# Patient Record
Sex: MALE | Race: BLACK OR AFRICAN AMERICAN | NOT HISPANIC OR LATINO | Employment: FULL TIME | URBAN - METROPOLITAN AREA
[De-identification: names, ages, dates, MRNs, and addresses within clinical notes are randomized per-mention and may not be internally consistent; named-entity substitution may affect disease eponyms.]

---

## 2020-08-19 DIAGNOSIS — Z76.82 ORGAN TRANSPLANT CANDIDATE: Primary | ICD-10-CM

## 2020-08-24 ENCOUNTER — TELEPHONE (OUTPATIENT)
Dept: TRANSPLANT | Facility: CLINIC | Age: 60
End: 2020-08-24

## 2020-09-01 ENCOUNTER — TELEPHONE (OUTPATIENT)
Dept: TRANSPLANT | Facility: CLINIC | Age: 60
End: 2020-09-01

## 2020-10-02 DIAGNOSIS — Z76.82 ORGAN TRANSPLANT CANDIDATE: Primary | ICD-10-CM

## 2020-11-11 NOTE — PROGRESS NOTES
Spoke to patient to complete history for upcoming appointment his wife will come with him to his appointment he is aware that he have to bring a small breakfast to eat after blood is drawn he will not need a

## 2020-11-12 ENCOUNTER — HOSPITAL ENCOUNTER (OUTPATIENT)
Dept: RADIOLOGY | Facility: HOSPITAL | Age: 60
Discharge: HOME | End: 2020-11-12
Attending: NURSE PRACTITIONER
Payer: COMMERCIAL

## 2020-11-12 ENCOUNTER — TELEPHONE (OUTPATIENT)
Dept: TRANSPLANT | Facility: CLINIC | Age: 60
End: 2020-11-12

## 2020-11-12 ENCOUNTER — OFFICE VISIT (OUTPATIENT)
Dept: TRANSPLANT | Facility: CLINIC | Age: 60
End: 2020-11-12
Payer: COMMERCIAL

## 2020-11-12 VITALS
SYSTOLIC BLOOD PRESSURE: 131 MMHG | HEART RATE: 59 BPM | WEIGHT: 194.44 LBS | BODY MASS INDEX: 30.52 KG/M2 | HEIGHT: 67 IN | RESPIRATION RATE: 16 BRPM | OXYGEN SATURATION: 100 % | TEMPERATURE: 96 F | DIASTOLIC BLOOD PRESSURE: 76 MMHG

## 2020-11-12 DIAGNOSIS — D86.0 SARCOIDOSIS OF LUNG: ICD-10-CM

## 2020-11-12 DIAGNOSIS — Z76.82 ORGAN TRANSPLANT CANDIDATE: ICD-10-CM

## 2020-11-12 DIAGNOSIS — N18.6 ESRD ON HEMODIALYSIS: ICD-10-CM

## 2020-11-12 DIAGNOSIS — Z99.2 ESRD ON HEMODIALYSIS: ICD-10-CM

## 2020-11-12 DIAGNOSIS — N18.6 ANEMIA IN ESRD (END-STAGE RENAL DISEASE): ICD-10-CM

## 2020-11-12 DIAGNOSIS — I15.0 RENOVASCULAR HYPERTENSION: ICD-10-CM

## 2020-11-12 DIAGNOSIS — D86.89 NEUROSARCOIDOSIS: ICD-10-CM

## 2020-11-12 DIAGNOSIS — D63.1 ANEMIA IN ESRD (END-STAGE RENAL DISEASE): ICD-10-CM

## 2020-11-12 DIAGNOSIS — Z01.818 PRE-TRANSPLANT EVALUATION FOR CHRONIC KIDNEY DISEASE: Primary | ICD-10-CM

## 2020-11-12 PROCEDURE — 71046 XR CHEST PA AND LATERAL: ICD-10-PCS | Mod: 26,TXP,, | Performed by: RADIOLOGY

## 2020-11-12 PROCEDURE — 72170 XR PELVIS ROUTINE AP: ICD-10-PCS | Mod: 26,TXP,, | Performed by: RADIOLOGY

## 2020-11-12 PROCEDURE — 99203 PR OFFICE/OUTPT VISIT, NEW, LEVL III, 30-44 MIN: ICD-10-PCS | Mod: S$GLB,TXP,, | Performed by: TRANSPLANT SURGERY

## 2020-11-12 PROCEDURE — 99205 OFFICE O/P NEW HI 60 MIN: CPT | Mod: S$GLB,TXP,, | Performed by: NURSE PRACTITIONER

## 2020-11-12 PROCEDURE — 72170 X-RAY EXAM OF PELVIS: CPT | Mod: 26,TXP,, | Performed by: RADIOLOGY

## 2020-11-12 PROCEDURE — 76770 US EXAM ABDO BACK WALL COMP: CPT | Mod: TC,TXP,59

## 2020-11-12 PROCEDURE — 72170 X-RAY EXAM OF PELVIS: CPT | Mod: TC,TXP

## 2020-11-12 PROCEDURE — 76770 US EXAM ABDO BACK WALL COMP: CPT | Mod: 26,59,TXP, | Performed by: RADIOLOGY

## 2020-11-12 PROCEDURE — 99203 OFFICE O/P NEW LOW 30 MIN: CPT | Mod: S$GLB,TXP,, | Performed by: TRANSPLANT SURGERY

## 2020-11-12 PROCEDURE — 71046 X-RAY EXAM CHEST 2 VIEWS: CPT | Mod: TC,TXP

## 2020-11-12 PROCEDURE — 76770 US RETROPERITONEAL COMPLETE: ICD-10-PCS | Mod: 26,59,TXP, | Performed by: RADIOLOGY

## 2020-11-12 PROCEDURE — 71046 X-RAY EXAM CHEST 2 VIEWS: CPT | Mod: 26,TXP,, | Performed by: RADIOLOGY

## 2020-11-12 PROCEDURE — 99999 PR PBB SHADOW E&M-EST. PATIENT-LVL V: ICD-10-PCS | Mod: PBBFAC,TXP,, | Performed by: NURSE PRACTITIONER

## 2020-11-12 PROCEDURE — 93978 VASCULAR STUDY: CPT | Mod: 26,TXP,, | Performed by: RADIOLOGY

## 2020-11-12 PROCEDURE — 93978 VASCULAR STUDY: CPT | Mod: TC,TXP

## 2020-11-12 PROCEDURE — 93978 US DOPP ILIACS BILATERAL: ICD-10-PCS | Mod: 26,TXP,, | Performed by: RADIOLOGY

## 2020-11-12 PROCEDURE — 99999 PR PBB SHADOW E&M-EST. PATIENT-LVL V: CPT | Mod: PBBFAC,TXP,, | Performed by: NURSE PRACTITIONER

## 2020-11-12 PROCEDURE — 99205 PR OFFICE/OUTPT VISIT, NEW, LEVL V, 60-74 MIN: ICD-10-PCS | Mod: S$GLB,TXP,, | Performed by: NURSE PRACTITIONER

## 2020-11-12 RX ORDER — ACETAMINOPHEN, DIPHENHYDRAMINE HCL, PHENYLEPHRINE HCL 325; 25; 5 MG/1; MG/1; MG/1
10 TABLET ORAL NIGHTLY
COMMUNITY
End: 2022-03-10

## 2020-11-12 RX ORDER — AMLODIPINE BESYLATE 2.5 MG/1
2.5 TABLET ORAL NIGHTLY
COMMUNITY
Start: 2020-11-02 | End: 2022-03-10

## 2020-11-12 RX ORDER — ALLOPURINOL 300 MG/1
300 TABLET ORAL DAILY
Status: ON HOLD | COMMUNITY
End: 2023-05-17 | Stop reason: HOSPADM

## 2020-11-12 RX ORDER — BENZONATATE 200 MG/1
1 CAPSULE ORAL 3 TIMES DAILY PRN
Status: ON HOLD | COMMUNITY
Start: 2020-09-22 | End: 2023-05-12 | Stop reason: HOSPADM

## 2020-11-12 RX ORDER — PREDNISONE 5 MG/1
5 TABLET ORAL DAILY
Status: ON HOLD | COMMUNITY
End: 2023-05-11 | Stop reason: HOSPADM

## 2020-11-12 RX ORDER — CALCITRIOL 0.25 UG/1
0.25 CAPSULE ORAL
COMMUNITY
Start: 2020-08-30 | End: 2022-03-10

## 2020-11-12 RX ORDER — SEVELAMER CARBONATE FOR ORAL SUSPENSION 2400 MG/1
POWDER, FOR SUSPENSION ORAL
Status: ON HOLD | COMMUNITY
Start: 2020-09-06 | End: 2023-05-12 | Stop reason: HOSPADM

## 2020-11-12 RX ORDER — BECLOMETHASONE DIPROPIONATE HFA 40 UG/1
1 AEROSOL, METERED RESPIRATORY (INHALATION) 2 TIMES DAILY
COMMUNITY
End: 2022-03-10

## 2020-11-12 RX ORDER — LANOLIN ALCOHOL/MO/W.PET/CERES
400 CREAM (GRAM) TOPICAL 2 TIMES DAILY
Status: ON HOLD | COMMUNITY
End: 2023-05-12 | Stop reason: HOSPADM

## 2020-11-12 RX ORDER — NEBIVOLOL 5 MG/1
5 TABLET ORAL DAILY
COMMUNITY
End: 2022-03-10

## 2020-11-12 NOTE — TELEPHONE ENCOUNTER
Reviewed pt transplant labs.  Notified dialysis unit dietitian of the following abnormal labs via fax and requested their most recent nutrition note on this pt.  Once this note is received it will be scanned into pt's chart.    K 3.1  Alb 3.1

## 2020-11-12 NOTE — PROGRESS NOTES
PHARM.D. PRE-TRANSPLANT NOTE:    This patient's medication therapy was evaluated as part of his pre-transplant evaluation.      The following general pharmacologic concerns were noted: takes prednisone chronically    The following concerns for post-operative pain management were noted: none    The following pharmacologic concerns related to HCV therapy were noted: none      This patient's medication profile was reviewed for considerations for DAA Hepatitis C therapy:    [x]  No current inducers of CYP 3A4 or PGP  [x]  No amiodarone on this patient's EMR profile in the last 24 months  [x]  No past or current atrial fibrillation on this patient's EMR profile       Current Outpatient Medications   Medication Sig Dispense Refill    allopurinoL (ZYLOPRIM) 300 MG tablet Take 300 mg by mouth once daily.      amLODIPine (NORVASC) 2.5 MG tablet Take 2.5 mg by mouth every evening.      beclomethasone dipropionate (QVAR REDIHALER) 40 mcg/actuation HFAB Inhale 1 puff into the lungs 2 (two) times a day.      benzonatate (TESSALON) 200 MG capsule Take 1 capsule by mouth 3 (three) times daily as needed.      magnesium oxide (MAG-OX) 400 mg (241.3 mg magnesium) tablet Take 400 mg by mouth 2 (two) times a day.      melatonin 10 mg Tab Take 10 mg by mouth every evening.      nebivoloL (BYSTOLIC) 5 MG Tab Take 5 mg by mouth once daily.      predniSONE (DELTASONE) 5 MG tablet Take 5 mg by mouth once daily.      sevelamer carbonate (RENVELA) 800 mg Tab Take 800 mg by mouth 3 (three) times daily with meals.      calcitRIOL (ROCALTROL) 0.25 MCG Cap Take 0.25 mcg by mouth 3 (three) times a week.       No current facility-administered medications for this visit.          Currently Mr Parikh is responsible for preparing / administering this patient's medications on a daily basis.  I am available for consultation and can be contacted, as needed by the other members of the Kidney Transplant team.

## 2020-11-12 NOTE — LETTER
November 13, 2020        Quinn Espino  [address]  [address]  [facility] [address]  Phone: [phone]  Fax: [phone]             [facility]  [address]  [facility] [address]  Phone: [phone]   Patient: Abdoulaye Parikh   MR Number: [id number]   YOB: 1960   Date of Visit: 11/12/2020       Dear Dr. Quinn Espino    Thank you for referring Abdoulaye Parikh to me for evaluation. Attached you will find relevant portions of my assessment and plan of care.    If you have questions, please do not hesitate to call me. I look forward to following Abdoulaye Parikh along with you.    Sincerely,    Teodora Guevara, NP    Enclosure    If you would like to receive this communication electronically, please contact [email or web address] or [phone] to request [employer] access.    [employer] is a tool which provides read-only access to select patient information with whom you have a relationship. Its easy to use and provides real time access to review your patients record including encounter summaries, notes, results, and demographic information.    If you feel you have received this communication in error or would no longer like to receive these types of communications, please e-mail [email or web address]

## 2020-11-12 NOTE — PROGRESS NOTES
Transplant Surgery  Kidney Transplant Recipient Evaluation    Referring Physician: Quinn Espino  Current Nephrologist: Quinn Espino    Subjective:     Reason for Visit: evaluate transplant candidacy    History of Present Illness: Abdoulaye Parikh is a 60 y.o. year old male undergoing transplant evaluation.    Dialysis History: Abdoulaye is on hemodialysis.      Transplant History: N/A    Etiology of Renal Disease: Diabetes Mellitus - Type II (based on medical records from referral).    Review of Systems    Objective:     Physical Exam:  Constitutional:   Vitals reviewed: yes   Well-nourished and well-groomed: yes  Eyes:   Sclerae icteric: no   Extraocular movements intact: yes  GI:    Bowel sounds normal: yes   Tenderness: no    If yes, quadrant/location: not applicable   Palpable masses: no    If yes, quadrant/location: not applicable   Hepatosplenomegaly: no   Ascites: no   Hernia: no    If yes, type/location: not applicable   Surgical scars: yes    If yes, type/location: midline  Resp:   Effort normal: yes   Breath sounds normal: yes    CV:   Regular rate and rhythm: yes   Heart sounds normal: yes   Femoral pulses normal: yes   Extremities edematous: no  Skin:   Rashes or lesions present: no    If yes, describe:not applicable   Jaundice:: no    Musculoskeletal:   Gait normal: yes   Strength normal: yes  Psych:   Oriented to person, place, and time: yes   Affect and mood normal: yes    Additional comments: not applicable    Counseling: We provided Abdoulaye Parikh with a group education session today.  We discussed kidney transplantation at length with him, including risks, potential complications, and alternatives in the management of his renal failure.  The discussion included complications related to anesthesia, bleeding, infection, primary nonfunction, and ATN.  I discussed the typical postoperative course, length of hospitalization, the need for long-term immunosuppression, and the need for  long-term routine follow-up.  I discussed living-donor and -donor transplantation and the relative advantages and disadvantages of each.  I also discussed average waiting times for both living donation and  donation.  I discussed national and center-specific survival rates.  I also mentioned the potential benefit of multicenter listing to candidates listed with centers within more than one organ procurement organization.  All questions were answered.    Final determination of transplant candidacy will be made once evaluation is complete and reviewed by the Kidney & Kidney/Pancreas Selection Committee.         Transplant Surgery - Candidacy   Assessment/Plan:   Abdoulaye Parikh has end stage renal disease (ESRD) on dialysis. I see no surgical contraindication to placing a kidney transplant. Based on available information, Abdoulaye Parikh is a suitable kidney transplant candidate.     Chivo Brown MD

## 2020-11-12 NOTE — PROGRESS NOTES
INITIAL PATIENT EDUCATION NOTE    Mr. Abdoulaye Parikh was seen in pre-kidney transplant clinic for evaluation for kidney, kidney/pancreas or pancreas only transplant.  The patient attended a an individual video education session that discussed/reviewed the following aspects of transplantation: evaluation and selection committee process, UNOS waitlist management/multiple listings, types of organs offered (KDPI < 85%, KDPI > 85%, PHS increased risk, DCD, HCV+, HIV+ for HIV+ recipients and enbloc/dual), financial aspects, surgical procedures, dietary instruction pre- and post-transplant, health maintenance pre- and post-transplant, post-transplant hospitalization and outpatient follow-up, potential to participate in a research protocol, and medication management and side effects.  A question and answer session was provided after the presentation.    The patient was seen by all members of the multi-disciplinary team to include: Nephrologist/PA, Surgeon, , Transplant Coordinator, , Pharmacist and Dietician (if applicable).    The patient reviewed and signed all consents for evaluation which were witnessed and sent to scanning into the [facility] chart.    The patient was given an education book and plan for further evaluation based on his individual assessment.      The patient was encouraged to call with any questions or concerns.

## 2020-11-12 NOTE — PROGRESS NOTES
Transplant Nephrology  Kidney Transplant Recipient Evaluation    Referring Physician: Quinn Espino  Current Nephrologist: Quinn Espino    Subjective:   CC:  Initial evaluation of kidney transplant candidacy.    HPI:  Mr. Parikh is a 60 y.o. year old Black or  male who has presented to be evaluated as a potential kidney transplant recipient.  He has ESRD secondary to unknown etiology.  Patient is currently on hemodialysis started on 7/22/2020. Patient is dialyzing on MWF schedule.  Patient reports that he is tolerating dialysis well.. He has a LUE AV fistula for dialysis access. He is dialyzing for 4 hours per session .     Previous Transplant: no    HTN-- diagnosed at age 18    Sarcoidosis  involving the lung   Pulmonologist Dr RASHMI Liu  Chronic cough  Uses an inhaler     Chest CT WO 9/4/20 care everywhere  Result Impression   1. There is a small right pleural effusion, which is slightly decreased in size when compared to the previous study.  2. There is a right ventriculopleural shunt which has distal tip at the medial aspect of the base of the right hemithorax.  3. Multiple enlarged, partially calcified mediastinal lymph nodes as described, not significantly changed in the interval.  4. No acute abnormality is identified. Additional chronic findings as described are stable.     1/29/20 [facility] complete care everywhere   IMPRESSION:   1. Liver stiffness measurement of 16.3 kPa is consistent with cirrhosis.  2. Controlled attenuation parameter measurement of 360.0 dB per meter is consistent with severe hepatic steatosis.  3. There is a small right-sided pleural effusion.  4. Bowel gas obscures some the pancreas can tail.  5. Bilateral renal cysts as detailed above.      Neurosarcoidosis  V/P shunt   Neurologist Dr TYLER Mustafa [facility]/[place]   3/1/2019 CT head WO care everywhere    Result Impression     1.  shunt catheter remains in place. Ventricles are grossly stable in size and  configuration from March 1, 2019.  2. Small right frontal and left frontoparietal subdural hygromas are grossly stable from March 1, 2019.    Electronically Signed By: Yamini Mary MD 3/14/2019 9:11 AM CDT       Result Impression   1. Ventriculomegaly and pneumocephaly have resolved since the prior study.  2. Bilateral extra-axial CSF spaces have enlarged, right greater than left. There is no associated mass effect or sulcal effacement. This increase in size in extra-axial CSF spaces may be secondary to resolution of ventriculomegaly.         Hep C, s/p tx harvonii, liver cirrhosis and steatosis  Per [facility] care everywhere  Dr Saldana / GI/hepatology [facility]   [facility] 1/29/20 care everywhere  IMPRESSION:   1. Liver stiffness measurement of 16.3 kPa is consistent with cirrhosis.  2. Controlled attenuation parameter measurement of 360.0 dB per meter is consistent with severe hepatic steatosis.  3. There is a small right-sided pleural effusion.  4. Bowel gas obscures some the pancreas can tail.  5. Bilateral renal cysts as detailed above.    Electronically Signed By: Gilberto Vines MD 1/29/2020 10:38 AM CST    fx assessment   Rides stationary bike 3x/week for ~ 15-30 minutes, overall looks good .   Denies LE weakness, chest pain, SOB or claudication     Donor-yes  Kidney bx-yes ~ 1990s         Past Medical History:   Diagnosis Date    Anemia     Arthritis     Cirrhosis     Crohn's disease     Disorder of kidney and ureter     Stage 5    Encounter for blood transfusion     Gout     Hearing loss     Hepatitis     Had Hep C Cleared    Hypertension     Neurosarcoidosis 2018    Obesity     Osteoarthritis     Sarcoid neuropathy     Sarcoidosis, lung     Steatosis        Past Medical and Surgical History: Mr. Parikh  has a past medical history of Anemia, Arthritis, Cirrhosis, Crohn's disease, Disorder of kidney and ureter, Encounter for blood transfusion, Gout, Hearing loss, Hepatitis, Hypertension,  Neurosarcoidosis, Obesity, Osteoarthritis, Sarcoid neuropathy, Sarcoidosis, lung, and Steatosis.  He has a past surgical history that includes AV fistula placement (Left); Brain surgery; Joint replacement (Right); Total hip arthroplasty (Right); and CSF shunt.    Past Social and Family History: Mr. Parikh reports that he has never smoked. He has never used smokeless tobacco. He reports previous alcohol use. He reports that he does not use drugs. His family history includes COPD in his sister; Cancer in his father, maternal grandfather, and paternal grandfather; Diabetes in his father; Heart disease in his father and sister; Hypertension in his father and mother; Lung cancer in his maternal grandfather and paternal grandfather; Multiple myeloma in his father; Stroke in his father.    Review of Systems   Constitutional: Positive for unexpected weight change. Negative for activity change, appetite change, chills, fatigue and fever.   HENT: Positive for hearing loss. Negative for congestion, facial swelling, postnasal drip, rhinorrhea, sinus pressure, sore throat and trouble swallowing.         Decreased hearing on left side--wears hearing aid  Complete hearing loss on right   Eyes: Positive for visual disturbance. Negative for pain and redness.        Wears glasses   Respiratory: Positive for cough. Negative for chest tightness, shortness of breath and wheezing.         Chronic dry cough -->Hx sarcoidosis with lung involvement    Cardiovascular: Negative.  Negative for chest pain, palpitations and leg swelling.   Gastrointestinal: Positive for constipation. Negative for abdominal pain, diarrhea, nausea and vomiting.        Hx crohn's    Genitourinary: Positive for decreased urine volume. Negative for dysuria, flank pain and urgency.   Musculoskeletal: Positive for arthralgias. Negative for gait problem, neck pain and neck stiffness.        Osteoarthritis    Skin: Negative for rash.   Allergic/Immunologic: Negative for  "environmental allergies, food allergies and immunocompromised state.   Neurological: Negative for dizziness, weakness, light-headedness and headaches.        Hs neurosarcoidosis  V/P shunt     Psychiatric/Behavioral: Positive for sleep disturbance. Negative for agitation and confusion. The patient is not nervous/anxious.        Objective:   Blood pressure 131/76, pulse (!) 59, temperature 96.2 °F (35.7 °C), temperature source Oral, resp. rate 16, height 5' 7.13" (1.705 m), weight 88.2 kg (194 lb 7.1 oz), SpO2 100 %.body mass index is 30.34 kg/m².        Physical Exam  Vitals signs reviewed.   Constitutional:       Appearance: Normal appearance. He is well-developed.   HENT:      Head: Normocephalic.   Eyes:      Pupils: Pupils are equal, round, and reactive to light.   Neck:      Musculoskeletal: Normal range of motion and neck supple.   Cardiovascular:      Rate and Rhythm: Normal rate and regular rhythm.      Heart sounds: Normal heart sounds.   Pulmonary:      Effort: Pulmonary effort is normal.      Breath sounds: Normal breath sounds.   Abdominal:      General: Bowel sounds are normal.      Palpations: Abdomen is soft.   Musculoskeletal: Normal range of motion.        Arms:    Skin:     General: Skin is warm and dry.   Neurological:      Mental Status: He is alert and oriented to person, place, and time.      Motor: No abnormal muscle tone.   Psychiatric:         Behavior: Behavior normal.             Labs:  Lab Results   Component Value Date    WBC 6.04 03/30/2007    HGB 12.1 (L) 03/30/2007    HCT 37.9 (L) 03/30/2007     03/30/2007    K 4.1 03/30/2007     03/30/2007    CO2 22 (L) 03/30/2007    BUN 26 (H) 03/30/2007    CREATININE 2.2 (H) 03/30/2007    CALCIUM 9.8 03/30/2007    ALBUMIN 4.3 03/30/2007    AST 30 03/30/2007    ALT 45 (H) 03/30/2007       No results found for: PREALBUMIN, BILIRUBINUA, GGT, AMYLASE, LIPASE, PROTEINUA, NITRITE, RBCUA, WBCUA    No results found for: HLAABCTYPE    Labs were " reviewed with the patient.    Assessment:     1. Pre-transplant evaluation for chronic kidney disease    2. ESRD on hemodialysis    3. Renovascular hypertension    4. Sarcoidosis of lung    5. Neurosarcoidosis    6. Anemia in ESRD (end-stage renal disease)    7. BMI 30.0-30.9,adult        Plan:   Pulm clearance: Sarcoidosis  involving the lung   Pulmonologist Dr RASHMI Liu    Neuro clearance : Neurosarcoidosis, V/P shunt   Neurologist Dr TYLER Mustafa [facility]/[place]     Records and clearance   Hep C, s/p tx harvonii, liver cirrhosis and steatosis.  Per [facility] care everywhere  Dr Saldana / GI/hepatology [facility]       Transplant Candidacy:   Based on available information, Mr. Parikh is a high-risk kidney transplant candidate.   Meets center eligibility for accepting HCV+ donor offer - deferred to hepatology .  Patient educated on HCV+ donors. Abdoulaye is willing to accept HCV+ donor offer - yes   Patient is a candidate for KDPI > 85 kidney donor offer - no d/i weight .  Final determination of transplant candidacy will be made once workup is complete and reviewed by the selection committee.    Teodora Guevara NP       [facility] Patient Status  Functional Status: 60% - Requires occasional assistance but is able to care for needs  Physical Capacity: No Limitations

## 2020-11-13 ENCOUNTER — TELEPHONE (OUTPATIENT)
Dept: TRANSPLANT | Facility: CLINIC | Age: 60
End: 2020-11-13

## 2020-11-13 NOTE — PROGRESS NOTES
Transplant Recipient Adult Psychosocial Assessment    Abdoulaye Parikh  [address] [facility] [address]  Telephone Information:   Mobile [phone]   Home  [phone] (home)  Work  There is no work phone number on file.  E-mail  [email or web address]    Sex: male  YOB: 1960  Age: 60 y.o.    Encounter Date: 2020  U[place] Citizen: yes  Primary Language: English   Needed: no    Emergency Contact:  Name: Kalani Parikh  Relationship: wife  Address: [address] [place] [address]; [place] [address]  Phone Numbers:  [phone] (mobile)    Family/Social Support:   Number of dependents/: none  Marital history:  twice,  from first wife and currently  to Kalani since .  Other family dynamics: Pt had one sister who is  and one living brother.  Pt reports support from cousin and children as well as wife.    Household Composition:  Name: Lizbet Parikh  Age: 60 and 49  Relationship: patient and wife  Does person drive? patient drives, wife does not.      Do you and your caregivers have access to reliable transportation? yes  PRIMARY CAREGIVER: Kalani Parikh and Jethro Chaparro will be primary caregiver, phone number [phone]; [phone].      provided in-depth information to patient and caregiver regarding pre- and post-transplant caregiver role.   strongly encourages patient and caregiver to have concrete plan regarding post-transplant care giving, including back-up caregiver(s) to ensure care giving needs are met as needed.    Patient and Caregiver states understanding all aspects of caregiver role/commitment and is able/willing/committed to being caregiver to the fullest extent necessary.    Patient and Caregiver verbalizes understanding of the education provided today and caregiver responsibilities.         remains available. Patient and Caregiver agree to contact  in a timely  "manner if concerns arise.      Able to take time off work without financial concerns: yes. Pt's wife does not work outside the home and cousin is retired.      Additional Significant Others who will Assist with Transplant:  Name: Angelica Chaparro [phone]  Age: 62  City: [place] State: [place]  Relationship: cousin's wife  Does person drive? yes    Living Will: yes  Healthcare Power of : yes  Advance Directives on file: <<no information> per medical record.  Verbally reviewed LW/HCPA information.   provided patient with copy of LW/HCPA documents and provided education on completion of forms.    Living Donors: No. Education and resource information given to patient. 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    Spouse/Significant Other Employment: homemaker    Disabled: no    Monthly Income:  Salary/Wages: $5022.40  Able to afford all costs now and if transplanted, including medications: yes  Patient and Caregiver verbalizes understanding of personal responsibilities related to transplant costs and the importance of having a financial plan to ensure that patients transplant costs are fully covered.       provided fundraising information/education. Patient and Caregiververbalizes understanding.   remains available.    Insurance:   Payor/Plan Subscr  Sex Relation Sub. Ins. ID Effective Group Num   1. BLUE CROSS BL* MATT BHANDARI 1960 Male  [id number] 20 [address] [address]     Primary Insurance (for UNOS reporting): Private Insurance  Secondary Insurance (for UNOS " reporting): None, patient stated he has applied for Medicaid since starting dialysis.  Patient and Caregiver verbalizes clear understanding that patient may experience difficulty obtaining and/or be denied insurance coverage post-surgery. This includes and is not limited to disability insurance, life insurance, health insurance, burial insurance, long term care insurance, and other insurances.      Patient and Caregiver also reports understanding that future health concerns related to or unrelated to transplantation may not be covered by patient's insurance.  Resources and information provided and reviewed.     Patient and Caregiver provides verbal permission to release any necessary information to outside resources for patient care and discharge planning.  Resources and information provided are reviewed.      Dialysis Adherence: Patient reports good compliance with dialysis treatment and MD recommendations.  MAUREEN has sent compliance check letter to [facility].    Infusion Service: patient utilizing? no  Home Health: patient utilizing? no  DME: no  Pulmonary/Cardiac Rehab: none   ADLS:  Pt states ability to independently accomplish all adls.    Adherence:   Pt states current and expected compliance with all healthcare recommendations..  Adherence education and counseling provided.     Per History Section:  Past Medical History:   Diagnosis Date    Anemia     Arthritis     Cirrhosis     Crohn's disease     Disorder of kidney and ureter     Stage 5    Encounter for blood transfusion     Gout     Hearing loss     Hepatitis     Had Hep C Cleared    Hypertension     Neurosarcoidosis 2018    Obesity     Osteoarthritis     Sarcoid neuropathy     Sarcoidosis, lung     Steatosis      Social History     Tobacco Use    Smoking status: Never Smoker    Smokeless tobacco: Never Used   Substance Use Topics    Alcohol use: Not Currently     Social History     Substance and Sexual Activity   Drug Use Never     Social  "History     Substance and Sexual Activity   Sexual Activity Yes    Partners: Female       Per Today's Psychosocial:  Tobacco: none, patient denies any use.  Alcohol: none, patient denies any use.  Illicit Drugs/Non-prescribed Medications: none, patient denies any use.    Patient and Caregiver states clear understanding of the potential impact of substance use as it relates to transplant candidacy and is aware of possible random substance screening.  Substance abstinence/cessation counseling, education and resources provided and reviewed.     Arrests/DWI/Treatment/Rehab: patient denies    Psychiatric History:    Mental Health: Pt denies mental health concerns.  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  He stated he will monitor his mental health and seek support/care if he needs it.  Psychiatrist/Counselor: pt denies  Medications:  Pt denies, however he takes melatonin for sleep and reports persistent sleep problems due to worry.  Suicide/Homicide Issues: Pt denies current or past suicidal/homicidal ideation.   Safety at home: Pt denies any home safety concerns.    Knowledge: Patient and Caregiver states having clear understanding and realistic expectations regarding the potential risks and potential benefits of organ transplantation and organ donation and agrees to discuss with health care team members and support system members, as well as to utilize available resources and express questions and/or concerns in order to further facilitate the pt informed decision-making.  Resources and information provided and reviewed.    Patient and Caregiver is aware of [facility]'s affiliation and/or partnership with agencies in home health care, LTAC, SNF, [facility] and other hospitals and clinics.    Understanding: Patient and Caregiver reports having a clear understanding of the many lifetime " commitments involved with being a transplant recipient, including costs, compliance, medications, lab work, procedures, appointments, concrete and financial planning, preparedness, timely and appropriate communication of concerns, abstinence (ETOH, tobacco, illicit non-prescribed drugs), adherence to all health care team recommendations, support system and caregiver involvement, appropriate and timely resource utilization and follow-through, mental health counseling as needed/recommended, and patient and caregiver responsibilities.  Social Service Handbook, resources and detailed educational information provided and reviewed.  Educational information provided.    Patient and Caregiver also reports current and expected compliance with health care regime and states having a clear understanding of the importance of compliance.      Patient and Caregiver reports a clear understanding that risks and benefits may be involved with organ transplantation and with organ donation.       Patient and Caregiver also reports clear understanding that psychosocial risk factors may affect patient, and include but are not limited to feelings of depression, generalized anxiety, anxiety regarding dependence on others, post traumatic stress disorder, feelings of guilt and other emotional and/or mental concerns, and/or exacerbation of existing mental health concerns.  Detailed resources provided and discussed.      Patient and Caregiver agrees to access appropriate resources in a timely manner as needed and/or as recommended, and to communicate concerns appropriately.  Patient and Caregiver also reports a clear understanding of treatment options available.     Patient and Caregiver received education in a group setting.   reviewed education, provided additional information, and answered questions.    Feelings or Concerns: Pt stated the wait time is his main concern.     Coping: Identify Patient & Caregiver Strategies to  [place]:   1. 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   2. At the time of surgery - family and friends support/ listening to music   3. During post-Transplant & Recovery Period - gradual return to daily routine and previous activities    Goals: continue to work, get off dialysis.  Patient referred to Vocational Rehabilitation.    Interview Behavior: Patient and Caregiver presents as alert and oriented x 4, pleasant, good eye contact, well groomed, recall good, concentration/judgement good, average intelligence, calm, communicative, cooperative and asking and answering questions appropriately. Patient was accompanied by his wife who does not drive and presented today walking with a walker.  She stated she has chronic back pain and is about to begin treatment at a pain management clinic.           Transplant Social Work - Candidacy  Assessment/Plan:     Psychosocial Suitability:  Based on psychosocial risk factors, patient presents as low risk, due to Pt has good support, reports good adherence, a strong financial plan, concrete transportation plan, lives locally .    Recommendations/Additional Comments: recommend fundraising; discuss living donatio with family     Esme Dewitt LCSW

## 2020-11-16 ENCOUNTER — SOCIAL WORK (OUTPATIENT)
Dept: TRANSPLANT | Facility: CLINIC | Age: 60
End: 2020-11-16

## 2020-11-25 ENCOUNTER — TELEPHONE (OUTPATIENT)
Dept: TRANSPLANT | Facility: CLINIC | Age: 60
End: 2020-11-25

## 2020-11-25 NOTE — TELEPHONE ENCOUNTER
Compliance check:  Dialysis unit reports in the past three months pt has had 0 no shows, 0 AMAs, labs , no lab concerns, transportation no concerns noted and family support no concerns noted.    Reported by Susie Lyons LCSW, MARIA ESTHERW via fax back sheet

## 2020-11-25 NOTE — TELEPHONE ENCOUNTER
SOLE consent missing from patient paper work called to informed patient of missing paper work patient agreed to come a bring the consent on 12/3 when he com in for his next appointment

## 2020-12-01 ENCOUNTER — TELEPHONE (OUTPATIENT)
Dept: CARDIOLOGY | Facility: CLINIC | Age: 60
End: 2020-12-01

## 2020-12-03 ENCOUNTER — HOSPITAL ENCOUNTER (OUTPATIENT)
Dept: CARDIOLOGY | Facility: HOSPITAL | Age: 60
Discharge: HOME | End: 2020-12-03
Attending: NURSE PRACTITIONER
Payer: COMMERCIAL

## 2020-12-03 VITALS
HEIGHT: 67 IN | HEART RATE: 80 BPM | SYSTOLIC BLOOD PRESSURE: 130 MMHG | DIASTOLIC BLOOD PRESSURE: 70 MMHG | WEIGHT: 194 LBS | BODY MASS INDEX: 30.45 KG/M2

## 2020-12-03 DIAGNOSIS — Z76.82 ORGAN TRANSPLANT CANDIDATE: ICD-10-CM

## 2020-12-03 LAB
ASCENDING AORTA: 3.38 CM
AV INDEX (PROSTH): 0.87
AV MEAN GRADIENT: 2 MMHG
AV PEAK GRADIENT: 4 MMHG
AV VALVE AREA: 3.23 CM2
AV VELOCITY RATIO: 0.81
BSA FOR ECHO PROCEDURE: 2.04 M2
CV ECHO LV RWT: 0.46 CM
CV PHARM DOSE: 0.4 MG
CV STRESS BASE HR: 67 BPM
DIASTOLIC BLOOD PRESSURE: 67 MMHG
DOP CALC AO PEAK VEL: 0.94 M/S
DOP CALC AO VTI: 17.57 CM
DOP CALC LVOT AREA: 3.7 CM2
DOP CALC LVOT DIAMETER: 2.18 CM
DOP CALC LVOT PEAK VEL: 0.76 M/S
DOP CALC LVOT STROKE VOLUME: 56.82 CM3
DOP CALCLVOT PEAK VEL VTI: 15.23 CM
E WAVE DECELERATION TIME: 192.44 MSEC
E/A RATIO: 0.98
E/E' RATIO: 4.35 M/S
ECHO LV POSTERIOR WALL: 1.1 CM (ref 0.6–1.1)
END DIASTOLIC INDEX-HIGH: 170 ML/M2
END SYSTOLIC INDEX-HIGH: 70 ML/M2
FRACTIONAL SHORTENING: 35 % (ref 28–44)
INTERVENTRICULAR SEPTUM: 1.05 CM (ref 0.6–1.1)
IVRT: 119.89 MSEC
LA MAJOR: 5.01 CM
LA MINOR: 4.98 CM
LA WIDTH: 4.67 CM
LEFT ATRIUM SIZE: 3.89 CM
LEFT ATRIUM VOLUME INDEX: 38.6 ML/M2
LEFT ATRIUM VOLUME: 77.13 CM3
LEFT INTERNAL DIMENSION IN SYSTOLE: 3.08 CM (ref 2.1–4)
LEFT VENTRICLE DIASTOLIC VOLUME INDEX: 52.9 ML/M2
LEFT VENTRICLE DIASTOLIC VOLUME: 105.59 ML
LEFT VENTRICLE MASS INDEX: 93 G/M2
LEFT VENTRICLE SYSTOLIC VOLUME INDEX: 18.8 ML/M2
LEFT VENTRICLE SYSTOLIC VOLUME: 37.46 ML
LEFT VENTRICULAR INTERNAL DIMENSION IN DIASTOLE: 4.76 CM (ref 3.5–6)
LEFT VENTRICULAR MASS: 185.38 G
LV LATERAL E/E' RATIO: 3.33 M/S
LV SEPTAL E/E' RATIO: 6.25 M/S
MV PEAK A VEL: 0.51 M/S
MV PEAK E VEL: 0.5 M/S
MV STENOSIS PRESSURE HALF TIME: 55.81 MS
MV VALVE AREA P 1/2 METHOD: 3.94 CM2
OHS CV CPX 85 PERCENT MAX PREDICTED HEART RATE MALE: 137
OHS CV CPX MAX PREDICTED HEART RATE: 161
OHS CV CPX PATIENT IS FEMALE: 0
OHS CV CPX PATIENT IS MALE: 1
OHS CV CPX PEAK DIASTOLIC BLOOD PRESSURE: 67 MMHG
OHS CV CPX PEAK HEAR RATE: 75 BPM
OHS CV CPX PEAK RATE PRESSURE PRODUCT: 9450
OHS CV CPX PEAK SYSTOLIC BLOOD PRESSURE: 126 MMHG
OHS CV CPX PERCENT MAX PREDICTED HEART RATE ACHIEVED: 47
OHS CV CPX RATE PRESSURE PRODUCT PRESENTING: 8442
PISA TR MAX VEL: 2.33 M/S
PULM VEIN S/D RATIO: 1.13
PV PEAK D VEL: 0.39 M/S
PV PEAK S VEL: 0.44 M/S
RA MAJOR: 4.16 CM
RA PRESSURE: 3 MMHG
RA WIDTH: 3.59 CM
RETIRED EF AND QEF - SEE NOTES: 51 %
RIGHT VENTRICULAR END-DIASTOLIC DIMENSION: 3.6 CM
RV TISSUE DOPPLER FREE WALL SYSTOLIC VELOCITY 1 (APICAL 4 CHAMBER VIEW): 12.19 CM/S
SINUS: 3.64 CM
STJ: 2.68 CM
SYSTOLIC BLOOD PRESSURE: 126 MMHG
TDI LATERAL: 0.15 M/S
TDI SEPTAL: 0.08 M/S
TDI: 0.12 M/S
TR MAX PG: 22 MMHG
TRICUSPID ANNULAR PLANE SYSTOLIC EXCURSION: 2.34 CM
TV REST PULMONARY ARTERY PRESSURE: 25 MMHG

## 2020-12-03 PROCEDURE — 93016 STRESS TEST WITH MYOCARDIAL PERFUSION (CUPID ONLY): ICD-10-PCS | Mod: TXP,,, | Performed by: INTERNAL MEDICINE

## 2020-12-03 PROCEDURE — 93016 CV STRESS TEST SUPVJ ONLY: CPT | Mod: TXP,,, | Performed by: INTERNAL MEDICINE

## 2020-12-03 PROCEDURE — 93306 TTE W/DOPPLER COMPLETE: CPT | Mod: 26,TXP,, | Performed by: INTERNAL MEDICINE

## 2020-12-03 PROCEDURE — 63600175 PHARM REV CODE 636 W HCPCS: Mod: TXP | Performed by: NURSE PRACTITIONER

## 2020-12-03 PROCEDURE — 93018 STRESS TEST WITH MYOCARDIAL PERFUSION (CUPID ONLY): ICD-10-PCS | Mod: TXP,,, | Performed by: INTERNAL MEDICINE

## 2020-12-03 PROCEDURE — 93018 CV STRESS TEST I&R ONLY: CPT | Mod: TXP,,, | Performed by: INTERNAL MEDICINE

## 2020-12-03 PROCEDURE — 78452 STRESS TEST WITH MYOCARDIAL PERFUSION (CUPID ONLY): ICD-10-PCS | Mod: 26,TXP,, | Performed by: INTERNAL MEDICINE

## 2020-12-03 PROCEDURE — 93306 TTE W/DOPPLER COMPLETE: CPT | Mod: TXP

## 2020-12-03 PROCEDURE — A9502 TC99M TETROFOSMIN: HCPCS | Mod: TXP

## 2020-12-03 PROCEDURE — 93306 ECHO (CUPID ONLY): ICD-10-PCS | Mod: 26,TXP,, | Performed by: INTERNAL MEDICINE

## 2020-12-03 PROCEDURE — 78452 HT MUSCLE IMAGE SPECT MULT: CPT | Mod: 26,TXP,, | Performed by: INTERNAL MEDICINE

## 2020-12-03 RX ORDER — REGADENOSON 0.08 MG/ML
0.4 INJECTION, SOLUTION INTRAVENOUS ONCE
Status: COMPLETED | OUTPATIENT
Start: 2020-12-03 | End: 2020-12-03

## 2020-12-03 RX ADMIN — REGADENOSON 0.4 MG: 0.08 INJECTION, SOLUTION INTRAVENOUS at 09:12

## 2020-12-07 ENCOUNTER — TELEPHONE (OUTPATIENT)
Dept: TRANSPLANT | Facility: CLINIC | Age: 60
End: 2020-12-07

## 2020-12-07 DIAGNOSIS — R94.39 ABNORMAL STRESS TEST: Primary | ICD-10-CM

## 2020-12-07 NOTE — TELEPHONE ENCOUNTER
----- Message from Teodora Guevara NP sent at 12/4/2020 12:42 PM CST -----  Results reviewed, action needed.  Cardiology referral

## 2020-12-07 NOTE — TELEPHONE ENCOUNTER
I spoke with Mr Parikh and attempted to explain test results to the best of my ability. He was understandably concerned and I tried to reassure him that the fact he is asyptomatic is good but we must pursue further guidance since his testing is abnormal. He verbalized understanding.    ----- Message from Teodora Guevara NP sent at 12/4/2020 12:42 PM CST -----  Results reviewed, action needed.  Cardiology referral

## 2020-12-23 ENCOUNTER — PATIENT MESSAGE (OUTPATIENT)
Dept: TRANSPLANT | Facility: CLINIC | Age: 60
End: 2020-12-23

## 2020-12-28 ENCOUNTER — TELEPHONE (OUTPATIENT)
Dept: TRANSPLANT | Facility: CLINIC | Age: 60
End: 2020-12-28

## 2020-12-28 NOTE — TELEPHONE ENCOUNTER
Mr Parikh is requesting he be seen by his own cardiologist for transplant clearance as opposed to coming to [facility]. I advised that I will cancel that appt and fax his echo and stress test on to Dr Javed YORK [phone].    ----- Message from Niki Elliott sent at 12/28/2020 10:52 AM CST -----  Regarding: Advice  Contact: pt  Reason: Calling to speak with coordinator.    Communication: [phone]

## 2021-01-08 ENCOUNTER — PATIENT MESSAGE (OUTPATIENT)
Dept: TRANSPLANT | Facility: CLINIC | Age: 61
End: 2021-01-08

## 2021-01-27 ENCOUNTER — TELEPHONE (OUTPATIENT)
Dept: TRANSPLANT | Facility: CLINIC | Age: 61
End: 2021-01-27

## 2021-02-03 ENCOUNTER — TELEPHONE (OUTPATIENT)
Dept: TRANSPLANT | Facility: CLINIC | Age: 61
End: 2021-02-03

## 2021-02-03 DIAGNOSIS — Z76.82 ORGAN TRANSPLANT CANDIDATE: Primary | ICD-10-CM

## 2021-02-06 ENCOUNTER — HOSPITAL ENCOUNTER (OUTPATIENT)
Dept: RADIOLOGY | Facility: HOSPITAL | Age: 61
Discharge: HOME | End: 2021-02-06
Attending: NURSE PRACTITIONER
Payer: MEDICARE

## 2021-02-06 DIAGNOSIS — Z76.82 ORGAN TRANSPLANT CANDIDATE: ICD-10-CM

## 2021-02-06 PROCEDURE — 72192 CT PELVIS WITHOUT CONTRAST: ICD-10-PCS | Mod: 26,TXP,, | Performed by: RADIOLOGY

## 2021-02-06 PROCEDURE — 72192 CT PELVIS W/O DYE: CPT | Mod: 26,TXP,, | Performed by: RADIOLOGY

## 2021-02-06 PROCEDURE — 72192 CT PELVIS W/O DYE: CPT | Mod: TC,TXP

## 2021-02-12 ENCOUNTER — PATIENT MESSAGE (OUTPATIENT)
Dept: TRANSPLANT | Facility: CLINIC | Age: 61
End: 2021-02-12

## 2021-02-12 ENCOUNTER — COMMITTEE REVIEW (OUTPATIENT)
Dept: TRANSPLANT | Facility: CLINIC | Age: 61
End: 2021-02-12

## 2021-02-13 ENCOUNTER — PATIENT MESSAGE (OUTPATIENT)
Dept: TRANSPLANT | Facility: CLINIC | Age: 61
End: 2021-02-13

## 2021-02-26 ENCOUNTER — PATIENT MESSAGE (OUTPATIENT)
Dept: TRANSPLANT | Facility: CLINIC | Age: 61
End: 2021-02-26

## 2021-03-04 ENCOUNTER — TELEPHONE (OUTPATIENT)
Dept: TRANSPLANT | Facility: CLINIC | Age: 61
End: 2021-03-04

## 2021-03-05 ENCOUNTER — TELEPHONE (OUTPATIENT)
Dept: TRANSPLANT | Facility: CLINIC | Age: 61
End: 2021-03-05

## 2021-03-10 ENCOUNTER — TELEPHONE (OUTPATIENT)
Dept: TRANSPLANT | Facility: CLINIC | Age: 61
End: 2021-03-10

## 2021-03-13 ENCOUNTER — PATIENT MESSAGE (OUTPATIENT)
Dept: TRANSPLANT | Facility: CLINIC | Age: 61
End: 2021-03-13

## 2021-03-15 ENCOUNTER — TELEPHONE (OUTPATIENT)
Dept: TRANSPLANT | Facility: CLINIC | Age: 61
End: 2021-03-15

## 2021-03-15 DIAGNOSIS — Z76.82 ORGAN TRANSPLANT CANDIDATE: Primary | ICD-10-CM

## 2021-04-06 ENCOUNTER — TELEPHONE (OUTPATIENT)
Dept: TRANSPLANT | Facility: CLINIC | Age: 61
End: 2021-04-06

## 2021-04-06 DIAGNOSIS — Z76.82 ORGAN TRANSPLANT CANDIDATE: Primary | ICD-10-CM

## 2021-04-08 ENCOUNTER — LAB VISIT (OUTPATIENT)
Dept: LAB | Facility: HOSPITAL | Age: 61
End: 2021-04-08
Payer: MEDICARE

## 2021-04-08 DIAGNOSIS — Z76.82 ORGAN TRANSPLANT CANDIDATE: ICD-10-CM

## 2021-04-08 PROCEDURE — 36415 COLL VENOUS BLD VENIPUNCTURE: CPT | Mod: TXP | Performed by: NURSE PRACTITIONER

## 2021-04-08 PROCEDURE — 86886 COOMBS TEST INDIRECT TITER: CPT | Mod: TXP | Performed by: NURSE PRACTITIONER

## 2021-04-09 LAB — BLD GP AB SCN TITR SERPL: 2 {TITER}

## 2021-04-16 ENCOUNTER — PATIENT MESSAGE (OUTPATIENT)
Dept: RESEARCH | Facility: HOSPITAL | Age: 61
End: 2021-04-16

## 2021-04-16 PROCEDURE — 99001 SPECIMEN HANDLING PT-LAB: CPT | Mod: PO,TXP | Performed by: NURSE PRACTITIONER

## 2021-04-27 ENCOUNTER — LAB VISIT (OUTPATIENT)
Dept: LAB | Facility: HOSPITAL | Age: 61
End: 2021-04-27
Payer: COMMERCIAL

## 2021-04-27 DIAGNOSIS — Z76.82 ORGAN TRANSPLANT CANDIDATE: ICD-10-CM

## 2021-05-05 LAB
HLA FREEZE AND HOLD INTERPRETATION: NORMAL
HLAFH COLLECTION DATE: NORMAL
HPRA INTERPRETATION: NORMAL

## 2021-05-12 PROCEDURE — 86829 HLA CLASS I/II ANTIBODY QUAL: CPT | Mod: 91,PO,TXP | Performed by: NURSE PRACTITIONER

## 2021-05-12 PROCEDURE — 86829 HLA CLASS I/II ANTIBODY QUAL: CPT | Mod: PO,TXP | Performed by: NURSE PRACTITIONER

## 2021-05-26 ENCOUNTER — LAB VISIT (OUTPATIENT)
Dept: LAB | Facility: HOSPITAL | Age: 61
End: 2021-05-26
Payer: COMMERCIAL

## 2021-05-26 DIAGNOSIS — Z76.82 ORGAN TRANSPLANT CANDIDATE: ICD-10-CM

## 2021-06-21 LAB — HPRA INTERPRETATION: NORMAL

## 2021-07-12 ENCOUNTER — TELEPHONE (OUTPATIENT)
Dept: TRANSPLANT | Facility: CLINIC | Age: 61
End: 2021-07-12

## 2021-07-12 DIAGNOSIS — Z76.82 ORGAN TRANSPLANT CANDIDATE: Primary | ICD-10-CM

## 2021-07-12 PROCEDURE — 86829 HLA CLASS I/II ANTIBODY QUAL: CPT | Mod: 91,PO,TXP | Performed by: NURSE PRACTITIONER

## 2021-07-12 PROCEDURE — 86829 HLA CLASS I/II ANTIBODY QUAL: CPT | Mod: PO,TXP | Performed by: NURSE PRACTITIONER

## 2021-07-16 ENCOUNTER — LAB VISIT (OUTPATIENT)
Dept: LAB | Facility: HOSPITAL | Age: 61
End: 2021-07-16
Payer: COMMERCIAL

## 2021-07-16 DIAGNOSIS — Z76.82 ORGAN TRANSPLANT CANDIDATE: ICD-10-CM

## 2021-07-17 ENCOUNTER — LAB VISIT (OUTPATIENT)
Dept: LAB | Facility: HOSPITAL | Age: 61
End: 2021-07-17
Payer: MEDICARE

## 2021-07-17 DIAGNOSIS — Z76.82 ORGAN TRANSPLANT CANDIDATE: ICD-10-CM

## 2021-07-17 LAB — BLD GP AB SCN TITR SERPL: 2 {TITER}

## 2021-07-17 PROCEDURE — 36415 COLL VENOUS BLD VENIPUNCTURE: CPT | Mod: TXP | Performed by: NURSE PRACTITIONER

## 2021-07-17 PROCEDURE — 86886 COOMBS TEST INDIRECT TITER: CPT | Mod: TXP | Performed by: NURSE PRACTITIONER

## 2021-08-04 LAB — HPRA INTERPRETATION: NORMAL

## 2021-10-13 ENCOUNTER — TELEPHONE (OUTPATIENT)
Dept: TRANSPLANT | Facility: CLINIC | Age: 61
End: 2021-10-13

## 2021-10-13 ENCOUNTER — TELEPHONE (OUTPATIENT)
Dept: TRANSPLANT | Facility: HOSPITAL | Age: 61
End: 2021-10-13

## 2021-10-14 ENCOUNTER — TELEPHONE (OUTPATIENT)
Dept: TRANSPLANT | Facility: CLINIC | Age: 61
End: 2021-10-14

## 2021-10-27 DIAGNOSIS — Z76.82 ORGAN TRANSPLANT CANDIDATE: Primary | ICD-10-CM

## 2021-11-01 ENCOUNTER — LAB VISIT (OUTPATIENT)
Dept: LAB | Facility: HOSPITAL | Age: 61
End: 2021-11-01
Payer: MEDICARE

## 2021-11-01 DIAGNOSIS — Z76.82 ORGAN TRANSPLANT CANDIDATE: ICD-10-CM

## 2021-11-01 PROCEDURE — 36415 COLL VENOUS BLD VENIPUNCTURE: CPT | Mod: TXP | Performed by: NURSE PRACTITIONER

## 2021-11-01 PROCEDURE — 86886 COOMBS TEST INDIRECT TITER: CPT | Mod: TXP | Performed by: NURSE PRACTITIONER

## 2021-11-02 LAB — BLD GP AB SCN TITR SERPL: 4 {TITER}

## 2021-12-23 ENCOUNTER — TELEPHONE (OUTPATIENT)
Dept: TRANSPLANT | Facility: CLINIC | Age: 61
End: 2021-12-23
Payer: COMMERCIAL

## 2021-12-23 DIAGNOSIS — Z76.82 AWAITING ORGAN TRANSPLANT STATUS: Primary | ICD-10-CM

## 2021-12-24 ENCOUNTER — TELEPHONE (OUTPATIENT)
Dept: TRANSPLANT | Facility: HOSPITAL | Age: 61
End: 2021-12-24
Payer: COMMERCIAL

## 2021-12-24 ENCOUNTER — TELEPHONE (OUTPATIENT)
Dept: TRANSPLANT | Facility: CLINIC | Age: 61
End: 2021-12-24
Payer: COMMERCIAL

## 2021-12-24 ENCOUNTER — LAB VISIT (OUTPATIENT)
Dept: LAB | Facility: HOSPITAL | Age: 61
End: 2021-12-24
Payer: COMMERCIAL

## 2021-12-24 DIAGNOSIS — Z76.82 ORGAN TRANSPLANT CANDIDATE: ICD-10-CM

## 2021-12-24 LAB
HLA VIRTUAL CROSSMATCH INTERPRETATION: NORMAL
HLVXM TESTING DATE: NORMAL

## 2021-12-28 DIAGNOSIS — Z76.82 ORGAN TRANSPLANT CANDIDATE: Primary | ICD-10-CM

## 2022-01-05 ENCOUNTER — TELEPHONE (OUTPATIENT)
Dept: TRANSPLANT | Facility: CLINIC | Age: 62
End: 2022-01-05
Payer: COMMERCIAL

## 2022-01-20 ENCOUNTER — TELEPHONE (OUTPATIENT)
Dept: TRANSPLANT | Facility: CLINIC | Age: 62
End: 2022-01-20
Payer: COMMERCIAL

## 2022-01-21 NOTE — PROGRESS NOTES
On-Call Note  SCOTT# [id number]     Informed pt that he is still being considered a back-up candidate for this offer. Pt advised to continue normal routine, attend dialysis and he will be updated throughout the day. All other questions and concerns addressed. Pt verbalized understanding.

## 2022-01-21 NOTE — TELEPHONE ENCOUNTER
ON-CALL NOTE    OS# [id number]    Notified by Johnnie Mar, , that Abdoulaye Parikh is eligible for kidney offer.  Spoke with patient and identified no acute medical issues with telephone assessment. Protocol script read to patient regarding HCV+ donor offer. Patient verbalized understanding, all questions answered, patient accepts organ offer. Notified by Johnnie Mar that virtual crossmatch is negative.  Current sample of blood is available from date 12/23/21 for crossmatch.  Patient reports no sensitizing event since last blood sample for PRA received. Will notify [facility] to perform a retrospective  crossmatch per guideline.    Patient was asked if they have had a positive COVID-19 test or if they have any signs or symptoms. Informed patient that they will be tested for COVID-19 upon arrival to the hospital, unless have a previous positive result. If tested and result is positive, the transplant will not be able to occur, they will be inactivated on the wait list for 28 days per protocol and required to quarantine.     Patient accepted offer as a back-up candidate, notified of plan and states understanding.  Dialysis unit notified. Patient on stand-by.

## 2022-01-22 ENCOUNTER — TELEPHONE (OUTPATIENT)
Dept: TRANSPLANT | Facility: CLINIC | Age: 62
End: 2022-01-22
Payer: COMMERCIAL

## 2022-01-22 NOTE — TELEPHONE ENCOUNTER
On-Call Note  UNOS# [id number]    Notified by , Johnnie Florez that we are declining  the kidney for our recipient due to quality. Updated pt that kidney is not suitable and he is released from this organ offer. Pt verbalized understanding. All questions and concerns addressed. Team notified.

## 2022-02-09 DIAGNOSIS — Z76.82 ORGAN TRANSPLANT CANDIDATE: Primary | ICD-10-CM

## 2022-02-17 ENCOUNTER — LAB VISIT (OUTPATIENT)
Dept: LAB | Facility: HOSPITAL | Age: 62
End: 2022-02-17
Payer: MEDICARE

## 2022-02-17 DIAGNOSIS — Z76.82 ORGAN TRANSPLANT CANDIDATE: ICD-10-CM

## 2022-02-17 PROCEDURE — 36415 COLL VENOUS BLD VENIPUNCTURE: CPT | Mod: TXP | Performed by: NURSE PRACTITIONER

## 2022-02-17 PROCEDURE — 86886 COOMBS TEST INDIRECT TITER: CPT | Mod: TXP | Performed by: NURSE PRACTITIONER

## 2022-02-18 LAB — BLD GP AB SCN TITR SERPL: 2 {TITER}

## 2022-03-09 DIAGNOSIS — Z76.82 ORGAN TRANSPLANT CANDIDATE: Primary | ICD-10-CM

## 2022-03-10 ENCOUNTER — HOSPITAL ENCOUNTER (OUTPATIENT)
Dept: RADIOLOGY | Facility: HOSPITAL | Age: 62
Discharge: HOME | End: 2022-03-10
Attending: NURSE PRACTITIONER
Payer: COMMERCIAL

## 2022-03-10 ENCOUNTER — OFFICE VISIT (OUTPATIENT)
Dept: TRANSPLANT | Facility: CLINIC | Age: 62
End: 2022-03-10
Payer: COMMERCIAL

## 2022-03-10 ENCOUNTER — HOSPITAL ENCOUNTER (OUTPATIENT)
Dept: CARDIOLOGY | Facility: HOSPITAL | Age: 62
Discharge: HOME | End: 2022-03-10
Attending: NURSE PRACTITIONER
Payer: MEDICARE

## 2022-03-10 VITALS
WEIGHT: 180.13 LBS | BODY MASS INDEX: 28.27 KG/M2 | DIASTOLIC BLOOD PRESSURE: 73 MMHG | HEIGHT: 67 IN | SYSTOLIC BLOOD PRESSURE: 128 MMHG | HEART RATE: 68 BPM | TEMPERATURE: 97 F | RESPIRATION RATE: 16 BRPM | OXYGEN SATURATION: 98 %

## 2022-03-10 VITALS
WEIGHT: 194 LBS | SYSTOLIC BLOOD PRESSURE: 125 MMHG | BODY MASS INDEX: 30.45 KG/M2 | DIASTOLIC BLOOD PRESSURE: 70 MMHG | HEIGHT: 67 IN | HEART RATE: 61 BPM

## 2022-03-10 DIAGNOSIS — N18.6 ESRD ON HEMODIALYSIS: ICD-10-CM

## 2022-03-10 DIAGNOSIS — Z76.82 ORGAN TRANSPLANT CANDIDATE: ICD-10-CM

## 2022-03-10 DIAGNOSIS — D86.0 SARCOIDOSIS OF LUNG: ICD-10-CM

## 2022-03-10 DIAGNOSIS — D86.89 NEUROSARCOIDOSIS: ICD-10-CM

## 2022-03-10 DIAGNOSIS — Z76.82 AWAITING ORGAN TRANSPLANT STATUS: Primary | ICD-10-CM

## 2022-03-10 DIAGNOSIS — Z99.2 ESRD ON HEMODIALYSIS: ICD-10-CM

## 2022-03-10 DIAGNOSIS — I15.0 RENOVASCULAR HYPERTENSION: ICD-10-CM

## 2022-03-10 DIAGNOSIS — N25.81 SECONDARY HYPERPARATHYROIDISM: ICD-10-CM

## 2022-03-10 LAB
ASCENDING AORTA: 3.41 CM
AV INDEX (PROSTH): 0.76
AV MEAN GRADIENT: 3 MMHG
AV PEAK GRADIENT: 7 MMHG
AV VALVE AREA: 3.64 CM2
AV VELOCITY RATIO: 0.77
BSA FOR ECHO PROCEDURE: 2.04 M2
CV ECHO LV RWT: 0.32 CM
DOP CALC AO PEAK VEL: 1.3 M/S
DOP CALC AO VTI: 24.73 CM
DOP CALC LVOT AREA: 4.8 CM2
DOP CALC LVOT DIAMETER: 2.47 CM
DOP CALC LVOT PEAK VEL: 1 M/S
DOP CALC LVOT STROKE VOLUME: 89.94 CM3
DOP CALCLVOT PEAK VEL VTI: 18.78 CM
E WAVE DECELERATION TIME: 292.27 MSEC
E/A RATIO: 1.06
E/E' RATIO: 5.79 M/S
ECHO LV POSTERIOR WALL: 0.68 CM (ref 0.6–1.1)
EJECTION FRACTION: 65 %
FRACTIONAL SHORTENING: 36 % (ref 28–44)
INTERVENTRICULAR SEPTUM: 0.73 CM (ref 0.6–1.1)
LA MAJOR: 4.27 CM
LA MINOR: 4.31 CM
LA WIDTH: 3.76 CM
LEFT ATRIUM SIZE: 4.33 CM
LEFT ATRIUM VOLUME INDEX MOD: 20.7 ML/M2
LEFT ATRIUM VOLUME INDEX: 29.7 ML/M2
LEFT ATRIUM VOLUME MOD: 41.37 CM3
LEFT ATRIUM VOLUME: 59.37 CM3
LEFT INTERNAL DIMENSION IN SYSTOLE: 2.77 CM (ref 2.1–4)
LEFT VENTRICLE DIASTOLIC VOLUME INDEX: 41.71 ML/M2
LEFT VENTRICLE DIASTOLIC VOLUME: 83.42 ML
LEFT VENTRICLE MASS INDEX: 45 G/M2
LEFT VENTRICLE SYSTOLIC VOLUME INDEX: 14.4 ML/M2
LEFT VENTRICLE SYSTOLIC VOLUME: 28.8 ML
LEFT VENTRICULAR INTERNAL DIMENSION IN DIASTOLE: 4.31 CM (ref 3.5–6)
LEFT VENTRICULAR MASS: 89.7 G
LV LATERAL E/E' RATIO: 5 M/S
LV SEPTAL E/E' RATIO: 6.88 M/S
MV A" WAVE DURATION": 14.46 MSEC
MV PEAK A VEL: 0.52 M/S
MV PEAK E VEL: 0.55 M/S
MV STENOSIS PRESSURE HALF TIME: 84.76 MS
MV VALVE AREA P 1/2 METHOD: 2.6 CM2
PISA TR MAX VEL: 2.51 M/S
PULM VEIN S/D RATIO: 1.11
PV PEAK D VEL: 0.37 M/S
PV PEAK S VEL: 0.41 M/S
QEF: 68 %
RA MAJOR: 3.54 CM
RA PRESSURE: 3 MMHG
RA WIDTH: 2.99 CM
RIGHT VENTRICULAR END-DIASTOLIC DIMENSION: 3.25 CM
RV TISSUE DOPPLER FREE WALL SYSTOLIC VELOCITY 1 (APICAL 4 CHAMBER VIEW): 9.51 CM/S
SINUS: 3.03 CM
STJ: 2.93 CM
TDI LATERAL: 0.11 M/S
TDI SEPTAL: 0.08 M/S
TDI: 0.1 M/S
TR MAX PG: 25 MMHG
TRICUSPID ANNULAR PLANE SYSTOLIC EXCURSION: 2.36 CM
TV REST PULMONARY ARTERY PRESSURE: 28 MMHG

## 2022-03-10 PROCEDURE — 71046 X-RAY EXAM CHEST 2 VIEWS: CPT | Mod: TC,TXP

## 2022-03-10 PROCEDURE — 1160F RVW MEDS BY RX/DR IN RCRD: CPT | Mod: CPTII,S$GLB,TXP, | Performed by: NURSE PRACTITIONER

## 2022-03-10 PROCEDURE — 76770 US EXAM ABDO BACK WALL COMP: CPT | Mod: TC,TXP

## 2022-03-10 PROCEDURE — 93306 ECHO (CUPID ONLY): ICD-10-PCS | Mod: 26,TXP,, | Performed by: INTERNAL MEDICINE

## 2022-03-10 PROCEDURE — 76770 US RETROPERITONEAL COMPLETE: ICD-10-PCS | Mod: 26,TXP,, | Performed by: RADIOLOGY

## 2022-03-10 PROCEDURE — 71046 XR CHEST PA AND LATERAL: ICD-10-PCS | Mod: 26,TXP,, | Performed by: RADIOLOGY

## 2022-03-10 PROCEDURE — 1160F PR REVIEW ALL MEDS BY PRESCRIBER/CLIN PHARMACIST DOCUMENTED: ICD-10-PCS | Mod: CPTII,S$GLB,TXP, | Performed by: NURSE PRACTITIONER

## 2022-03-10 PROCEDURE — 1159F MED LIST DOCD IN RCRD: CPT | Mod: CPTII,S$GLB,TXP, | Performed by: NURSE PRACTITIONER

## 2022-03-10 PROCEDURE — 3078F PR MOST RECENT DIASTOLIC BLOOD PRESSURE < 80 MM HG: ICD-10-PCS | Mod: CPTII,S$GLB,TXP, | Performed by: NURSE PRACTITIONER

## 2022-03-10 PROCEDURE — 3008F BODY MASS INDEX DOCD: CPT | Mod: CPTII,S$GLB,TXP, | Performed by: NURSE PRACTITIONER

## 2022-03-10 PROCEDURE — 1159F PR MEDICATION LIST DOCUMENTED IN MEDICAL RECORD: ICD-10-PCS | Mod: CPTII,S$GLB,TXP, | Performed by: NURSE PRACTITIONER

## 2022-03-10 PROCEDURE — 3074F PR MOST RECENT SYSTOLIC BLOOD PRESSURE < 130 MM HG: ICD-10-PCS | Mod: CPTII,S$GLB,TXP, | Performed by: NURSE PRACTITIONER

## 2022-03-10 PROCEDURE — 99214 PR OFFICE/OUTPT VISIT, EST, LEVL IV, 30-39 MIN: ICD-10-PCS | Mod: S$GLB,TXP,, | Performed by: NURSE PRACTITIONER

## 2022-03-10 PROCEDURE — 99999 PR PBB SHADOW E&M-EST. PATIENT-LVL V: CPT | Mod: PBBFAC,TXP,, | Performed by: NURSE PRACTITIONER

## 2022-03-10 PROCEDURE — 3008F PR BODY MASS INDEX (BMI) DOCUMENTED: ICD-10-PCS | Mod: CPTII,S$GLB,TXP, | Performed by: NURSE PRACTITIONER

## 2022-03-10 PROCEDURE — 99214 OFFICE O/P EST MOD 30 MIN: CPT | Mod: S$GLB,TXP,, | Performed by: NURSE PRACTITIONER

## 2022-03-10 PROCEDURE — 76770 US EXAM ABDO BACK WALL COMP: CPT | Mod: 26,TXP,, | Performed by: RADIOLOGY

## 2022-03-10 PROCEDURE — 3074F SYST BP LT 130 MM HG: CPT | Mod: CPTII,S$GLB,TXP, | Performed by: NURSE PRACTITIONER

## 2022-03-10 PROCEDURE — 3078F DIAST BP <80 MM HG: CPT | Mod: CPTII,S$GLB,TXP, | Performed by: NURSE PRACTITIONER

## 2022-03-10 PROCEDURE — 93306 TTE W/DOPPLER COMPLETE: CPT | Mod: TXP

## 2022-03-10 PROCEDURE — 99999 PR PBB SHADOW E&M-EST. PATIENT-LVL V: ICD-10-PCS | Mod: PBBFAC,TXP,, | Performed by: NURSE PRACTITIONER

## 2022-03-10 PROCEDURE — 71046 X-RAY EXAM CHEST 2 VIEWS: CPT | Mod: 26,TXP,, | Performed by: RADIOLOGY

## 2022-03-10 PROCEDURE — 93306 TTE W/DOPPLER COMPLETE: CPT | Mod: 26,TXP,, | Performed by: INTERNAL MEDICINE

## 2022-03-10 RX ORDER — TEMAZEPAM 15 MG/1
15 CAPSULE ORAL NIGHTLY PRN
COMMUNITY
End: 2023-04-25

## 2022-03-10 RX ORDER — METOPROLOL SUCCINATE 25 MG/1
12.5 TABLET, EXTENDED RELEASE ORAL
Status: ON HOLD | COMMUNITY
End: 2023-05-12 | Stop reason: HOSPADM

## 2022-03-10 RX ORDER — FLUTICASONE FUROATE 100 UG/1
100 POWDER RESPIRATORY (INHALATION) DAILY
COMMUNITY
End: 2023-06-30

## 2022-03-10 NOTE — PROGRESS NOTES
Kidney Transplant Recipient Reevalulation    Referring Physician: Quinn Espino  Current Nephrologist: Quinn Espino  Waitlist Status: active  Dialysis Start Date: 7/22/2020    Subjective:     CC:  Annual reassessment of kidney transplant candidacy.    HPI:  Mr. Parikh is a 61 y.o. year old Black or  male with ESRD secondary to unknown etiology.  He has been on the wait list for a kidney transplant at [facility] since 7/22/2020. Patient is currently on hemodialysis started on 7/22/2020. Patient is dialyzing on MWF schedule.  Patient reports that he is tolerating dialysis well.. He has a LUE AV fistula. Running 3 hours, 15 minutes per session, no hypotension with dialysis treatments. Patient denies any recent hospitalizations or ED visits.    Neurosarcoidosis-has  shunt    Sarcoidosis of lung-stable with no acute issues, on prednisone    HX HCV infection s/p Harvoni treatment 2020    Had positive stress test followed by [facility] 1/2021 with no evidence of obstructive CAD.    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 Exercises at home on stationary bike. Looks great, not frail.      CXR 3/10/2022: No acute cardiopulmonary disease and no significant interval change.   shunt tube remains in place. Stable mild blunting of the right costophrenic angle.    Renal US 3/10/2022: Findings consistent with bilateral chronic medical disease. 6 mm non obstructing left nephrolith. Bilateral simple cysts, similar prior.    Echo 3/10/2022: EF 68%, PA 28        Current Outpatient Medications   Medication Sig Dispense Refill    allopurinoL (ZYLOPRIM) 300 MG tablet Take 300 mg by mouth once daily.      benzonatate (TESSALON) 200 MG capsule Take 1 capsule by mouth 3 (three) times daily as needed.      fluticasone furoate (ARNUITY ELLIPTA) 100 mcg/actuation inhaler Inhale 100 mcg into the lungs once daily. Controller      magnesium oxide (MAG-OX) 400 mg (241.3 mg magnesium) tablet Take 400 mg  by mouth 2 (two) times a day.      metoprolol succinate (TOPROL-XL) 25 MG 24 hr tablet Take 12.5 mg by mouth every Tuesday, Thursday, Saturday, Sunday.      predniSONE (DELTASONE) 5 MG tablet Take 5 mg by mouth once daily.      sevelamer carbonate (RENVELA) 800 mg Tab Take 1,600 mg by mouth 3 (three) times daily with meals.      temazepam (RESTORIL) 15 mg Cap Take 15 mg by mouth nightly as needed.       No current facility-administered medications for this visit.       Past Medical History:   Diagnosis Date    Anemia     Arthritis     Cirrhosis     Crohn's disease     Disorder of kidney and ureter     Stage 5    Encounter for blood transfusion     Gout     Hearing loss     Hepatitis     Had Hep C Cleared    Hypertension     Neurosarcoidosis 2018    Obesity     Osteoarthritis     Sarcoid neuropathy     Sarcoidosis, lung     Steatosis        Review of Systems   Constitutional: Negative for activity change, appetite change and fever.   HENT: Positive for hearing loss. Negative for congestion, mouth sores and sore throat.    Eyes: Positive for visual disturbance.        Wears glasses   Respiratory: Negative for cough, chest tightness and shortness of breath.    Cardiovascular: Negative for chest pain, palpitations and leg swelling.   Gastrointestinal: Negative for abdominal distention, abdominal pain, constipation, diarrhea and nausea.   Genitourinary: Positive for decreased urine volume. Negative for difficulty urinating, frequency and hematuria.        Voiding ~3x/day   Musculoskeletal: Negative for arthralgias and gait problem.   Skin: Negative for wound.   Allergic/Immunologic: Negative for environmental allergies, food allergies and immunocompromised state.   Neurological: Negative for dizziness, weakness and numbness.   Psychiatric/Behavioral: Negative for sleep disturbance. The patient is not nervous/anxious.        Objective:   body mass index is 28.04 kg/m².  /73 (BP Location: Right  "arm, Patient Position: Sitting, BP Method: Medium (Automatic))   Pulse 68   Temp 97.3 °F (36.3 °C) (Temporal)   Resp 16   Ht 5' 7.21" (1.707 m)   Wt 81.7 kg (180 lb 1.9 oz)   SpO2 98%   BMI 28.04 kg/m²     Physical Exam  Vitals and nursing note reviewed.   Constitutional:       Appearance: Normal appearance.   HENT:      Head: Normocephalic.   Cardiovascular:      Rate and Rhythm: Normal rate and regular rhythm.      Heart sounds: Normal heart sounds.   Pulmonary:      Effort: Pulmonary effort is normal.      Breath sounds: Normal breath sounds.   Abdominal:      General: Bowel sounds are normal. There is no distension.      Palpations: Abdomen is soft.      Tenderness: There is no abdominal tenderness.   Musculoskeletal:         General: Normal range of motion.      Comments: LUE AV fistula + thrill   Skin:     General: Skin is warm and dry.   Neurological:      General: No focal deficit present.      Mental Status: He is alert.   Psychiatric:         Behavior: Behavior normal.         Labs:  Lab Results   Component Value Date    WBC 7.82 11/12/2020    HGB 11.7 (L) 11/12/2020    HCT 36.3 (L) 11/12/2020     11/12/2020    K 3.1 (L) 11/12/2020    CL 93 (L) 11/12/2020    CO2 31 (H) 11/12/2020    BUN 16 11/12/2020    CREATININE 4.3 (H) 11/12/2020    EGFRNONAA 14.1 (A) 11/12/2020    CALCIUM 9.8 11/12/2020    PHOS 2.9 11/12/2020    ALBUMIN 3.1 (L) 11/12/2020    AST 21 11/12/2020    ALT 19 11/12/2020    .0 (H) 11/12/2020       Lab Results   Component Value Date    CPRA 0 03/20/2021    CPRA 0 03/20/2021    CPRA 0 03/20/2021    OR9TZSA Negative 03/20/2021    CIIAB Negative 03/20/2021       Labs were reviewed with the patient.    Pre-transplant Workup:   Reviewed with the patient.    Assessment:     1. Awaiting organ transplant status    2. ESRD on hemodialysis    3. Neurosarcoidosis    4. Renovascular hypertension    5. Sarcoidosis of lung    6. Secondary hyperparathyroidism    7. BMI 28.0-28.9,adult  "       Plan:     Transplant Candidacy:   Mr. Parikh is a suitable kidney transplant candidate.  Meets center eligibility for accepting HCV+ donor offer - no.  Patient educated on HCV+ donors. Abdoulaye is willing  to accept HCV+ donor offer -  no   Patient is a candidate for KDPI > 85 kidney donor offer - no.  He remains in overall stable health, and will remain active on the transplant list.    Patient advised that it is recommended that all transplant candidates, and their close contacts and household members receive Covid vaccination.    Kristina Hernandez NP       Follow-up:   In addition to the tests noted in the plan, Mr. Parikh will continue to have reevaluation as per the standing pre-kidney transplant protocol:  1. Monthly blood for PRA  2. Annual return to clinic, except HIV positive, > 65 years of age, or pancreas transplant candidates who will be scheduled to see transplant every 6 months while in pre-transplant phase  3. Annual re-testing: CXR, EKG, yearly mammograms for women over 40 and PSA for males over 40, cardiology follow-up as recommended by initial cardiology pre-transplant evaluation  4. Renal ultrasound every 2 years  5. Baseline colonoscopy after age 50 and repeated as recommended    UNOS Patient Status  Functional Status: 60% - Requires occasional assistance but is able to care for needs  Physical Capacity: No Limitations

## 2022-03-10 NOTE — LETTER
March 14, 2022        Quinn Espino  [address]  [address]  [facility] [address]  Phone: [phone]  Fax: [phone]             [facility] 1st Fl  [address]  [facility] [address]  Phone: [phone]   Patient: Abdoulaye Parikh   MR Number: [id number]   YOB: 1960   Date of Visit: 3/10/2022       Dear Dr. Quinn Espino    Thank you for referring Abdoulaye Parikh to me for evaluation. Attached you will find relevant portions of my assessment and plan of care.    If you have questions, please do not hesitate to call me. I look forward to following Abdoulaye Parikh along with you.    Sincerely,    Kristina Hernanedz, AUDREY    Enclosure    If you would like to receive this communication electronically, please contact [email or web address] or [phone] to request [employer] access.    [employer] is a tool which provides read-only access to select patient information with whom you have a relationship. Its easy to use and provides real time access to review your patients record including encounter summaries, notes, results, and demographic information.    If you feel you have received this communication in error or would no longer like to receive these types of communications, please e-mail [email or web address]

## 2022-03-11 NOTE — PROGRESS NOTES
Transplant Recipient Adult Psychosocial Assessment (Updated from 2020)    Abdoulaye Parikh  [address] [facility] [address]  Telephone Information:   Mobile [phone]   Home  [phone] (home)  Work  [phone] (work)  E-mail  [email or web address]    Sex: male  YOB: 1960  Age: 61 y.o.    Encounter Date: 3/10/2022  [place] Citizen: yes  Primary Language: English   Needed: no    Emergency Contact:  Name: Kalani Parikh  Relationship: wife  Address: [address] [place] [address]  Phone Numbers:  [phone] (mobile)    Family/Social Support:   Number of dependents/: none  Marital history:  twice,  from first wife and currently  to Kalani since .  Other family dynamics: Pt had one sister who is  and one living brother.  Pt reports support from cousin (Jethro Chaparro) and his wife (Angelica).    Household Composition:  Name: Lizbet Parikh  Age: 60 and 60  Relationship: patient and wife  Does person drive? patient drives, wife does not.     Milly Ricardo, 86 (mother in law); Willian Ricardo, 91 (father in law); Elenora Ricardo, 58 (sister in law) also reside in the home.       Do you and your caregivers have access to reliable transportation? yes  PRIMARY CAREGIVER: Kalani Parikh and Jethro Chaparro will be primary caregivers, phone number Kalani: [phone]; Jethro: [phone].      provided in-depth information to patient and caregiver regarding pre- and post-transplant caregiver role.   strongly encourages patient and caregiver to have concrete plan regarding post-transplant care giving, including back-up caregiver(s) to ensure care giving needs are met as needed.    Patient and Caregiver states understanding all aspects of caregiver role/commitment and is able/willing/committed to being caregiver to the fullest extent necessary.    Patient and Caregiver verbalizes understanding of the  "education provided today and caregiver responsibilities.         remains available. Patient and Caregiver agree to contact  in a timely manner if concerns arise.      Able to take time off work without financial concerns: yes. Pt's wife does not work outside the home and cousin is retired.      Additional Significant Others who will Assist with Transplant:  Name: Angelica Chaparro [phone]  Age: 62  City: [place] State: [place]  Relationship: cousin's wife  Does person drive? yes     Name: Jennifer Silva  Age: 59  City[place], [place]  Does person drive? yes    Living Will: yes  Healthcare Power of : yes  Advance Directives on file: <<no information> per medical record.  Verbally reviewed LW/HCPA information.   provided patient with copy of LW/HCPA documents and provided education on completion of forms.    Living Donors: No. Education and resource information given to patient. 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    Spouse/Significant Other Employment: homemaker    Disabled: no    Monthly Income:  Salary/Wages: $5022.40  Able to afford all costs now and if transplanted, including medications: yes  Patient and Caregiver verbalizes understanding of personal responsibilities related to transplant costs and the importance of having a financial plan to ensure that patients transplant costs are fully covered.       provided fundraising information/education. Patient and Caregiververbalizes understanding.   remains available.    Insurance:     Payer/Plan Subscr  Sex Relation Sub. Ins. ID Effective Group " Num   1. BLUE CROSS BL* MATT BHANDARI 1960 Male Self [id number] 1/1/20 [id number]                                   [address] [address]   2. MEDICARE - ME* MATT BHANDARI 1960 Male Self [id number] 10/1/20                                    [address]       Primary Insurance (for UNOS reporting): Private Insurance  Secondary Insurance (for UNOS reporting): Public Insurance - Medicare FFS (Fee For Service)  Patient and Caregiver verbalizes clear understanding that patient may experience difficulty obtaining and/or be denied insurance coverage post-surgery. This includes and is not limited to disability insurance, life insurance, health insurance, burial insurance, long term care insurance, and other insurances.      Patient and Caregiver also reports understanding that future health concerns related to or unrelated to transplantation may not be covered by patient's insurance.  Resources and information provided and reviewed.     Patient and Caregiver provides verbal permission to release any necessary information to outside resources for patient care and discharge planning.  Resources and information provided are reviewed.      Dialysis Adherence: Patient reports good compliance with dialysis treatment and MD recommendations.  [facility]. MWF 2-6pm.    Infusion Service: patient utilizing? no  Home Health: patient utilizing? no  DME: no  Pulmonary/Cardiac Rehab: none   ADLS:  Pt states ability to independently accomplish all adls.    Adherence:   Pt states current and expected compliance with all healthcare recommendations..  Adherence education and counseling provided.     Per History Section:  Past Medical History:   Diagnosis Date    Anemia     Arthritis     Cirrhosis     Crohn's disease     Disorder of kidney and ureter     Stage 5    Encounter for blood transfusion     Gout     Hearing loss     Hepatitis     Had Hep C Cleared    Hypertension     Neurosarcoidosis 2018    Obesity      Osteoarthritis     Sarcoid neuropathy     Sarcoidosis, lung     Steatosis      Social History     Tobacco Use    Smoking status: Never Smoker    Smokeless tobacco: Never Used   Substance Use Topics    Alcohol use: Not Currently     Social History     Substance and Sexual Activity   Drug Use Never     Social History     Substance and Sexual Activity   Sexual Activity Yes    Partners: Female       Per Today's Psychosocial:  Tobacco: none, patient denies any use.  Alcohol: none, patient denies any use.  Illicit Drugs/Non-prescribed Medications: none, patient denies any use.    Patient and Caregiver states clear understanding of the potential impact of substance use as it relates to transplant candidacy and is aware of possible random substance screening.  Substance abstinence/cessation counseling, education and resources provided and reviewed.     Arrests/DWI/Treatment/Rehab: patient denies    Psychiatric History:    Mental Health: Pt denies mental health concerns.  Pt did acknowledge some anxiety.  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  He stated he will monitor his mental health and seek support/care if he needs it. Pt has EAP available through work if needed.  Psychiatrist/Counselor: pt denies  Medications:  Pt denies  Suicide/Homicide Issues: Pt denies current or past suicidal/homicidal ideation.   Safety at home: Pt denies any home safety concerns.    Knowledge: Patient and Caregiver states having clear understanding and realistic expectations regarding the potential risks and potential benefits of organ transplantation and organ donation and agrees to discuss with health care team members and support system members, as well as to utilize available resources and express questions and/or concerns in order to further facilitate the pt informed decision-making.  Resources and information provided and reviewed.    Patient and Caregiver is aware of [facility]'s affiliation  and/or partnership with agencies in home health care, LTAC, SNF, [facility], and other hospitals and clinics.    Understanding: Patient and Caregiver reports having a clear understanding of the many lifetime commitments involved with being a transplant recipient, including costs, compliance, medications, lab work, procedures, appointments, concrete and financial planning, preparedness, timely and appropriate communication of concerns, abstinence (ETOH, tobacco, illicit non-prescribed drugs), adherence to all health care team recommendations, support system and caregiver involvement, appropriate and timely resource utilization and follow-through, mental health counseling as needed/recommended, and patient and caregiver responsibilities.  Social Service Handbook, resources and detailed educational information provided and reviewed.  Educational information provided.    Patient and Caregiver also reports current and expected compliance with health care regime and states having a clear understanding of the importance of compliance.      Patient and Caregiver reports a clear understanding that risks and benefits may be involved with organ transplantation and with organ donation.       Patient and Caregiver also reports clear understanding that psychosocial risk factors may affect patient, and include but are not limited to feelings of depression, generalized anxiety, anxiety regarding dependence on others, post traumatic stress disorder, feelings of guilt and other emotional and/or mental concerns, and/or exacerbation of existing mental health concerns.  Detailed resources provided and discussed.      Patient and Caregiver agrees to access appropriate resources in a timely manner as needed and/or as recommended, and to communicate concerns appropriately.  Patient and Caregiver also reports a clear understanding of treatment options available.     Patient and Caregiver received education in a group setting.    reviewed education, provided additional information, and answered questions.    Feelings or Concerns: Pt stated the wait time is his main concern.     Coping: Identify Patient & Caregiver Strategies to [place]:   1. 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   2. At the time of surgery - family and friends support/ listening to music, TV, reading   3. During post-Transplant & Recovery Period - family support, TV, reading, music, and gradual return to daily routine and previous activities    Goals:  Pt reported that his goal is to get off dialysis and continue to work.   Patient referred to Vocational Rehabilitation.    Interview Behavior: Patient and Caregiver presents as alert and oriented x 4, pleasant, good eye contact, well groomed, recall good, concentration/judgement good, average intelligence, calm, communicative, cooperative and asking and answering questions appropriately. Patient was accompanied by his wife who was engaged and supportive.          Transplant Social Work - Candidacy  Assessment/Plan:     Psychosocial Suitability:  Based on psychosocial risk factors, patient presents as low risk, due to Pt has adequate caregiver support, reports good adherence, adequate financial plan, adequate transportation plan. .    Recommendations/Additional Comments: SW recommends that pt conduct fundraising to assist pt with pay for cost of medications, food, gas, and other transplant related needs. SW recommends that pt remain aware of potential mental health concerns and contact the team if any concerns arise.  SW recommends that pt remain abstinent from tobacco, ETOH, and drug use.  SW supports pt's continued adherence. SW remains available to answer any questions or concerns that arise as the pt moves through the transplant process.       Tavia Sanchez, KARLA-BACS

## 2022-03-30 PROCEDURE — 99001 SPECIMEN HANDLING PT-LAB: CPT | Mod: PO,TXP | Performed by: NURSE PRACTITIONER

## 2022-04-01 ENCOUNTER — LAB VISIT (OUTPATIENT)
Dept: LAB | Facility: HOSPITAL | Age: 62
End: 2022-04-01
Payer: COMMERCIAL

## 2022-04-01 DIAGNOSIS — Z76.82 ORGAN TRANSPLANT CANDIDATE: ICD-10-CM

## 2022-04-19 ENCOUNTER — TELEPHONE (OUTPATIENT)
Dept: TRANSPLANT | Facility: CLINIC | Age: 62
End: 2022-04-19
Payer: COMMERCIAL

## 2022-04-25 ENCOUNTER — TELEPHONE (OUTPATIENT)
Dept: TRANSPLANT | Facility: CLINIC | Age: 62
End: 2022-04-25
Payer: COMMERCIAL

## 2022-04-25 NOTE — TELEPHONE ENCOUNTER
Informed patient that both living donor coordinators attempted to contact his potential donor last week and both received no answer.  Asked patient to call us if he is still interested.  Patient verbalized understanding to all information provided.

## 2022-04-25 NOTE — TELEPHONE ENCOUNTER
----- Message from Kenisha Borrego sent at 4/25/2022  9:00 AM CDT -----  Regarding: Speak with office  Contact: Abdoulaye Pearl is calling to speak with living donor coordinator about someone that filled out form to be a potential donor.      [phone]

## 2022-04-28 ENCOUNTER — TELEPHONE (OUTPATIENT)
Dept: TRANSPLANT | Facility: CLINIC | Age: 62
End: 2022-04-28
Payer: COMMERCIAL

## 2022-04-29 NOTE — PROGRESS NOTES
"On-Call Note  UNOS# [id number]    Spoke with pt who was at  and informed him that there are no updates at this time. Pt is still being considered a back-up candidate for this case. Pt is aware that calls may come in as "Private" or "Unknown" and to answer the phone. All other questions and concerns were addressed. Pt advised to continue normal routine at this time.        "

## 2022-04-29 NOTE — TELEPHONE ENCOUNTER
ON-CALL NOTE    OS# [id number]    Notified by Johnnie Mar, , that Abdoulaye Parikh is eligible for kidney offer.  Spoke with patient and identified no acute medical issues with telephone assessment. Protocol script read to patient regarding PHS risk criteria donor offer . Patient verbalized understanding, all questions answered, patient accepts organ offer. Notified by Johnnie Mar that virtual crossmatch is negative.  Current sample of blood is available from date 3/30/22 for crossmatch.  Patient reports no sensitizing event since last blood sample for PRA received. Will notify [facility] to perform a retrospective  crossmatch per guideline if patient admitted for transplant.    Patient was asked if they have had a positive COVID-19 test or if they have any signs or symptoms. Informed patient that they will be tested for COVID-19 upon arrival to the hospital, unless have a previous positive result. If tested and result is positive, the transplant will not be able to occur, they will be inactivated on the wait list for 28 days per protocol and required to quarantine.     Patient notified of plan and accepts offer as backup candidate. Instructed patient to continue normal routine. Dialysis unit to be notified if patient admitted.    toe

## 2022-05-01 ENCOUNTER — TELEPHONE (OUTPATIENT)
Dept: TRANSPLANT | Facility: CLINIC | Age: 62
End: 2022-05-01
Payer: COMMERCIAL

## 2022-05-01 NOTE — TELEPHONE ENCOUNTER
I was informed by Sathya kimball that the kidney transplant case [id number] is being shut down due to an artery of kidney would not flush ( kidney quality). Patient notified that case is shut down and he is released from case. Patient verbalized understanding.

## 2022-05-10 PROCEDURE — 86829 HLA CLASS I/II ANTIBODY QUAL: CPT | Mod: 91,PO,TXP | Performed by: NURSE PRACTITIONER

## 2022-05-10 PROCEDURE — 86829 HLA CLASS I/II ANTIBODY QUAL: CPT | Mod: PO,TXP | Performed by: NURSE PRACTITIONER

## 2022-05-19 DIAGNOSIS — Z76.82 AWAITING ORGAN TRANSPLANT STATUS: Primary | ICD-10-CM

## 2022-05-23 ENCOUNTER — LAB VISIT (OUTPATIENT)
Dept: LAB | Facility: HOSPITAL | Age: 62
End: 2022-05-23
Payer: COMMERCIAL

## 2022-05-23 DIAGNOSIS — Z76.82 ORGAN TRANSPLANT CANDIDATE: ICD-10-CM

## 2022-05-30 DIAGNOSIS — Z76.82 AWAITING ORGAN TRANSPLANT STATUS: Primary | ICD-10-CM

## 2022-06-02 ENCOUNTER — LAB VISIT (OUTPATIENT)
Dept: LAB | Facility: HOSPITAL | Age: 62
End: 2022-06-02
Payer: MEDICARE

## 2022-06-02 DIAGNOSIS — Z76.82 AWAITING ORGAN TRANSPLANT STATUS: ICD-10-CM

## 2022-06-02 LAB — BLD GP AB SCN TITR SERPL: 1 {TITER}

## 2022-06-02 PROCEDURE — 86886 COOMBS TEST INDIRECT TITER: CPT | Mod: TXP | Performed by: NURSE PRACTITIONER

## 2022-06-02 PROCEDURE — 36415 COLL VENOUS BLD VENIPUNCTURE: CPT | Mod: TXP | Performed by: NURSE PRACTITIONER

## 2022-06-04 ENCOUNTER — PATIENT MESSAGE (OUTPATIENT)
Dept: TRANSPLANT | Facility: CLINIC | Age: 62
End: 2022-06-04
Payer: COMMERCIAL

## 2022-06-07 LAB — HPRA INTERPRETATION: NORMAL

## 2022-07-19 ENCOUNTER — TELEPHONE (OUTPATIENT)
Dept: TRANSPLANT | Facility: CLINIC | Age: 62
End: 2022-07-19
Payer: COMMERCIAL

## 2022-07-19 NOTE — TELEPHONE ENCOUNTER
Patient calling to ask if crossmatch completed with possible living donor. Informed that blood was not received from donor.    ----- Message from Prvaeen Francis sent at 7/19/2022  2:35 PM CDT -----  Regarding: speak to nurse  Patient is calling to speak to nurse., reason unspecified.    Contact: [phone]

## 2022-07-26 ENCOUNTER — LAB VISIT (OUTPATIENT)
Dept: LAB | Facility: HOSPITAL | Age: 62
End: 2022-07-26
Payer: COMMERCIAL

## 2022-07-26 DIAGNOSIS — Z76.82 ORGAN TRANSPLANT CANDIDATE: ICD-10-CM

## 2022-07-26 PROCEDURE — 86829 HLA CLASS I/II ANTIBODY QUAL: CPT | Mod: 91,PO,TXP | Performed by: NURSE PRACTITIONER

## 2022-07-26 PROCEDURE — 86829 HLA CLASS I/II ANTIBODY QUAL: CPT | Mod: PO,TXP | Performed by: NURSE PRACTITIONER

## 2022-07-30 ENCOUNTER — PATIENT MESSAGE (OUTPATIENT)
Dept: TRANSPLANT | Facility: CLINIC | Age: 62
End: 2022-07-30
Payer: COMMERCIAL

## 2022-07-30 LAB — HPRA INTERPRETATION: NORMAL

## 2022-08-08 PROCEDURE — 99001 SPECIMEN HANDLING PT-LAB: CPT | Mod: PO,TXP | Performed by: NURSE PRACTITIONER

## 2022-08-12 ENCOUNTER — LAB VISIT (OUTPATIENT)
Dept: LAB | Facility: HOSPITAL | Age: 62
End: 2022-08-12
Payer: COMMERCIAL

## 2022-08-12 DIAGNOSIS — Z76.82 ORGAN TRANSPLANT CANDIDATE: ICD-10-CM

## 2022-08-30 ENCOUNTER — PATIENT MESSAGE (OUTPATIENT)
Dept: ENDOCRINOLOGY | Facility: CLINIC | Age: 62
End: 2022-08-30
Payer: COMMERCIAL

## 2022-08-30 ENCOUNTER — TELEPHONE (OUTPATIENT)
Dept: NEUROSURGERY | Facility: CLINIC | Age: 62
End: 2022-08-30
Payer: COMMERCIAL

## 2022-09-02 ENCOUNTER — TELEPHONE (OUTPATIENT)
Dept: NEUROSURGERY | Facility: CLINIC | Age: 62
End: 2022-09-02
Payer: COMMERCIAL

## 2022-09-02 NOTE — TELEPHONE ENCOUNTER
Returned pt's call  He made the appt to have his  shunt checked (wife is a pt).    Explained we will need a CT & XR. I will order Xray. Advised pt to get CT order from PCP and let me know so I can schedule all together.    He v/u & thx.    ----- Message from Jethro Gallegos sent at 9/2/2022  3:13 PM CDT -----  Regarding: call bk from Melinda- cancelled appt      The Pt states that he had a missed call from you and would like a call back about the cancelled appt    # [phone]

## 2022-09-15 DIAGNOSIS — Z98.2 VP (VENTRICULOPERITONEAL) SHUNT STATUS: Primary | ICD-10-CM

## 2022-09-21 PROCEDURE — 86829 HLA CLASS I/II ANTIBODY QUAL: CPT | Mod: PO,TXP | Performed by: NURSE PRACTITIONER

## 2022-09-21 PROCEDURE — 86832 HLA CLASS I HIGH DEFIN QUAL: CPT | Mod: PO,TXP | Performed by: NURSE PRACTITIONER

## 2022-09-21 PROCEDURE — 86833 HLA CLASS II HIGH DEFIN QUAL: CPT | Mod: PO,TXP | Performed by: NURSE PRACTITIONER

## 2022-09-21 PROCEDURE — 86829 HLA CLASS I/II ANTIBODY QUAL: CPT | Mod: 91,PO,TXP | Performed by: NURSE PRACTITIONER

## 2022-09-24 ENCOUNTER — HOSPITAL ENCOUNTER (OUTPATIENT)
Dept: RADIOLOGY | Facility: HOSPITAL | Age: 62
Discharge: HOME | End: 2022-09-24
Attending: PHYSICIAN ASSISTANT
Payer: COMMERCIAL

## 2022-09-24 DIAGNOSIS — Z98.2 VP (VENTRICULOPERITONEAL) SHUNT STATUS: ICD-10-CM

## 2022-09-24 PROCEDURE — 70250 XR SHUNT SERIES: ICD-10-PCS | Mod: 26,NTX,, | Performed by: RADIOLOGY

## 2022-09-24 PROCEDURE — 72020 XR SHUNT SERIES: ICD-10-PCS | Mod: 26,NTX,, | Performed by: RADIOLOGY

## 2022-09-24 PROCEDURE — 71045 XR SHUNT SERIES: ICD-10-PCS | Mod: 26,NTX,, | Performed by: RADIOLOGY

## 2022-09-24 PROCEDURE — 74018 RADEX ABDOMEN 1 VIEW: CPT | Mod: TC,TXP

## 2022-09-24 PROCEDURE — 70250 X-RAY EXAM OF SKULL: CPT | Mod: 26,NTX,, | Performed by: RADIOLOGY

## 2022-09-24 PROCEDURE — 71045 X-RAY EXAM CHEST 1 VIEW: CPT | Mod: 26,NTX,, | Performed by: RADIOLOGY

## 2022-09-24 PROCEDURE — 74018 XR SHUNT SERIES: ICD-10-PCS | Mod: 26,NTX,, | Performed by: RADIOLOGY

## 2022-09-24 PROCEDURE — 72020 X-RAY EXAM OF SPINE 1 VIEW: CPT | Mod: 26,NTX,, | Performed by: RADIOLOGY

## 2022-09-24 PROCEDURE — 74018 RADEX ABDOMEN 1 VIEW: CPT | Mod: 26,NTX,, | Performed by: RADIOLOGY

## 2022-09-29 ENCOUNTER — PATIENT MESSAGE (OUTPATIENT)
Dept: NEUROSURGERY | Facility: CLINIC | Age: 62
End: 2022-09-29
Payer: COMMERCIAL

## 2022-10-03 ENCOUNTER — LAB VISIT (OUTPATIENT)
Dept: LAB | Facility: HOSPITAL | Age: 62
End: 2022-10-03
Payer: COMMERCIAL

## 2022-10-03 DIAGNOSIS — Z76.82 ORGAN TRANSPLANT CANDIDATE: ICD-10-CM

## 2022-10-04 ENCOUNTER — OFFICE VISIT (OUTPATIENT)
Dept: NEUROSURGERY | Facility: CLINIC | Age: 62
End: 2022-10-04
Payer: COMMERCIAL

## 2022-10-04 VITALS — WEIGHT: 180 LBS | BODY MASS INDEX: 28.25 KG/M2 | HEIGHT: 67 IN

## 2022-10-04 DIAGNOSIS — G91.2 NPH (NORMAL PRESSURE HYDROCEPHALUS): Primary | ICD-10-CM

## 2022-10-04 PROCEDURE — 99999 PR PBB SHADOW E&M-EST. PATIENT-LVL III: ICD-10-PCS | Mod: PBBFAC,TXP,, | Performed by: PHYSICIAN ASSISTANT

## 2022-10-04 PROCEDURE — 99204 OFFICE O/P NEW MOD 45 MIN: CPT | Mod: NTX,S$GLB,, | Performed by: PHYSICIAN ASSISTANT

## 2022-10-04 PROCEDURE — 99204 PR OFFICE/OUTPT VISIT, NEW, LEVL IV, 45-59 MIN: ICD-10-PCS | Mod: NTX,S$GLB,, | Performed by: PHYSICIAN ASSISTANT

## 2022-10-04 PROCEDURE — 99999 PR PBB SHADOW E&M-EST. PATIENT-LVL III: CPT | Mod: PBBFAC,TXP,, | Performed by: PHYSICIAN ASSISTANT

## 2022-10-04 PROCEDURE — 1159F PR MEDICATION LIST DOCUMENTED IN MEDICAL RECORD: ICD-10-PCS | Mod: CPTII,NTX,S$GLB, | Performed by: PHYSICIAN ASSISTANT

## 2022-10-04 PROCEDURE — 3008F PR BODY MASS INDEX (BMI) DOCUMENTED: ICD-10-PCS | Mod: CPTII,NTX,S$GLB, | Performed by: PHYSICIAN ASSISTANT

## 2022-10-04 PROCEDURE — 3008F BODY MASS INDEX DOCD: CPT | Mod: CPTII,NTX,S$GLB, | Performed by: PHYSICIAN ASSISTANT

## 2022-10-04 PROCEDURE — 1159F MED LIST DOCD IN RCRD: CPT | Mod: CPTII,NTX,S$GLB, | Performed by: PHYSICIAN ASSISTANT

## 2022-10-10 LAB — HPRA INTERPRETATION: NORMAL

## 2022-10-10 NOTE — PROGRESS NOTES
Neurosurgery  History & Physical    SUBJECTIVE:     Chief Complaint: headaches    History of Present Illness:  Abdoulaye Parikh is a 61 y.o. male who h/o of ESRD on HD (M W F), neurosarcoidosis, Crohn's disease (s/p multiple abdominal surgeries), arnold-chiari malformation, and ventriculopleural shunt placed by Dr. Hammer in 2019 ([facility] NCS @ 2.5) who presents to clinic today to establish care for ventriculopleural shunt. Patient reports ventriculoperitoneal shunt was originally placed in 8/2018 and was subsequently revised shortly after to pleural given scar tissue in abdomen 2/2 to Crohn's disease. Patient had significant improvement in gait imbalance. Unfortunately, patient's cranial incision became infected as well and required washout and antibiotics in 2/2019. He has been doing well since then until the past 4-5 months when he noticed headaches before and after HD sessions. He reports tylenol will help the headaches. His insurance no longer cover's Dr. Eldridge, and he wants to establish care with Dr. Patel.      Review of patient's allergies indicates:   Allergen Reactions    Bactrim [sulfamethoxazole-trimethoprim] Shortness Of Breath and Itching    Penicillins Shortness Of Breath and Itching       Current Outpatient Medications   Medication Sig Dispense Refill    allopurinoL (ZYLOPRIM) 300 MG tablet Take 300 mg by mouth once daily.      benzonatate (TESSALON) 200 MG capsule Take 1 capsule by mouth 3 (three) times daily as needed.      fluticasone furoate (ARNUITY ELLIPTA) 100 mcg/actuation inhaler Inhale 100 mcg into the lungs once daily. Controller      magnesium oxide (MAG-OX) 400 mg (241.3 mg magnesium) tablet Take 400 mg by mouth 2 (two) times a day.      metoprolol succinate (TOPROL-XL) 25 MG 24 hr tablet Take 12.5 mg by mouth every Tuesday, Thursday, Saturday, Sunday.      predniSONE (DELTASONE) 5 MG tablet Take 5 mg by mouth once daily.      sevelamer carbonate (RENVELA) 800 mg Tab Take 1,600 mg by mouth 3  (three) times daily with meals.      temazepam (RESTORIL) 15 mg Cap Take 15 mg by mouth nightly as needed.       No current facility-administered medications for this visit.       Past Medical History:   Diagnosis Date    Anemia     Arthritis     Cirrhosis     Crohn's disease     Disorder of kidney and ureter     Stage 5    Encounter for blood transfusion     Gout     Hearing loss     Hepatitis     Had Hep C Cleared    Hypertension     Neurosarcoidosis 2018    Obesity     Osteoarthritis     Sarcoid neuropathy     Sarcoidosis, lung     Steatosis      Past Surgical History:   Procedure Laterality Date    AV FISTULA PLACEMENT Left     BRAIN SURGERY      COLON SURGERY      Exploratory lap x 4 for Chron's disease. Likely right colectomy    CSF SHUNT      JOINT REPLACEMENT Right     Hip    TOTAL HIP ARTHROPLASTY Right      Family History       Problem Relation (Age of Onset)    COPD Sister    Cancer Father, Maternal Grandfather, Paternal Grandfather    Diabetes Father    Heart disease Father, Sister    Hypertension Mother, Father    Lung cancer Maternal Grandfather, Paternal Grandfather    Multiple myeloma Father    Stroke Father          Social History     Socioeconomic History    Marital status:      Spouse name: Kalani Parikh    Number of children: 2   Tobacco Use    Smoking status: Never    Smokeless tobacco: Never   Substance and Sexual Activity    Alcohol use: Not Currently    Drug use: Never    Sexual activity: Yes     Partners: Female   Social History Narrative    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       Review of Systems   Constitutional:  Negative for chills, fatigue and fever.   Eyes:  Negative for photophobia and visual disturbance.   Respiratory:  Negative for cough and shortness of breath.    Cardiovascular:  Negative for chest pain, palpitations and leg swelling.   Gastrointestinal:  Negative for constipation, diarrhea, nausea and vomiting.   Genitourinary:  Negative for difficulty urinating, dysuria, frequency and  "urgency.   Musculoskeletal:  Negative for back pain, gait problem and neck pain.   Neurological:  Positive for headaches. Negative for dizziness, seizures, speech difficulty, weakness and numbness.   Psychiatric/Behavioral:  Negative for confusion. The patient is not nervous/anxious.      OBJECTIVE:     Vital Signs  Pain Score:   6  Height: 5' 7" (170.2 cm)  Weight: 81.6 kg (180 lb)  Body mass index is 28.19 kg/m².      Neurosurgery Physical Exam  General: well developed, well nourished, no distress.   Head: normocephalic, shunt reservoir pumps and refills  Neurologic: Alert and oriented. Thought content appropriate.  GCS: Motor: 6/Verbal: 5/Eyes: 4 GCS Total: 15  Mental Status: Awake, Alert, Oriented x 4  Language: No aphasia  Speech: No dysarthria  Cranial nerves: face symmetric, tongue midline, CN II-XII grossly intact.   Eyes: pupils equal, round, reactive to light with accommodation, EOMI.   Pulmonary: normal respirations, no signs of respiratory distress  Skin: Skin is warm, dry and intact.  Sensory: intact to light touch throughout  Motor Strength:Moves all extremities spontaneously with good tone.  Full strength upper and lower extremities. No abnormal movements seen.   Gait stable, fluid.       Diagnostic Results:  I have personally reviewed imaging and agree with the findings    CT head wo contrast (9/22/22): proximal catheter in place, ventricles small, no extra-axial fluid collection    ASSESSMENT/PLAN:     61 y.o. male who h/o of ESRD on HD (M W F), neurosarcoidosis, Crohn's disease (s/p multiple abdominal surgeries), arnold-chiari malformation, and ventriculopleural shunt placed by Dr. Hammer in 2019 ([facility] NCS @ 2.5) who presents to clinic today to establish care for ventriculopleural shunt. Patient with headaches, ventricles are small, patient may be over-shunting. Will repeat CTH in 3 months and reassess at that time. Discussed potential distal catheter tie off may be an option if headaches " continue. Signs and symptoms that prompt urgent medical attention were discussed.  All questions answered.      Guillermina Espino PA-C  Neurosurgery     Time spent on this encounter: 45 minutes. This includes face-to-face time and non-face to face time preparing to see the patient (eg, review of tests), obtaining and/or reviewing separately obtained history, documenting clinical information in the electronic or other health record, independently interpreting results and communicating results to the patient/family/caregiver, or care coordinator       Note dictated with voice recognition software, please excuse any grammatical errors.

## 2022-10-11 ENCOUNTER — PATIENT MESSAGE (OUTPATIENT)
Dept: TRANSPLANT | Facility: CLINIC | Age: 62
End: 2022-10-11
Payer: COMMERCIAL

## 2022-10-11 ENCOUNTER — TELEPHONE (OUTPATIENT)
Dept: NEUROSURGERY | Facility: CLINIC | Age: 62
End: 2022-10-11
Payer: COMMERCIAL

## 2022-10-11 NOTE — TELEPHONE ENCOUNTER
Spoke w pt to schedule 3mo follow-up appt w [facility]. Pt agreed to be seen on 12/28 with 830am CTH and 930am f/u appt w Guillermina MACHUCA.

## 2022-10-18 LAB
CLASS I ANTIBODIES - LUMINEX: NEGATIVE
CLASS II ANTIBODIES - LUMINEX: NORMAL
CPRA %: 0
SERUM COLLECTION DT - LUMINEX CLASS I: NORMAL
SERUM COLLECTION DT - LUMINEX CLASS II: NORMAL
SPCL1 TESTING DATE: NORMAL
SPCL2 TESTING DATE: NORMAL
SPCLU TESTING DATE: NORMAL

## 2022-10-19 PROCEDURE — 99001 SPECIMEN HANDLING PT-LAB: CPT | Mod: PO,TXP | Performed by: NURSE PRACTITIONER

## 2022-10-21 ENCOUNTER — LAB VISIT (OUTPATIENT)
Dept: LAB | Facility: HOSPITAL | Age: 62
End: 2022-10-21
Payer: COMMERCIAL

## 2022-10-21 DIAGNOSIS — Z76.82 ORGAN TRANSPLANT CANDIDATE: ICD-10-CM

## 2022-11-08 PROCEDURE — 86832 HLA CLASS I HIGH DEFIN QUAL: CPT | Mod: PO,TXP | Performed by: NURSE PRACTITIONER

## 2022-11-08 PROCEDURE — 86833 HLA CLASS II HIGH DEFIN QUAL: CPT | Mod: PO,TXP | Performed by: NURSE PRACTITIONER

## 2022-11-15 ENCOUNTER — LAB VISIT (OUTPATIENT)
Dept: LAB | Facility: HOSPITAL | Age: 62
End: 2022-11-15
Payer: MEDICARE

## 2022-11-15 DIAGNOSIS — Z76.82 ORGAN TRANSPLANT CANDIDATE: ICD-10-CM

## 2022-11-30 LAB — HPRA INTERPRETATION: NORMAL

## 2022-12-12 PROCEDURE — 99001 SPECIMEN HANDLING PT-LAB: CPT | Mod: PO,TXP | Performed by: NURSE PRACTITIONER

## 2022-12-15 ENCOUNTER — LAB VISIT (OUTPATIENT)
Dept: LAB | Facility: HOSPITAL | Age: 62
End: 2022-12-15
Payer: COMMERCIAL

## 2022-12-15 DIAGNOSIS — Z76.82 ORGAN TRANSPLANT CANDIDATE: ICD-10-CM

## 2022-12-26 ENCOUNTER — PATIENT MESSAGE (OUTPATIENT)
Dept: NEUROSURGERY | Facility: CLINIC | Age: 62
End: 2022-12-26
Payer: MEDICARE

## 2022-12-27 ENCOUNTER — PATIENT MESSAGE (OUTPATIENT)
Dept: ADMINISTRATIVE | Facility: OTHER | Age: 62
End: 2022-12-27
Payer: MEDICARE

## 2023-01-05 LAB
CLASS I ANTIBODIES - LUMINEX: NEGATIVE
CLASS II ANTIBODIES - LUMINEX: NEGATIVE
CPRA %: 0
SERUM COLLECTION DT - LUMINEX CLASS I: NORMAL
SERUM COLLECTION DT - LUMINEX CLASS II: NORMAL
SPCL1 TESTING DATE: NORMAL
SPCL2 TESTING DATE: NORMAL
SPCLU TESTING DATE: NORMAL

## 2023-01-14 PROCEDURE — 86833 HLA CLASS II HIGH DEFIN QUAL: CPT | Mod: PO,TXP | Performed by: NURSE PRACTITIONER

## 2023-01-14 PROCEDURE — 86832 HLA CLASS I HIGH DEFIN QUAL: CPT | Mod: PO,TXP | Performed by: NURSE PRACTITIONER

## 2023-01-23 ENCOUNTER — LAB VISIT (OUTPATIENT)
Dept: LAB | Facility: HOSPITAL | Age: 63
End: 2023-01-23
Payer: COMMERCIAL

## 2023-01-23 DIAGNOSIS — Z76.82 ORGAN TRANSPLANT CANDIDATE: ICD-10-CM

## 2023-01-28 LAB — HPRA INTERPRETATION: NORMAL

## 2023-01-31 ENCOUNTER — TELEPHONE (OUTPATIENT)
Dept: TRANSPLANT | Facility: CLINIC | Age: 63
End: 2023-01-31
Payer: MEDICARE

## 2023-01-31 DIAGNOSIS — Z76.82 AWAITING ORGAN TRANSPLANT STATUS: Primary | ICD-10-CM

## 2023-02-08 PROCEDURE — 99001 SPECIMEN HANDLING PT-LAB: CPT | Mod: PO,TXP | Performed by: NURSE PRACTITIONER

## 2023-02-09 ENCOUNTER — LAB VISIT (OUTPATIENT)
Dept: LAB | Facility: HOSPITAL | Age: 63
End: 2023-02-09
Payer: MEDICARE

## 2023-02-09 DIAGNOSIS — Z76.82 AWAITING ORGAN TRANSPLANT STATUS: ICD-10-CM

## 2023-02-09 LAB — BLD GP AB SCN TITR SERPL: 2 {TITER}

## 2023-02-09 PROCEDURE — 36415 COLL VENOUS BLD VENIPUNCTURE: CPT | Mod: TXP | Performed by: NURSE PRACTITIONER

## 2023-02-09 PROCEDURE — 86886 COOMBS TEST INDIRECT TITER: CPT | Mod: TXP | Performed by: NURSE PRACTITIONER

## 2023-02-13 ENCOUNTER — LAB VISIT (OUTPATIENT)
Dept: LAB | Facility: HOSPITAL | Age: 63
End: 2023-02-13
Payer: MEDICARE

## 2023-02-13 DIAGNOSIS — Z76.82 ORGAN TRANSPLANT CANDIDATE: ICD-10-CM

## 2023-02-16 ENCOUNTER — PATIENT MESSAGE (OUTPATIENT)
Dept: TRANSPLANT | Facility: CLINIC | Age: 63
End: 2023-02-16
Payer: MEDICARE

## 2023-02-16 LAB
CLASS I ANTIBODIES - LUMINEX: NEGATIVE
CLASS II ANTIBODY COMMENTS - LUMINEX: NORMAL
CPRA %: 0
SERUM COLLECTION DT - LUMINEX CLASS I: NORMAL
SERUM COLLECTION DT - LUMINEX CLASS II: NORMAL
SPCL1 TESTING DATE: NORMAL
SPCL2 TESTING DATE: NORMAL
SPCLU TESTING DATE: NORMAL

## 2023-02-28 DIAGNOSIS — Z76.82 ORGAN TRANSPLANT CANDIDATE: Primary | ICD-10-CM

## 2023-02-28 NOTE — PROGRESS NOTES
YEARLY LIST MANAGEMENT NOTE    Abdoulaye Parikh's kidney transplant listing status reviewed.  Patient is due for follow-up appointments in May 2023.  Appointments will be scheduled per protocol.  HLA sample is up to date and being received on a regular basis.

## 2023-03-01 ENCOUNTER — TELEPHONE (OUTPATIENT)
Dept: TRANSPLANT | Facility: CLINIC | Age: 63
End: 2023-03-01
Payer: MEDICARE

## 2023-03-07 ENCOUNTER — TELEPHONE (OUTPATIENT)
Dept: TRANSPLANT | Facility: CLINIC | Age: 63
End: 2023-03-07
Payer: MEDICARE

## 2023-03-20 PROCEDURE — 86833 HLA CLASS II HIGH DEFIN QUAL: CPT | Mod: PO,TXP | Performed by: NURSE PRACTITIONER

## 2023-03-20 PROCEDURE — 86832 HLA CLASS I HIGH DEFIN QUAL: CPT | Mod: PO,TXP | Performed by: NURSE PRACTITIONER

## 2023-03-28 ENCOUNTER — LAB VISIT (OUTPATIENT)
Dept: LAB | Facility: HOSPITAL | Age: 63
End: 2023-03-28
Payer: COMMERCIAL

## 2023-03-28 DIAGNOSIS — Z76.82 ORGAN TRANSPLANT CANDIDATE: ICD-10-CM

## 2023-04-04 LAB — HPRA INTERPRETATION: NORMAL

## 2023-04-10 ENCOUNTER — TELEPHONE (OUTPATIENT)
Dept: TRANSPLANT | Facility: CLINIC | Age: 63
End: 2023-04-10
Payer: MEDICARE

## 2023-04-10 NOTE — TELEPHONE ENCOUNTER
Spoke with patient regarding living donor being offered to him through the paired kidney exchange program.   Patient denies any new medical problems and is doing well.   Informed that I will contact him with a surgery date once set and arrange for preop testing at that time.   All questions were answered.

## 2023-04-11 ENCOUNTER — TELEPHONE (OUTPATIENT)
Dept: TRANSPLANT | Facility: CLINIC | Age: 63
End: 2023-04-11
Payer: MEDICARE

## 2023-04-11 DIAGNOSIS — Z76.82 ORGAN TRANSPLANT CANDIDATE: Primary | ICD-10-CM

## 2023-04-11 NOTE — TELEPHONE ENCOUNTER
Patient notified that a surgery date of 5/10/23 has been set. Preop appointments will be scheduled for 4/25/23. Information provided for STD/FMLA. All questions were answered.

## 2023-04-17 ENCOUNTER — HOSPITAL ENCOUNTER (OUTPATIENT)
Dept: CARDIOLOGY | Facility: HOSPITAL | Age: 63
Discharge: HOME | End: 2023-04-17
Attending: NURSE PRACTITIONER
Payer: MEDICARE

## 2023-04-17 DIAGNOSIS — Z76.82 ORGAN TRANSPLANT CANDIDATE: ICD-10-CM

## 2023-04-17 LAB
ASCENDING AORTA: 3.36 CM
AV INDEX (PROSTH): 0.72
AV MEAN GRADIENT: 4 MMHG
AV PEAK GRADIENT: 7 MMHG
AV VALVE AREA: 2.98 CM2
AV VELOCITY RATIO: 0.63
CV ECHO LV RWT: 0.49 CM
DOP CALC AO PEAK VEL: 1.3 M/S
DOP CALC AO VTI: 25.6 CM
DOP CALC LVOT AREA: 4.2 CM2
DOP CALC LVOT DIAMETER: 2.3 CM
DOP CALC LVOT PEAK VEL: 0.82 M/S
DOP CALC LVOT STROKE VOLUME: 76.41 CM3
DOP CALCLVOT PEAK VEL VTI: 18.4 CM
E WAVE DECELERATION TIME: 206.97 MSEC
E/A RATIO: 1.22
E/E' RATIO: 6.42 M/S
ECHO LV POSTERIOR WALL: 1.05 CM (ref 0.6–1.1)
EJECTION FRACTION: 60 %
FRACTIONAL SHORTENING: 34 % (ref 28–44)
INTERVENTRICULAR SEPTUM: 1.01 CM (ref 0.6–1.1)
IVC DIAMETER: 1.4 CM
IVRT: 110.37 MSEC
LA MAJOR: 5.16 CM
LA MINOR: 5.12 CM
LA WIDTH: 4 CM
LEFT ATRIUM SIZE: 4.45 CM
LEFT ATRIUM VOLUME: 77.77 CM3
LEFT INTERNAL DIMENSION IN SYSTOLE: 2.8 CM (ref 2.1–4)
LEFT VENTRICLE DIASTOLIC VOLUME: 81.24 ML
LEFT VENTRICLE SYSTOLIC VOLUME: 29.59 ML
LEFT VENTRICULAR INTERNAL DIMENSION IN DIASTOLE: 4.26 CM (ref 3.5–6)
LEFT VENTRICULAR MASS: 146.31 G
LV LATERAL E/E' RATIO: 5.08 M/S
LV SEPTAL E/E' RATIO: 8.71 M/S
LVOT MG: 1.42 MMHG
LVOT MV: 0.56 CM/S
MV PEAK A VEL: 0.5 M/S
MV PEAK E VEL: 0.61 M/S
MV STENOSIS PRESSURE HALF TIME: 60.02 MS
MV VALVE AREA P 1/2 METHOD: 3.67 CM2
PISA TR MAX VEL: 2.5 M/S
PULM VEIN S/D RATIO: 1.48
PV PEAK D VEL: 0.42 M/S
PV PEAK S VEL: 0.62 M/S
PV PEAK VELOCITY: 0.89 CM/S
RA MAJOR: 4.75 CM
RA PRESSURE: 3 MMHG
RIGHT VENTRICULAR END-DIASTOLIC DIMENSION: 3.82 CM
RV TISSUE DOPPLER FREE WALL SYSTOLIC VELOCITY 1 (APICAL 4 CHAMBER VIEW): 0.01 CM/S
SINUS: 3.16 CM
STJ: 2.62 CM
TDI LATERAL: 0.12 M/S
TDI SEPTAL: 0.07 M/S
TDI: 0.1 M/S
TR MAX PG: 25 MMHG
TRICUSPID ANNULAR PLANE SYSTOLIC EXCURSION: 2.16 CM
TV PEAK GRADIENT: 1.9 MMHG
TV REST PULMONARY ARTERY PRESSURE: 28 MMHG

## 2023-04-17 PROCEDURE — 93306 ECHO (CUPID ONLY): ICD-10-PCS | Mod: 26,TXP,, | Performed by: INTERNAL MEDICINE

## 2023-04-17 PROCEDURE — 93306 TTE W/DOPPLER COMPLETE: CPT | Mod: 26,TXP,, | Performed by: INTERNAL MEDICINE

## 2023-04-17 PROCEDURE — 93306 TTE W/DOPPLER COMPLETE: CPT | Mod: TXP

## 2023-04-18 ENCOUNTER — HOSPITAL ENCOUNTER (OUTPATIENT)
Dept: RADIOLOGY | Facility: HOSPITAL | Age: 63
Discharge: HOME | End: 2023-04-18
Attending: NURSE PRACTITIONER
Payer: MEDICARE

## 2023-04-18 DIAGNOSIS — Z76.82 ORGAN TRANSPLANT CANDIDATE: ICD-10-CM

## 2023-04-18 PROCEDURE — 76770 US EXAM ABDO BACK WALL COMP: CPT | Mod: TC,TXP

## 2023-04-18 PROCEDURE — 76770 US EXAM ABDO BACK WALL COMP: CPT | Mod: 26,TXP,, | Performed by: RADIOLOGY

## 2023-04-18 PROCEDURE — 76770 US RETROPERITONEAL COMPLETE: ICD-10-PCS | Mod: 26,TXP,, | Performed by: RADIOLOGY

## 2023-04-21 ENCOUNTER — TELEPHONE (OUTPATIENT)
Dept: TRANSPLANT | Facility: CLINIC | Age: 63
End: 2023-04-21
Payer: MEDICARE

## 2023-04-21 NOTE — TELEPHONE ENCOUNTER
Caregiver confirmation    MA called pt at 7:42 am on 4/21/23 to confirm pre-op appt. Pt reports plan to bring his wife Kalani Parikh (caregiver) to pre-op appt.     MA answered all questions and remains available.     Marlen Keys  Abdominal Transplant MA

## 2023-04-24 NOTE — PROGRESS NOTES
Kidney Transplant Recipient Preoperative Evaluation    Subjective:     CC:  Preoperative evaluation of kidney transplant candidacy.    HPI:  Mr. Parikh is a 62 y.o. year old Black or  male who has been approved to receive a living unrelated kidney transplant through paired exchange.  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  He has ESRD secondary to unknown etiology.  Patient is on hemodialysis MWF via left AVF, tolerating well without hypotension.  He has presented for his final preoperative medical evaluation.  He denies any recent hospitalizations or ED visits.       Still working as a mental health counselor. Exercises at home on stationary bike and plays drums at [employer]. Looks good, not frail.    CXR 4/25/2023: following visit   CT pelvis 2/6/2021: favorable for transplant  Renal US 4/18/2023: Chronic medical renal disease. Bilateral simple renal cysts, similar to prior. Right-sided pleural effusion.  Iliacs 11/23/2020: favorable for transplant.   Echo 4/17/2023: EF 60%, PA 28   LHC 1/25/2021: No evidence of obstructive coronary arterial disease.  Colonoscopy 11/24/2020:  No evidence of malignancy or active colitis. Repeat 3-5 years    Current Outpatient Medications   Medication Sig Dispense Refill    allopurinoL (ZYLOPRIM) 300 MG tablet Take 300 mg by mouth once daily.      benzonatate (TESSALON) 200 MG capsule Take 1 capsule by mouth 3 (three) times daily as needed.      fluticasone furoate (ARNUITY ELLIPTA) 100 mcg/actuation inhaler Inhale 100 mcg into the lungs once daily. Controller      magnesium oxide (MAG-OX) 400 mg (241.3 mg magnesium) tablet Take 400 mg by mouth 2 (two) times a day.      metoprolol succinate (TOPROL-XL) 25 MG 24 hr tablet Take 12.5 mg by mouth every Tuesday, Thursday, Saturday, Sunday.      predniSONE (DELTASONE) 5 MG tablet Take 5 mg by mouth once daily.       "sevelamer carbonate (RENVELA) 800 mg Tab Take 1,600 mg by mouth 3 (three) times daily with meals.      temazepam (RESTORIL) 15 mg Cap Take 15 mg by mouth nightly as needed.       No current facility-administered medications for this visit.       Past Medical History:   Diagnosis Date    Anemia     Arthritis     Cirrhosis     Crohn's disease     Disorder of kidney and ureter     Stage 5    Encounter for blood transfusion     Gout     Hearing loss     Hepatitis     Had Hep C Cleared    Hypertension     Neurosarcoidosis 2018    Obesity     Osteoarthritis     Sarcoid neuropathy     Sarcoidosis, lung     Steatosis        Review of Systems   Constitutional:  Negative for activity change, appetite change and fever.   HENT:  Positive for hearing loss. Negative for congestion, mouth sores and sore throat.    Eyes:  Positive for visual disturbance.        Wears glasses   Respiratory:  Negative for cough, chest tightness and shortness of breath.    Cardiovascular:  Negative for chest pain, palpitations and leg swelling.   Gastrointestinal:  Negative for abdominal distention, abdominal pain, constipation, diarrhea and nausea.   Genitourinary:  Positive for decreased urine volume. Negative for difficulty urinating, frequency and hematuria.        Voiding ~2x/day   Musculoskeletal:  Negative for arthralgias and gait problem.   Skin:  Negative for wound.   Allergic/Immunologic: Negative for environmental allergies, food allergies and immunocompromised state.   Neurological:  Negative for dizziness, weakness and numbness.   Psychiatric/Behavioral:  Negative for sleep disturbance. The patient is not nervous/anxious.      Objective:     Blood pressure 121/68, pulse 69, temperature 97.7 °F (36.5 °C), temperature source Temporal, resp. rate 18, height 5' 6.5" (1.689 m), weight 81.9 kg (180 lb 8.9 oz), SpO2 97 %.  Physical Exam  Vitals and nursing note reviewed.   Constitutional:       Appearance: Normal appearance.   HENT:      Head: " Normocephalic.   Cardiovascular:      Rate and Rhythm: Normal rate and regular rhythm.      Heart sounds: Normal heart sounds.   Pulmonary:      Effort: Pulmonary effort is normal.      Breath sounds: Normal breath sounds.   Abdominal:      General: Bowel sounds are normal. There is no distension.      Palpations: Abdomen is soft.      Tenderness: There is no abdominal tenderness.   Musculoskeletal:         General: Normal range of motion.      Comments: LUE AV fistula + thrill    Skin:     General: Skin is warm and dry.   Neurological:      General: No focal deficit present.      Mental Status: He is alert.   Psychiatric:         Behavior: Behavior normal.       Labs:  PSA, Screen (ng/mL)   Date Value   04/18/2023 2.0     Lab Results   Component Value Date    WBC 5.08 04/25/2023    HGB 9.9 (L) 04/25/2023    HCT 30.8 (L) 04/25/2023     (H) 04/25/2023     04/25/2023 4/25/2023   Sodium 136 - 145 mmol/L 134 (A)   Potassium 3.5 - 5.1 mmol/L 3.4 (A)   Chloride 95 - 110 mmol/L 92 (A)   CO2 23 - 29 mmol/L 28   Anion Gap 8 - 16 mmol/L 14   BUN 8 - 23 mg/dL 22   Creatinine 0.5 - 1.4 mg/dL 7.0 (A)   eGFR >60 mL/min/1.73 m^2 8.3 (A)   Glucose 70 - 110 mg/dL 69 (A)   Calcium 8.7 - 10.5 mg/dL 8.6 (A)   Phosphorus 2.7 - 4.5 mg/dL 3.2   Alkaline Phosphatase 55 - 135 U/L 67   PROTEIN TOTAL 6.0 - 8.4 g/dL 8.5 (A)   Albumin 3.5 - 5.2 g/dL 3.0 (A)   Uric Acid 3.4 - 7.0 mg/dL 2.7 (A)   BILIRUBIN TOTAL 0.1 - 1.0 mg/dL 0.7     AST 10 - 40 U/L 18   ALT 10 - 44 U/L 14     4/25/2023: Prothrombin Time 10.5 sec (Ref range: 9.0 - 12.5 sec)  Labs were reviewed with the patient.    ABO type: B POS    Assessment:     1. Organ transplant candidate    2. ESRD on hemodialysis    3. Sarcoidosis of lung    4. Secondary hyperparathyroidism    5. Renovascular hypertension    6. BMI 28.0-28.9,adult      Plan:      Transplant Candidacy:   Based on available information, Mr. Parikh remains a suitable kidney transplant candidate.  He may  proceed with transplant surgery as planned.    Kristina Hernandez NP       Follow-up:  After the transplant, the patient will follow-up with the kidney transplant team and get blood work per standard protocol as described below.    Clinic: return to transplant clinic weekly for the first month after transplant; every 2 weeks during months 2-3; then at 6-, 9-, 12-, 18-, 24-, and 36- months post-transplant to reassess for complications from immunosuppression and monitor for rejection.  Annually thereafter.    Labs: continue twice weekly CBC, renal panel, and drug level for first month; then same labs once weekly through 3rd month post-transplant.  Urine for UA, protein/creatinine ratio monthly.  Add urine BK - PCR at 1-, 3-, 6-, 9-, 12-, 18-, 24-, and 36- months post-transplant.  Hepatic panel at 1-, 2-, 3-, 6-, 9-, 12-, 18-, 24-, and 36- months post-transplant.    Counseling:     Patient advised that it is recommended that all transplant candidates, and their close contacts and household members receive Covid vaccination.

## 2023-04-25 ENCOUNTER — CLINICAL SUPPORT (OUTPATIENT)
Dept: TRANSPLANT | Facility: CLINIC | Age: 63
End: 2023-04-25
Payer: MEDICARE

## 2023-04-25 ENCOUNTER — OFFICE VISIT (OUTPATIENT)
Dept: TRANSPLANT | Facility: CLINIC | Age: 63
End: 2023-04-25
Payer: MEDICARE

## 2023-04-25 ENCOUNTER — HOSPITAL ENCOUNTER (OUTPATIENT)
Dept: PREADMISSION TESTING | Facility: HOSPITAL | Age: 63
Discharge: HOME | End: 2023-04-25
Attending: TRANSPLANT SURGERY
Payer: MEDICARE

## 2023-04-25 ENCOUNTER — SOCIAL WORK (OUTPATIENT)
Dept: TRANSPLANT | Facility: CLINIC | Age: 63
End: 2023-04-25
Payer: MEDICARE

## 2023-04-25 ENCOUNTER — ANESTHESIA EVENT (OUTPATIENT)
Dept: SURGERY | Facility: HOSPITAL | Age: 63
DRG: 650 | End: 2023-04-25
Payer: MEDICARE

## 2023-04-25 ENCOUNTER — HOSPITAL ENCOUNTER (OUTPATIENT)
Dept: RADIOLOGY | Facility: HOSPITAL | Age: 63
Discharge: HOME | End: 2023-04-25
Attending: NURSE PRACTITIONER
Payer: MEDICARE

## 2023-04-25 ENCOUNTER — HOSPITAL ENCOUNTER (OUTPATIENT)
Dept: CARDIOLOGY | Facility: CLINIC | Age: 63
Discharge: HOME | End: 2023-04-25
Payer: MEDICARE

## 2023-04-25 VITALS
BODY MASS INDEX: 28.34 KG/M2 | DIASTOLIC BLOOD PRESSURE: 68 MMHG | SYSTOLIC BLOOD PRESSURE: 121 MMHG | HEIGHT: 67 IN | OXYGEN SATURATION: 97 % | WEIGHT: 180.56 LBS | BODY MASS INDEX: 28.34 KG/M2 | OXYGEN SATURATION: 97 % | HEART RATE: 69 BPM | TEMPERATURE: 98 F | SYSTOLIC BLOOD PRESSURE: 121 MMHG | RESPIRATION RATE: 18 BRPM | RESPIRATION RATE: 18 BRPM | DIASTOLIC BLOOD PRESSURE: 68 MMHG | WEIGHT: 180.56 LBS | SYSTOLIC BLOOD PRESSURE: 121 MMHG | TEMPERATURE: 98 F | HEIGHT: 67 IN | WEIGHT: 180.56 LBS | HEART RATE: 69 BPM | BODY MASS INDEX: 28.34 KG/M2 | TEMPERATURE: 98 F | RESPIRATION RATE: 18 BRPM | HEIGHT: 67 IN | SYSTOLIC BLOOD PRESSURE: 121 MMHG | HEART RATE: 69 BPM | BODY MASS INDEX: 28.34 KG/M2 | OXYGEN SATURATION: 97 % | HEART RATE: 69 BPM | HEIGHT: 67 IN | RESPIRATION RATE: 18 BRPM | DIASTOLIC BLOOD PRESSURE: 68 MMHG | OXYGEN SATURATION: 97 % | WEIGHT: 180.56 LBS | TEMPERATURE: 98 F | DIASTOLIC BLOOD PRESSURE: 68 MMHG

## 2023-04-25 VITALS
BODY MASS INDEX: 28.56 KG/M2 | HEART RATE: 62 BPM | OXYGEN SATURATION: 98 % | HEIGHT: 67 IN | WEIGHT: 182 LBS | SYSTOLIC BLOOD PRESSURE: 130 MMHG | TEMPERATURE: 98 F | DIASTOLIC BLOOD PRESSURE: 76 MMHG | RESPIRATION RATE: 16 BRPM

## 2023-04-25 DIAGNOSIS — Z76.82 ORGAN TRANSPLANT CANDIDATE: ICD-10-CM

## 2023-04-25 DIAGNOSIS — Z99.2 ESRD ON HEMODIALYSIS: ICD-10-CM

## 2023-04-25 DIAGNOSIS — D86.0 SARCOIDOSIS OF LUNG: ICD-10-CM

## 2023-04-25 DIAGNOSIS — N25.81 SECONDARY HYPERPARATHYROIDISM: ICD-10-CM

## 2023-04-25 DIAGNOSIS — N18.6 ESRD ON HEMODIALYSIS: ICD-10-CM

## 2023-04-25 DIAGNOSIS — N18.6 ESRD (END STAGE RENAL DISEASE): ICD-10-CM

## 2023-04-25 DIAGNOSIS — N18.6 ESRD (END STAGE RENAL DISEASE) ON DIALYSIS: ICD-10-CM

## 2023-04-25 DIAGNOSIS — Z99.2 ESRD (END STAGE RENAL DISEASE) ON DIALYSIS: ICD-10-CM

## 2023-04-25 DIAGNOSIS — Z76.82 ORGAN TRANSPLANT CANDIDATE: Primary | ICD-10-CM

## 2023-04-25 DIAGNOSIS — I15.0 RENOVASCULAR HYPERTENSION: ICD-10-CM

## 2023-04-25 PROCEDURE — 99211 OFF/OP EST MAY X REQ PHY/QHP: CPT | Mod: PBBFAC,27,TXP,25

## 2023-04-25 PROCEDURE — 99999 PR PBB SHADOW E&M-EST. PATIENT-LVL III: CPT | Mod: PBBFAC,TXP,,

## 2023-04-25 PROCEDURE — 99213 OFFICE O/P EST LOW 20 MIN: CPT | Mod: PBBFAC,27,TXP,25

## 2023-04-25 PROCEDURE — 99214 OFFICE O/P EST MOD 30 MIN: CPT | Mod: S$PBB,TXP,, | Performed by: NURSE PRACTITIONER

## 2023-04-25 PROCEDURE — 71046 X-RAY EXAM CHEST 2 VIEWS: CPT | Mod: 26,TXP,, | Performed by: RADIOLOGY

## 2023-04-25 PROCEDURE — 99215 OFFICE O/P EST HI 40 MIN: CPT | Mod: S$PBB,TXP,, | Performed by: TRANSPLANT SURGERY

## 2023-04-25 PROCEDURE — 93005 ELECTROCARDIOGRAM TRACING: CPT | Mod: PBBFAC,TXP | Performed by: INTERNAL MEDICINE

## 2023-04-25 PROCEDURE — 99214 PR OFFICE/OUTPT VISIT, EST, LEVL IV, 30-39 MIN: ICD-10-PCS | Mod: S$PBB,TXP,, | Performed by: NURSE PRACTITIONER

## 2023-04-25 PROCEDURE — 99999 PR PBB SHADOW E&M-EST. PATIENT-LVL III: ICD-10-PCS | Mod: PBBFAC,TXP,,

## 2023-04-25 PROCEDURE — 93010 ELECTROCARDIOGRAM REPORT: CPT | Mod: S$PBB,TXP,, | Performed by: INTERNAL MEDICINE

## 2023-04-25 PROCEDURE — 99999 PR PBB SHADOW E&M-EST. PATIENT-LVL IV: ICD-10-PCS | Mod: PBBFAC,TXP,, | Performed by: NURSE PRACTITIONER

## 2023-04-25 PROCEDURE — 99999 PR PBB SHADOW E&M-EST. PATIENT-LVL IV: CPT | Mod: PBBFAC,TXP,, | Performed by: NURSE PRACTITIONER

## 2023-04-25 PROCEDURE — 99214 OFFICE O/P EST MOD 30 MIN: CPT | Mod: PBBFAC,TXP,25 | Performed by: NURSE PRACTITIONER

## 2023-04-25 PROCEDURE — 71046 X-RAY EXAM CHEST 2 VIEWS: CPT | Mod: TC,FY,TXP

## 2023-04-25 PROCEDURE — 99999 PR PBB SHADOW E&M-EST. PATIENT-LVL I: CPT | Mod: PBBFAC,TXP,,

## 2023-04-25 PROCEDURE — 71046 XR CHEST PA AND LATERAL: ICD-10-PCS | Mod: 26,TXP,, | Performed by: RADIOLOGY

## 2023-04-25 PROCEDURE — 93010 EKG 12-LEAD: ICD-10-PCS | Mod: S$PBB,TXP,, | Performed by: INTERNAL MEDICINE

## 2023-04-25 PROCEDURE — 99215 PR OFFICE/OUTPT VISIT, EST, LEVL V, 40-54 MIN: ICD-10-PCS | Mod: S$PBB,TXP,, | Performed by: TRANSPLANT SURGERY

## 2023-04-25 PROCEDURE — 99999 PR PBB SHADOW E&M-EST. PATIENT-LVL I: ICD-10-PCS | Mod: PBBFAC,TXP,,

## 2023-04-25 RX ORDER — CIPROFLOXACIN 2 MG/ML
400 INJECTION, SOLUTION INTRAVENOUS
Status: CANCELLED | OUTPATIENT
Start: 2023-04-25

## 2023-04-25 RX ORDER — SODIUM CHLORIDE 0.9 % (FLUSH) 0.9 %
10 SYRINGE (ML) INJECTION
Status: CANCELLED | OUTPATIENT
Start: 2023-04-25

## 2023-04-25 RX ORDER — CYANOCOBALAMIN (VITAMIN B-12) 500 MCG
3 TABLET ORAL NIGHTLY
Status: ON HOLD | COMMUNITY
End: 2023-05-12 | Stop reason: HOSPADM

## 2023-04-25 RX ORDER — VANCOMYCIN HCL IN 5 % DEXTROSE 1.5G/250ML
1500 PLASTIC BAG, INJECTION (ML) INTRAVENOUS
Status: CANCELLED | OUTPATIENT
Start: 2023-04-25

## 2023-04-25 RX ORDER — DIPHENHYDRAMINE HYDROCHLORIDE 50 MG/ML
50 INJECTION INTRAMUSCULAR; INTRAVENOUS ONCE
Status: CANCELLED | OUTPATIENT
Start: 2023-04-25 | End: 2023-04-25

## 2023-04-25 RX ORDER — HEPARIN SODIUM 5000 [USP'U]/ML
5000 INJECTION, SOLUTION INTRAVENOUS; SUBCUTANEOUS ONCE
Status: CANCELLED | OUTPATIENT
Start: 2023-04-25 | End: 2023-04-25

## 2023-04-25 RX ORDER — MUPIROCIN 20 MG/G
OINTMENT TOPICAL
Status: CANCELLED | OUTPATIENT
Start: 2023-04-25

## 2023-04-25 RX ORDER — ACETAMINOPHEN 650 MG/20.3ML
650 LIQUID ORAL ONCE
Status: CANCELLED | OUTPATIENT
Start: 2023-04-25 | End: 2023-04-25

## 2023-04-25 RX ORDER — CINACALCET 30 MG/1
30 TABLET, FILM COATED ORAL DAILY
Status: ON HOLD | COMMUNITY
Start: 2023-04-21 | End: 2023-05-12 | Stop reason: HOSPADM

## 2023-04-25 RX ORDER — PREGABALIN 75 MG/1
75 CAPSULE ORAL
Status: CANCELLED | OUTPATIENT
Start: 2023-04-25

## 2023-04-25 NOTE — PROGRESS NOTES
Met with Abdoulaye Parikh in the clinic as part of her pre-transplant clearance appointment.    1) Performed a complete medication reconciliation.    2) Obtained copies of insurance cards, patient understood that the Pharm.D. will order medications, and that medications must be available prior to discharge   3) Discussed medication education that will occur post-transplant   4) Patient will  use [facility] for first fill.  5)  was checked: yes     Current Outpatient Medications   Medication Sig Dispense Refill    allopurinoL (ZYLOPRIM) 300 MG tablet Take 300 mg by mouth once daily.      benzonatate (TESSALON) 200 MG capsule Take 1 capsule by mouth 3 (three) times daily as needed.      cinacalcet (SENSIPAR) 30 MG Tab Take 30 mg by mouth once daily.      fluticasone furoate (ARNUITY ELLIPTA) 100 mcg/actuation inhaler Inhale 100 mcg into the lungs once daily. Controller      magnesium oxide (MAG-OX) 400 mg (241.3 mg magnesium) tablet Take 400 mg by mouth 2 (two) times a day.      melatonin (MELATIN) Take 3 mg by mouth nightly.      metoprolol succinate (TOPROL-XL) 25 MG 24 hr tablet Take 12.5 mg by mouth every Tuesday, Thursday, Saturday, Sunday.      predniSONE (DELTASONE) 5 MG tablet Take 5 mg by mouth once daily.      sevelamer carbonate (RENVELA) 2.4 gram PwPk Take by mouth 3 (three) times daily with meals. One pack with meals       No current facility-administered medications for this visit.       Patient verbalized understanding and had the opportunity to ask questions

## 2023-04-25 NOTE — ANESTHESIA PREPROCEDURE EVALUATION
[facility]  Anesthesia Pre-Operative Evaluation         Patient Name: Abdoulaye Parikh  YOB: 1960  MRN: [id number]    SUBJECTIVE:     Pre-operative evaluation for Procedure(s) (LRB):  TRANSPLANT, KIDNEY - RQ 7PM START (N/A)     05/10/2023    Abdoulaye Parikh is a 62 y.o. male w/ a significant PMHx of ESRD 2/2 unknown etiology, on HD via LUE AVF (MWF), hx of neurosarcoidosis s/p  shunt, lung sarcoidosis on long-term prednisone (uses inhaler couple times a week), HTN, HCV s/p Harvoni tx (2020), Crohn's disease s/p colectomy, arthritis, and anemia. Patient presents as a direct admit for living unrelated kidney transplant. Denies any recent illnesses or changes to baseline health.    4/17/23 Echo   The left ventricle is normal in size with concentric hypertrophy and normal systolic function.   The estimated ejection fraction is 60%.   Normal left ventricular diastolic function.   Normal right ventricular size with normal right ventricular systolic function.   Mild left atrial enlargement.   Normal central venous pressure (3 mmHg).   The estimated PA systolic pressure is 28 mmHg.       Patient now presents for the above procedure(s).      LDA:        Hemodialysis AV Graft Left upper arm (Active)   Number of days:             Peripheral IV - Single Lumen 05/10/23 1555 20 G;1 3/4 in Right;Anterior Forearm (Active)   Site Assessment Clean;Dry;Intact;No redness;No swelling 05/10/23 1555   Extremity Assessment Distal to IV No abnormal discoloration 05/10/23 1555   Line Status Blood return noted;Flushed;Saline locked 05/10/23 1555   Dressing Status Clean;Dry;Intact 05/10/23 1555   Dressing Intervention First dressing 05/10/23 1555   Site Change Due 05/14/23 05/10/23 1555   Number of days: 0        Prev airway: None documented.      Drips: None documented.       Patient Active Problem List   Diagnosis    ESRD (end stage renal disease) on dialysis       Review of patient's allergies  indicates:   Allergen Reactions    Bactrim [sulfamethoxazole-trimethoprim] Shortness Of Breath and Itching    Penicillins Shortness Of Breath and Itching       Current Outpatient Medications:    Current Facility-Administered Medications:     acetaminophen oral solution 650 mg, 650 mg, Per NG tube, Once, Sachi Rdz MD    antithymocyte globulin (rabbit) 100 mg, hydrocortisone sodium succinate (SOLU-CORTEF) 20 mg in sodium chloride 0.9% 500 mL (FOR PERIPHERAL LINE ADMINISTRATION ONLY), 1.5 mg/kg (Adjusted), Intravenous, Once, Sachi Rdz MD    vancomycin 1,500 mg in dextrose 5 % (D5W) 250 mL IVPB (Vial-Mate), 1,500 mg, Intravenous, On Call Procedure **AND** ciprofloxacin (CIPRO)400mg/200ml D5W IVPB 400 mg, 400 mg, Intravenous, On Call Procedure, Sachi Rdz MD    diphenhydrAMINE injection 50 mg, 50 mg, Intravenous, Once, Sachi Rdz MD    methylPREDNISolone sodium succinate (SOLU-MEDROL) 500 mg in dextrose 5 % (D5W) 100 mL IVPB, 500 mg, Intravenous, Once, Sachi Rdz MD    mupirocin 2 % ointment, , Nasal, On Call Procedure, Sachi Rdz MD    pregabalin capsule 75 mg, 75 mg, Oral, On Call Procedure, Sachi Rdz MD    sodium chloride 0.9% flush 10 mL, 10 mL, Intravenous, PRN, Sachi Rdz MD    Past Surgical History:   Procedure Laterality Date    AV FISTULA PLACEMENT Left     BRAIN SURGERY      COLON SURGERY      Exploratory lap x 4 for Chron's disease. 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       OBJECTIVE:     Vital  Signs Range (Last 24H):  Temp:  [36.6 °C (97.8 °F)-36.6 °C (97.9 °F)]   Pulse:  [64-77]   Resp:  [16-18]   BP: (130)/(70-73)   SpO2:  [98 %]       Significant Labs:  Lab Results   Component Value Date    WBC 5.08 04/25/2023    HGB 9.9 (L) 04/25/2023    HCT 31.8 (L) 05/10/2023     04/25/2023    CHOL 145 04/25/2023    TRIG 115 04/25/2023    HDL 47 04/25/2023    ALT 14 04/25/2023    AST 18 04/25/2023     (L) 05/10/2023    K 3.8 05/10/2023    CL 93 (L) 05/10/2023    CREATININE 6.5 (H) 05/10/2023    BUN 18 05/10/2023    CO2 27 05/10/2023    PSA 2.0 04/18/2023    INR 1.0 05/10/2023    HGBA1C 4.4 11/12/2020       Microbiology Results (last 7 days)     ** No results found for the last 168 hours. **          Diagnostic Studies:    EKG:   Results for orders placed or performed during the hospital encounter of 04/25/23   EKG 12-lead    Collection Time: 04/25/23 10:39 AM    Narrative    Test Reason : Z76.82,    Vent. Rate : 065 BPM     Atrial Rate : 065 BPM     P-R Int : 144 ms          QRS Dur : 092 ms      QT Int : 476 ms       P-R-T Axes : 065 048 038 degrees     QTc Int : 495 ms    Normal sinus rhythm  Nonspecific ST abnormality  Prolonged QT  Abnormal ECG  When compared with ECG of 03-DEC-2020 09:19,    QT has lengthened  Confirmed by NICHOLE QUINONES MD ([id number]) on 4/25/2023 11:19:23 AM    Referred By: BRE RAWLS           Confirmed By:NICHOLE QUINONES MD       2D ECHO:  TTE:  Results for orders placed or performed during the hospital encounter of 04/17/23   Echo Saline Bubble? No   Result Value Ref Range    TDI SEPTAL 0.07 m/s    LV LATERAL E/E' RATIO 5.08 m/s    LV SEPTAL E/E' RATIO 8.71 m/s    LA WIDTH 4.00 cm    IVC diameter 1.40 cm    Left Ventricular Outflow Tract Mean Velocity 0.56 cm/s    Left Ventricular Outflow Tract Mean Gradient 1.42 mmHg    TDI LATERAL 0.12 m/s    PV PEAK VELOCITY 0.89 cm/s    LVIDd 4.26 3.5 - 6.0 cm    IVS 1.01 0.6 - 1.1 cm    Posterior Wall 1.05 0.6 - 1.1 cm    LVIDs 2.80 2.1 -  4.0 cm    FS 34 28 - 44 %    LA volume 77.77 cm3    Sinus 3.16 cm    STJ 2.62 cm    Ascending aorta 3.36 cm    LV mass 146.31 g    LA size 4.45 cm    RVDD 3.82 cm    TAPSE 2.16 cm    RV S' 0.01 cm/s    Left Ventricle Relative Wall Thickness 0.49 cm    AV mean gradient 4 mmHg    AV valve area 2.98 cm2    AV Velocity Ratio 0.63     AV index (prosthetic) 0.72     MV valve area p 1/2 method 3.67 cm2    E/A ratio 1.22     Mean e' 0.10 m/s    E wave deceleration time 206.97 msec    IVRT 110.37 msec    Pulm vein S/D ratio 1.48     LVOT diameter 2.30 cm    LVOT area 4.2 cm2    LVOT peak aníbal 0.82 m/s    LVOT peak VTI 18.40 cm    Ao peak aníbal 1.30 m/s    Ao VTI 25.6 cm    LVOT stroke volume 76.41 cm3    AV peak gradient 7 mmHg    TV peak gradient 1.90 mmHg    E/E' ratio 6.42 m/s    MV Peak E Aníbal 0.61 m/s    TR Max Aníbal 2.50 m/s    MV stenosis pressure 1/2 time 60.02 ms    MV Peak A Aníbal 0.50 m/s    PV Peak S Aníbal 0.62 m/s    PV Peak D Aníbal 0.42 m/s    LV Systolic Volume 29.59 mL    LV Diastolic Volume 81.24 mL    RA Major Axis 4.75 cm    Left Atrium Minor Axis 5.12 cm    Left Atrium Major Axis 5.16 cm    Triscuspid Valve Regurgitation Peak Gradient 25 mmHg    Right Atrial Pressure (from IVC) 3 mmHg    EF 60 %    TV rest pulmonary artery pressure 28 mmHg    Narrative    · The left ventricle is normal in size with concentric hypertrophy and   normal systolic function.  · The estimated ejection fraction is 60%.  · Normal left ventricular diastolic function.  · Normal right ventricular size with normal right ventricular systolic   function.  · Mild left atrial enlargement.  · Normal central venous pressure (3 mmHg).  · The estimated PA systolic pressure is 28 mmHg.          FER:  No results found for this or any previous visit.    ASSESSMENT/PLAN:            Pre-op Assessment    I have reviewed the Patient Summary Reports.    I have reviewed the Nursing Notes. I have reviewed the NPO Status.   I have reviewed the Medications.    Steroids Taken In Past Year: Prednisone    Review of Systems  Anesthesia Hx:  No problems with previous Anesthesia  History of prior surgery of interest to airway management or planning: Denies Family Hx of Anesthesia complications.   Denies Personal Hx of Anesthesia complications.   Social:  Non-Smoker    Hematology/Oncology:         -- Anemia:   Cardiovascular:   Hypertension    Pulmonary:   Denies COPD.  Denies Asthma.  Denies Shortness of breath.  Denies Recent URI.  Denies Sleep Apnea. Lung sarcoidosis   Renal/:   Chronic Renal Disease, ESRD, Dialysis    Hepatic/GI:   Denies GERD. Hepatitis, C    Musculoskeletal:   Arthritis     Neurological:   Neuromuscular Disease, Neurosarcoidosis s/p  shunt   Endocrine:  Endocrine Normal Denies Diabetes.        Physical Exam  General: Well nourished, Cooperative, Alert and Oriented    Airway:  Mallampati: III   Mouth Opening: Normal  TM Distance: Normal  Tongue: Normal  Neck ROM: Normal ROM       Anesthesia Plan  Type of Anesthesia, risks & benefits discussed:    Anesthesia Type: Gen ETT  Intra-op Monitoring Plan: Standard ASA Monitors and Art Line  Post Op Pain Control Plan: multimodal analgesia and IV/PO Opioids PRN  Induction:  IV  Airway Plan: Direct and Video, Post-Induction  Informed Consent: Informed consent signed with the Patient and all parties understand the risks and agree with anesthesia plan.  All questions answered.   ASA Score: 3  Day of Surgery Review of History & Physical: H&P Update referred to the surgeon/provider.    Ready For Surgery From Anesthesia Perspective.     .

## 2023-04-25 NOTE — PROGRESS NOTES
Transplant Surgery  Kidney Transplant Recipient Evaluation    Referring Physician: Quinn Espino  Current Nephrologist: Quinn Espino    Subjective:     Reason for Visit: evaluate transplant candidacy    History of Present Illness: Abdoulaye Parikh is a 62 y.o. year old male undergoing transplant evaluation.    Dialysis History: Abdoulaye is on hemodialysis.      Transplant History: N/A    Etiology of Renal Disease: Other, Specify - Unknown (based on medical records from referral).    External provider notes reviewed: Yes    Review of Systems   Constitutional:  Negative for activity change, appetite change, chills, diaphoresis, fatigue, fever and unexpected weight change.   HENT:  Negative for congestion, dental problem, ear pain, facial swelling, mouth sores, nosebleeds, sore throat, tinnitus, trouble swallowing and voice change.    Eyes:  Negative for photophobia, pain and visual disturbance.   Respiratory:  Negative for apnea, cough, choking, chest tightness and shortness of breath.    Cardiovascular:  Negative for chest pain, palpitations and leg swelling.   Gastrointestinal:  Negative for abdominal distention, abdominal pain, blood in stool, constipation, diarrhea, nausea and vomiting.   Endocrine: Negative for cold intolerance and heat intolerance.   Genitourinary:  Negative for difficulty urinating, dysuria, flank pain, hematuria and urgency.   Musculoskeletal:  Negative for arthralgias and gait problem.   Skin:  Negative for color change, pallor and rash.   Neurological:  Negative for dizziness, tremors, seizures, syncope and light-headedness.   Hematological:  Negative for adenopathy. Does not bruise/bleed easily.   Psychiatric/Behavioral:  Negative for agitation and confusion.    Objective:     Physical Exam:  Constitutional:   Vitals reviewed: yes   Well-nourished and well-groomed: yes  Eyes:   Sclerae icteric: no   Extraocular movements intact: yes  GI:    Bowel sounds normal: yes   Tenderness:  no    If yes, quadrant/location: not applicable   Palpable masses: no    If yes, quadrant/location: not applicable   Hepatosplenomegaly: no   Ascites: no   Hernia: no    If yes, type/location: not applicable   Surgical scars: yes    If yes, type/location: midline  RUQ transverse incision from  shunt  Resp:   Effort normal: yes   Breath sounds normal: yes    CV:   Regular rate and rhythm: yes   Heart sounds normal: yes   Femoral pulses normal: yes   Extremities edematous: no  Skin:   Rashes or lesions present: no    If yes, describe:not applicable   Jaundice:: no    Musculoskeletal:   Gait normal: yes   Strength normal: yes  Psych:   Oriented to person, place, and time: yes   Affect and mood normal: yes    Additional comments: not applicable    Diagnostics:  The following labs have been reviewed: CBC  BMP  CMP  PT  INR  PTT  The following radiology images have been independently reviewed and interpreted: CT Abd/Pelvis    Counseling: We provided Abdoulaye VITALE Parikh with a group education session today.  We discussed kidney transplantation at length with him, including risks, potential complications, and alternatives in the management of his renal failure.  The discussion included complications related to anesthesia, bleeding, infection, primary nonfunction, and ATN.  I discussed the typical postoperative course, length of hospitalization, the need for long-term immunosuppression, and the need for long-term routine follow-up.  I discussed living-donor and -donor transplantation and the relative advantages and disadvantages of each.  I also discussed average waiting times for both living donation and  donation.  I discussed national and center-specific survival rates.  I also mentioned the potential benefit of multicenter listing to candidates listed with centers within more than one organ procurement organization.  All questions were answered.    Patient advised that it is recommended that all transplant  candidates, and their close contacts and household members receive Covid vaccination.    Final determination of transplant candidacy will be made once evaluation is complete and reviewed by the Kidney & Kidney/Pancreas Selection Committee.    Coronavirus disease (COVID-19) caused by severe acute respiratory virus coronavirus 2 (SARS-C0V 2) is associated with increased mortality in solid organ transplant recipients (SOT) compared to non-transplant patients. Vaccine responses to vaccination are depressed against SARS-CoV2 compared to normal individuals but improve with third vaccination doses. Vaccination prior to SOT provides both the best opportunity for transplant candidates to develop protective immunity and to reduce the risk of serious COVID19 infections post transplantation. Organ transplant candidates at [facility] will be required to receive SARS-CoV-2 vaccination prior to being listed with a an active status, whenever possible. Exceptions will be made for disability related reasons or for sincerely held Judaism beliefs.          Transplant Surgery - Candidacy   Assessment/Plan:   Abdoulaye Parikh has end stage renal disease (ESRD) on dialysis. I see no surgical contraindication to placing a kidney transplant. Based on available information, Abdoulaye Parikh is a suitable kidney transplant candidate.     Additional testing to be completed according to the Written Order Guidelines for Adult Pre-kidney and Pancreas Transplant Evaluation (KI-02).  Interpretation of tests and discussion of patient management involves all members of the multidisciplinary transplant team.    Sachi Rdz MD

## 2023-04-25 NOTE — LETTER
April 25, 2023        Quinn Espino  [address]  [address]  [facility] [address]  Phone: [phone]  Fax: [phone]             [facility] 1st Fl  [address]  [facility] [address]  Phone: [phone]   Patient: Abdoulaye Parikh   MR Number: [id number]   YOB: 1960   Date of Visit: 4/25/2023       Dear Dr. Quinn Espino    Thank you for referring Abdoulaye Parikh to me for evaluation. Attached you will find relevant portions of my assessment and plan of care.    If you have questions, please do not hesitate to call me. I look forward to following Abdoulaye Parikh along with you.    Sincerely,    Kristina Hernandez, AUDREY    Enclosure    If you would like to receive this communication electronically, please contact [email or web address] or [phone] to request Stat Link access.    Stat Link is a tool which provides read-only access to select patient information with whom you have a relationship. Its easy to use and provides real time access to review your patients record including encounter summaries, notes, results, and demographic information.    If you feel you have received this communication in error or would no longer like to receive these types of communications, please e-mail [email or web address]

## 2023-04-25 NOTE — H&P
Transplant Surgery  Kidney Transplant Recipient Evaluation    Referring Physician: Quinn Espino  Current Nephrologist: Quinn Espino    Subjective:     Reason for Visit: evaluate transplant candidacy    History of Present Illness: Abdoulaye Parikh is a 62 y.o. year old male undergoing transplant evaluation.    Dialysis History: Abdoulaye is on hemodialysis.      Transplant History: N/A    Etiology of Renal Disease: Other, Specify - Unknown (based on medical records from referral).    External provider notes reviewed: Yes    Review of Systems   Constitutional:  Negative for activity change, appetite change, chills, diaphoresis, fatigue, fever and unexpected weight change.   HENT:  Negative for congestion, dental problem, ear pain, facial swelling, mouth sores, nosebleeds, sore throat, tinnitus, trouble swallowing and voice change.    Eyes:  Negative for photophobia, pain and visual disturbance.   Respiratory:  Negative for apnea, cough, choking, chest tightness and shortness of breath.    Cardiovascular:  Negative for chest pain, palpitations and leg swelling.   Gastrointestinal:  Negative for abdominal distention, abdominal pain, blood in stool, constipation, diarrhea, nausea and vomiting.   Endocrine: Negative for cold intolerance and heat intolerance.   Genitourinary:  Negative for difficulty urinating, dysuria, flank pain, hematuria and urgency.   Musculoskeletal:  Negative for arthralgias and gait problem.   Skin:  Negative for color change, pallor and rash.   Neurological:  Negative for dizziness, tremors, seizures, syncope and light-headedness.   Hematological:  Negative for adenopathy. Does not bruise/bleed easily.   Psychiatric/Behavioral:  Negative for agitation and confusion.    Objective:     Physical Exam:  Constitutional:   Vitals reviewed: yes   Well-nourished and well-groomed: yes  Eyes:   Sclerae icteric: no   Extraocular movements intact: yes  GI:    Bowel sounds normal: yes   Tenderness:  no    If yes, quadrant/location: not applicable   Palpable masses: no    If yes, quadrant/location: not applicable   Hepatosplenomegaly: no   Ascites: no   Hernia: no    If yes, type/location: not applicable   Surgical scars: yes    If yes, type/location: midline  RUQ transverse incision from  shunt  Resp:   Effort normal: yes   Breath sounds normal: yes    CV:   Regular rate and rhythm: yes   Heart sounds normal: yes   Femoral pulses normal: yes   Extremities edematous: no  Skin:   Rashes or lesions present: no    If yes, describe:not applicable   Jaundice:: no    Musculoskeletal:   Gait normal: yes   Strength normal: yes  Psych:   Oriented to person, place, and time: yes   Affect and mood normal: yes    Additional comments: not applicable    Diagnostics:  The following labs have been reviewed: CBC  BMP  CMP  PT  INR  PTT  The following radiology images have been independently reviewed and interpreted: CT Abd/Pelvis    Counseling: We provided Abdoulaye VITALE Parikh with a group education session today.  We discussed kidney transplantation at length with him, including risks, potential complications, and alternatives in the management of his renal failure.  The discussion included complications related to anesthesia, bleeding, infection, primary nonfunction, and ATN.  I discussed the typical postoperative course, length of hospitalization, the need for long-term immunosuppression, and the need for long-term routine follow-up.  I discussed living-donor and -donor transplantation and the relative advantages and disadvantages of each.  I also discussed average waiting times for both living donation and  donation.  I discussed national and center-specific survival rates.  I also mentioned the potential benefit of multicenter listing to candidates listed with centers within more than one organ procurement organization.  All questions were answered.    Patient advised that it is recommended that all transplant  candidates, and their close contacts and household members receive Covid vaccination.    Final determination of transplant candidacy will be made once evaluation is complete and reviewed by the Kidney & Kidney/Pancreas Selection Committee.    Coronavirus disease (COVID-19) caused by severe acute respiratory virus coronavirus 2 (SARS-C0V 2) is associated with increased mortality in solid organ transplant recipients (SOT) compared to non-transplant patients. Vaccine responses to vaccination are depressed against SARS-CoV2 compared to normal individuals but improve with third vaccination doses. Vaccination prior to SOT provides both the best opportunity for transplant candidates to develop protective immunity and to reduce the risk of serious COVID19 infections post transplantation. Organ transplant candidates at [facility] will be required to receive SARS-CoV-2 vaccination prior to being listed with a an active status, whenever possible. Exceptions will be made for disability related reasons or for sincerely held Restorationism beliefs.          Transplant Surgery - Candidacy   Assessment/Plan:   Abdoulaye Parikh has end stage renal disease (ESRD) on dialysis. I see no surgical contraindication to placing a kidney transplant. Based on available information, Abdoulaye Parikh is a suitable kidney transplant candidate.     Additional testing to be completed according to the Written Order Guidelines for Adult Pre-kidney and Pancreas Transplant Evaluation (KI-02).  Interpretation of tests and discussion of patient management involves all members of the multidisciplinary transplant team.    Sachi Rdz MD

## 2023-04-25 NOTE — PROGRESS NOTES
PRE-OP TEACHING NOTE    Abdoulaye Jl is here today for pre-op appointments.  Pre-op instructions reviewed and written information was provided.  Instructed to report to the admit department at 12 pm to be admitted for surgery. Surgery scheduled for 7 pm. Patient will move dialysis to Tuesday or Wednesday morning. Post kidney transplant education book provided with instructions to review prior to surgery. All questions were answered.  Patient agreed and verbalized understanding of all instructions.

## 2023-04-25 NOTE — PROGRESS NOTES
RECIPIENT PRE-OP NOTE    Potential Recipient Name: Abdoulaye Parikh, [id number]  Encounter Date: 4/25/2023    Sex: male  YOB: 1960  Age: 62 y.o.    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 Patient presents as a 62 y.o. year old male to recipient pre-op appointment for scheduled kidney living donor surgery. Patient's wife accompanies patient.  Patient states that he is independent with ADLs at this time.  Patient states motivation to pursue organ transplant at this time.    Does patient drive?  Patient is aware will not be able to drive until medically cleared by the transplant team. Patient verbalizes understanding that patient will need assistance for all transportation needs until medically cleared to drive.    Caregivers/Transportation:  Name: Kalani Parikh  Age: 62  Phone: [phone]  Relationship: wife  Does person drive? no  Does person have own/reliable transportation? yes    Name: Jennifer Silva  Age: 60  Phone: [phone]  Relationship:  ADENIKE  Does person drive? yes  Does person have own/reliable transportation? yes    Name: Huyen Krysta   Age: both 62  Phone: [phone]// [phone]  Relationship: cousin and wife  Does person drive? yes  Does person have own/reliable transportation? yes    Dependents/Others who rely on Patient/Caregiver for care: none    Cognitive:  Education:  graduate degree  Reading Level: college  Reports difficulty with:  "hearing and wears bilateral hearing aids  Denies difficulty with: reading, writing, seeing, comprehension, learning, and memory    Infusion Service: patient utilizing? no  Home Health: patient utilizing? no  DME:  patient utilizing? no    Living Will: no  Healthcare Power of : no  Written LW/HCPA and verbal information presented to patient today.    Insurance: Payor: MEDICARE / Plan: MEDICARE PART A & B / Product Type: Government /   Possible concerns regarding insurance post-donation reviewed. Patient verbalizes clear understanding.    Financial:  Employment: Patient is currently employed: occupation: counselor, company: [employer], time at present employer: not provided.  Able to take time off work without financial concerns: yes.. Patient does plan to return to work once medically cleared. Patient referred to vocational rehabilitation.  Spouse/Significant Other Employment: occupation: homemaker.  Patient states does not expect to have any financial problems following transplant surgery.  Patient states has not conducted fundraising to assist with post-transplant costs.    Tobacco/Alcohol/Illicit Drug Abuse: Patient reports does not use tobacco products, alcohol, illicit drugs, and non-prescription medications and that patient does plan to remain abstinent.     Psychiatric History:  pt denies significant mental health concerns.  States he is anxious to get surgery done ("impatient, not nervous")    Coping: Patient states that he is coping well with having kidney living donor surgery. Patient states will call as needed and does understand how to access resources including the  as needed, both inpatient and outpatient.    Resources, information and support provided. Psychosocial aspects regarding organ donation and transplantation were discussed. Patient reports having a clear understanding of resources, information, support and psychosocial aspects.    Discharge " Plan: Patient to discharge to own home under the care of patient's wife and other family members  post-organ transplant. Patient states that patient's wife will be present as caregiver in the hospital. Patient's  cousin or ADENIKE  will transport patient home. Patient states agreement with not driving and not returning to work until medically cleared to do so.    Patient states having clear understanding and realistic expectations regarding the potential risks and potential benefits of organ transplantation and organ donation. Patient agrees to further discuss with health care team members and support system members, as well as to utilize available resources and express questions and/or concerns. Resources and information provided and reviewed.    Patient reports motivation to proceed with living organ donor transplantation as scheduled.     Suitability for Transplant: Patient presents as  low risk  candidate for organ transplant at this time.  Patient states does have a caregiver plan, transportation plan, and lodging plan in place. Patient states that patient does have medical and prescription medicine insurance in place and does have a plan in place to afford post-transplant costs.    Patient provided verbal permission to release any necessary information to outside resources for patient care and discharge planning.  Resources and information provided and reviewed.  Patient is choosing local pharmacy.    Pharmacy Name: [facility]    Patient states that he is aware of what patient's normal copays and deductibles are for prescription medicines.       provided psychosocial support, counseling, resources, education, assistance, and discharge planning.  remains available.    Recommendations/Additional Comments: Recommend pt and wife contact transplant team for any questions or concerns as they may arise.    Patient states is aware of [facility]'s affiliation and/or partnership with  agencies in home health care, LTAC, SNF, [facility], and other hospitals and clinics.

## 2023-05-03 ENCOUNTER — PATIENT MESSAGE (OUTPATIENT)
Dept: TRANSPLANT | Facility: CLINIC | Age: 63
End: 2023-05-03
Payer: MEDICARE

## 2023-05-10 ENCOUNTER — ANESTHESIA (OUTPATIENT)
Dept: SURGERY | Facility: HOSPITAL | Age: 63
DRG: 650 | End: 2023-05-10
Payer: MEDICARE

## 2023-05-10 ENCOUNTER — HOSPITAL ENCOUNTER (INPATIENT)
Facility: HOSPITAL | Age: 63
LOS: 7 days | Discharge: HOME | DRG: 650 | End: 2023-05-17
Attending: TRANSPLANT SURGERY | Admitting: TRANSPLANT SURGERY
Payer: MEDICARE

## 2023-05-10 DIAGNOSIS — Z99.2 ESRD (END STAGE RENAL DISEASE) ON DIALYSIS: ICD-10-CM

## 2023-05-10 DIAGNOSIS — Z94.0 S/P KIDNEY TRANSPLANT: Primary | ICD-10-CM

## 2023-05-10 DIAGNOSIS — Z91.89 AT RISK FOR OPPORTUNISTIC INFECTIONS: ICD-10-CM

## 2023-05-10 DIAGNOSIS — N18.6 ESRD (END STAGE RENAL DISEASE): ICD-10-CM

## 2023-05-10 DIAGNOSIS — N18.6 ESRD (END STAGE RENAL DISEASE) ON DIALYSIS: ICD-10-CM

## 2023-05-10 DIAGNOSIS — D63.1 ANEMIA OF RENAL DISEASE: ICD-10-CM

## 2023-05-10 DIAGNOSIS — E87.5 HYPERKALEMIA: ICD-10-CM

## 2023-05-10 DIAGNOSIS — D62 ACUTE BLOOD LOSS ANEMIA: ICD-10-CM

## 2023-05-10 DIAGNOSIS — Z79.60 LONG-TERM USE OF IMMUNOSUPPRESSANT MEDICATION: ICD-10-CM

## 2023-05-10 DIAGNOSIS — Z29.89 PROPHYLACTIC IMMUNOTHERAPY: ICD-10-CM

## 2023-05-10 DIAGNOSIS — N18.9 ANEMIA OF RENAL DISEASE: ICD-10-CM

## 2023-05-10 LAB
25(OH)D3+25(OH)D2 SERPL-MCNC: 39 NG/ML (ref 30–96)
ABO + RH BLD: NORMAL
ALBUMIN SERPL BCP-MCNC: 3.2 G/DL (ref 3.5–5.2)
ANION GAP SERPL CALC-SCNC: 15 MMOL/L (ref 8–16)
APTT PPP: 24.3 SEC (ref 21–32)
BLD GP AB SCN CELLS X3 SERPL QL: NORMAL
BUN SERPL-MCNC: 18 MG/DL (ref 8–23)
CALCIUM SERPL-MCNC: 8.9 MG/DL (ref 8.7–10.5)
CHLORIDE SERPL-SCNC: 93 MMOL/L (ref 95–110)
CO2 SERPL-SCNC: 27 MMOL/L (ref 23–29)
CREAT SERPL-MCNC: 6.5 MG/DL (ref 0.5–1.4)
EST. GFR  (NO RACE VARIABLE): 9.1 ML/MIN/1.73 M^2
GLUCOSE SERPL-MCNC: 90 MG/DL (ref 70–110)
HBV CORE AB SERPL QL IA: NORMAL
HBV SURFACE AB SER-ACNC: 39.53 MIU/ML
HBV SURFACE AB SER-ACNC: REACTIVE M[IU]/ML
HBV SURFACE AG SERPL QL IA: NORMAL
HCT VFR BLD AUTO: 31.8 % (ref 40–54)
HCV AB SERPL QL IA: REACTIVE
HIV 1+2 AB+HIV1 P24 AG SERPL QL IA: NORMAL
INR PPP: 1 (ref 0.8–1.2)
PHOSPHATE SERPL-MCNC: 2.4 MG/DL (ref 2.7–4.5)
POTASSIUM SERPL-SCNC: 3.8 MMOL/L (ref 3.5–5.1)
PROTHROMBIN TIME: 10.9 SEC (ref 9–12.5)
PTH-INTACT SERPL-MCNC: 217.7 PG/ML (ref 9–77)
SARS-COV-2 RDRP RESP QL NAA+PROBE: NEGATIVE
SODIUM SERPL-SCNC: 135 MMOL/L (ref 136–145)
SPECIMEN OUTDATE: NORMAL

## 2023-05-10 PROCEDURE — 81100000 HC KIDNEY ACQUISITION-LIVE DONOR

## 2023-05-10 PROCEDURE — 50360 RNL ALTRNSPLJ W/O RCP NFRCT: CPT | Mod: LT,,, | Performed by: TRANSPLANT SURGERY

## 2023-05-10 PROCEDURE — 12000002 HC ACUTE/MED SURGE SEMI-PRIVATE ROOM: Mod: NTX

## 2023-05-10 PROCEDURE — 37000008 HC ANESTHESIA 1ST 15 MINUTES: Performed by: TRANSPLANT SURGERY

## 2023-05-10 PROCEDURE — 85014 HEMATOCRIT: CPT | Mod: NTX | Performed by: TRANSPLANT SURGERY

## 2023-05-10 PROCEDURE — 50360 PR TRANSPLANTATION OF KIDNEY: ICD-10-PCS | Mod: LT,,, | Performed by: TRANSPLANT SURGERY

## 2023-05-10 PROCEDURE — 63600175 PHARM REV CODE 636 W HCPCS: Performed by: TRANSPLANT SURGERY

## 2023-05-10 PROCEDURE — 99223 PR INITIAL HOSPITAL CARE,LEVL III: ICD-10-PCS | Mod: 57,AI,NTX, | Performed by: PHYSICIAN ASSISTANT

## 2023-05-10 PROCEDURE — C2617 STENT, NON-COR, TEM W/O DEL: HCPCS | Performed by: TRANSPLANT SURGERY

## 2023-05-10 PROCEDURE — 86803 HEPATITIS C AB TEST: CPT | Mod: NTX | Performed by: TRANSPLANT SURGERY

## 2023-05-10 PROCEDURE — U0002 COVID-19 LAB TEST NON-CDC: HCPCS | Mod: NTX | Performed by: TRANSPLANT SURGERY

## 2023-05-10 PROCEDURE — 86900 BLOOD TYPING SEROLOGIC ABO: CPT | Mod: NTX | Performed by: TRANSPLANT SURGERY

## 2023-05-10 PROCEDURE — 50605 PR URETEROTOMY TO INSERT STENT: ICD-10-PCS | Mod: 51,LT,, | Performed by: TRANSPLANT SURGERY

## 2023-05-10 PROCEDURE — 25000003 PHARM REV CODE 250: Mod: NTX | Performed by: TRANSPLANT SURGERY

## 2023-05-10 PROCEDURE — 82962 GLUCOSE BLOOD TEST: CPT | Performed by: TRANSPLANT SURGERY

## 2023-05-10 PROCEDURE — 83970 ASSAY OF PARATHORMONE: CPT | Mod: NTX | Performed by: TRANSPLANT SURGERY

## 2023-05-10 PROCEDURE — 81200001 HC KIDNEY ACQUISITION - CADAVER

## 2023-05-10 PROCEDURE — 71000015 HC POSTOP RECOV 1ST HR: Performed by: TRANSPLANT SURGERY

## 2023-05-10 PROCEDURE — 87340 HEPATITIS B SURFACE AG IA: CPT | Mod: NTX | Performed by: TRANSPLANT SURGERY

## 2023-05-10 PROCEDURE — 63600175 PHARM REV CODE 636 W HCPCS: Mod: NTX | Performed by: TRANSPLANT SURGERY

## 2023-05-10 PROCEDURE — 82306 VITAMIN D 25 HYDROXY: CPT | Mod: NTX | Performed by: TRANSPLANT SURGERY

## 2023-05-10 PROCEDURE — 80069 RENAL FUNCTION PANEL: CPT | Mod: NTX | Performed by: TRANSPLANT SURGERY

## 2023-05-10 PROCEDURE — 86706 HEP B SURFACE ANTIBODY: CPT | Mod: 91,NTX | Performed by: TRANSPLANT SURGERY

## 2023-05-10 PROCEDURE — 50325 PR TRANSPLANT,PREP LIVING  RENAL GRAFT: ICD-10-PCS | Mod: 51,LT,, | Performed by: TRANSPLANT SURGERY

## 2023-05-10 PROCEDURE — D9220A PRA ANESTHESIA: ICD-10-PCS | Mod: CRNA,,, | Performed by: NURSE ANESTHETIST, CERTIFIED REGISTERED

## 2023-05-10 PROCEDURE — 27201423 OPTIME MED/SURG SUP & DEVICES STERILE SUPPLY: Performed by: TRANSPLANT SURGERY

## 2023-05-10 PROCEDURE — 36415 COLL VENOUS BLD VENIPUNCTURE: CPT | Mod: NTX | Performed by: TRANSPLANT SURGERY

## 2023-05-10 PROCEDURE — 25000003 PHARM REV CODE 250: Performed by: NURSE ANESTHETIST, CERTIFIED REGISTERED

## 2023-05-10 PROCEDURE — 63600175 PHARM REV CODE 636 W HCPCS: Performed by: NURSE ANESTHETIST, CERTIFIED REGISTERED

## 2023-05-10 PROCEDURE — 87522 HEPATITIS C REVRS TRNSCRPJ: CPT | Mod: NTX | Performed by: TRANSPLANT SURGERY

## 2023-05-10 PROCEDURE — 87389 HIV-1 AG W/HIV-1&-2 AB AG IA: CPT | Mod: NTX | Performed by: TRANSPLANT SURGERY

## 2023-05-10 PROCEDURE — 71000033 HC RECOVERY, INTIAL HOUR: Performed by: TRANSPLANT SURGERY

## 2023-05-10 PROCEDURE — 50605 INSERT URETERAL SUPPORT: CPT | Mod: 51,LT,, | Performed by: TRANSPLANT SURGERY

## 2023-05-10 PROCEDURE — 50325 PREP DONOR RENAL GRAFT: CPT | Mod: 51,LT,, | Performed by: TRANSPLANT SURGERY

## 2023-05-10 PROCEDURE — D9220A PRA ANESTHESIA: Mod: CRNA,,, | Performed by: NURSE ANESTHETIST, CERTIFIED REGISTERED

## 2023-05-10 PROCEDURE — 76937 US GUIDE VASCULAR ACCESS: CPT | Mod: 26,,, | Performed by: ANESTHESIOLOGY

## 2023-05-10 PROCEDURE — 99223 1ST HOSP IP/OBS HIGH 75: CPT | Mod: 57,AI,NTX, | Performed by: PHYSICIAN ASSISTANT

## 2023-05-10 PROCEDURE — 37000009 HC ANESTHESIA EA ADD 15 MINS: Performed by: TRANSPLANT SURGERY

## 2023-05-10 PROCEDURE — 36000931 HC OR TIME LEV VII EA ADD 15 MIN: Performed by: TRANSPLANT SURGERY

## 2023-05-10 PROCEDURE — 36000930 HC OR TIME LEV VII 1ST 15 MIN: Performed by: TRANSPLANT SURGERY

## 2023-05-10 PROCEDURE — 71000016 HC POSTOP RECOV ADDL HR: Performed by: TRANSPLANT SURGERY

## 2023-05-10 PROCEDURE — 86704 HEP B CORE ANTIBODY TOTAL: CPT | Mod: NTX | Performed by: TRANSPLANT SURGERY

## 2023-05-10 PROCEDURE — 85610 PROTHROMBIN TIME: CPT | Mod: NTX | Performed by: TRANSPLANT SURGERY

## 2023-05-10 PROCEDURE — D9220A PRA ANESTHESIA: Mod: ANES,,, | Performed by: ANESTHESIOLOGY

## 2023-05-10 PROCEDURE — 81300002 HC KIDNEY TRANSPORT, GROUND 4-5 HOURS

## 2023-05-10 PROCEDURE — 76937 ARTERIAL: ICD-10-PCS | Mod: 26,,, | Performed by: ANESTHESIOLOGY

## 2023-05-10 PROCEDURE — 85730 THROMBOPLASTIN TIME PARTIAL: CPT | Mod: NTX | Performed by: TRANSPLANT SURGERY

## 2023-05-10 PROCEDURE — 25000003 PHARM REV CODE 250: Performed by: TRANSPLANT SURGERY

## 2023-05-10 PROCEDURE — 36620 INSERTION CATHETER ARTERY: CPT | Mod: 59,,, | Performed by: ANESTHESIOLOGY

## 2023-05-10 PROCEDURE — 36620 ARTERIAL: ICD-10-PCS | Mod: 59,,, | Performed by: ANESTHESIOLOGY

## 2023-05-10 PROCEDURE — D9220A PRA ANESTHESIA: ICD-10-PCS | Mod: ANES,,, | Performed by: ANESTHESIOLOGY

## 2023-05-10 DEVICE — STENT DOUBLE J 6FRMX12CM
Type: IMPLANTABLE DEVICE | Site: URETER | Status: FUNCTIONAL
Removed: 2023-05-30

## 2023-05-10 RX ORDER — PREGABALIN 75 MG/1
75 CAPSULE ORAL
Status: DISCONTINUED | OUTPATIENT
Start: 2023-05-10 | End: 2023-05-11

## 2023-05-10 RX ORDER — FENTANYL CITRATE 50 UG/ML
INJECTION, SOLUTION INTRAMUSCULAR; INTRAVENOUS
Status: DISCONTINUED | OUTPATIENT
Start: 2023-05-10 | End: 2023-05-11

## 2023-05-10 RX ORDER — LIDOCAINE HYDROCHLORIDE 20 MG/ML
JELLY TOPICAL
Status: DISCONTINUED | OUTPATIENT
Start: 2023-05-10 | End: 2023-05-11 | Stop reason: HOSPADM

## 2023-05-10 RX ORDER — MUPIROCIN 20 MG/G
OINTMENT TOPICAL
Status: DISCONTINUED | OUTPATIENT
Start: 2023-05-10 | End: 2023-05-11

## 2023-05-10 RX ORDER — HEPARIN SODIUM 1000 [USP'U]/ML
INJECTION, SOLUTION INTRAVENOUS; SUBCUTANEOUS
Status: DISCONTINUED | OUTPATIENT
Start: 2023-05-10 | End: 2023-05-11 | Stop reason: HOSPADM

## 2023-05-10 RX ORDER — KETAMINE HCL IN 0.9 % NACL 50 MG/5 ML
SYRINGE (ML) INTRAVENOUS
Status: DISCONTINUED | OUTPATIENT
Start: 2023-05-10 | End: 2023-05-11

## 2023-05-10 RX ORDER — CIPROFLOXACIN 2 MG/ML
400 INJECTION, SOLUTION INTRAVENOUS
Status: COMPLETED | OUTPATIENT
Start: 2023-05-10 | End: 2023-05-10

## 2023-05-10 RX ORDER — ACETAMINOPHEN 650 MG/20.3ML
650 LIQUID ORAL ONCE
Status: COMPLETED | OUTPATIENT
Start: 2023-05-10 | End: 2023-05-10

## 2023-05-10 RX ORDER — MIDAZOLAM HYDROCHLORIDE 1 MG/ML
INJECTION INTRAMUSCULAR; INTRAVENOUS
Status: DISCONTINUED | OUTPATIENT
Start: 2023-05-10 | End: 2023-05-11

## 2023-05-10 RX ORDER — PROPOFOL 10 MG/ML
VIAL (ML) INTRAVENOUS
Status: DISCONTINUED | OUTPATIENT
Start: 2023-05-10 | End: 2023-05-11

## 2023-05-10 RX ORDER — ROCURONIUM BROMIDE 10 MG/ML
INJECTION, SOLUTION INTRAVENOUS
Status: DISCONTINUED | OUTPATIENT
Start: 2023-05-10 | End: 2023-05-11

## 2023-05-10 RX ORDER — PHENYLEPHRINE HYDROCHLORIDE 10 MG/ML
INJECTION INTRAVENOUS
Status: DISCONTINUED | OUTPATIENT
Start: 2023-05-10 | End: 2023-05-11

## 2023-05-10 RX ORDER — MANNITOL 250 MG/ML
INJECTION, SOLUTION INTRAVENOUS
Status: DISCONTINUED | OUTPATIENT
Start: 2023-05-10 | End: 2023-05-11

## 2023-05-10 RX ORDER — SODIUM CHLORIDE 0.9 % (FLUSH) 0.9 %
10 SYRINGE (ML) INJECTION
Status: DISCONTINUED | OUTPATIENT
Start: 2023-05-10 | End: 2023-05-11

## 2023-05-10 RX ORDER — DIPHENHYDRAMINE HYDROCHLORIDE 50 MG/ML
INJECTION INTRAMUSCULAR; INTRAVENOUS
Status: DISCONTINUED | OUTPATIENT
Start: 2023-05-10 | End: 2023-05-11

## 2023-05-10 RX ORDER — HEPARIN SODIUM 5000 [USP'U]/ML
5000 INJECTION, SOLUTION INTRAVENOUS; SUBCUTANEOUS ONCE
Status: COMPLETED | OUTPATIENT
Start: 2023-05-10 | End: 2023-05-10

## 2023-05-10 RX ORDER — ONDANSETRON 2 MG/ML
INJECTION INTRAMUSCULAR; INTRAVENOUS
Status: DISCONTINUED | OUTPATIENT
Start: 2023-05-10 | End: 2023-05-11

## 2023-05-10 RX ORDER — FUROSEMIDE 10 MG/ML
INJECTION INTRAMUSCULAR; INTRAVENOUS
Status: DISCONTINUED | OUTPATIENT
Start: 2023-05-10 | End: 2023-05-11

## 2023-05-10 RX ORDER — DIPHENHYDRAMINE HYDROCHLORIDE 50 MG/ML
50 INJECTION INTRAMUSCULAR; INTRAVENOUS ONCE
Status: DISCONTINUED | OUTPATIENT
Start: 2023-05-10 | End: 2023-05-11

## 2023-05-10 RX ORDER — LIDOCAINE HYDROCHLORIDE 20 MG/ML
INJECTION INTRAVENOUS
Status: DISCONTINUED | OUTPATIENT
Start: 2023-05-10 | End: 2023-05-11

## 2023-05-10 RX ADMIN — CIPROFLOXACIN 400 MG: 2 INJECTION, SOLUTION INTRAVENOUS at 09:05

## 2023-05-10 RX ADMIN — ROCURONIUM BROMIDE 50 MG: 10 INJECTION INTRAVENOUS at 09:05

## 2023-05-10 RX ADMIN — PHENYLEPHRINE HYDROCHLORIDE 200 MCG: 10 INJECTION INTRAVENOUS at 09:05

## 2023-05-10 RX ADMIN — ONDANSETRON 250 G: 2 INJECTION INTRAMUSCULAR; INTRAVENOUS at 10:05

## 2023-05-10 RX ADMIN — LIDOCAINE HYDROCHLORIDE 100 MG: 20 INJECTION INTRAVENOUS at 09:05

## 2023-05-10 RX ADMIN — MIDAZOLAM HYDROCHLORIDE 2 MG: 1 INJECTION INTRAMUSCULAR; INTRAVENOUS at 09:05

## 2023-05-10 RX ADMIN — ONDANSETRON 250 G: 2 INJECTION INTRAMUSCULAR; INTRAVENOUS at 11:05

## 2023-05-10 RX ADMIN — PROPOFOL 180 MG: 10 INJECTION, EMULSION INTRAVENOUS at 09:05

## 2023-05-10 RX ADMIN — HEPARIN SODIUM 5000 UNITS: 5000 INJECTION, SOLUTION INTRAVENOUS; SUBCUTANEOUS at 06:05

## 2023-05-10 RX ADMIN — DEXTROSE 500 MG: 50 INJECTION, SOLUTION INTRAVENOUS at 09:05

## 2023-05-10 RX ADMIN — DIPHENHYDRAMINE HYDROCHLORIDE 50 MG: 50 INJECTION, SOLUTION INTRAMUSCULAR; INTRAVENOUS at 09:05

## 2023-05-10 RX ADMIN — ROCURONIUM BROMIDE 20 MG: 10 INJECTION INTRAVENOUS at 10:05

## 2023-05-10 RX ADMIN — VANCOMYCIN HYDROCHLORIDE 1500 MG: 1.5 INJECTION, POWDER, LYOPHILIZED, FOR SOLUTION INTRAVENOUS at 09:05

## 2023-05-10 RX ADMIN — SODIUM CHLORIDE, SODIUM GLUCONATE, SODIUM ACETATE, POTASSIUM CHLORIDE, MAGNESIUM CHLORIDE, SODIUM PHOSPHATE, DIBASIC, AND POTASSIUM PHOSPHATE: .53; .5; .37; .037; .03; .012; .00082 INJECTION, SOLUTION INTRAVENOUS at 09:05

## 2023-05-10 RX ADMIN — FENTANYL CITRATE 50 MCG: 50 INJECTION, SOLUTION INTRAMUSCULAR; INTRAVENOUS at 10:05

## 2023-05-10 RX ADMIN — ANTI-THYMOCYTE GLOBULIN (RABBIT) 100 MG: 5 INJECTION, POWDER, LYOPHILIZED, FOR SOLUTION INTRAVENOUS at 10:05

## 2023-05-10 RX ADMIN — PHENYLEPHRINE HYDROCHLORIDE 100 MCG: 10 INJECTION INTRAVENOUS at 09:05

## 2023-05-10 RX ADMIN — Medication 10 MG: at 11:05

## 2023-05-10 RX ADMIN — ACETAMINOPHEN 650 MG: 650 SOLUTION ORAL at 09:05

## 2023-05-10 RX ADMIN — Medication 20 MG: at 10:05

## 2023-05-10 RX ADMIN — ROCURONIUM BROMIDE 10 MG: 10 INJECTION INTRAVENOUS at 11:05

## 2023-05-10 RX ADMIN — MANNITOL 25 G: 12.5 INJECTION, SOLUTION INTRAVENOUS at 11:05

## 2023-05-10 RX ADMIN — SODIUM CHLORIDE 0.4 MCG/KG/MIN: 9 INJECTION, SOLUTION INTRAVENOUS at 09:05

## 2023-05-10 RX ADMIN — FUROSEMIDE 100 MG: 10 INJECTION, SOLUTION INTRAMUSCULAR; INTRAVENOUS at 11:05

## 2023-05-10 NOTE — ASSESSMENT & PLAN NOTE
- Admit for living unrelated kidney transplant through paired exchange for ESRD 2/2 unknown etiology  - Dr Brown will be the primary surgeon  - OR scheduled for 1900  - Pre op labs and imaging will be reviewed prior to surgery

## 2023-05-10 NOTE — H&P
[facility]  Kidney Transplant  H&P      Subjective:     Chief Complaint/Reason for Admission: Kidney Transplant    History of Present Illness:  Mr. Parikh is a 62 y.o. year old Black or  male being admitted to receive a living unrelated kidney transplant through paired exchange for ESRD secondary to unknown etiology. 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 Patient is on HD MWF via left AVF, tolerating well without hypotension. Last HD 5/9/22. Dry wt ~82 kg. He denies any recent hospitalizations or ED visits. Dr Brown will be the primary surgeon. OR scheduled for 1900. Pre op labs and imaging will be reviewed prior to surgery.     Dialysis History: Mr. Stanford with ESRD, requiring chronic dialysis who is on hemodialysis started on 7/22/20. Patient is dialyzing on MWF schedule.  Patient reports that he is tolerating dialysis well.  Date of Last Dialysis: 5/9/22    Native urine output per day: 1 cup     Previous Transplant: no    PTA Medications   Medication Sig    allopurinoL (ZYLOPRIM) 300 MG tablet Take 300 mg by mouth once daily.    benzonatate (TESSALON) 200 MG capsule Take 1 capsule by mouth 3 (three) times daily as needed.    cinacalcet (SENSIPAR) 30 MG Tab Take 30 mg by mouth once daily.    fluticasone furoate (ARNUITY ELLIPTA) 100 mcg/actuation inhaler Inhale 100 mcg into the lungs once daily. Controller    magnesium oxide (MAG-OX) 400 mg (241.3 mg magnesium) tablet Take 400 mg by mouth 2 (two) times a day.    melatonin (MELATIN) Take 3 mg by mouth nightly.    metoprolol succinate (TOPROL-XL) 25 MG 24 hr tablet Take 12.5 mg by mouth every Tuesday, Thursday, Saturday, Sunday.    predniSONE (DELTASONE) 5 MG tablet Take 5 mg by mouth once daily.    sevelamer carbonate (RENVELA) 2.4 gram PwPk Take by mouth 3 (three) times daily with meals. One pack with meals        Review of patient's allergies indicates:   Allergen Reactions    Bactrim [sulfamethoxazole-trimethoprim] Shortness Of Breath and Itching    Penicillins Shortness Of Breath and Itching       Past Medical History:   Diagnosis Date    Anemia     Arthritis     Cirrhosis     Crohn's disease     Disorder of kidney and ureter     Stage 5    Encounter for blood transfusion     Gout     Hearing loss     Hepatitis     Had Hep C Cleared    Hypertension     Neurosarcoidosis 2018    Obesity     Osteoarthritis     Sarcoid neuropathy     Sarcoidosis, lung     Steatosis      Past Surgical History:   Procedure Laterality Date    AV FISTULA PLACEMENT Left     BRAIN SURGERY      COLON SURGERY      Exploratory lap x 4 for Chron's disease. Likely right colectomy    CSF SHUNT      JOINT REPLACEMENT Right     Hip    TOTAL HIP ARTHROPLASTY Right      Family History       Problem Relation (Age of Onset)    COPD Sister    Cancer Father, Maternal Grandfather, Paternal Grandfather    Diabetes Father    Heart disease Father, Sister    Hypertension Mother, Father    Lung cancer Maternal Grandfather, Paternal Grandfather    Multiple myeloma Father    Stroke Father          Tobacco Use    Smoking status: Never    Smokeless tobacco: Never   Substance and Sexual Activity    Alcohol use: Not Currently    Drug use: Never    Sexual activity: Yes     Partners: Female        Review of Systems   Constitutional:  Negative for chills and fever.   HENT: Negative.     Eyes: Negative.    Respiratory:  Negative for chest tightness and shortness of breath.    Cardiovascular:  Negative for chest pain and leg swelling.   Gastrointestinal:  Negative for constipation, diarrhea, nausea and vomiting.   Endocrine: Negative.    Genitourinary:  Positive for decreased urine volume. Negative for dysuria and hematuria.   Musculoskeletal:  Negative for arthralgias and myalgias.   Skin:  Negative for wound.   Allergic/Immunologic: Negative.   "  Neurological:  Negative for weakness and light-headedness.   Psychiatric/Behavioral:  Negative for confusion. The patient is not nervous/anxious.    Objective:     Vital Signs (Most Recent):  Temp: 97.9 °F (36.6 °C) (05/10/23 1242)  Pulse: 77 (05/10/23 1242)  Resp: 16 (05/10/23 1242)  BP: 130/73 (05/10/23 1242)  SpO2: 98 % (05/10/23 1242)  Height: 5' 7" (170.2 cm)  Weight: 81.3 kg (179 lb 3.7 oz)  Body mass index is 28.07 kg/m².      Physical Exam  Vitals and nursing note reviewed.   Constitutional:       Appearance: Normal appearance. He is normal weight.   Eyes:      Conjunctiva/sclera: Conjunctivae normal.   Cardiovascular:      Rate and Rhythm: Normal rate.      Pulses: Normal pulses.   Pulmonary:      Effort: Pulmonary effort is normal.   Abdominal:      General: Bowel sounds are normal. There is no distension.      Palpations: Abdomen is soft.      Tenderness: There is no abdominal tenderness.   Musculoskeletal:         General: Normal range of motion.      Right lower leg: No edema.      Left lower leg: No edema.   Skin:     General: Skin is warm and dry.   Neurological:      Mental Status: He is oriented to person, place, and time.      Motor: No weakness.   Psychiatric:         Behavior: Behavior normal.         Thought Content: Thought content normal.        Pre op labs and imaging pending     Assessment/Plan:     Renal/  * ESRD (end stage renal disease) on dialysis  - Admit for living unrelated kidney transplant through paired exchange for ESRD 2/2 unknown etiology  - Dr Brown will be the primary surgeon  - OR scheduled for 1900  - Pre op labs and imaging will be reviewed prior to surgery       The patient presents for kidney transplant.  There are no apparent contraindications to proceeding with the planned transplant.  The patient understands that the transplant could potentially be cancelled pending detailed assessment of the donor organ.  He will receive Thymoglobulin induction.  A complete " discussion of the transplant procedure, including risks, complications, and alternatives, as well as any donor-specific risk factors requiring specific disclosure, will be carried out by the responsible staff surgeon prior to the procedure.     Discharge Planning: Not a candidate for d/c at this time.     Sachi Samuels PA-C  Kidney Transplant  [facility]

## 2023-05-10 NOTE — SUBJECTIVE & OBJECTIVE
Subjective:     Chief Complaint/Reason for Admission: Kidney Transplant    History of Present Illness:  Mr. Parikh is a 62 y.o. year old Black or  male being admitted to receive a living unrelated kidney transplant through paired exchange for ESRD secondary to unknown etiology. 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 Patient is on HD MWF via left AVF, tolerating well without hypotension. Last HD 5/9/22. Dry wt 82 kg. He denies any recent hospitalizations or ED visits. Dr Brown will be the primary surgeon. OR scheduled for 1900. Pre op labs and imaging will be reviewed prior to surgery.     Dialysis History: Mr. Stanford with ESRD, requiring chronic dialysis who is on hemodialysis started on 7/22/20. Patient is dialyzing on MWF schedule.  Patient reports that he is tolerating dialysis well.  Date of Last Dialysis: 5/9/22    Native urine output per day: 1 cup     Previous Transplant: no    PTA Medications   Medication Sig    allopurinoL (ZYLOPRIM) 300 MG tablet Take 300 mg by mouth once daily.    benzonatate (TESSALON) 200 MG capsule Take 1 capsule by mouth 3 (three) times daily as needed.    cinacalcet (SENSIPAR) 30 MG Tab Take 30 mg by mouth once daily.    fluticasone furoate (ARNUITY ELLIPTA) 100 mcg/actuation inhaler Inhale 100 mcg into the lungs once daily. Controller    magnesium oxide (MAG-OX) 400 mg (241.3 mg magnesium) tablet Take 400 mg by mouth 2 (two) times a day.    melatonin (MELATIN) Take 3 mg by mouth nightly.    metoprolol succinate (TOPROL-XL) 25 MG 24 hr tablet Take 12.5 mg by mouth every Tuesday, Thursday, Saturday, Sunday.    predniSONE (DELTASONE) 5 MG tablet Take 5 mg by mouth once daily.    sevelamer carbonate (RENVELA) 2.4 gram PwPk Take by mouth 3 (three) times daily with meals. One pack with meals       Review of patient's allergies indicates:   Allergen Reactions     Bactrim [sulfamethoxazole-trimethoprim] Shortness Of Breath and Itching    Penicillins Shortness Of Breath and Itching       Past Medical History:   Diagnosis Date    Anemia     Arthritis     Cirrhosis     Crohn's disease     Disorder of kidney and ureter     Stage 5    Encounter for blood transfusion     Gout     Hearing loss     Hepatitis     Had Hep C Cleared    Hypertension     Neurosarcoidosis 2018    Obesity     Osteoarthritis     Sarcoid neuropathy     Sarcoidosis, lung     Steatosis      Past Surgical History:   Procedure Laterality Date    AV FISTULA PLACEMENT Left     BRAIN SURGERY      COLON SURGERY      Exploratory lap x 4 for Chron's disease. Likely right colectomy    CSF SHUNT      JOINT REPLACEMENT Right     Hip    TOTAL HIP ARTHROPLASTY Right      Family History       Problem Relation (Age of Onset)    COPD Sister    Cancer Father, Maternal Grandfather, Paternal Grandfather    Diabetes Father    Heart disease Father, Sister    Hypertension Mother, Father    Lung cancer Maternal Grandfather, Paternal Grandfather    Multiple myeloma Father    Stroke Father          Tobacco Use    Smoking status: Never    Smokeless tobacco: Never   Substance and Sexual Activity    Alcohol use: Not Currently    Drug use: Never    Sexual activity: Yes     Partners: Female        Review of Systems   Constitutional:  Negative for chills and fever.   HENT: Negative.     Eyes: Negative.    Respiratory:  Negative for chest tightness and shortness of breath.    Cardiovascular:  Negative for chest pain and leg swelling.   Gastrointestinal:  Negative for constipation, diarrhea, nausea and vomiting.   Endocrine: Negative.    Genitourinary:  Positive for decreased urine volume. Negative for dysuria and hematuria.   Musculoskeletal:  Negative for arthralgias and myalgias.   Skin:  Negative for wound.   Allergic/Immunologic: Negative.    Neurological:  Negative for weakness and light-headedness.   Psychiatric/Behavioral:  Negative  "for confusion. The patient is not nervous/anxious.    Objective:     Vital Signs (Most Recent):  Temp: 97.9 °F (36.6 °C) (05/10/23 1242)  Pulse: 77 (05/10/23 1242)  Resp: 16 (05/10/23 1242)  BP: 130/73 (05/10/23 1242)  SpO2: 98 % (05/10/23 1242)  Height: 5' 7" (170.2 cm)  Weight: 81.3 kg (179 lb 3.7 oz)  Body mass index is 28.07 kg/m².      Physical Exam  Vitals and nursing note reviewed.   Constitutional:       Appearance: Normal appearance. He is normal weight.   Eyes:      Conjunctiva/sclera: Conjunctivae normal.   Cardiovascular:      Rate and Rhythm: Normal rate.      Pulses: Normal pulses.   Pulmonary:      Effort: Pulmonary effort is normal.   Abdominal:      General: Bowel sounds are normal. There is no distension.      Palpations: Abdomen is soft.      Tenderness: There is no abdominal tenderness.   Musculoskeletal:         General: Normal range of motion.      Right lower leg: No edema.      Left lower leg: No edema.   Skin:     General: Skin is warm and dry.   Neurological:      Mental Status: He is oriented to person, place, and time.      Motor: No weakness.   Psychiatric:         Behavior: Behavior normal.         Thought Content: Thought content normal.        Pre op labs and imaging pending   "

## 2023-05-10 NOTE — PLAN OF CARE
PT admitted to room 73098 for kidney transplant .OR time 1800. Unable to Place PIV, Midline consulted. No acute Distress noted or voiced. No skin problems noted on Admit.

## 2023-05-10 NOTE — SIGNIFICANT EVENT
Pre-operative Discussion Note  Kidney Transplant Surgery    Abdoulaye Parikh is a 62 y.o. male with ESRD, requiring chronic dialysis admitted for kidney transplant.  I discussed the planned procedure in detail, including expected hospital course and outcomes, benefits, risks, and potential complications.  Complications discussed included death, graft failure, bleeding, infection, vascular thrombosis, and rejection.  I discussed the risks of anesthesia, as well as the potential need for re-operation.  The possibility of other complications not specifically mentioned was also discussed.  Also, I discussed the need for lifelong immunosuppression and the possibility of serious complications from immunosuppressive drugs.    The discussion included the risks that the patient will incur if he elects to not have the proposed procedure.    Relevant donor-specific risk factors were disclosed and discussed with the patient, including:   PHS: I discussed the use of organs from donors with [facility] risk criteria, including the testing protocols utilized, as well as data from the literature regarding the likelihood of transmission of hepatitis or HIV. The patient is willing to consider such grafts.    Specific [facility] donor risk criteria for the organ donor include:  Drug injection for nonmedical reasons    HCV: Non-viremic recipient: I discussed the use of HCV-positive organs in naive recipients, including the risk of viral transmission to the patients or others, potential insurance barriers for antiviral medication coverage, risk for fibrosing cholestatic hepatitis, death or graft loss. The potential advantage to the recipient is the possibility of receiving a transplant sooner with decreased mortality risk by accepting such an organ. The patient is willing to consider such grafts.    The patient was SARS-CoV-2 /COVID-19 tested with negative results.    COVID-19: I discussed the possibility of COVID-19 transmission with the patient.  Although [facility] testing is available for the virus using a technique which in theory should be very accurate, there is no data yet regarding the likelihood of a  false-negative test leading to virus transmission. Based on accuracy of testing for other viruses, it is expected that this risk is extremely small.     I also discussed that transplant immunosuppression will increase susceptibility to COVID-19 and other viruses, and that although we use stringent precautions to protect patients from infection, it is possible for a transplant recipient to contract this infection. If COVID-19 infection should occur, it would be a serious matter with a significant risk of death.    All questions were answered.  The patient and available family members voice understanding and agree to proceed with the transplant.    UNOS Patient Status  Functional Status: 50% - Requires considerable assistance and frequent medical care  Physical Capacity: No Limitations

## 2023-05-10 NOTE — HPI
Mr. Parikh is a 62 y.o. year old Black or  male being admitted to receive a living unrelated kidney transplant through paired exchange for ESRD secondary to unknown etiology. 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 Patient is on HD MWF via left AVF, tolerating well without hypotension. Last HD 5/9/22. Dry wt 82 kg. He denies any recent hospitalizations or ED visits. Dr Brown will be the primary surgeon. OR scheduled for 1900. Pre op labs and imaging will be reviewed prior to surgery.

## 2023-05-11 PROBLEM — Z94.0 S/P KIDNEY TRANSPLANT: Status: ACTIVE | Noted: 2023-05-11

## 2023-05-11 PROBLEM — Z29.89 PROPHYLACTIC IMMUNOTHERAPY: Status: ACTIVE | Noted: 2023-05-11

## 2023-05-11 PROBLEM — N18.9 ANEMIA OF RENAL DISEASE: Status: ACTIVE | Noted: 2023-05-11

## 2023-05-11 PROBLEM — Z79.60 LONG-TERM USE OF IMMUNOSUPPRESSANT MEDICATION: Status: ACTIVE | Noted: 2023-05-11

## 2023-05-11 PROBLEM — Z91.89 AT RISK FOR OPPORTUNISTIC INFECTIONS: Status: ACTIVE | Noted: 2023-05-11

## 2023-05-11 PROBLEM — D62 ACUTE BLOOD LOSS ANEMIA: Status: ACTIVE | Noted: 2023-05-11

## 2023-05-11 PROBLEM — D63.1 ANEMIA OF RENAL DISEASE: Status: ACTIVE | Noted: 2023-05-11

## 2023-05-11 PROBLEM — E87.5 HYPERKALEMIA: Status: ACTIVE | Noted: 2023-05-11

## 2023-05-11 PROBLEM — Z99.2 ESRD (END STAGE RENAL DISEASE) ON DIALYSIS: Status: RESOLVED | Noted: 2023-04-25 | Resolved: 2023-05-11

## 2023-05-11 PROBLEM — N18.6 ESRD (END STAGE RENAL DISEASE) ON DIALYSIS: Status: RESOLVED | Noted: 2023-04-25 | Resolved: 2023-05-11

## 2023-05-11 LAB
ALBUMIN SERPL BCP-MCNC: 2.4 G/DL (ref 3.5–5.2)
ALBUMIN SERPL BCP-MCNC: 2.4 G/DL (ref 3.5–5.2)
ALBUMIN SERPL BCP-MCNC: 2.6 G/DL (ref 3.5–5.2)
ALP SERPL-CCNC: 57 U/L (ref 55–135)
ALT SERPL W/O P-5'-P-CCNC: 25 U/L (ref 10–44)
ANION GAP SERPL CALC-SCNC: 12 MMOL/L (ref 8–16)
ANION GAP SERPL CALC-SCNC: 13 MMOL/L (ref 8–16)
ANION GAP SERPL CALC-SCNC: 13 MMOL/L (ref 8–16)
ANION GAP SERPL CALC-SCNC: 15 MMOL/L (ref 8–16)
AST SERPL-CCNC: 28 U/L (ref 10–40)
BASOPHILS # BLD AUTO: 0.01 K/UL (ref 0–0.2)
BASOPHILS # BLD AUTO: 0.02 K/UL (ref 0–0.2)
BASOPHILS NFR BLD: 0.1 % (ref 0–1.9)
BASOPHILS NFR BLD: 0.1 % (ref 0–1.9)
BILIRUB DIRECT SERPL-MCNC: 0.4 MG/DL (ref 0.1–0.3)
BILIRUB SERPL-MCNC: 0.7 MG/DL (ref 0.1–1)
BUN SERPL-MCNC: 20 MG/DL (ref 8–23)
BUN SERPL-MCNC: 23 MG/DL (ref 8–23)
BUN SERPL-MCNC: 24 MG/DL (ref 8–23)
BUN SERPL-MCNC: 27 MG/DL (ref 8–23)
CALCIUM SERPL-MCNC: 8.1 MG/DL (ref 8.7–10.5)
CALCIUM SERPL-MCNC: 8.3 MG/DL (ref 8.7–10.5)
CALCIUM SERPL-MCNC: 8.4 MG/DL (ref 8.7–10.5)
CALCIUM SERPL-MCNC: 8.4 MG/DL (ref 8.7–10.5)
CHLORIDE SERPL-SCNC: 94 MMOL/L (ref 95–110)
CHLORIDE SERPL-SCNC: 95 MMOL/L (ref 95–110)
CHLORIDE SERPL-SCNC: 97 MMOL/L (ref 95–110)
CHLORIDE SERPL-SCNC: 98 MMOL/L (ref 95–110)
CO2 SERPL-SCNC: 17 MMOL/L (ref 23–29)
CO2 SERPL-SCNC: 20 MMOL/L (ref 23–29)
CO2 SERPL-SCNC: 21 MMOL/L (ref 23–29)
CO2 SERPL-SCNC: 25 MMOL/L (ref 23–29)
CREAT SERPL-MCNC: 7.2 MG/DL (ref 0.5–1.4)
CREAT SERPL-MCNC: 7.2 MG/DL (ref 0.5–1.4)
CREAT SERPL-MCNC: 7.5 MG/DL (ref 0.5–1.4)
CREAT SERPL-MCNC: 7.7 MG/DL (ref 0.5–1.4)
CREAT UR-MCNC: 32 MG/DL (ref 23–375)
DIFFERENTIAL METHOD: ABNORMAL
DIFFERENTIAL METHOD: ABNORMAL
EOSINOPHIL # BLD AUTO: 0 K/UL (ref 0–0.5)
EOSINOPHIL # BLD AUTO: 0 K/UL (ref 0–0.5)
EOSINOPHIL NFR BLD: 0 % (ref 0–8)
EOSINOPHIL NFR BLD: 0.1 % (ref 0–8)
ERYTHROCYTE [DISTWIDTH] IN BLOOD BY AUTOMATED COUNT: 14 % (ref 11.5–14.5)
ERYTHROCYTE [DISTWIDTH] IN BLOOD BY AUTOMATED COUNT: 14.3 % (ref 11.5–14.5)
EST. GFR  (NO RACE VARIABLE): 7.4 ML/MIN/1.73 M^2
EST. GFR  (NO RACE VARIABLE): 7.6 ML/MIN/1.73 M^2
EST. GFR  (NO RACE VARIABLE): 8 ML/MIN/1.73 M^2
EST. GFR  (NO RACE VARIABLE): 8 ML/MIN/1.73 M^2
GLUCOSE SERPL-MCNC: 103 MG/DL (ref 70–110)
GLUCOSE SERPL-MCNC: 107 MG/DL (ref 70–110)
GLUCOSE SERPL-MCNC: 112 MG/DL (ref 70–110)
GLUCOSE SERPL-MCNC: 142 MG/DL (ref 70–110)
GLUCOSE SERPL-MCNC: 88 MG/DL (ref 70–110)
HAPTOGLOB SERPL-MCNC: 113 MG/DL (ref 30–250)
HCO3 UR-SCNC: 27.5 MMOL/L (ref 24–28)
HCT VFR BLD AUTO: 28.3 % (ref 40–54)
HCT VFR BLD AUTO: 29 % (ref 40–54)
HCT VFR BLD AUTO: 30.7 % (ref 40–54)
HCT VFR BLD CALC: 27 %PCV (ref 36–54)
HCV RNA SERPL QL NAA+PROBE: NOT DETECTED
HCV RNA SPEC NAA+PROBE-ACNC: NOT DETECTED IU/ML
HGB BLD-MCNC: 10 G/DL (ref 14–18)
HGB BLD-MCNC: 10.4 G/DL (ref 14–18)
IMM GRANULOCYTES # BLD AUTO: 0.12 K/UL (ref 0–0.04)
IMM GRANULOCYTES # BLD AUTO: 0.13 K/UL (ref 0–0.04)
IMM GRANULOCYTES NFR BLD AUTO: 0.8 % (ref 0–0.5)
IMM GRANULOCYTES NFR BLD AUTO: 0.8 % (ref 0–0.5)
LDH SERPL L TO P-CCNC: 277 U/L (ref 110–260)
LYMPHOCYTES # BLD AUTO: 0 K/UL (ref 1–4.8)
LYMPHOCYTES # BLD AUTO: 0.1 K/UL (ref 1–4.8)
LYMPHOCYTES NFR BLD: 0.1 % (ref 18–48)
LYMPHOCYTES NFR BLD: 0.3 % (ref 18–48)
MAGNESIUM SERPL-MCNC: 1.9 MG/DL (ref 1.6–2.6)
MCH RBC QN AUTO: 36 PG (ref 27–31)
MCH RBC QN AUTO: 37.5 PG (ref 27–31)
MCHC RBC AUTO-ENTMCNC: 33.9 G/DL (ref 32–36)
MCHC RBC AUTO-ENTMCNC: 35.3 G/DL (ref 32–36)
MCV RBC AUTO: 106 FL (ref 82–98)
MCV RBC AUTO: 106 FL (ref 82–98)
MONOCYTES # BLD AUTO: 0.1 K/UL (ref 0.3–1)
MONOCYTES # BLD AUTO: 0.2 K/UL (ref 0.3–1)
MONOCYTES NFR BLD: 0.9 % (ref 4–15)
MONOCYTES NFR BLD: 1.5 % (ref 4–15)
NEUTROPHILS # BLD AUTO: 14.9 K/UL (ref 1.8–7.7)
NEUTROPHILS # BLD AUTO: 16 K/UL (ref 1.8–7.7)
NEUTROPHILS NFR BLD: 97.3 % (ref 38–73)
NEUTROPHILS NFR BLD: 98 % (ref 38–73)
NRBC BLD-RTO: 0 /100 WBC
NRBC BLD-RTO: 0 /100 WBC
PCO2 BLDA: 30.5 MMHG (ref 35–45)
PH SMN: 7.56 [PH] (ref 7.35–7.45)
PHOSPHATE SERPL-MCNC: 3.1 MG/DL (ref 2.7–4.5)
PHOSPHATE SERPL-MCNC: 3.9 MG/DL (ref 2.7–4.5)
PHOSPHATE SERPL-MCNC: 3.9 MG/DL (ref 2.7–4.5)
PLATELET # BLD AUTO: 101 K/UL (ref 150–450)
PLATELET # BLD AUTO: 102 K/UL (ref 150–450)
PMV BLD AUTO: 11.4 FL (ref 9.2–12.9)
PMV BLD AUTO: 11.4 FL (ref 9.2–12.9)
PO2 BLDA: 156 MMHG (ref 80–100)
POC BE: 5 MMOL/L
POC IONIZED CALCIUM: 0.94 MMOL/L (ref 1.06–1.42)
POC SATURATED O2: 100 % (ref 95–100)
POC TCO2: 28 MMOL/L (ref 23–27)
POCT GLUCOSE: 111 MG/DL (ref 70–110)
POCT GLUCOSE: 119 MG/DL (ref 70–110)
POCT GLUCOSE: 139 MG/DL (ref 70–110)
POCT GLUCOSE: 239 MG/DL (ref 70–110)
POCT GLUCOSE: 90 MG/DL (ref 70–110)
POTASSIUM BLD-SCNC: 3.5 MMOL/L (ref 3.5–5.1)
POTASSIUM SERPL-SCNC: 4.5 MMOL/L (ref 3.5–5.1)
POTASSIUM SERPL-SCNC: 5 MMOL/L (ref 3.5–5.1)
POTASSIUM SERPL-SCNC: 5.9 MMOL/L (ref 3.5–5.1)
POTASSIUM SERPL-SCNC: 6 MMOL/L (ref 3.5–5.1)
PROT SERPL-MCNC: 7.6 G/DL (ref 6–8.4)
PROT UR-MCNC: 60 MG/DL (ref 0–15)
PROT/CREAT UR: 1.88 MG/G{CREAT} (ref 0–0.2)
RBC # BLD AUTO: 2.67 M/UL (ref 4.6–6.2)
RBC # BLD AUTO: 2.89 M/UL (ref 4.6–6.2)
SAMPLE: ABNORMAL
SODIUM BLD-SCNC: 133 MMOL/L (ref 136–145)
SODIUM SERPL-SCNC: 127 MMOL/L (ref 136–145)
SODIUM SERPL-SCNC: 129 MMOL/L (ref 136–145)
SODIUM SERPL-SCNC: 131 MMOL/L (ref 136–145)
SODIUM SERPL-SCNC: 133 MMOL/L (ref 136–145)
TACROLIMUS BLD-MCNC: <2 NG/ML (ref 5–15)
WBC # BLD AUTO: 15.18 K/UL (ref 3.9–12.7)
WBC # BLD AUTO: 16.47 K/UL (ref 3.9–12.7)

## 2023-05-11 PROCEDURE — 25000003 PHARM REV CODE 250: Performed by: PHYSICIAN ASSISTANT

## 2023-05-11 PROCEDURE — 80197 ASSAY OF TACROLIMUS: CPT | Performed by: STUDENT IN AN ORGANIZED HEALTH CARE EDUCATION/TRAINING PROGRAM

## 2023-05-11 PROCEDURE — 83615 LACTATE (LD) (LDH) ENZYME: CPT | Performed by: PHYSICIAN ASSISTANT

## 2023-05-11 PROCEDURE — 63600175 PHARM REV CODE 636 W HCPCS: Performed by: STUDENT IN AN ORGANIZED HEALTH CARE EDUCATION/TRAINING PROGRAM

## 2023-05-11 PROCEDURE — 99233 SBSQ HOSP IP/OBS HIGH 50: CPT | Mod: ,,, | Performed by: PHYSICIAN ASSISTANT

## 2023-05-11 PROCEDURE — 25000003 PHARM REV CODE 250: Performed by: NURSE ANESTHETIST, CERTIFIED REGISTERED

## 2023-05-11 PROCEDURE — 63600175 PHARM REV CODE 636 W HCPCS: Performed by: TRANSPLANT SURGERY

## 2023-05-11 PROCEDURE — 36415 COLL VENOUS BLD VENIPUNCTURE: CPT | Performed by: STUDENT IN AN ORGANIZED HEALTH CARE EDUCATION/TRAINING PROGRAM

## 2023-05-11 PROCEDURE — 82570 ASSAY OF URINE CREATININE: CPT | Performed by: PHYSICIAN ASSISTANT

## 2023-05-11 PROCEDURE — 85025 COMPLETE CBC W/AUTO DIFF WBC: CPT | Performed by: STUDENT IN AN ORGANIZED HEALTH CARE EDUCATION/TRAINING PROGRAM

## 2023-05-11 PROCEDURE — 80048 BASIC METABOLIC PNL TOTAL CA: CPT | Performed by: PHYSICIAN ASSISTANT

## 2023-05-11 PROCEDURE — 25000003 PHARM REV CODE 250: Performed by: STUDENT IN AN ORGANIZED HEALTH CARE EDUCATION/TRAINING PROGRAM

## 2023-05-11 PROCEDURE — 85025 COMPLETE CBC W/AUTO DIFF WBC: CPT | Mod: 91 | Performed by: PHYSICIAN ASSISTANT

## 2023-05-11 PROCEDURE — 80100014 HC HEMODIALYSIS 1:1

## 2023-05-11 PROCEDURE — 36415 COLL VENOUS BLD VENIPUNCTURE: CPT | Performed by: PHYSICIAN ASSISTANT

## 2023-05-11 PROCEDURE — S5010 5% DEXTROSE AND 0.45% SALINE: HCPCS | Performed by: STUDENT IN AN ORGANIZED HEALTH CARE EDUCATION/TRAINING PROGRAM

## 2023-05-11 PROCEDURE — 85014 HEMATOCRIT: CPT | Performed by: STUDENT IN AN ORGANIZED HEALTH CARE EDUCATION/TRAINING PROGRAM

## 2023-05-11 PROCEDURE — 63600175 PHARM REV CODE 636 W HCPCS

## 2023-05-11 PROCEDURE — 25000003 PHARM REV CODE 250: Performed by: CLINICAL NURSE SPECIALIST

## 2023-05-11 PROCEDURE — 63600175 PHARM REV CODE 636 W HCPCS: Mod: JZ,JG | Performed by: PHYSICIAN ASSISTANT

## 2023-05-11 PROCEDURE — 99233 PR SUBSEQUENT HOSPITAL CARE,LEVL III: ICD-10-PCS | Mod: ,,, | Performed by: PHYSICIAN ASSISTANT

## 2023-05-11 PROCEDURE — 25000003 PHARM REV CODE 250: Performed by: TRANSPLANT SURGERY

## 2023-05-11 PROCEDURE — 20600001 HC STEP DOWN PRIVATE ROOM

## 2023-05-11 PROCEDURE — 83010 ASSAY OF HAPTOGLOBIN QUANT: CPT | Performed by: PHYSICIAN ASSISTANT

## 2023-05-11 PROCEDURE — 63600175 PHARM REV CODE 636 W HCPCS: Performed by: NURSE ANESTHETIST, CERTIFIED REGISTERED

## 2023-05-11 PROCEDURE — 83735 ASSAY OF MAGNESIUM: CPT | Performed by: STUDENT IN AN ORGANIZED HEALTH CARE EDUCATION/TRAINING PROGRAM

## 2023-05-11 PROCEDURE — 80076 HEPATIC FUNCTION PANEL: CPT | Performed by: PHYSICIAN ASSISTANT

## 2023-05-11 PROCEDURE — 80069 RENAL FUNCTION PANEL: CPT | Mod: 91 | Performed by: STUDENT IN AN ORGANIZED HEALTH CARE EDUCATION/TRAINING PROGRAM

## 2023-05-11 RX ORDER — PREDNISONE 5 MG/1
TABLET ORAL
Qty: 70 TABLET | Refills: 11 | Status: ON HOLD | OUTPATIENT
Start: 2023-05-11 | End: 2024-03-09

## 2023-05-11 RX ORDER — CIPROFLOXACIN 2 MG/ML
400 INJECTION, SOLUTION INTRAVENOUS
Status: COMPLETED | OUTPATIENT
Start: 2023-05-11 | End: 2023-05-11

## 2023-05-11 RX ORDER — ONDANSETRON 2 MG/ML
INJECTION INTRAMUSCULAR; INTRAVENOUS
Status: DISCONTINUED | OUTPATIENT
Start: 2023-05-11 | End: 2023-05-11

## 2023-05-11 RX ORDER — SODIUM CHLORIDE 0.9 % (FLUSH) 0.9 %
10 SYRINGE (ML) INJECTION
Status: DISCONTINUED | OUTPATIENT
Start: 2023-05-11 | End: 2023-05-17 | Stop reason: HOSPADM

## 2023-05-11 RX ORDER — IBUPROFEN 200 MG
16 TABLET ORAL
Status: DISCONTINUED | OUTPATIENT
Start: 2023-05-11 | End: 2023-05-16

## 2023-05-11 RX ORDER — TACROLIMUS 1 MG/1
6 CAPSULE ORAL EVERY 12 HOURS
Qty: 360 CAPSULE | Refills: 11 | Status: SHIPPED | OUTPATIENT
Start: 2023-05-11 | End: 2023-05-17 | Stop reason: SDUPTHER

## 2023-05-11 RX ORDER — DEXTROSE MONOHYDRATE AND SODIUM CHLORIDE 5; .45 G/100ML; G/100ML
INJECTION, SOLUTION INTRAVENOUS CONTINUOUS
Status: DISCONTINUED | OUTPATIENT
Start: 2023-05-11 | End: 2023-05-11

## 2023-05-11 RX ORDER — SODIUM CHLORIDE 9 MG/ML
INJECTION, SOLUTION INTRAVENOUS ONCE
Status: DISCONTINUED | OUTPATIENT
Start: 2023-05-11 | End: 2023-05-16

## 2023-05-11 RX ORDER — EPINEPHRINE 1 MG/ML
1 INJECTION, SOLUTION, CONCENTRATE INTRAVENOUS ONCE AS NEEDED
Status: DISCONTINUED | OUTPATIENT
Start: 2023-05-11 | End: 2023-05-13

## 2023-05-11 RX ORDER — DOCUSATE SODIUM 100 MG/1
100 CAPSULE, LIQUID FILLED ORAL 3 TIMES DAILY
Status: DISCONTINUED | OUTPATIENT
Start: 2023-05-11 | End: 2023-05-14

## 2023-05-11 RX ORDER — FUROSEMIDE 10 MG/ML
120 INJECTION INTRAMUSCULAR; INTRAVENOUS ONCE
Status: COMPLETED | OUTPATIENT
Start: 2023-05-11 | End: 2023-05-11

## 2023-05-11 RX ORDER — HYDROMORPHONE HYDROCHLORIDE 1 MG/ML
0.2 INJECTION, SOLUTION INTRAMUSCULAR; INTRAVENOUS; SUBCUTANEOUS EVERY 5 MIN PRN
Status: DISCONTINUED | OUTPATIENT
Start: 2023-05-11 | End: 2023-05-11

## 2023-05-11 RX ORDER — MYCOPHENOLIC ACID 180 MG/1
720 TABLET, DELAYED RELEASE ORAL 2 TIMES DAILY
Qty: 240 TABLET | Refills: 11 | Status: ON HOLD | OUTPATIENT
Start: 2023-05-11 | End: 2023-06-06 | Stop reason: SDUPTHER

## 2023-05-11 RX ORDER — FENTANYL CITRATE 50 UG/ML
25 INJECTION, SOLUTION INTRAMUSCULAR; INTRAVENOUS EVERY 5 MIN PRN
Status: DISCONTINUED | OUTPATIENT
Start: 2023-05-11 | End: 2023-05-11 | Stop reason: HOSPADM

## 2023-05-11 RX ORDER — ACETAMINOPHEN 325 MG/1
650 TABLET ORAL
Status: DISCONTINUED | OUTPATIENT
Start: 2023-05-12 | End: 2023-05-11

## 2023-05-11 RX ORDER — FAMOTIDINE 20 MG/1
20 TABLET, FILM COATED ORAL NIGHTLY
Qty: 30 TABLET | Refills: 0 | Status: ON HOLD | OUTPATIENT
Start: 2023-05-11 | End: 2023-06-06 | Stop reason: HOSPADM

## 2023-05-11 RX ORDER — TACROLIMUS 1 MG/1
3 CAPSULE ORAL 2 TIMES DAILY
Status: DISCONTINUED | OUTPATIENT
Start: 2023-05-11 | End: 2023-05-12

## 2023-05-11 RX ORDER — METHYLPREDNISOLONE SOD SUCC 125 MG
125 VIAL (EA) INJECTION ONCE
Status: COMPLETED | OUTPATIENT
Start: 2023-05-12 | End: 2023-05-12

## 2023-05-11 RX ORDER — METHYLPREDNISOLONE SOD SUCC 125 MG
250 VIAL (EA) INJECTION ONCE
Status: DISCONTINUED | OUTPATIENT
Start: 2023-05-12 | End: 2023-05-11

## 2023-05-11 RX ORDER — HEPARIN SODIUM 5000 [USP'U]/ML
5000 INJECTION, SOLUTION INTRAVENOUS; SUBCUTANEOUS EVERY 8 HOURS
Status: DISCONTINUED | OUTPATIENT
Start: 2023-05-11 | End: 2023-05-17 | Stop reason: HOSPADM

## 2023-05-11 RX ORDER — CALCIUM GLUCONATE 20 MG/ML
1 INJECTION, SOLUTION INTRAVENOUS ONCE
Status: COMPLETED | OUTPATIENT
Start: 2023-05-11 | End: 2023-05-11

## 2023-05-11 RX ORDER — ATOVAQUONE 750 MG/5ML
1500 SUSPENSION ORAL DAILY
Qty: 300 ML | Refills: 5 | Status: ON HOLD | OUTPATIENT
Start: 2023-05-11 | End: 2023-06-06 | Stop reason: HOSPADM

## 2023-05-11 RX ORDER — HEPARIN SODIUM 1000 [USP'U]/ML
1000 INJECTION, SOLUTION INTRAVENOUS; SUBCUTANEOUS
Status: DISCONTINUED | OUTPATIENT
Start: 2023-05-11 | End: 2023-05-13

## 2023-05-11 RX ORDER — HYDROMORPHONE HYDROCHLORIDE 1 MG/ML
0.2 INJECTION, SOLUTION INTRAMUSCULAR; INTRAVENOUS; SUBCUTANEOUS EVERY 5 MIN PRN
Status: DISCONTINUED | OUTPATIENT
Start: 2023-05-11 | End: 2023-05-11 | Stop reason: HOSPADM

## 2023-05-11 RX ORDER — METHYLPREDNISOLONE SOD SUCC 125 MG
250 VIAL (EA) INJECTION ONCE
Status: COMPLETED | OUTPATIENT
Start: 2023-05-11 | End: 2023-05-11

## 2023-05-11 RX ORDER — HEPARIN SODIUM 1000 [USP'U]/ML
1000 INJECTION, SOLUTION INTRAVENOUS; SUBCUTANEOUS
Status: DISCONTINUED | OUTPATIENT
Start: 2023-05-11 | End: 2023-05-17 | Stop reason: HOSPADM

## 2023-05-11 RX ORDER — HYDROMORPHONE HYDROCHLORIDE 1 MG/ML
INJECTION, SOLUTION INTRAMUSCULAR; INTRAVENOUS; SUBCUTANEOUS
Status: COMPLETED
Start: 2023-05-11 | End: 2023-05-11

## 2023-05-11 RX ORDER — BISACODYL 5 MG
10 TABLET, DELAYED RELEASE (ENTERIC COATED) ORAL NIGHTLY
Status: DISCONTINUED | OUTPATIENT
Start: 2023-05-11 | End: 2023-05-14

## 2023-05-11 RX ORDER — SULFAMETHOXAZOLE AND TRIMETHOPRIM 400; 80 MG/1; MG/1
1 TABLET ORAL EVERY MORNING
Status: DISCONTINUED | OUTPATIENT
Start: 2023-05-21 | End: 2023-05-11

## 2023-05-11 RX ORDER — ACYCLOVIR 200 MG/1
400 CAPSULE ORAL 2 TIMES DAILY
Status: DISCONTINUED | OUTPATIENT
Start: 2023-05-12 | End: 2023-05-17 | Stop reason: HOSPADM

## 2023-05-11 RX ORDER — HALOPERIDOL 5 MG/ML
0.5 INJECTION INTRAMUSCULAR EVERY 10 MIN PRN
Status: DISCONTINUED | OUTPATIENT
Start: 2023-05-11 | End: 2023-05-11 | Stop reason: HOSPADM

## 2023-05-11 RX ORDER — DIPHENHYDRAMINE HCL 25 MG
25 CAPSULE ORAL DAILY
Status: COMPLETED | OUTPATIENT
Start: 2023-05-11 | End: 2023-05-12

## 2023-05-11 RX ORDER — PREDNISONE 20 MG/1
20 TABLET ORAL DAILY
Status: DISCONTINUED | OUTPATIENT
Start: 2023-05-14 | End: 2023-05-17 | Stop reason: HOSPADM

## 2023-05-11 RX ORDER — SODIUM CHLORIDE 0.9 % (FLUSH) 0.9 %
10 SYRINGE (ML) INJECTION
Status: DISCONTINUED | OUTPATIENT
Start: 2023-05-11 | End: 2023-05-11 | Stop reason: HOSPADM

## 2023-05-11 RX ORDER — BISACODYL 5 MG
10 TABLET, DELAYED RELEASE (ENTERIC COATED) ORAL DAILY PRN
Refills: 0 | Status: ON HOLD | COMMUNITY
Start: 2023-05-11 | End: 2023-06-06 | Stop reason: HOSPADM

## 2023-05-11 RX ORDER — MYCOPHENOLATE MOFETIL 250 MG/1
1000 CAPSULE ORAL 2 TIMES DAILY
Status: DISCONTINUED | OUTPATIENT
Start: 2023-05-11 | End: 2023-05-11

## 2023-05-11 RX ORDER — ALLOPURINOL 100 MG/1
300 TABLET ORAL DAILY
Status: DISCONTINUED | OUTPATIENT
Start: 2023-05-11 | End: 2023-05-11

## 2023-05-11 RX ORDER — MYCOPHENOLIC ACID 180 MG/1
720 TABLET, DELAYED RELEASE ORAL 2 TIMES DAILY
Status: DISCONTINUED | OUTPATIENT
Start: 2023-05-11 | End: 2023-05-17 | Stop reason: HOSPADM

## 2023-05-11 RX ORDER — DIPHENHYDRAMINE HCL 25 MG
50 CAPSULE ORAL ONCE AS NEEDED
Status: DISCONTINUED | OUTPATIENT
Start: 2023-05-11 | End: 2023-05-16

## 2023-05-11 RX ORDER — DIPHENHYDRAMINE HCL 25 MG
25 CAPSULE ORAL
Status: DISCONTINUED | OUTPATIENT
Start: 2023-05-12 | End: 2023-05-11

## 2023-05-11 RX ORDER — TRAMADOL HYDROCHLORIDE 50 MG/1
50 TABLET ORAL EVERY 6 HOURS PRN
Status: DISCONTINUED | OUTPATIENT
Start: 2023-05-11 | End: 2023-05-16

## 2023-05-11 RX ORDER — ONDANSETRON 2 MG/ML
4 INJECTION INTRAMUSCULAR; INTRAVENOUS EVERY 6 HOURS PRN
Status: DISCONTINUED | OUTPATIENT
Start: 2023-05-11 | End: 2023-05-17 | Stop reason: HOSPADM

## 2023-05-11 RX ORDER — LORAZEPAM 2 MG/ML
0.25 INJECTION INTRAMUSCULAR ONCE AS NEEDED
Status: DISCONTINUED | OUTPATIENT
Start: 2023-05-12 | End: 2023-05-11

## 2023-05-11 RX ORDER — CIPROFLOXACIN 2 MG/ML
400 INJECTION, SOLUTION INTRAVENOUS
Status: DISCONTINUED | OUTPATIENT
Start: 2023-05-11 | End: 2023-05-11

## 2023-05-11 RX ORDER — INSULIN ASPART 100 [IU]/ML
0-5 INJECTION, SOLUTION INTRAVENOUS; SUBCUTANEOUS
Status: DISCONTINUED | OUTPATIENT
Start: 2023-05-11 | End: 2023-05-17 | Stop reason: HOSPADM

## 2023-05-11 RX ORDER — VALGANCICLOVIR 450 MG/1
900 TABLET, FILM COATED ORAL EVERY MORNING
Qty: 60 TABLET | Refills: 2 | Status: SHIPPED | OUTPATIENT
Start: 2023-05-10 | End: 2023-05-11 | Stop reason: HOSPADM

## 2023-05-11 RX ORDER — IBUPROFEN 200 MG
24 TABLET ORAL
Status: DISCONTINUED | OUTPATIENT
Start: 2023-05-11 | End: 2023-05-16

## 2023-05-11 RX ORDER — GLUCAGON 1 MG
1 KIT INJECTION CONTINUOUS PRN
Status: DISCONTINUED | OUTPATIENT
Start: 2023-05-11 | End: 2023-05-17 | Stop reason: HOSPADM

## 2023-05-11 RX ORDER — DOCUSATE SODIUM 100 MG/1
100 CAPSULE, LIQUID FILLED ORAL 3 TIMES DAILY PRN
Refills: 0 | Status: ON HOLD | COMMUNITY
Start: 2023-05-11 | End: 2023-06-06 | Stop reason: HOSPADM

## 2023-05-11 RX ORDER — ACETAMINOPHEN 325 MG/1
650 TABLET ORAL EVERY 8 HOURS
Status: DISPENSED | OUTPATIENT
Start: 2023-05-11 | End: 2023-05-13

## 2023-05-11 RX ORDER — OXYCODONE HYDROCHLORIDE 5 MG/1
5 TABLET ORAL EVERY 6 HOURS PRN
Status: DISCONTINUED | OUTPATIENT
Start: 2023-05-11 | End: 2023-05-17 | Stop reason: HOSPADM

## 2023-05-11 RX ORDER — ACETAMINOPHEN 325 MG/1
650 TABLET ORAL DAILY
Status: COMPLETED | OUTPATIENT
Start: 2023-05-11 | End: 2023-05-12

## 2023-05-11 RX ORDER — DIPHENHYDRAMINE HYDROCHLORIDE 50 MG/ML
25 INJECTION INTRAMUSCULAR; INTRAVENOUS EVERY 6 HOURS PRN
Status: DISCONTINUED | OUTPATIENT
Start: 2023-05-12 | End: 2023-05-11

## 2023-05-11 RX ORDER — CEFAZOLIN SODIUM 1 G/3ML
INJECTION, POWDER, FOR SOLUTION INTRAMUSCULAR; INTRAVENOUS
Status: DISCONTINUED | OUTPATIENT
Start: 2023-05-11 | End: 2023-05-11 | Stop reason: HOSPADM

## 2023-05-11 RX ORDER — MYCOPHENOLATE MOFETIL 250 MG/1
1000 CAPSULE ORAL 2 TIMES DAILY
Qty: 240 CAPSULE | Refills: 11 | Status: SHIPPED | OUTPATIENT
Start: 2023-05-11 | End: 2023-05-11 | Stop reason: HOSPADM

## 2023-05-11 RX ORDER — FAMOTIDINE 20 MG/1
20 TABLET, FILM COATED ORAL NIGHTLY
Status: DISCONTINUED | OUTPATIENT
Start: 2023-05-11 | End: 2023-05-17 | Stop reason: HOSPADM

## 2023-05-11 RX ORDER — VALGANCICLOVIR 450 MG/1
450 TABLET, FILM COATED ORAL EVERY MORNING
Status: DISCONTINUED | OUTPATIENT
Start: 2023-05-21 | End: 2023-05-11

## 2023-05-11 RX ORDER — MUPIROCIN 20 MG/G
1 OINTMENT TOPICAL 2 TIMES DAILY
Status: COMPLETED | OUTPATIENT
Start: 2023-05-11 | End: 2023-05-15

## 2023-05-11 RX ORDER — ACYCLOVIR 400 MG/1
400 TABLET ORAL 2 TIMES DAILY
Qty: 60 TABLET | Refills: 2 | Status: SHIPPED | OUTPATIENT
Start: 2023-05-11 | End: 2023-08-18

## 2023-05-11 RX ORDER — METHYLPREDNISOLONE SOD SUCC 125 MG
125 VIAL (EA) INJECTION ONCE
Status: DISCONTINUED | OUTPATIENT
Start: 2023-05-13 | End: 2023-05-11

## 2023-05-11 RX ORDER — SODIUM CHLORIDE 9 MG/ML
INJECTION, SOLUTION INTRAVENOUS CONTINUOUS
Status: DISCONTINUED | OUTPATIENT
Start: 2023-05-11 | End: 2023-05-11

## 2023-05-11 RX ADMIN — FAMOTIDINE 20 MG: 20 TABLET, FILM COATED ORAL at 11:05

## 2023-05-11 RX ADMIN — ACETAMINOPHEN 650 MG: 325 TABLET ORAL at 01:05

## 2023-05-11 RX ADMIN — HYDROMORPHONE HYDROCHLORIDE 0.2 MG: 1 INJECTION, SOLUTION INTRAMUSCULAR; INTRAVENOUS; SUBCUTANEOUS at 01:05

## 2023-05-11 RX ADMIN — ACETAMINOPHEN 650 MG: 325 TABLET ORAL at 11:05

## 2023-05-11 RX ADMIN — DEXTROSE AND SODIUM CHLORIDE: 5; 450 INJECTION, SOLUTION INTRAVENOUS at 01:05

## 2023-05-11 RX ADMIN — TACROLIMUS 3 MG: 1 CAPSULE ORAL at 05:05

## 2023-05-11 RX ADMIN — TRAMADOL HYDROCHLORIDE 50 MG: 50 TABLET, COATED ORAL at 03:05

## 2023-05-11 RX ADMIN — INSULIN ASPART 2 UNITS: 100 INJECTION, SOLUTION INTRAVENOUS; SUBCUTANEOUS at 12:05

## 2023-05-11 RX ADMIN — OXYCODONE 5 MG: 5 TABLET ORAL at 08:05

## 2023-05-11 RX ADMIN — Medication 10 MG: at 12:05

## 2023-05-11 RX ADMIN — BISACODYL 10 MG: 5 TABLET, COATED ORAL at 11:05

## 2023-05-11 RX ADMIN — CALCIUM GLUCONATE 1 G: 20 INJECTION, SOLUTION INTRAVENOUS at 12:05

## 2023-05-11 RX ADMIN — SUGAMMADEX 200 MG: 100 INJECTION, SOLUTION INTRAVENOUS at 12:05

## 2023-05-11 RX ADMIN — OXYCODONE 5 MG: 5 TABLET ORAL at 06:05

## 2023-05-11 RX ADMIN — FENTANYL CITRATE 50 MCG: 50 INJECTION, SOLUTION INTRAMUSCULAR; INTRAVENOUS at 01:05

## 2023-05-11 RX ADMIN — HEPARIN SODIUM 5000 UNITS: 5000 INJECTION INTRAVENOUS; SUBCUTANEOUS at 06:05

## 2023-05-11 RX ADMIN — OXYCODONE 5 MG: 5 TABLET ORAL at 11:05

## 2023-05-11 RX ADMIN — DOCUSATE SODIUM 100 MG: 100 CAPSULE, LIQUID FILLED ORAL at 11:05

## 2023-05-11 RX ADMIN — SODIUM CHLORIDE 250 ML: 9 INJECTION, SOLUTION INTRAVENOUS at 05:05

## 2023-05-11 RX ADMIN — MUPIROCIN 1 G: 20 OINTMENT TOPICAL at 11:05

## 2023-05-11 RX ADMIN — DIPHENHYDRAMINE HYDROCHLORIDE 25 MG: 25 CAPSULE ORAL at 04:05

## 2023-05-11 RX ADMIN — HEPARIN SODIUM 5000 UNITS: 5000 INJECTION INTRAVENOUS; SUBCUTANEOUS at 11:05

## 2023-05-11 RX ADMIN — DOCUSATE SODIUM 100 MG: 100 CAPSULE, LIQUID FILLED ORAL at 08:05

## 2023-05-11 RX ADMIN — HEPARIN SODIUM 5000 UNITS: 5000 INJECTION INTRAVENOUS; SUBCUTANEOUS at 01:05

## 2023-05-11 RX ADMIN — MUPIROCIN 1 G: 20 OINTMENT TOPICAL at 08:05

## 2023-05-11 RX ADMIN — FUROSEMIDE 120 MG: 10 INJECTION, SOLUTION INTRAMUSCULAR; INTRAVENOUS at 08:05

## 2023-05-11 RX ADMIN — ALLOPURINOL 300 MG: 100 TABLET ORAL at 08:05

## 2023-05-11 RX ADMIN — METHYLPREDNISOLONE SODIUM SUCCINATE 250 MG: 125 INJECTION, POWDER, FOR SOLUTION INTRAMUSCULAR; INTRAVENOUS at 12:05

## 2023-05-11 RX ADMIN — TACROLIMUS 3 MG: 1 CAPSULE ORAL at 08:05

## 2023-05-11 RX ADMIN — SODIUM CHLORIDE 500 ML: 9 INJECTION, SOLUTION INTRAVENOUS at 04:05

## 2023-05-11 RX ADMIN — ANTI-THYMOCYTE GLOBULIN (RABBIT) 100 MG: 5 INJECTION, POWDER, LYOPHILIZED, FOR SOLUTION INTRAVENOUS at 05:05

## 2023-05-11 RX ADMIN — FAMOTIDINE 20 MG: 20 TABLET, FILM COATED ORAL at 01:05

## 2023-05-11 RX ADMIN — MYCOPHENOLIC ACID 720 MG: 180 TABLET, DELAYED RELEASE ORAL at 08:05

## 2023-05-11 RX ADMIN — MYCOPHENOLATE MOFETIL 1000 MG: 250 CAPSULE ORAL at 01:05

## 2023-05-11 RX ADMIN — DOCUSATE SODIUM 100 MG: 100 CAPSULE, LIQUID FILLED ORAL at 01:05

## 2023-05-11 RX ADMIN — HYDROMORPHONE HYDROCHLORIDE 0.2 MG: 1 INJECTION, SOLUTION INTRAMUSCULAR; INTRAVENOUS; SUBCUTANEOUS at 02:05

## 2023-05-11 RX ADMIN — VANCOMYCIN HYDROCHLORIDE 1500 MG: 1.5 INJECTION, POWDER, LYOPHILIZED, FOR SOLUTION INTRAVENOUS at 10:05

## 2023-05-11 RX ADMIN — ACETAMINOPHEN 650 MG: 325 TABLET ORAL at 06:05

## 2023-05-11 RX ADMIN — ACETAMINOPHEN 650 MG: 325 TABLET ORAL at 04:05

## 2023-05-11 RX ADMIN — OXYCODONE 5 MG: 5 TABLET ORAL at 01:05

## 2023-05-11 RX ADMIN — ONDANSETRON 4 MG: 2 INJECTION INTRAMUSCULAR; INTRAVENOUS at 12:05

## 2023-05-11 RX ADMIN — MYCOPHENOLATE MOFETIL 1000 MG: 250 CAPSULE ORAL at 08:05

## 2023-05-11 RX ADMIN — CIPROFLOXACIN 400 MG: 2 INJECTION, SOLUTION INTRAVENOUS at 08:05

## 2023-05-11 RX ADMIN — FUROSEMIDE 120 MG: 10 INJECTION, SOLUTION INTRAMUSCULAR; INTRAVENOUS at 02:05

## 2023-05-11 RX ADMIN — SODIUM CHLORIDE 250 ML: 9 INJECTION, SOLUTION INTRAVENOUS at 02:05

## 2023-05-11 RX ADMIN — Medication 10 MG: at 01:05

## 2023-05-11 RX ADMIN — THERA TABS 1 TABLET: TAB at 08:05

## 2023-05-11 NOTE — PROGRESS NOTES
Admit Note     Met with pt's spouse to assess patients needs due to pt being out of room for imaging. SW returned to room later in day to complete admit note with information directly from pt.  Patient is a 62 y.o.  male, admitted End stage renal disease [N18.6]  ESRD (end stage renal disease) [N18.6]  ESRD (end stage renal disease) on dialysis [N18.6, Z99.2]  S/P kidney transplant [Z94.0]     Patient admitted from home on 5/10/2023 .  At this time, patient presents as alert and oriented x 4, pleasant, good eye contact, well groomed, recall good, concentration/judgement good, average intelligence, calm, communicative, cooperative, and asking and answering questions appropriately.  At this time, patients caregiver presents as alert and oriented x 4, pleasant, good eye contact, well groomed, recall good, concentration/judgement good, average intelligence, calm, communicative, cooperative, and asking and answering questions appropriately.      Household/Family Systems (as reported by patients caregiver)     Patient resides with patient's spouse Kalani Parikh, pt's mother-in-law Milly Ricardo, and pt's sister-in-law Shellynosamuel Ricardo, at [address] [place] [address].  Support system includes patient's spouse Kalani Parikh, pt's mother-in-law Milly Ricardo, and pt's sister-in-law Elenora Ricardo.  Patient does not have dependents that are need of being cared for.     Patients primary caregiver is Kalani Parikh, patients spouse, phone number [phone].  Confirmed patients contact information is [phone] (home);   Telephone Information:   Mobile [phone]   .    During admission, patient's caregiver plans to stay in patient's room.  Confirmed patient and patients caregivers do have access to reliable transportation. Pt's wife does not drive to to temporarily needing walker for ambulation. Pt reports pt's sister-in-law, Jennifer Ricardo, does not work and will be providing all transportation.      Cognitive Status/Learning     Patients caregiver reports patients reading ability as college and states patient does have difficulty with hearing and uses bilateral hearing aids. Patients caregiver reports patient learns best by hands-on, practicing.   Needed: No.   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 Pt reports having 4 months of PTO and FMLA available.     Adherence     Patients caregiver reports patient has a high level of adherence to patients health care regimen.  Adherence counseling and education provided.  Patient's caregiver verbalizes understanding.    Substance Use    Patients caregiver reports patients substance usage as the following:    Tobacco: none, patient denies any use.  Alcohol: none, patient denies any use.  Illicit Drugs/Non-prescribed Medications: none, patient denies any use.  Patients caregiver states clear understanding of the potential impact of substance use.  Substance abstinence/cessation counseling, education and resources provided and reviewed.     Services Utilizing/ADLS (as reported by patients caregiver)    Infusion Service: Prior to admission, patient utilizing? no  Home Health: Prior to admission, patient utilizing? no  DME: Prior to admission, yes, BPC and glucometer  Pulmonary/Cardiac Rehab: Prior to admission, no  Dialysis:  Prior to admission, yes, before admission pt was on MWF 2-6 pm at [facility]  Transplant Specialty Pharmacy:  Prior to admission, no; patient's caregiver provides permission to release necessary information to specialty pharmacy for medications post-transplant. Resources provided and patient is choosing [facility] pharmacy at this time.    Prior to admission, patients caregiver  "reports patient was independent with ADLS and was driving.  Patients caregiver reports patient is able to care for self at this time..  Patients caregiver reports patient indicates a willingness to care for self once medically cleared to do so.    Insurance/Medications    Insured by   Payer/Plan Subscr  Sex Relation Sub. Ins. ID Effective Group Num   1. MEDICARE - MEMATT HARPER 1960 Male Self [id number] 10/1/20                                    [address]   2. BLUE CROSS BL* MATT BHANDARI 1960 Male Self [id number]                                   [address] [address]      Primary Insurance (for UNOS reporting): Private Insurance  Secondary Insurance (for UNOS reporting): Public Insurance - Medicare FFS (Fee For Service)  Pt reports receiving AKF assistance paying for insurance premiums. MAUREEN provided education and AKF booklet. MAUREEN emailed transplant verification form to [email or web address]     Patients caregiver reports patient is able to obtain and afford medications at this time and at time of discharge.    Living Will/Healthcare Power of     Patients caregiver reports patient has a LW and/or HCPA.   provided education regarding LW and HCPA and the completion of forms.    Coping/Mental Health (as reported by patients caregiver)    Patient is coping adequately with the aid of  family members. Pt reports heightened anxiety day of transplant. Pt reports anxiety has mostly subsided now and pt feels "relieved and grateful". Pt reports in pain and feels uncomfortable. Patients caregiver is coping adequately with the aid of  family members.      Discharge Planning (as reported by patients caregiver)    At time of discharge, patient plans to return to patient's home under the care of self/spouse.  Patients spouse will transport patient.  Per rounds today, expected discharge date has not been medically determined at this time. Patients caretaker " verbalizes understanding and is involved in treatment planning and discharge process.    Additional Concerns    Patient's caretaker denies additional needs and/or concerns at this time.  providing ongoing psychosocial support, education, resources and d/c planning as needed.  SW remains available.  remains available. Patient's caregiver verbalizes understanding and agreement with information reviewed,  availability and how to access available resources as needed. Patient denies additional needs and/or concerns at this time. Patient verbalizes understanding and agreement with information reviewed, social work availability, and how to access available resources as needed.    Laure Mascorro LMSW

## 2023-05-11 NOTE — HOSPITAL COURSE
Patient is now s/p living unrelated kidney transplant on 5/10/23. Post op UOP not robust. Kidney US obtained on POD#1 which showed mildly elevated RIs but good waveforms. He required HD on POD#1 for hyperkalemia.     Interval History: lab unable to obtain labs- required HD RN to access AVF for labs. He reports increased anxiety overnight. He attributes it to increased bloating and not able to have a BM. He denies N/V. He endorses early satiety. BS Ax4, (+)belching. He is on colace, increased dose, added bisacodyl at hs PRN, KUB and supp ordered. He has history of Crohn's, he denies diarrhea. Kidney US 5/15 again showed interval elevation of intraparenchymal arterial resistive indices and elevation of the main renal artery and main renal vein peak systolic velocities at the anastomosis. Pt was taken back to OR 5/15 ~1430. US showed good flow and kindey looked well perfused, no intervention. Kidney Bx performed- peth pending. RLQ surgical dressing removed, incision CDI w staples. UOP is increasing. He is voiding per urinal w no issues.  Cr trended down. BP stable, restarted home BB w hold parameters in place. O2 sat % on RA. CXR reviewed w opacity to RLL. Encourage IS. Pt is up in chair. Encourage ambulation. Monitor.

## 2023-05-11 NOTE — NURSING TRANSFER
Nursing Transfer Note      5/11/2023     Reason patient is being transferred: post surgey    Transfer To: [id number] from PACU    Transfer via bed    Transfer with IV pole    Transported by [facility]    Telemetry: na    Medicines sent: IVF infusing/ thymo infusing    Any special needs or follow-up needed: per orders    Chart send with patient: Yes    Notified: spouse via epic    Patient reassessed at: 5/11/23 02:30

## 2023-05-11 NOTE — OP NOTE
Operative Report    Date of Procedure: 5/10/2023  Date of Transplant (UNOS): 5/10/23    Surgeons:  Surgeon(s) and Role:     * Chivo Brown MD - Primary     * Pipo Rosen MD - Fellow    First Assistant Attestation:  The indicated resident served as first assistant for this procedure.    Pre-operative Diagnosis: ESRD, requiring chronic dialysis secondary to Other, Specify - Unknown  Post-operative Diagnosis: Same    Procedure(s) Performed:   Back Table Preparation of left kidney with normal anatomy.     Living Kidney transplant    Anesthesia: General endotracheal    Preamble  Indications and Patient Counseling: The patient is a 62 y.o. year-old male with end-stage kidney disease secondary to Other, Specify - Unknown who has been evaluated for a kidney transplant.  The procedure was thoroughly discussed with the patient, including potential risks, complications, and alternatives.  Specific complications mentioned included death, graft non-function, bleeding, infection, and rejection, as well as the possibility of other complications not specifically mentioned.    Donor Risk Factors: Prior to the operation, the patient was advised of any donor-specific risk factors requiring specific disclosure.  Factors in this case included nothing that required specific disclosure.      Specific [facility] donor risk criteria for the organ donor include:  None    All questions were answered, the patient voiced appropriate understanding, and he agreed to proceed with the planned procedure.    ABO Confirmation: Immediately following arrival of the donor organ and prior to implantation, a formal ABO confirmation was done according to hospital and UNOS policies.  I confirmed the UNOS ID number ([id number]) of the donor organ and the donor and recipient ABO types, directly verifying these data by comparison with the UNOS Match Run report ().  This confirmation was personally done by an attending surgeon and circulating nurse, and is  officially documented elsewhere.    Time-Out: A complete time out was carried out prior to incision, with confirmation of patient identity, correct procedure, correct operative site, appropriate antibiotic prophylaxis, review of any known allergies, and presence of all needed equipment.    Procedure in Detail  Prior to starting the operation, the left kidney  was prepared on the back table. Arterial anatomy was normal, single. Venous anatomy was single. Ureteral anatomy was single. Back table vascular reconstruction was not required. Unneeded fat was removed from the kidney, the vessels were cleaned of adherent tissue and tested for leaks, and the kidney was maintained at ice temperature in organ preservation solution until it was brought to the operative field.     The patient was brought into the operating room and placed in a supine position on the OR table.  After the induction of general endotracheal anesthesia, lines were placed by the anesthesiologist.  The urinary bladder was catheterized and irrigated with antibiotic solution.  There was no tension on the axillae and all pressure points were padded.  Sequential compression boots were used as were Jorge Luis Huggers.  The abdomen was prepped and draped in the usual sterile fashion.  Skin was incised over the right with a knife and deepened with electrocautery.  The peritoneum and its contents were swept medially, exposing the right external iliac artery and the right external iliac vein.  The Bookwalter retractor was used to provide exposure.  Overlying lymphatics were ligated or cauterized and the vessels were dissected free for a length compatible with anastomosis.  The kidney was brought to the OR table at 5/10/2023 11:17 PM.  Venous control was obtained with a vascular clamp.  A venotomy was made, the vein irrigated, and an end renal to right external iliac vein anastomosis was created with 5-0 polypropylene.  Arterial control was obtained with a vascular  clamp.  Arteriotomy was made, the artery irrigated, and an end renal to right external iliac artery anastomosis was created with 6-0 polypropylene.  The kidney was unclamped and reperfused at 5/10/2023 11:48 PM.  Reperfusion quality was good. Intraoperative urine production was not observed.  A subcapsular hematoma (20%) draining through a small capsular tear was identified at the dome of the kidney. This was controlled with Surgiflow.  After hemostasis was obtained, a Lich uretero-neocystostomy was created.  The bladder was filled and identified, opened, and the anastomosis created using 6-0 PDS.  The bladder muscle was closed over the distal ureter to create an antireflux tunnel.  A ureteral stent was used.  With the kidney well perfused and sitting appropriately without tension on the anastomoses, viscera were replaced in their usual position.  The wound was closed after a final check for hemostasis.  Overall, the graft quality was assessed to be good. At the end of the case the needle, sponge and instrument counts were all correct.  Sterile dressings were applied and the patient was brought to the recovery room/ICU in good condition.    Estimated Blood Loss: 10 mL  Fluids Administered:    Drains: None  Specimens: none    Findings:    Organ Transplanted: Left kidney    Arterial Anatomy: Normal   Number of Arteries: 1  Configuration of Multiple Arteries: not applicable  Venous Anatomy:   Number of Veins: 1  Ureteral Anatomy: Single  Number of Ureters: 1  Reperfusion Quality: good  Overall Graft Quality: good  Intraoperative Urine Production: no  Gr: not to be removed before 2 days.  Ureteral Stent: Yes    Ischemic Times:   Anastomosis (warm ischemia) time: 31 minutes   Clamp time 05-  11:26 am Cold ischemia time: 711 minutes  Total ischemia time: 751 minutes    Donor Data:  UNOS ID: [id number]   UNOS Match Run:    Donor Type: Living   Donor CMV Status:    Donor HBcAB:    Donor HCV Status:

## 2023-05-11 NOTE — TRANSFER OF CARE
"Anesthesia Transfer of Care Note    Patient: Abdoulaye Parikh    Procedure(s) Performed: Procedure(s) (LRB):  TRANSPLANT, KIDNEY - RQ 7PM START (N/A)    Patient location: PACU    Anesthesia Type: general    Transport from OR: Transported from OR on 6-10 L/min O2 by face mask with adequate spontaneous ventilation    Post pain: adequate analgesia    Post assessment: no apparent anesthetic complications and tolerated procedure well    Post vital signs: stable    Level of consciousness: awake and alert    Nausea/Vomiting: no nausea/vomiting    Complications: none    Transfer of care protocol was followed      Last vitals:   Visit Vitals  /70 (BP Location: Right arm, Patient Position: Lying)   Pulse 64   Temp 36.6 °C (97.8 °F) (Oral)   Resp 18   Ht 5' 7" (1.702 m)   Wt 81.3 kg (179 lb 3.7 oz)   SpO2 98%   BMI 28.07 kg/m²     "

## 2023-05-11 NOTE — PLAN OF CARE
Patient is AAOx3. RLQ incision surgical dressing removed by the PA. Incision is oozing, gauze applied. Patient had a kidney US this morning. Gr catheter CDI. Patient's K 5.9 this morning. Patient received Lasix 120mg IVP and Ca gluconate. Repeat K level 5.0, patient received NS 250cc bolus and another Lasix 120mg IVP. Patient has been sitting up in the chair for most of the day. Errol hose and SCDs are in use. Monitoring the patient's blood sugar AC&HS. P/C sent. Patient to receive thymo #2 today. Reminded the patient to call for assistance.

## 2023-05-11 NOTE — ASSESSMENT & PLAN NOTE
- Lasix 120 mg x 1  - Calcium gluconate  - Repeat labs ordered  - Possible need for HD pending repeat labs  - Renal diet and cardiac monitoring

## 2023-05-11 NOTE — ANESTHESIA PROCEDURE NOTES
Arterial    Diagnosis: ESRD    Patient location during procedure: done in OR  Procedure start time: 5/10/2023 9:32 PM  Timeout: 5/10/2023 9:32 PM  Procedure end time: 5/10/2023 9:37 PM    Staffing  Authorizing Provider: Tima Mon MD  Performing Provider: Tima Mon MD    Anesthesiologist was present at the time of the procedure.  Arterial  Skin Prep: chlorhexidine gluconate  Local Infiltration: none  Orientation: right  Location: radial    Catheter Size: 20 G  Catheter placement by Ultrasound guidance. Heme positive aspiration all ports.   Vessel Caliber: medium, patent, compressibility normal  Needle advanced into vessel with real time Ultrasound guidance.  Guidewire confirmed in vessel.  Sterile sheath used.  Image recorded and saved.Insertion Attempts: 1  Assessment  Dressing: secured with tape and tegaderm  Patient: Tolerated well

## 2023-05-11 NOTE — PROGRESS NOTES
EDUCATION NOTE:    Met with Abdoulaye Parikh and his caregivers to provide teaching re: immunosuppressant medications.  Reviewed medication section of the Kidney Transplant Education book that was provided.  Emphasized the importance of compliance, role of the blue medication card, concerns for drug interactions, and process of obtaining refills.  Counseled regarding Prograf, Cellcept , prednisone, including directions for use, monitoring, how to handle missed doses, and side effects.  Patient and caretaker verbalized understanding and had the opportunity to ask questions.

## 2023-05-11 NOTE — ANESTHESIA PROCEDURE NOTES
Intubation    Date/Time: 5/10/2023 9:29 PM  Performed by: Katie Rojas CRNA  Authorized by: Tima Mon MD     Intubation:     Induction:  Intravenous    Intubated:  Postinduction    Mask Ventilation:  Easy mask    Attempts:  1    Attempted By:  CRNA    Method of Intubation:  Video laryngoscopy    Blade:  Trivedi 3    Laryngeal View Grade: Grade I - full view of cords      Difficult Airway Encountered?: No      Complications:  None    Airway Device:  Oral endotracheal tube    Airway Device Size:  7.5    Style/Cuff Inflation:  Cuffed    Inflation Amount (mL):  7    Tube secured:  24    Secured at:  The lips    Placement Verified By:  Capnometry    Complicating Factors:  None    Findings Post-Intubation:  BS equal bilateral and atraumatic/condition of teeth unchanged

## 2023-05-11 NOTE — PROGRESS NOTES
TRANSPLANT NOTE:      ORGAN: LEFT KIDNEY    Disease Etiology: Other, Specify - Unknown  Donor Type: Living  unrelated paired   [facility] High Risk: No    Donor CMV Status: negative   Donor HBcAB:   negative   Donor HCV Status:   negative   Peak cPRA % (within 1 year of transplant): 0      Abdoulaye Parikh is a 62 y.o. male s/p  Living   kidney transplant on 5/10/2023 (Kidney) for Other, Specify - Unknown.   This patient will receive 3 doses of Thymoglobulin for induction.  This patients maintenance immunosuppression will include a steroid taper per protocol to 5mg daily, Prograf, and Cellcept maintenance.  Opportunistic infection prophylaxis will include acyclovir for 3 months (CMV D - , R - ) and Bactrim for 6 months.  Patient is to begin self medications upon transfer to the [facility], and I plan to meet with this patient and his/her support person prior to discharge to review the medication section of the Kidney Transplant Education Manual.  I have reviewed the pre-op medications and have restarted those, as appropriate.

## 2023-05-11 NOTE — PROGRESS NOTES
[facility] - Transplant Stepdown  Kidney Transplant  Progress Note      Reason for Follow-up: Reassessment of Kidney Transplant - 5/10/2023  (#1) recipient and management of immunosuppression.          Subjective:   History of Present Illness:  Mr. Parikh is a 62 y.o. year old Black or  male being admitted to receive a living unrelated kidney transplant through paired exchange for ESRD secondary to unknown etiology. 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 Patient is on HD MWF via left AVF, tolerating well without hypotension. Last HD 5/9/22. Dry wt 82 kg. He denies any recent hospitalizations or ED visits. Dr Brown will be the primary surgeon. OR scheduled for 1900. Pre op labs and imaging will be reviewed prior to surgery.         Hospital Course:  Patient is now s/p living unrelated kidney transplant on 5/10/23.    Interval History: Patient out of OR ~1AM last night. He is doing okay this morning but making minimal UOP. Kidney US obtained and overall looks fine. Potassium elevated on AM labs. Plan for Lasix 120 mg x 1 and Calcium gluconate. Will repeat bmp now to reassess need to HD. Will also check pr/cr ratio, LDH, ferratin. Continue to monitor closely      Past Medical, Surgical, Family, and Social History:   Unchanged from H&P.    Scheduled Meds:   acetaminophen  650 mg Oral Q8H    acetaminophen  650 mg Oral Daily    antithymocyte globulin (rabbit), hydrocortisone 20mg, with optional heparin 1000 units in NS 500ml (FOR PERIPHERAL LINE ADMINISTRATION ONLY)  1.5 mg/kg (Adjusted) Intravenous Daily    bisacodyL  10 mg Oral QHS    calcium gluconate IVPB  1 g Intravenous Once    diphenhydrAMINE  25 mg Oral Daily    docusate sodium  100 mg Oral TID    famotidine  20 mg Oral QHS    heparin (porcine)  5,000 Units Subcutaneous Q8H    [START ON 5/12/2023] methylPREDNISolone sodium  succinate injection  125 mg Intravenous Once    methylPREDNISolone sodium succinate injection  250 mg Intravenous Once    multivitamin  1 tablet Oral Daily    mupirocin  1 g Nasal BID    mycophenolate  720 mg Oral BID    [START ON 5/14/2023] predniSONE  20 mg Oral Daily    tacrolimus  3 mg Oral BID    [START ON 5/21/2023] valGANciclovir  450 mg Oral Daily AM    vancomycin (VANCOCIN) IVPB  1,500 mg Intravenous Q12H     Continuous Infusions:   sodium chloride 0.9%      dextrose 5 % and 0.45 % NaCl 50 mL/hr at 05/11/23 0140    glucagon (human recombinant)       PRN Meds:dextrose 10%, diphenhydrAMINE, EPINEPHrine (PF), glucagon (human recombinant), glucose, glucose, glucose, hydrocortisone sodium succinate, insulin aspart U-100, ondansetron, oxyCODONE, sodium chloride 0.9%, traMADoL    Intake/Output - Last 3 Shifts         05/09 0700  05/10 0659 05/10 0700  05/11 0659 05/11 0700  05/12 0659    P.O.  285     I.V. (mL/kg)  436.9 (5.4) 225 (2.8)    IV Piggyback  3129.2     Total Intake(mL/kg)  3851.1 (47.4) 225 (2.8)    Urine (mL/kg/hr)  205 325 (0.8)    Total Output  205 325    Net  +3646.1 -100                    Review of Systems   Constitutional:  Negative for activity change and appetite change.   Respiratory:  Negative for shortness of breath.    Gastrointestinal:  Positive for abdominal pain. Negative for abdominal distention, diarrhea and nausea.   Genitourinary:  Positive for decreased urine volume.   Skin:  Positive for wound.   Allergic/Immunologic: Positive for immunocompromised state.   Neurological:  Negative for weakness.   Psychiatric/Behavioral:  Negative for agitation, behavioral problems and confusion.     Objective:     Vital Signs (Most Recent):  Temp: 97.5 °F (36.4 °C) (05/11/23 0800)  Pulse: 70 (05/11/23 1000)  Resp: (!) 30 (05/11/23 1000)  BP: 133/85 (05/11/23 1000)  SpO2: 97 % (05/11/23 1000) Vital Signs (24h Range):  Temp:  [97.5 °F (36.4 °C)-97.9 °F (36.6 °C)] 97.5 °F (36.4 °C)  Pulse:  " [64-78] 70  Resp:  [11-30] 30  SpO2:  [94 %-100 %] 97 %  BP: (113-148)/(65-85) 133/85  Arterial Line BP: (118-133)/(45-56) 118/53     Weight: 81.3 kg (179 lb 3.7 oz)  Height: 5' 7" (170.2 cm)  Body mass index is 28.07 kg/m².     Physical Exam  Vitals reviewed.   Cardiovascular:      Rate and Rhythm: Normal rate.   Pulmonary:      Effort: Pulmonary effort is normal.   Abdominal:      General: There is no distension.      Tenderness: There is abdominal tenderness. There is no guarding or rebound.      Comments: Kidney incision with minimal bloody oozing     Musculoskeletal:      Right lower leg: No edema.      Left lower leg: No edema.   Neurological:      Mental Status: He is alert and oriented to person, place, and time.   Psychiatric:         Mood and Affect: Mood normal.         Behavior: Behavior normal.         Thought Content: Thought content normal.         Judgment: Judgment normal.        Laboratory:  CBC:   Recent Labs   Lab 05/10/23  1255 05/11/23  0121 05/11/23  0628   WBC  --   --  15.18*   RBC  --   --  2.89*   HGB  --   --  10.4*   HCT 31.8* 29.0* 30.7*   PLT  --   --  102*   MCV  --   --  106*   MCH  --   --  36.0*   MCHC  --   --  33.9     BMP:   Recent Labs   Lab 05/10/23  1245 05/11/23  0121 05/11/23  0628   GLU 90 88 107   * 133* 131*   K 3.8 4.5 5.9*   CL 93* 95 97   CO2 27 25 21*   BUN 18 20 23   CREATININE 6.5* 7.2* 7.2*   CALCIUM 8.9 8.4* 8.1*       Diagnostic Results:  None    Assessment/Plan:     * S/P kidney transplant  - CIT ~11 hours  - 2 day diaz  - No drain   - Making minimal UOP  - Kidney US overall looks fine  - Plan for Lasix 120 mg x 1  - Repeat labs ordered  - Possible need for HD pending repeat potassium  - Continue to monitor       Hyperkalemia  - Lasix 120 mg x 1  - Calcium gluconate  - Repeat labs ordered  - Possible need for HD pending repeat labs  - Renal diet and cardiac monitoring       Acute blood loss anemia  - Expected post operatively  - H/H stable  - Monitor " with daily cbc      Anemia of renal disease  - See acute blood loss anemia      Long-term use of immunosuppressant medication  - Continue prograf. Monitor prograf level daily, monitor for toxic side effects, and adjust for therapeutic dose      Prophylactic immunotherapy  - See long term use of immunosuppressant medication          Discharge Planning: Not a candidate for discharge at this time       Sruthi Cardoso PA-C  Kidney Transplant  [facility] - Transplant Stepdown

## 2023-05-11 NOTE — SUBJECTIVE & OBJECTIVE
Subjective:   History of Present Illness:  Mr. Parikh is a 62 y.o. year old Black or  male being admitted to receive a living unrelated kidney transplant through paired exchange for ESRD secondary to unknown etiology. 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 Patient is on HD MWF via left AVF, tolerating well without hypotension. Last HD 5/9/22. Dry wt 82 kg. He denies any recent hospitalizations or ED visits. Dr Brown will be the primary surgeon. OR scheduled for 1900. Pre op labs and imaging will be reviewed prior to surgery.         Hospital Course:  Patient is now s/p living unrelated kidney transplant on 5/10/23.    Interval History: Patient out of OR ~1AM last night. He is doing okay this morning but making minimal UOP. Kidney US obtained and overall looks fine. Potassium elevated on AM labs. Plan for Lasix 120 mg x 1 and Calcium gluconate. Will repeat bmp now to reassess need to HD. Will also check pr/cr ratio, LDH, ferratin. Continue to monitor closely      Past Medical, Surgical, Family, and Social History:   Unchanged from H&P.    Scheduled Meds:   acetaminophen  650 mg Oral Q8H    acetaminophen  650 mg Oral Daily    antithymocyte globulin (rabbit), hydrocortisone 20mg, with optional heparin 1000 units in NS 500ml (FOR PERIPHERAL LINE ADMINISTRATION ONLY)  1.5 mg/kg (Adjusted) Intravenous Daily    bisacodyL  10 mg Oral QHS    calcium gluconate IVPB  1 g Intravenous Once    diphenhydrAMINE  25 mg Oral Daily    docusate sodium  100 mg Oral TID    famotidine  20 mg Oral QHS    heparin (porcine)  5,000 Units Subcutaneous Q8H    [START ON 5/12/2023] methylPREDNISolone sodium succinate injection  125 mg Intravenous Once    methylPREDNISolone sodium succinate injection  250 mg Intravenous Once    multivitamin  1 tablet Oral Daily    mupirocin  1 g Nasal BID    mycophenolate  720 mg Oral  "BID    [START ON 5/14/2023] predniSONE  20 mg Oral Daily    tacrolimus  3 mg Oral BID    [START ON 5/21/2023] valGANciclovir  450 mg Oral Daily AM    vancomycin (VANCOCIN) IVPB  1,500 mg Intravenous Q12H     Continuous Infusions:   sodium chloride 0.9%      dextrose 5 % and 0.45 % NaCl 50 mL/hr at 05/11/23 0140    glucagon (human recombinant)       PRN Meds:dextrose 10%, diphenhydrAMINE, EPINEPHrine (PF), glucagon (human recombinant), glucose, glucose, glucose, hydrocortisone sodium succinate, insulin aspart U-100, ondansetron, oxyCODONE, sodium chloride 0.9%, traMADoL    Intake/Output - Last 3 Shifts         05/09 0700  05/10 0659 05/10 0700  05/11 0659 05/11 0700 05/12 0659    P.O.  285     I.V. (mL/kg)  436.9 (5.4) 225 (2.8)    IV Piggyback  3129.2     Total Intake(mL/kg)  3851.1 (47.4) 225 (2.8)    Urine (mL/kg/hr)  205 325 (0.8)    Total Output  205 325    Net  +3646.1 -100                    Review of Systems   Constitutional:  Negative for activity change and appetite change.   Respiratory:  Negative for shortness of breath.    Gastrointestinal:  Positive for abdominal pain. Negative for abdominal distention, diarrhea and nausea.   Genitourinary:  Positive for decreased urine volume.   Skin:  Positive for wound.   Allergic/Immunologic: Positive for immunocompromised state.   Neurological:  Negative for weakness.   Psychiatric/Behavioral:  Negative for agitation, behavioral problems and confusion.     Objective:     Vital Signs (Most Recent):  Temp: 97.5 °F (36.4 °C) (05/11/23 0800)  Pulse: 70 (05/11/23 1000)  Resp: (!) 30 (05/11/23 1000)  BP: 133/85 (05/11/23 1000)  SpO2: 97 % (05/11/23 1000) Vital Signs (24h Range):  Temp:  [97.5 °F (36.4 °C)-97.9 °F (36.6 °C)] 97.5 °F (36.4 °C)  Pulse:  [64-78] 70  Resp:  [11-30] 30  SpO2:  [94 %-100 %] 97 %  BP: (113-148)/(65-85) 133/85  Arterial Line BP: (118-133)/(45-56) 118/53     Weight: 81.3 kg (179 lb 3.7 oz)  Height: 5' 7" (170.2 cm)  Body mass index is 28.07 " kg/m².     Physical Exam  Vitals reviewed.   Cardiovascular:      Rate and Rhythm: Normal rate.   Pulmonary:      Effort: Pulmonary effort is normal.   Abdominal:      General: There is no distension.      Tenderness: There is abdominal tenderness. There is no guarding or rebound.      Comments: Kidney incision with minimal bloody oozing     Musculoskeletal:      Right lower leg: No edema.      Left lower leg: No edema.   Neurological:      Mental Status: He is alert and oriented to person, place, and time.   Psychiatric:         Mood and Affect: Mood normal.         Behavior: Behavior normal.         Thought Content: Thought content normal.         Judgment: Judgment normal.        Laboratory:  CBC:   Recent Labs   Lab 05/10/23  1255 05/11/23  0121 05/11/23  0628   WBC  --   --  15.18*   RBC  --   --  2.89*   HGB  --   --  10.4*   HCT 31.8* 29.0* 30.7*   PLT  --   --  102*   MCV  --   --  106*   MCH  --   --  36.0*   MCHC  --   --  33.9     BMP:   Recent Labs   Lab 05/10/23  1245 05/11/23  0121 05/11/23  0628   GLU 90 88 107   * 133* 131*   K 3.8 4.5 5.9*   CL 93* 95 97   CO2 27 25 21*   BUN 18 20 23   CREATININE 6.5* 7.2* 7.2*   CALCIUM 8.9 8.4* 8.1*       Diagnostic Results:  None

## 2023-05-11 NOTE — ASSESSMENT & PLAN NOTE
- CIT ~11 hours  - 2 day diaz  - No drain   - Making minimal UOP  - Kidney US overall looks fine  - Plan for Lasix 120 mg x 1  - Repeat labs ordered  - Possible need for HD pending repeat potassium  - Continue to monitor

## 2023-05-11 NOTE — CONSULTS
"20g x 1 3/4" PIV placed to right upper arm and 20g x 1 3/4" PIV placed to right forearm using usg   "

## 2023-05-12 DIAGNOSIS — Z94.0 KIDNEY REPLACED BY TRANSPLANT: Primary | ICD-10-CM

## 2023-05-12 PROBLEM — E87.5 HYPERKALEMIA: Status: RESOLVED | Noted: 2023-05-11 | Resolved: 2023-05-12

## 2023-05-12 LAB
ALBUMIN SERPL BCP-MCNC: 2.3 G/DL (ref 3.5–5.2)
ANION GAP SERPL CALC-SCNC: 9 MMOL/L (ref 8–16)
BASOPHILS # BLD AUTO: 0.01 K/UL (ref 0–0.2)
BASOPHILS NFR BLD: 0.1 % (ref 0–1.9)
BUN SERPL-MCNC: 19 MG/DL (ref 8–23)
CALCIUM SERPL-MCNC: 8 MG/DL (ref 8.7–10.5)
CHLORIDE SERPL-SCNC: 96 MMOL/L (ref 95–110)
CO2 SERPL-SCNC: 27 MMOL/L (ref 23–29)
CREAT SERPL-MCNC: 5.1 MG/DL (ref 0.5–1.4)
DIFFERENTIAL METHOD: ABNORMAL
EOSINOPHIL # BLD AUTO: 0 K/UL (ref 0–0.5)
EOSINOPHIL NFR BLD: 0 % (ref 0–8)
ERYTHROCYTE [DISTWIDTH] IN BLOOD BY AUTOMATED COUNT: 14.3 % (ref 11.5–14.5)
EST. GFR  (NO RACE VARIABLE): 12.1 ML/MIN/1.73 M^2
GLUCOSE SERPL-MCNC: 101 MG/DL (ref 70–110)
HCT VFR BLD AUTO: 29.4 % (ref 40–54)
HGB BLD-MCNC: 9.7 G/DL (ref 14–18)
IMM GRANULOCYTES # BLD AUTO: 0.06 K/UL (ref 0–0.04)
IMM GRANULOCYTES NFR BLD AUTO: 0.7 % (ref 0–0.5)
LYMPHOCYTES # BLD AUTO: 0 K/UL (ref 1–4.8)
LYMPHOCYTES NFR BLD: 0.3 % (ref 18–48)
MAGNESIUM SERPL-MCNC: 2 MG/DL (ref 1.6–2.6)
MCH RBC QN AUTO: 37.2 PG (ref 27–31)
MCHC RBC AUTO-ENTMCNC: 33 G/DL (ref 32–36)
MCV RBC AUTO: 113 FL (ref 82–98)
MONOCYTES # BLD AUTO: 0.1 K/UL (ref 0.3–1)
MONOCYTES NFR BLD: 1.2 % (ref 4–15)
NEUTROPHILS # BLD AUTO: 8.8 K/UL (ref 1.8–7.7)
NEUTROPHILS NFR BLD: 97.7 % (ref 38–73)
NRBC BLD-RTO: 0 /100 WBC
PHOSPHATE SERPL-MCNC: 4.8 MG/DL (ref 2.7–4.5)
PHOSPHATE SERPL-MCNC: 4.8 MG/DL (ref 2.7–4.5)
PLATELET # BLD AUTO: 91 K/UL (ref 150–450)
PMV BLD AUTO: 11.6 FL (ref 9.2–12.9)
POCT GLUCOSE: 109 MG/DL (ref 70–110)
POCT GLUCOSE: 114 MG/DL (ref 70–110)
POCT GLUCOSE: 130 MG/DL (ref 70–110)
POTASSIUM SERPL-SCNC: 4.5 MMOL/L (ref 3.5–5.1)
RBC # BLD AUTO: 2.61 M/UL (ref 4.6–6.2)
SODIUM SERPL-SCNC: 132 MMOL/L (ref 136–145)
TACROLIMUS BLD-MCNC: 6.4 NG/ML (ref 5–15)
WBC # BLD AUTO: 8.96 K/UL (ref 3.9–12.7)

## 2023-05-12 PROCEDURE — 20600001 HC STEP DOWN PRIVATE ROOM

## 2023-05-12 PROCEDURE — 63600175 PHARM REV CODE 636 W HCPCS: Mod: JZ,JG | Performed by: PHYSICIAN ASSISTANT

## 2023-05-12 PROCEDURE — 63600175 PHARM REV CODE 636 W HCPCS: Performed by: STUDENT IN AN ORGANIZED HEALTH CARE EDUCATION/TRAINING PROGRAM

## 2023-05-12 PROCEDURE — 63600175 PHARM REV CODE 636 W HCPCS: Performed by: TRANSPLANT SURGERY

## 2023-05-12 PROCEDURE — 80069 RENAL FUNCTION PANEL: CPT | Performed by: STUDENT IN AN ORGANIZED HEALTH CARE EDUCATION/TRAINING PROGRAM

## 2023-05-12 PROCEDURE — 25000003 PHARM REV CODE 250: Performed by: STUDENT IN AN ORGANIZED HEALTH CARE EDUCATION/TRAINING PROGRAM

## 2023-05-12 PROCEDURE — 83735 ASSAY OF MAGNESIUM: CPT | Performed by: STUDENT IN AN ORGANIZED HEALTH CARE EDUCATION/TRAINING PROGRAM

## 2023-05-12 PROCEDURE — 99233 PR SUBSEQUENT HOSPITAL CARE,LEVL III: ICD-10-PCS | Mod: ,,, | Performed by: PHYSICIAN ASSISTANT

## 2023-05-12 PROCEDURE — 25000003 PHARM REV CODE 250: Performed by: PHYSICIAN ASSISTANT

## 2023-05-12 PROCEDURE — 85025 COMPLETE CBC W/AUTO DIFF WBC: CPT | Performed by: STUDENT IN AN ORGANIZED HEALTH CARE EDUCATION/TRAINING PROGRAM

## 2023-05-12 PROCEDURE — 80197 ASSAY OF TACROLIMUS: CPT | Performed by: STUDENT IN AN ORGANIZED HEALTH CARE EDUCATION/TRAINING PROGRAM

## 2023-05-12 PROCEDURE — 36415 COLL VENOUS BLD VENIPUNCTURE: CPT | Performed by: STUDENT IN AN ORGANIZED HEALTH CARE EDUCATION/TRAINING PROGRAM

## 2023-05-12 PROCEDURE — 99233 SBSQ HOSP IP/OBS HIGH 50: CPT | Mod: ,,, | Performed by: PHYSICIAN ASSISTANT

## 2023-05-12 RX ORDER — TACROLIMUS 1 MG/1
5 CAPSULE ORAL 2 TIMES DAILY
Status: DISCONTINUED | OUTPATIENT
Start: 2023-05-12 | End: 2023-05-12

## 2023-05-12 RX ORDER — TACROLIMUS 1 MG/1
1 CAPSULE ORAL ONCE
Status: COMPLETED | OUTPATIENT
Start: 2023-05-12 | End: 2023-05-12

## 2023-05-12 RX ORDER — TACROLIMUS 1 MG/1
2 CAPSULE ORAL ONCE
Status: DISCONTINUED | OUTPATIENT
Start: 2023-05-12 | End: 2023-05-12

## 2023-05-12 RX ORDER — TACROLIMUS 1 MG/1
4 CAPSULE ORAL 2 TIMES DAILY
Status: DISCONTINUED | OUTPATIENT
Start: 2023-05-12 | End: 2023-05-13

## 2023-05-12 RX ORDER — OXYCODONE HYDROCHLORIDE 5 MG/1
5 TABLET ORAL EVERY 4 HOURS PRN
Qty: 40 TABLET | Refills: 0 | Status: SHIPPED | OUTPATIENT
Start: 2023-05-12 | End: 2023-06-30

## 2023-05-12 RX ORDER — FUROSEMIDE 10 MG/ML
120 INJECTION INTRAMUSCULAR; INTRAVENOUS ONCE
Status: COMPLETED | OUTPATIENT
Start: 2023-05-12 | End: 2023-05-12

## 2023-05-12 RX ADMIN — MYCOPHENOLIC ACID 720 MG: 180 TABLET, DELAYED RELEASE ORAL at 08:05

## 2023-05-12 RX ADMIN — TACROLIMUS 1 MG: 1 CAPSULE ORAL at 01:05

## 2023-05-12 RX ADMIN — DOCUSATE SODIUM 100 MG: 100 CAPSULE, LIQUID FILLED ORAL at 08:05

## 2023-05-12 RX ADMIN — OXYCODONE 5 MG: 5 TABLET ORAL at 03:05

## 2023-05-12 RX ADMIN — MUPIROCIN 1 G: 20 OINTMENT TOPICAL at 08:05

## 2023-05-12 RX ADMIN — HEPARIN SODIUM 5000 UNITS: 5000 INJECTION INTRAVENOUS; SUBCUTANEOUS at 05:05

## 2023-05-12 RX ADMIN — DIPHENHYDRAMINE HYDROCHLORIDE 25 MG: 25 CAPSULE ORAL at 01:05

## 2023-05-12 RX ADMIN — BISACODYL 10 MG: 5 TABLET, COATED ORAL at 08:05

## 2023-05-12 RX ADMIN — TACROLIMUS 4 MG: 1 CAPSULE ORAL at 05:05

## 2023-05-12 RX ADMIN — ACETAMINOPHEN 650 MG: 325 TABLET ORAL at 08:05

## 2023-05-12 RX ADMIN — HEPARIN SODIUM 5000 UNITS: 5000 INJECTION INTRAVENOUS; SUBCUTANEOUS at 08:05

## 2023-05-12 RX ADMIN — FAMOTIDINE 20 MG: 20 TABLET, FILM COATED ORAL at 08:05

## 2023-05-12 RX ADMIN — FUROSEMIDE 120 MG: 10 INJECTION, SOLUTION INTRAMUSCULAR; INTRAVENOUS at 09:05

## 2023-05-12 RX ADMIN — METHYLPREDNISOLONE SODIUM SUCCINATE 125 MG: 125 INJECTION, POWDER, FOR SOLUTION INTRAMUSCULAR; INTRAVENOUS at 01:05

## 2023-05-12 RX ADMIN — ACETAMINOPHEN 650 MG: 325 TABLET ORAL at 05:05

## 2023-05-12 RX ADMIN — ACETAMINOPHEN 650 MG: 325 TABLET ORAL at 01:05

## 2023-05-12 RX ADMIN — HEPARIN SODIUM 5000 UNITS: 5000 INJECTION INTRAVENOUS; SUBCUTANEOUS at 01:05

## 2023-05-12 RX ADMIN — TACROLIMUS 3 MG: 1 CAPSULE ORAL at 08:05

## 2023-05-12 RX ADMIN — SODIUM CHLORIDE 500 ML: 9 INJECTION, SOLUTION INTRAVENOUS at 09:05

## 2023-05-12 RX ADMIN — THERA TABS 1 TABLET: TAB at 08:05

## 2023-05-12 RX ADMIN — DOCUSATE SODIUM 100 MG: 100 CAPSULE, LIQUID FILLED ORAL at 02:05

## 2023-05-12 RX ADMIN — ANTI-THYMOCYTE GLOBULIN (RABBIT) 100 MG: 5 INJECTION, POWDER, LYOPHILIZED, FOR SOLUTION INTRAVENOUS at 02:05

## 2023-05-12 RX ADMIN — ACYCLOVIR 400 MG: 200 CAPSULE ORAL at 08:05

## 2023-05-12 NOTE — DISCHARGE SUMMARY
[facility]  Kidney Transplant  Discharge Summary    Patient Name: Abdoulaye Parikh  MRN: [id number]  Admission Date: 5/10/2023  Hospital Length of Stay: 7 days  Discharge Date and Time:  05/17/2023 10:20 AM  Attending Physician: Zeke Devine Jr., MD   Discharging Provider: Sruthi Cardoso PA-C  Primary Care Provider: Primary Doctor Gosia    HPI:   Mr. Parikh is a 62 y.o. year old Black or  male being admitted to receive a living unrelated kidney transplant through paired exchange for ESRD secondary to unknown etiology. 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 Patient is on HD MWF via left AVF, tolerating well without hypotension. Last HD 5/9/22. Dry wt 82 kg. He denies any recent hospitalizations or ED visits. Dr Brown will be the primary surgeon. OR scheduled for 1900. Pre op labs and imaging will be reviewed prior to surgery.       Procedure(s) (LRB):  ULTRASOUND, DIAGNOSTIC (N/A)  BIOPSY, KIDNEY  EXPLORATION, KIDNEY     Hospital Course:    Patient is now s/p living unrelated kidney transplant on 5/10/23. Post op UOP not robust initially. Kidney US obtained on POD#1 which showed mildly elevated RIs but good waveforms. He required HD on POD#1 for hyperkalemia.Gr removed POD#3 w/o issues. UOP remained fair- ~600cc per day. Native UOP ~1c per day. Cr slow to trend down. Kidney US obtained 5/14 showed elevated velocity in artery and vein. Repeat kidney US 5/15 w similar findings. Decision made to take patient back to OR 5/15 to assess. Per surgeon there was good flow in allograft and kidney looked good. Kidney biopsy performed- path pending. DSA negative. He is now making more urine and Cr starting to clear. Patient has history of Crohn's and initially had loose stools (baseline for him). After take back to OR, he c/o increased distention and not able to have a BM. KUB  reviewed. Pt denied having n/v and appetite improving. He is now passing flatus and had a BM.  Pain well managed w Tramadol and Oxy as needed. He is ambulating in hallways.     On day of discharge, patient is feeling well. Tolerating diet, passing flatus, ambulating, pain well controlled. Incision clean, dry, intact with staples in place. He is stable and ready for discharge. He will follow up with DGF protocol. He has an outpatient HD chair set up in case he needs it (only required HD on POD#1 for hyperkalemia thus far). He will discharge with lasix 40 mg BID x 5 days. He will follow up with labs and transplant clinic tomorrow 5/18/23. Patient verbalized his understanding prior to discharge.       Goals of Care Treatment Preferences:  Code Status: Full Code      Final Active Diagnoses:    Diagnosis Date Noted POA    PRINCIPAL PROBLEM:  S/P kidney transplant [Z94.0] 05/11/2023 Not Applicable    Constipation [K59.00] 05/16/2023 No    At risk for opportunistic infections [Z91.89] 05/11/2023 No    Prophylactic immunotherapy [Z29.8] 05/11/2023 Not Applicable    Long-term use of immunosuppressant medication [Z79.60] 05/11/2023 Not Applicable    Anemia of renal disease [N18.9, D63.1] 05/11/2023 Yes    Acute blood loss anemia [D62] 05/11/2023 No      Problems Resolved During this Admission:    Diagnosis Date Noted Date Resolved POA    Hyperkalemia [E87.5] 05/11/2023 05/12/2023 No    ESRD (end stage renal disease) on dialysis [N18.6, Z99.2] 04/25/2023 05/11/2023 Not Applicable       Treatments: As above    Consults (From admission, onward)          Status Ordering Provider     Inpatient consult to Midline team  Once        Provider:  (Not yet assigned)    Completed DOMINIC LEWIS     Inpatient consult to Midline team  Once        Provider:  (Not yet assigned)    Completed REBECCA BELTRÁN JR     Inpatient consult to Registered Dietitian/Nutritionist  Once        Provider:  (Not yet assigned)    Completed MEAGHAN WATKINS      Inpatient consult to Transplant Nephrology (KTM)  Once        Provider:  (Not yet assigned)    Acknowledged MEAGHAN WATKINS     Inpatient consult to Midline team  Once        Provider:  (Not yet assigned)    MARK Euceda            Pending Diagnostic Studies:       Procedure Component Value Units Date/Time    CBC auto differential [[id number]]     Order Status: Sent Lab Status: No result     Specimen: Blood     Renal Function Panel [[id number]]     Order Status: Sent Lab Status: No result     Specimen: Blood     Specimen to Pathology, Surgery Other (kidney transplant) [[id number]] Collected: 05/15/23 1516    Order Status: Sent Lab Status: In process Updated: 05/15/23 1540    Specimen: Tissue           Significant Diagnostic Studies: Labs:   BMP:   Recent Labs   Lab 05/15/23  1358 05/16/23  1047 05/17/23  0700    84 77   * 134* 137   K 3.4* 3.5 3.5   CL 97 101 105   CO2 20* 20* 20*   BUN 63* 60* 67*   CREATININE 7.0* 6.5* 6.1*   CALCIUM 8.4* 8.3* 8.1*   MG  --  2.0  --     and CBC   Recent Labs   Lab 05/15/23  1358 05/16/23  1047 05/17/23  0700   WBC 6.09 6.08 5.32   HGB 8.2* 8.8* 8.8*   HCT 23.9* 25.9* 26.6*   PLT 95* 103* 116*       Discharged Condition: good    Disposition: Home    Follow Up: As above    Patient Instructions:      Diet renal     Notify your health care provider if you experience any of the following:  temperature >100.4     Notify your health care provider if you experience any of the following:  persistent nausea and vomiting or diarrhea     Notify your health care provider if you experience any of the following:  severe uncontrolled pain     Notify your health care provider if you experience any of the following:  redness, tenderness, or signs of infection (pain, swelling, redness, odor or green/yellow discharge around incision site)     Notify your health care provider if you experience any of the following:  difficulty breathing or increased cough     Notify your  health care provider if you experience any of the following:  severe persistent headache     Notify your health care provider if you experience any of the following:  worsening rash     Notify your health care provider if you experience any of the following:  persistent dizziness, light-headedness, or visual disturbances     Notify your health care provider if you experience any of the following:  increased confusion or weakness     Notify your health care provider if you experience any of the following:   Order Comments: For any other concerning signs or symptoms  If you have not received your scheduled follow up within 24 hours of your discharge or for any questions or concerns, please call [phone] for further assistance.     Lifting restrictions   Order Comments: Do not lift greater than 10 lbs for 6 weeks from time of transplant     Medications:  Reconciled Home Medications:      Medication List        START taking these medications      acyclovir 400 MG tablet  Commonly known as: ZOVIRAX  Take 1 tablet (400 mg total) by mouth 2 (two) times daily. Stop 8/9/23     atovaquone 750 mg/5 mL Susp oral liquid  Commonly known as: MEPRON  Take 10 mLs (1,500 mg total) by mouth once daily. Stop 11/7/23     bisacodyL 5 mg EC tablet  Commonly known as: DULCOLAX  Take 2 tablets (10 mg total) by mouth daily as needed for Constipation.     docusate sodium 100 MG capsule  Commonly known as: COLACE  Take 1 capsule (100 mg total) by mouth 3 (three) times daily as needed for Constipation.     famotidine 20 MG tablet  Commonly known as: PEPCID  Take 1 tablet (20 mg total) by mouth every evening.     furosemide 40 MG tablet  Commonly known as: LASIX  Take 1 tablet (40 mg total) by mouth 2 (two) times daily. for 5 days     hydrOXYzine HCL 25 MG tablet  Commonly known as: ATARAX  Take 1 tablet (25 mg total) by mouth 3 (three) times daily as needed for Anxiety.     mycophenolate 180 MG Tbec  Commonly known as: MYFORTIC  Take 4  tablets (720 mg total) by mouth 2 (two) times daily.     oxyCODONE 5 MG immediate release tablet  Commonly known as: ROXICODONE  Take 1 tablet (5 mg total) by mouth every 4 (four) hours as needed for Pain.     sodium bicarbonate 650 MG tablet  Take 1 tablet (650 mg total) by mouth 2 (two) times daily.     tacrolimus 1 MG Cap  Commonly known as: PROGRAF  Take 2 capsules (2 mg total) by mouth every 12 (twelve) hours.            CHANGE how you take these medications      metoprolol succinate 25 MG 24 hr tablet  Commonly known as: TOPROL-XL  Take HALF tablet (12.5 mg total) by mouth once daily.  Start taking on: May 18, 2023  What changed: when to take this     predniSONE 5 MG tablet  Commonly known as: DELTASONE  Take 20 mg by mouth daily 5/14-5/20; 15 mg daily 5/21-5/27; 10 mg daily 5/28-6/3; then 5 mg daily thereafter 6/4/23  What changed:   how much to take  how to take this  when to take this            CONTINUE taking these medications      ARNUITY ELLIPTA 100 mcg/actuation inhaler  Generic drug: fluticasone furoate  Inhale 100 mcg into the lungs once daily. Controller            STOP taking these medications      allopurinoL 300 MG tablet  Commonly known as: ZYLOPRIM     benzonatate 200 MG capsule  Commonly known as: TESSALON     cinacalcet 30 MG Tab  Commonly known as: SENSIPAR     magnesium oxide 400 mg (241.3 mg magnesium) tablet  Commonly known as: MAG-OX     melatonin  Commonly known as: MELATIN     sevelamer carbonate 2.4 gram Pwpk  Commonly known as: RENVELA            Time spent caring for patient (Greater than 1/2 spent in direct face-to-face contact): > 30 minutes      Sruthi Cardoso PA-C  Kidney Transplant  [facility]

## 2023-05-12 NOTE — PROGRESS NOTES
[facility]  Kidney Transplant  Progress Note      Reason for Follow-up: Reassessment of Kidney Transplant - 5/10/2023  (#1) recipient and management of immunosuppression.          Subjective:   History of Present Illness:  Mr. Parikh is a 62 y.o. year old Black or  male being admitted to receive a living unrelated kidney transplant through paired exchange for ESRD secondary to unknown etiology. 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 Patient is on HD MWF via left AVF, tolerating well without hypotension. Last HD 5/9/22. Dry wt 82 kg. He denies any recent hospitalizations or ED visits. Dr Brown will be the primary surgeon. OR scheduled for 1900. Pre op labs and imaging will be reviewed prior to surgery.         Hospital Course:  Patient is now s/p living unrelated kidney transplant on 5/10/23. Post op UOP not robust. Kidney US obtained on POD#1 which showed mildly elevated RIs but good waveforms. He required HD on POD#1 for hyperkalemia.     Interval History: Patient required HD overnight for hyperkalemia. He is feeling fine today. Some expected incisional pain. UOP increased past 24 hours. ~1.1L. Will give IV lasix again today. Plan for diaz removal tomorrow 5/13/23. Encourage ambulation. Encourage IS.       Past Medical, Surgical, Family, and Social History:   Unchanged from H&P.    Scheduled Meds:   sodium chloride 0.9%   Intravenous Once    sodium chloride 0.9%   Intravenous Once    acetaminophen  650 mg Oral Q8H    acetaminophen  650 mg Oral Daily    acyclovir  400 mg Oral BID    antithymocyte globulin (rabbit), hydrocortisone 20mg, with optional heparin 1000 units in NS 500ml (FOR PERIPHERAL LINE ADMINISTRATION ONLY)  1.5 mg/kg (Adjusted) Intravenous Daily    bisacodyL  10 mg Oral QHS    diphenhydrAMINE  25 mg Oral Daily    docusate sodium  100 mg Oral TID     famotidine  20 mg Oral QHS    heparin (porcine)  5,000 Units Subcutaneous Q8H    methylPREDNISolone sodium succinate injection  125 mg Intravenous Once    multivitamin  1 tablet Oral Daily    mupirocin  1 g Nasal BID    mycophenolate  720 mg Oral BID    [START ON 5/14/2023] predniSONE  20 mg Oral Daily    tacrolimus  3 mg Oral BID     Continuous Infusions:   glucagon (human recombinant)       PRN Meds:dextrose 10%, diphenhydrAMINE, EPINEPHrine (PF), glucagon (human recombinant), glucose, glucose, glucose, heparin (porcine), heparin (porcine), hydrocortisone sodium succinate, insulin aspart U-100, ondansetron, oxyCODONE, sodium chloride 0.9%, sodium chloride 0.9%, sodium chloride 0.9%, traMADoL    Intake/Output - Last 3 Shifts         05/10 0700 05/11 0659 05/11 0700 05/12 0659 05/12 0700  05/13 0659    P.O. 285 1140     I.V. (mL/kg) 436.9 (5.4) 484.2 (6)     IV Piggyback 3129.2 782.8     Total Intake(mL/kg) 3851.1 (47.4) 2407.1 (29.6)     Urine (mL/kg/hr) 205 1100 (0.6)     Other  500     Stool  0     Total Output 205 1600     Net +3646.1 +807.1            Stool Occurrence  0 x              Review of Systems   Constitutional:  Negative for activity change and appetite change.   Respiratory:  Negative for shortness of breath.    Gastrointestinal:  Positive for abdominal pain. Negative for abdominal distention, diarrhea and nausea.   Skin:  Positive for wound.   Allergic/Immunologic: Positive for immunocompromised state.   Neurological:  Negative for weakness.   Psychiatric/Behavioral:  Negative for agitation, behavioral problems and confusion.     Objective:     Vital Signs (Most Recent):  Temp: 97.5 °F (36.4 °C) (05/12/23 0359)  Pulse: 66 (05/12/23 0359)  Resp: 18 (05/12/23 0359)  BP: 118/73 (05/12/23 0359)  SpO2: (!) 92 % (05/12/23 0359) Vital Signs (24h Range):  Temp:  [97.3 °F (36.3 °C)-98 °F (36.7 °C)] 97.5 °F (36.4 °C)  Pulse:  [59-70] 66  Resp:  [11-30] 18  SpO2:  [92 %-99 %] 92 %  BP:  "(118-154)/(62-87) 118/73     Weight: 81.3 kg (179 lb 3.7 oz)  Height: 5' 7" (170.2 cm)  Body mass index is 28.07 kg/m².    Physical Exam  Vitals reviewed.   Cardiovascular:      Rate and Rhythm: Normal rate.   Pulmonary:      Effort: Pulmonary effort is normal.   Abdominal:      General: There is no distension.      Tenderness: There is abdominal tenderness. There is no guarding or rebound.      Comments: Kidney incision with minimal bloody oozing     Musculoskeletal:      Right lower leg: No edema.      Left lower leg: No edema.   Neurological:      Mental Status: He is alert and oriented to person, place, and time.   Psychiatric:         Mood and Affect: Mood normal.         Behavior: Behavior normal.         Thought Content: Thought content normal.         Judgment: Judgment normal.        Laboratory:  CBC:   Recent Labs   Lab 05/11/23  0121 05/11/23  0628 05/11/23  1745   WBC  --  15.18* 16.47*   RBC  --  2.89* 2.67*   HGB  --  10.4* 10.0*   HCT 29.0* 30.7* 28.3*   PLT  --  102* 101*   MCV  --  106* 106*   MCH  --  36.0* 37.5*   MCHC  --  33.9 35.3       BMP:   Recent Labs   Lab 05/11/23  0628 05/11/23  1133 05/11/23  1745    112* 103   * 129* 127*   K 5.9* 5.0 6.0*   CL 97 94* 98   CO2 21* 20* 17*   BUN 23 24* 27*   CREATININE 7.2* 7.7* 7.5*   CALCIUM 8.1* 8.4* 8.3*         Diagnostic Results:  None    Assessment/Plan:     * S/P kidney transplant  - CIT ~11 hours  - 2 day joe, plan for removal 5/13  - No drain   - Kidney US overall looks fine, mildly elevated RIs, preserved waveforms   - Required HD overnight for hyperkalemia   - UOP improved past 24 hours   - Will continue lasix   - Continue to monitor       Hyperkalemia  - Required HD overnight for hyperkalemia  - Improved now  - Will continue IV lasix   - Continue renal diet and cardiac monitoring       Acute blood loss anemia  - Expected post operatively  - H/H stable  - Monitor with daily cbc      Anemia of renal disease  - See acute blood " loss anemia      Long-term use of immunosuppressant medication  - Continue prograf. Monitor prograf level daily, monitor for toxic side effects, and adjust for therapeutic dose      Prophylactic immunotherapy  - See long term use of immunosuppressant medication          Discharge Planning: Not a candidate for discharge at this time       Sruthi Cardoso, PA-C  Kidney Transplant  [facility]

## 2023-05-12 NOTE — CONSULTS
"  [facility]  Adult Nutrition  Consult Note    SUMMARY     Recommendations    1.Continue Renal diet      2. If PO intake <50%, add Novasource renal TID      3. RD to monitor and follow    Goals: Meet % EEN, EPN by RD f/u  Nutrition Goal Status: new  Communication of RD Recs:  (POC)    Assessment and Plan    Nutrition Problem  Increased nutrient needs (protein, energy)    Related to (etiology):   Increased physiological demands    Signs and Symptoms (as evidenced by):   S/p kidney transplant (5/10)    Interventions/Recommendations (treatment strategy):  Collaboration with other providers  Nutrition education    Nutrition Diagnosis Status:   New      Reason for Assessment    Reason For Assessment: consult  Diagnosis:  (s/p kidney transplant)  Interdisciplinary Rounds: did not attend    General Information Comments:   S/p kidney transplant (5/10). Pt tolerating diet. Reports good PO intake PTA. Follows renal diet at home. Denies N/V, chewing/swallowing difficulties. Stated  lb. Wt stable per chart. Pt appears nourished, no indicator of malnutrition. Educated pt and family on food safety and food-drug interaction. Pt and family verbalized understanding.    Nutrition Discharge Planning: Post transplant nutrition education provided on 5/12. Food safety/drug interactions emphasized. General healthy/low salt diet recommended. Education material left at bedside. No other needs identified.    Nutrition Risk Screen    Nutrition Risk Screen: no indicators present    Anthropometrics    Temp: 97.6 °F (36.4 °C)  Height: 5' 7" (170.2 cm)  Height (inches): 67 in  Weight Method: Standard Scale  Weight: 81.3 kg (179 lb 3.7 oz)  Weight (lb): 179.24 lb  Ideal Body Weight (IBW), Male: 148 lb  % Ideal Body Weight, Male (lb): 121.11 %  BMI (Calculated): 28.1     Lab/Procedures/Meds    Pertinent Labs Reviewed: reviewed  Pertinent Labs Comments: P 4.8, Na 132, Cr 5.1, albumin 2.3, eGFR 12.1  Pertinent " Medications Reviewed: reviewed  Pertinent Medications Comments: prednisone, tacrolimus, methylprednisolone, mycophenolate, MV, docusate, heparin, famotidine    Estimated/Assessed Needs    Weight Used For Calorie Calculations: 81.2 kg (179 lb)  Energy Calorie Requirements (kcal): 1968 kcal  Energy Need Method: [place]-St Jeor (PAL 1.25)  Protein Requirements: 81g (1g/kg)  Weight Used For Protein Calculations: 81.2 kg (179 lb)  Fluid Requirements (mL): 1ml/kcal or per MD  Estimated Fluid Requirement Method: RDA Method  RDA Method (mL): 1968     Nutrition Prescription Ordered    Current Diet Order: Renal    Evaluation of Received Nutrient/Fluid Intake    I/O: + 4.5 L since admit  Energy Calories Required: not meeting needs  Protein Required: not meeting needs  Comments: LBM 5/10  Tolerance: tolerating    Nutrition Risk    Level of Risk/Frequency of Follow-up:  (1x/week)     Monitor and Evaluation    Food and Nutrient Intake: energy intake, food and beverage intake  Food and Nutrient Adminstration: diet order  Physical Activity and Function: nutrition-related ADLs and IADLs  Anthropometric Measurements: height/length, weight, weight change, body mass index  Biochemical Data, Medical Tests and Procedures: gastrointestinal profile, glucose/endocrine profile, electrolyte and renal panel, inflammatory profile, lipid profile  Nutrition-Focused Physical Findings: overall appearance     Nutrition Follow-Up    RD Follow-up?: Yes

## 2023-05-12 NOTE — PROGRESS NOTES
Patient admitted for Kidney transplant.Transplant coordinator met with the patient on rounds to introduce self and explain the coordinator role. The post-transplant teaching book was given. Transplant Coordinator explained that she will follow the patient while in the hospital and assist with discharge.       ESRD 2/2 unknown  SCD, LURD (paired exchange  CIT~11hrs  Thymo induction  CMV -,-

## 2023-05-12 NOTE — PROGRESS NOTES
Tentative Discharge Note:    SW met with pt in room to discuss discharge plan and to assess needs. Pt reports wife returned home and plans to be back shortly.Pt reports coping adequately at this time and presents as alert and oriented x4 and states pain and anxiety is lower than yesterday. Pt scheduled to discharge possibly over the weekend to pt's home under the care of self/wife, Kalani Parikh ([phone]).     SW confirmed with pt's dialysis unit, [facility] pt's original chair time is being held MWF 2:00pm, if needed.    SW reviewed discharge plan with pt. Pt aware of and involved in discharge planning. Pt's wife, Kalani, to transport pt. Pt denies having any concerns at this time. Pt verbalized understanding and agreement with information reviewed, social work availability, and how to assess available resources as needed. SW remains available.      Laure Mascorro LMSW

## 2023-05-12 NOTE — ASSESSMENT & PLAN NOTE
- CIT ~11 hours  - 2 day joe, plan for removal 5/13  - No drain   - Kidney US overall looks fine, mildly elevated RIs, preserved waveforms   - Required HD overnight for hyperkalemia   - UOP improved past 24 hours   - Will continue lasix   - Continue to monitor

## 2023-05-12 NOTE — NURSING
05/11/23 2030   During Hemodialysis Assessment   Blood Flow Rate (mL/min) 400 mL/min   Dialysate Flow Rate (mL/min) 800 ml/min   Ultrafiltration Rate (mL/Hr) 170 mL/Hr   Arteriovenous Lines Secure Yes   Arterial Pressure (mmHg) -150 mmHg   Venous Pressure (mmHg) 150   Blood Volume Processed (Liters) 0 L   UF Removed (mL) 0 mL   TMP 70   Venous Line in Air Detector Yes   Intake (mL) 250 mL   Intra-Hemodialysis Comments Tx started per MD orders     Report received from primary nurse. HD started per MD orders.

## 2023-05-12 NOTE — PROGRESS NOTES
Transplant Teaching Book given to patient, Abdoulaye Parikh, during pre-op appts.  During the course of the hospital stay the patient received information regarding kidney transplant. Teaching and instruction were completed.  Areas that were discussed included: how to contact the Transplant Team, the importance of measuring intake of fluids and urine output, and monitoring vital signs such as blood pressure, temperature, and daily weights.  Parameters for which to report abnormal findings were given.  Appointment were provided along with the rational for the importance of lab work and clinic visits.  A written medication list was provided.  The importance of immunosuppressive medications, their common side effects, and treatment to prevent or minimize side effects has been reviewed.  Signs and symptoms of rejection and infection along with various treatments were reviewed.  The need to avoid infection was discussed.  Wound care and special consideration regarding activities of daily living were explained.  Written and verbal teaching of the above information was given.     Discussed with the patient and caregiver the importance of maintaining COVID-19 precautions; wearing a mask, good handwashing, and social distancing.  Also, to report any signs or symptoms (fever, difficulty breathing, loss of taste/smell, etc.), suspected exposure, or COVID testing, immediately to the transplant program.     Education was provided to the patient, his wife, mother-in-law and sister-in-law.

## 2023-05-12 NOTE — PLAN OF CARE
Patient is AAOx3. RLQ incision MARGARITA with staples. Patient and his wife taught how to paint the incision with betadine. Diaz catheter CDI. Orders to remove the diaz at 0600 5/13. Patient received NS 500c bolus followed by Lasix 120mg IVP. Patient has been sitting up in the chair for most of the day. IS, amanda hose and SCDs are in use. Monitoring the patient's blood sugar AC&HS. Patient and his wife taught self meds and how to pull them.  Patient to receive thymo #3 today. Reminded the patient to call for assistance.

## 2023-05-12 NOTE — NURSING
05/11/23 2330 05/11/23 2336   During Hemodialysis Assessment   Blood Flow Rate (mL/min) 400 mL/min  --    Dialysate Flow Rate (mL/min) 800 ml/min  --    Ultrafiltration Rate (mL/Hr) 170 mL/Hr  --    Arteriovenous Lines Secure Yes  --    Arterial Pressure (mmHg) -150 mmHg  --    Venous Pressure (mmHg) 150  --    UF Removed (mL) 500 mL  --    TMP 70  --    Venous Line in Air Detector Yes  --    Intake (mL) 250 mL  --    Intra-Hemodialysis Comments Tx complete  --    Post-Hemodialysis Assessment   Blood Volume Processed (Liters)  --  72.2 L   Dialyzer Clearance  --  Moderately streaked   Duration of Treatment  --  180 minutes   Total UF (mL)  --  500 mL   Net Fluid Removal  --  0   Patient Response to Treatment  --  Pt tolerated HD very well   Post-Hemodialysis Comments  --  Tx complete     HD x 3 hours via GRACIE AVF, no UF, pt tolerated HD very well

## 2023-05-12 NOTE — SUBJECTIVE & OBJECTIVE
Subjective:   History of Present Illness:  Mr. Parikh is a 62 y.o. year old Black or  male being admitted to receive a living unrelated kidney transplant through paired exchange for ESRD secondary to unknown etiology. 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 Patient is on HD MWF via left AVF, tolerating well without hypotension. Last HD 5/9/22. Dry wt 82 kg. He denies any recent hospitalizations or ED visits. Dr Brown will be the primary surgeon. OR scheduled for 1900. Pre op labs and imaging will be reviewed prior to surgery.         Hospital Course:  Patient is now s/p living unrelated kidney transplant on 5/10/23. Post op UOP not robust. Kidney US obtained on POD#1 which showed mildly elevated RIs but good waveforms. He required HD on POD#1 for hyperkalemia.     Interval History: Patient required HD overnight for hyperkalemia. He is feeling fine today. Some expected incisional pain. UOP increased past 24 hours. ~1.1L. Will give IV lasix again today. Plan for diaz removal tomorrow 5/13/23. Encourage ambulation. Encourage IS.       Past Medical, Surgical, Family, and Social History:   Unchanged from H&P.    Scheduled Meds:   sodium chloride 0.9%   Intravenous Once    sodium chloride 0.9%   Intravenous Once    acetaminophen  650 mg Oral Q8H    acetaminophen  650 mg Oral Daily    acyclovir  400 mg Oral BID    antithymocyte globulin (rabbit), hydrocortisone 20mg, with optional heparin 1000 units in NS 500ml (FOR PERIPHERAL LINE ADMINISTRATION ONLY)  1.5 mg/kg (Adjusted) Intravenous Daily    bisacodyL  10 mg Oral QHS    diphenhydrAMINE  25 mg Oral Daily    docusate sodium  100 mg Oral TID    famotidine  20 mg Oral QHS    heparin (porcine)  5,000 Units Subcutaneous Q8H    methylPREDNISolone sodium succinate injection  125 mg Intravenous Once    multivitamin  1 tablet Oral Daily    mupirocin  1  "g Nasal BID    mycophenolate  720 mg Oral BID    [START ON 5/14/2023] predniSONE  20 mg Oral Daily    tacrolimus  3 mg Oral BID     Continuous Infusions:   glucagon (human recombinant)       PRN Meds:dextrose 10%, diphenhydrAMINE, EPINEPHrine (PF), glucagon (human recombinant), glucose, glucose, glucose, heparin (porcine), heparin (porcine), hydrocortisone sodium succinate, insulin aspart U-100, ondansetron, oxyCODONE, sodium chloride 0.9%, sodium chloride 0.9%, sodium chloride 0.9%, traMADoL    Intake/Output - Last 3 Shifts         05/10 0700  05/11 0659 05/11 0700  05/12 0659 05/12 0700  05/13 0659    P.O. 285 1140     I.V. (mL/kg) 436.9 (5.4) 484.2 (6)     IV Piggyback 3129.2 782.8     Total Intake(mL/kg) 3851.1 (47.4) 2407.1 (29.6)     Urine (mL/kg/hr) 205 1100 (0.6)     Other  500     Stool  0     Total Output 205 1600     Net +3646.1 +807.1            Stool Occurrence  0 x              Review of Systems   Constitutional:  Negative for activity change and appetite change.   Respiratory:  Negative for shortness of breath.    Gastrointestinal:  Positive for abdominal pain. Negative for abdominal distention, diarrhea and nausea.   Skin:  Positive for wound.   Allergic/Immunologic: Positive for immunocompromised state.   Neurological:  Negative for weakness.   Psychiatric/Behavioral:  Negative for agitation, behavioral problems and confusion.     Objective:     Vital Signs (Most Recent):  Temp: 97.5 °F (36.4 °C) (05/12/23 0359)  Pulse: 66 (05/12/23 0359)  Resp: 18 (05/12/23 0359)  BP: 118/73 (05/12/23 0359)  SpO2: (!) 92 % (05/12/23 0359) Vital Signs (24h Range):  Temp:  [97.3 °F (36.3 °C)-98 °F (36.7 °C)] 97.5 °F (36.4 °C)  Pulse:  [59-70] 66  Resp:  [11-30] 18  SpO2:  [92 %-99 %] 92 %  BP: (118-154)/(62-87) 118/73     Weight: 81.3 kg (179 lb 3.7 oz)  Height: 5' 7" (170.2 cm)  Body mass index is 28.07 kg/m².     Physical Exam  Vitals reviewed.   Cardiovascular:      Rate and Rhythm: Normal rate.   Pulmonary:      " Effort: Pulmonary effort is normal.   Abdominal:      General: There is no distension.      Tenderness: There is abdominal tenderness. There is no guarding or rebound.      Comments: Kidney incision with minimal bloody oozing     Musculoskeletal:      Right lower leg: No edema.      Left lower leg: No edema.   Neurological:      Mental Status: He is alert and oriented to person, place, and time.   Psychiatric:         Mood and Affect: Mood normal.         Behavior: Behavior normal.         Thought Content: Thought content normal.         Judgment: Judgment normal.        Laboratory:  CBC:   Recent Labs   Lab 05/11/23  0121 05/11/23  0628 05/11/23  1745   WBC  --  15.18* 16.47*   RBC  --  2.89* 2.67*   HGB  --  10.4* 10.0*   HCT 29.0* 30.7* 28.3*   PLT  --  102* 101*   MCV  --  106* 106*   MCH  --  36.0* 37.5*   MCHC  --  33.9 35.3       BMP:   Recent Labs   Lab 05/11/23  0628 05/11/23  1133 05/11/23  1745    112* 103   * 129* 127*   K 5.9* 5.0 6.0*   CL 97 94* 98   CO2 21* 20* 17*   BUN 23 24* 27*   CREATININE 7.2* 7.7* 7.5*   CALCIUM 8.1* 8.4* 8.3*         Diagnostic Results:  None

## 2023-05-12 NOTE — PLAN OF CARE
Patient AAO x4, VSS, on room air, afebrile. AC/HS- no coverage needed. Received HD this shift- nothing taken off. Repeat labs this AM. Thymo #2/3 completed- tolerated well. RLQ incision CDI with staples. Small area of incision w/ gauze; no drainage. Gr clean and intact with clear yellow urine. Q4 I/O. Bed locked and in lowest setting. Call bell in reach. Reminded to call for assistance.

## 2023-05-13 LAB
ALBUMIN SERPL BCP-MCNC: 2.2 G/DL (ref 3.5–5.2)
ANION GAP SERPL CALC-SCNC: 13 MMOL/L (ref 8–16)
BASOPHILS # BLD AUTO: 0 K/UL (ref 0–0.2)
BASOPHILS NFR BLD: 0 % (ref 0–1.9)
BUN SERPL-MCNC: 36 MG/DL (ref 8–23)
CALCIUM SERPL-MCNC: 8.2 MG/DL (ref 8.7–10.5)
CHLORIDE SERPL-SCNC: 95 MMOL/L (ref 95–110)
CO2 SERPL-SCNC: 23 MMOL/L (ref 23–29)
CREAT SERPL-MCNC: 6 MG/DL (ref 0.5–1.4)
DIFFERENTIAL METHOD: ABNORMAL
EOSINOPHIL # BLD AUTO: 0 K/UL (ref 0–0.5)
EOSINOPHIL NFR BLD: 0 % (ref 0–8)
ERYTHROCYTE [DISTWIDTH] IN BLOOD BY AUTOMATED COUNT: 13.8 % (ref 11.5–14.5)
EST. GFR  (NO RACE VARIABLE): 10 ML/MIN/1.73 M^2
GLUCOSE SERPL-MCNC: 94 MG/DL (ref 70–110)
HCT VFR BLD AUTO: 28.4 % (ref 40–54)
HGB BLD-MCNC: 9.2 G/DL (ref 14–18)
IMM GRANULOCYTES # BLD AUTO: 0.02 K/UL (ref 0–0.04)
IMM GRANULOCYTES NFR BLD AUTO: 0.3 % (ref 0–0.5)
LYMPHOCYTES # BLD AUTO: 0 K/UL (ref 1–4.8)
LYMPHOCYTES NFR BLD: 0.4 % (ref 18–48)
MAGNESIUM SERPL-MCNC: 2 MG/DL (ref 1.6–2.6)
MCH RBC QN AUTO: 35.1 PG (ref 27–31)
MCHC RBC AUTO-ENTMCNC: 32.4 G/DL (ref 32–36)
MCV RBC AUTO: 108 FL (ref 82–98)
MONOCYTES # BLD AUTO: 0.1 K/UL (ref 0.3–1)
MONOCYTES NFR BLD: 2.1 % (ref 4–15)
NEUTROPHILS # BLD AUTO: 6.6 K/UL (ref 1.8–7.7)
NEUTROPHILS NFR BLD: 97.2 % (ref 38–73)
NRBC BLD-RTO: 0 /100 WBC
PHOSPHATE SERPL-MCNC: 5.5 MG/DL (ref 2.7–4.5)
PHOSPHATE SERPL-MCNC: 5.5 MG/DL (ref 2.7–4.5)
PLATELET # BLD AUTO: 90 K/UL (ref 150–450)
PMV BLD AUTO: 11.8 FL (ref 9.2–12.9)
POCT GLUCOSE: 100 MG/DL (ref 70–110)
POCT GLUCOSE: 106 MG/DL (ref 70–110)
POCT GLUCOSE: 95 MG/DL (ref 70–110)
POCT GLUCOSE: 95 MG/DL (ref 70–110)
POTASSIUM SERPL-SCNC: 4.3 MMOL/L (ref 3.5–5.1)
RBC # BLD AUTO: 2.62 M/UL (ref 4.6–6.2)
SODIUM SERPL-SCNC: 131 MMOL/L (ref 136–145)
TACROLIMUS BLD-MCNC: 12 NG/ML (ref 5–15)
WBC # BLD AUTO: 6.8 K/UL (ref 3.9–12.7)

## 2023-05-13 PROCEDURE — 20600001 HC STEP DOWN PRIVATE ROOM

## 2023-05-13 PROCEDURE — 85025 COMPLETE CBC W/AUTO DIFF WBC: CPT | Performed by: STUDENT IN AN ORGANIZED HEALTH CARE EDUCATION/TRAINING PROGRAM

## 2023-05-13 PROCEDURE — 25000242 PHARM REV CODE 250 ALT 637 W/ HCPCS: Performed by: INTERNAL MEDICINE

## 2023-05-13 PROCEDURE — 63600175 PHARM REV CODE 636 W HCPCS: Performed by: TRANSPLANT SURGERY

## 2023-05-13 PROCEDURE — 97162 PT EVAL MOD COMPLEX 30 MIN: CPT

## 2023-05-13 PROCEDURE — 63600175 PHARM REV CODE 636 W HCPCS: Performed by: PHYSICIAN ASSISTANT

## 2023-05-13 PROCEDURE — 80069 RENAL FUNCTION PANEL: CPT | Performed by: STUDENT IN AN ORGANIZED HEALTH CARE EDUCATION/TRAINING PROGRAM

## 2023-05-13 PROCEDURE — 36415 COLL VENOUS BLD VENIPUNCTURE: CPT | Performed by: STUDENT IN AN ORGANIZED HEALTH CARE EDUCATION/TRAINING PROGRAM

## 2023-05-13 PROCEDURE — 63600175 PHARM REV CODE 636 W HCPCS: Performed by: STUDENT IN AN ORGANIZED HEALTH CARE EDUCATION/TRAINING PROGRAM

## 2023-05-13 PROCEDURE — 63600175 PHARM REV CODE 636 W HCPCS: Performed by: INTERNAL MEDICINE

## 2023-05-13 PROCEDURE — 83735 ASSAY OF MAGNESIUM: CPT | Performed by: STUDENT IN AN ORGANIZED HEALTH CARE EDUCATION/TRAINING PROGRAM

## 2023-05-13 PROCEDURE — 80197 ASSAY OF TACROLIMUS: CPT | Performed by: STUDENT IN AN ORGANIZED HEALTH CARE EDUCATION/TRAINING PROGRAM

## 2023-05-13 PROCEDURE — 25000003 PHARM REV CODE 250: Performed by: STUDENT IN AN ORGANIZED HEALTH CARE EDUCATION/TRAINING PROGRAM

## 2023-05-13 PROCEDURE — 97116 GAIT TRAINING THERAPY: CPT

## 2023-05-13 PROCEDURE — 25000003 PHARM REV CODE 250: Performed by: PHYSICIAN ASSISTANT

## 2023-05-13 RX ORDER — TACROLIMUS 1 MG/1
3 CAPSULE ORAL 2 TIMES DAILY
Status: DISCONTINUED | OUTPATIENT
Start: 2023-05-14 | End: 2023-05-15

## 2023-05-13 RX ORDER — FUROSEMIDE 10 MG/ML
60 INJECTION INTRAMUSCULAR; INTRAVENOUS ONCE
Status: COMPLETED | OUTPATIENT
Start: 2023-05-13 | End: 2023-05-13

## 2023-05-13 RX ADMIN — MYCOPHENOLIC ACID 720 MG: 180 TABLET, DELAYED RELEASE ORAL at 08:05

## 2023-05-13 RX ADMIN — FAMOTIDINE 20 MG: 20 TABLET, FILM COATED ORAL at 08:05

## 2023-05-13 RX ADMIN — TACROLIMUS 4 MG: 1 CAPSULE ORAL at 08:05

## 2023-05-13 RX ADMIN — MUPIROCIN 1 G: 20 OINTMENT TOPICAL at 09:05

## 2023-05-13 RX ADMIN — ACYCLOVIR 400 MG: 200 CAPSULE ORAL at 08:05

## 2023-05-13 RX ADMIN — HEPARIN SODIUM 5000 UNITS: 5000 INJECTION INTRAVENOUS; SUBCUTANEOUS at 08:05

## 2023-05-13 RX ADMIN — FUROSEMIDE 60 MG: 10 INJECTION, SOLUTION INTRAMUSCULAR; INTRAVENOUS at 12:05

## 2023-05-13 RX ADMIN — THERA TABS 1 TABLET: TAB at 08:05

## 2023-05-13 RX ADMIN — HEPARIN SODIUM 5000 UNITS: 5000 INJECTION INTRAVENOUS; SUBCUTANEOUS at 02:05

## 2023-05-13 RX ADMIN — HEPARIN SODIUM 5000 UNITS: 5000 INJECTION INTRAVENOUS; SUBCUTANEOUS at 06:05

## 2023-05-13 RX ADMIN — OXYCODONE 5 MG: 5 TABLET ORAL at 06:05

## 2023-05-13 RX ADMIN — FLUTICASONE FUROATE 1 PUFF: 100 POWDER RESPIRATORY (INHALATION) at 02:05

## 2023-05-13 RX ADMIN — MUPIROCIN 1 G: 20 OINTMENT TOPICAL at 08:05

## 2023-05-13 RX ADMIN — DOCUSATE SODIUM 100 MG: 100 CAPSULE, LIQUID FILLED ORAL at 02:05

## 2023-05-13 RX ADMIN — DOCUSATE SODIUM 100 MG: 100 CAPSULE, LIQUID FILLED ORAL at 08:05

## 2023-05-13 RX ADMIN — OXYCODONE 5 MG: 5 TABLET ORAL at 02:05

## 2023-05-13 RX ADMIN — OXYCODONE 5 MG: 5 TABLET ORAL at 11:05

## 2023-05-13 RX ADMIN — TRAMADOL HYDROCHLORIDE 50 MG: 50 TABLET, COATED ORAL at 08:05

## 2023-05-13 NOTE — PROGRESS NOTES
[facility] - Transplant Stepdown  Kidney Transplant  Progress Note      Reason for Follow-up: Reassessment of Kidney Transplant - 5/10/2023  (#1) recipient and management of immunosuppression.    ORGAN: LEFT KIDNEY    Donor Type: Living    Donor CMV Status:   Donor HBcAB:  Donor HCV Status:  Donor HBV AURORA:   Donor HCV AURORA:       Subjective:   History of Present Illness:  Mr. Parikh is a 62 y.o. year old Black or  male being admitted to receive a living unrelated kidney transplant through paired exchange for ESRD secondary to unknown etiology. 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 Patient is on HD MWF via left AVF, tolerating well without hypotension. Last HD 5/9/22. Dry wt 82 kg. He denies any recent hospitalizations or ED visits. Dr Brown will be the primary surgeon. OR scheduled for 1900. Pre op labs and imaging will be reviewed prior to surgery.         Hospital Course:  Patient is now s/p living unrelated kidney transplant on 5/10/23. Post op UOP not robust. Kidney US obtained on POD#1 which showed mildly elevated RIs but good waveforms. He required HD on POD#1 for hyperkalemia.     Interval History: No acute events overnight.  Gr removed this AM,  had not yet urinated.  Pain is tolerable. UOP adequate at 850.  Patient has noted he has minimally ambulated.        Past Medical, Surgical, Family, and Social History:   Unchanged from H&P.    Scheduled Meds:   sodium chloride 0.9%   Intravenous Once    sodium chloride 0.9%   Intravenous Once    acyclovir  400 mg Oral BID    bisacodyL  10 mg Oral QHS    docusate sodium  100 mg Oral TID    famotidine  20 mg Oral QHS    fluticasone furoate  1 puff Inhalation Daily    heparin (porcine)  5,000 Units Subcutaneous Q8H    multivitamin  1 tablet Oral Daily    mupirocin  1 g Nasal BID    mycophenolate  720 mg Oral BID    [START ON  "5/14/2023] predniSONE  20 mg Oral Daily    [START ON 5/14/2023] tacrolimus  3 mg Oral BID     Continuous Infusions:   glucagon (human recombinant)       PRN Meds:dextrose 10%, diphenhydrAMINE, glucagon (human recombinant), glucose, glucose, glucose, heparin (porcine), insulin aspart U-100, ondansetron, oxyCODONE, sodium chloride 0.9%, sodium chloride 0.9%, sodium chloride 0.9%, traMADoL    Intake/Output - Last 3 Shifts         05/11 0700 05/12 0659 05/12 0700 05/13 0659 05/13 0700 05/14 0659    P.O. 1140 1080     I.V. (mL/kg) 484.2 (6)      IV Piggyback 782.8 1000     Total Intake(mL/kg) 2407.1 (29.6) 2080 (25.6)     Urine (mL/kg/hr) 1100 (0.6) 850 (0.4) 75 (0.1)    Other 500      Stool 0 0 0    Total Output 1600 850 75    Net +807.1 +1230 -75           Stool Occurrence 0 x 0 x 0 x             Review of Systems   Constitutional:  Negative for activity change and appetite change.   Respiratory:  Negative for shortness of breath.    Gastrointestinal:  Positive for abdominal pain. Negative for abdominal distention, diarrhea and nausea.   Skin:  Positive for wound.   Allergic/Immunologic: Positive for immunocompromised state.   Neurological:  Negative for weakness.   Psychiatric/Behavioral:  Negative for agitation, behavioral problems and confusion.     Objective:     Vital Signs (Most Recent):  Temp: 97.4 °F (36.3 °C) (05/13/23 1112)  Pulse: 70 (05/13/23 1400)  Resp: 16 (05/13/23 1433)  BP: 134/67 (05/13/23 1112)  SpO2: 95 % (05/13/23 1400) Vital Signs (24h Range):  Temp:  [97.4 °F (36.3 °C)-97.9 °F (36.6 °C)] 97.4 °F (36.3 °C)  Pulse:  [64-73] 70  Resp:  [16-18] 16  SpO2:  [92 %-95 %] 95 %  BP: (119-134)/(65-78) 134/67     Weight: 81.3 kg (179 lb 3.7 oz)  Height: 5' 7" (170.2 cm)  Body mass index is 28.07 kg/m².    Physical Exam  Vitals reviewed.   Cardiovascular:      Rate and Rhythm: Normal rate.   Pulmonary:      Effort: Pulmonary effort is normal.   Abdominal:      General: There is no distension.      " Tenderness: There is abdominal tenderness. There is no guarding or rebound.      Comments: Kidney incision with minimal bloody oozing     Musculoskeletal:      Right lower leg: No edema.      Left lower leg: No edema.   Neurological:      Mental Status: He is alert and oriented to person, place, and time.   Psychiatric:         Mood and Affect: Mood normal.         Behavior: Behavior normal.         Thought Content: Thought content normal.         Judgment: Judgment normal.        Laboratory:  CBC:   Recent Labs   Lab 05/11/23  1745 05/12/23  0710 05/13/23  0744   WBC 16.47* 8.96 6.80   RBC 2.67* 2.61* 2.62*   HGB 10.0* 9.7* 9.2*   HCT 28.3* 29.4* 28.4*   * 91* 90*   * 113* 108*   MCH 37.5* 37.2* 35.1*   MCHC 35.3 33.0 32.4       BMP:   Recent Labs   Lab 05/11/23  1745 05/12/23  0710 05/13/23  0743    101 94   * 132* 131*   K 6.0* 4.5 4.3   CL 98 96 95   CO2 17* 27 23   BUN 27* 19 36*   CREATININE 7.5* 5.1* 6.0*   CALCIUM 8.3* 8.0* 8.2*         Diagnostic Results:  None    Assessment/Plan:     * S/P kidney transplant  - CIT ~11 hours  - 2 day joe, plan for removal 5/13  - No drain   - Kidney US overall looks fine, mildly elevated RIs, preserved waveforms   - Required HD 5/11/23 for hyperkalemia   - adquate UOP but not yet clearing.  - Will continue lasix   - Continue to monitor       Acute blood loss anemia  - Expected post operatively  - H/H stable  - Monitor with daily cbc      Anemia of renal disease  - See acute blood loss anemia      Long-term use of immunosuppressant medication  - Continue prograf. Monitor prograf level daily, monitor for toxic side effects, and adjust for therapeutic dose      Prophylactic immunotherapy  - See long term use of immunosuppressant medication          Discharge Planning:  Not medically ready for discharge    FALLON BURDEN MD  Kidney Transplant  [facility] - Transplant Stepdown

## 2023-05-13 NOTE — PT/OT/SLP EVAL
"Physical Therapy  Evaluation and Treatment    Patient Name:  Abdoulaye Parikh   MRN:  [id number]    Recommendations:     Discharge Recommendations:  other (see comments) (HHPT)   Discharge Equipment Recommendations: none   Barriers to discharge: decreased functional mobility and fall risk    Assessment:     Abdoulaye Parikh is a 62 y.o. male admitted with a medical diagnosis of S/P kidney transplant.  Pt demonstrates the below listed impairments with decreased tolerance to functional mobility, impaired endurance, and pain being the most limiting.  Pt demonstrates fair tolerance to out of bed mobility and is willing to ambulate in the room.  He demonstrates a slow gait velocity and is not at his baseline at this time.  Pt requires skilled PT due to patient's status as: a fall risk and patient has increased pain to allow safe d/c home.     Impairments and functional limitations:  weakness, impaired endurance, impaired self care skills, impaired functional mobility, gait instability, impaired balance, pain, impaired skin.  These deficits affect their roles and responsibilities in which they were able to complete prior to admit.  Rehab Prognosis:   Good ; patient would benefit from acute skilled PT services 3 x/week to address these deficits, improve quality of life, focus on recovery of impairments, provide patient/caregiver education, reduce fall risk, and reach maximum level of function.  Pt is highly  motivated to participated in skilled PT.    Recent Surgery:   Procedure(s) (LRB):  TRANSPLANT, KIDNEY - RQ 7PM START (N/A) 3 Days Post-Op    Plan:     During this hospitalization, patient to be seen 3 x/week to address the identified rehab impairments via gait training, therapeutic activities, therapeutic exercises, neuromuscular re-education and progress toward the following goals:    Plan of Care Expires:  06/12/23    Subjective     Chief Complaint: "I haven't used the walker since 2016"  Patient/Family Comments/Goals: " Return home  Pain/Comfort:  Pain Rating 1: 6/10  Location - Side 1: Right  Location - Orientation 1: generalized  Location 1: abdomen  Pain Addressed 1: Reposition, Distraction  Pain Rating Post-Intervention 1:  (not rated)    Patients cultural, spiritual, Pentec[employer]alism conflicts given the current situation: no    Living Environment:  Patient lives with their spouse and mother in law and sister in law  in single story home, 3 steps with Bilateral hand rail, tub/shower.   Pt utilizes No AD for ambulation of all distances.    Prior to admission, patient was independent with ADLs.   DME owned: walker, rolling, grab bar.   DME not currently used: RW.   Upon discharge, patient will have assistance from family with 24/7 assist.     Objective:     Communicated with nursing prior to session.  Patient found HOB elevated with  (no active lines)  upon PT entry to room.    General Precautions: Standard, fall   Orthopedic Precautions:N/A   Braces: N/A   Oxygen Device:      Exams:  Cognitive Exam:  Patient is oriented to Person, Place, Time, and Situation  Command following: Patient follows 100% of commands   RLE ROM: WFL  RLE Strength: WFL  LLE ROM: WFL  LLE Strength: WFL  Postural Exam:  Patient presented with the following abnormalities:    -       Rounded shoulders  -       Forward head  Sensation:    -       Intact    Functional Mobility:  Bed Mobility:  Rolling Right: SBA  Scooting: SBA  Supine to Sit: SBA  Head of bed position: HOB elevated    Transfers:  Sit to Stand: CGA with HHA    Gait: Patient ambulated 25' with HHA and CGA. Patient demonstrates occasional unsteady gait, decreased step length, narrow base of support, decreased weight shift, decreased arm swing, flexed posture, and decreased orin. All lines remained intact throughout ambulation trial.  Short steps, slow velocity     Balance:   Position Score Time   Static Sitting GOOD: Takes MODERATE challenges n/a   Dynamic Sitting GOOD-: Maintains balance through  MODERATE excursions of active trunk motion, but inconstantly  n/a   Static Standing FAIR-: Maintains without assist but is inconsistent n/a   Dynamic Standing FAIR-: Maintains without assist but is inconsistent n/a       Therapeutic Activities:  Patient educated on role of acute care PT and PT POC, safety while in hospital including calling nurse for mobility, call light usage, benefits of out of bed mobility, breathing technique, fall risk, assistive device use, bed mobility , transfers, gait technique, positioning, posture, risks of prolonged bed rest, possible discharge disposition , and benefits of continued PT by explanation and demonstration.    Patient demonstrates good understanding of education provided this day.   Whiteboard updated    Therapeutic Exercises:  n/a    AM-PAC 6 CLICK MOBILITY  Total Score:18     Patient left up in chair with all lines intact, call button in reach, and RN notified.    GOALS:   Multidisciplinary Problems       Physical Therapy Goals          Problem: Physical Therapy    Goal Priority Disciplines Outcome Goal Variances Interventions   Physical Therapy Goal     PT, PT/OT Ongoing, Progressing     Description: Goals to be met by: 23    Patient will increase functional independence with mobility by performin. Supine to sit with Modified [place] using LRAD as needed  2. Sit to supine with Modified [place] using LRAD as needed  3. Sit to stand transfer with Modified [place] using LRAD as needed  4. Gait  x 100 feet with Modified [place] using LRAD as needed  5. Ascend/descend 3 stair with bilateral handrails Modified [place] using LRAD as needed  6. Lower extremity exercise program x15 reps per handout, with independence                         History:     Past Medical History:   Diagnosis Date    Anemia     Arthritis     Cirrhosis     Crohn's disease     Disorder of kidney and ureter     Stage 5    Encounter for blood transfusion     Gout      Hearing loss     Hepatitis     Had Hep C Cleared    Hypertension     Neurosarcoidosis 2018    Obesity     Osteoarthritis     Sarcoid neuropathy     Sarcoidosis, lung     Steatosis        Past Surgical History:   Procedure Laterality Date    AV FISTULA PLACEMENT Left     BRAIN SURGERY      COLON SURGERY      Exploratory lap x 4 for Chron's disease. Likely right colectomy    CSF SHUNT      JOINT REPLACEMENT Right     Hip    KIDNEY TRANSPLANT N/A 5/10/2023    Procedure: TRANSPLANT, KIDNEY - RQ 7PM START;  Surgeon: Chivo Brown MD;  Location: [facility] OR [address] [place] [address];  Service: Transplant;  Laterality: N/A;    TOTAL HIP ARTHROPLASTY Right        Time Tracking:     PT Received On: 05/13/23  PT Start Time: 0957     PT Stop Time: 1012  PT Total Time (min): 15 min     Billable Minutes: Evaluation 7 and Gait Training 8    05/13/2023

## 2023-05-13 NOTE — PLAN OF CARE
Problem: Physical Therapy  Goal: Physical Therapy Goal  Description: Goals to be met by: 23    Patient will increase functional independence with mobility by performin. Supine to sit with Modified [place] using LRAD as needed  2. Sit to supine with Modified [place] using LRAD as needed  3. Sit to stand transfer with Modified [place] using LRAD as needed  4. Gait  x 100 feet with Modified [place] using LRAD as needed  5. Ascend/descend 3 stair with bilateral handrails Modified [place] using LRAD as needed  6. Lower extremity exercise program x15 reps per handout, with independence    Outcome: Ongoing, Progressing     Pt tolerated PT session well.      All needs met, all questions answered within PT scope of practice.

## 2023-05-13 NOTE — ANESTHESIA POSTPROCEDURE EVALUATION
Anesthesia Post Evaluation    Patient: Abdoulaye Parikh    Procedure(s) Performed: Procedure(s) (LRB):  TRANSPLANT, KIDNEY - RQ 7PM START (N/A)    Final Anesthesia Type: general      Patient location during evaluation: PACU  Patient participation: Yes- Able to Participate  Level of consciousness: awake and alert and oriented  Post-procedure vital signs: reviewed and stable  Pain management: adequate  Airway patency: patent    PONV status at discharge: No PONV  Anesthetic complications: no      Cardiovascular status: hemodynamically stable  Respiratory status: unassisted, spontaneous ventilation and room air  Hydration status: euvolemic  Follow-up not needed.          Vitals Value Taken Time   /69 05/12/23 2048   Temp 36.4 °C (97.6 °F) 05/12/23 2048   Pulse 71 05/12/23 2048   Resp 18 05/12/23 2048   SpO2 95 % 05/12/23 2048         Event Time   Out of Recovery 05/11/2023 01:30:00         Pain/Marci Score: Pain Rating Prior to Med Admin: 2 (5/12/2023  8:49 PM)  Pain Rating Post Med Admin: 4 (5/11/2023  2:20 AM)  Marci Score: 9 (5/11/2023  1:30 AM)

## 2023-05-13 NOTE — SUBJECTIVE & OBJECTIVE
Subjective:   History of Present Illness:  Mr. Parikh is a 62 y.o. year old Black or  male being admitted to receive a living unrelated kidney transplant through paired exchange for ESRD secondary to unknown etiology. 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 Patient is on HD MWF via left AVF, tolerating well without hypotension. Last HD 5/9/22. Dry wt 82 kg. He denies any recent hospitalizations or ED visits. Dr Brown will be the primary surgeon. OR scheduled for 1900. Pre op labs and imaging will be reviewed prior to surgery.         Hospital Course:  Patient is now s/p living unrelated kidney transplant on 5/10/23. Post op UOP not robust. Kidney US obtained on POD#1 which showed mildly elevated RIs but good waveforms. He required HD on POD#1 for hyperkalemia.     Interval History: No acute events overnight.  Gr removed this AM,  had not yet urinated.  Pain is tolerable. UOP adequate at 850.  Patient has noted he has minimally ambulated.        Past Medical, Surgical, Family, and Social History:   Unchanged from H&P.    Scheduled Meds:   sodium chloride 0.9%   Intravenous Once    sodium chloride 0.9%   Intravenous Once    acyclovir  400 mg Oral BID    bisacodyL  10 mg Oral QHS    docusate sodium  100 mg Oral TID    famotidine  20 mg Oral QHS    fluticasone furoate  1 puff Inhalation Daily    heparin (porcine)  5,000 Units Subcutaneous Q8H    multivitamin  1 tablet Oral Daily    mupirocin  1 g Nasal BID    mycophenolate  720 mg Oral BID    [START ON 5/14/2023] predniSONE  20 mg Oral Daily    [START ON 5/14/2023] tacrolimus  3 mg Oral BID     Continuous Infusions:   glucagon (human recombinant)       PRN Meds:dextrose 10%, diphenhydrAMINE, glucagon (human recombinant), glucose, glucose, glucose, heparin (porcine), insulin aspart U-100, ondansetron, oxyCODONE, sodium chloride 0.9%, sodium  "chloride 0.9%, sodium chloride 0.9%, traMADoL    Intake/Output - Last 3 Shifts         05/11 0700  05/12 0659 05/12 0700  05/13 0659 05/13 0700  05/14 0659    P.O. 1140 1080     I.V. (mL/kg) 484.2 (6)      IV Piggyback 782.8 1000     Total Intake(mL/kg) 2407.1 (29.6) 2080 (25.6)     Urine (mL/kg/hr) 1100 (0.6) 850 (0.4) 75 (0.1)    Other 500      Stool 0 0 0    Total Output 1600 850 75    Net +807.1 +1230 -75           Stool Occurrence 0 x 0 x 0 x             Review of Systems   Constitutional:  Negative for activity change and appetite change.   Respiratory:  Negative for shortness of breath.    Gastrointestinal:  Positive for abdominal pain. Negative for abdominal distention, diarrhea and nausea.   Skin:  Positive for wound.   Allergic/Immunologic: Positive for immunocompromised state.   Neurological:  Negative for weakness.   Psychiatric/Behavioral:  Negative for agitation, behavioral problems and confusion.     Objective:     Vital Signs (Most Recent):  Temp: 97.4 °F (36.3 °C) (05/13/23 1112)  Pulse: 70 (05/13/23 1400)  Resp: 16 (05/13/23 1433)  BP: 134/67 (05/13/23 1112)  SpO2: 95 % (05/13/23 1400) Vital Signs (24h Range):  Temp:  [97.4 °F (36.3 °C)-97.9 °F (36.6 °C)] 97.4 °F (36.3 °C)  Pulse:  [64-73] 70  Resp:  [16-18] 16  SpO2:  [92 %-95 %] 95 %  BP: (119-134)/(65-78) 134/67     Weight: 81.3 kg (179 lb 3.7 oz)  Height: 5' 7" (170.2 cm)  Body mass index is 28.07 kg/m².     Physical Exam  Vitals reviewed.   Cardiovascular:      Rate and Rhythm: Normal rate.   Pulmonary:      Effort: Pulmonary effort is normal.   Abdominal:      General: There is no distension.      Tenderness: There is abdominal tenderness. There is no guarding or rebound.      Comments: Kidney incision with minimal bloody oozing     Musculoskeletal:      Right lower leg: No edema.      Left lower leg: No edema.   Neurological:      Mental Status: He is alert and oriented to person, place, and time.   Psychiatric:         Mood and Affect: Mood " normal.         Behavior: Behavior normal.         Thought Content: Thought content normal.         Judgment: Judgment normal.        Laboratory:  CBC:   Recent Labs   Lab 05/11/23  1745 05/12/23  0710 05/13/23  0744   WBC 16.47* 8.96 6.80   RBC 2.67* 2.61* 2.62*   HGB 10.0* 9.7* 9.2*   HCT 28.3* 29.4* 28.4*   * 91* 90*   * 113* 108*   MCH 37.5* 37.2* 35.1*   MCHC 35.3 33.0 32.4       BMP:   Recent Labs   Lab 05/11/23 1745 05/12/23  0710 05/13/23  0743    101 94   * 132* 131*   K 6.0* 4.5 4.3   CL 98 96 95   CO2 17* 27 23   BUN 27* 19 36*   CREATININE 7.5* 5.1* 6.0*   CALCIUM 8.3* 8.0* 8.2*         Diagnostic Results:  None

## 2023-05-13 NOTE — PLAN OF CARE
Pt aaox4. VSS on RA. Pt post kidney transplant 5/10. RLQ incision with staples CDI. Painted incision with betadine. Gr catheter with clear yellow urine, to be removed this am. Thymo #3 infused yesterday, pt tolerated well. PIV x2 intact. GRACIE fistula +/+ CDI. AC/HS accuchecks monitored. Pt and wife pulled self meds @ 100%. Pt up with x1 assistance. VS and assessments in flowsheets.

## 2023-05-13 NOTE — PLAN OF CARE
"- Pt with lower UOP.  75 this AM, 125 mL directly after Lasix 60 mg IVP & 1x unmeasured urination when having BM, though patient states subjectively "it felt like way more" than previously.  - Tacro dose adjusted by team, blue card updated  - CBG ACHS, SSI if indicated  - Keep bed low & locked, call light in reach, nonslip socks in use, calls appropriately for assistance, spouse at bedside this afternoon    Problem: Adult Inpatient Plan of Care  Goal: Plan of Care Review  Outcome: Ongoing, Progressing  Goal: Patient-Specific Goal (Individualized)  Outcome: Ongoing, Progressing  Goal: Absence of Hospital-Acquired Illness or Injury  Outcome: Ongoing, Progressing  Goal: Optimal Comfort and Wellbeing  Outcome: Ongoing, Progressing  Goal: Readiness for Transition of Care  Outcome: Ongoing, Progressing     Problem: Infection  Goal: Absence of Infection Signs and Symptoms  Outcome: Ongoing, Progressing     Problem: Adjustment to Transplant (Kidney Transplant)  Goal: Optimal Coping with Organ Transplant  Outcome: Ongoing, Progressing     Problem: Bleeding (Kidney Transplant)  Goal: Absence of Bleeding  Outcome: Ongoing, Progressing     Problem: Bowel Motility Impaired (Kidney Transplant)  Goal: Effective Bowel Elimination  Outcome: Ongoing, Progressing     Problem: Donor Organ Function (Kidney Transplant)  Goal: Effective Renal Function  Outcome: Ongoing, Progressing     Problem: Fluid and Electrolyte Imbalance (Kidney Transplant)  Goal: Fluid and Electrolyte Balance  Outcome: Ongoing, Progressing     Problem: Glycemic Control Impaired (Kidney Transplant)  Goal: Blood Glucose Level Within Targeted Range  Outcome: Ongoing, Progressing     Problem: Infection (Kidney Transplant)  Goal: Absence of Infection Signs and Symptoms  Outcome: Ongoing, Progressing     Problem: Ongoing Anesthesia Effects (Kidney Transplant)  Goal: Anesthesia/Sedation Recovery  Outcome: Ongoing, Progressing     Problem: Oral Intake Inadequate (Kidney " Transplant)  Goal: Optimal Nutrition Intake  Outcome: Ongoing, Progressing     Problem: Pain (Kidney Transplant)  Goal: Acceptable Pain Control  Outcome: Ongoing, Progressing     Problem: Respiratory Compromise (Kidney Transplant)  Goal: Effective Oxygenation and Ventilation  Outcome: Ongoing, Progressing     Problem: Fall Injury Risk  Goal: Absence of Fall and Fall-Related Injury  Outcome: Ongoing, Progressing     Problem: Device-Related Complication Risk (Hemodialysis)  Goal: Safe, Effective Therapy Delivery  Outcome: Ongoing, Progressing     Problem: Hemodynamic Instability (Hemodialysis)  Goal: Effective Tissue Perfusion  Outcome: Ongoing, Progressing     Problem: Infection (Hemodialysis)  Goal: Absence of Infection Signs and Symptoms  Outcome: Ongoing, Progressing

## 2023-05-13 NOTE — ASSESSMENT & PLAN NOTE
- CIT ~11 hours  - 2 day diaz, plan for removal 5/13  - No drain   - Kidney US overall looks fine, mildly elevated RIs, preserved waveforms   - Required HD 5/11/23 for hyperkalemia   - adquate UOP but not yet clearing.  - Will continue lasix   - Continue to monitor

## 2023-05-14 LAB
ABO + RH BLD: NORMAL
ALBUMIN SERPL BCP-MCNC: 2.7 G/DL (ref 3.5–5.2)
ANION GAP SERPL CALC-SCNC: 19 MMOL/L (ref 8–16)
BASOPHILS # BLD AUTO: 0.02 K/UL (ref 0–0.2)
BASOPHILS NFR BLD: 0.3 % (ref 0–1.9)
BLD GP AB SCN CELLS X3 SERPL QL: NORMAL
BUN SERPL-MCNC: 51 MG/DL (ref 8–23)
CALCIUM SERPL-MCNC: 8.5 MG/DL (ref 8.7–10.5)
CHLORIDE SERPL-SCNC: 94 MMOL/L (ref 95–110)
CO2 SERPL-SCNC: 18 MMOL/L (ref 23–29)
CREAT SERPL-MCNC: 6.7 MG/DL (ref 0.5–1.4)
CREAT UR-MCNC: 125 MG/DL (ref 23–375)
DIFFERENTIAL METHOD: ABNORMAL
EOSINOPHIL # BLD AUTO: 0 K/UL (ref 0–0.5)
EOSINOPHIL NFR BLD: 0.2 % (ref 0–8)
ERYTHROCYTE [DISTWIDTH] IN BLOOD BY AUTOMATED COUNT: 14.2 % (ref 11.5–14.5)
EST. GFR  (NO RACE VARIABLE): 8.7 ML/MIN/1.73 M^2
GLUCOSE SERPL-MCNC: 85 MG/DL (ref 70–110)
HCT VFR BLD AUTO: 38.6 % (ref 40–54)
HGB BLD-MCNC: 12.8 G/DL (ref 14–18)
IMM GRANULOCYTES # BLD AUTO: 0.11 K/UL (ref 0–0.04)
IMM GRANULOCYTES NFR BLD AUTO: 1.8 % (ref 0–0.5)
LYMPHOCYTES # BLD AUTO: 0.2 K/UL (ref 1–4.8)
LYMPHOCYTES NFR BLD: 3.5 % (ref 18–48)
MAGNESIUM SERPL-MCNC: 2 MG/DL (ref 1.6–2.6)
MCH RBC QN AUTO: 36.1 PG (ref 27–31)
MCHC RBC AUTO-ENTMCNC: 33.2 G/DL (ref 32–36)
MCV RBC AUTO: 109 FL (ref 82–98)
MONOCYTES # BLD AUTO: 0.1 K/UL (ref 0.3–1)
MONOCYTES NFR BLD: 2 % (ref 4–15)
NEUTROPHILS # BLD AUTO: 5.6 K/UL (ref 1.8–7.7)
NEUTROPHILS NFR BLD: 92.2 % (ref 38–73)
NRBC BLD-RTO: 0 /100 WBC
PHOSPHATE SERPL-MCNC: 4.4 MG/DL (ref 2.7–4.5)
PHOSPHATE SERPL-MCNC: 4.4 MG/DL (ref 2.7–4.5)
PLATELET # BLD AUTO: 98 K/UL (ref 150–450)
PLATELET BLD QL SMEAR: ABNORMAL
PMV BLD AUTO: 12.3 FL (ref 9.2–12.9)
POCT GLUCOSE: 103 MG/DL (ref 70–110)
POTASSIUM SERPL-SCNC: 3.3 MMOL/L (ref 3.5–5.1)
PROT UR-MCNC: 169 MG/DL (ref 0–15)
PROT/CREAT UR: 1.35 MG/G{CREAT} (ref 0–0.2)
RBC # BLD AUTO: 3.55 M/UL (ref 4.6–6.2)
SODIUM SERPL-SCNC: 131 MMOL/L (ref 136–145)
SPECIMEN OUTDATE: NORMAL
TACROLIMUS BLD-MCNC: 6.7 NG/ML (ref 5–15)
WBC # BLD AUTO: 6.03 K/UL (ref 3.9–12.7)

## 2023-05-14 PROCEDURE — 63600175 PHARM REV CODE 636 W HCPCS: Performed by: STUDENT IN AN ORGANIZED HEALTH CARE EDUCATION/TRAINING PROGRAM

## 2023-05-14 PROCEDURE — 36415 COLL VENOUS BLD VENIPUNCTURE: CPT | Performed by: TRANSPLANT SURGERY

## 2023-05-14 PROCEDURE — 25000003 PHARM REV CODE 250: Performed by: INTERNAL MEDICINE

## 2023-05-14 PROCEDURE — 63600175 PHARM REV CODE 636 W HCPCS: Performed by: TRANSPLANT SURGERY

## 2023-05-14 PROCEDURE — 83735 ASSAY OF MAGNESIUM: CPT | Performed by: STUDENT IN AN ORGANIZED HEALTH CARE EDUCATION/TRAINING PROGRAM

## 2023-05-14 PROCEDURE — 25000003 PHARM REV CODE 250: Performed by: PHYSICIAN ASSISTANT

## 2023-05-14 PROCEDURE — 20600001 HC STEP DOWN PRIVATE ROOM

## 2023-05-14 PROCEDURE — 25000003 PHARM REV CODE 250: Performed by: STUDENT IN AN ORGANIZED HEALTH CARE EDUCATION/TRAINING PROGRAM

## 2023-05-14 PROCEDURE — 80069 RENAL FUNCTION PANEL: CPT | Performed by: STUDENT IN AN ORGANIZED HEALTH CARE EDUCATION/TRAINING PROGRAM

## 2023-05-14 PROCEDURE — 86900 BLOOD TYPING SEROLOGIC ABO: CPT | Performed by: TRANSPLANT SURGERY

## 2023-05-14 PROCEDURE — 84156 ASSAY OF PROTEIN URINE: CPT | Performed by: INTERNAL MEDICINE

## 2023-05-14 PROCEDURE — 85025 COMPLETE CBC W/AUTO DIFF WBC: CPT | Performed by: STUDENT IN AN ORGANIZED HEALTH CARE EDUCATION/TRAINING PROGRAM

## 2023-05-14 PROCEDURE — 80197 ASSAY OF TACROLIMUS: CPT | Performed by: STUDENT IN AN ORGANIZED HEALTH CARE EDUCATION/TRAINING PROGRAM

## 2023-05-14 PROCEDURE — 63600175 PHARM REV CODE 636 W HCPCS: Performed by: INTERNAL MEDICINE

## 2023-05-14 PROCEDURE — 86920 COMPATIBILITY TEST SPIN: CPT | Performed by: STUDENT IN AN ORGANIZED HEALTH CARE EDUCATION/TRAINING PROGRAM

## 2023-05-14 RX ORDER — SODIUM CHLORIDE 9 MG/ML
INJECTION, SOLUTION INTRAVENOUS CONTINUOUS
Status: DISCONTINUED | OUTPATIENT
Start: 2023-05-14 | End: 2023-05-14

## 2023-05-14 RX ADMIN — SODIUM BICARBONATE: 84 INJECTION, SOLUTION INTRAVENOUS at 03:05

## 2023-05-14 RX ADMIN — SODIUM CHLORIDE: 9 INJECTION, SOLUTION INTRAVENOUS at 10:05

## 2023-05-14 RX ADMIN — TRAMADOL HYDROCHLORIDE 50 MG: 50 TABLET, COATED ORAL at 02:05

## 2023-05-14 RX ADMIN — FAMOTIDINE 20 MG: 20 TABLET, FILM COATED ORAL at 08:05

## 2023-05-14 RX ADMIN — ACYCLOVIR 400 MG: 200 CAPSULE ORAL at 08:05

## 2023-05-14 RX ADMIN — ONDANSETRON 4 MG: 2 INJECTION INTRAMUSCULAR; INTRAVENOUS at 02:05

## 2023-05-14 RX ADMIN — TACROLIMUS 3 MG: 1 CAPSULE ORAL at 08:05

## 2023-05-14 RX ADMIN — TACROLIMUS 3 MG: 1 CAPSULE ORAL at 05:05

## 2023-05-14 RX ADMIN — FLUTICASONE FUROATE 1 PUFF: 100 POWDER RESPIRATORY (INHALATION) at 08:05

## 2023-05-14 RX ADMIN — MYCOPHENOLIC ACID 720 MG: 180 TABLET, DELAYED RELEASE ORAL at 08:05

## 2023-05-14 RX ADMIN — OXYCODONE 5 MG: 5 TABLET ORAL at 05:05

## 2023-05-14 RX ADMIN — THERA TABS 1 TABLET: TAB at 08:05

## 2023-05-14 RX ADMIN — HEPARIN SODIUM 5000 UNITS: 5000 INJECTION INTRAVENOUS; SUBCUTANEOUS at 05:05

## 2023-05-14 RX ADMIN — PREDNISONE 20 MG: 20 TABLET ORAL at 08:05

## 2023-05-14 RX ADMIN — MUPIROCIN 1 G: 20 OINTMENT TOPICAL at 08:05

## 2023-05-14 RX ADMIN — HEPARIN SODIUM 5000 UNITS: 5000 INJECTION INTRAVENOUS; SUBCUTANEOUS at 08:05

## 2023-05-14 RX ADMIN — OXYCODONE 5 MG: 5 TABLET ORAL at 03:05

## 2023-05-14 NOTE — ASSESSMENT & PLAN NOTE
- CIT ~11 hours  - 2 day diaz, plan for removal 5/13  - No drain   - Required HD 5/11/23 for hyperkalemia   - UOP decreased 5/13 and Cr now uptrending.  Repeat US ordered.  - Urine protein-Cr ratio ordered.  - PVR.  - IVF restarted.

## 2023-05-14 NOTE — SUBJECTIVE & OBJECTIVE
Subjective:   History of Present Illness:  Mr. Parikh is a 62 y.o. year old Black or  male being admitted to receive a living unrelated kidney transplant through paired exchange for ESRD secondary to unknown etiology. 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 Patient is on HD MWF via left AVF, tolerating well without hypotension. Last HD 5/9/22. Dry wt 82 kg. He denies any recent hospitalizations or ED visits. Dr Brown will be the primary surgeon. OR scheduled for 1900. Pre op labs and imaging will be reviewed prior to surgery.         Hospital Course:  Patient is now s/p living unrelated kidney transplant on 5/10/23. Post op UOP not robust. Kidney US obtained on POD#1 which showed mildly elevated RIs but good waveforms. He required HD on POD#1 for hyperkalemia.     Interval History: Yesterday, developed nausea and emesis.  In the evening also developed multiple episodes of diarrhea through the night.  The AM, patient continues to complain of diarrhea.  UOP decreased. IVF restarted.  Bowel regimen discontinued.  Labs notable for significantly higher hgb (12.8 from 9.2) and increased Cr (6.7 from 6.1).  Kidney US ordered.  Repeat labs and urine protein-Cr ratio ordered.      Past Medical, Surgical, Family, and Social History:   Unchanged from H&P.    Scheduled Meds:   sodium chloride 0.9%   Intravenous Once    sodium chloride 0.9%   Intravenous Once    acyclovir  400 mg Oral BID    famotidine  20 mg Oral QHS    fluticasone furoate  1 puff Inhalation Daily    heparin (porcine)  5,000 Units Subcutaneous Q8H    multivitamin  1 tablet Oral Daily    mupirocin  1 g Nasal BID    mycophenolate  720 mg Oral BID    predniSONE  20 mg Oral Daily    tacrolimus  3 mg Oral BID     Continuous Infusions:   sodium chloride 0.9% 100 mL/hr at 05/14/23 1040    glucagon (human recombinant)       PRN Meds:dextrose  "10%, diphenhydrAMINE, glucagon (human recombinant), glucose, glucose, glucose, heparin (porcine), insulin aspart U-100, ondansetron, oxyCODONE, sodium chloride 0.9%, sodium chloride 0.9%, sodium chloride 0.9%, traMADoL    Intake/Output - Last 3 Shifts         05/12 0700  05/13 0659 05/13 0700  05/14 0659 05/14 0700  05/15 0659    P.O. 1080 1350     I.V. (mL/kg)       IV Piggyback 1000      Total Intake(mL/kg) 2080 (25.6) 1350 (16.6)     Urine (mL/kg/hr) 850 (0.4) 275 (0.1) 100 (0.3)    Other       Stool 0 0 0    Total Output 850 275 100    Net +1230 +1075 -100           Urine Occurrence  3 x     Stool Occurrence 0 x 4 x 1 x             Review of Systems   Constitutional:  Negative for activity change and appetite change.   Respiratory:  Negative for shortness of breath.    Gastrointestinal:  Positive for abdominal pain, diarrhea, nausea and vomiting. Negative for abdominal distention.   Skin:  Positive for wound.   Allergic/Immunologic: Positive for immunocompromised state.   Neurological:  Negative for weakness.   Psychiatric/Behavioral:  Negative for agitation, behavioral problems and confusion.     Objective:     Vital Signs (Most Recent):  Temp: 98.5 °F (36.9 °C) (05/14/23 0730)  Pulse: 84 (05/14/23 0841)  Resp: 16 (05/14/23 0841)  BP: 134/68 (05/14/23 0730)  SpO2: 96 % (05/14/23 0730) Vital Signs (24h Range):  Temp:  [97.4 °F (36.3 °C)-98.5 °F (36.9 °C)] 98.5 °F (36.9 °C)  Pulse:  [66-84] 84  Resp:  [16-18] 16  SpO2:  [93 %-96 %] 96 %  BP: (120-142)/(66-82) 134/68     Weight: 81.3 kg (179 lb 3.7 oz)  Height: 5' 7" (170.2 cm)  Body mass index is 28.07 kg/m².     Physical Exam  Vitals reviewed.   Cardiovascular:      Rate and Rhythm: Normal rate.   Pulmonary:      Effort: Pulmonary effort is normal.   Abdominal:      General: There is no distension.      Tenderness: There is abdominal tenderness. There is no guarding or rebound.      Comments: Kidney incision with minimal bloody oozing     Musculoskeletal:      " Right lower leg: No edema.      Left lower leg: No edema.   Neurological:      Mental Status: He is alert and oriented to person, place, and time.   Psychiatric:         Mood and Affect: Mood normal.         Behavior: Behavior normal.         Thought Content: Thought content normal.         Judgment: Judgment normal.        Laboratory:  CBC:   Recent Labs   Lab 05/12/23  0710 05/13/23  0744 05/14/23  0828   WBC 8.96 6.80 6.03   RBC 2.61* 2.62* 3.55*   HGB 9.7* 9.2* 12.8*   HCT 29.4* 28.4* 38.6*   PLT 91* 90* 98*   * 108* 109*   MCH 37.2* 35.1* 36.1*   MCHC 33.0 32.4 33.2       BMP:   Recent Labs   Lab 05/12/23  0710 05/13/23  0743 05/14/23  0828    94 85   * 131* 131*   K 4.5 4.3 3.3*   CL 96 95 94*   CO2 27 23 18*   BUN 19 36* 51*   CREATININE 5.1* 6.0* 6.7*   CALCIUM 8.0* 8.2* 8.5*         Diagnostic Results:  None

## 2023-05-14 NOTE — NURSING
Notified KTS & KTM incision with small amount of serous drainage from the right side, not noted on previous day assessment by this RN, patient also reports he vomited 3 times & had 3 BM overnight

## 2023-05-14 NOTE — PROGRESS NOTES
[facility] - Transplant Stepdown  Kidney Transplant  Progress Note      Reason for Follow-up: Reassessment of Kidney Transplant - 5/10/2023  (#1) recipient and management of immunosuppression.    ORGAN: LEFT KIDNEY    Donor Type: Living    Donor CMV Status:   Donor HBcAB:  Donor HCV Status:  Donor HBV A[facility]A:   Donor HCV [facility]:       Subjective:   History of Present Illness:  Mr. Parikh is a 62 y.o. year old Black or  male being admitted to receive a living unrelated kidney transplant through paired exchange for ESRD secondary to unknown etiology. 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 Patient is on HD MWF via left AVF, tolerating well without hypotension. Last HD 5/9/22. Dry wt 82 kg. He denies any recent hospitalizations or ED visits. Dr Brown will be the primary surgeon. OR scheduled for 1900. Pre op labs and imaging will be reviewed prior to surgery.         Hospital Course:  Patient is now s/p living unrelated kidney transplant on 5/10/23. Post op UOP not robust. Kidney US obtained on POD#1 which showed mildly elevated RIs but good waveforms. He required HD on POD#1 for hyperkalemia.     Interval History: Yesterday, developed nausea and emesis.  In the evening also developed multiple episodes of diarrhea through the night.  The AM, patient continues to complain of diarrhea.  UOP decreased. IVF restarted.  Bowel regimen discontinued.  Labs notable for significantly higher hgb (12.8 from 9.2) and increased Cr (6.7 from 6.1).  Kidney US ordered.  Repeat labs and urine protein-Cr ratio ordered.      Past Medical, Surgical, Family, and Social History:   Unchanged from H&P.    Scheduled Meds:   sodium chloride 0.9%   Intravenous Once    sodium chloride 0.9%   Intravenous Once    acyclovir  400 mg Oral BID    famotidine  20 mg Oral QHS    fluticasone furoate  1 puff Inhalation Daily    heparin  "(porcine)  5,000 Units Subcutaneous Q8H    multivitamin  1 tablet Oral Daily    mupirocin  1 g Nasal BID    mycophenolate  720 mg Oral BID    predniSONE  20 mg Oral Daily    tacrolimus  3 mg Oral BID     Continuous Infusions:   sodium chloride 0.9% 100 mL/hr at 05/14/23 1040    glucagon (human recombinant)       PRN Meds:dextrose 10%, diphenhydrAMINE, glucagon (human recombinant), glucose, glucose, glucose, heparin (porcine), insulin aspart U-100, ondansetron, oxyCODONE, sodium chloride 0.9%, sodium chloride 0.9%, sodium chloride 0.9%, traMADoL    Intake/Output - Last 3 Shifts         05/12 0700  05/13 0659 05/13 0700  05/14 0659 05/14 0700  05/15 0659    P.O. 1080 1350     I.V. (mL/kg)       IV Piggyback 1000      Total Intake(mL/kg) 2080 (25.6) 1350 (16.6)     Urine (mL/kg/hr) 850 (0.4) 275 (0.1) 100 (0.3)    Other       Stool 0 0 0    Total Output 850 275 100    Net +1230 +1075 -100           Urine Occurrence  3 x     Stool Occurrence 0 x 4 x 1 x             Review of Systems   Constitutional:  Negative for activity change and appetite change.   Respiratory:  Negative for shortness of breath.    Gastrointestinal:  Positive for abdominal pain, diarrhea, nausea and vomiting. Negative for abdominal distention.   Skin:  Positive for wound.   Allergic/Immunologic: Positive for immunocompromised state.   Neurological:  Negative for weakness.   Psychiatric/Behavioral:  Negative for agitation, behavioral problems and confusion.     Objective:     Vital Signs (Most Recent):  Temp: 98.5 °F (36.9 °C) (05/14/23 0730)  Pulse: 84 (05/14/23 0841)  Resp: 16 (05/14/23 0841)  BP: 134/68 (05/14/23 0730)  SpO2: 96 % (05/14/23 0730) Vital Signs (24h Range):  Temp:  [97.4 °F (36.3 °C)-98.5 °F (36.9 °C)] 98.5 °F (36.9 °C)  Pulse:  [66-84] 84  Resp:  [16-18] 16  SpO2:  [93 %-96 %] 96 %  BP: (120-142)/(66-82) 134/68     Weight: 81.3 kg (179 lb 3.7 oz)  Height: 5' 7" (170.2 cm)  Body mass index is 28.07 kg/m².    Physical " Exam  Vitals reviewed.   Cardiovascular:      Rate and Rhythm: Normal rate.   Pulmonary:      Effort: Pulmonary effort is normal.   Abdominal:      General: There is no distension.      Tenderness: There is abdominal tenderness. There is no guarding or rebound.      Comments: Kidney incision with minimal bloody oozing     Musculoskeletal:      Right lower leg: No edema.      Left lower leg: No edema.   Neurological:      Mental Status: He is alert and oriented to person, place, and time.   Psychiatric:         Mood and Affect: Mood normal.         Behavior: Behavior normal.         Thought Content: Thought content normal.         Judgment: Judgment normal.        Laboratory:  CBC:   Recent Labs   Lab 05/12/23  0710 05/13/23  0744 05/14/23  0828   WBC 8.96 6.80 6.03   RBC 2.61* 2.62* 3.55*   HGB 9.7* 9.2* 12.8*   HCT 29.4* 28.4* 38.6*   PLT 91* 90* 98*   * 108* 109*   MCH 37.2* 35.1* 36.1*   MCHC 33.0 32.4 33.2       BMP:   Recent Labs   Lab 05/12/23  0710 05/13/23  0743 05/14/23  0828    94 85   * 131* 131*   K 4.5 4.3 3.3*   CL 96 95 94*   CO2 27 23 18*   BUN 19 36* 51*   CREATININE 5.1* 6.0* 6.7*   CALCIUM 8.0* 8.2* 8.5*         Diagnostic Results:  None    Assessment/Plan:     * S/P kidney transplant  - CIT ~11 hours  - 2 day joe, plan for removal 5/13  - No drain   - Required HD 5/11/23 for hyperkalemia   - UOP decreased 5/13 and Cr now uptrending.  Repeat US ordered.  - Urine protein-Cr ratio ordered.  - PVR.  - IVF restarted.        Acute blood loss anemia  - Expected post operatively  - H/H stable  - Monitor with daily cbc      Anemia of renal disease  - See acute blood loss anemia      Long-term use of immunosuppressant medication  - Continue prograf. Monitor prograf level daily, monitor for toxic side effects, and adjust for therapeutic dose      Prophylactic immunotherapy  - See long term use of immunosuppressant medication          Discharge Planning:  Not medically ready for  discharge    FALLON BURDEN MD  Kidney Transplant  [facility] - Transplant Stepdown

## 2023-05-14 NOTE — PLAN OF CARE
Pt aaox4. VSS on RA. Pt post kidney transplant 5/10. RLQ incision with staples CDI. Painted incision with betadine. Gr catheter removed yesterday, pt voiding spontaneously. Pt voided once in urinal and twice in toilet, educated pt on making sure to void in urinal for accurate I/O's. Pt had 1 bm overnight. PIV x2 intact. GRACIE fistula +/+ CDI. AC/HS accuchecks monitored. Prn oxy and tramadol administered for pain. Pt had 1 episode of emesis overnight, prn zofran given. Pt up with x1 assistance. VS and assessments in flowsheets

## 2023-05-14 NOTE — PLAN OF CARE
- Pt with lower UOP.  See flowsheet for volume.  Started IV fluids.  Currently with Na Bicarb continous at 100 mL/hr  - P/K urine sent & U/S completed  - Pt with multiple BMs overnight & 2 loose/liquid stools reported so far today.  Bowel regimen d/c & abdominal x-ray complete  - Keep bed low & locked, call light in reach, nonslip socks in use, calls appropriately for assistance, spouse at bedside this afternoon    Problem: Adult Inpatient Plan of Care  Goal: Plan of Care Review  Outcome: Ongoing, Progressing  Goal: Patient-Specific Goal (Individualized)  Outcome: Ongoing, Progressing  Goal: Absence of Hospital-Acquired Illness or Injury  Outcome: Ongoing, Progressing  Goal: Optimal Comfort and Wellbeing  Outcome: Ongoing, Progressing  Goal: Readiness for Transition of Care  Outcome: Ongoing, Progressing     Problem: Infection  Goal: Absence of Infection Signs and Symptoms  Outcome: Ongoing, Progressing     Problem: Adjustment to Transplant (Kidney Transplant)  Goal: Optimal Coping with Organ Transplant  Outcome: Ongoing, Progressing     Problem: Bleeding (Kidney Transplant)  Goal: Absence of Bleeding  Outcome: Ongoing, Progressing     Problem: Bowel Motility Impaired (Kidney Transplant)  Goal: Effective Bowel Elimination  Outcome: Ongoing, Progressing     Problem: Donor Organ Function (Kidney Transplant)  Goal: Effective Renal Function  Outcome: Ongoing, Progressing     Problem: Fluid and Electrolyte Imbalance (Kidney Transplant)  Goal: Fluid and Electrolyte Balance  Outcome: Ongoing, Progressing     Problem: Infection (Kidney Transplant)  Goal: Absence of Infection Signs and Symptoms  Outcome: Ongoing, Progressing     Problem: Oral Intake Inadequate (Kidney Transplant)  Goal: Optimal Nutrition Intake  Outcome: Ongoing, Progressing     Problem: Pain (Kidney Transplant)  Goal: Acceptable Pain Control  Outcome: Ongoing, Progressing     Problem: Fall Injury Risk  Goal: Absence of Fall and Fall-Related Injury  Outcome:  Ongoing, Progressing     Problem: Device-Related Complication Risk (Hemodialysis)  Goal: Safe, Effective Therapy Delivery  Outcome: Ongoing, Progressing     Problem: Hemodynamic Instability (Hemodialysis)  Goal: Effective Tissue Perfusion  Outcome: Ongoing, Progressing     Problem: Infection (Hemodialysis)  Goal: Absence of Infection Signs and Symptoms  Outcome: Ongoing, Progressing

## 2023-05-15 ENCOUNTER — ANESTHESIA EVENT (OUTPATIENT)
Dept: SURGERY | Facility: HOSPITAL | Age: 63
DRG: 650 | End: 2023-05-15
Payer: MEDICARE

## 2023-05-15 ENCOUNTER — ANESTHESIA (OUTPATIENT)
Dept: SURGERY | Facility: HOSPITAL | Age: 63
DRG: 650 | End: 2023-05-15
Payer: MEDICARE

## 2023-05-15 LAB
ALBUMIN SERPL BCP-MCNC: 2.2 G/DL (ref 3.5–5.2)
ANION GAP SERPL CALC-SCNC: 14 MMOL/L (ref 8–16)
ANION GAP SERPL CALC-SCNC: 15 MMOL/L (ref 8–16)
BASOPHILS # BLD AUTO: 0 K/UL (ref 0–0.2)
BASOPHILS # BLD AUTO: 0 K/UL (ref 0–0.2)
BASOPHILS NFR BLD: 0 % (ref 0–1.9)
BASOPHILS NFR BLD: 0 % (ref 0–1.9)
BLD PROD TYP BPU: NORMAL
BLOOD UNIT EXPIRATION DATE: NORMAL
BLOOD UNIT TYPE CODE: 7300
BLOOD UNIT TYPE: NORMAL
BUN SERPL-MCNC: 60 MG/DL (ref 8–23)
BUN SERPL-MCNC: 63 MG/DL (ref 8–23)
CALCIUM SERPL-MCNC: 8 MG/DL (ref 8.7–10.5)
CALCIUM SERPL-MCNC: 8.4 MG/DL (ref 8.7–10.5)
CHLORIDE SERPL-SCNC: 97 MMOL/L (ref 95–110)
CHLORIDE SERPL-SCNC: 97 MMOL/L (ref 95–110)
CO2 SERPL-SCNC: 20 MMOL/L (ref 23–29)
CO2 SERPL-SCNC: 22 MMOL/L (ref 23–29)
CODING SYSTEM: NORMAL
CREAT SERPL-MCNC: 7 MG/DL (ref 0.5–1.4)
CREAT SERPL-MCNC: 7.1 MG/DL (ref 0.5–1.4)
CROSSMATCH INTERPRETATION: NORMAL
DIFFERENTIAL METHOD: ABNORMAL
DIFFERENTIAL METHOD: ABNORMAL
DISPENSE STATUS: NORMAL
EOSINOPHIL # BLD AUTO: 0 K/UL (ref 0–0.5)
EOSINOPHIL # BLD AUTO: 0 K/UL (ref 0–0.5)
EOSINOPHIL NFR BLD: 0 % (ref 0–8)
EOSINOPHIL NFR BLD: 0.2 % (ref 0–8)
ERYTHROCYTE [DISTWIDTH] IN BLOOD BY AUTOMATED COUNT: 13.9 % (ref 11.5–14.5)
ERYTHROCYTE [DISTWIDTH] IN BLOOD BY AUTOMATED COUNT: 14.1 % (ref 11.5–14.5)
EST. GFR  (NO RACE VARIABLE): 8.1 ML/MIN/1.73 M^2
EST. GFR  (NO RACE VARIABLE): 8.3 ML/MIN/1.73 M^2
GLUCOSE SERPL-MCNC: 103 MG/DL (ref 70–110)
GLUCOSE SERPL-MCNC: 89 MG/DL (ref 70–110)
HCT VFR BLD AUTO: 23.9 % (ref 40–54)
HCT VFR BLD AUTO: 26.3 % (ref 40–54)
HGB BLD-MCNC: 8.2 G/DL (ref 14–18)
HGB BLD-MCNC: 8.3 G/DL (ref 14–18)
IMM GRANULOCYTES # BLD AUTO: 0.01 K/UL (ref 0–0.04)
IMM GRANULOCYTES # BLD AUTO: 0.02 K/UL (ref 0–0.04)
IMM GRANULOCYTES NFR BLD AUTO: 0.2 % (ref 0–0.5)
IMM GRANULOCYTES NFR BLD AUTO: 0.3 % (ref 0–0.5)
LYMPHOCYTES # BLD AUTO: 0 K/UL (ref 1–4.8)
LYMPHOCYTES # BLD AUTO: 0 K/UL (ref 1–4.8)
LYMPHOCYTES NFR BLD: 0.7 % (ref 18–48)
LYMPHOCYTES NFR BLD: 0.9 % (ref 18–48)
MAGNESIUM SERPL-MCNC: 2 MG/DL (ref 1.6–2.6)
MCH RBC QN AUTO: 34.7 PG (ref 27–31)
MCH RBC QN AUTO: 35.5 PG (ref 27–31)
MCHC RBC AUTO-ENTMCNC: 31.6 G/DL (ref 32–36)
MCHC RBC AUTO-ENTMCNC: 34.3 G/DL (ref 32–36)
MCV RBC AUTO: 104 FL (ref 82–98)
MCV RBC AUTO: 110 FL (ref 82–98)
MONOCYTES # BLD AUTO: 0.1 K/UL (ref 0.3–1)
MONOCYTES # BLD AUTO: 0.2 K/UL (ref 0.3–1)
MONOCYTES NFR BLD: 2.8 % (ref 4–15)
MONOCYTES NFR BLD: 3 % (ref 4–15)
NEUTROPHILS # BLD AUTO: 4.5 K/UL (ref 1.8–7.7)
NEUTROPHILS # BLD AUTO: 5.9 K/UL (ref 1.8–7.7)
NEUTROPHILS NFR BLD: 95.9 % (ref 38–73)
NEUTROPHILS NFR BLD: 96 % (ref 38–73)
NRBC BLD-RTO: 0 /100 WBC
NRBC BLD-RTO: 0 /100 WBC
NUM UNITS TRANS PACKED RBC: NORMAL
PHOSPHATE SERPL-MCNC: 4.3 MG/DL (ref 2.7–4.5)
PLATELET # BLD AUTO: 87 K/UL (ref 150–450)
PLATELET # BLD AUTO: 95 K/UL (ref 150–450)
PMV BLD AUTO: 11.3 FL (ref 9.2–12.9)
PMV BLD AUTO: 12 FL (ref 9.2–12.9)
POCT GLUCOSE: 101 MG/DL (ref 70–110)
POCT GLUCOSE: 99 MG/DL (ref 70–110)
POTASSIUM SERPL-SCNC: 3.4 MMOL/L (ref 3.5–5.1)
POTASSIUM SERPL-SCNC: 4 MMOL/L (ref 3.5–5.1)
RBC # BLD AUTO: 2.31 M/UL (ref 4.6–6.2)
RBC # BLD AUTO: 2.39 M/UL (ref 4.6–6.2)
SODIUM SERPL-SCNC: 132 MMOL/L (ref 136–145)
SODIUM SERPL-SCNC: 133 MMOL/L (ref 136–145)
TACROLIMUS BLD-MCNC: 14 NG/ML (ref 5–15)
WBC # BLD AUTO: 4.66 K/UL (ref 3.9–12.7)
WBC # BLD AUTO: 6.09 K/UL (ref 3.9–12.7)

## 2023-05-15 PROCEDURE — 86977 RBC SERUM PRETX INCUBJ/INHIB: CPT | Mod: 59 | Performed by: TRANSPLANT SURGERY

## 2023-05-15 PROCEDURE — 35840 PR EXPLOR POSTOP BLEED,INFEC,CLOT-ABD: ICD-10-PCS | Mod: 78,,, | Performed by: TRANSPLANT SURGERY

## 2023-05-15 PROCEDURE — 86832 HLA CLASS I HIGH DEFIN QUAL: CPT | Performed by: TRANSPLANT SURGERY

## 2023-05-15 PROCEDURE — 27201423 OPTIME MED/SURG SUP & DEVICES STERILE SUPPLY: Performed by: TRANSPLANT SURGERY

## 2023-05-15 PROCEDURE — 82962 GLUCOSE BLOOD TEST: CPT | Performed by: TRANSPLANT SURGERY

## 2023-05-15 PROCEDURE — 63600175 PHARM REV CODE 636 W HCPCS: Performed by: STUDENT IN AN ORGANIZED HEALTH CARE EDUCATION/TRAINING PROGRAM

## 2023-05-15 PROCEDURE — 36000706: Performed by: TRANSPLANT SURGERY

## 2023-05-15 PROCEDURE — P9016 RBC LEUKOCYTES REDUCED: HCPCS | Performed by: STUDENT IN AN ORGANIZED HEALTH CARE EDUCATION/TRAINING PROGRAM

## 2023-05-15 PROCEDURE — 71000033 HC RECOVERY, INTIAL HOUR: Performed by: TRANSPLANT SURGERY

## 2023-05-15 PROCEDURE — D9220A PRA ANESTHESIA: ICD-10-PCS | Mod: CRNA,,, | Performed by: NURSE ANESTHETIST, CERTIFIED REGISTERED

## 2023-05-15 PROCEDURE — D9220A PRA ANESTHESIA: Mod: CRNA,,, | Performed by: NURSE ANESTHETIST, CERTIFIED REGISTERED

## 2023-05-15 PROCEDURE — 80197 ASSAY OF TACROLIMUS: CPT | Performed by: STUDENT IN AN ORGANIZED HEALTH CARE EDUCATION/TRAINING PROGRAM

## 2023-05-15 PROCEDURE — 63600175 PHARM REV CODE 636 W HCPCS

## 2023-05-15 PROCEDURE — 20600001 HC STEP DOWN PRIVATE ROOM

## 2023-05-15 PROCEDURE — 85025 COMPLETE CBC W/AUTO DIFF WBC: CPT | Performed by: PHYSICIAN ASSISTANT

## 2023-05-15 PROCEDURE — 63600175 PHARM REV CODE 636 W HCPCS: Performed by: INTERNAL MEDICINE

## 2023-05-15 PROCEDURE — D9220A PRA ANESTHESIA: Mod: ANES,,, | Performed by: STUDENT IN AN ORGANIZED HEALTH CARE EDUCATION/TRAINING PROGRAM

## 2023-05-15 PROCEDURE — 51798 US URINE CAPACITY MEASURE: CPT

## 2023-05-15 PROCEDURE — 25000003 PHARM REV CODE 250: Performed by: PHYSICIAN ASSISTANT

## 2023-05-15 PROCEDURE — 86833 HLA CLASS II HIGH DEFIN QUAL: CPT | Performed by: TRANSPLANT SURGERY

## 2023-05-15 PROCEDURE — 99233 SBSQ HOSP IP/OBS HIGH 50: CPT | Mod: ,,, | Performed by: PHYSICIAN ASSISTANT

## 2023-05-15 PROCEDURE — 83735 ASSAY OF MAGNESIUM: CPT | Performed by: STUDENT IN AN ORGANIZED HEALTH CARE EDUCATION/TRAINING PROGRAM

## 2023-05-15 PROCEDURE — 25000003 PHARM REV CODE 250: Performed by: STUDENT IN AN ORGANIZED HEALTH CARE EDUCATION/TRAINING PROGRAM

## 2023-05-15 PROCEDURE — 99233 PR SUBSEQUENT HOSPITAL CARE,LEVL III: ICD-10-PCS | Mod: ,,, | Performed by: PHYSICIAN ASSISTANT

## 2023-05-15 PROCEDURE — 80048 BASIC METABOLIC PNL TOTAL CA: CPT | Performed by: PHYSICIAN ASSISTANT

## 2023-05-15 PROCEDURE — 80069 RENAL FUNCTION PANEL: CPT | Performed by: PHYSICIAN ASSISTANT

## 2023-05-15 PROCEDURE — 37000008 HC ANESTHESIA 1ST 15 MINUTES: Performed by: TRANSPLANT SURGERY

## 2023-05-15 PROCEDURE — 63600175 PHARM REV CODE 636 W HCPCS: Performed by: TRANSPLANT SURGERY

## 2023-05-15 PROCEDURE — 37000009 HC ANESTHESIA EA ADD 15 MINS: Performed by: TRANSPLANT SURGERY

## 2023-05-15 PROCEDURE — 36000707: Performed by: TRANSPLANT SURGERY

## 2023-05-15 PROCEDURE — 25000003 PHARM REV CODE 250: Performed by: TRANSPLANT SURGERY

## 2023-05-15 PROCEDURE — 25000003 PHARM REV CODE 250: Performed by: NURSE ANESTHETIST, CERTIFIED REGISTERED

## 2023-05-15 PROCEDURE — 50205 PR BIOPSY OF KIDNEY,OPEN EXPOS: ICD-10-PCS | Mod: 78,51,LT, | Performed by: TRANSPLANT SURGERY

## 2023-05-15 PROCEDURE — 35840 EXPLORE ABDOMINAL VESSELS: CPT | Mod: 78,,, | Performed by: TRANSPLANT SURGERY

## 2023-05-15 PROCEDURE — 71000016 HC POSTOP RECOV ADDL HR: Performed by: TRANSPLANT SURGERY

## 2023-05-15 PROCEDURE — 71000015 HC POSTOP RECOV 1ST HR: Performed by: TRANSPLANT SURGERY

## 2023-05-15 PROCEDURE — 25000003 PHARM REV CODE 250: Performed by: INTERNAL MEDICINE

## 2023-05-15 PROCEDURE — 63600175 PHARM REV CODE 636 W HCPCS: Performed by: NURSE ANESTHETIST, CERTIFIED REGISTERED

## 2023-05-15 PROCEDURE — D9220A PRA ANESTHESIA: ICD-10-PCS | Mod: ANES,,, | Performed by: STUDENT IN AN ORGANIZED HEALTH CARE EDUCATION/TRAINING PROGRAM

## 2023-05-15 PROCEDURE — 80197 ASSAY OF TACROLIMUS: CPT | Mod: 91 | Performed by: PHYSICIAN ASSISTANT

## 2023-05-15 PROCEDURE — 50205 RENAL BX SURG EXPOSURE KDN: CPT | Mod: 78,51,LT, | Performed by: TRANSPLANT SURGERY

## 2023-05-15 RX ORDER — PHENYLEPHRINE HYDROCHLORIDE 10 MG/ML
INJECTION INTRAVENOUS CONTINUOUS PRN
Status: DISCONTINUED | OUTPATIENT
Start: 2023-05-15 | End: 2023-05-15

## 2023-05-15 RX ORDER — SODIUM CHLORIDE 9 MG/ML
INJECTION, SOLUTION INTRAVENOUS CONTINUOUS
Status: DISCONTINUED | OUTPATIENT
Start: 2023-05-15 | End: 2023-05-16

## 2023-05-15 RX ORDER — METHOCARBAMOL 500 MG/1
500 TABLET, FILM COATED ORAL 4 TIMES DAILY PRN
Status: DISCONTINUED | OUTPATIENT
Start: 2023-05-15 | End: 2023-05-17 | Stop reason: HOSPADM

## 2023-05-15 RX ORDER — HYDROMORPHONE HYDROCHLORIDE 1 MG/ML
INJECTION, SOLUTION INTRAMUSCULAR; INTRAVENOUS; SUBCUTANEOUS
Status: COMPLETED
Start: 2023-05-15 | End: 2023-05-15

## 2023-05-15 RX ORDER — SIMETHICONE 80 MG
1 TABLET,CHEWABLE ORAL 3 TIMES DAILY PRN
Status: DISCONTINUED | OUTPATIENT
Start: 2023-05-16 | End: 2023-05-17 | Stop reason: HOSPADM

## 2023-05-15 RX ORDER — FENTANYL CITRATE 50 UG/ML
INJECTION, SOLUTION INTRAMUSCULAR; INTRAVENOUS
Status: DISCONTINUED | OUTPATIENT
Start: 2023-05-15 | End: 2023-05-15

## 2023-05-15 RX ORDER — ROCURONIUM BROMIDE 10 MG/ML
INJECTION, SOLUTION INTRAVENOUS
Status: DISCONTINUED | OUTPATIENT
Start: 2023-05-15 | End: 2023-05-15

## 2023-05-15 RX ORDER — MIDAZOLAM HYDROCHLORIDE 1 MG/ML
INJECTION, SOLUTION INTRAMUSCULAR; INTRAVENOUS
Status: DISCONTINUED | OUTPATIENT
Start: 2023-05-15 | End: 2023-05-15

## 2023-05-15 RX ORDER — PROCHLORPERAZINE EDISYLATE 5 MG/ML
5 INJECTION INTRAMUSCULAR; INTRAVENOUS EVERY 30 MIN PRN
Status: DISCONTINUED | OUTPATIENT
Start: 2023-05-15 | End: 2023-05-17 | Stop reason: HOSPADM

## 2023-05-15 RX ORDER — BUPIVACAINE HYDROCHLORIDE 2.5 MG/ML
INJECTION, SOLUTION EPIDURAL; INFILTRATION; INTRACAUDAL
Status: DISCONTINUED | OUTPATIENT
Start: 2023-05-15 | End: 2023-05-15 | Stop reason: HOSPADM

## 2023-05-15 RX ORDER — ONDANSETRON 2 MG/ML
INJECTION INTRAMUSCULAR; INTRAVENOUS
Status: DISCONTINUED | OUTPATIENT
Start: 2023-05-15 | End: 2023-05-15

## 2023-05-15 RX ORDER — IPRATROPIUM BROMIDE AND ALBUTEROL SULFATE 2.5; .5 MG/3ML; MG/3ML
3 SOLUTION RESPIRATORY (INHALATION) EVERY 4 HOURS PRN
Status: DISCONTINUED | OUTPATIENT
Start: 2023-05-15 | End: 2023-05-17 | Stop reason: HOSPADM

## 2023-05-15 RX ORDER — PHENYLEPHRINE HYDROCHLORIDE 10 MG/ML
INJECTION INTRAVENOUS
Status: DISCONTINUED | OUTPATIENT
Start: 2023-05-15 | End: 2023-05-15

## 2023-05-15 RX ORDER — CIPROFLOXACIN 2 MG/ML
INJECTION, SOLUTION INTRAVENOUS
Status: DISCONTINUED | OUTPATIENT
Start: 2023-05-15 | End: 2023-05-15

## 2023-05-15 RX ORDER — FENTANYL CITRATE 50 UG/ML
25 INJECTION, SOLUTION INTRAMUSCULAR; INTRAVENOUS EVERY 5 MIN PRN
Status: DISCONTINUED | OUTPATIENT
Start: 2023-05-15 | End: 2023-05-16

## 2023-05-15 RX ORDER — PROPOFOL 10 MG/ML
VIAL (ML) INTRAVENOUS
Status: DISCONTINUED | OUTPATIENT
Start: 2023-05-15 | End: 2023-05-15

## 2023-05-15 RX ORDER — HYDROMORPHONE HYDROCHLORIDE 1 MG/ML
0.2 INJECTION, SOLUTION INTRAMUSCULAR; INTRAVENOUS; SUBCUTANEOUS EVERY 5 MIN PRN
Status: COMPLETED | OUTPATIENT
Start: 2023-05-15 | End: 2023-05-15

## 2023-05-15 RX ORDER — SENNOSIDES 8.6 MG/1
8.6 TABLET ORAL DAILY PRN
Status: DISCONTINUED | OUTPATIENT
Start: 2023-05-16 | End: 2023-05-17 | Stop reason: HOSPADM

## 2023-05-15 RX ORDER — LIDOCAINE HYDROCHLORIDE 20 MG/ML
INJECTION, SOLUTION EPIDURAL; INFILTRATION; INTRACAUDAL; PERINEURAL
Status: DISCONTINUED | OUTPATIENT
Start: 2023-05-15 | End: 2023-05-15

## 2023-05-15 RX ORDER — SUCCINYLCHOLINE CHLORIDE 20 MG/ML
INJECTION INTRAMUSCULAR; INTRAVENOUS
Status: DISCONTINUED | OUTPATIENT
Start: 2023-05-15 | End: 2023-05-15

## 2023-05-15 RX ADMIN — HYDROMORPHONE HYDROCHLORIDE 0.2 MG: 1 INJECTION, SOLUTION INTRAMUSCULAR; INTRAVENOUS; SUBCUTANEOUS at 05:05

## 2023-05-15 RX ADMIN — PROPOFOL 100 MG: 10 INJECTION, EMULSION INTRAVENOUS at 02:05

## 2023-05-15 RX ADMIN — TACROLIMUS 3 MG: 1 CAPSULE ORAL at 08:05

## 2023-05-15 RX ADMIN — HEPARIN SODIUM 5000 UNITS: 5000 INJECTION INTRAVENOUS; SUBCUTANEOUS at 05:05

## 2023-05-15 RX ADMIN — MUPIROCIN 1 G: 20 OINTMENT TOPICAL at 08:05

## 2023-05-15 RX ADMIN — ROCURONIUM BROMIDE 20 MG: 10 INJECTION, SOLUTION INTRAVENOUS at 02:05

## 2023-05-15 RX ADMIN — PREDNISONE 20 MG: 20 TABLET ORAL at 08:05

## 2023-05-15 RX ADMIN — PHENYLEPHRINE HYDROCHLORIDE 100 MCG: 10 INJECTION INTRAVENOUS at 02:05

## 2023-05-15 RX ADMIN — PHENYLEPHRINE HYDROCHLORIDE 0.5 MCG/KG/MIN: 10 INJECTION INTRAVENOUS at 02:05

## 2023-05-15 RX ADMIN — THERA TABS 1 TABLET: TAB at 08:05

## 2023-05-15 RX ADMIN — SUGAMMADEX 200 MG: 100 INJECTION, SOLUTION INTRAVENOUS at 03:05

## 2023-05-15 RX ADMIN — HEPARIN SODIUM 5000 UNITS: 5000 INJECTION INTRAVENOUS; SUBCUTANEOUS at 08:05

## 2023-05-15 RX ADMIN — HYDROMORPHONE HYDROCHLORIDE 0.2 MG: 1 INJECTION, SOLUTION INTRAMUSCULAR; INTRAVENOUS; SUBCUTANEOUS at 04:05

## 2023-05-15 RX ADMIN — SENNOSIDES 8.6 MG: 8.6 TABLET, FILM COATED ORAL at 11:05

## 2023-05-15 RX ADMIN — SODIUM BICARBONATE: 84 INJECTION, SOLUTION INTRAVENOUS at 02:05

## 2023-05-15 RX ADMIN — ACYCLOVIR 400 MG: 200 CAPSULE ORAL at 08:05

## 2023-05-15 RX ADMIN — FAMOTIDINE 20 MG: 20 TABLET, FILM COATED ORAL at 08:05

## 2023-05-15 RX ADMIN — MYCOPHENOLIC ACID 720 MG: 180 TABLET, DELAYED RELEASE ORAL at 08:05

## 2023-05-15 RX ADMIN — MIDAZOLAM 2 MG: 1 INJECTION INTRAMUSCULAR; INTRAVENOUS at 02:05

## 2023-05-15 RX ADMIN — ROCURONIUM BROMIDE 5 MG: 10 INJECTION, SOLUTION INTRAVENOUS at 02:05

## 2023-05-15 RX ADMIN — SUCCINYLCHOLINE 160 MG: 20 INJECTION, SOLUTION INTRAMUSCULAR; INTRAVENOUS at 02:05

## 2023-05-15 RX ADMIN — ROCURONIUM BROMIDE 10 MG: 10 INJECTION, SOLUTION INTRAVENOUS at 03:05

## 2023-05-15 RX ADMIN — SIMETHICONE 80 MG: 80 TABLET, CHEWABLE ORAL at 11:05

## 2023-05-15 RX ADMIN — LIDOCAINE HYDROCHLORIDE 100 MG: 20 INJECTION, SOLUTION EPIDURAL; INFILTRATION; INTRACAUDAL at 02:05

## 2023-05-15 RX ADMIN — OXYCODONE 5 MG: 5 TABLET ORAL at 04:05

## 2023-05-15 RX ADMIN — SODIUM CHLORIDE 1500 MG: 9 INJECTION, SOLUTION INTRAVENOUS at 02:05

## 2023-05-15 RX ADMIN — SODIUM CHLORIDE, SODIUM GLUCONATE, SODIUM ACETATE, POTASSIUM CHLORIDE, MAGNESIUM CHLORIDE, SODIUM PHOSPHATE, DIBASIC, AND POTASSIUM PHOSPHATE: .53; .5; .37; .037; .03; .012; .00082 INJECTION, SOLUTION INTRAVENOUS at 02:05

## 2023-05-15 RX ADMIN — FENTANYL CITRATE 100 MCG: 50 INJECTION, SOLUTION INTRAMUSCULAR; INTRAVENOUS at 02:05

## 2023-05-15 RX ADMIN — FLUTICASONE FUROATE 1 PUFF: 100 POWDER RESPIRATORY (INHALATION) at 08:05

## 2023-05-15 RX ADMIN — SODIUM CHLORIDE: 9 INJECTION, SOLUTION INTRAVENOUS at 11:05

## 2023-05-15 RX ADMIN — CIPROFLOXACIN 400 MG: 2 INJECTION, SOLUTION INTRAVENOUS at 02:05

## 2023-05-15 RX ADMIN — METHOCARBAMOL 500 MG: 500 TABLET ORAL at 07:05

## 2023-05-15 RX ADMIN — ONDANSETRON 4 MG: 2 INJECTION INTRAMUSCULAR; INTRAVENOUS at 03:05

## 2023-05-15 NOTE — NURSING TRANSFER
Nursing Transfer Note      5/15/2023     Reason patient is being transferred: post op    Transfer To: [id number]    Transfer via bed    Transported by transport    Medicines sent: none    Any special needs or follow-up needed: routine    Chart send with patient: Yes    Notified: spouse    Patient reassessed at: 1756      Pt. Complaining of cramping to r upper /mid-abd area. Overall pain rating 5/10 but team paged to notify them of cramping. No page back at this time. Nurse Katie on [facility] that will be taking over care has been notified.

## 2023-05-15 NOTE — PROGRESS NOTES
Phlebotomist and coordinator unable to draw labs on pt.  Notified Ron Cardoso and Dr Mary bates for HD nurse to draw from fistula.  Dialysis department called and RN stated to put supplies and tubes at bedside- done.  Pt aware as well as bedside RN

## 2023-05-15 NOTE — PHYSICIAN QUERY
PT Name: Abdoulaye Parikh  MR #: [id number]    DOCUMENTATION CLARIFICATION     CDS/: Amanda Lopez RN               Contact information: [email or web address]    This form is a permanent document in the medical record.     Query Date: May 15, 2023    Dear Provider,  By submitting this query, we are merely seeking further clarification of documentation. Please utilize your independent clinical judgment when addressing the question(s) below.    The medical record contains the following:  Supporting Clinical Findings Location in Medical Record    Procedure(s) Performed:    Back Table Preparation of left kidney with normal anatomy.     Living Kidney transplant     A subcapsular hematoma (20%) draining through  a small capsular tear was identified at the dome of the kidney. This was controlled with Surgiflow.  After hemostasis was obtained, a Lich uretero-neocystostomy was created.   Op Note 5/10       Please clarify if _subcapsular hematoma and __small capsular tear ____________   (as it relates to __ Living Kidney transplant_  is:      [  ] Complication of the procedure     [ X ] Present, but not a complication of the procedure     [  ] Incidental finding, not clinically significant     [  ] Other (please specify): __________________         Please document in your progress notes daily for the duration of treatment until resolved and include in your discharge summary.

## 2023-05-15 NOTE — PROGRESS NOTES
[facility] - [facility]plant Stepdown  Kidney Transplant  Progress Note      Reason for Follow-up: Reassessment of Kidney Transplant - 5/10/2023  (#1) recipient and management of immunosuppression.        Subjective:   History of Present Illness:  Mr. Parikh is a 62 y.o. year old Black or  male being admitted to receive a living unrelated kidney transplant through paired exchange for ESRD secondary to unknown etiology. 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 Patient is on HD MWF via left AVF, tolerating well without hypotension. Last HD 5/9/22. Dry wt 82 kg. He denies any recent hospitalizations or ED visits. Dr Brown will be the primary surgeon. OR scheduled for 1900. Pre op labs and imaging will be reviewed prior to surgery.         Hospital Course:  Patient is now s/p living unrelated kidney transplant on 5/10/23. Post op UOP not robust. Kidney US obtained on POD#1 which showed mildly elevated RIs but good waveforms. He required HD on POD#1 for hyperkalemia.     Interval History: No acute events overnight. Patient feeling fine. Diarrhea improved. Kidney US yesterday showed interval elevation of intraparenchymal arterial resistive indices and elevation of the main renal artery and main renal vein peak systolic velocities at the anastomosis. Repeat US today shows similar findings. UOP~ 600cc past 24 hours with no clearance yet. Given US findings and ongoing DGF, decision made to take patient back to OR to assess blood flow and obtain kidney biopsy. Continue to monitor.       Past Medical, Surgical, Family, and Social History:   Unchanged from H&P.    Scheduled Meds:   sodium chloride 0.9%   Intravenous Once    sodium chloride 0.9%   Intravenous Once    acyclovir  400 mg Oral BID    famotidine  20 mg Oral QHS    fluticasone furoate  1 puff Inhalation Daily    heparin (porcine)  5,000 Units  "Subcutaneous Q8H    multivitamin  1 tablet Oral Daily    mupirocin  1 g Nasal BID    mycophenolate  720 mg Oral BID    predniSONE  20 mg Oral Daily     Continuous Infusions:   sodium chloride 0.9% 75 mL/hr at 05/15/23 1121    glucagon (human recombinant)       PRN Meds:dextrose 10%, diphenhydrAMINE, glucagon (human recombinant), glucose, glucose, glucose, heparin (porcine), insulin aspart U-100, ondansetron, oxyCODONE, sodium chloride 0.9%, sodium chloride 0.9%, sodium chloride 0.9%, traMADoL    Intake/Output - Last 3 Shifts         05/13 0700  05/14 0659 05/14 0700  05/15 0659 05/15 0700  05/16 0659    P.O. 1350 1000     I.V. (mL/kg)  865.9 (10.7) 1295.1 (15.9)    IV Piggyback       Total Intake(mL/kg) 1350 (16.6) 1865.9 (23) 1295.1 (15.9)    Urine (mL/kg/hr) 275 (0.1) 640 (0.3) 150 (0.3)    Stool 0 0 0    Total Output 275 640 150    Net +1075 +1225.9 +1145.1           Urine Occurrence 3 x      Stool Occurrence 4 x 2 x 1 x             Review of Systems   Constitutional:  Negative for activity change and appetite change.   Respiratory:  Negative for shortness of breath.    Gastrointestinal:  Positive for abdominal pain. Negative for abdominal distention, diarrhea and nausea.   Skin:  Positive for wound.   Allergic/Immunologic: Positive for immunocompromised state.   Neurological:  Negative for weakness.   Psychiatric/Behavioral:  Negative for agitation, behavioral problems and confusion.     Objective:     Vital Signs (Most Recent):  Temp: 97.5 °F (36.4 °C) (05/15/23 0726)  Pulse: 84 (05/15/23 0842)  Resp: 16 (05/15/23 0842)  BP: (!) 112/59 (05/15/23 0726)  SpO2: (!) 92 % (05/15/23 0726) Vital Signs (24h Range):  Temp:  [97.5 °F (36.4 °C)-98 °F (36.7 °C)] 97.5 °F (36.4 °C)  Pulse:  [68-84] 84  Resp:  [16-18] 16  SpO2:  [92 %-94 %] 92 %  BP: (112-135)/(57-67) 112/59     Weight: 81.3 kg (179 lb 3.7 oz)  Height: 5' 7" (170.2 cm)  Body mass index is 28.07 kg/m².    Physical Exam  Vitals reviewed.   Cardiovascular: "      Rate and Rhythm: Normal rate.   Pulmonary:      Effort: Pulmonary effort is normal.   Abdominal:      General: There is no distension.      Tenderness: There is abdominal tenderness. There is no guarding or rebound.      Comments: Kidney incision clean, dry, intact with staples in place     Musculoskeletal:      Right lower leg: No edema.      Left lower leg: No edema.   Neurological:      Mental Status: He is alert and oriented to person, place, and time.   Psychiatric:         Mood and Affect: Mood normal.         Behavior: Behavior normal.         Thought Content: Thought content normal.         Judgment: Judgment normal.        Laboratory:  CBC:   Recent Labs   Lab 05/13/23  0744 05/14/23  0828 05/15/23  0811   WBC 6.80 6.03 4.66   RBC 2.62* 3.55* 2.39*   HGB 9.2* 12.8* 8.3*   HCT 28.4* 38.6* 26.3*   PLT 90* 98* 87*   * 109* 110*   MCH 35.1* 36.1* 34.7*   MCHC 32.4 33.2 31.6*       BMP:   Recent Labs   Lab 05/13/23  0743 05/14/23  0828 05/15/23  0811   GLU 94 85 89   * 131* 133*   K 4.3 3.3* 4.0   CL 95 94* 97   CO2 23 18* 22*   BUN 36* 51* 60*   CREATININE 6.0* 6.7* 7.1*   CALCIUM 8.2* 8.5* 8.0*         Diagnostic Results:  None    Assessment/Plan:     * S/P kidney transplant  - CIT ~11 hours  - 2 day diaz, removed 5/13, no issues voiding following removal  - No drain   - Required HD 5/11/23 for hyperkalemia   - Kidney US 5/14 showed interval elevation of intraparenchymal arterial resistive indices and elevation of the main renal artery and main renal vein peak systolic velocities at the anastomosis.  - Repeat US today shows similar findings. UOP~ 600cc past 24 hours with no clearance yet.  - Given US findings and ongoing DGF, decision made to take patient back to OR to assess blood flow and obtain kidney biopsy        Acute blood loss anemia  - Expected post operatively  - H/H stable  - Monitor with daily cbc      Anemia of renal disease  - See acute blood loss anemia      Long-term use of  immunosuppressant medication  - Continue prograf. Monitor prograf level daily, monitor for toxic side effects, and adjust for therapeutic dose      Prophylactic immunotherapy  - See long term use of immunosuppressant medication          Discharge Planning: Not a candidate for discharge at this time       Sruthi Cardoso PAOrquideaC  Kidney Transplant  [facility] - Transplant Stepdown

## 2023-05-15 NOTE — PLAN OF CARE
Pt aaox4. VSS on RA. Pt post kidney transplant 5/10. RLQ incision with staples with scant serous drainage, gauze dressing in place. Changed prn. Sodium bicarb infusing @ 100 cc/hr. Pt had loose BM's yesterday, none reported overnight. No c/o of nausea/vomiting overnight. PIV x2 intact. GRACIE fistula +/+ CDI. AC/HS accuchecks monitored. Pt up with x1 assistance. VS and assessments in flowsheets

## 2023-05-15 NOTE — ASSESSMENT & PLAN NOTE
- CIT ~11 hours  - 2 day diaz, removed 5/13, no issues voiding following removal  - No drain   - Required HD 5/11/23 for hyperkalemia   - Kidney US 5/14 showed interval elevation of intraparenchymal arterial resistive indices and elevation of the main renal artery and main renal vein peak systolic velocities at the anastomosis.  - Repeat US today shows similar findings. UOP~ 600cc past 24 hours with no clearance yet.  - Given US findings and ongoing DGF, decision made to take patient back to OR to assess blood flow and obtain kidney biopsy

## 2023-05-15 NOTE — PLAN OF CARE
- Pt with continued low UOP.  Sent for STAT US transplant kidney this AM, then to OR for Class A this afternoon.  Currently off unit in OR/PACU.  Spouse [place] [id number], states MD has updated her    Problem: Adult Inpatient Plan of Care  Goal: Plan of Care Review  Outcome: Ongoing, Progressing  Goal: Patient-Specific Goal (Individualized)  Outcome: Ongoing, Progressing  Goal: Absence of Hospital-Acquired Illness or Injury  Outcome: Ongoing, Progressing  Goal: Optimal Comfort and Wellbeing  Outcome: Ongoing, Progressing  Goal: Readiness for Transition of Care  Outcome: Ongoing, Progressing     Problem: Infection  Goal: Absence of Infection Signs and Symptoms  Outcome: Ongoing, Progressing     Problem: Adjustment to Transplant (Kidney Transplant)  Goal: Optimal Coping with Organ Transplant  Outcome: Ongoing, Progressing     Problem: Bleeding (Kidney Transplant)  Goal: Absence of Bleeding  Outcome: Ongoing, Progressing     Problem: Bowel Motility Impaired (Kidney Transplant)  Goal: Effective Bowel Elimination  Outcome: Ongoing, Progressing     Problem: Donor Organ Function (Kidney Transplant)  Goal: Effective Renal Function  Outcome: Ongoing, Progressing     Problem: Fluid and Electrolyte Imbalance (Kidney Transplant)  Goal: Fluid and Electrolyte Balance  Outcome: Ongoing, Progressing     Problem: Infection (Kidney Transplant)  Goal: Absence of Infection Signs and Symptoms  Outcome: Ongoing, Progressing     Problem: Oral Intake Inadequate (Kidney Transplant)  Goal: Optimal Nutrition Intake  Outcome: Ongoing, Progressing     Problem: Pain (Kidney Transplant)  Goal: Acceptable Pain Control  Outcome: Ongoing, Progressing     Problem: Fall Injury Risk  Goal: Absence of Fall and Fall-Related Injury  Outcome: Ongoing, Progressing     Problem: Device-Related Complication Risk (Hemodialysis)  Goal: Safe, Effective Therapy Delivery  Outcome: Ongoing, Progressing     Problem: Hemodynamic Instability (Hemodialysis)  Goal: Effective  Tissue Perfusion  Outcome: Ongoing, Progressing     Problem: Infection (Hemodialysis)  Goal: Absence of Infection Signs and Symptoms  Outcome: Ongoing, Progressing     Problem: Postoperative Nausea and Vomiting (Kidney Transplant)  Goal: Nausea and Vomiting Relief  Outcome: Ongoing, Progressing     Problem: Respiratory Compromise (Kidney Transplant)  Goal: Effective Oxygenation and Ventilation  Outcome: Ongoing, Progressing

## 2023-05-15 NOTE — ANESTHESIA PREPROCEDURE EVALUATION
[facility]  Anesthesia Pre-Operative Evaluation         Patient Name: Abdoulaye Parikh  YOB: 1960  MRN: [id number]    SUBJECTIVE:     05/15/2023    Procedure(s) (LRB):  LAPAROTOMY, EXPLORATORY (N/A)    Abdoulaye Parikh is a 62 y.o. male here for Procedure(s) (LRB):  LAPAROTOMY, EXPLORATORY (N/A)    Drips:    sodium chloride 0.9% 75 mL/hr at 05/15/23 1121    glucagon (human recombinant)         Patient Active Problem List   Diagnosis    S/P kidney transplant    At risk for opportunistic infections    Prophylactic immunotherapy    Long-term use of immunosuppressant medication    Anemia of renal disease    Acute blood loss anemia       Review of patient's allergies indicates:   Allergen Reactions    Bactrim [sulfamethoxazole-trimethoprim] Shortness Of Breath and Itching    Penicillins Shortness Of Breath and Itching       No current facility-administered medications on file prior to encounter.     Current Outpatient Medications on File Prior to Encounter   Medication Sig Dispense Refill    allopurinoL (ZYLOPRIM) 300 MG tablet Take 300 mg by mouth once daily.      fluticasone furoate (ARNUITY ELLIPTA) 100 mcg/actuation inhaler Inhale 100 mcg into the lungs once daily. Controller         Past Surgical History:   Procedure Laterality Date    AV FISTULA PLACEMENT Left     BRAIN SURGERY      COLON SURGERY      Exploratory lap x 4 for Chron's disease. Likely right colectomy    CSF SHUNT      JOINT REPLACEMENT Right     Hip    KIDNEY TRANSPLANT N/A 5/10/2023    Procedure: TRANSPLANT, KIDNEY - RQ 7PM START;  Surgeon: Chivo Brown MD;  Location: [address];  Service: Transplant;  Laterality: N/A;    TOTAL HIP ARTHROPLASTY Right        Social History     Socioeconomic History    Marital status:      Spouse name: Kalani Parikh    Number of children: 2   Tobacco Use    Smoking status: Never    Smokeless tobacco: Never   Substance and Sexual Activity    Alcohol  use: Not Currently    Drug use: Never    Sexual activity: Yes     Partners: Female   Social History Narrative    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         OBJECTIVE:     Vital Signs Range (Last 24H):  Temp:  [36.4 °C (97.5 °F)-36.7 °C (98 °F)] 36.4 °C (97.5 °F)  Pulse:  [68-84] 69  Resp:  [16-18] 16  SpO2:  [92 %-94 %] 92 %  BP: (112-166)/(57-76) 166/76    Significant Labs:  Lab Results   Component Value Date    WBC 4.66 05/15/2023    HGB 8.3 (L) 05/15/2023    HCT 26.3 (L) 05/15/2023    PLT 87 (L) 05/15/2023    CHOL 145 04/25/2023    TRIG 115 04/25/2023    HDL 47 04/25/2023    ALT 25 05/11/2023    AST 28 05/11/2023     (L) 05/15/2023    K 4.0 05/15/2023    CL 97 05/15/2023    CREATININE 7.1 (H) 05/15/2023    BUN 60 (H) 05/15/2023    CO2 22 (L) 05/15/2023    PSA 2.0 04/18/2023    INR 1.0 05/10/2023    HGBA1C 4.4 11/12/2020       Diagnostic Studies:    EKG:   Results for orders placed or performed during the hospital encounter of 04/25/23   EKG 12-lead    Collection Time: 04/25/23 10:39 AM    Narrative    Test Reason : Z76.82,    Vent. Rate : 065 BPM     Atrial Rate : 065 BPM     P-R Int : 144 ms          QRS Dur : 092 ms      QT Int : 476 ms       P-R-T Axes : 065 048 038 degrees     QTc Int : 495 ms    Normal sinus rhythm  Nonspecific ST abnormality  Prolonged QT  Abnormal ECG  When compared with ECG of 03-DEC-2020 09:19,    QT has lengthened  Confirmed by NICHOLE QUINONES MD ([id number]) on 4/25/2023 11:19:23 AM    Referred By: BRE RAWLS           Confirmed By:NICHOLE QUINONES MD       2D ECHO:  TTE:  No results found for this or any previous visit.  Results for orders placed or performed during the hospital encounter of 04/17/23   Echo Saline Bubble? No   Result Value Ref Range    TDI SEPTAL 0.07 m/s    LV LATERAL E/E' RATIO 5.08 m/s    LV SEPTAL E/E' RATIO 8.71 m/s    LA WIDTH 4.00 cm    IVC diameter 1.40 cm    Left Ventricular Outflow Tract Mean Velocity 0.56 cm/s    Left Ventricular Outflow Tract Mean Gradient 1.42 mmHg    TDI  LATERAL 0.12 m/s    PV PEAK VELOCITY 0.89 cm/s    LVIDd 4.26 3.5 - 6.0 cm    IVS 1.01 0.6 - 1.1 cm    Posterior Wall 1.05 0.6 - 1.1 cm    LVIDs 2.80 2.1 - 4.0 cm    FS 34 28 - 44 %    LA volume 77.77 cm3    Sinus 3.16 cm    STJ 2.62 cm    Ascending aorta 3.36 cm    LV mass 146.31 g    LA size 4.45 cm    RVDD 3.82 cm    TAPSE 2.16 cm    RV S' 0.01 cm/s    Left Ventricle Relative Wall Thickness 0.49 cm    AV mean gradient 4 mmHg    AV valve area 2.98 cm2    AV Velocity Ratio 0.63     AV index (prosthetic) 0.72     MV valve area p 1/2 method 3.67 cm2    E/A ratio 1.22     Mean e' 0.10 m/s    E wave deceleration time 206.97 msec    IVRT 110.37 msec    Pulm vein S/D ratio 1.48     LVOT diameter 2.30 cm    LVOT area 4.2 cm2    LVOT peak aníbal 0.82 m/s    LVOT peak VTI 18.40 cm    Ao peak aníbal 1.30 m/s    Ao VTI 25.6 cm    LVOT stroke volume 76.41 cm3    AV peak gradient 7 mmHg    TV peak gradient 1.90 mmHg    E/E' ratio 6.42 m/s    MV Peak E Aníbal 0.61 m/s    TR Max Aníbal 2.50 m/s    MV stenosis pressure 1/2 time 60.02 ms    MV Peak A Aníbal 0.50 m/s    PV Peak S Aníbal 0.62 m/s    PV Peak D Aníbal 0.42 m/s    LV Systolic Volume 29.59 mL    LV Diastolic Volume 81.24 mL    RA Major Axis 4.75 cm    Left Atrium Minor Axis 5.12 cm    Left Atrium Major Axis 5.16 cm    Triscuspid Valve Regurgitation Peak Gradient 25 mmHg    Right Atrial Pressure (from IVC) 3 mmHg    EF 60 %    TV rest pulmonary artery pressure 28 mmHg    Narrative    · The left ventricle is normal in size with concentric hypertrophy and   normal systolic function.  · The estimated ejection fraction is 60%.  · Normal left ventricular diastolic function.  · Normal right ventricular size with normal right ventricular systolic   function.  · Mild left atrial enlargement.  · Normal central venous pressure (3 mmHg).  · The estimated PA systolic pressure is 28 mmHg.              Pre-op Assessment    I have reviewed the Patient Summary Reports.     I have reviewed the Nursing Notes. I  have reviewed the NPO Status.   I have reviewed the Medications.     Review of Systems  Anesthesia Hx:  No problems with previous Anesthesia  History of prior surgery of interest to airway management or planning:  Denies Personal Hx of Anesthesia complications.   Social:  Non-Smoker, No Alcohol Use    Cardiovascular:   Hypertension    Pulmonary:   Sarcoidosis   Denies breathing difficulties    Renal/:   Chronic Renal Disease, ESRD S/p living unrelated kidney TXP on 5/10/2023.   Last dialysis 5/11/2023    Now, decision made to take patient back to OR to assess blood flow and obtain kidney biopsy   Hepatic/GI:   Liver Disease, Hepatitis (s/p Harvoni), C    Musculoskeletal:   Arthritis     Neurological:   Neuromuscular Disease, Neuro-sarcoidosis    Endocrine:   Denies Diabetes. Denies Hypothyroidism. Denies Hyperthyroidism.        Physical Exam  General: Well nourished, Cooperative, Alert and Oriented  Left upper arm AV fistula   Airway:  Mallampati: III / II  Mouth Opening: Normal  TM Distance: Normal  Tongue: Normal  Neck ROM: Normal ROM    Chest/Lungs:  Clear to auscultation, Normal Respiratory Rate    Heart:  Rate: Normal  Rhythm: Regular Rhythm        Anesthesia Plan  Type of Anesthesia, risks & benefits discussed:    Anesthesia Type: Gen ETT  Intra-op Monitoring Plan: Standard ASA Monitors  Post Op Pain Control Plan: multimodal analgesia and IV/PO Opioids PRN  Induction:  IV and rapid sequence  Airway Plan: Video, Post-Induction  Informed Consent: Informed consent signed with the Patient and all parties understand the risks and agree with anesthesia plan.  All questions answered.   ASA Score: 3  Day of Surgery Review of History & Physical: H&P Update referred to the surgeon/provider.  Anesthesia Plan Notes:   Last ate bite of eggs this morning before being told to be NPO.     Ready For Surgery From Anesthesia Perspective.     .

## 2023-05-15 NOTE — ANESTHESIA PROCEDURE NOTES
Intubation    Date/Time: 5/15/2023 2:34 PM  Performed by: Flavia Suarez CRNA  Authorized by: Josué Wills MD     Intubation:     Induction:  Intravenous    Intubated:  Postinduction    Mask Ventilation:  Not attempted    Attempts:  1    Attempted By:  CRNA    Method of Intubation:  Video laryngoscopy    Blade:  Trivedi 3    Laryngeal View Grade: Grade I - full view of cords      Difficult Airway Encountered?: No      Complications:  None    Airway Device:  Oral endotracheal tube    Airway Device Size:  7.5    Style/Cuff Inflation:  Cuffed    Inflation Amount (mL):  8    Tube secured:  22    Secured at:  The lips    Placement Verified By:  Capnometry    Complicating Factors:  None    Findings Post-Intubation:  BS equal bilateral and atraumatic/condition of teeth unchanged

## 2023-05-15 NOTE — TRANSFER OF CARE
"Anesthesia Transfer of Care Note    Patient: Abdoulaye Parikh    Procedure(s) Performed: Procedure(s) (LRB):  ULTRASOUND, DIAGNOSTIC (N/A)  BIOPSY, KIDNEY  EXPLORATION, KIDNEY    Patient location: PACU    Anesthesia Type: general    Transport from OR: Transported from OR on 6-10 L/min O2 by face mask with adequate spontaneous ventilation    Post pain: adequate analgesia    Post assessment: no apparent anesthetic complications    Post vital signs: stable    Level of consciousness: awake    Nausea/Vomiting: no nausea/vomiting    Complications: none    Transfer of care protocol was followed      Last vitals:   Visit Vitals  BP (!) 166/76   Pulse 69   Temp 36.4 °C (97.5 °F)   Resp 16   Ht 5' 7" (1.702 m)   Wt 81.3 kg (179 lb 3.7 oz)   SpO2 (!) 92%   BMI 28.07 kg/m²     "

## 2023-05-15 NOTE — SUBJECTIVE & OBJECTIVE
Subjective:   History of Present Illness:  Mr. Parikh is a 62 y.o. year old Black or  male being admitted to receive a living unrelated kidney transplant through paired exchange for ESRD secondary to unknown etiology. 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 Patient is on HD MWF via left AVF, tolerating well without hypotension. Last HD 5/9/22. Dry wt 82 kg. He denies any recent hospitalizations or ED visits. Dr Brown will be the primary surgeon. OR scheduled for 1900. Pre op labs and imaging will be reviewed prior to surgery.         Hospital Course:  Patient is now s/p living unrelated kidney transplant on 5/10/23. Post op UOP not robust. Kidney US obtained on POD#1 which showed mildly elevated RIs but good waveforms. He required HD on POD#1 for hyperkalemia.     Interval History: No acute events overnight. Patient feeling fine. Diarrhea improved. Kidney US yesterday showed interval elevation of intraparenchymal arterial resistive indices and elevation of the main renal artery and main renal vein peak systolic velocities at the anastomosis. Repeat US today shows similar findings. UOP~ 600cc past 24 hours with no clearance yet. Given US findings and ongoing DGF, decision made to take patient back to OR to assess blood flow and obtain kidney biopsy. Continue to monitor.       Past Medical, Surgical, Family, and Social History:   Unchanged from H&P.    Scheduled Meds:   sodium chloride 0.9%   Intravenous Once    sodium chloride 0.9%   Intravenous Once    acyclovir  400 mg Oral BID    famotidine  20 mg Oral QHS    fluticasone furoate  1 puff Inhalation Daily    heparin (porcine)  5,000 Units Subcutaneous Q8H    multivitamin  1 tablet Oral Daily    mupirocin  1 g Nasal BID    mycophenolate  720 mg Oral BID    predniSONE  20 mg Oral Daily     Continuous Infusions:   sodium chloride 0.9% 75  "mL/hr at 05/15/23 1121    glucagon (human recombinant)       PRN Meds:dextrose 10%, diphenhydrAMINE, glucagon (human recombinant), glucose, glucose, glucose, heparin (porcine), insulin aspart U-100, ondansetron, oxyCODONE, sodium chloride 0.9%, sodium chloride 0.9%, sodium chloride 0.9%, traMADoL    Intake/Output - Last 3 Shifts         05/13 0700  05/14 0659 05/14 0700  05/15 0659 05/15 0700  05/16 0659    P.O. 1350 1000     I.V. (mL/kg)  865.9 (10.7) 1295.1 (15.9)    IV Piggyback       Total Intake(mL/kg) 1350 (16.6) 1865.9 (23) 1295.1 (15.9)    Urine (mL/kg/hr) 275 (0.1) 640 (0.3) 150 (0.3)    Stool 0 0 0    Total Output 275 640 150    Net +1075 +1225.9 +1145.1           Urine Occurrence 3 x      Stool Occurrence 4 x 2 x 1 x             Review of Systems   Constitutional:  Negative for activity change and appetite change.   Respiratory:  Negative for shortness of breath.    Gastrointestinal:  Positive for abdominal pain. Negative for abdominal distention, diarrhea and nausea.   Skin:  Positive for wound.   Allergic/Immunologic: Positive for immunocompromised state.   Neurological:  Negative for weakness.   Psychiatric/Behavioral:  Negative for agitation, behavioral problems and confusion.     Objective:     Vital Signs (Most Recent):  Temp: 97.5 °F (36.4 °C) (05/15/23 0726)  Pulse: 84 (05/15/23 0842)  Resp: 16 (05/15/23 0842)  BP: (!) 112/59 (05/15/23 0726)  SpO2: (!) 92 % (05/15/23 0726) Vital Signs (24h Range):  Temp:  [97.5 °F (36.4 °C)-98 °F (36.7 °C)] 97.5 °F (36.4 °C)  Pulse:  [68-84] 84  Resp:  [16-18] 16  SpO2:  [92 %-94 %] 92 %  BP: (112-135)/(57-67) 112/59     Weight: 81.3 kg (179 lb 3.7 oz)  Height: 5' 7" (170.2 cm)  Body mass index is 28.07 kg/m².     Physical Exam  Vitals reviewed.   Cardiovascular:      Rate and Rhythm: Normal rate.   Pulmonary:      Effort: Pulmonary effort is normal.   Abdominal:      General: There is no distension.      Tenderness: There is abdominal tenderness. There is no " guarding or rebound.      Comments: Kidney incision clean, dry, intact with staples in place     Musculoskeletal:      Right lower leg: No edema.      Left lower leg: No edema.   Neurological:      Mental Status: He is alert and oriented to person, place, and time.   Psychiatric:         Mood and Affect: Mood normal.         Behavior: Behavior normal.         Thought Content: Thought content normal.         Judgment: Judgment normal.        Laboratory:  CBC:   Recent Labs   Lab 05/13/23  0744 05/14/23  0828 05/15/23  0811   WBC 6.80 6.03 4.66   RBC 2.62* 3.55* 2.39*   HGB 9.2* 12.8* 8.3*   HCT 28.4* 38.6* 26.3*   PLT 90* 98* 87*   * 109* 110*   MCH 35.1* 36.1* 34.7*   MCHC 32.4 33.2 31.6*       BMP:   Recent Labs   Lab 05/13/23  0743 05/14/23  0828 05/15/23  0811   GLU 94 85 89   * 131* 133*   K 4.3 3.3* 4.0   CL 95 94* 97   CO2 23 18* 22*   BUN 36* 51* 60*   CREATININE 6.0* 6.7* 7.1*   CALCIUM 8.2* 8.5* 8.0*         Diagnostic Results:  None

## 2023-05-15 NOTE — PROGRESS NOTES
"[facility] - Transplant Stepdown  Adult Nutrition  Progress Note    SUMMARY       Recommendations    1.When medically able, resume Renal diet       2. If PO intake <50%, add Novasource renal TID       3. RD to monitor and follow    Goals: Meet % EEN, EPN by RD f/u  Nutrition Goal Status: progressing towards goal  Communication of RD Recs:  (POC)    Assessment and Plan    Nutrition Problem  Increased nutrient needs (protein, energy)     Related to (etiology):   Increased physiological demands     Signs and Symptoms (as evidenced by):   S/p kidney transplant (5/10)     Interventions/Recommendations (treatment strategy):  Collaboration with other providers  Nutrition education     Nutrition Diagnosis Status:   Continue     Reason for Assessment    Reason For Assessment: RD follow-up  Diagnosis:  (s/p kidney transplant)  Interdisciplinary Rounds: did not attend    General Information Comments:   5/15: NPO today for US. Pt denies N/V at this time. Had loose BM yesterday per chart. Reiterated post tx nutrition education, pt and family with no additional question at this time.  5/12: S/p kidney transplant (5/10). Pt tolerating diet. Reports good PO intake PTA. Follows renal diet at home. Denies N/V, chewing/swallowing difficulties. Stated  lb. Wt stable per chart. Pt appears nourished, no indicator of malnutrition. Educated pt and family on food safety and food-drug interaction. Pt and family verbalized understanding.    Nutrition Discharge Planning: Post transplant nutrition education provided on 5/12. Food safety/drug interactions emphasized. General healthy/low salt diet recommended. Education material left at bedside. No other needs identified.    Nutrition Risk Screen    Nutrition Risk Screen: no indicators present    Nutrition/Diet History    Spiritual, Cultural Beliefs, Church Practices, Values that Affect Care: no    Anthropometrics    Temp: 97.5 °F (36.4 °C)  Height: 5' 7" (170.2 cm)  Height " (inches): 67 in  Weight Method: Standard Scale  Weight: 81.3 kg (179 lb 3.7 oz)  Weight (lb): 179.24 lb  Ideal Body Weight (IBW), Male: 148 lb  % Ideal Body Weight, Male (lb): 121.11 %  BMI (Calculated): 28.1     Lab/Procedures/Meds    Pertinent Labs Reviewed: reviewed  Pertinent Labs Comments: Na 133, BUN 60, Cr 7.1, albumin 2.2, eGFR 8.1  Pertinent Medications Reviewed: reviewed  Pertinent Medications Comments: prednisone, mycophenolate, MV, heparin, famotidine, prednisone, tacrolimue    Estimated/Assessed Needs    Weight Used For Calorie Calculations: 81.2 kg (179 lb)  Energy Calorie Requirements (kcal): 1968 kcal  Energy Need Method: Saunders-St Jeor (PAL 1.25)  Protein Requirements: 81g (1g/kg)  Weight Used For Protein Calculations: 81.2 kg (179 lb)  Fluid Requirements (mL): 1ml/kcal or per MD  Estimated Fluid Requirement Method: RDA Method  RDA Method (mL): 1968     Nutrition Prescription Ordered    Current Diet Order: NPO    Evaluation of Received Nutrient/Fluid Intake    I/O: + 9L since admit  Energy Calories Required: not meeting needs  Protein Required: not meeting needs  Comments: LBM 5/14  Tolerance: tolerating    Nutrition Risk    Level of Risk/Frequency of Follow-up:  (1x/week)     Monitor and Evaluation    Food and Nutrient Intake: energy intake, food and beverage intake  Food and Nutrient Adminstration: diet order  Physical Activity and Function: nutrition-related ADLs and IADLs  Anthropometric Measurements: height/length, weight, weight change, body mass index  Biochemical Data, Medical Tests and Procedures: gastrointestinal profile, glucose/endocrine profile, electrolyte and renal panel, inflammatory profile, lipid profile  Nutrition-Focused Physical Findings: overall appearance     Nutrition Follow-Up    RD Follow-up?: Yes

## 2023-05-15 NOTE — ANESTHESIA POSTPROCEDURE EVALUATION
Anesthesia Post Evaluation    Patient: Abdoulaye Parikh    Procedure(s) Performed: Procedure(s) (LRB):  ULTRASOUND, DIAGNOSTIC (N/A)  BIOPSY, KIDNEY  EXPLORATION, KIDNEY    Final Anesthesia Type: general      Patient location during evaluation: PACU  Patient participation: Yes- Able to Participate  Level of consciousness: awake  Post-procedure vital signs: reviewed and stable  Pain management: adequate  Airway patency: patent    PONV status at discharge: No PONV  Anesthetic complications: no      Cardiovascular status: blood pressure returned to baseline  Respiratory status: unassisted  Hydration status: euvolemic  Follow-up not needed.          Vitals Value Taken Time   /70 05/15/23 1731   Temp 36.5 °C (97.7 °F) 05/15/23 1615   Pulse 77 05/15/23 1732   Resp 17 05/15/23 1732   SpO2 97 % 05/15/23 1732   Vitals shown include unvalidated device data.      Event Time   Out of Recovery 05/15/2023 16:45:00         Pain/Marci Score: Pain Rating Prior to Med Admin: 7 (5/15/2023  5:32 PM)  Pain Rating Post Med Admin: 4 (5/14/2023  3:13 AM)  Marci Score: 9 (5/15/2023  5:00 PM)

## 2023-05-16 ENCOUNTER — TELEPHONE (OUTPATIENT)
Dept: PHARMACY | Facility: CLINIC | Age: 63
End: 2023-05-16
Payer: MEDICARE

## 2023-05-16 PROBLEM — K59.00 CONSTIPATION: Status: ACTIVE | Noted: 2023-05-16

## 2023-05-16 LAB
ALBUMIN SERPL BCP-MCNC: 2.3 G/DL (ref 3.5–5.2)
ANION GAP SERPL CALC-SCNC: 13 MMOL/L (ref 8–16)
BASOPHILS # BLD AUTO: 0 K/UL (ref 0–0.2)
BASOPHILS NFR BLD: 0 % (ref 0–1.9)
BUN SERPL-MCNC: 60 MG/DL (ref 8–23)
CALCIUM SERPL-MCNC: 8.3 MG/DL (ref 8.7–10.5)
CHLORIDE SERPL-SCNC: 101 MMOL/L (ref 95–110)
CLASS I ANTIBODY COMMENTS - LUMINEX: NORMAL
CLASS II ANTIBODY COMMENTS - LUMINEX: NORMAL
CO2 SERPL-SCNC: 20 MMOL/L (ref 23–29)
CREAT SERPL-MCNC: 6.5 MG/DL (ref 0.5–1.4)
DIFFERENTIAL METHOD: ABNORMAL
DSA1 TESTING DATE: NORMAL
DSA12 TESTING DATE: NORMAL
DSA2 TESTING DATE: NORMAL
EOSINOPHIL # BLD AUTO: 0.1 K/UL (ref 0–0.5)
EOSINOPHIL NFR BLD: 0.8 % (ref 0–8)
ERYTHROCYTE [DISTWIDTH] IN BLOOD BY AUTOMATED COUNT: 18.1 % (ref 11.5–14.5)
EST. GFR  (NO RACE VARIABLE): 9.1 ML/MIN/1.73 M^2
GLUCOSE SERPL-MCNC: 84 MG/DL (ref 70–110)
HCT VFR BLD AUTO: 25.9 % (ref 40–54)
HGB BLD-MCNC: 8.8 G/DL (ref 14–18)
IMM GRANULOCYTES # BLD AUTO: 0.03 K/UL (ref 0–0.04)
IMM GRANULOCYTES NFR BLD AUTO: 0.5 % (ref 0–0.5)
LYMPHOCYTES # BLD AUTO: 0.1 K/UL (ref 1–4.8)
LYMPHOCYTES NFR BLD: 0.8 % (ref 18–48)
MAGNESIUM SERPL-MCNC: 2 MG/DL (ref 1.6–2.6)
MCH RBC QN AUTO: 33.8 PG (ref 27–31)
MCHC RBC AUTO-ENTMCNC: 34 G/DL (ref 32–36)
MCV RBC AUTO: 100 FL (ref 82–98)
MONOCYTES # BLD AUTO: 0.3 K/UL (ref 0.3–1)
MONOCYTES NFR BLD: 4.3 % (ref 4–15)
NEUTROPHILS # BLD AUTO: 5.7 K/UL (ref 1.8–7.7)
NEUTROPHILS NFR BLD: 93.6 % (ref 38–73)
NRBC BLD-RTO: 0 /100 WBC
PHOSPHATE SERPL-MCNC: 4.2 MG/DL (ref 2.7–4.5)
PHOSPHATE SERPL-MCNC: 4.2 MG/DL (ref 2.7–4.5)
PLATELET # BLD AUTO: 103 K/UL (ref 150–450)
PMV BLD AUTO: 11.6 FL (ref 9.2–12.9)
POCT GLUCOSE: 101 MG/DL (ref 70–110)
POCT GLUCOSE: 107 MG/DL (ref 70–110)
POCT GLUCOSE: 128 MG/DL (ref 70–110)
POCT GLUCOSE: 81 MG/DL (ref 70–110)
POTASSIUM SERPL-SCNC: 3.5 MMOL/L (ref 3.5–5.1)
RBC # BLD AUTO: 2.6 M/UL (ref 4.6–6.2)
SERUM COLLECTION DT - LUMINEX CLASS I: NORMAL
SERUM COLLECTION DT - LUMINEX CLASS II: NORMAL
SODIUM SERPL-SCNC: 134 MMOL/L (ref 136–145)
TACROLIMUS BLD-MCNC: 19.5 NG/ML (ref 5–15)
WBC # BLD AUTO: 6.08 K/UL (ref 3.9–12.7)

## 2023-05-16 PROCEDURE — 63600175 PHARM REV CODE 636 W HCPCS: Performed by: TRANSPLANT SURGERY

## 2023-05-16 PROCEDURE — 25000003 PHARM REV CODE 250: Performed by: PHYSICIAN ASSISTANT

## 2023-05-16 PROCEDURE — 83735 ASSAY OF MAGNESIUM: CPT | Performed by: TRANSPLANT SURGERY

## 2023-05-16 PROCEDURE — 36415 COLL VENOUS BLD VENIPUNCTURE: CPT | Performed by: PHYSICIAN ASSISTANT

## 2023-05-16 PROCEDURE — 99233 PR SUBSEQUENT HOSPITAL CARE,LEVL III: ICD-10-PCS | Mod: 24,,, | Performed by: NURSE PRACTITIONER

## 2023-05-16 PROCEDURE — 84100 ASSAY OF PHOSPHORUS: CPT | Performed by: TRANSPLANT SURGERY

## 2023-05-16 PROCEDURE — 27000221 HC OXYGEN, UP TO 24 HOURS

## 2023-05-16 PROCEDURE — 97116 GAIT TRAINING THERAPY: CPT

## 2023-05-16 PROCEDURE — 36415 COLL VENOUS BLD VENIPUNCTURE: CPT | Performed by: TRANSPLANT SURGERY

## 2023-05-16 PROCEDURE — 94761 N-INVAS EAR/PLS OXIMETRY MLT: CPT

## 2023-05-16 PROCEDURE — 63600175 PHARM REV CODE 636 W HCPCS: Performed by: STUDENT IN AN ORGANIZED HEALTH CARE EDUCATION/TRAINING PROGRAM

## 2023-05-16 PROCEDURE — 20600001 HC STEP DOWN PRIVATE ROOM

## 2023-05-16 PROCEDURE — 94640 AIRWAY INHALATION TREATMENT: CPT

## 2023-05-16 PROCEDURE — 63600175 PHARM REV CODE 636 W HCPCS: Performed by: PHYSICIAN ASSISTANT

## 2023-05-16 PROCEDURE — 85025 COMPLETE CBC W/AUTO DIFF WBC: CPT | Performed by: TRANSPLANT SURGERY

## 2023-05-16 PROCEDURE — 25000003 PHARM REV CODE 250: Performed by: STUDENT IN AN ORGANIZED HEALTH CARE EDUCATION/TRAINING PROGRAM

## 2023-05-16 PROCEDURE — 99233 SBSQ HOSP IP/OBS HIGH 50: CPT | Mod: 24,,, | Performed by: NURSE PRACTITIONER

## 2023-05-16 PROCEDURE — 80069 RENAL FUNCTION PANEL: CPT | Performed by: TRANSPLANT SURGERY

## 2023-05-16 PROCEDURE — 80197 ASSAY OF TACROLIMUS: CPT | Performed by: TRANSPLANT SURGERY

## 2023-05-16 PROCEDURE — 25000003 PHARM REV CODE 250: Performed by: NURSE PRACTITIONER

## 2023-05-16 RX ORDER — SENNOSIDES 8.6 MG/1
8.6 TABLET ORAL DAILY
Status: CANCELLED | OUTPATIENT
Start: 2023-05-17

## 2023-05-16 RX ORDER — DEXTROSE 40 %
15 GEL (GRAM) ORAL
Status: DISCONTINUED | OUTPATIENT
Start: 2023-05-16 | End: 2023-05-17 | Stop reason: HOSPADM

## 2023-05-16 RX ORDER — DOCUSATE SODIUM 100 MG/1
100 CAPSULE, LIQUID FILLED ORAL 2 TIMES DAILY
Status: DISCONTINUED | OUTPATIENT
Start: 2023-05-16 | End: 2023-05-16

## 2023-05-16 RX ORDER — TACROLIMUS 1 MG/1
2 CAPSULE ORAL ONCE
Status: COMPLETED | OUTPATIENT
Start: 2023-05-16 | End: 2023-05-16

## 2023-05-16 RX ORDER — DEXTROSE 40 %
30 GEL (GRAM) ORAL
Status: DISCONTINUED | OUTPATIENT
Start: 2023-05-16 | End: 2023-05-17 | Stop reason: HOSPADM

## 2023-05-16 RX ORDER — DOCUSATE SODIUM 100 MG/1
100 CAPSULE, LIQUID FILLED ORAL 3 TIMES DAILY
Status: DISCONTINUED | OUTPATIENT
Start: 2023-05-16 | End: 2023-05-17 | Stop reason: HOSPADM

## 2023-05-16 RX ORDER — TRAMADOL HYDROCHLORIDE 50 MG/1
50 TABLET ORAL EVERY 6 HOURS PRN
Status: DISCONTINUED | OUTPATIENT
Start: 2023-05-16 | End: 2023-05-17 | Stop reason: HOSPADM

## 2023-05-16 RX ORDER — SODIUM CHLORIDE 9 MG/ML
INJECTION, SOLUTION INTRAVENOUS CONTINUOUS
Status: ACTIVE | OUTPATIENT
Start: 2023-05-16 | End: 2023-05-16

## 2023-05-16 RX ORDER — HYDROXYZINE HYDROCHLORIDE 25 MG/1
25 TABLET, FILM COATED ORAL 3 TIMES DAILY PRN
Status: DISCONTINUED | OUTPATIENT
Start: 2023-05-16 | End: 2023-05-17 | Stop reason: HOSPADM

## 2023-05-16 RX ORDER — BISACODYL 10 MG
10 SUPPOSITORY, RECTAL RECTAL ONCE
Status: COMPLETED | OUTPATIENT
Start: 2023-05-16 | End: 2023-05-16

## 2023-05-16 RX ORDER — BISACODYL 5 MG
5 TABLET, DELAYED RELEASE (ENTERIC COATED) ORAL DAILY
Status: DISCONTINUED | OUTPATIENT
Start: 2023-05-16 | End: 2023-05-17 | Stop reason: HOSPADM

## 2023-05-16 RX ADMIN — HYDROXYZINE HYDROCHLORIDE 25 MG: 25 TABLET, FILM COATED ORAL at 12:05

## 2023-05-16 RX ADMIN — METOPROLOL SUCCINATE 12.5 MG: 25 TABLET, EXTENDED RELEASE ORAL at 10:05

## 2023-05-16 RX ADMIN — FLUTICASONE FUROATE 1 PUFF: 100 POWDER RESPIRATORY (INHALATION) at 08:05

## 2023-05-16 RX ADMIN — HYDROXYZINE HYDROCHLORIDE 25 MG: 25 TABLET, FILM COATED ORAL at 08:05

## 2023-05-16 RX ADMIN — HEPARIN SODIUM 5000 UNITS: 5000 INJECTION INTRAVENOUS; SUBCUTANEOUS at 06:05

## 2023-05-16 RX ADMIN — MYCOPHENOLIC ACID 720 MG: 180 TABLET, DELAYED RELEASE ORAL at 08:05

## 2023-05-16 RX ADMIN — OXYCODONE 5 MG: 5 TABLET ORAL at 08:05

## 2023-05-16 RX ADMIN — SIMETHICONE 80 MG: 80 TABLET, CHEWABLE ORAL at 11:05

## 2023-05-16 RX ADMIN — ACYCLOVIR 400 MG: 200 CAPSULE ORAL at 08:05

## 2023-05-16 RX ADMIN — SODIUM CHLORIDE: 9 INJECTION, SOLUTION INTRAVENOUS at 04:05

## 2023-05-16 RX ADMIN — HEPARIN SODIUM 5000 UNITS: 5000 INJECTION INTRAVENOUS; SUBCUTANEOUS at 02:05

## 2023-05-16 RX ADMIN — PREDNISONE 20 MG: 20 TABLET ORAL at 08:05

## 2023-05-16 RX ADMIN — DOCUSATE SODIUM 100 MG: 100 CAPSULE, LIQUID FILLED ORAL at 02:05

## 2023-05-16 RX ADMIN — TACROLIMUS 2 MG: 1 CAPSULE ORAL at 10:05

## 2023-05-16 RX ADMIN — FAMOTIDINE 20 MG: 20 TABLET, FILM COATED ORAL at 08:05

## 2023-05-16 RX ADMIN — BISACODYL 5 MG: 5 TABLET, COATED ORAL at 10:05

## 2023-05-16 RX ADMIN — DOCUSATE SODIUM 100 MG: 100 CAPSULE, LIQUID FILLED ORAL at 08:05

## 2023-05-16 RX ADMIN — BISACODYL 10 MG: 10 SUPPOSITORY RECTAL at 10:05

## 2023-05-16 RX ADMIN — THERA TABS 1 TABLET: TAB at 08:05

## 2023-05-16 RX ADMIN — TRAMADOL HYDROCHLORIDE 50 MG: 50 TABLET, COATED ORAL at 08:05

## 2023-05-16 RX ADMIN — TRAMADOL HYDROCHLORIDE 50 MG: 50 TABLET, COATED ORAL at 12:05

## 2023-05-16 NOTE — PROGRESS NOTES
[facility] - Transplant Stepdown  Kidney Transplant  Progress Note      Reason for Follow-up: Reassessment of Kidney Transplant - 5/10/2023  (#1) recipient and management of immunosuppression.    ORGAN: LEFT KIDNEY    Donor Type: Living    Donor CMV Status:   Donor HBcAB:  Donor HCV Status:  Donor HBV AURORA:   Donor HCV AURORA:       Subjective:   History of Present Illness:  Mr. Parikh is a 62 y.o. year old Black or  male being admitted to receive a living unrelated kidney transplant through paired exchange for ESRD secondary to unknown etiology. 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 Patient is on HD MWF via left AVF, tolerating well without hypotension. Last HD 5/9/22. Dry wt 82 kg. He denies any recent hospitalizations or ED visits. Dr Brown will be the primary surgeon. OR scheduled for 1900. Pre op labs and imaging will be reviewed prior to surgery.         Hospital Course:  Patient is now s/p living unrelated kidney transplant on 5/10/23. Post op UOP not robust. Kidney US obtained on POD#1 which showed mildly elevated RIs but good waveforms. He required HD on POD#1 for hyperkalemia.     Interval History: labs pending- required HD RN to access AVF for labs. He reports increased anxiety overnight. He attributes it to increased bloating and not able to have a BM. He denies nausea or vomiting, endorses early satiety.BS Ax4, (+)belching. He is on colace, increased dose, added bisacodyl at hs PRN, KUB and supp ordered. He has history of Crohn's, he denies diarrhea. Kidney US 5/15 again showed interval elevation of intraparenchymal arterial resistive indices and elevation of the main renal artery and main renal vein peak systolic velocities at the anastomosis. Pt was taken back to OR 5/15 ~1430. US showed good flow and kindey looked well perfused, no intervention. Kidney Bx performed- peth pending.  RLQ surgical dressing removed, incision CDI w staples. UOP is increasing. He is voiding per urinal w no issues.. BP stable, restarted BB w hold parameters in place. O2 sat % on RA. CXR reviewed w opacity to RLL. Encourage IS. Pt is up in chair. Encourage ambulation. Monitor.       Past Medical, Surgical, Family, and Social History:   Unchanged from H&P.    Scheduled Meds:   acyclovir  400 mg Oral BID    bisacodyL  5 mg Oral Daily    docusate sodium  100 mg Oral TID    famotidine  20 mg Oral QHS    fluticasone furoate  1 puff Inhalation Daily    heparin (porcine)  5,000 Units Subcutaneous Q8H    metoprolol succinate  12.5 mg Oral Daily    multivitamin  1 tablet Oral Daily    mycophenolate  720 mg Oral BID    predniSONE  20 mg Oral Daily     Continuous Infusions:   sodium chloride 0.9% 75 mL/hr at 05/16/23 0448    glucagon (human recombinant)       PRN Meds:albuterol-ipratropium, dextrose 10%, diphenhydrAMINE, fentaNYL, glucagon (human recombinant), glucose, glucose, glucose, heparin (porcine), hydrOXYzine HCL, insulin aspart U-100, methocarbamoL, ondansetron, oxyCODONE, prochlorperazine, senna, simethicone, sodium chloride 0.9%, sodium chloride 0.9%, sodium chloride 0.9%, traMADoL    Intake/Output - Last 3 Shifts         05/14 0700  05/15 0659 05/15 0700  05/16 0659 05/16 0700  05/17 0659    P.O. 1000 480     I.V. (mL/kg) 865.9 (10.7) 1514.9 (18.6)     Blood  250     IV Piggyback  950     Total Intake(mL/kg) 1865.9 (23) 3194.9 (39.3)     Urine (mL/kg/hr) 640 (0.3) 1375 (0.7) 225 (0.8)    Stool 0 0     Total Output 640 1375 225    Net +1225.9 +1819.9 -225           Stool Occurrence 2 x 1 x              Review of Systems   Constitutional:  Positive for appetite change (decreased). Negative for activity change.   Respiratory:  Negative for shortness of breath.    Gastrointestinal:  Positive for abdominal pain and constipation. Negative for abdominal distention, diarrhea and nausea.   Genitourinary:   "Negative for dysuria, hematuria and urgency.   Skin:  Positive for wound.   Allergic/Immunologic: Positive for immunocompromised state.   Neurological:  Negative for weakness.   Psychiatric/Behavioral:  Negative for agitation, behavioral problems and confusion.     Objective:     Vital Signs (Most Recent):  Temp: 97.7 °F (36.5 °C) (05/16/23 0814)  Pulse: 65 (05/16/23 0850)  Resp: 16 (05/16/23 0850)  BP: (!) 150/72 (05/16/23 0814)  SpO2: 98 % (05/16/23 0850) Vital Signs (24h Range):  Temp:  [97 °F (36.1 °C)-98.6 °F (37 °C)] 97.7 °F (36.5 °C)  Pulse:  [62-82] 65  Resp:  [13-29] 16  SpO2:  [93 %-100 %] 98 %  BP: (127-166)/(61-77) 150/72     Weight: 81.3 kg (179 lb 3.7 oz)  Height: 5' 7" (170.2 cm)  Body mass index is 28.07 kg/m².    Physical Exam  Vitals reviewed.   Constitutional:       Appearance: Normal appearance.   HENT:      Head: Normocephalic.   Eyes:      Pupils: Pupils are equal, round, and reactive to light.   Cardiovascular:      Rate and Rhythm: Normal rate.   Pulmonary:      Effort: Pulmonary effort is normal.   Abdominal:      General: There is no distension.      Tenderness: There is abdominal tenderness. There is no guarding or rebound.      Comments: Kidney incision CDI with staples in place     Musculoskeletal:      Right lower leg: No edema.      Left lower leg: No edema.   Skin:     General: Skin is warm.      Capillary Refill: Capillary refill takes 2 to 3 seconds.   Neurological:      Mental Status: He is alert and oriented to person, place, and time.   Psychiatric:         Mood and Affect: Mood normal.         Behavior: Behavior normal.         Thought Content: Thought content normal.         Judgment: Judgment normal.        Laboratory:  CBC:   Recent Labs   Lab 05/14/23  0828 05/15/23  0811 05/15/23  1358   WBC 6.03 4.66 6.09   RBC 3.55* 2.39* 2.31*   HGB 12.8* 8.3* 8.2*   HCT 38.6* 26.3* 23.9*   PLT 98* 87* 95*   * 110* 104*   MCH 36.1* 34.7* 35.5*   MCHC 33.2 31.6* 34.3       BMP: "   Recent Labs   Lab 05/14/23  0828 05/15/23  0811 05/15/23  1358   GLU 85 89 103   * 133* 132*   K 3.3* 4.0 3.4*   CL 94* 97 97   CO2 18* 22* 20*   BUN 51* 60* 63*   CREATININE 6.7* 7.1* 7.0*   CALCIUM 8.5* 8.0* 8.4*         Diagnostic Results:  US and CXR reviewed. KUB pending.    Assessment/Plan:     * S/P kidney transplant  - CIT ~11 hours  - 2 day diaz, removed 5/13, no issues voiding following removal  - No drain   - Required HD 5/11/23 for hyperkalemia   - Kidney US 5/14 showed interval elevation of intraparenchymal arterial resistive indices and elevation of the main renal artery and main renal vein peak systolic velocities at the anastomosis.  - Repeat US 5/15 w similar findings. UOP~ 600cc past 24 hours with no clearance   - Given US findings and ongoing DGF, pt taken to OR- flow was good, allograft looked good  - Kidney Bx performed, path pending  - UOP increasing past 24 hours. Cr decreased 7 to 6.5!  - cont up in chair, OOB and pulmonary toiletring/IS    Constipation  - pt has hx of crohn's disease, typically has 2-3 BMs per day  - reports worsening constipation past 48 hours  - cont colace, bisacodyl at hs PRN  - KUB w/o ileus  - supp ordered  - encourage ambulation      Acute blood loss anemia  - Expected post operatively  - H/H stable  - Monitor with daily cbc      Anemia of renal disease  - See acute blood loss anemia      Long-term use of immunosuppressant medication  - Continue prograf. Monitor prograf level daily, monitor for toxic side effects, and adjust for therapeutic dose      Prophylactic immunotherapy  - See long term use of immunosuppressant medication      At risk for opportunistic infections  - cont OI prophylaxis          Discharge Planning:  Discussed plan of care.  No plan for discharge today.    Greater than 30 minutes spent with patient discussing diagnosis including reviewing labs and imaging and plan of care.      Sandra Arredondo, NP  Kidney Transplant  [facility] -  Transplant Stepdown

## 2023-05-16 NOTE — PLAN OF CARE
Pt aaox4. VSS on RA. Pt post kidney transplant 5/10. Pt went for ex lap yesterday with kidney biopsy. RLQ incision with island dressing intact, some serosanguinous drainage to middle area this am, reinforced dressing. Drainage on R side of incision marked. NS infusing @ 75 cc/hr. Prn gasx administered for bloating/gas pain. Pt having anxiety overnight, prn atarax added to regimen. Pt stated relief. No c/o of nausea/vomiting overnight. PIV x2 intact. GRACIE fistula +/+ CDI. AC/HS accuchecks monitored. Pt up with x1 assistance. VS and assessments in flowsheets

## 2023-05-16 NOTE — ASSESSMENT & PLAN NOTE
- CIT ~11 hours  - 2 day diaz, removed 5/13, no issues voiding following removal  - No drain   - Required HD 5/11/23 for hyperkalemia   - Kidney US 5/14 showed interval elevation of intraparenchymal arterial resistive indices and elevation of the main renal artery and main renal vein peak systolic velocities at the anastomosis.  - Repeat US 5/15 w similar findings. UOP~ 600cc past 24 hours with no clearance   - Given US findings and ongoing DGF, pt taken to OR- flow was good, allograft looked good  - Kidney Bx performed, path pending  - UOP increasing past 24 hours. Cr decreased 7 to 6.5!  - cont up in chair, OOB and pulmonary toiletring/IS

## 2023-05-16 NOTE — OP NOTE
[facility] - Transplant Stepdown  General Surgery  Operative Note    SUMMARY     Date of Procedure: 5/15/2023     Procedure: Procedure(s) (LRB):  ULTRASOUND, DIAGNOSTIC (N/A)  BIOPSY, KIDNEY  EXPLORATION, KIDNEY       Surgeon(s) and Role:     * Zach Samuels MD - Primary     * Chivo Brown MD - Assisting     * Jasbir Osman MD - Resident - Assisting     * Pipo Rosen MD - Fellow        Pre-Operative Diagnosis: S/P kidney transplant [Z94.0]    Post-Operative Diagnosis: Post-Op Diagnosis Codes:     * S/P kidney transplant [Z94.0]    Anesthesia: General    Operative Findings (including complications, if any): healthy-appearing kidney. Perfusion satisfactory on ultrasound    Description of Technical Procedures: exploration of kidney transplant, open needle biopsy of kidney, intraoperative ultrasound (performed by radiology personnel).    Significant Surgical Tasks Conducted by the Assistant(s), if Applicable: assisting primary surgeon    Estimated Blood Loss (EBL): none         Implants: * No implants in log *    Specimens:   Specimen (24h ago, onward)       Start     Ordered    05/15/23 1514  Specimen to Pathology, Surgery Other (kidney transplant)  Once        Comments: Pre-op Diagnosis: S/P kidney transplant [Z94.0]Procedure(s):LAPAROTOMY, EXPLORATORY Number of specimens: 1Name of specimens: 1) kidney transplant biopsy -fresh for [place]. Light Microscopy, IF, EM per protocol- fresh     References:    Click here for ordering Quick Tip   Question Answer Comment   Procedure Type: Other kidney transplant   Specimen Class: Complex case/Special    Which provider would you like to cc? ZACH SAMUELS    Release to patient Immediate        05/15/23 1516                            Condition: Good    Disposition: PACU - hemodynamically stable.    Attestation: I was present for the entire procedure.    Procedure in Detail: Following the induction of general anesthesia, the abdomen was prepped and draped and the  recent kidney transplant incision was reopened. The kidney appeared pink and well-perfused.  There was no evidence of kinking of the renal vessels, though the vessels themselves were only able to be visualized after the kidney was mobilized. Intraoperative ultrasound was done, showing high renal vein velocity (likely attributable to the small diameter of the renal vein) but otherwise satisfactory perfusion.  A spring-loaded 18g biopsy needle was used to obtain four cores for diagnostic evaluation, and the biopsy site was sutured with a 5-0 prolene U stitch.  The kidney was then carefully positioned in the retroperitoneum to avoid any kinking of the vessels, and the wound was closed.    Rodolfo Samuels MD

## 2023-05-16 NOTE — PLAN OF CARE
Pt is AAOx4, 1 person/stand by assist, and VSS. NS infusion ordered to DC. Blood glucose monitoring performed and treated as ordered. U/O 575ml this shift. Suppository administered this shift- no BM yet. Pt ambulated with PT in hallway. Pt up in chair during shift. Self meds reinforced to pt's wife by this RN. Pt's bed in lowest position, call bell within reach, nonskid socks on, and RN encouraged pt to call for any assistance needed. RN rounded on pt throughout shift. Will continue to monitor.

## 2023-05-16 NOTE — ASSESSMENT & PLAN NOTE
- pt has hx of crohn's disease, typically has 2-3 BMs per day  - reports worsening constipation past 48 hours  - cont colace, bisacodyl at hs PRN  - KUB w/o ileus  - supp ordered  - encourage ambulation

## 2023-05-16 NOTE — PT/OT/SLP PROGRESS
Physical Therapy Treatment    Patient Name:  Abdoulaye Parikh   MRN:  [id number]    Recommendations:     Discharge Recommendations: other (see comments)  Discharge Equipment Recommendations: none  Barriers to discharge: None    Assessment:     Abdoulaye Parikh is a 62 y.o. male admitted with a medical diagnosis of S/P kidney transplant.  He presents with the following impairments/functional limitations: weakness, impaired endurance, impaired self care skills, impaired functional mobility, gait instability, impaired balance Pt. cooperative and tolerated treatment well. Pt. progressing with mobility. Pt. would benefit from continued PT at home upon discharge.    Rehab Prognosis: Good; patient would benefit from acute skilled PT services to address these deficits and reach maximum level of function.    Recent Surgery: Procedure(s) (LRB):  ULTRASOUND, DIAGNOSTIC (N/A)  BIOPSY, KIDNEY  EXPLORATION, KIDNEY 1 Day Post-Op    Plan:     During this hospitalization, patient to be seen 3 x/week to address the identified rehab impairments via gait training, therapeutic activities, therapeutic exercises and progress toward the following goals:    Plan of Care Expires:  06/12/23    Subjective     Chief Complaint: decreased endurance  Patient/Family Comments/goals: pt. Agreeable to PT  Pain/Comfort:  Pain Rating 1:  (pt. did not rate)      Objective:     Communicated with nursing prior to session.  Patient found up in chair with peripheral IV upon PT entry to room.     General Precautions: Standard, fall  Orthopedic Precautions: N/A  Braces: N/A  Respiratory Status: Room air     Functional Mobility:  Transfers:     Sit to Stand:  stand by assistance with rolling walker  Bed to Chair: stand by assistance with  rolling walker  using  Stand Pivot  Gait: 200' with RW and SBA with slow, steady gait.  Balance: fair+      AM-PAC 6 CLICK MOBILITY  Turning over in bed (including adjusting bedclothes, sheets and blankets)?: 3  Sitting down on and  standing up from a chair with arms (e.g., wheelchair, bedside commode, etc.): 3  Moving from lying on back to sitting on the side of the bed?: 3  Moving to and from a bed to a chair (including a wheelchair)?: 3  Need to walk in hospital room?: 3  Climbing 3-5 steps with a railing?: 3  Basic Mobility Total Score: 18       Treatment & Education:  Discussed pt.'s progress, goals, LE therex, and POC.    Patient left up in chair with all lines intact and call button in reach..    GOALS:   Multidisciplinary Problems       Physical Therapy Goals          Problem: Physical Therapy    Goal Priority Disciplines Outcome Goal Variances Interventions   Physical Therapy Goal     PT, PT/OT Ongoing, Progressing     Description: Goals to be met by: 23    Patient will increase functional independence with mobility by performin. Supine to sit with Modified [place] using LRAD as needed  2. Sit to supine with Modified [place] using LRAD as needed  3. Sit to stand transfer with Modified [place] using LRAD as needed  4. Gait  x 100 feet with Modified [place] using LRAD as needed  5. Ascend/descend 3 stair with bilateral handrails Modified [place] using LRAD as needed  6. Lower extremity exercise program x15 reps per handout, with independence                         Time Tracking:     PT Received On: 23  PT Start Time: 1125     PT Stop Time: 1135  PT Total Time (min): 10 min     Billable Minutes: Gait Training 10    Treatment Type: Treatment  PT/PTA: PT     Number of PTA visits since last PT visit: 0     2023

## 2023-05-16 NOTE — NURSING
"Pt c/o of gas pain/bloating overnight. Prn gasx and stool softener ordered and administered to pt. Pt called nurse to room saying he felt SOB after walking to bathroom and that he "feels like he is having a lot of anxiety". O2 sats >97%. Pt placed on NC for comfort. BRIGETTE López notified. Prn atarax ordered and cxray for am. Atarax administered, pt stating he is starting to feel better. WCTM  "

## 2023-05-16 NOTE — SUBJECTIVE & OBJECTIVE
Subjective:   History of Present Illness:  Mr. Parikh is a 62 y.o. year old Black or  male being admitted to receive a living unrelated kidney transplant through paired exchange for ESRD secondary to unknown etiology. 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 Patient is on HD MWF via left AVF, tolerating well without hypotension. Last HD 5/9/22. Dry wt 82 kg. He denies any recent hospitalizations or ED visits. Dr Brown will be the primary surgeon. OR scheduled for 1900. Pre op labs and imaging will be reviewed prior to surgery.         Hospital Course:  Patient is now s/p living unrelated kidney transplant on 5/10/23. Post op UOP not robust. Kidney US obtained on POD#1 which showed mildly elevated RIs but good waveforms. He required HD on POD#1 for hyperkalemia.     Interval History: labs pending- required HD RN to access AVF for labs. He reports increased anxiety overnight. He attributes it to increased bloating and not able to have a BM. He denies nausea or vomiting, endorses early satiety.BS Ax4, (+)belching. He is on colace, increased dose, added bisacodyl at hs PRN, KUB and supp ordered. He has history of Crohn's, he denies diarrhea. Kidney US 5/15 again showed interval elevation of intraparenchymal arterial resistive indices and elevation of the main renal artery and main renal vein peak systolic velocities at the anastomosis. Pt was taken back to OR 5/15 ~1430. US showed good flow and kindey looked well perfused, no intervention. Kidney Bx performed- peth pending. RLQ surgical dressing removed, incision CDI w staples. UOP is increasing. He is voiding per urinal w no issues.. BP stable, restarted BB w hold parameters in place. O2 sat % on RA. CXR reviewed w opacity to RLL. Encourage IS. Pt is up in chair. Encourage ambulation. Monitor.       Past Medical, Surgical, Family, and  Social History:   Unchanged from H&P.    Scheduled Meds:   acyclovir  400 mg Oral BID    bisacodyL  5 mg Oral Daily    docusate sodium  100 mg Oral TID    famotidine  20 mg Oral QHS    fluticasone furoate  1 puff Inhalation Daily    heparin (porcine)  5,000 Units Subcutaneous Q8H    metoprolol succinate  12.5 mg Oral Daily    multivitamin  1 tablet Oral Daily    mycophenolate  720 mg Oral BID    predniSONE  20 mg Oral Daily     Continuous Infusions:   sodium chloride 0.9% 75 mL/hr at 05/16/23 0448    glucagon (human recombinant)       PRN Meds:albuterol-ipratropium, dextrose 10%, diphenhydrAMINE, fentaNYL, glucagon (human recombinant), glucose, glucose, glucose, heparin (porcine), hydrOXYzine HCL, insulin aspart U-100, methocarbamoL, ondansetron, oxyCODONE, prochlorperazine, senna, simethicone, sodium chloride 0.9%, sodium chloride 0.9%, sodium chloride 0.9%, traMADoL    Intake/Output - Last 3 Shifts         05/14 0700  05/15 0659 05/15 0700  05/16 0659 05/16 0700  05/17 0659    P.O. 1000 480     I.V. (mL/kg) 865.9 (10.7) 1514.9 (18.6)     Blood  250     IV Piggyback  950     Total Intake(mL/kg) 1865.9 (23) 3194.9 (39.3)     Urine (mL/kg/hr) 640 (0.3) 1375 (0.7) 225 (0.8)    Stool 0 0     Total Output 640 1375 225    Net +1225.9 +1819.9 -225           Stool Occurrence 2 x 1 x              Review of Systems   Constitutional:  Positive for appetite change (decreased). Negative for activity change.   Respiratory:  Negative for shortness of breath.    Gastrointestinal:  Positive for abdominal pain and constipation. Negative for abdominal distention, diarrhea and nausea.   Genitourinary:  Negative for dysuria, hematuria and urgency.   Skin:  Positive for wound.   Allergic/Immunologic: Positive for immunocompromised state.   Neurological:  Negative for weakness.   Psychiatric/Behavioral:  Negative for agitation, behavioral problems and confusion.     Objective:     Vital Signs (Most Recent):  Temp: 97.7 °F (36.5 °C)  "(05/16/23 0814)  Pulse: 65 (05/16/23 0850)  Resp: 16 (05/16/23 0850)  BP: (!) 150/72 (05/16/23 0814)  SpO2: 98 % (05/16/23 0850) Vital Signs (24h Range):  Temp:  [97 °F (36.1 °C)-98.6 °F (37 °C)] 97.7 °F (36.5 °C)  Pulse:  [62-82] 65  Resp:  [13-29] 16  SpO2:  [93 %-100 %] 98 %  BP: (127-166)/(61-77) 150/72     Weight: 81.3 kg (179 lb 3.7 oz)  Height: 5' 7" (170.2 cm)  Body mass index is 28.07 kg/m².     Physical Exam  Vitals reviewed.   Constitutional:       Appearance: Normal appearance.   HENT:      Head: Normocephalic.   Eyes:      Pupils: Pupils are equal, round, and reactive to light.   Cardiovascular:      Rate and Rhythm: Normal rate.   Pulmonary:      Effort: Pulmonary effort is normal.   Abdominal:      General: There is no distension.      Tenderness: There is abdominal tenderness. There is no guarding or rebound.      Comments: Kidney incision CDI with staples in place     Musculoskeletal:      Right lower leg: No edema.      Left lower leg: No edema.   Skin:     General: Skin is warm.      Capillary Refill: Capillary refill takes 2 to 3 seconds.   Neurological:      Mental Status: He is alert and oriented to person, place, and time.   Psychiatric:         Mood and Affect: Mood normal.         Behavior: Behavior normal.         Thought Content: Thought content normal.         Judgment: Judgment normal.        Laboratory:  CBC:   Recent Labs   Lab 05/14/23  0828 05/15/23  0811 05/15/23  1358   WBC 6.03 4.66 6.09   RBC 3.55* 2.39* 2.31*   HGB 12.8* 8.3* 8.2*   HCT 38.6* 26.3* 23.9*   PLT 98* 87* 95*   * 110* 104*   MCH 36.1* 34.7* 35.5*   MCHC 33.2 31.6* 34.3       BMP:   Recent Labs   Lab 05/14/23  0828 05/15/23  0811 05/15/23  1358   GLU 85 89 103   * 133* 132*   K 3.3* 4.0 3.4*   CL 94* 97 97   CO2 18* 22* 20*   BUN 51* 60* 63*   CREATININE 6.7* 7.1* 7.0*   CALCIUM 8.5* 8.0* 8.4*         Diagnostic Results:  US and CXR reviewed. KUB pending.  "

## 2023-05-17 ENCOUNTER — TELEPHONE (OUTPATIENT)
Dept: TRANSPLANT | Facility: CLINIC | Age: 63
End: 2023-05-17

## 2023-05-17 VITALS
RESPIRATION RATE: 18 BRPM | WEIGHT: 201.06 LBS | SYSTOLIC BLOOD PRESSURE: 137 MMHG | BODY MASS INDEX: 31.56 KG/M2 | OXYGEN SATURATION: 96 % | DIASTOLIC BLOOD PRESSURE: 63 MMHG | HEIGHT: 67 IN | TEMPERATURE: 98 F | HEART RATE: 61 BPM

## 2023-05-17 DIAGNOSIS — Z57.8 EMPLOYEE EXPOSURE TO BLOOD: ICD-10-CM

## 2023-05-17 DIAGNOSIS — Z11.4 SCREENING FOR HUMAN IMMUNODEFICIENCY VIRUS: ICD-10-CM

## 2023-05-17 DIAGNOSIS — Z94.0 KIDNEY REPLACED BY TRANSPLANT: Primary | ICD-10-CM

## 2023-05-17 DIAGNOSIS — Z21 ASYMPTOMATIC HUMAN IMMUNODEFICIENCY VIRUS (HIV) INFECTION STATUS: ICD-10-CM

## 2023-05-17 DIAGNOSIS — Z91.89 PERSONAL HISTORY OF POISONING, PRESENTING HAZARDS TO HEALTH: ICD-10-CM

## 2023-05-17 LAB
ALBUMIN SERPL BCP-MCNC: 2.3 G/DL (ref 3.5–5.2)
ANION GAP SERPL CALC-SCNC: 12 MMOL/L (ref 8–16)
BASOPHILS # BLD AUTO: 0 K/UL (ref 0–0.2)
BASOPHILS NFR BLD: 0 % (ref 0–1.9)
BUN SERPL-MCNC: 67 MG/DL (ref 8–23)
CALCIUM SERPL-MCNC: 8.1 MG/DL (ref 8.7–10.5)
CHLORIDE SERPL-SCNC: 105 MMOL/L (ref 95–110)
CO2 SERPL-SCNC: 20 MMOL/L (ref 23–29)
CREAT SERPL-MCNC: 6.1 MG/DL (ref 0.5–1.4)
DIFFERENTIAL METHOD: ABNORMAL
EOSINOPHIL # BLD AUTO: 0 K/UL (ref 0–0.5)
EOSINOPHIL NFR BLD: 0.8 % (ref 0–8)
ERYTHROCYTE [DISTWIDTH] IN BLOOD BY AUTOMATED COUNT: 17.6 % (ref 11.5–14.5)
EST. GFR  (NO RACE VARIABLE): 9.8 ML/MIN/1.73 M^2
GLUCOSE SERPL-MCNC: 77 MG/DL (ref 70–110)
HCT VFR BLD AUTO: 26.6 % (ref 40–54)
HGB BLD-MCNC: 8.8 G/DL (ref 14–18)
IMM GRANULOCYTES # BLD AUTO: 0.05 K/UL (ref 0–0.04)
IMM GRANULOCYTES NFR BLD AUTO: 0.9 % (ref 0–0.5)
LYMPHOCYTES # BLD AUTO: 0.1 K/UL (ref 1–4.8)
LYMPHOCYTES NFR BLD: 1.1 % (ref 18–48)
MCH RBC QN AUTO: 34.1 PG (ref 27–31)
MCHC RBC AUTO-ENTMCNC: 33.1 G/DL (ref 32–36)
MCV RBC AUTO: 103 FL (ref 82–98)
MONOCYTES # BLD AUTO: 0.4 K/UL (ref 0.3–1)
MONOCYTES NFR BLD: 7.1 % (ref 4–15)
NEUTROPHILS # BLD AUTO: 4.8 K/UL (ref 1.8–7.7)
NEUTROPHILS NFR BLD: 90.1 % (ref 38–73)
NRBC BLD-RTO: 0 /100 WBC
PHOSPHATE SERPL-MCNC: 4.3 MG/DL (ref 2.7–4.5)
PLATELET # BLD AUTO: 116 K/UL (ref 150–450)
PMV BLD AUTO: 11.7 FL (ref 9.2–12.9)
POCT GLUCOSE: 104 MG/DL (ref 70–110)
POCT GLUCOSE: 81 MG/DL (ref 70–110)
POTASSIUM SERPL-SCNC: 3.5 MMOL/L (ref 3.5–5.1)
RBC # BLD AUTO: 2.58 M/UL (ref 4.6–6.2)
SODIUM SERPL-SCNC: 137 MMOL/L (ref 136–145)
TACROLIMUS BLD-MCNC: 11 NG/ML (ref 5–15)
TACROLIMUS BLD-MCNC: 8.8 NG/ML (ref 5–15)
WBC # BLD AUTO: 5.32 K/UL (ref 3.9–12.7)

## 2023-05-17 PROCEDURE — 25000003 PHARM REV CODE 250: Performed by: PHYSICIAN ASSISTANT

## 2023-05-17 PROCEDURE — 63600175 PHARM REV CODE 636 W HCPCS: Performed by: TRANSPLANT SURGERY

## 2023-05-17 PROCEDURE — 36415 COLL VENOUS BLD VENIPUNCTURE: CPT | Performed by: PHYSICIAN ASSISTANT

## 2023-05-17 PROCEDURE — 63600175 PHARM REV CODE 636 W HCPCS: Performed by: STUDENT IN AN ORGANIZED HEALTH CARE EDUCATION/TRAINING PROGRAM

## 2023-05-17 PROCEDURE — 63600175 PHARM REV CODE 636 W HCPCS: Performed by: PHYSICIAN ASSISTANT

## 2023-05-17 PROCEDURE — 80069 RENAL FUNCTION PANEL: CPT | Performed by: PHYSICIAN ASSISTANT

## 2023-05-17 PROCEDURE — 80197 ASSAY OF TACROLIMUS: CPT | Performed by: PHYSICIAN ASSISTANT

## 2023-05-17 PROCEDURE — 25000003 PHARM REV CODE 250: Performed by: NURSE PRACTITIONER

## 2023-05-17 PROCEDURE — 99024 POSTOP FOLLOW-UP VISIT: CPT | Mod: ,,, | Performed by: PHYSICIAN ASSISTANT

## 2023-05-17 PROCEDURE — 99900035 HC TECH TIME PER 15 MIN (STAT)

## 2023-05-17 PROCEDURE — 94761 N-INVAS EAR/PLS OXIMETRY MLT: CPT

## 2023-05-17 PROCEDURE — 94640 AIRWAY INHALATION TREATMENT: CPT

## 2023-05-17 PROCEDURE — 99024 PR POST-OP FOLLOW-UP VISIT: ICD-10-PCS | Mod: ,,, | Performed by: PHYSICIAN ASSISTANT

## 2023-05-17 PROCEDURE — 25000003 PHARM REV CODE 250: Performed by: STUDENT IN AN ORGANIZED HEALTH CARE EDUCATION/TRAINING PROGRAM

## 2023-05-17 PROCEDURE — 85025 COMPLETE CBC W/AUTO DIFF WBC: CPT | Performed by: PHYSICIAN ASSISTANT

## 2023-05-17 RX ORDER — HYDROXYZINE HYDROCHLORIDE 25 MG/1
25 TABLET, FILM COATED ORAL 3 TIMES DAILY PRN
Qty: 90 TABLET | Refills: 0 | Status: SHIPPED | OUTPATIENT
Start: 2023-05-17 | End: 2023-07-03 | Stop reason: SDUPTHER

## 2023-05-17 RX ORDER — FUROSEMIDE 40 MG/1
20 TABLET ORAL 2 TIMES DAILY
Qty: 5 TABLET | Refills: 0 | Status: SHIPPED | OUTPATIENT
Start: 2023-05-17 | End: 2023-05-17 | Stop reason: SDUPTHER

## 2023-05-17 RX ORDER — METOPROLOL SUCCINATE 25 MG/1
12.5 TABLET, EXTENDED RELEASE ORAL DAILY
Qty: 15 TABLET | Refills: 11 | Status: SHIPPED | OUTPATIENT
Start: 2023-05-18 | End: 2023-11-13

## 2023-05-17 RX ORDER — FUROSEMIDE 10 MG/ML
100 INJECTION INTRAMUSCULAR; INTRAVENOUS ONCE
Status: COMPLETED | OUTPATIENT
Start: 2023-05-17 | End: 2023-05-17

## 2023-05-17 RX ORDER — SODIUM BICARBONATE 650 MG/1
650 TABLET ORAL 2 TIMES DAILY
Qty: 60 TABLET | Refills: 11 | Status: SHIPPED | OUTPATIENT
Start: 2023-05-17 | End: 2023-05-29 | Stop reason: SDUPTHER

## 2023-05-17 RX ORDER — ATOVAQUONE 750 MG/5ML
1500 SUSPENSION ORAL DAILY
Status: DISCONTINUED | OUTPATIENT
Start: 2023-05-17 | End: 2023-05-17 | Stop reason: HOSPADM

## 2023-05-17 RX ORDER — FUROSEMIDE 40 MG/1
40 TABLET ORAL 2 TIMES DAILY
Qty: 10 TABLET | Refills: 0 | Status: SHIPPED | OUTPATIENT
Start: 2023-05-17 | End: 2023-05-23 | Stop reason: ALTCHOICE

## 2023-05-17 RX ORDER — TACROLIMUS 1 MG/1
2 CAPSULE ORAL 2 TIMES DAILY
Status: DISCONTINUED | OUTPATIENT
Start: 2023-05-17 | End: 2023-05-17 | Stop reason: HOSPADM

## 2023-05-17 RX ORDER — TACROLIMUS 1 MG/1
2 CAPSULE ORAL EVERY 12 HOURS
Qty: 360 CAPSULE | Refills: 11 | Status: SHIPPED | OUTPATIENT
Start: 2023-05-17 | End: 2023-05-22

## 2023-05-17 RX ADMIN — FUROSEMIDE 100 MG: 10 INJECTION, SOLUTION INTRAMUSCULAR; INTRAVENOUS at 09:05

## 2023-05-17 RX ADMIN — THERA TABS 1 TABLET: TAB at 08:05

## 2023-05-17 RX ADMIN — FLUTICASONE FUROATE 1 PUFF: 100 POWDER RESPIRATORY (INHALATION) at 08:05

## 2023-05-17 RX ADMIN — ACYCLOVIR 400 MG: 200 CAPSULE ORAL at 08:05

## 2023-05-17 RX ADMIN — BISACODYL 5 MG: 5 TABLET, COATED ORAL at 08:05

## 2023-05-17 RX ADMIN — PREDNISONE 20 MG: 20 TABLET ORAL at 08:05

## 2023-05-17 RX ADMIN — ATOVAQUONE 1500 MG: 750 SUSPENSION ORAL at 09:05

## 2023-05-17 RX ADMIN — HEPARIN SODIUM 5000 UNITS: 5000 INJECTION INTRAVENOUS; SUBCUTANEOUS at 06:05

## 2023-05-17 RX ADMIN — DOCUSATE SODIUM 100 MG: 100 CAPSULE, LIQUID FILLED ORAL at 08:05

## 2023-05-17 RX ADMIN — OXYCODONE 5 MG: 5 TABLET ORAL at 09:05

## 2023-05-17 RX ADMIN — METOPROLOL SUCCINATE 12.5 MG: 25 TABLET, EXTENDED RELEASE ORAL at 08:05

## 2023-05-17 RX ADMIN — MYCOPHENOLIC ACID 720 MG: 180 TABLET, DELAYED RELEASE ORAL at 08:05

## 2023-05-17 RX ADMIN — TACROLIMUS 2 MG: 1 CAPSULE ORAL at 09:05

## 2023-05-17 SDOH — SOCIAL DETERMINANTS OF HEALTH (SDOH): OCCUPATIONAL EXPOSURE TO OTHER RISK FACTORS: Z57.8

## 2023-05-17 NOTE — PROGRESS NOTES
Fall bundle in place. Pt. Remained free from falls/trauma/injuries. No complaints of CP/ SOB/discomfort. VSS. NSR. A&Ox4. Pt. Reports pain this shift, given PRN Tramadol. Pts' abd. Incision remains open to air, CDI; cleansed w/iodine. Additional dose of Prograf 2 mg given, ordered by BRIGETTE López due to unknown Prograf AM levels on 05/16. The POC reviewed w/ pt. & pts' spouse @ bedside, questions were encouraged & answered. No further concerns at this time.

## 2023-05-17 NOTE — NURSING
Discharge instructions reviewed with the patient and his wife. Both verbalized their understanding and deny any questions or concerns at this time. Right FA PIVs removed. Patient preparing for discharge, awaiting transportation home.

## 2023-05-18 ENCOUNTER — TELEPHONE (OUTPATIENT)
Dept: TRANSPLANT | Facility: CLINIC | Age: 63
End: 2023-05-18

## 2023-05-18 ENCOUNTER — CLINICAL SUPPORT (OUTPATIENT)
Dept: TRANSPLANT | Facility: CLINIC | Age: 63
End: 2023-05-18
Payer: MEDICARE

## 2023-05-18 ENCOUNTER — LAB VISIT (OUTPATIENT)
Dept: LAB | Facility: HOSPITAL | Age: 63
End: 2023-05-18
Payer: MEDICARE

## 2023-05-18 VITALS
WEIGHT: 192.88 LBS | DIASTOLIC BLOOD PRESSURE: 72 MMHG | BODY MASS INDEX: 30.27 KG/M2 | SYSTOLIC BLOOD PRESSURE: 134 MMHG | TEMPERATURE: 97 F | OXYGEN SATURATION: 98 % | OXYGEN SATURATION: 97 % | SYSTOLIC BLOOD PRESSURE: 134 MMHG | DIASTOLIC BLOOD PRESSURE: 72 MMHG | RESPIRATION RATE: 17 BRPM | HEIGHT: 67 IN | RESPIRATION RATE: 17 BRPM | HEIGHT: 67 IN | WEIGHT: 192.88 LBS | TEMPERATURE: 97 F | BODY MASS INDEX: 30.27 KG/M2 | HEART RATE: 67 BPM | HEART RATE: 67 BPM

## 2023-05-18 DIAGNOSIS — Z94.0 KIDNEY REPLACED BY TRANSPLANT: ICD-10-CM

## 2023-05-18 LAB
ALBUMIN SERPL BCP-MCNC: 2.9 G/DL (ref 3.5–5.2)
ANION GAP SERPL CALC-SCNC: 18 MMOL/L (ref 8–16)
BASOPHILS # BLD AUTO: 0 K/UL (ref 0–0.2)
BASOPHILS NFR BLD: 0 % (ref 0–1.9)
BUN SERPL-MCNC: 74 MG/DL (ref 8–23)
CALCIUM SERPL-MCNC: 8.9 MG/DL (ref 8.7–10.5)
CHLORIDE SERPL-SCNC: 102 MMOL/L (ref 95–110)
CO2 SERPL-SCNC: 22 MMOL/L (ref 23–29)
CREAT SERPL-MCNC: 5.6 MG/DL (ref 0.5–1.4)
DIFFERENTIAL METHOD: ABNORMAL
EOSINOPHIL # BLD AUTO: 0 K/UL (ref 0–0.5)
EOSINOPHIL NFR BLD: 0.4 % (ref 0–8)
ERYTHROCYTE [DISTWIDTH] IN BLOOD BY AUTOMATED COUNT: 17.1 % (ref 11.5–14.5)
EST. GFR  (NO RACE VARIABLE): 10.8 ML/MIN/1.73 M^2
GLUCOSE SERPL-MCNC: 77 MG/DL (ref 70–110)
HCT VFR BLD AUTO: 30.2 % (ref 40–54)
HGB BLD-MCNC: 10.1 G/DL (ref 14–18)
IMM GRANULOCYTES # BLD AUTO: 0.04 K/UL (ref 0–0.04)
IMM GRANULOCYTES NFR BLD AUTO: 0.9 % (ref 0–0.5)
LYMPHOCYTES # BLD AUTO: 0.1 K/UL (ref 1–4.8)
LYMPHOCYTES NFR BLD: 3.1 % (ref 18–48)
MAGNESIUM SERPL-MCNC: 2.1 MG/DL (ref 1.6–2.6)
MCH RBC QN AUTO: 34.7 PG (ref 27–31)
MCHC RBC AUTO-ENTMCNC: 33.4 G/DL (ref 32–36)
MCV RBC AUTO: 104 FL (ref 82–98)
MONOCYTES # BLD AUTO: 0.4 K/UL (ref 0.3–1)
MONOCYTES NFR BLD: 8.3 % (ref 4–15)
NEUTROPHILS # BLD AUTO: 4 K/UL (ref 1.8–7.7)
NEUTROPHILS NFR BLD: 87.3 % (ref 38–73)
NRBC BLD-RTO: 0 /100 WBC
PHOSPHATE SERPL-MCNC: 3.9 MG/DL (ref 2.7–4.5)
PLATELET # BLD AUTO: 148 K/UL (ref 150–450)
PMV BLD AUTO: 10.7 FL (ref 9.2–12.9)
POTASSIUM SERPL-SCNC: 3.5 MMOL/L (ref 3.5–5.1)
RBC # BLD AUTO: 2.91 M/UL (ref 4.6–6.2)
SODIUM SERPL-SCNC: 142 MMOL/L (ref 136–145)
TACROLIMUS BLD-MCNC: 8.4 NG/ML (ref 5–15)
WBC # BLD AUTO: 4.58 K/UL (ref 3.9–12.7)

## 2023-05-18 PROCEDURE — 80197 ASSAY OF TACROLIMUS: CPT | Performed by: INTERNAL MEDICINE

## 2023-05-18 PROCEDURE — 99999 PR PBB SHADOW E&M-EST. PATIENT-LVL III: ICD-10-PCS | Mod: PBBFAC,,,

## 2023-05-18 PROCEDURE — 36415 COLL VENOUS BLD VENIPUNCTURE: CPT | Performed by: INTERNAL MEDICINE

## 2023-05-18 PROCEDURE — 99999 PR PBB SHADOW E&M-EST. PATIENT-LVL III: CPT | Mod: PBBFAC,,,

## 2023-05-18 PROCEDURE — 83735 ASSAY OF MAGNESIUM: CPT | Performed by: INTERNAL MEDICINE

## 2023-05-18 PROCEDURE — 99213 OFFICE O/P EST LOW 20 MIN: CPT | Mod: PBBFAC,27

## 2023-05-18 PROCEDURE — 85025 COMPLETE CBC W/AUTO DIFF WBC: CPT | Performed by: INTERNAL MEDICINE

## 2023-05-18 PROCEDURE — 99213 OFFICE O/P EST LOW 20 MIN: CPT | Mod: PBBFAC

## 2023-05-18 PROCEDURE — 80069 RENAL FUNCTION PANEL: CPT | Performed by: INTERNAL MEDICINE

## 2023-05-18 NOTE — PROGRESS NOTES
Discharge Note:    Pt will discharge to patient's home under the care of Kalani Parikh, patient's wife, phone number [phone].  SW confirmed with pt's dialysis unit, Richard Welsh pt's original chair time is being held MWF 2:00pm, if needed.  Pt aware of, involved in, and coping well with this discharge plan. Pt did not have any concerns with the discharge plan at this time. No additional psychosocial needs indicated for discharge at this time.  SW remains available at [phone].

## 2023-05-18 NOTE — TELEPHONE ENCOUNTER
Results reviewed with patient and Wife Kalani.  Voice a understanding to increase PO intake to over 2 liters of H2O daily.  Next labs with DGF assessment tomorrow 5/19.  ----- Message from Darrin Verma MD sent at 5/18/2023  1:17 PM CDT -----  Please document biopsy result: good sample, No IFTA no glomerulosclerosis ,ATI no rejection C4d negative, moderate arterionephrosclerosis    Encourage hydration

## 2023-05-18 NOTE — PROGRESS NOTES
Clinic Note: First Return to Clinic Post-  Kidney Transplant    Abdoulaye Parikh  is a 62 y.o. male  S/p LEFT KIDNEY   transplant on 5/10/2023 (Kidney) for Other, Specify - Unknown.      Discharge Course (Issues/Concerns): No real issues or concerns since discharge -- pt complaining of going to to the bathroom too often.    Objective:   Vitals:    05/18/23 1043   BP: 134/72   Pulse: 67   Resp: 17   Temp: 97.3 °F (36.3 °C)       Met with patient and his caregiver in the clinic to review current medication list.     Current Outpatient Medications   Medication Sig Dispense Refill    acyclovir (ZOVIRAX) 400 MG tablet Take 1 tablet (400 mg total) by mouth 2 (two) times daily. Stop 8/9/23 60 tablet 2    atovaquone (MEPRON) 750 mg/5 mL Susp oral liquid Take 10 mLs (1,500 mg total) by mouth once daily. Stop 11/7/23 300 mL 5    bisacodyL (DULCOLAX) 5 mg EC tablet Take 2 tablets (10 mg total) by mouth daily as needed for Constipation.  0    docusate sodium (COLACE) 100 MG capsule Take 1 capsule (100 mg total) by mouth 3 (three) times daily as needed for Constipation.  0    famotidine (PEPCID) 20 MG tablet Take 1 tablet (20 mg total) by mouth every evening. 30 tablet 0    fluticasone furoate (ARNUITY ELLIPTA) 100 mcg/actuation inhaler Inhale 100 mcg into the lungs once daily. Controller      furosemide (LASIX) 40 MG tablet Take 1 tablet (40 mg total) by mouth 2 (two) times daily. for 5 days 10 tablet 0    hydrOXYzine HCL (ATARAX) 25 MG tablet Take 1 tablet (25 mg total) by mouth 3 (three) times daily as needed for Anxiety. 90 tablet 0    metoprolol succinate (TOPROL-XL) 25 MG 24 hr tablet Take HALF tablet (12.5 mg total) by mouth once daily. 15 tablet 11    mycophenolate (MYFORTIC) 180 MG TbEC Take 4 tablets (720 mg total) by mouth 2 (two) times daily. 240 tablet 11    oxyCODONE (ROXICODONE) 5 MG immediate release tablet Take 1 tablet (5 mg total) by mouth every 4 (four) hours as needed for Pain. 40 tablet 0    predniSONE  (DELTASONE) 5 MG tablet Take 20 mg by mouth daily 5/14-5/20; 15 mg daily 5/21-5/27; 10 mg daily 5/28-6/3; then 5 mg daily thereafter 6/4/23 70 tablet 11    sodium bicarbonate 650 MG tablet Take 1 tablet (650 mg total) by mouth 2 (two) times daily. 60 tablet 11    tacrolimus (PROGRAF) 1 MG Cap Take 2 capsules (2 mg total) by mouth every 12 (twelve) hours. 360 capsule 11     No current facility-administered medications for this visit.       Pharmacy Interventions/Recommendations:     1) Graft Function & Immunosuppression Issues: Thymoglobulin induction with DGF.  Patient on tacrolimus 2 mg BID, Myfortic 720 mg BID and prednisone taper. SCr down to 5.6 from 6.1 - patient states he is making more urine.     2) Opportunistic Infection prophylaxis:   PCP ppx: Atovaquone 11/7/23 (bactrim allergy)  CMV ppx: Acyclovir until 8/9/23    3) Donor Serologies & Monitoring:     Donor CMV Status: Negative  Donor HCV Status: Negative  Donor HBcAb: Negative  Donor HBV AURORA: Negative  Donor HCV AUR[place]A: Negative    4) Pain Management & Bowel Regimen: Patient discharged on oxycodone 5 mg q4 PRN. Patient states he had a bowel movement today after 4 days and feeling much better. Patient advised to take tylenol more frequently if in pain.     5) Blood Pressure Management: Patient on metoprolol 12.5 mg daily for BP management. BP WNL on current regimen.     6) Blood Sugar Management & Follow-up: WNL without medications.     7) Electrolyte Management: Sodium bicarbonate 650 mg BID     8) Osteoporosis Prevention Strategy (Liver Transplant): N/A    9) OTHER medication follow-up (patient assistance, held medications, etc): N/A    10) Reinforced medication education conducted in the hospital, including medication indications, dosing, administration, side effects, monitoring-- including timing of immunosuppressant levels.     Patient received their FIRST fill of medications from [facility].  Discussed the process for obtaining refills of medications,  including verifying the dose and mailing address to have medications delivered.     Abdoulaye and his caregivers verbalized understanding and had the opportunity to ask questions.

## 2023-05-18 NOTE — PROGRESS NOTES
"1ST NURSING VISIT POST DISCHARGE NOTE    1st RN appointment with Abdoulaye Parikh post discharge 5/17/2023 s/p kidney transplant 5/10/23.  Patient's wife Kalani accompanied him.  Patient reports none.  Patient says that he that he is sleeping well.  Incision intact with staples.  Patient has  N/A .  Patient that he is able to explain daily incision care and showering instructions.  Reviewed I&O monitoring, measuring, and recording, and the need for hydration (i.e., at least 2 liters of water daily with minimal caffeine and no grapefruit products).  Medication list and rationale were reviewed.  Patient did bring blue medication card and medication bottles for review.  Patient reports that he has stopped Dulcolax and has continued Colace.  Patient has had a bowel movement.  Patient expressed understanding of daily care including BID VS, medications, and I&O documentation.  Patient made aware of today's creatinine level: 5.6 ,DGF labs and assessment schedule for 5/19..  Patient aware that coordinator will review today's labs with a transplant physician and call the patient with any dose changes indicated.  Next lab appointment scheduled for 5/19/2023.  First post-operative transplant team appointment with labs scheduled for 5/22/2023.    Using the Kidney Transplant Patient Reference Manual, the patient submitted his open book "Self-assessment of Kidney Transplant Patient Knowledge" test, which was completed in the transplant clinic this morning before 1st nursing visit.  This test includes questions regarding critical dose medications commonly used after kidney transplant, medication dosing and side effects, importance of timed lab draws, important signs and symptoms to report 24/7 immediately post-transplant as well as how to contact the transplant team 24/7.    Test Score: 22/25    After completing the test, the patient was given a copy of the Self-assessment Answer Key to reinforce accurate learning of test content.  " Patient expressed his understanding of the value of the information included in the self-assessment test.  Patient instructed on correct procedure for collection of midstream clean catch urine specimen and vebalizes understanding.

## 2023-05-18 NOTE — PROGRESS NOTES
Please document biopsy result: good sample, No IFTA no glomerulosclerosis ,ATI no rejection C4d negative, moderate arterionephrosclerosis    Encourage hydration

## 2023-05-19 ENCOUNTER — TELEPHONE (OUTPATIENT)
Dept: TRANSPLANT | Facility: CLINIC | Age: 63
End: 2023-05-19
Payer: MEDICARE

## 2023-05-19 ENCOUNTER — LAB VISIT (OUTPATIENT)
Dept: LAB | Facility: HOSPITAL | Age: 63
End: 2023-05-19
Attending: INTERNAL MEDICINE
Payer: MEDICARE

## 2023-05-19 DIAGNOSIS — Z94.0 KIDNEY REPLACED BY TRANSPLANT: ICD-10-CM

## 2023-05-19 LAB
ALBUMIN SERPL BCP-MCNC: 3 G/DL (ref 3.5–5.2)
ANION GAP SERPL CALC-SCNC: 14 MMOL/L (ref 8–16)
BASOPHILS # BLD AUTO: 0 K/UL (ref 0–0.2)
BASOPHILS NFR BLD: 0 % (ref 0–1.9)
BUN SERPL-MCNC: 70 MG/DL (ref 8–23)
CALCIUM SERPL-MCNC: 9.1 MG/DL (ref 8.7–10.5)
CHLORIDE SERPL-SCNC: 104 MMOL/L (ref 95–110)
CO2 SERPL-SCNC: 25 MMOL/L (ref 23–29)
CREAT SERPL-MCNC: 5 MG/DL (ref 0.5–1.4)
DIFFERENTIAL METHOD: ABNORMAL
EOSINOPHIL # BLD AUTO: 0 K/UL (ref 0–0.5)
EOSINOPHIL NFR BLD: 0.4 % (ref 0–8)
ERYTHROCYTE [DISTWIDTH] IN BLOOD BY AUTOMATED COUNT: 16.8 % (ref 11.5–14.5)
EST. GFR  (NO RACE VARIABLE): 12.4 ML/MIN/1.73 M^2
FINAL PATHOLOGIC DIAGNOSIS: NORMAL
GLUCOSE SERPL-MCNC: 83 MG/DL (ref 70–110)
GROSS: NORMAL
HCT VFR BLD AUTO: 31.1 % (ref 40–54)
HGB BLD-MCNC: 10.3 G/DL (ref 14–18)
IMM GRANULOCYTES # BLD AUTO: 0.06 K/UL (ref 0–0.04)
IMM GRANULOCYTES NFR BLD AUTO: 0.8 % (ref 0–0.5)
LYMPHOCYTES # BLD AUTO: 0.1 K/UL (ref 1–4.8)
LYMPHOCYTES NFR BLD: 1.5 % (ref 18–48)
Lab: NORMAL
MAGNESIUM SERPL-MCNC: 1.9 MG/DL (ref 1.6–2.6)
MCH RBC QN AUTO: 34.4 PG (ref 27–31)
MCHC RBC AUTO-ENTMCNC: 33.1 G/DL (ref 32–36)
MCV RBC AUTO: 104 FL (ref 82–98)
MONOCYTES # BLD AUTO: 0.4 K/UL (ref 0.3–1)
MONOCYTES NFR BLD: 5.1 % (ref 4–15)
NEUTROPHILS # BLD AUTO: 6.8 K/UL (ref 1.8–7.7)
NEUTROPHILS NFR BLD: 92.2 % (ref 38–73)
NRBC BLD-RTO: 0 /100 WBC
PHOSPHATE SERPL-MCNC: 3.2 MG/DL (ref 2.7–4.5)
PLATELET # BLD AUTO: 177 K/UL (ref 150–450)
PMV BLD AUTO: 10.9 FL (ref 9.2–12.9)
POTASSIUM SERPL-SCNC: 3.5 MMOL/L (ref 3.5–5.1)
RBC # BLD AUTO: 2.99 M/UL (ref 4.6–6.2)
SODIUM SERPL-SCNC: 143 MMOL/L (ref 136–145)
TACROLIMUS BLD-MCNC: 8.4 NG/ML (ref 5–15)
WBC # BLD AUTO: 7.42 K/UL (ref 3.9–12.7)

## 2023-05-19 PROCEDURE — 83735 ASSAY OF MAGNESIUM: CPT | Performed by: INTERNAL MEDICINE

## 2023-05-19 PROCEDURE — 80197 ASSAY OF TACROLIMUS: CPT | Performed by: INTERNAL MEDICINE

## 2023-05-19 PROCEDURE — 80069 RENAL FUNCTION PANEL: CPT | Performed by: INTERNAL MEDICINE

## 2023-05-19 PROCEDURE — 85025 COMPLETE CBC W/AUTO DIFF WBC: CPT | Performed by: INTERNAL MEDICINE

## 2023-05-19 PROCEDURE — 36415 COLL VENOUS BLD VENIPUNCTURE: CPT | Performed by: INTERNAL MEDICINE

## 2023-05-19 NOTE — PROGRESS NOTES
No HD today  Continue with DGF assessment per protocol  Labs Monday   Please let's make sure we discussed biopsy results when he is seen in clinic. Thanks very much

## 2023-05-19 NOTE — PROGRESS NOTES
Transplant Coordinator  5/10-5/17/2023     Pt admitted for a LURD kidney transplant from the paired exchange. CIT was ~12 hrs. Thymo induction, CMV -,-. ESRD 2/2 unknown etiology     Kidney function did not improve initially and made minimal UOP.   U/S and HD x1 POD 1  UOP improved some with dieretics ~600cc per day.      Kidney US 5/14 and 5/15/23. Pt taken back to the OR for exploration. Good flow and position. Kidney biopsy--PENDING. DSA negative.      UOP has increased and function improving, Cr 6.1 on day of d/c.  Outpt HD set up at home unit  MWF 2pm  Pt will d/c with lasix 40 mg BID x 5 days.      Labs 5/18 and RTC to meet with coordinator/pharmD

## 2023-05-19 NOTE — TELEPHONE ENCOUNTER
Patient repeated back and voice a understanding of orders.  Bonny WEATHERS at [facility] notified.  Next labs and clinic on 5/22.    ----- Message from Darrin Verma MD sent at 5/19/2023 11:05 AM CDT -----  No HD today  Continue with DGF assessment per protocol  Labs Monday   Please let's make sure we discussed biopsy results when he is seen in clinic. Thanks very much

## 2023-05-19 NOTE — TELEPHONE ENCOUNTER
"Return call to patient stating that he has had several episodes of loose watery stools today, states he feels its like a crohn's flare up.Patient does not want to come to the ER at present.Voice a understanding that  to drink extra H2O with each episode and present to the ER if no improvement.Patient denies pain Nausea or Vomiting.  ----- Message from Randall Ortiz sent at 5/19/2023  2:15 PM CDT -----  Consult/Advisory:          Name Of Caller: Kalani Parikh (Spouse)       Contact Preference?: [phone] (Mobile)      What is the nature of the call?: Calling to speak w/ Robles about pt current stomach issues          Additional Notes:  "Thank you for all that you do for our patients"      "

## 2023-05-22 ENCOUNTER — TELEPHONE (OUTPATIENT)
Dept: TRANSPLANT | Facility: CLINIC | Age: 63
End: 2023-05-22

## 2023-05-22 ENCOUNTER — HOSPITAL ENCOUNTER (OUTPATIENT)
Dept: RADIOLOGY | Facility: HOSPITAL | Age: 63
Discharge: HOME | End: 2023-05-22
Attending: NURSE PRACTITIONER
Payer: MEDICARE

## 2023-05-22 ENCOUNTER — OFFICE VISIT (OUTPATIENT)
Dept: TRANSPLANT | Facility: CLINIC | Age: 63
End: 2023-05-22
Payer: MEDICARE

## 2023-05-22 VITALS
WEIGHT: 174.19 LBS | BODY MASS INDEX: 25.8 KG/M2 | SYSTOLIC BLOOD PRESSURE: 100 MMHG | HEIGHT: 69 IN | RESPIRATION RATE: 24 BRPM | HEART RATE: 84 BPM | TEMPERATURE: 97 F | OXYGEN SATURATION: 98 % | DIASTOLIC BLOOD PRESSURE: 65 MMHG

## 2023-05-22 DIAGNOSIS — Z94.0 S/P KIDNEY TRANSPLANT: ICD-10-CM

## 2023-05-22 DIAGNOSIS — Z79.60 LONG-TERM USE OF IMMUNOSUPPRESSANT MEDICATION: ICD-10-CM

## 2023-05-22 DIAGNOSIS — Z94.0 S/P KIDNEY TRANSPLANT: Primary | ICD-10-CM

## 2023-05-22 DIAGNOSIS — E87.20 METABOLIC ACIDOSIS: ICD-10-CM

## 2023-05-22 DIAGNOSIS — T86.19 DELAYED RENAL GRAFT FUNCTION: ICD-10-CM

## 2023-05-22 DIAGNOSIS — M79.601 RIGHT ARM PAIN: ICD-10-CM

## 2023-05-22 PROCEDURE — 99999 PR PBB SHADOW E&M-EST. PATIENT-LVL V: ICD-10-PCS | Mod: PBBFAC,,, | Performed by: NURSE PRACTITIONER

## 2023-05-22 PROCEDURE — 93971 EXTREMITY STUDY: CPT | Mod: TC,RT

## 2023-05-22 PROCEDURE — 99215 PR OFFICE/OUTPT VISIT, EST, LEVL V, 40-54 MIN: ICD-10-PCS | Mod: S$PBB,,, | Performed by: NURSE PRACTITIONER

## 2023-05-22 PROCEDURE — 99999 PR PBB SHADOW E&M-EST. PATIENT-LVL V: CPT | Mod: PBBFAC,,, | Performed by: NURSE PRACTITIONER

## 2023-05-22 PROCEDURE — 93971 US UPPER EXTREMITY VEINS RIGHT: ICD-10-PCS | Mod: 26,RT,, | Performed by: RADIOLOGY

## 2023-05-22 PROCEDURE — 99215 OFFICE O/P EST HI 40 MIN: CPT | Mod: S$PBB,,, | Performed by: NURSE PRACTITIONER

## 2023-05-22 PROCEDURE — 93971 EXTREMITY STUDY: CPT | Mod: 26,RT,, | Performed by: RADIOLOGY

## 2023-05-22 PROCEDURE — 99215 OFFICE O/P EST HI 40 MIN: CPT | Mod: PBBFAC,25 | Performed by: NURSE PRACTITIONER

## 2023-05-22 RX ORDER — TACROLIMUS 1 MG/1
CAPSULE ORAL
Qty: 360 CAPSULE | Refills: 11 | Status: SHIPPED | OUTPATIENT
Start: 2023-05-22 | End: 2023-05-25

## 2023-05-22 NOTE — PHYSICIAN QUERY
PT Name: Abdoulaye Parikh  MR #: [id number]    DOCUMENTATION CLARIFICATION     CDS/: Amanda Lopez RN               Contact information: [email or web address]  This form is a permanent document in the medical record.     Query Date: May 22, 2023    By submitting this query, we are merely seeking further clarification of documentation.  Please utilize your independent clinical judgment when addressing the question(s) below.    The medical record contains the following:  Pathology Findings Location in Medical Record   Final Pathologic Diagnosis [place] DIAGNOSIS:     KIDNEY (PERCUTANEOUS TRANSPLANT BIOPSY):   1)  ACUTE TUBULAR INJURY.   2)  MODERATE-TO-SEVERE ARTERIOSCLEROSIS (SEE COMMENT).       Aline Hercules M.D.       Surgical Pathology Report 5/16       Please clarify the pathology findings.    [x  ] Pathology findings noted above are ruled in/confirmed as diagnoses     [  ] Pathology findings noted above are not confirmed as diagnoses     [  ] Pathology findings noted above are incidental     [  ] Other diagnosis (please specify): ___________       Please document in your progress notes daily for the duration of treatment until resolved and include in your discharge summary.    Form No. [id number]

## 2023-05-22 NOTE — TELEPHONE ENCOUNTER
Vaishnavi Bartholomew at [facility] notified.  Prograf dose adjusted in clinic today.  Next labs on 5/25/2023.  ----- Message from Darrin Verma MD sent at 5/22/2023 10:53 AM CDT -----  Yes ok to d/c dialysis chair and make sure they adjust prograf dose today

## 2023-05-22 NOTE — PROGRESS NOTES
Kidney Post-Transplant Assessment    Referring Physician: Quinn Espino  Current Nephrologist: Quinn Espino    ORGAN: LEFT KIDNEY  Donor Type: living  PHS Increased Risk: no  Cold Ischemia: 11 hours  Induction Medications: Thymo    Subjective:     CC:  Reassessment of renal allograft function and management of chronic immunosuppression.    HPI:  Mr. Parikh is a 62 y.o. year old Black or  male who received a living (paired exchange) kidney transplant on 5/10/23. His most recent creatinine is 3.5. He takes mycophenolate mofetil, prednisone, and tacrolimus for maintenance immunosuppression. His post transplant course has been complicated by delayed graft function requiring dialysis.    5/15/23-Kidney Bx: Good sample, No IFTA no glomerulosclerosis ,ATI no rejection C4d negative, moderate arterionephrosclerosis     Hospitalization/ ED visits  None    Interval HX:  Reports doing well except for pain to right arm. It is tender to touch, red swollen       Intake 2-2.5L  UOP 2.5L  BP low 100s  Peripheral edema: reports improved, trace edema  Weight: lose about 10lbs  Appetite: slowly increase  Wound: staples  fx assessment: improving   Lab /diagnostic results reviewed with patient today.   All questions answered        Current Outpatient Medications:     acyclovir (ZOVIRAX) 400 MG tablet, Take 1 tablet (400 mg total) by mouth 2 (two) times daily. Stop 8/9/23, Disp: 60 tablet, Rfl: 2    atovaquone (MEPRON) 750 mg/5 mL Susp oral liquid, Take 10 mLs (1,500 mg total) by mouth once daily. Stop 11/7/23, Disp: 300 mL, Rfl: 5    famotidine (PEPCID) 20 MG tablet, Take 1 tablet (20 mg total) by mouth every evening., Disp: 30 tablet, Rfl: 0    fluticasone furoate (ARNUITY ELLIPTA) 100 mcg/actuation inhaler, Inhale 100 mcg into the lungs once daily. Controller, Disp: , Rfl:     furosemide (LASIX) 40 MG tablet, Take 1 tablet (40 mg total) by mouth 2 (two) times daily. for 5 days, Disp: 10 tablet, Rfl:  0    hydrOXYzine HCL (ATARAX) 25 MG tablet, Take 1 tablet (25 mg total) by mouth 3 (three) times daily as needed for Anxiety., Disp: 90 tablet, Rfl: 0    metoprolol succinate (TOPROL-XL) 25 MG 24 hr tablet, Take HALF tablet (12.5 mg total) by mouth once daily., Disp: 15 tablet, Rfl: 11    mycophenolate (MYFORTIC) 180 MG TbEC, Take 4 tablets (720 mg total) by mouth 2 (two) times daily., Disp: 240 tablet, Rfl: 11    oxyCODONE (ROXICODONE) 5 MG immediate release tablet, Take 1 tablet (5 mg total) by mouth every 4 (four) hours as needed for Pain., Disp: 40 tablet, Rfl: 0    predniSONE (DELTASONE) 5 MG tablet, Take 20 mg by mouth daily 5/14-5/20; 15 mg daily 5/21-5/27; 10 mg daily 5/28-6/3; then 5 mg daily thereafter 6/4/23, Disp: 70 tablet, Rfl: 11    sodium bicarbonate 650 MG tablet, Take 1 tablet (650 mg total) by mouth 2 (two) times daily., Disp: 60 tablet, Rfl: 11    tacrolimus (PROGRAF) 1 MG Cap, Take 2 capsules (2 mg total) by mouth every 12 (twelve) hours., Disp: 360 capsule, Rfl: 11    bisacodyL (DULCOLAX) 5 mg EC tablet, Take 2 tablets (10 mg total) by mouth daily as needed for Constipation. (Patient not taking: Reported on 5/22/2023), Disp: , Rfl: 0    docusate sodium (COLACE) 100 MG capsule, Take 1 capsule (100 mg total) by mouth 3 (three) times daily as needed for Constipation. (Patient not taking: Reported on 5/22/2023), Disp: , Rfl: 0      Past Medical History:   Diagnosis Date    Anemia     Arthritis     Cirrhosis     Crohn's disease     Disorder of kidney and ureter     Stage 5    Encounter for blood transfusion     Gout     Hearing loss     Hepatitis     Had Hep C Cleared    Hypertension     Neurosarcoidosis 2018    Obesity     Osteoarthritis     Sarcoid neuropathy     Sarcoidosis, lung     Steatosis        Review of Systems   Constitutional:  Negative for appetite change, chills, fatigue and fever.   HENT:  Negative for trouble swallowing.    Respiratory:  Negative for cough, chest tightness, shortness  "of breath and wheezing.    Cardiovascular:  Negative for chest pain, palpitations and leg swelling.   Gastrointestinal:  Negative for abdominal pain, constipation, diarrhea and nausea.   Genitourinary:  Negative for difficulty urinating, frequency and urgency.   Musculoskeletal:  Negative for arthralgias and myalgias.        Right arm swollen, tender to touch, redness   Skin:  Negative for rash.   Allergic/Immunologic: Positive for immunocompromised state.   Neurological:  Negative for dizziness, weakness, light-headedness and headaches.   Psychiatric/Behavioral:  Negative for sleep disturbance.      Objective:   Blood pressure 100/65, pulse 84, temperature 97.3 °F (36.3 °C), temperature source Skin, resp. rate (!) 24, height 5' 9" (1.753 m), weight 79 kg (174 lb 2.6 oz), SpO2 98 %.body mass index is 25.72 kg/m².    Physical Exam  Constitutional:       General: He is not in acute distress.     Appearance: He is well-developed. He is not diaphoretic.   Cardiovascular:      Rate and Rhythm: Normal rate and regular rhythm.      Heart sounds: Normal heart sounds.   Pulmonary:      Effort: Pulmonary effort is normal.      Breath sounds: Normal breath sounds.   Abdominal:      General: Bowel sounds are normal.      Palpations: Abdomen is soft.   Musculoskeletal:         General: Swelling (Right arm swollen, tender to touch, redness) and tenderness present. Normal range of motion.   Skin:     General: Skin is warm and dry.      Findings: No rash.      Nails: There is no clubbing.   Neurological:      Mental Status: He is alert and oriented to person, place, and time.   Psychiatric:         Behavior: Behavior normal.       Labs:  Lab Results   Component Value Date    WBC 14.81 (H) 05/22/2023    HGB 9.8 (L) 05/22/2023    HCT 29.8 (L) 05/22/2023     05/22/2023    K 3.6 05/22/2023     05/22/2023    CO2 23 05/22/2023    BUN 54 (H) 05/22/2023    CREATININE 3.5 (H) 05/22/2023    EGFRNORACEVR 19.0 (A) 05/22/2023    " CALCIUM 9.3 05/22/2023    PHOS 2.9 05/22/2023    MG 1.6 05/22/2023    ALBUMIN 3.0 (L) 05/22/2023    AST 28 05/11/2023    ALT 25 05/11/2023    UTPCR 1.35 (H) 05/14/2023    .7 (H) 05/10/2023    TACROLIMUS 5.7 05/22/2023       No results found for: EXTANC, EXTWBC, EXTSEGS, EXTPLATELETS, EXTHEMOGLOBI, EXTHEMATOCRI, EXTCREATININ, EXTSODIUM, EXTPOTASSIUM, EXTBUN, EXTCO2, EXTCALCIUM, EXTPHOSPHORU, EXTGLUCOSE, EXTALBUMIN, EXTAST, EXTALT, EXTBILITOTAL, EXTLIPASE, EXTAMYLASE    No results found for: EXTCYCLOSLVL, EXTSIROLIMUS, EXTTACROLVL, EXTPROTCRE, EXTPTHINTACT, EXTPROTEINUA, EXTWBCUA, EXTRBCUA    Labs were reviewed with the patient    Assessment:     1. S/P kidney transplant    2. Long-term use of immunosuppressant medication    3. Metabolic acidosis    4. Right arm pain    5. Delayed renal graft function        Plan:   US right upper extremity to assess for DVT; red, swollen, tinder to touch  Tac changed to 3/2; today's level 5.7    @1408  Occlusive thrombus in the right cephalic (superficial) vein compatible with superficial thrombophlebitis  No action needed since involvement with superficial vein  Needs elevation and warm compresses  I notified patient via phone call today. Answered all questions       1. CKD stage: will continue follow up as per our center guidelines. patient to continue close follow up with the local General nephrologist. Education provided in appropriate fluid intake, potassium intake. Continue with oral hydration.  DGF-- on lasix 40mg BID    2. Immunosuppression: Prograf trough 5.7, therapeutic target 8-10. Increase  Prograf 3/2, MAS521 Mg BID, and Prednisone   Lab Results   Component Value Date    TACROLIMUS 5.7 05/22/2023    TACROLIMUS 8.4 05/19/2023    TACROLIMUS 8.4 05/18/2023   Will closely monitor for toxicities, education provided about adherence to medicines and need to communicate any side effect to the transplant nurse or physician.    3. Allograft Function:stable at baseline for  the patient. Continue follow up as per our guidelines and with the local General nephrologist. Communication will be sent today.  Lab Results   Component Value Date    CREATININE 3.5 (H) 05/22/2023    CREATININE 5.0 (H) 05/19/2023    CREATININE 5.6 (H) 05/18/2023     No results found for: AMYLASE, LIPASE    4. Hypertension management:  Continue with home blood pressure monitoring, low salt and healthy life discussed with the patient.  BP Readings from Last 3 Encounters:   05/22/23 100/65   05/18/23 134/72   05/18/23 134/72   MTP 12.5mg daily    5. Metabolic Bone Disease/Secondary Hyperparathyroidism:calcium and phosphorus level discussed with the patient, patient will continue follow up with the general nephrologist for management of metabolic bone disease calcium and phosphorus as per our center protocol. Will monitor PTH, Vit D level, calcium.   Lab Results   Component Value Date    .7 (H) 05/10/2023    CALCIUM 9.3 05/22/2023    PHOS 2.9 05/22/2023    PHOS 3.2 05/19/2023    PHOS 3.9 05/18/2023       6. Electrolytes: reviewed with the patient, essentially within the normal range no need for acute changes today, will monitor as per our center guidelines.  Lab Results   Component Value Date     05/22/2023    K 3.6 05/22/2023     05/22/2023    CO2 23 05/22/2023    CO2 25 05/19/2023    CO2 22 (L) 05/18/2023   NaBicarb 650mg BID    7. Anemia: will continue monitoring as per our center guidelines. No indication for acute intervention today.  Lab Results   Component Value Date    WBC 14.81 (H) 05/22/2023    HGB 9.8 (L) 05/22/2023    HCT 29.8 (L) 05/22/2023     (H) 05/22/2023     05/22/2023       8.Proteinuria: will continue with pr/cr ratio as per our center guidelines  Lab Results   Component Value Date    PROTEINURINE 169 (H) 05/14/2023    CREATRANDUR 125.0 05/14/2023    UTPCR 1.35 (H) 05/14/2023    UTPCR 1.88 (H) 05/11/2023        9. BK virus infection screening: will continue with  urine or blood PCR as per our guidelines to prevent BK virus viremia and allograft dysfunction  No results found for: BKVIRUSDNAUR, BKQUANTURINE, BKVIRUSLOG, BKVIRUSURINE, BKVIRUSPCRQB      10. Weight education: provided during the clinic visit.  Body mass index is 25.72 kg/m².     11.Patient safety education regarding immunosuppression including prophylaxis posttransplant for CMV, PCP : Education provided about vaccination and prevention of infections.    12.  Cytopenias: no significant cytopenias will monitor as per our guidelines. Medicine list reviewed including potential causes of drug-induced cytopenias  Lab Results   Component Value Date    WBC 14.81 (H) 05/22/2023    HGB 9.8 (L) 05/22/2023    HCT 29.8 (L) 05/22/2023     (H) 05/22/2023     05/22/2023       13. Post-transplant Prophylaxis; CMV Infection, PJP and Candida mucosistis and other indicated for this particular patient.   PCP PROPHYLAXIS: Atovaquone until 11/7/23 (bactrim allergy)  CMV PROPHYLAXIS: Acyclovir until 8/9/23      Exercise: reminded Roc of the importance of regular exercise for weight management, blood sugar and blood pressure management.  I also explained exercise has been shown to improve cardiovascular health, energy level, and sleep hygiene.  Lastly, I advised him that cardiovascular complications are leading cause of death for renal transplant recipients, and regular exercise can help lower this risk.      Follow-up:   Clinic: return to transplant clinic weekly for the first month after transplant; every 2 weeks during months 2-3; then at 6-, 9-, 12-, 18-, 24-, and 36- months post-transplant to reassess for complications from immunosuppression toxicity and monitor for rejection.  Annually thereafter.    Labs: since patient remains at high risk for rejection and drug-related complications that warrant close monitoring, labs will be ordered as follows: continue twice weekly CBC, renal panel, and drug level for first  month; then same labs once weekly through 3rd month post-transplant.  Urine for UA and protein/creatinine ratio monthly.  Serum BK - PCR at 1-, 3-, 6-, 9-, 12-, 18-, 24-, 36-, 48-, and 60 months post-transplant.  Hepatic panel at 1-, 2-, 3-, 6-, 9-, 12-, 18-, 24-, and 36- months post-transplant.    Rose Mary Stroud NP       Education:   Material provided to the patient.  Patient reminded to call with any health changes since these can be early signs of significant complications.  Also, I advised the patient to be sure any new medications or changes of old medications are discussed with either a pharmacist or physician knowledgeable with transplant to avoid rejection/drug toxicity related to significant drug interactions.    Patient advised that it is recommended that all transplanted patients, and their close contacts and household members receive Covid vaccination.

## 2023-05-22 NOTE — LETTER
May 22, 2023        Quinn Espino  [address]  [address]  [facility] [address]  Phone: [phone]  Fax: [phone]             [facility]st [facility]  [address]  [facility] [address]  Phone: [phone]   Patient: Abdoulaye Parikh   MR Number: [id number]   YOB: 1960   Date of Visit: 5/22/2023       Dear Dr. Quinn Espino    Thank you for referring Abdoulaye Parikh to me for evaluation. Attached you will find relevant portions of my assessment and plan of care.    If you have questions, please do not hesitate to call me. I look forward to following Abdoulaye Parikh along with you.    Sincerely,    Rose Mary Stroud, NP    Enclosure    If you would like to receive this communication electronically, please contact [email or web address] or [phone] to request [employer] access.    [employer] is a tool which provides read-only access to select patient information with whom you have a relationship. Its easy to use and provides real time access to review your patients record including encounter summaries, notes, results, and demographic information.    If you feel you have received this communication in error or would no longer like to receive these types of communications, please e-mail [email or web address]

## 2023-05-23 ENCOUNTER — TELEPHONE (OUTPATIENT)
Dept: TRANSPLANT | Facility: CLINIC | Age: 63
End: 2023-05-23
Payer: MEDICARE

## 2023-05-23 NOTE — TELEPHONE ENCOUNTER
Return call to patient and wanted to validate he was to D/C Lasix on 5/22.  ----- Message from Robles Arita RN sent at 5/23/2023 11:29 AM CDT -----  Regarding: FW: Medication Advice    ----- Message -----  From: Praveen Francis  Sent: 5/23/2023   9:48 AM CDT  To: [facility] Post-Kidney Transplant Clinical  Subject: Medication Advice                                Patient's spouse called in requesting to speak with nurse for advice on one of patient's medications. No other info provided. Requested a call back to discuss further.                               Contact: [phone] or [phone]

## 2023-05-24 ENCOUNTER — PATIENT MESSAGE (OUTPATIENT)
Dept: TRANSPLANT | Facility: CLINIC | Age: 63
End: 2023-05-24
Payer: MEDICARE

## 2023-05-25 ENCOUNTER — LAB VISIT (OUTPATIENT)
Dept: LAB | Facility: HOSPITAL | Age: 63
End: 2023-05-25
Attending: INTERNAL MEDICINE
Payer: MEDICARE

## 2023-05-25 ENCOUNTER — PATIENT MESSAGE (OUTPATIENT)
Dept: ADMINISTRATIVE | Facility: OTHER | Age: 63
End: 2023-05-25
Payer: MEDICARE

## 2023-05-25 ENCOUNTER — PATIENT MESSAGE (OUTPATIENT)
Dept: TRANSPLANT | Facility: CLINIC | Age: 63
End: 2023-05-25
Payer: MEDICARE

## 2023-05-25 DIAGNOSIS — Z94.0 KIDNEY REPLACED BY TRANSPLANT: ICD-10-CM

## 2023-05-25 DIAGNOSIS — Z94.0 S/P KIDNEY TRANSPLANT: ICD-10-CM

## 2023-05-25 LAB
ALBUMIN SERPL BCP-MCNC: 3 G/DL (ref 3.5–5.2)
ANION GAP SERPL CALC-SCNC: 16 MMOL/L (ref 8–16)
BASOPHILS # BLD AUTO: 0.02 K/UL (ref 0–0.2)
BASOPHILS NFR BLD: 0.3 % (ref 0–1.9)
BUN SERPL-MCNC: 44 MG/DL (ref 8–23)
CALCIUM SERPL-MCNC: 9.7 MG/DL (ref 8.7–10.5)
CHLORIDE SERPL-SCNC: 106 MMOL/L (ref 95–110)
CO2 SERPL-SCNC: 22 MMOL/L (ref 23–29)
CREAT SERPL-MCNC: 2.9 MG/DL (ref 0.5–1.4)
DIFFERENTIAL METHOD: ABNORMAL
EOSINOPHIL # BLD AUTO: 0 K/UL (ref 0–0.5)
EOSINOPHIL NFR BLD: 0.5 % (ref 0–8)
ERYTHROCYTE [DISTWIDTH] IN BLOOD BY AUTOMATED COUNT: 18.1 % (ref 11.5–14.5)
EST. GFR  (NO RACE VARIABLE): 23.9 ML/MIN/1.73 M^2
GLUCOSE SERPL-MCNC: 86 MG/DL (ref 70–110)
HCT VFR BLD AUTO: 28.9 % (ref 40–54)
HGB BLD-MCNC: 9.3 G/DL (ref 14–18)
IMM GRANULOCYTES # BLD AUTO: 0.05 K/UL (ref 0–0.04)
IMM GRANULOCYTES NFR BLD AUTO: 0.7 % (ref 0–0.5)
LYMPHOCYTES # BLD AUTO: 0.2 K/UL (ref 1–4.8)
LYMPHOCYTES NFR BLD: 3.1 % (ref 18–48)
MAGNESIUM SERPL-MCNC: 1.8 MG/DL (ref 1.6–2.6)
MCH RBC QN AUTO: 34.6 PG (ref 27–31)
MCHC RBC AUTO-ENTMCNC: 32.2 G/DL (ref 32–36)
MCV RBC AUTO: 107 FL (ref 82–98)
MONOCYTES # BLD AUTO: 0.3 K/UL (ref 0.3–1)
MONOCYTES NFR BLD: 3.7 % (ref 4–15)
NEUTROPHILS # BLD AUTO: 6.7 K/UL (ref 1.8–7.7)
NEUTROPHILS NFR BLD: 91.7 % (ref 38–73)
NRBC BLD-RTO: 0 /100 WBC
PHOSPHATE SERPL-MCNC: 2.6 MG/DL (ref 2.7–4.5)
PLATELET # BLD AUTO: 276 K/UL (ref 150–450)
PMV BLD AUTO: 11 FL (ref 9.2–12.9)
POTASSIUM SERPL-SCNC: 3.9 MMOL/L (ref 3.5–5.1)
RBC # BLD AUTO: 2.69 M/UL (ref 4.6–6.2)
SODIUM SERPL-SCNC: 144 MMOL/L (ref 136–145)
TACROLIMUS BLD-MCNC: 10.6 NG/ML (ref 5–15)
WBC # BLD AUTO: 7.32 K/UL (ref 3.9–12.7)

## 2023-05-25 PROCEDURE — 80197 ASSAY OF TACROLIMUS: CPT | Performed by: INTERNAL MEDICINE

## 2023-05-25 PROCEDURE — 85025 COMPLETE CBC W/AUTO DIFF WBC: CPT | Performed by: INTERNAL MEDICINE

## 2023-05-25 PROCEDURE — 83735 ASSAY OF MAGNESIUM: CPT | Performed by: INTERNAL MEDICINE

## 2023-05-25 PROCEDURE — 80069 RENAL FUNCTION PANEL: CPT | Performed by: INTERNAL MEDICINE

## 2023-05-25 PROCEDURE — 36415 COLL VENOUS BLD VENIPUNCTURE: CPT | Performed by: INTERNAL MEDICINE

## 2023-05-25 RX ORDER — TACROLIMUS 1 MG/1
2 CAPSULE ORAL EVERY 12 HOURS
Qty: 120 CAPSULE | Refills: 11 | Status: SHIPPED | OUTPATIENT
Start: 2023-05-25 | End: 2023-06-08 | Stop reason: DRUGHIGH

## 2023-05-25 NOTE — TELEPHONE ENCOUNTER
Message sent to pt instructing him to decrease prograf dose to 2mg twice a day. Repeat labs Monday  ----- Message from Darrin Verma MD sent at 5/25/2023 10:42 AM CDT -----  Please Lower prograf to 2 mg PO bid ;labs next week twice  a week

## 2023-05-26 PROBLEM — N18.4 CKD (CHRONIC KIDNEY DISEASE) STAGE 4, GFR 15-29 ML/MIN: Status: ACTIVE | Noted: 2023-05-26

## 2023-05-26 PROBLEM — Z94.0 IMMUNOSUPPRESSIVE MANAGEMENT ENCOUNTER FOLLOWING KIDNEY TRANSPLANT: Status: ACTIVE | Noted: 2023-05-26

## 2023-05-26 PROBLEM — Z79.899 IMMUNOSUPPRESSIVE MANAGEMENT ENCOUNTER FOLLOWING KIDNEY TRANSPLANT: Status: ACTIVE | Noted: 2023-05-26

## 2023-05-26 NOTE — PROGRESS NOTES
Kidney Post-Transplant Assessment    Referring Physician: Quinn Espino  Current Nephrologist: Quinn Espino    ORGAN: LEFT KIDNEY  Donor Type: living  PHS Increased Risk: no  Cold Ischemia:  mins  Induction Medications: thymo    Subjective:     CC:  Reassessment of renal allograft function and management of chronic immunosuppression.    HPI:  Mr. Parikh is a 62 y.o. year old Black or  male who received a living kidney transplant on 5/10/23. His most recent creatinine is 2.9. He takes mycophenolate mofetil, prednisone, and tacrolimus for maintenance immunosuppression. His post transplant course has been complicated by slow graft function . Kidney biopsy negative for rejection consistent with severe arteriosclerosis     Refers diarrhea 4 to 5 times a day    They state that  the BP is normal at home    No fever No abdominal pain     No nausea or vomit     Current Outpatient Medications on File Prior to Visit   Medication Sig Dispense Refill    acyclovir (ZOVIRAX) 400 MG tablet Take 1 tablet (400 mg total) by mouth 2 (two) times daily. Stop 8/9/23 60 tablet 2    atovaquone (MEPRON) 750 mg/5 mL Susp oral liquid Take 10 mLs (1,500 mg total) by mouth once daily. Stop 11/7/23 300 mL 5    famotidine (PEPCID) 20 MG tablet Take 1 tablet (20 mg total) by mouth every evening. 30 tablet 0    hydrOXYzine HCL (ATARAX) 25 MG tablet Take 1 tablet (25 mg total) by mouth 3 (three) times daily as needed for Anxiety. 90 tablet 0    metoprolol succinate (TOPROL-XL) 25 MG 24 hr tablet Take HALF tablet (12.5 mg total) by mouth once daily. 15 tablet 11    mycophenolate (MYFORTIC) 180 MG TbEC Take 4 tablets (720 mg total) by mouth 2 (two) times daily. 240 tablet 11    oxyCODONE (ROXICODONE) 5 MG immediate release tablet Take 1 tablet (5 mg total) by mouth every 4 (four) hours as needed for Pain. 40 tablet 0    predniSONE (DELTASONE) 5 MG tablet Take 20 mg by mouth daily 5/14-5/20; 15 mg daily 5/21-5/27; 10 mg  "daily 5/28-6/3; then 5 mg daily thereafter 6/4/23 70 tablet 11    sodium bicarbonate 650 MG tablet Take 1 tablet (650 mg total) by mouth 2 (two) times daily. 60 tablet 11    tacrolimus (PROGRAF) 1 MG Cap Take 2 capsules (2 mg total) by mouth every 12 (twelve) hours. 120 capsule 11    bisacodyL (DULCOLAX) 5 mg EC tablet Take 2 tablets (10 mg total) by mouth daily as needed for Constipation. (Patient not taking: Reported on 5/29/2023)  0    docusate sodium (COLACE) 100 MG capsule Take 1 capsule (100 mg total) by mouth 3 (three) times daily as needed for Constipation. (Patient not taking: Reported on 5/22/2023)  0    fluticasone furoate (ARNUITY ELLIPTA) 100 mcg/actuation inhaler Inhale 100 mcg into the lungs once daily. Controller       No current facility-administered medications on file prior to visit.                    Review of Systems    Skin: no skin rash  CNS; no headaches, blurred vision, seizure, or syncope  ENT: No JVD,  Adenopathies,  nasal congestion. No oral lesions  Cardiac: No chest pain, dyspnea, claudication, edema or palpitations  Respiratory: No SOB, cough, hemoptysis   Gastro-intestinal: ++ diarrhea, constipation, abdominal pain, nausea, vomit. No ascitis  Genitourinary: no hematuria, dysuria, frequency, frequency  Musculoskeletal: joint pain, arthritis or vasculitic changes  Psych: alert awake, oriented, No cranial nerves deficit.      Objective:       Physical Exam    /63 (BP Location: Right arm, Patient Position: Sitting, BP Method: Medium (Automatic))   Pulse 60   Temp 97.7 °F (36.5 °C) (Tympanic)   Resp 18   Ht 5' 9" (1.753 m)   Wt 79 kg (174 lb 2.6 oz)   SpO2 99%   BMI 25.72 kg/m²       Head: normocephalic  Neck: No JVD, cervical axillary, or femoral adenopathies  Heart: no murmurs, Normal s1 and s2, No gallops, no rubs, No murmurs  Lungs; CTA, good respiratory effort, no crackles  Abdomen: soft, non tender, no splenomegaly or hepatomegaly, no massess, no bruits. Incision is " healing well. No erythema No pain.   Extremities: No edema, skin rash, joint pain  SNC: awake, alert oriented. Cranial nerves are intact, no focalized, sensitivity and strength preserved      Labs:  Lab Results   Component Value Date    WBC 7.32 05/25/2023    HGB 9.3 (L) 05/25/2023    HCT 28.9 (L) 05/25/2023     05/25/2023    K 3.9 05/25/2023     05/25/2023    CO2 22 (L) 05/25/2023    BUN 44 (H) 05/25/2023    CREATININE 2.9 (H) 05/25/2023    EGFRNORACEVR 23.9 (A) 05/25/2023    CALCIUM 9.7 05/25/2023    PHOS 2.6 (L) 05/25/2023    MG 1.8 05/25/2023    ALBUMIN 3.0 (L) 05/25/2023    AST 28 05/11/2023    ALT 25 05/11/2023    UTPCR 0.67 (H) 05/22/2023    .7 (H) 05/10/2023    TACROLIMUS 10.6 05/25/2023       No results found for: EXTANC, EXTWBC, EXTSEGS, EXTPLATELETS, EXTHEMOGLOBI, EXTHEMATOCRI, EXTCREATININ, EXTSODIUM, EXTPOTASSIUM, EXTBUN, EXTCO2, EXTCALCIUM, EXTPHOSPHORU, EXTGLUCOSE, EXTALBUMIN, EXTAST, EXTALT, EXTBILITOTAL, EXTLIPASE, EXTAMYLASE    No results found for: EXTCYCLOSLVL, EXTSIROLIMUS, EXTTACROLVL, EXTPROTCRE, EXTPTHINTACT, EXTPROTEINUA, EXTWBCUA, EXTRBCUA    Labs were reviewed with the patient    Assessment:     1. S/P kidney transplant    2. CKD (chronic kidney disease) stage 4, GFR 15-29 ml/min, slow graft function with sustaine creatinien improvement    3. At risk for opportunistic infections    4. Immunosuppressive management encounter following kidney transplant    5. Anemia of renal disease    6. Long-term use of immunosuppressant medication        Plan:        1. CKD stage 3 with improved creatinine now down to 2 consistent with CKD stage 3b will continue follow up as per our center guidelines. patient to continue close follow up with the local General nephrologist. Education provided in appropriate fluid intake, potassium intake. Continue with oral hydration.    1A. Pancreatic Function: N/A for non-pancreas allograft recipients:     2. High risk immunosuppression medication for  organ transplantation, requiring regular intensive follow up and monitoring : prograf at target , Myfortic per protocol, will watch diarrhea  get stool specimen for GI PCR and consider lower dose of myfortic. I asked patient and wife to call if diarrhea does not get better or gets worse.   Lab Results   Component Value Date    TACROLIMUS 10.6 05/25/2023    TACROLIMUS 5.7 05/22/2023    TACROLIMUS 8.4 05/19/2023     No results found for: CYCLOSPORINE  @   Will closely monitor for toxicities, education provided about adherence to medicines and need to communicate any side effects to the transplant nurse or physician.    3. Allograft Function:improved to 2 consistent with CKD stage 3 stable at baseline for the patient. Continue follow up as per our guidelines and with the local General nephrologist. Communication will be sent today.  Lab Results   Component Value Date    CREATININE 2.9 (H) 05/25/2023    CREATININE 3.5 (H) 05/22/2023    CREATININE 5.0 (H) 05/19/2023     No results found for: AMYLASE, LIPASE    4. Hypertension management: well controlled  Continue with home blood pressure monitoring, low salt and healthy life discussed with the patient..    5. Metabolic Bone Disease/Secondary Hyperparathyroidism:calcium and phosphorus level discussed with the patient, patient will continue follow up with the general nephrologist for management of metabolic bone disease. Will monitor PTH and Vit D per our transplant center guidelines.      Lab Results   Component Value Date    .7 (H) 05/10/2023    CALCIUM 9.7 05/25/2023    PHOS 2.6 (L) 05/25/2023    PHOS 2.9 05/22/2023    PHOS 3.2 05/19/2023       6. Electrolytes and acid base balance: increased sodium bicarbonate to 1300 bid reviewed with the patient, essentially within the normal range no need for acute changes today, will monitor as per our center guidelines.     Lab Results   Component Value Date     05/25/2023    K 3.9 05/25/2023     05/25/2023     CO2 22 (L) 05/25/2023    CO2 23 05/22/2023    CO2 25 05/19/2023       7. Anemia: h/h improving will continue monitoring as per our center guidelines. No indication for acute intervention today.     Lab Results   Component Value Date    WBC 7.32 05/25/2023    HGB 9.3 (L) 05/25/2023    HCT 28.9 (L) 05/25/2023     (H) 05/25/2023     05/25/2023       8.Proteinuria: will continue with pr/cr ratio as per our center guidelines.  Lab Results   Component Value Date    PROTEINURINE 63 (H) 05/22/2023    CREATRANDUR 94.0 05/22/2023    UTPCR 0.67 (H) 05/22/2023    UTPCR 1.35 (H) 05/14/2023    UTPCR 1.88 (H) 05/11/2023        9. BK virus infection screening: will continue with urine or blood PCR as per our guidelines to prevent BK virus viremia and allograft dysfunction  No results found for: BKVIRUSDNAUR, BKQUANTURINE, BKVIRUSLOG, BKVIRUSURINE, BKVIRUSPCRQB      10. Weight and metabolic management: education provided provided during the clinic visit.   Body mass index is 25.72 kg/m².       11.Patient safety education regarding immunosuppression including prophylaxis posttransplant for CMV, PCP : Education provided about vaccination and prevention of infections.    12.  Cytopenias: no significant cytopenias will monitor as per our guidelines. Medicine list reviewed including potential causes of drug-induced cytopenias     Lab Results   Component Value Date    WBC 7.32 05/25/2023    HGB 9.3 (L) 05/25/2023    HCT 28.9 (L) 05/25/2023     (H) 05/25/2023     05/25/2023       13. Post-transplant Prophylaxis; CMV Infection, PJP and Candida mucosistis and other indicated for this particular patient. Zovirax atovaquone     I spoke with the patient for 30 minutes. More than half dedicated to counseling and education. All questions answered    Darrin Verma MD  Transplant Nephrology            Follow-up:   Clinic: return to transplant clinic weekly for the first month after transplant; every 2 weeks during  months 2-3; then at 6-, 9-, 12-, 18-, 24-, and 36- months post-transplant to reassess for complications from immunosuppression toxicity and monitor for rejection.  Annually thereafter.    Labs: since patient remains at high risk for rejection and drug-related complications that warrant close monitoring, labs will be ordered as follows: continue twice weekly CBC, renal panel, and drug level for first month; then same labs once weekly through 3rd month post-transplant.  Urine for UA and protein/creatinine ratio monthly.  Serum BK - PCR at 1-, 3-, 6-, 9-, 12-, 18-, 24-, 36-, 48-, and 60 months post-transplant.  Hepatic panel at 1-, 2-, 3-, 6-, 9-, 12-, 18-, 24-, and 36- months post-transplant.    Darrin Verma MD       Education:   Material provided to the patient.  Patient reminded to call with any health changes since these can be early signs of significant complications.  Also, I advised the patient to be sure any new medications or changes of old medications are discussed with either a pharmacist or physician knowledgeable with transplant to avoid rejection/drug toxicity related to significant drug interactions.    Patient advised that it is recommended that all transplanted patients, and their close contacts and household members receive Covid vaccination.

## 2023-05-29 ENCOUNTER — OFFICE VISIT (OUTPATIENT)
Dept: TRANSPLANT | Facility: CLINIC | Age: 63
End: 2023-05-29
Payer: MEDICARE

## 2023-05-29 ENCOUNTER — LAB VISIT (OUTPATIENT)
Dept: LAB | Facility: HOSPITAL | Age: 63
End: 2023-05-29
Attending: INTERNAL MEDICINE
Payer: MEDICARE

## 2023-05-29 VITALS
OXYGEN SATURATION: 99 % | SYSTOLIC BLOOD PRESSURE: 117 MMHG | RESPIRATION RATE: 18 BRPM | BODY MASS INDEX: 25.8 KG/M2 | HEART RATE: 60 BPM | TEMPERATURE: 98 F | HEIGHT: 69 IN | WEIGHT: 174.19 LBS | DIASTOLIC BLOOD PRESSURE: 63 MMHG

## 2023-05-29 DIAGNOSIS — Z79.899 IMMUNOSUPPRESSIVE MANAGEMENT ENCOUNTER FOLLOWING KIDNEY TRANSPLANT: ICD-10-CM

## 2023-05-29 DIAGNOSIS — Z94.0 S/P KIDNEY TRANSPLANT: Primary | ICD-10-CM

## 2023-05-29 DIAGNOSIS — N18.4 CKD (CHRONIC KIDNEY DISEASE) STAGE 4, GFR 15-29 ML/MIN: ICD-10-CM

## 2023-05-29 DIAGNOSIS — Z79.60 LONG-TERM USE OF IMMUNOSUPPRESSANT MEDICATION: ICD-10-CM

## 2023-05-29 DIAGNOSIS — A09 INFECTIOUS DIARRHEA: ICD-10-CM

## 2023-05-29 DIAGNOSIS — Z91.89 AT RISK FOR OPPORTUNISTIC INFECTIONS: ICD-10-CM

## 2023-05-29 DIAGNOSIS — Z94.0 IMMUNOSUPPRESSIVE MANAGEMENT ENCOUNTER FOLLOWING KIDNEY TRANSPLANT: ICD-10-CM

## 2023-05-29 DIAGNOSIS — N18.9 ANEMIA OF RENAL DISEASE: ICD-10-CM

## 2023-05-29 DIAGNOSIS — Z94.0 KIDNEY REPLACED BY TRANSPLANT: ICD-10-CM

## 2023-05-29 DIAGNOSIS — D63.1 ANEMIA OF RENAL DISEASE: ICD-10-CM

## 2023-05-29 LAB
ALBUMIN SERPL BCP-MCNC: 3 G/DL (ref 3.5–5.2)
ANION GAP SERPL CALC-SCNC: 14 MMOL/L (ref 8–16)
BASOPHILS # BLD AUTO: 0.02 K/UL (ref 0–0.2)
BASOPHILS NFR BLD: 0.5 % (ref 0–1.9)
BUN SERPL-MCNC: 29 MG/DL (ref 8–23)
CALCIUM SERPL-MCNC: 9.5 MG/DL (ref 8.7–10.5)
CHLORIDE SERPL-SCNC: 104 MMOL/L (ref 95–110)
CO2 SERPL-SCNC: 19 MMOL/L (ref 23–29)
CREAT SERPL-MCNC: 2 MG/DL (ref 0.5–1.4)
DIFFERENTIAL METHOD: ABNORMAL
EOSINOPHIL # BLD AUTO: 0.1 K/UL (ref 0–0.5)
EOSINOPHIL NFR BLD: 1.5 % (ref 0–8)
ERYTHROCYTE [DISTWIDTH] IN BLOOD BY AUTOMATED COUNT: 17.9 % (ref 11.5–14.5)
EST. GFR  (NO RACE VARIABLE): 37.3 ML/MIN/1.73 M^2
GLUCOSE SERPL-MCNC: 80 MG/DL (ref 70–110)
HCT VFR BLD AUTO: 28.4 % (ref 40–54)
HGB BLD-MCNC: 9.4 G/DL (ref 14–18)
IMM GRANULOCYTES # BLD AUTO: 0.08 K/UL (ref 0–0.04)
IMM GRANULOCYTES NFR BLD AUTO: 2 % (ref 0–0.5)
LYMPHOCYTES # BLD AUTO: 0.3 K/UL (ref 1–4.8)
LYMPHOCYTES NFR BLD: 7.6 % (ref 18–48)
MAGNESIUM SERPL-MCNC: 1.9 MG/DL (ref 1.6–2.6)
MCH RBC QN AUTO: 35.2 PG (ref 27–31)
MCHC RBC AUTO-ENTMCNC: 33.1 G/DL (ref 32–36)
MCV RBC AUTO: 106 FL (ref 82–98)
MONOCYTES # BLD AUTO: 0.5 K/UL (ref 0.3–1)
MONOCYTES NFR BLD: 11.1 % (ref 4–15)
NEUTROPHILS # BLD AUTO: 3.2 K/UL (ref 1.8–7.7)
NEUTROPHILS NFR BLD: 77.3 % (ref 38–73)
NRBC BLD-RTO: 0 /100 WBC
PHOSPHATE SERPL-MCNC: 2.2 MG/DL (ref 2.7–4.5)
PLATELET # BLD AUTO: 284 K/UL (ref 150–450)
PMV BLD AUTO: 10.3 FL (ref 9.2–12.9)
POTASSIUM SERPL-SCNC: 3.7 MMOL/L (ref 3.5–5.1)
RBC # BLD AUTO: 2.67 M/UL (ref 4.6–6.2)
SODIUM SERPL-SCNC: 137 MMOL/L (ref 136–145)
TACROLIMUS BLD-MCNC: 7.8 NG/ML (ref 5–15)
WBC # BLD AUTO: 4.07 K/UL (ref 3.9–12.7)

## 2023-05-29 PROCEDURE — 99215 OFFICE O/P EST HI 40 MIN: CPT | Mod: S$PBB,,, | Performed by: INTERNAL MEDICINE

## 2023-05-29 PROCEDURE — 99214 OFFICE O/P EST MOD 30 MIN: CPT | Mod: PBBFAC | Performed by: INTERNAL MEDICINE

## 2023-05-29 PROCEDURE — 99999 PR PBB SHADOW E&M-EST. PATIENT-LVL IV: ICD-10-PCS | Mod: PBBFAC,,, | Performed by: INTERNAL MEDICINE

## 2023-05-29 PROCEDURE — 80069 RENAL FUNCTION PANEL: CPT | Performed by: INTERNAL MEDICINE

## 2023-05-29 PROCEDURE — 99215 PR OFFICE/OUTPT VISIT, EST, LEVL V, 40-54 MIN: ICD-10-PCS | Mod: S$PBB,,, | Performed by: INTERNAL MEDICINE

## 2023-05-29 PROCEDURE — 99999 PR PBB SHADOW E&M-EST. PATIENT-LVL IV: CPT | Mod: PBBFAC,,, | Performed by: INTERNAL MEDICINE

## 2023-05-29 PROCEDURE — 83735 ASSAY OF MAGNESIUM: CPT | Performed by: INTERNAL MEDICINE

## 2023-05-29 PROCEDURE — 36415 COLL VENOUS BLD VENIPUNCTURE: CPT | Performed by: INTERNAL MEDICINE

## 2023-05-29 PROCEDURE — 85025 COMPLETE CBC W/AUTO DIFF WBC: CPT | Performed by: INTERNAL MEDICINE

## 2023-05-29 PROCEDURE — 80197 ASSAY OF TACROLIMUS: CPT | Performed by: INTERNAL MEDICINE

## 2023-05-29 RX ORDER — SODIUM BICARBONATE 650 MG/1
1300 TABLET ORAL 2 TIMES DAILY
Qty: 120 TABLET | Refills: 11 | Status: ON HOLD | OUTPATIENT
Start: 2023-05-29 | End: 2023-06-06 | Stop reason: SDUPTHER

## 2023-05-29 NOTE — LETTER
May 29, 2023        Quinn Espino  [address]  [address]  [facility] [address]  Phone: [phone]  Fax: [phone]             [facility]  [address]  [facility] [address]  Phone: [phone]   Patient: Abdoulaye Parikh   MR Number: [id number]   YOB: 1960   Date of Visit: 5/29/2023       Dear Dr. Quinn Espino    Thank you for referring Abdoulaye Parikh to me for evaluation. Attached you will find relevant portions of my assessment and plan of care.    If you have questions, please do not hesitate to call me. I look forward to following Abdoulaye Parikh along with you.    Sincerely,    Darrin Verma MD    Enclosure    If you would like to receive this communication electronically, please contact [email or web address] or [phone] to request [employer] access.    [employer] is a tool which provides read-only access to select patient information with whom you have a relationship. Its easy to use and provides real time access to review your patients record including encounter summaries, notes, results, and demographic information.    If you feel you have received this communication in error or would no longer like to receive these types of communications, please e-mail [email or web address]

## 2023-05-30 ENCOUNTER — PROCEDURE VISIT (OUTPATIENT)
Dept: UROLOGY | Facility: CLINIC | Age: 63
DRG: 699 | End: 2023-05-30
Payer: MEDICARE

## 2023-05-30 VITALS
WEIGHT: 174.13 LBS | RESPIRATION RATE: 18 BRPM | DIASTOLIC BLOOD PRESSURE: 68 MMHG | TEMPERATURE: 98 F | HEIGHT: 69 IN | BODY MASS INDEX: 25.79 KG/M2 | HEART RATE: 71 BPM | SYSTOLIC BLOOD PRESSURE: 143 MMHG

## 2023-05-30 DIAGNOSIS — Z94.0 KIDNEY REPLACED BY TRANSPLANT: ICD-10-CM

## 2023-05-30 DIAGNOSIS — Z94.0 S/P KIDNEY TRANSPLANT: Primary | ICD-10-CM

## 2023-05-30 PROCEDURE — 52310 CYSTOSCOPY AND TREATMENT: CPT | Mod: PBBFAC | Performed by: UROLOGY

## 2023-05-30 PROCEDURE — 52310 PR CYSTOSCOPY,REMV CALCULUS,SIMPLE: ICD-10-PCS | Mod: S$PBB,,, | Performed by: UROLOGY

## 2023-05-30 PROCEDURE — 52310 CYSTOSCOPY AND TREATMENT: CPT | Mod: S$PBB,,, | Performed by: UROLOGY

## 2023-05-30 RX ORDER — LIDOCAINE HYDROCHLORIDE 20 MG/ML
JELLY TOPICAL ONCE
Status: COMPLETED | OUTPATIENT
Start: 2023-05-30 | End: 2023-05-30

## 2023-05-30 RX ADMIN — LIDOCAINE HYDROCHLORIDE: 20 JELLY TOPICAL at 09:05

## 2023-05-30 NOTE — PATIENT INSTRUCTIONS
What to Expect After a Cystoscopy with Stent Removal  For the next 24-48 hours, you may feel a mild burning when you urinate. This burning is normal and expected. Drink plenty of water to dilute the urine to help relieve the burning sensation. You may also see a small amount of blood in your urine after the procedure.    Unless you are already taking antibiotics, you may be given an antibiotic after the test to prevent infection.    Signs and Symptoms to Report  Call the [facility] at [phone] if you develop any of the following:  Fever of 101 degrees or higher  Chills or persistent bleeding  Inability to urinate    After hours or on weekends, you may reach a urology resident on call at this number: [phone].

## 2023-05-30 NOTE — PROCEDURES
CYSTOSCOPY W/ STENT REMOVAL    Date/Time: 5/30/2023 9:42 AM  Performed by: Gary Carmen MD  Authorized by: Zeke Devine Jr., MD   Preparation: Patient was prepped and draped in the usual sterile fashion.  Local anesthesia used: no    Anesthesia:  Local anesthesia used: no    Sedation:  Patient sedated: no    Patient tolerance: patient tolerated the procedure well with no immediate complications  Comments: JJ stent removed. Patient tolerated procedure well.

## 2023-05-31 ENCOUNTER — OFFICE VISIT (OUTPATIENT)
Dept: TRANSPLANT | Facility: CLINIC | Age: 63
DRG: 699 | End: 2023-05-31
Payer: MEDICARE

## 2023-05-31 VITALS
TEMPERATURE: 97 F | BODY MASS INDEX: 26.02 KG/M2 | OXYGEN SATURATION: 97 % | WEIGHT: 175.69 LBS | HEIGHT: 69 IN | SYSTOLIC BLOOD PRESSURE: 136 MMHG | HEART RATE: 62 BPM | RESPIRATION RATE: 16 BRPM | DIASTOLIC BLOOD PRESSURE: 68 MMHG

## 2023-05-31 DIAGNOSIS — Z91.89 AT RISK FOR OPPORTUNISTIC INFECTIONS: ICD-10-CM

## 2023-05-31 DIAGNOSIS — Z94.0 S/P KIDNEY TRANSPLANT: Primary | ICD-10-CM

## 2023-05-31 DIAGNOSIS — Z29.89 PROPHYLACTIC IMMUNOTHERAPY: ICD-10-CM

## 2023-05-31 DIAGNOSIS — Z79.899 ENCOUNTER FOR LONG-TERM (CURRENT) USE OF OTHER MEDICATIONS: ICD-10-CM

## 2023-05-31 DIAGNOSIS — Z94.0 IMMUNOSUPPRESSIVE MANAGEMENT ENCOUNTER FOLLOWING KIDNEY TRANSPLANT: ICD-10-CM

## 2023-05-31 DIAGNOSIS — Z51.81 ENCOUNTER FOR THERAPEUTIC DRUG MONITORING: ICD-10-CM

## 2023-05-31 DIAGNOSIS — Z79.60 LONG-TERM USE OF IMMUNOSUPPRESSANT MEDICATION: ICD-10-CM

## 2023-05-31 DIAGNOSIS — Z79.899 IMMUNOSUPPRESSIVE MANAGEMENT ENCOUNTER FOLLOWING KIDNEY TRANSPLANT: ICD-10-CM

## 2023-05-31 DIAGNOSIS — Z94.0 KIDNEY REPLACED BY TRANSPLANT: ICD-10-CM

## 2023-05-31 PROCEDURE — 99215 PR OFFICE/OUTPT VISIT, EST, LEVL V, 40-54 MIN: ICD-10-PCS | Mod: 24,S$PBB,, | Performed by: TRANSPLANT SURGERY

## 2023-05-31 PROCEDURE — 99215 OFFICE O/P EST HI 40 MIN: CPT | Mod: 24,S$PBB,, | Performed by: TRANSPLANT SURGERY

## 2023-05-31 PROCEDURE — 99999 PR PBB SHADOW E&M-EST. PATIENT-LVL IV: CPT | Mod: PBBFAC,,,

## 2023-05-31 PROCEDURE — 99999 PR PBB SHADOW E&M-EST. PATIENT-LVL IV: ICD-10-PCS | Mod: PBBFAC,,,

## 2023-05-31 PROCEDURE — 99214 OFFICE O/P EST MOD 30 MIN: CPT | Mod: PBBFAC

## 2023-05-31 NOTE — PROGRESS NOTES
STAPLE REMOVAL NOTE    Staples removed from kidney transplant incision, steri-strips applied with Benzoin per Dr. Rdz's order.  Patient tolerated procedure well.  Skin dry and intact.  Patient instructed to shower with back to water spray and to pat dry incision and to let the steri-strips wear off on their own.  Patient instructed to report any redness, warmth, or drainage from incision to transplant coordinators.  All questions answered.

## 2023-05-31 NOTE — LETTER
May 31, 2023      [facility]  [address]  [facility] [address]  Phone: [phone]       Patient: Abdoulaye Parikh   YOB: 1960  Date of Visit: 05/31/2023    To Whom It May Concern:    Trisha Parikh  was at [facility] on 05/31/2023. The patient may return to work/school on *** {With/no:46720} restrictions. If you have any questions or concerns, or if I can be of further assistance, please do not hesitate to contact me.    Sincerely,    Katie Bermudez MA

## 2023-05-31 NOTE — PROGRESS NOTES
Transplant Surgery  Kidney Transplant Recipient Follow-up    Referring Physician: Quinn Espino  Current Nephrologist: Quinn Espino    Subjective:     Chief Complaint: Abdoulaye Parikh is a 62 y.o. year old Black or  male who is status post Kidney transplant performed on 5/10/2023.    ORGAN: LEFT KIDNEY   Disease Etiology: Other, Specify - Unknown  Donor Type: Living   Donor CMV Status:    Donor HBcAB:    Donor HCV Status:      History of Present Illness: He reports no concerns.  From a transplant perspective, he reports normal urination.  Abdoulaye is here for management of his immunosuppression medication.  Abdoulaye states that his immunosuppression is being well tolerated.  Hypertension is not present.    External provider notes reviewed: Yes    Review of Systems   Constitutional:  Negative for activity change, appetite change, chills, diaphoresis, fatigue, fever and unexpected weight change.   HENT:  Negative for congestion, dental problem, ear pain, facial swelling, mouth sores, nosebleeds, sore throat, tinnitus, trouble swallowing and voice change.    Eyes:  Negative for photophobia, pain and visual disturbance.   Respiratory:  Negative for apnea, cough, choking, chest tightness and shortness of breath.    Cardiovascular:  Negative for chest pain, palpitations and leg swelling.   Gastrointestinal:  Negative for abdominal distention, abdominal pain, blood in stool, constipation, diarrhea, nausea and vomiting.   Endocrine: Negative for cold intolerance and heat intolerance.   Genitourinary:  Negative for difficulty urinating, dysuria, flank pain, hematuria and urgency.   Musculoskeletal:  Negative for arthralgias and gait problem.   Skin:  Negative for color change, pallor and rash.   Neurological:  Negative for dizziness, tremors, seizures, syncope and light-headedness.   Hematological:  Negative for adenopathy. Does not bruise/bleed easily.   Psychiatric/Behavioral:  Negative for  agitation and confusion.      Objective:     Physical Exam  Constitutional:       General: He is not in acute distress.     Appearance: He is well-developed.   HENT:      Head: Normocephalic and atraumatic.      Mouth/Throat:      Pharynx: No oropharyngeal exudate.   Eyes:      General: No scleral icterus.     Conjunctiva/sclera: Conjunctivae normal.      Pupils: Pupils are equal, round, and reactive to light.   Cardiovascular:      Rate and Rhythm: Normal rate and regular rhythm.      Heart sounds: Normal heart sounds.   Pulmonary:      Effort: Pulmonary effort is normal. No respiratory distress.      Breath sounds: Normal breath sounds.   Abdominal:      General: Bowel sounds are normal. There is no distension.      Palpations: Abdomen is soft.      Tenderness: There is no abdominal tenderness. There is no guarding or rebound.   Musculoskeletal:         General: No swelling. Normal range of motion.      Cervical back: Normal range of motion and neck supple.   Lymphadenopathy:      Cervical: No cervical adenopathy.   Skin:     General: Skin is warm and dry.      Findings: No erythema or rash.   Neurological:      Mental Status: He is alert and oriented to person, place, and time.   Psychiatric:         Mood and Affect: Mood normal.         Behavior: Behavior normal.         Thought Content: Thought content normal.   Lab Results   Component Value Date    CREATININE 2.0 (H) 05/29/2023    BUN 29 (H) 05/29/2023     Lab Results   Component Value Date    WBC 4.07 05/29/2023    HGB 9.4 (L) 05/29/2023    HCT 28.4 (L) 05/29/2023    HCT 27 (L) 05/10/2023     05/29/2023     Lab Results   Component Value Date    TACROLIMUS 7.8 05/29/2023       Diagnostics:  The following labs have been reviewed: CBC  BMP  UA  TACROLIMUS LEVEL  The following radiology images have been independently reviewed and interpreted: Renal US    Assessment and Plan:        S/P Kidney transplant.  Chronic immunosuppressive medications for rejection  prophylaxis at target.  Plan: no adjustment needed.  Continue monitoring symptoms, labs and drug levels for drug-related toxicity and side effects.  Renal hypertension at target.  Remove staples today    Additional testing to be completed according to the Kidney: Written Order Guideline for Kidney Transplant Follow-Up (KI-09)    Interpretation of tests and discussion of patient management involves all members of the multidisciplinary transplant team.  Patient advised that it is recommended that all transplant candidates, and their close contacts and household members receive Covid vaccination.  Follow-up: Patient reminded to call with any health changes, since these can be early signs of significant complications.  Also, I advised the patient to be sure any new medications or changes of old medications are discussed with either a pharmacist, or physician knowledgeable with transplant to avoid rejection/drug toxicity related to significant drug interactions.    Sachi Rdz MD       [facility] Patient Status  Functional Status: 70% - Cares for self: unable to carry on normal activity or active work  Physical Capacity: No Limitations

## 2023-06-01 ENCOUNTER — PATIENT MESSAGE (OUTPATIENT)
Dept: TRANSPLANT | Facility: CLINIC | Age: 63
End: 2023-06-01
Payer: MEDICARE

## 2023-06-01 ENCOUNTER — TELEPHONE (OUTPATIENT)
Dept: TRANSPLANT | Facility: CLINIC | Age: 63
End: 2023-06-01
Payer: MEDICARE

## 2023-06-01 ENCOUNTER — HOSPITAL ENCOUNTER (INPATIENT)
Facility: HOSPITAL | Age: 63
LOS: 5 days | Discharge: HOME | DRG: 699 | End: 2023-06-06
Attending: INTERNAL MEDICINE | Admitting: INTERNAL MEDICINE
Payer: MEDICARE

## 2023-06-01 DIAGNOSIS — Z29.89 PROPHYLACTIC IMMUNOTHERAPY: ICD-10-CM

## 2023-06-01 DIAGNOSIS — T86.19 PERINEPHRIC FLUID COLLECTION OF KIDNEY TRANSPLANT: ICD-10-CM

## 2023-06-01 DIAGNOSIS — R78.81 BACTEREMIA: ICD-10-CM

## 2023-06-01 DIAGNOSIS — E83.39 HYPOPHOSPHATEMIA: ICD-10-CM

## 2023-06-01 DIAGNOSIS — Z79.60 LONG-TERM USE OF IMMUNOSUPPRESSANT MEDICATION: ICD-10-CM

## 2023-06-01 DIAGNOSIS — N28.89 PERINEPHRIC FLUID COLLECTION OF KIDNEY TRANSPLANT: ICD-10-CM

## 2023-06-01 DIAGNOSIS — N17.9 AKI (ACUTE KIDNEY INJURY): ICD-10-CM

## 2023-06-01 DIAGNOSIS — Z91.89 AT RISK FOR OPPORTUNISTIC INFECTIONS: ICD-10-CM

## 2023-06-01 DIAGNOSIS — T86.898 URINE LEAK FROM TRANSPLANTED URETER: Primary | ICD-10-CM

## 2023-06-01 DIAGNOSIS — Z94.0 S/P KIDNEY TRANSPLANT: ICD-10-CM

## 2023-06-01 PROBLEM — I95.1 ORTHOSTATIC HYPOTENSION: Status: ACTIVE | Noted: 2023-06-01

## 2023-06-01 PROBLEM — R19.7 DIARRHEA, UNSPECIFIED: Status: ACTIVE | Noted: 2023-06-01

## 2023-06-01 PROBLEM — D62 ACUTE BLOOD LOSS ANEMIA: Status: RESOLVED | Noted: 2023-05-11 | Resolved: 2023-06-01

## 2023-06-01 LAB
ABO + RH BLD: NORMAL
ALBUMIN SERPL BCP-MCNC: 3.2 G/DL (ref 3.5–5.2)
ALBUMIN SERPL BCP-MCNC: 3.4 G/DL (ref 3.5–5.2)
ALP SERPL-CCNC: 104 U/L (ref 55–135)
ALT SERPL W/O P-5'-P-CCNC: 33 U/L (ref 10–44)
ANION GAP SERPL CALC-SCNC: 11 MMOL/L (ref 8–16)
ANION GAP SERPL CALC-SCNC: 8 MMOL/L (ref 8–16)
AST SERPL-CCNC: 18 U/L (ref 10–40)
BACTERIA #/AREA URNS AUTO: ABNORMAL /HPF
BASOPHILS # BLD AUTO: 0.01 K/UL (ref 0–0.2)
BASOPHILS NFR BLD: 0.2 % (ref 0–1.9)
BILIRUB SERPL-MCNC: 0.5 MG/DL (ref 0.1–1)
BILIRUB UR QL STRIP: NEGATIVE
BLD GP AB SCN CELLS X3 SERPL QL: NORMAL
BUN SERPL-MCNC: 27 MG/DL (ref 8–23)
BUN SERPL-MCNC: 28 MG/DL (ref 8–23)
CALCIUM SERPL-MCNC: 9.3 MG/DL (ref 8.7–10.5)
CALCIUM SERPL-MCNC: 9.6 MG/DL (ref 8.7–10.5)
CHLORIDE SERPL-SCNC: 106 MMOL/L (ref 95–110)
CHLORIDE SERPL-SCNC: 106 MMOL/L (ref 95–110)
CLARITY UR REFRACT.AUTO: CLEAR
CO2 SERPL-SCNC: 22 MMOL/L (ref 23–29)
CO2 SERPL-SCNC: 24 MMOL/L (ref 23–29)
COLOR UR AUTO: YELLOW
CREAT SERPL-MCNC: 2.2 MG/DL (ref 0.5–1.4)
CREAT SERPL-MCNC: 2.3 MG/DL (ref 0.5–1.4)
DIFFERENTIAL METHOD: ABNORMAL
EOSINOPHIL # BLD AUTO: 0 K/UL (ref 0–0.5)
EOSINOPHIL NFR BLD: 0.5 % (ref 0–8)
ERYTHROCYTE [DISTWIDTH] IN BLOOD BY AUTOMATED COUNT: 19 % (ref 11.5–14.5)
EST. GFR  (NO RACE VARIABLE): 31.5 ML/MIN/1.73 M^2
EST. GFR  (NO RACE VARIABLE): 33.2 ML/MIN/1.73 M^2
GLUCOSE SERPL-MCNC: 94 MG/DL (ref 70–110)
GLUCOSE SERPL-MCNC: 95 MG/DL (ref 70–110)
GLUCOSE UR QL STRIP: NEGATIVE
HCT VFR BLD AUTO: 25.1 % (ref 40–54)
HGB BLD-MCNC: 8.4 G/DL (ref 14–18)
HGB UR QL STRIP: ABNORMAL
HYALINE CASTS UR QL AUTO: 0 /LPF
IMM GRANULOCYTES # BLD AUTO: 0.08 K/UL (ref 0–0.04)
IMM GRANULOCYTES NFR BLD AUTO: 1.4 % (ref 0–0.5)
KETONES UR QL STRIP: NEGATIVE
LEUKOCYTE ESTERASE UR QL STRIP: ABNORMAL
LYMPHOCYTES # BLD AUTO: 0.2 K/UL (ref 1–4.8)
LYMPHOCYTES NFR BLD: 2.7 % (ref 18–48)
MAGNESIUM SERPL-MCNC: 2 MG/DL (ref 1.6–2.6)
MCH RBC QN AUTO: 35.6 PG (ref 27–31)
MCHC RBC AUTO-ENTMCNC: 33.5 G/DL (ref 32–36)
MCV RBC AUTO: 106 FL (ref 82–98)
MICROSCOPIC COMMENT: ABNORMAL
MONOCYTES # BLD AUTO: 0.4 K/UL (ref 0.3–1)
MONOCYTES NFR BLD: 7 % (ref 4–15)
NEUTROPHILS # BLD AUTO: 4.9 K/UL (ref 1.8–7.7)
NEUTROPHILS NFR BLD: 88.2 % (ref 38–73)
NITRITE UR QL STRIP: NEGATIVE
NRBC BLD-RTO: 0 /100 WBC
PH UR STRIP: 6 [PH] (ref 5–8)
PHOSPHATE SERPL-MCNC: 1.8 MG/DL (ref 2.7–4.5)
PLATELET # BLD AUTO: 246 K/UL (ref 150–450)
PMV BLD AUTO: 10.4 FL (ref 9.2–12.9)
POTASSIUM SERPL-SCNC: 3.9 MMOL/L (ref 3.5–5.1)
POTASSIUM SERPL-SCNC: 3.9 MMOL/L (ref 3.5–5.1)
PROT SERPL-MCNC: 8.7 G/DL (ref 6–8.4)
PROT UR QL STRIP: ABNORMAL
RBC # BLD AUTO: 2.36 M/UL (ref 4.6–6.2)
RBC #/AREA URNS AUTO: 14 /HPF (ref 0–4)
SARS-COV-2 RDRP RESP QL NAA+PROBE: NEGATIVE
SODIUM SERPL-SCNC: 138 MMOL/L (ref 136–145)
SODIUM SERPL-SCNC: 139 MMOL/L (ref 136–145)
SP GR UR STRIP: 1.01 (ref 1–1.03)
SPECIMEN OUTDATE: NORMAL
URN SPEC COLLECT METH UR: ABNORMAL
WBC # BLD AUTO: 5.56 K/UL (ref 3.9–12.7)
WBC #/AREA URNS AUTO: 29 /HPF (ref 0–5)

## 2023-06-01 PROCEDURE — G0379 DIRECT REFER HOSPITAL OBSERV: HCPCS

## 2023-06-01 PROCEDURE — 87086 URINE CULTURE/COLONY COUNT: CPT | Performed by: PHYSICIAN ASSISTANT

## 2023-06-01 PROCEDURE — 83735 ASSAY OF MAGNESIUM: CPT | Performed by: PHYSICIAN ASSISTANT

## 2023-06-01 PROCEDURE — 80069 RENAL FUNCTION PANEL: CPT | Performed by: TRANSPLANT SURGERY

## 2023-06-01 PROCEDURE — G0378 HOSPITAL OBSERVATION PER HR: HCPCS

## 2023-06-01 PROCEDURE — 86900 BLOOD TYPING SEROLOGIC ABO: CPT | Performed by: PHYSICIAN ASSISTANT

## 2023-06-01 PROCEDURE — 81001 URINALYSIS AUTO W/SCOPE: CPT | Performed by: PHYSICIAN ASSISTANT

## 2023-06-01 PROCEDURE — 25000003 PHARM REV CODE 250: Performed by: PHYSICIAN ASSISTANT

## 2023-06-01 PROCEDURE — 36415 COLL VENOUS BLD VENIPUNCTURE: CPT | Performed by: TRANSPLANT SURGERY

## 2023-06-01 PROCEDURE — 63600175 PHARM REV CODE 636 W HCPCS: Performed by: PHYSICIAN ASSISTANT

## 2023-06-01 PROCEDURE — 85025 COMPLETE CBC W/AUTO DIFF WBC: CPT | Performed by: TRANSPLANT SURGERY

## 2023-06-01 PROCEDURE — 80053 COMPREHEN METABOLIC PANEL: CPT | Performed by: PHYSICIAN ASSISTANT

## 2023-06-01 PROCEDURE — U0002 COVID-19 LAB TEST NON-CDC: HCPCS | Performed by: PHYSICIAN ASSISTANT

## 2023-06-01 PROCEDURE — 87449 NOS EACH ORGANISM AG IA: CPT | Performed by: PHYSICIAN ASSISTANT

## 2023-06-01 RX ORDER — ACETAMINOPHEN 325 MG/1
650 TABLET ORAL EVERY 8 HOURS PRN
Status: DISCONTINUED | OUTPATIENT
Start: 2023-06-01 | End: 2023-06-06 | Stop reason: HOSPADM

## 2023-06-01 RX ORDER — HYDROXYZINE HYDROCHLORIDE 25 MG/1
25 TABLET, FILM COATED ORAL 3 TIMES DAILY PRN
Status: DISCONTINUED | OUTPATIENT
Start: 2023-06-01 | End: 2023-06-06 | Stop reason: HOSPADM

## 2023-06-01 RX ORDER — PREDNISONE 10 MG/1
10 TABLET ORAL DAILY
Status: DISCONTINUED | OUTPATIENT
Start: 2023-06-01 | End: 2023-06-06 | Stop reason: HOSPADM

## 2023-06-01 RX ORDER — SODIUM CHLORIDE 0.9 % (FLUSH) 0.9 %
10 SYRINGE (ML) INJECTION
Status: DISCONTINUED | OUTPATIENT
Start: 2023-06-01 | End: 2023-06-06 | Stop reason: HOSPADM

## 2023-06-01 RX ORDER — SODIUM CHLORIDE 9 MG/ML
INJECTION, SOLUTION INTRAVENOUS CONTINUOUS
Status: DISCONTINUED | OUTPATIENT
Start: 2023-06-01 | End: 2023-06-02

## 2023-06-01 RX ORDER — TALC
6 POWDER (GRAM) TOPICAL NIGHTLY PRN
Status: DISCONTINUED | OUTPATIENT
Start: 2023-06-01 | End: 2023-06-06 | Stop reason: HOSPADM

## 2023-06-01 RX ORDER — FAMOTIDINE 20 MG/1
20 TABLET, FILM COATED ORAL NIGHTLY
Status: DISCONTINUED | OUTPATIENT
Start: 2023-06-01 | End: 2023-06-06 | Stop reason: HOSPADM

## 2023-06-01 RX ORDER — SODIUM BICARBONATE 650 MG/1
1300 TABLET ORAL 2 TIMES DAILY
Status: DISCONTINUED | OUTPATIENT
Start: 2023-06-01 | End: 2023-06-02

## 2023-06-01 RX ORDER — ONDANSETRON 8 MG/1
8 TABLET, ORALLY DISINTEGRATING ORAL EVERY 6 HOURS PRN
Status: DISCONTINUED | OUTPATIENT
Start: 2023-06-01 | End: 2023-06-06 | Stop reason: HOSPADM

## 2023-06-01 RX ORDER — OXYCODONE HYDROCHLORIDE 5 MG/1
5 TABLET ORAL EVERY 4 HOURS PRN
Status: DISCONTINUED | OUTPATIENT
Start: 2023-06-01 | End: 2023-06-06 | Stop reason: HOSPADM

## 2023-06-01 RX ORDER — ACETAMINOPHEN 325 MG/1
650 TABLET ORAL EVERY 4 HOURS PRN
Status: DISCONTINUED | OUTPATIENT
Start: 2023-06-01 | End: 2023-06-06 | Stop reason: HOSPADM

## 2023-06-01 RX ORDER — ATOVAQUONE 750 MG/5ML
1500 SUSPENSION ORAL DAILY
Status: DISCONTINUED | OUTPATIENT
Start: 2023-06-01 | End: 2023-06-05

## 2023-06-01 RX ORDER — ACYCLOVIR 200 MG/1
400 CAPSULE ORAL 2 TIMES DAILY
Status: DISCONTINUED | OUTPATIENT
Start: 2023-06-01 | End: 2023-06-06 | Stop reason: HOSPADM

## 2023-06-01 RX ORDER — TACROLIMUS 1 MG/1
2 CAPSULE ORAL 2 TIMES DAILY
Status: DISCONTINUED | OUTPATIENT
Start: 2023-06-01 | End: 2023-06-06 | Stop reason: HOSPADM

## 2023-06-01 RX ADMIN — SODIUM BICARBONATE 1300 MG: 650 TABLET ORAL at 08:06

## 2023-06-01 RX ADMIN — SODIUM CHLORIDE: 9 INJECTION, SOLUTION INTRAVENOUS at 05:06

## 2023-06-01 RX ADMIN — TACROLIMUS 2 MG: 1 CAPSULE ORAL at 05:06

## 2023-06-01 RX ADMIN — ACYCLOVIR 400 MG: 200 CAPSULE ORAL at 08:06

## 2023-06-01 RX ADMIN — FAMOTIDINE 20 MG: 20 TABLET ORAL at 08:06

## 2023-06-01 NOTE — ASSESSMENT & PLAN NOTE
- Continue prograf and steroids.  - Continue to monitor prograf levels daily, monitor for toxic side effects, and adjust for therapeutic dose.   - Hold myfortic due to GI upset

## 2023-06-01 NOTE — TELEPHONE ENCOUNTER
Patient repeated back and voice a understanding of orders, all appropriate personnel notified of admit [facility] # [id number].  ----- Message from Darrin Verma MD sent at 6/1/2023 10:48 AM CDT -----   I think the safer thing to do is direct admit this for observation  man for IV fluids and monitor BP, he is clearly orthostatic   ----- Message -----  From: Robles Arita RN  Sent: 6/1/2023  10:41 AM CDT  To: Darrin Verma MD    Spoke with patient and states he had 7 episodes of diarrhea last night.  Total intake yesterday 5/30 2.5 liters and output 1.1 liters.  V/S during conversation Sitting B/P 134/72 P 66 and Standing 114/79 P 80, states he feels fine.  He submitted stool on Tuesday 5/30 for a Gastro Intestinal Panel and I spoke with  Lab and specimen is a send out with a 4 day turn around.  Still proceed with a Kidney TXP U/S today ? or give outpatient IV fluids ?  ----- Message -----  From: Darrin Verma MD  Sent: 6/1/2023   9:09 AM CDT  To: [facility] Post-Kidney Transplant Clinical    Please see if he has any acute change or illness :?diarrhea. Let me know   Let's do a kidney US today if possible'  Assess fluid intake  Tacro level is pending

## 2023-06-01 NOTE — ASSESSMENT & PLAN NOTE
"- s/p living unrelated kidney transplant on 5/10/23  - Post op course significant for OR takeback 5/15 to assess flow due to elevated velocities in artery and vein on US and DGF (last HD 5/11 for hyperkalemia)  - See "TRINO"  "

## 2023-06-01 NOTE — ASSESSMENT & PLAN NOTE
- Cr noted to be elevated at 2.4 from 2.0 on outpt labs  - DGF  - Pt reports watery stools (hx of Crohns, BMs loose but not watery at BL).   - Plan to hydrate with IVFs, send c diff, CMV PCR, Kidney US, UA w/cx

## 2023-06-01 NOTE — ASSESSMENT & PLAN NOTE
- Reports watery stools (hx of Crohns, BMs loose but not watery at BL)  - C diff and CMV PCR ordered  - Hydrating with IVFs

## 2023-06-01 NOTE — H&P
[facility]  Kidney Transplant  H&P      Subjective:     Chief Complaint/Reason for Admission: TRINO    History of Present Illness:  Mr Abdoulaye Parikh is a 63 yo s/p living unrelated kidney transplant on 5/10/23. Post op course significant for OR takeback 5/15 to assess flow due to elevated velocities in artery and vein on US and DGF (last HD 5/11 for hyperkalemia). Kidney biopsy obtained in the OR without evidence of rejection. He now presents as a direct admit for TRINO. Cr noted to be elevated at 2.4 from 2.0. Pt reports watery stools (hx of Crohn's, BMs loose but not watery at BL). Pt also orthostatic in clinic but asymptomatic. Plan to hydrate with IVFs, send c diff and CMV PCR, Kidney US, UA w/cx, and hold metoprolol and myfortic. Pt discussed with Dr Mendez.       PTA Medications   Medication Sig    acyclovir (ZOVIRAX) 400 MG tablet Take 1 tablet (400 mg total) by mouth 2 (two) times daily. Stop 8/9/23    atovaquone (MEPRON) 750 mg/5 mL Susp oral liquid Take 10 mLs (1,500 mg total) by mouth once daily. Stop 11/7/23    bisacodyL (DULCOLAX) 5 mg EC tablet Take 2 tablets (10 mg total) by mouth daily as needed for Constipation.    docusate sodium (COLACE) 100 MG capsule Take 1 capsule (100 mg total) by mouth 3 (three) times daily as needed for Constipation.    famotidine (PEPCID) 20 MG tablet Take 1 tablet (20 mg total) by mouth every evening.    fluticasone furoate (ARNUITY ELLIPTA) 100 mcg/actuation inhaler Inhale 100 mcg into the lungs once daily. Controller    hydrOXYzine HCL (ATARAX) 25 MG tablet Take 1 tablet (25 mg total) by mouth 3 (three) times daily as needed for Anxiety.    metoprolol succinate (TOPROL-XL) 25 MG 24 hr tablet Take HALF tablet (12.5 mg total) by mouth once daily.    mycophenolate (MYFORTIC) 180 MG TbEC Take 4 tablets (720 mg total) by mouth 2 (two) times daily.    oxyCODONE (ROXICODONE) 5 MG immediate release tablet Take 1 tablet (5 mg total) by mouth every 4 (four)  hours as needed for Pain.    predniSONE (DELTASONE) 5 MG tablet Take 20 mg by mouth daily 5/14-5/20; 15 mg daily 5/21-5/27; 10 mg daily 5/28-6/3; then 5 mg daily thereafter 6/4/23    sodium bicarbonate 650 MG tablet Take 2 tablets (1,300 mg total) by mouth 2 (two) times daily.    tacrolimus (PROGRAF) 1 MG Cap Take 2 capsules (2 mg total) by mouth every 12 (twelve) hours.       Review of patient's allergies indicates:   Allergen Reactions    Bactrim [sulfamethoxazole-trimethoprim] Shortness Of Breath and Itching    Penicillins Shortness Of Breath and Itching       Past Medical History:   Diagnosis Date    Acute blood loss anemia 5/11/2023    Anemia     Arthritis     Cirrhosis     CKD (chronic kidney disease) stage 4, GFR 15-29 ml/min, slow graft function with sustaine creatinien improvement 5/26/2023    Crohn's disease     Disorder of kidney and ureter     Stage 5    Encounter for blood transfusion     Gout     Hearing loss     Hepatitis     Had Hep C Cleared    Hypertension     Immunosuppressive management encounter following kidney transplant 5/26/2023    Neurosarcoidosis 2018    Obesity     Osteoarthritis     Sarcoid neuropathy     Sarcoidosis, lung     Steatosis      Past Surgical History:   Procedure Laterality Date    AV FISTULA PLACEMENT Left     BRAIN SURGERY      COLON SURGERY      Exploratory lap x 4 for Chron's disease. Likely right colectomy    CSF SHUNT      DIAGNOSTIC ULTRASOUND N/A 5/15/2023    Procedure: ULTRASOUND, DIAGNOSTIC;  Surgeon: Chivo Brown MD;  Location: [address];  Service: Transplant;  Laterality: N/A;    JOINT REPLACEMENT Right     Hip    KIDNEY TRANSPLANT N/A 5/10/2023    Procedure: TRANSPLANT, KIDNEY - RQ 7PM START;  Surgeon: Chivo Brown MD;  Location: [address];  Service: Transplant;  Laterality: N/A;    RENAL BIOPSY  5/15/2023    Procedure: BIOPSY, KIDNEY;  Surgeon: Chivo Brown MD;  Location: [facility] OR [address] [place] [address];   Service: Transplant;;    RENAL EXPLORATION  5/15/2023    Procedure: EXPLORATION, KIDNEY;  Surgeon: Chivo Brown MD;  Location: [facility] OR [address] [place] [address];  Service: Transplant;;    TOTAL HIP ARTHROPLASTY Right      Family History       Problem Relation (Age of Onset)    COPD Sister    Cancer Father, Maternal Grandfather, Paternal Grandfather    Diabetes Father    Heart disease Father, Sister    Hypertension Mother, Father    Lung cancer Maternal Grandfather, Paternal Grandfather    Multiple myeloma Father    Stroke Father          Tobacco Use    Smoking status: Never    Smokeless tobacco: Never   Substance and Sexual Activity    Alcohol use: Not Currently    Drug use: Never    Sexual activity: Yes     Partners: Female        Review of Systems   Constitutional:  Negative for appetite change and fever.   HENT:  Negative for facial swelling and trouble swallowing.    Eyes:  Negative for visual disturbance.   Respiratory:  Negative for shortness of breath, wheezing and stridor.    Cardiovascular:  Negative for chest pain.   Gastrointestinal:  Positive for diarrhea. Negative for abdominal distention, abdominal pain, nausea and vomiting.   Genitourinary:  Negative for decreased urine volume and difficulty urinating.   Musculoskeletal:  Negative for myalgias.   Skin:  Negative for wound.   Allergic/Immunologic: Positive for immunocompromised state.   Neurological:  Negative for tremors and syncope.   Psychiatric/Behavioral:  Negative for agitation, behavioral problems, confusion, decreased concentration and hallucinations.    Objective:     Vital Signs (Most Recent):           There is no height or weight on file to calculate BMI.      Physical Exam  Vitals and nursing note reviewed.   Constitutional:       General: He is not in acute distress.     Appearance: Normal appearance. He is well-developed. He is not toxic-appearing.   HENT:      Head: Normocephalic.   Eyes:      General: No scleral  icterus.  Cardiovascular:      Rate and Rhythm: Normal rate and regular rhythm.      Pulses: Normal pulses.      Heart sounds: Murmur heard.   Pulmonary:      Effort: Pulmonary effort is normal.      Breath sounds: Normal breath sounds.   Abdominal:      General: A surgical scar is present. Bowel sounds are normal. There is no distension.      Palpations: Abdomen is soft.      Tenderness: There is no abdominal tenderness. There is no guarding or rebound.   Musculoskeletal:      Right lower leg: No edema.      Left lower leg: No edema.   Skin:     General: Skin is warm and dry.      Capillary Refill: Capillary refill takes 2 to 3 seconds.   Neurological:      Mental Status: He is alert and oriented to person, place, and time.      GCS: GCS eye subscore is 4. GCS verbal subscore is 5. GCS motor subscore is 6.   Psychiatric:         Attention and Perception: Attention normal.         Mood and Affect: Mood normal.         Speech: Speech normal.         Behavior: Behavior normal. Behavior is cooperative.         Thought Content: Thought content normal.         Judgment: Judgment normal.        Laboratory  CBC:   Recent Labs   Lab 05/29/23  0645 06/01/23  0746   WBC 4.07 4.76   RBC 2.67* 2.69*   HGB 9.4* 9.4*   HCT 28.4* 29.5*    233   * 110*   MCH 35.2* 34.9*   MCHC 33.1 31.9*     CMP:   Recent Labs   Lab 05/29/23  0645 06/01/23  0746   GLU 80 100   CALCIUM 9.5 9.3   ALBUMIN 3.0* 2.9*    138   K 3.7 4.2   CO2 19* 22*    106   BUN 29* 27*   CREATININE 2.0* 2.4*     Labs within the past 24 hours have been reviewed.    Diagnostic Results:  US - Kidney: Results for orders placed during the hospital encounter of 05/10/23    US Transplant Kidney With Doppler    Narrative  EXAMINATION:  US TRANSPLANT KIDNEY WITH DOPPLER    CLINICAL HISTORY:  please reassess flow, concern for need to go to OR;    TECHNIQUE:  Real time gray scale and doppler ultrasound was performed over the patient's renal  "allograft.    COMPARISON:  Renal transplant ultrasound 05/14/2023, 05/11/2023    FINDINGS:  Renal allograft in the right lower quadrant.  The allograft measures 11.8 cm. Normal perfusion. No hydronephrosis.  Urinary stent is noted.    3.7 x 1.0 x 2.4 cm fluid collection adjacent to the superior pole, previously 2.7 x 1.3 x 2.4 cm.    Vasculature:    Resistive indexes:    *Interlobar: 1.0, previously 0.85    *Segmental upper: 1.0, previously 0.86    *Segmental middle: 1.0, previously 1.0    *Segmental lower: 1.0, previously 1.0    Main renal artery peak systolic velocity: 203cm/sec with normal waveform (previously 347 cm/sec).    Renal artery/iliac ratio: 1.0.    The main renal vein is patent with elevated peak systolic velocity at the anastomosis of 250 cm/sec, previously 258 cm/sec.    Impression  Persistent elevation of the intraparenchymal arterial resistive indices as well as the main renal vein peak systolic velocity at the anastomosis.  Interval improvement of the main renal artery peak systolic velocity.  Waveforms are preserved.  Continued follow-up is recommended.    Peritransplant fluid collection, similar to prior exam.    Electronically signed by resident: Jemima Nielson  Date:    05/15/2023  Time:    11:33    Electronically signed by: Al Read  Date:    05/15/2023  Time:    11:59        Assessment/Plan:     Cardiac/Vascular  Orthostatic hypotension  - Hold metoprolol  - Monitor       Renal/  * TRINO (acute kidney injury)  - Cr noted to be elevated at 2.4 from 2.0 on outpt labs  - DGF  - Pt reports watery stools (hx of Crohns, BMs loose but not watery at BL).   - Plan to hydrate with IVFs, send c diff, CMV PCR, Kidney US, UA w/cx    Delayed renal graft function  - Last HD 5/11 for hyperkalemia  - See "TRINO"      Metabolic acidosis  - Cont PO bicarb   - Monitor       S/P kidney transplant  - s/p living unrelated kidney transplant on 5/10/23  - Post op course significant for OR takeback 5/15 to " "assess flow due to elevated velocities in artery and vein on US and DGF (last HD 5/11 for hyperkalemia)  - See "TRINO"    ID  At risk for opportunistic infections  - Cont appropriate OI prophylaxis per protocol.         Immunology/Multi System  Prophylactic immunotherapy  - see long term use of immunosuppression         Oncology  Anemia of renal disease  - H/H stable. Will continue to monitor with daily cbc.         GI  Diarrhea, unspecified  - Reports watery stools (hx of Crohns, BMs loose but not watery at BL)  - C diff and CMV PCR ordered  - Hydrating with IVFs    Palliative Care  Long-term use of immunosuppressant medication  - Continue prograf and steroids.  - Continue to monitor prograf levels daily, monitor for toxic side effects, and adjust for therapeutic dose.   - Hold myfortic due to GI upset            Discharge Planning:  Not a candidate at this time     Kera Houston NP  Kidney Transplant  [facility]  "

## 2023-06-01 NOTE — SUBJECTIVE & OBJECTIVE
Subjective:     Chief Complaint/Reason for Admission: TRINO    History of Present Illness:  Mr Abdoulaye Parikh is a 61 yo s/p living unrelated kidney transplant on 5/10/23. Post op course significant for OR takeback 5/15 to assess flow due to elevated velocities in artery and vein on US and DGF (last HD 5/11 for hyperkalemia). Kidney biopsy obtained in the OR without evidence of rejection. He now presents as a direct admit for TRINO. Cr noted to be elevated at 2.4 from 2.0. Pt reports watery stools (hx of Crohn's, BMs loose but not watery at BL). Pt also orthostatic in clinic but asymptomatic. Plan to hydrate with IVFs, send c diff and CMV PCR, Kidney US, UA w/cx, and hold metoprolol and myfortic. Pt discussed with Dr Mendez.       PTA Medications   Medication Sig    acyclovir (ZOVIRAX) 400 MG tablet Take 1 tablet (400 mg total) by mouth 2 (two) times daily. Stop 8/9/23    atovaquone (MEPRON) 750 mg/5 mL Susp oral liquid Take 10 mLs (1,500 mg total) by mouth once daily. Stop 11/7/23    bisacodyL (DULCOLAX) 5 mg EC tablet Take 2 tablets (10 mg total) by mouth daily as needed for Constipation.    docusate sodium (COLACE) 100 MG capsule Take 1 capsule (100 mg total) by mouth 3 (three) times daily as needed for Constipation.    famotidine (PEPCID) 20 MG tablet Take 1 tablet (20 mg total) by mouth every evening.    fluticasone furoate (ARNUITY ELLIPTA) 100 mcg/actuation inhaler Inhale 100 mcg into the lungs once daily. Controller    hydrOXYzine HCL (ATARAX) 25 MG tablet Take 1 tablet (25 mg total) by mouth 3 (three) times daily as needed for Anxiety.    metoprolol succinate (TOPROL-XL) 25 MG 24 hr tablet Take HALF tablet (12.5 mg total) by mouth once daily.    mycophenolate (MYFORTIC) 180 MG TbEC Take 4 tablets (720 mg total) by mouth 2 (two) times daily.    oxyCODONE (ROXICODONE) 5 MG immediate release tablet Take 1 tablet (5 mg total) by mouth every 4 (four) hours as needed for Pain.    predniSONE (DELTASONE) 5 MG tablet  Take 20 mg by mouth daily 5/14-5/20; 15 mg daily 5/21-5/27; 10 mg daily 5/28-6/3; then 5 mg daily thereafter 6/4/23    sodium bicarbonate 650 MG tablet Take 2 tablets (1,300 mg total) by mouth 2 (two) times daily.    tacrolimus (PROGRAF) 1 MG Cap Take 2 capsules (2 mg total) by mouth every 12 (twelve) hours.       Review of patient's allergies indicates:   Allergen Reactions    Bactrim [sulfamethoxazole-trimethoprim] Shortness Of Breath and Itching    Penicillins Shortness Of Breath and Itching       Past Medical History:   Diagnosis Date    Acute blood loss anemia 5/11/2023    Anemia     Arthritis     Cirrhosis     CKD (chronic kidney disease) stage 4, GFR 15-29 ml/min, slow graft function with sustaine creatinien improvement 5/26/2023    Crohn's disease     Disorder of kidney and ureter     Stage 5    Encounter for blood transfusion     Gout     Hearing loss     Hepatitis     Had Hep C Cleared    Hypertension     Immunosuppressive management encounter following kidney transplant 5/26/2023    Neurosarcoidosis 2018    Obesity     Osteoarthritis     Sarcoid neuropathy     Sarcoidosis, lung     Steatosis      Past Surgical History:   Procedure Laterality Date    AV FISTULA PLACEMENT Left     BRAIN SURGERY      COLON SURGERY      Exploratory lap x 4 for Chron's disease. Likely right colectomy    CSF SHUNT      DIAGNOSTIC ULTRASOUND N/A 5/15/2023    Procedure: ULTRASOUND, DIAGNOSTIC;  Surgeon: Chivo Brown MD;  Location: [address];  Service: Transplant;  Laterality: N/A;    JOINT REPLACEMENT Right     Hip    KIDNEY TRANSPLANT N/A 5/10/2023    Procedure: TRANSPLANT, KIDNEY - RQ 7PM START;  Surgeon: Chivo Brown MD;  Location: [facility] OR [address] [place] [address];  Service: Transplant;  Laterality: N/A;    RENAL BIOPSY  5/15/2023    Procedure: BIOPSY, KIDNEY;  Surgeon: Chivo Brown MD;  Location: [facility] OR [address] [place] [address];  Service: Transplant;;    RENAL EXPLORATION  5/15/2023    Procedure: EXPLORATION, KIDNEY;   Surgeon: Chivo Brown MD;  Location: [facility] OR [address] [place] [address];  Service: Transplant;;    TOTAL HIP ARTHROPLASTY Right      Family History       Problem Relation (Age of Onset)    COPD Sister    Cancer Father, Maternal Grandfather, Paternal Grandfather    Diabetes Father    Heart disease Father, Sister    Hypertension Mother, Father    Lung cancer Maternal Grandfather, Paternal Grandfather    Multiple myeloma Father    Stroke Father          Tobacco Use    Smoking status: Never    Smokeless tobacco: Never   Substance and Sexual Activity    Alcohol use: Not Currently    Drug use: Never    Sexual activity: Yes     Partners: Female        Review of Systems   Constitutional:  Negative for appetite change and fever.   HENT:  Negative for facial swelling and trouble swallowing.    Eyes:  Negative for visual disturbance.   Respiratory:  Negative for shortness of breath, wheezing and stridor.    Cardiovascular:  Negative for chest pain.   Gastrointestinal:  Positive for diarrhea. Negative for abdominal distention, abdominal pain, nausea and vomiting.   Genitourinary:  Negative for decreased urine volume and difficulty urinating.   Musculoskeletal:  Negative for myalgias.   Skin:  Negative for wound.   Allergic/Immunologic: Positive for immunocompromised state.   Neurological:  Negative for tremors and syncope.   Psychiatric/Behavioral:  Negative for agitation, behavioral problems, confusion, decreased concentration and hallucinations.    Objective:     Vital Signs (Most Recent):           There is no height or weight on file to calculate BMI.      Physical Exam  Vitals and nursing note reviewed.   Constitutional:       General: He is not in acute distress.     Appearance: Normal appearance. He is well-developed. He is not toxic-appearing.   HENT:      Head: Normocephalic.   Eyes:      General: No scleral icterus.  Cardiovascular:      Rate and Rhythm: Normal rate and regular rhythm.      Pulses: Normal pulses.      Heart  sounds: Murmur heard.   Pulmonary:      Effort: Pulmonary effort is normal.      Breath sounds: Normal breath sounds.   Abdominal:      General: A surgical scar is present. Bowel sounds are normal. There is no distension.      Palpations: Abdomen is soft.      Tenderness: There is no abdominal tenderness. There is no guarding or rebound.   Musculoskeletal:      Right lower leg: No edema.      Left lower leg: No edema.   Skin:     General: Skin is warm and dry.      Capillary Refill: Capillary refill takes 2 to 3 seconds.   Neurological:      Mental Status: He is alert and oriented to person, place, and time.      GCS: GCS eye subscore is 4. GCS verbal subscore is 5. GCS motor subscore is 6.   Psychiatric:         Attention and Perception: Attention normal.         Mood and Affect: Mood normal.         Speech: Speech normal.         Behavior: Behavior normal. Behavior is cooperative.         Thought Content: Thought content normal.         Judgment: Judgment normal.        Laboratory  CBC:   Recent Labs   Lab 05/29/23  0645 06/01/23  0746   WBC 4.07 4.76   RBC 2.67* 2.69*   HGB 9.4* 9.4*   HCT 28.4* 29.5*    233   * 110*   MCH 35.2* 34.9*   MCHC 33.1 31.9*     CMP:   Recent Labs   Lab 05/29/23  0645 06/01/23  0746   GLU 80 100   CALCIUM 9.5 9.3   ALBUMIN 3.0* 2.9*    138   K 3.7 4.2   CO2 19* 22*    106   BUN 29* 27*   CREATININE 2.0* 2.4*     Labs within the past 24 hours have been reviewed.    Diagnostic Results:  US - Kidney: Results for orders placed during the hospital encounter of 05/10/23    US Transplant Kidney With Doppler    Narrative  EXAMINATION:  US TRANSPLANT KIDNEY WITH DOPPLER    CLINICAL HISTORY:  please reassess flow, concern for need to go to OR;    TECHNIQUE:  Real time gray scale and doppler ultrasound was performed over the patient's renal allograft.    COMPARISON:  Renal transplant ultrasound 05/14/2023, 05/11/2023    FINDINGS:  Renal allograft in the right lower  quadrant.  The allograft measures 11.8 cm. Normal perfusion. No hydronephrosis.  Urinary stent is noted.    3.7 x 1.0 x 2.4 cm fluid collection adjacent to the superior pole, previously 2.7 x 1.3 x 2.4 cm.    Vasculature:    Resistive indexes:    *Interlobar: 1.0, previously 0.85    *Segmental upper: 1.0, previously 0.86    *Segmental middle: 1.0, previously 1.0    *Segmental lower: 1.0, previously 1.0    Main renal artery peak systolic velocity: 203cm/sec with normal waveform (previously 347 cm/sec).    Renal artery/iliac ratio: 1.0.    The main renal vein is patent with elevated peak systolic velocity at the anastomosis of 250 cm/sec, previously 258 cm/sec.    Impression  Persistent elevation of the intraparenchymal arterial resistive indices as well as the main renal vein peak systolic velocity at the anastomosis.  Interval improvement of the main renal artery peak systolic velocity.  Waveforms are preserved.  Continued follow-up is recommended.    Peritransplant fluid collection, similar to prior exam.    Electronically signed by resident: Jemima Nielson  Date:    05/15/2023  Time:    11:33    Electronically signed by: Al Read  Date:    05/15/2023  Time:    11:59

## 2023-06-01 NOTE — HPI
Mr Abdoulaye Parikh is a 61 yo s/p living unrelated kidney transplant on 5/10/23. Post op course significant for OR takeback 5/15 to assess flow due to elevated velocities in artery and vein on US and DGF (last HD 5/11 for hyperkalemia). Kidney biopsy obtained in the OR without evidence of rejection. He now presents as a direct admit for TRINO. Cr noted to be elevated at 2.4 from 2.0. Pt reports watery stools (hx of Crohn's, BMs loose but not watery at BL). Pt also orthostatic in clinic but asymptomatic. Plan to hydrate with IVFs, send c diff and CMV PCR, Kidney US, UA w/cx, and hold metoprolol and myfortic. Pt discussed with Dr Mendez.

## 2023-06-02 PROBLEM — E83.39 HYPOPHOSPHATEMIA: Status: ACTIVE | Noted: 2023-06-02

## 2023-06-02 PROBLEM — T86.19 PERINEPHRIC FLUID COLLECTION OF KIDNEY TRANSPLANT: Status: ACTIVE | Noted: 2023-06-02

## 2023-06-02 PROBLEM — N28.89 PERINEPHRIC FLUID COLLECTION OF KIDNEY TRANSPLANT: Status: ACTIVE | Noted: 2023-06-02

## 2023-06-02 LAB
ALBUMIN SERPL BCP-MCNC: 2.6 G/DL (ref 3.5–5.2)
ALP SERPL-CCNC: 82 U/L (ref 55–135)
ALT SERPL W/O P-5'-P-CCNC: 22 U/L (ref 10–44)
ANION GAP SERPL CALC-SCNC: 11 MMOL/L (ref 8–16)
APPEARANCE FLD: NORMAL
AST SERPL-CCNC: 16 U/L (ref 10–40)
BACTERIA UR CULT: NO GROWTH
BASOPHILS # BLD AUTO: 0.02 K/UL (ref 0–0.2)
BASOPHILS NFR BLD: 0.4 % (ref 0–1.9)
BILIRUB SERPL-MCNC: 0.4 MG/DL (ref 0.1–1)
BODY FLD TYPE: NORMAL
BODY FLUID SOURCE, CREATININE: NORMAL
BUN SERPL-MCNC: 28 MG/DL (ref 8–23)
C DIFF GDH STL QL: NEGATIVE
C DIFF TOX A+B STL QL IA: NEGATIVE
CALCIUM SERPL-MCNC: 8.8 MG/DL (ref 8.7–10.5)
CHLORIDE SERPL-SCNC: 110 MMOL/L (ref 95–110)
CMV DNA SPEC QL NAA+PROBE: NOT DETECTED
CO2 SERPL-SCNC: 17 MMOL/L (ref 23–29)
COLOR FLD: YELLOW
CREAT FLD-MCNC: >30 MG/DL
CREAT SERPL-MCNC: 2.1 MG/DL (ref 0.5–1.4)
CYTOMEGALOVIRUS LOG (IU/ML): NOT DETECTED LOGIU/ML
CYTOMEGALOVIRUS PCR, QUANT: NOT DETECTED IU/ML
DIFFERENTIAL METHOD: ABNORMAL
EOSINOPHIL # BLD AUTO: 0.1 K/UL (ref 0–0.5)
EOSINOPHIL NFR BLD: 1.6 % (ref 0–8)
ERYTHROCYTE [DISTWIDTH] IN BLOOD BY AUTOMATED COUNT: 19.6 % (ref 11.5–14.5)
EST. GFR  (NO RACE VARIABLE): 35.2 ML/MIN/1.73 M^2
GLUCOSE SERPL-MCNC: 79 MG/DL (ref 70–110)
HCT VFR BLD AUTO: 25.4 % (ref 40–54)
HGB BLD-MCNC: 8.1 G/DL (ref 14–18)
IMM GRANULOCYTES # BLD AUTO: 0.05 K/UL (ref 0–0.04)
IMM GRANULOCYTES NFR BLD AUTO: 1.1 % (ref 0–0.5)
LYMPHOCYTES # BLD AUTO: 0.4 K/UL (ref 1–4.8)
LYMPHOCYTES NFR BLD: 8.2 % (ref 18–48)
LYMPHOCYTES NFR FLD MANUAL: 4 %
MAGNESIUM SERPL-MCNC: 1.8 MG/DL (ref 1.6–2.6)
MCH RBC QN AUTO: 35.4 PG (ref 27–31)
MCHC RBC AUTO-ENTMCNC: 31.9 G/DL (ref 32–36)
MCV RBC AUTO: 111 FL (ref 82–98)
MONOCYTES # BLD AUTO: 0.7 K/UL (ref 0.3–1)
MONOCYTES NFR BLD: 14.7 % (ref 4–15)
MONOS+MACROS NFR FLD MANUAL: 14 %
NEUTROPHILS # BLD AUTO: 3.3 K/UL (ref 1.8–7.7)
NEUTROPHILS NFR BLD: 74 % (ref 38–73)
NEUTROPHILS NFR FLD MANUAL: 82 %
NRBC BLD-RTO: 0 /100 WBC
PHOSPHATE SERPL-MCNC: 1.8 MG/DL (ref 2.7–4.5)
PLATELET # BLD AUTO: 204 K/UL (ref 150–450)
PMV BLD AUTO: 10.6 FL (ref 9.2–12.9)
POTASSIUM SERPL-SCNC: 3.8 MMOL/L (ref 3.5–5.1)
PROT SERPL-MCNC: 6.8 G/DL (ref 6–8.4)
RBC # BLD AUTO: 2.29 M/UL (ref 4.6–6.2)
SODIUM SERPL-SCNC: 138 MMOL/L (ref 136–145)
TACROLIMUS BLD-MCNC: 5.9 NG/ML (ref 5–15)
WBC # BLD AUTO: 4.5 K/UL (ref 3.9–12.7)
WBC # FLD: 627 /CU MM

## 2023-06-02 PROCEDURE — 94761 N-INVAS EAR/PLS OXIMETRY MLT: CPT

## 2023-06-02 PROCEDURE — 99233 SBSQ HOSP IP/OBS HIGH 50: CPT | Mod: ,,,

## 2023-06-02 PROCEDURE — 85025 COMPLETE CBC W/AUTO DIFF WBC: CPT | Performed by: PHYSICIAN ASSISTANT

## 2023-06-02 PROCEDURE — 20600001 HC STEP DOWN PRIVATE ROOM

## 2023-06-02 PROCEDURE — 25000003 PHARM REV CODE 250: Performed by: PHYSICIAN ASSISTANT

## 2023-06-02 PROCEDURE — 83735 ASSAY OF MAGNESIUM: CPT | Performed by: PHYSICIAN ASSISTANT

## 2023-06-02 PROCEDURE — 80197 ASSAY OF TACROLIMUS: CPT | Performed by: PHYSICIAN ASSISTANT

## 2023-06-02 PROCEDURE — 99233 PR SUBSEQUENT HOSPITAL CARE,LEVL III: ICD-10-PCS | Mod: ,,,

## 2023-06-02 PROCEDURE — 63600175 PHARM REV CODE 636 W HCPCS: Performed by: RADIOLOGY

## 2023-06-02 PROCEDURE — 80053 COMPREHEN METABOLIC PANEL: CPT | Performed by: PHYSICIAN ASSISTANT

## 2023-06-02 PROCEDURE — 99223 1ST HOSP IP/OBS HIGH 75: CPT | Mod: ,,, | Performed by: PHYSICIAN ASSISTANT

## 2023-06-02 PROCEDURE — 87075 CULTR BACTERIA EXCEPT BLOOD: CPT

## 2023-06-02 PROCEDURE — 99223 PR INITIAL HOSPITAL CARE,LEVL III: ICD-10-PCS | Mod: ,,, | Performed by: PHYSICIAN ASSISTANT

## 2023-06-02 PROCEDURE — 25000003 PHARM REV CODE 250: Performed by: INTERNAL MEDICINE

## 2023-06-02 PROCEDURE — 25000003 PHARM REV CODE 250

## 2023-06-02 PROCEDURE — 82570 ASSAY OF URINE CREATININE: CPT | Mod: 91 | Performed by: NURSE PRACTITIONER

## 2023-06-02 PROCEDURE — 89051 BODY FLUID CELL COUNT: CPT

## 2023-06-02 PROCEDURE — 87070 CULTURE OTHR SPECIMN AEROBIC: CPT

## 2023-06-02 PROCEDURE — 25000003 PHARM REV CODE 250: Performed by: RADIOLOGY

## 2023-06-02 PROCEDURE — 63600175 PHARM REV CODE 636 W HCPCS: Performed by: PHYSICIAN ASSISTANT

## 2023-06-02 PROCEDURE — 36415 COLL VENOUS BLD VENIPUNCTURE: CPT | Performed by: PHYSICIAN ASSISTANT

## 2023-06-02 PROCEDURE — 82570 ASSAY OF URINE CREATININE: CPT

## 2023-06-02 PROCEDURE — 84100 ASSAY OF PHOSPHORUS: CPT | Performed by: PHYSICIAN ASSISTANT

## 2023-06-02 PROCEDURE — 25000242 PHARM REV CODE 250 ALT 637 W/ HCPCS: Performed by: PHYSICIAN ASSISTANT

## 2023-06-02 RX ORDER — MIDAZOLAM HYDROCHLORIDE 1 MG/ML
INJECTION INTRAMUSCULAR; INTRAVENOUS
Status: COMPLETED | OUTPATIENT
Start: 2023-06-02 | End: 2023-06-02

## 2023-06-02 RX ORDER — SODIUM CHLORIDE 0.9 % (FLUSH) 0.9 %
3 SYRINGE (ML) INJECTION
Status: DISCONTINUED | OUTPATIENT
Start: 2023-06-02 | End: 2023-06-06 | Stop reason: HOSPADM

## 2023-06-02 RX ORDER — SODIUM CHLORIDE 9 MG/ML
INJECTION, SOLUTION INTRAVENOUS
Status: COMPLETED | OUTPATIENT
Start: 2023-06-02 | End: 2023-06-02

## 2023-06-02 RX ORDER — SODIUM BICARBONATE 650 MG/1
1300 TABLET ORAL 3 TIMES DAILY
Status: DISCONTINUED | OUTPATIENT
Start: 2023-06-02 | End: 2023-06-06 | Stop reason: HOSPADM

## 2023-06-02 RX ORDER — FENTANYL CITRATE 50 UG/ML
INJECTION, SOLUTION INTRAMUSCULAR; INTRAVENOUS
Status: COMPLETED | OUTPATIENT
Start: 2023-06-02 | End: 2023-06-02

## 2023-06-02 RX ORDER — SODIUM CHLORIDE 9 MG/ML
INJECTION, SOLUTION INTRAVENOUS
Status: DISCONTINUED | OUTPATIENT
Start: 2023-06-02 | End: 2023-06-06 | Stop reason: HOSPADM

## 2023-06-02 RX ADMIN — PREDNISONE 10 MG: 10 TABLET ORAL at 08:06

## 2023-06-02 RX ADMIN — ONDANSETRON 8 MG: 8 TABLET, ORALLY DISINTEGRATING ORAL at 08:06

## 2023-06-02 RX ADMIN — TACROLIMUS 2 MG: 1 CAPSULE ORAL at 06:06

## 2023-06-02 RX ADMIN — Medication 6 MG: at 07:06

## 2023-06-02 RX ADMIN — ATOVAQUONE 1500 MG: 750 SUSPENSION ORAL at 08:06

## 2023-06-02 RX ADMIN — ACYCLOVIR 400 MG: 200 CAPSULE ORAL at 08:06

## 2023-06-02 RX ADMIN — SODIUM BICARBONATE 1300 MG: 650 TABLET ORAL at 09:06

## 2023-06-02 RX ADMIN — OXYCODONE HYDROCHLORIDE 5 MG: 5 TABLET ORAL at 07:06

## 2023-06-02 RX ADMIN — SODIUM CHLORIDE 50 ML/HR: 0.9 INJECTION, SOLUTION INTRAVENOUS at 04:06

## 2023-06-02 RX ADMIN — FAMOTIDINE 20 MG: 20 TABLET ORAL at 07:06

## 2023-06-02 RX ADMIN — ACYCLOVIR 400 MG: 200 CAPSULE ORAL at 07:06

## 2023-06-02 RX ADMIN — SODIUM BICARBONATE: 84 INJECTION, SOLUTION INTRAVENOUS at 11:06

## 2023-06-02 RX ADMIN — MIDAZOLAM HYDROCHLORIDE 2 MG: 1 INJECTION INTRAMUSCULAR; INTRAVENOUS at 04:06

## 2023-06-02 RX ADMIN — FLUTICASONE FUROATE 1 PUFF: 100 POWDER RESPIRATORY (INHALATION) at 08:06

## 2023-06-02 RX ADMIN — TACROLIMUS 2 MG: 1 CAPSULE ORAL at 08:06

## 2023-06-02 RX ADMIN — FENTANYL CITRATE 50 MCG: 50 INJECTION, SOLUTION INTRAMUSCULAR; INTRAVENOUS at 04:06

## 2023-06-02 RX ADMIN — SODIUM BICARBONATE 1300 MG: 650 TABLET ORAL at 08:06

## 2023-06-02 RX ADMIN — SODIUM CHLORIDE: 9 INJECTION, SOLUTION INTRAVENOUS at 11:06

## 2023-06-02 RX ADMIN — POTASSIUM PHOSPHATE, MONOBASIC AND POTASSIUM PHOSPHATE, DIBASIC 20 MMOL: 224; 236 INJECTION, SOLUTION, CONCENTRATE INTRAVENOUS at 11:06

## 2023-06-02 RX ADMIN — SODIUM CHLORIDE: 9 INJECTION, SOLUTION INTRAVENOUS at 02:06

## 2023-06-02 RX ADMIN — SODIUM BICARBONATE 1300 MG: 650 TABLET ORAL at 06:06

## 2023-06-02 NOTE — PLAN OF CARE
- Report received from IR.  Patient to return to TSU with RLQ drain in place  - Na Bicarb gtt and K Phos IVPB replacement  - RLQ incision with steristrips in place  - Cellcept remains on hold.  3 BM during day, improving consistency    Problem: Adult Inpatient Plan of Care  Goal: Plan of Care Review  Outcome: Ongoing, Progressing  Goal: Patient-Specific Goal (Individualized)  Outcome: Ongoing, Progressing  Goal: Absence of Hospital-Acquired Illness or Injury  Outcome: Ongoing, Progressing  Goal: Optimal Comfort and Wellbeing  Outcome: Ongoing, Progressing  Goal: Readiness for Transition of Care  Outcome: Ongoing, Progressing     Problem: Fluid and Electrolyte Imbalance (Acute Kidney Injury/Impairment)  Goal: Fluid and Electrolyte Balance  Outcome: Ongoing, Progressing     Problem: Oral Intake Inadequate (Acute Kidney Injury/Impairment)  Goal: Optimal Nutrition Intake  Outcome: Ongoing, Progressing     Problem: Renal Function Impairment (Acute Kidney Injury/Impairment)  Goal: Effective Renal Function  Outcome: Ongoing, Progressing     Problem: Infection  Goal: Absence of Infection Signs and Symptoms  Outcome: Ongoing, Progressing     Problem: Fall Injury Risk  Goal: Absence of Fall and Fall-Related Injury  Outcome: Ongoing, Progressing

## 2023-06-02 NOTE — ASSESSMENT & PLAN NOTE
"- Cr noted to be elevated at 2.4 from 2.0 on outpt labs  - DGF  - Pt reports watery stools (hx of Crohns, BMs loose but not watery at BL).   - Plan to hydrate with IVFs. Cr improving with IV hydration. Switching NS infusion to Sodium Bicarb infusion today. Cr 2.1. CO2 17. 6/2  - c diff negative   - CMV PCR and GI pathogens panel pending  - UA negative; Urine culture pending   - Kidney US 6/1 with impression of "Improved intraparenchymal arterial resistive indices which measure mildly elevated on today's exam. Improved main renal vein velocity at the anastomosis. Large minimally complex peritransplant collection extending the entire length of the allograft.  Collection may be new or represent significant interval enlargement of prior collection.".   - IR consulted for drain placement. Fluid studies ordered.  - continue to monitor       "

## 2023-06-02 NOTE — CARE UPDATE
patient arrived to MPU 4 via stretcher in [facility], report received from JASIEL Bone, see chart for vital signs and assessment, will continue to monitor patient until properly relieved.

## 2023-06-02 NOTE — NURSING
Procedure recovery complete. VSS. Patient denies pain. Procedure site dressing to RLQ is clean, dry, and intact. Bulb drain compressed with serous drainage in tubing.

## 2023-06-02 NOTE — PROGRESS NOTES
Admit / (Tentative) Weekend Discharge Note     Met with patient and pt's spouse to assess patients needs.   Patient is a 62 y.o.  male, s/p kidney transplant from 5/10/2023; admitted Kidney transplant status [Z94.0]  Diarrhea, unspecified [R19.7]  TRINO (acute kidney injury) [N17.9]     Patient admitted from home on 6/1/2023 .  At this time, patient presents as alert and oriented x 4, pleasant, good eye contact, well groomed, recall good, concentration/judgement good, average intelligence, calm, communicative, cooperative, and asking and answering questions appropriately.  At this time, patients caregiver presents as alert and oriented x 4, pleasant, good eye contact, well groomed, recall good, concentration/judgement good, average intelligence, calm, communicative, cooperative, and asking and answering questions appropriately.      Household/Family Systems    Patient resides with patient's spouse Kalani Parikh, pt's mother-in-law Milly Ricardo, and pt's sister-in-law Elenora Ricardo, at [address] [place] [address].  Support system includes patient's spouse Kalani Parikh, pt's mother-in-law Milly Ricardo, and pt's sister-in-law Elenora Ricardo.  Patient does not have dependents that are need of being cared for.     Patients primary caregiver is Kalani Parikh, patients spouse, phone number [phone].  Confirmed patients contact information is [phone] (home);   Telephone Information:   Mobile [phone]   .    During admission, patient's caregiver plans to stay at home.  Confirmed patient and patients caregivers do have access to reliable transportation.     Cognitive Status/Learning     Patient reports patients reading ability as college and states patient does have difficulty with hearing and uses bilateral hearing aids. Patient reports patient learns best by hands-on, practicing.   Needed: No.   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 Pt reports having PTO and FMLA available.     Adherence     Patient reports patient has a high level of adherence to patients health care regimen.  Adherence counseling and education provided.  Patient verbalizes understanding.    Substance Use    Patient reports substance usage as the following:    Tobacco: none, patient denies any use.  Alcohol: none, patient denies any use.  Illicit Drugs/Non-prescribed Medications: none, patient denies any use.  Patient states clear understanding of the potential impact of substance use.  Substance abstinence/cessation counseling, education and resources provided and reviewed.     Services Utilizing/ADLS    Infusion Service: Prior to admission, patient utilizing? no  Home Health: Prior to admission, patient utilizing? no  DME: Prior to admission, yes, BPC and glucometer  Pulmonary/Cardiac Rehab: Prior to admission, no  Dialysis:  Prior to admission, no, pt was on MWF 2-6 pm at [facility] following transplant due to DGF concerns.  However, per [facility] rounds, OP HD no longer needed.  Transplant Specialty Pharmacy:  Prior to admission, yes; [facility].    Prior to admission, patient was independent with ADLS and was not driving.  Patients caregiver reports patient is able to care for self at this time..  Patients caregiver reports patient indicates a willingness to care for self once medically cleared to do so.    Insurance/Medications    Insured by  Payer/Plan Subscr  Sex Relation Sub. Ins. ID Effective Group Num   1. MEDICARE - ME* MATT BHANDARI 1960 Male Self [id number] 10/1/20                                    [address]   2. BLUE CROSS * MATT BHANDARI 1960 Male Self [id number]                                   [address] [address]     Primary  Insurance (for UNOS reporting): Private Insurance  Secondary Insurance (for UNOS reporting): Public Insurance - Medicare FFS (Fee For Service)  Pt reports receiving AKF assistance paying for insurance premiums. SW previously provided education and AKF booklet and submitted AKF verification form.  Pt confirms having access to own GMS account online for ongoing management.    Patient reports patient is able to obtain and afford medications at this time and at time of discharge.    Living Will/Healthcare Power of     Patient reports patient has a LW and/or HCPA.   provided education regarding LW and HCPA and the completion of forms.    Coping/Mental Health    Patient is coping adequately with the aid of  family members.  Patients caregiver is coping adequately with the aid of  family members.  Patient denies mental health difficulties.    Discharge Planning    At time of discharge, patient plans to return to patient's home under the care of self/spouse.  Patients spouse will transport patient.  Per rounds today, expected discharge date is tentatively scheduled for over the weekend . Patient and patients caretaker verbalizes understanding and is involved in treatment planning and discharge process.    No psychosocial needs indicated for discharge at this time.    Additional Concerns    Patient's caretaker denies additional needs and/or concerns at this time. Patient is being followed for needs, education, resources, information, emotional support, supportive counseling, and for supportive and skilled discharge plan of care.  providing ongoing psychosocial support, education, resources and d/c planning as needed.  SW remains available.  remains available. Patient's caregiver verbalizes understanding and agreement with information reviewed,  availability and how to access available resources as needed. Patient denies additional needs and/or concerns at this  time. Patient verbalizes understanding and agreement with information reviewed, social work availability, and how to access available resources as needed.

## 2023-06-02 NOTE — DISCHARGE SUMMARY
[facility]  Kidney Transplant  Discharge Summary    Patient Name: Abdoulaye Parikh  MRN: [id number]  Admission Date: 6/1/2023  Hospital Length of Stay: 5 days  Discharge Date and Time:  06/06/2023 2:25 PM  Attending Physician: Beatrice Mendez DO   Discharging Provider: Mary Lima PA-C  Primary Care Provider: Primary Doctor Gosia    HPI:   Mr Abdoulaye Parikh is a 63 yo s/p living unrelated kidney transplant on 5/10/23. Post op course significant for OR takeback 5/15 to assess flow due to elevated velocities in artery and vein on US and DGF (last HD 5/11 for hyperkalemia). Kidney biopsy obtained in the OR without evidence of rejection. He now presents as a direct admit for TRINO. Cr noted to be elevated at 2.4 from 2.0. Pt reports watery stools (hx of Crohn's, BMs loose but not watery at BL). Pt also orthostatic in clinic but asymptomatic. Plan to hydrate with IVFs, send c diff and CMV PCR, Kidney US, UA w/cx, and hold metoprolol and myfortic. Pt discussed with Dr Mendez.       Procedure(s) (LRB):  Nephrostomy (N/A)     Hospital Course:    Patient admitted for TRINO and diarrhea. IVFs started. Myfortic held for GI upset and poss infection.   KTX US 6/1 with new or evolved, large minimally complex peritransplant collection   C diff negative, UA negative, Urine culture NGTD and CMV PCR not detected  IR consulted and drain placed in fluid collection on 6/2.   Fluid studies NGTD, + for urinoma  Cont Levo x 14 days total (EOT 6/16)   Nephrostomy tube placed 6/3 for urine leak   Repeat in 1-2 weeks   RLE DVT US pending for RLE swelling. Will be completed and reviewed prior to dc.   Patient's diarrhea improving once atovaquone was held, restarted myfortic without issue. His kidney function improved with IVFs and Cr on day of discharge is 1.4.     Patient ready and stable for discharge at this time. On day of discharge, patient ambulating without assistance, tolerating diet, pain well controlled. CATHLEEN to  remain in place, PCNU to remain uncapped. F/u labs 6/8. F/u clinic appointment 6/9.  Patient understands discharge instruction and importance of follow up.    Goals of Care Treatment Preferences:  Code Status: Full Code      Final Active Diagnoses:    Diagnosis Date Noted POA    Urine leak from transplanted ureter [T86.898] 06/03/2023 Yes    Perinephric fluid collection of kidney transplant [T86.19, N28.89] 06/02/2023 Yes    Orthostatic hypotension [I95.1] 06/01/2023 Yes    Metabolic acidosis [E87.20] 05/22/2023 Yes    Anemia of renal disease [N18.9, D63.1] 05/11/2023 Yes    At risk for opportunistic infections [Z91.89] 05/11/2023 Yes    Prophylactic immunotherapy [Z29.8] 05/11/2023 Not Applicable    Long-term use of immunosuppressant medication [Z79.60] 05/11/2023 Not Applicable    S/P kidney transplant [Z94.0] 05/11/2023 Not Applicable      Problems Resolved During this Admission:    Diagnosis Date Noted Date Resolved POA    PRINCIPAL PROBLEM:  TRINO (acute kidney injury) [N17.9] 06/01/2023 06/06/2023 Yes    Bacteremia [R78.81] 06/05/2023 06/06/2023 No    Hypophosphatemia [E83.39] 06/02/2023 06/06/2023 No    Diarrhea, unspecified [R19.7] 06/01/2023 06/06/2023 Yes    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] 05/22/2023 06/06/2023 Yes       Treatments: see above     Consults (From admission, onward)          Status Ordering Provider     Inpatient consult to Infectious Diseases  Once        Provider:  (Not yet assigned)    Completed STEFFEN HYMAN     Inpatient consult to Interventional Radiology  Once        Provider:  (Not yet assigned)    Completed STEFFEN YU     Inpatient consult to Midline team  Once        Provider:  (Not yet assigned)    Completed BASILIO GARCIA     Inpatient consult to Interventional Radiology  Once        Provider:  (Not yet assigned)    Completed KARAN CABRERA     Inpatient consult to Midline team  Once        Provider:  (Not yet assigned)    Completed JUAN BARRETO             Pending Diagnostic Studies:       Procedure Component Value Units Date/Time    G6PD,Quantitative [[id number]] Collected: 06/06/23 0559    Order Status: Sent Lab Status: In process Updated: 06/06/23 0632    Specimen: Blood     US Lower Extremity Veins Right [[id number]]     Order Status: Sent Lab Status: No result           Significant Diagnostic Studies: Labs:   CMP   Recent Labs   Lab 06/05/23 0757 06/06/23 0559    139   K 3.5 3.5    107   CO2 23 23   GLU 76 96   BUN 15 13   CREATININE 1.6* 1.4   CALCIUM 8.6* 8.7   ALBUMIN 2.6* 2.5*   ANIONGAP 8 9   , CBC   Recent Labs   Lab 06/05/23 0757 06/06/23 0559   WBC 5.06 5.83   HGB 9.2* 8.6*   HCT 27.5* 26.1*    210    and All labs within the past 24 hours have been reviewed    Discharged Condition: good    Disposition: Home or Self Care    Patient Instructions:      Diet Adult Regular     Notify your health care provider if you experience any of the following:  increased confusion or weakness     Notify your health care provider if you experience any of the following:  persistent dizziness, light-headedness, or visual disturbances     Notify your health care provider if you experience any of the following:  worsening rash     Notify your health care provider if you experience any of the following:  severe persistent headache     Notify your health care provider if you experience any of the following:  difficulty breathing or increased cough     Notify your health care provider if you experience any of the following:  redness, tenderness, or signs of infection (pain, swelling, redness, odor or green/yellow discharge around incision site)     Notify your health care provider if you experience any of the following:  severe uncontrolled pain     Notify your health care provider if you experience any of the following:  persistent nausea and vomiting or diarrhea     Notify your health care provider if you experience any of the following:  temperature  >100.4     No dressing needed     Activity as tolerated     Medications:  Reconciled Home Medications:      Medication List        START taking these medications      levoFLOXacin 750 MG tablet  Commonly known as: LEVAQUIN  Take 1 tablet (750 mg total) by mouth once daily. for 9 days  Start taking on: June 7, 2023     PHOSPHA 250 NEUTRAL 250 mg Tab  Generic drug: k phos di & mono-sod phos mono  Take 2 tablets by mouth 3 (three) times daily.            CHANGE how you take these medications      mycophenolate 180 MG Tbec  Commonly known as: MYFORTIC  Take 2 tablets (360 mg total) by mouth 2 (two) times daily.  What changed: how much to take     sodium bicarbonate 650 MG tablet  Take 2 tablets (1,300 mg total) by mouth 3 (three) times daily.  What changed: when to take this            CONTINUE taking these medications      acyclovir 400 MG tablet  Commonly known as: ZOVIRAX  Take 1 tablet (400 mg total) by mouth 2 (two) times daily. Stop 8/9/23     ARNUITY ELLIPTA 100 mcg/actuation inhaler  Generic drug: fluticasone furoate  Inhale 100 mcg into the lungs once daily. Controller     hydrOXYzine HCL 25 MG tablet  Commonly known as: ATARAX  Take 1 tablet (25 mg total) by mouth 3 (three) times daily as needed for Anxiety.     metoprolol succinate 25 MG 24 hr tablet  Commonly known as: TOPROL-XL  Take HALF tablet (12.5 mg total) by mouth once daily.     oxyCODONE 5 MG immediate release tablet  Commonly known as: ROXICODONE  Take 1 tablet (5 mg total) by mouth every 4 (four) hours as needed for Pain.     predniSONE 5 MG tablet  Commonly known as: DELTASONE  Take 20 mg by mouth daily 5/14-5/20; 15 mg daily 5/21-5/27; 10 mg daily 5/28-6/3; then 5 mg daily thereafter 6/4/23     tacrolimus 1 MG Cap  Commonly known as: PROGRAF  Take 2 capsules (2 mg total) by mouth every 12 (twelve) hours.            STOP taking these medications      atovaquone 750 mg/5 mL Susp oral liquid  Commonly known as: MEPRON     bisacodyL 5 mg EC  tablet  Commonly known as: DULCOLAX     docusate sodium 100 MG capsule  Commonly known as: COLACE     famotidine 20 MG tablet  Commonly known as: PEPCID            Time spent caring for patient (Greater than 1/2 spent in direct face-to-face contact): > 30 minutes    Mary Lima PA-C  Kidney Transplant  [facility]

## 2023-06-02 NOTE — PROGRESS NOTES
"[facility]  Kidney Transplant  Progress Note      Reason for Follow-up: Reassessment of Kidney Transplant - 5/10/2023  (#1) recipient and management of immunosuppression.    ORGAN: LEFT KIDNEY    Donor Type: Living    Donor CMV Status:   Donor HBcAB:  Donor HCV Status:  Donor HBV AURORA:   Donor HCV AURORA:       Subjective:   History of Present Illness:  Mr Abdoulaye Parikh is a 61 yo s/p living unrelated kidney transplant on 5/10/23. Post op course significant for OR takeback 5/15 to assess flow due to elevated velocities in artery and vein on US and DGF (last HD 5/11 for hyperkalemia). Kidney biopsy obtained in the OR without evidence of rejection. He now presents as a direct admit for TRINO. Cr noted to be elevated at 2.4 from 2.0. Pt reports watery stools (hx of Crohn's, BMs loose but not watery at BL). Pt also orthostatic in clinic but asymptomatic. Plan to hydrate with IVFs, send c diff and CMV PCR, Kidney US, UA w/cx, and hold metoprolol and myfortic. Pt discussed with Dr Mendez.       Hospital Course:  Patient admitted for TRINO and diarrhea. IVFs started, C diff negative, UA negative, Urine culture and CMV PCR pending. Myfortic held on admit due to GI upset and possible infection. Kidney trasnplant US obtained on admit with impression of "Improved intraparenchymal arterial resistive indices which measure mildly elevated on today's exam. Improved main renal vein velocity at the anastomosis. Large minimally complex peritransplant collection extending the entire length of the allograft.  Collection may be new or represent significant interval enlargement of prior collection.".     Interval Course: NAEON. Patient reports feeling well this morning and has not had any more episodes of diarrhea overnight. Denies any nausea, vomiting, or abdominal pain. CMV PCR and GI pathogens panel pending. Infectious workup negative thus far. Kidney US on admit with large peritransplant fluid collection. IR consulted for " drain placement. Fluid studies ordered. Cr down to 2.1 this morning. Will switch NS infusion to Sodium bicarb gtt. Electrolytes replaced as needed. All VSS. Will continue to monitor closely.       Past Medical, Surgical, Family, and Social History:   Unchanged from H&P.    Scheduled Meds:   acyclovir  400 mg Oral BID    atovaquone  1,500 mg Oral Daily    famotidine  20 mg Oral QHS    fluticasone furoate  1 puff Inhalation Daily    potassium phosphate IVPB  20 mmol Intravenous Once    predniSONE  10 mg Oral Daily    sodium bicarbonate  1,300 mg Oral TID    tacrolimus  2 mg Oral BID     Continuous Infusions:   sodium bicarbonate drip       PRN Meds:acetaminophen, acetaminophen, hydrOXYzine HCL, melatonin, ondansetron, oxyCODONE, sodium chloride 0.9%    Intake/Output - Last 3 Shifts         05/31 0700  06/01 0659 06/01 0700  06/02 0659 06/02 0700  06/03 0659    P.O.  0     I.V. (mL/kg)  1220 (15.2)     Total Intake(mL/kg)  1220 (15.2)     Net  +1220            Urine Occurrence  4 x     Stool Occurrence  4 x              Review of Systems   Constitutional:  Negative for appetite change and fever.   HENT:  Negative for facial swelling and trouble swallowing.    Eyes:  Negative for visual disturbance.   Respiratory:  Negative for shortness of breath, wheezing and stridor.    Cardiovascular:  Negative for chest pain.   Gastrointestinal:  Positive for diarrhea (improved). Negative for abdominal distention, abdominal pain, nausea and vomiting.   Genitourinary:  Negative for decreased urine volume and difficulty urinating.   Musculoskeletal:  Negative for myalgias.   Skin:  Negative for wound.   Allergic/Immunologic: Positive for immunocompromised state.   Neurological:  Negative for tremors and syncope.   Psychiatric/Behavioral:  Negative for agitation, behavioral problems, confusion, decreased concentration and hallucinations.     Objective:     Vital Signs (Most Recent):  Temp: 97.8 °F (36.6 °C) (06/02/23  "0740)  Pulse: 60 (06/02/23 0827)  Resp: 16 (06/02/23 0827)  BP: (!) 140/69 (06/02/23 0740)  SpO2: 98 % (06/02/23 0827) Vital Signs (24h Range):  Temp:  [97.4 °F (36.3 °C)-97.9 °F (36.6 °C)] 97.8 °F (36.6 °C)  Pulse:  [59-63] 60  Resp:  [15-18] 16  SpO2:  [98 %-99 %] 98 %  BP: (119-147)/(59-71) 140/69     Weight: 80.3 kg (177 lb 0.5 oz)  Height: 5' 9" (175.3 cm)  Body mass index is 26.14 kg/m².     Physical Exam  Vitals and nursing note reviewed.   Constitutional:       General: He is not in acute distress.     Appearance: Normal appearance. He is well-developed. He is not toxic-appearing.   HENT:      Head: Normocephalic.   Eyes:      General: No scleral icterus.  Cardiovascular:      Rate and Rhythm: Normal rate and regular rhythm.      Pulses: Normal pulses.      Heart sounds: Murmur heard.   Pulmonary:      Effort: Pulmonary effort is normal.      Breath sounds: Normal breath sounds.   Abdominal:      General: A surgical scar is present. Bowel sounds are normal. There is no distension.      Palpations: Abdomen is soft.      Tenderness: There is no abdominal tenderness. There is no guarding or rebound.   Musculoskeletal:      Right lower leg: No edema.      Left lower leg: No edema.   Skin:     General: Skin is warm and dry.      Capillary Refill: Capillary refill takes 2 to 3 seconds.   Neurological:      Mental Status: He is alert and oriented to person, place, and time.      GCS: GCS eye subscore is 4. GCS verbal subscore is 5. GCS motor subscore is 6.   Psychiatric:         Attention and Perception: Attention normal.         Mood and Affect: Mood normal.         Speech: Speech normal.         Behavior: Behavior normal. Behavior is cooperative.         Thought Content: Thought content normal.         Judgment: Judgment normal.        Laboratory:  CBC:   Recent Labs   Lab 06/01/23  0746 06/01/23  1548 06/02/23  0503   WBC 4.76 5.56 4.50   RBC 2.69* 2.36* 2.29*   HGB 9.4* 8.4* 8.1*   HCT 29.5* 25.1* 25.4*   PLT " 233 246 204   * 106* 111*   MCH 34.9* 35.6* 35.4*   MCHC 31.9* 33.5 31.9*     CMP:   Recent Labs   Lab 06/01/23  0746 06/01/23  1548 06/02/23  0503    95  94 79   CALCIUM 9.3 9.3  9.6 8.8   ALBUMIN 2.9* 3.4*  3.2* 2.6*   PROT  --  8.7* 6.8    139  138 138   K 4.2 3.9  3.9 3.8   CO2 22* 22*  24 17*    106  106 110   BUN 27* 27*  28* 28*   CREATININE 2.4* 2.3*  2.2* 2.1*   ALKPHOS  --  104 82   ALT  --  33 22   AST  --  18 16     Labs within the past 24 hours have been reviewed.    Diagnostic Results:  US - Kidney: Results for orders placed during the hospital encounter of 06/01/23    US Transplant Kidney With Doppler    Narrative  EXAMINATION:  US TRANSPLANT KIDNEY WITH DOPPLER    CLINICAL HISTORY:  TRINO;    TECHNIQUE:  Real time gray scale and doppler ultrasound was performed over the patient's renal allograft.    COMPARISON:  U/S intraoperative 05/15/2023; transplant kidney with Doppler 05/15/2023    FINDINGS:  The transplant lies in the right lower quadrant and measures 11.9 cm.    The renal transplant demonstrates no focal parenchymal abnormality. No transplant hydronephrosis. Minimally complex peritransplant fluid collection extending across the entire allograft measuring 16.6 x 7.8 x 8.6 cm.    Renal arterial resistive indices are as follows: Interlobar 0.77, segmental Up 0.68, segmental Mid 0.80, segmental Low 0.79.  No evidence of a tardus parvus waveform. The maximum velocity in the main renal artery is 249 cm/sec (previously 252 cm/sec).  The maximum velocity in the iliac artery measures 147 cm/sec.  The RA/iliac ratio measures 1.2.    The renal transplant venous system is unremarkable.  Improved velocity within the main renal vein anastomosis measuring 120 cm/sec.    Impression  Improved intraparenchymal arterial resistive indices which measure mildly elevated on today's exam.    Improved main renal vein velocity at the anastomosis.    Large minimally complex  "peritransplant collection extending the entire length of the allograft.  Collection may be new or represent significant interval enlargement of prior collection.    This report was flagged in Epic as abnormal.    Electronically signed by resident: Chandan Montoya  Date:    06/01/2023  Time:    16:36    Electronically signed by: Al Read  Date:    06/01/2023  Time:    17:00    Assessment/Plan:     * TRINO (acute kidney injury)  - Cr noted to be elevated at 2.4 from 2.0 on outpt labs  - DGF  - Pt reports watery stools (hx of Crohns, BMs loose but not watery at BL).   - Plan to hydrate with IVFs. Cr improving with IV hydration. Switching NS infusion to Sodium Bicarb infusion today. Cr 2.1. CO2 17. 6/2  - c diff negative   - CMV PCR and GI pathogens panel pending  - UA negative; Urine culture pending   - Kidney US 6/1 with impression of "Improved intraparenchymal arterial resistive indices which measure mildly elevated on today's exam. Improved main renal vein velocity at the anastomosis. Large minimally complex peritransplant collection extending the entire length of the allograft.  Collection may be new or represent significant interval enlargement of prior collection.".   - IR consulted for drain placement. Fluid studies ordered.  - continue to monitor         Hypophosphatemia  - replace IV as needed      Perinephric fluid collection of kidney transplant  - Kidney US 6/1 with impression of "Improved intraparenchymal arterial resistive indices which measure mildly elevated on today's exam. Improved main renal vein velocity at the anastomosis. Large minimally complex peritransplant collection extending the entire length of the allograft.  Collection may be new or represent significant interval enlargement of prior collection.".   - IR consulted for drain placement; appreciate assitance. Fluid studies ordered.      Orthostatic hypotension  - Hold metoprolol  - Monitor       Diarrhea, unspecified  - Reports watery " "stools (hx of Crohns, BMs loose but not watery at BL)  - C diff negative and CMV PCR pending   - Hydrating with IVFs; Cr improving with hydration, Cr 2.1 today. Will cotnineu to hydrate while NPO for IR procedure  - Myfortic held on admit due to GI upset  - Patient reports not having anymore diarrhea overnight.  - continue to monitor     Delayed renal graft function  - Last HD 5/11 for hyperkalemia  - See "TRINO"      Metabolic acidosis  - Cont PO bicarb   - Switching NS infusion to Sodium Bicarb gtt. CO2 17 on AM labs   - Monitor       Anemia of renal disease  - H/H stable. Will continue to monitor with daily cbc.         Long-term use of immunosuppressant medication  - Continue prograf and steroids.  - Continue to monitor prograf levels daily, monitor for toxic side effects, and adjust for therapeutic dose.   - Hold myfortic due to GI upset      Prophylactic immunotherapy  - see long term use of immunosuppression         At risk for opportunistic infections  - Cont appropriate OI prophylaxis per protocol.         S/P kidney transplant  - s/p living unrelated kidney transplant on 5/10/23  - Post op course significant for OR takeback 5/15 to assess flow due to elevated velocities in artery and vein on US and DGF (last HD 5/11 for hyperkalemia)  - See "TRINO"        Discharge Planning:  Not a candidate at this time     Kera Houston NP  Kidney Transplant  [facility]  "

## 2023-06-02 NOTE — PLAN OF CARE
Patient AAO x4. VSS. On room air. Afebrile. NS infusing at 100 ml/hr. Admitted with TRINO; kidney U/S showed fluid collection. NPO for possible biopsy or drain placement today. Pt ambulatory and independent. Bed locked/in lowest setting. Call bell in reach. Wife at bedside.

## 2023-06-02 NOTE — SUBJECTIVE & OBJECTIVE
"  Subjective:   History of Present Illness:  Mr Abdoulaye Parikh is a 61 yo s/p living unrelated kidney transplant on 5/10/23. Post op course significant for OR takeback 5/15 to assess flow due to elevated velocities in artery and vein on US and DGF (last HD 5/11 for hyperkalemia). Kidney biopsy obtained in the OR without evidence of rejection. He now presents as a direct admit for TRINO. Cr noted to be elevated at 2.4 from 2.0. Pt reports watery stools (hx of Crohn's, BMs loose but not watery at BL). Pt also orthostatic in clinic but asymptomatic. Plan to hydrate with IVFs, send c diff and CMV PCR, Kidney US, UA w/cx, and hold metoprolol and myfortic. Pt discussed with Dr Mendez.       Hospital Course:  Patient admitted for TRINO and diarrhea. IVFs started, C diff negative, UA negative, Urine culture and CMV PCR pending. Myfortic held on admit due to GI upset and possible infection. Kidney trasnplant US obtained on admit with impression of "Improved intraparenchymal arterial resistive indices which measure mildly elevated on today's exam. Improved main renal vein velocity at the anastomosis. Large minimally complex peritransplant collection extending the entire length of the allograft.  Collection may be new or represent significant interval enlargement of prior collection.".     Interval Course: NAEON. Patient reports feeling well this morning and has not had any more episodes of diarrhea overnight. Denies any nausea, vomiting, or abdominal pain. CMV PCR and GI pathogens panel pending. Infectious workup negative thus far. Kidney US on admit with large peritransplant fluid collection. IR consulted for drain placement. Fluid studies ordered. Cr down to 2.1 this morning. Will switch NS infusion to Sodium bicarb gtt. Electrolytes replaced as needed. All VSS. Will continue to monitor closely.       Past Medical, Surgical, Family, and Social History:   Unchanged from H&P.    Scheduled Meds:   acyclovir  400 mg Oral BID    " EXAM DESCRIPTION:  CHEST PA/LAT



COMPLETED DATE/TIME:  1/28/2018 8:53 pm



REASON FOR STUDY:  cough fever



COMPARISON:  2010.



TECHNIQUE:  Frontal and lateral radiographic views of the chest acquired.



NUMBER OF VIEWS:  Two view.



LIMITATIONS:  None.



FINDINGS:  LUNGS AND PLEURA: Suspect minimal subsegmental atelectasis or infiltrate in the lingula.

MEDIASTINUM AND HILAR STRUCTURES: No masses or contour abnormalities.

HEART AND VASCULAR STRUCTURES: Heart normal size.  No evidence for failure.

BONES: No acute findings.

HARDWARE: None in the chest.

OTHER: No other significant finding.



IMPRESSION:  Left upper lobe infiltrate.



TECHNICAL DOCUMENTATION:  JOB ID:  [id number]

 2011 [employer]- All Rights Reserved atovaquone  1,500 mg Oral Daily    famotidine  20 mg Oral QHS    fluticasone furoate  1 puff Inhalation Daily    potassium phosphate IVPB  20 mmol Intravenous Once    predniSONE  10 mg Oral Daily    sodium bicarbonate  1,300 mg Oral TID    tacrolimus  2 mg Oral BID     Continuous Infusions:   sodium bicarbonate drip       PRN Meds:acetaminophen, acetaminophen, hydrOXYzine HCL, melatonin, ondansetron, oxyCODONE, sodium chloride 0.9%    Intake/Output - Last 3 Shifts         05/31 0700 06/01 0659 06/01 0700 06/02 0659 06/02 0700 06/03 0659    P.O.  0     I.V. (mL/kg)  1220 (15.2)     Total Intake(mL/kg)  1220 (15.2)     Net  +1220            Urine Occurrence  4 x     Stool Occurrence  4 x              Review of Systems   Constitutional:  Negative for appetite change and fever.   HENT:  Negative for facial swelling and trouble swallowing.    Eyes:  Negative for visual disturbance.   Respiratory:  Negative for shortness of breath, wheezing and stridor.    Cardiovascular:  Negative for chest pain.   Gastrointestinal:  Positive for diarrhea (improved). Negative for abdominal distention, abdominal pain, nausea and vomiting.   Genitourinary:  Negative for decreased urine volume and difficulty urinating.   Musculoskeletal:  Negative for myalgias.   Skin:  Negative for wound.   Allergic/Immunologic: Positive for immunocompromised state.   Neurological:  Negative for tremors and syncope.   Psychiatric/Behavioral:  Negative for agitation, behavioral problems, confusion, decreased concentration and hallucinations.     Objective:     Vital Signs (Most Recent):  Temp: 97.8 °F (36.6 °C) (06/02/23 0740)  Pulse: 60 (06/02/23 0827)  Resp: 16 (06/02/23 0827)  BP: (!) 140/69 (06/02/23 0740)  SpO2: 98 % (06/02/23 0827) Vital Signs (24h Range):  Temp:  [97.4 °F (36.3 °C)-97.9 °F (36.6 °C)] 97.8 °F (36.6 °C)  Pulse:  [59-63] 60  Resp:  [15-18] 16  SpO2:  [98 %-99 %] 98 %  BP: (119-147)/(59-71) 140/69     Weight: 80.3 kg (177 lb 0.5  "oz)  Height: 5' 9" (175.3 cm)  Body mass index is 26.14 kg/m².     Physical Exam  Vitals and nursing note reviewed.   Constitutional:       General: He is not in acute distress.     Appearance: Normal appearance. He is well-developed. He is not toxic-appearing.   HENT:      Head: Normocephalic.   Eyes:      General: No scleral icterus.  Cardiovascular:      Rate and Rhythm: Normal rate and regular rhythm.      Pulses: Normal pulses.      Heart sounds: Murmur heard.   Pulmonary:      Effort: Pulmonary effort is normal.      Breath sounds: Normal breath sounds.   Abdominal:      General: A surgical scar is present. Bowel sounds are normal. There is no distension.      Palpations: Abdomen is soft.      Tenderness: There is no abdominal tenderness. There is no guarding or rebound.   Musculoskeletal:      Right lower leg: No edema.      Left lower leg: No edema.   Skin:     General: Skin is warm and dry.      Capillary Refill: Capillary refill takes 2 to 3 seconds.   Neurological:      Mental Status: He is alert and oriented to person, place, and time.      GCS: GCS eye subscore is 4. GCS verbal subscore is 5. GCS motor subscore is 6.   Psychiatric:         Attention and Perception: Attention normal.         Mood and Affect: Mood normal.         Speech: Speech normal.         Behavior: Behavior normal. Behavior is cooperative.         Thought Content: Thought content normal.         Judgment: Judgment normal.        Laboratory:  CBC:   Recent Labs   Lab 06/01/23  0746 06/01/23  1548 06/02/23  0503   WBC 4.76 5.56 4.50   RBC 2.69* 2.36* 2.29*   HGB 9.4* 8.4* 8.1*   HCT 29.5* 25.1* 25.4*    246 204   * 106* 111*   MCH 34.9* 35.6* 35.4*   MCHC 31.9* 33.5 31.9*     CMP:   Recent Labs   Lab 06/01/23  0746 06/01/23  1548 06/02/23  0503    95  94 79   CALCIUM 9.3 9.3  9.6 8.8   ALBUMIN 2.9* 3.4*  3.2* 2.6*   PROT  --  8.7* 6.8    139  138 138   K 4.2 3.9  3.9 3.8   CO2 22* 22*  24 17*    " 106  106 110   BUN 27* 27*  28* 28*   CREATININE 2.4* 2.3*  2.2* 2.1*   ALKPHOS  --  104 82   ALT  --  33 22   AST  --  18 16     Labs within the past 24 hours have been reviewed.    Diagnostic Results:  US - Kidney: Results for orders placed during the hospital encounter of 06/01/23    US Transplant Kidney With Doppler    Narrative  EXAMINATION:  US TRANSPLANT KIDNEY WITH DOPPLER    CLINICAL HISTORY:  TRINO;    TECHNIQUE:  Real time gray scale and doppler ultrasound was performed over the patient's renal allograft.    COMPARISON:  U/S intraoperative 05/15/2023; transplant kidney with Doppler 05/15/2023    FINDINGS:  The transplant lies in the right lower quadrant and measures 11.9 cm.    The renal transplant demonstrates no focal parenchymal abnormality. No transplant hydronephrosis. Minimally complex peritransplant fluid collection extending across the entire allograft measuring 16.6 x 7.8 x 8.6 cm.    Renal arterial resistive indices are as follows: Interlobar 0.77, segmental Up 0.68, segmental Mid 0.80, segmental Low 0.79.  No evidence of a tardus parvus waveform. The maximum velocity in the main renal artery is 249 cm/sec (previously 252 cm/sec).  The maximum velocity in the iliac artery measures 147 cm/sec.  The RA/iliac ratio measures 1.2.    The renal transplant venous system is unremarkable.  Improved velocity within the main renal vein anastomosis measuring 120 cm/sec.    Impression  Improved intraparenchymal arterial resistive indices which measure mildly elevated on today's exam.    Improved main renal vein velocity at the anastomosis.    Large minimally complex peritransplant collection extending the entire length of the allograft.  Collection may be new or represent significant interval enlargement of prior collection.    This report was flagged in Epic as abnormal.    Electronically signed by resident: Chandan Montoya  Date:    06/01/2023  Time:    16:36    Electronically signed by: Al  Foto  Date:    06/01/2023  Time:    17:00

## 2023-06-02 NOTE — ASSESSMENT & PLAN NOTE
- Reports watery stools (hx of Crohns, BMs loose but not watery at BL)  - C diff negative and CMV PCR pending   - Hydrating with IVFs; Cr improving with hydration, Cr 2.1 today. Will cotnineu to hydrate while NPO for IR procedure  - Myfortic held on admit due to GI upset  - Patient reports not having anymore diarrhea overnight.  - continue to monitor

## 2023-06-02 NOTE — CONSULTS
"Percutaneous Drain Placement/Aspiration Consult Note  Interventional Radiology    Consult Requested By: Kera Houston NP   Reason for Consult: "large fluid collection around kidney transsplant. would like fluid tested and drain placed please"    SUBJECTIVE:     Chief Complaint: perinephric fluid collection for Ktx pt a/w TRINO, request drain placement    History of Present Illness:  Abdoulaye Parikh is a 62 y.o. male with a PMHx of ESRD s/p Ktx 5/10/23 c/b DGF (now resolved), Crohn's disease (s/p multiple abdominal surgeries), arnold-chiari malformation, and ventriculopleural shunt who was admitted on 6/1/23 for TRINO, Cr 2.4 from 2.0. Interventional Radiology has been consulted for image guided percutaneous drain placement for management of perinephric fluid collection. Pt has had recent imaging including a  kidney transplant US  on 6/1/23 which revealed "Minimally complex peritransplant fluid collection extending across the entire allograft measuring 16.6 x 7.8 x 8.6 cm". The pt's WBC is 4.50 and is trending down. Pt's Cr has trended down to 2.1 this AM after receiving IVF. The pt is hemodynamically stable.     Review of Systems   Constitutional: Negative.    HENT: Negative.     Eyes: Negative.    Respiratory: Negative.     Cardiovascular: Negative.    Gastrointestinal:  Positive for diarrhea. Negative for abdominal pain, nausea and vomiting.   Genitourinary: Negative.    Musculoskeletal: Negative.    Skin: Negative.    Neurological: Negative.      Scheduled Meds:   acyclovir  400 mg Oral BID    atovaquone  1,500 mg Oral Daily    famotidine  20 mg Oral QHS    fluticasone furoate  1 puff Inhalation Daily    potassium phosphate IVPB  20 mmol Intravenous Once    predniSONE  10 mg Oral Daily    sodium bicarbonate  1,300 mg Oral TID    tacrolimus  2 mg Oral BID     Continuous Infusions:   sodium chloride 0.9% 100 mL/hr at 06/02/23 0230     PRN Meds:acetaminophen, acetaminophen, hydrOXYzine HCL, melatonin, ondansetron, " New Prescription: Prolensa (bromfenac): drops: 0.07% 1 drop once a day into affected eye 09- oxyCODONE, sodium chloride 0.9%    Review of patient's allergies indicates:   Allergen Reactions    Bactrim [sulfamethoxazole-trimethoprim] Shortness Of Breath and Itching    Penicillins Shortness Of Breath and Itching       Past Medical History:   Diagnosis Date    Acute blood loss anemia 5/11/2023    Anemia     Arthritis     Cirrhosis     CKD (chronic kidney disease) stage 4, GFR 15-29 ml/min, slow graft function with sustaine creatinien improvement 5/26/2023    Crohn's disease     Disorder of kidney and ureter     Stage 5    Encounter for blood transfusion     Gout     Hearing loss     Hepatitis     Had Hep C Cleared    Hypertension     Immunosuppressive management encounter following kidney transplant 5/26/2023    Neurosarcoidosis 2018    Obesity     Osteoarthritis     Sarcoid neuropathy     Sarcoidosis, lung     Steatosis      Past Surgical History:   Procedure Laterality Date    AV FISTULA PLACEMENT Left     BRAIN SURGERY      COLON SURGERY      Exploratory lap x 4 for Chron's disease. Likely right colectomy    CSF SHUNT      DIAGNOSTIC ULTRASOUND N/A 5/15/2023    Procedure: ULTRASOUND, DIAGNOSTIC;  Surgeon: Chivo Brown MD;  Location: [facility] OR [address] [place] [address];  Service: Transplant;  Laterality: N/A;    JOINT REPLACEMENT Right     Hip    KIDNEY TRANSPLANT N/A 5/10/2023    Procedure: TRANSPLANT, KIDNEY - RQ 7PM START;  Surgeon: Chivo Brown MD;  [facility];  Service: Transplant;  Laterality: N/A;    RENAL BIOPSY  5/15/2023    Procedure: BIOPSY, KIDNEY;  Surgeon: Chivo Brown MD;  Location: [facility] OR [address] [place] [address];  Service: Transplant;;    RENAL EXPLORATION  5/15/2023    Procedure: EXPLORATION, KIDNEY;  Surgeon: Chivo Brown MD;  Location: [facility] OR [address] [place] [address];  Service: Transplant;;    TOTAL HIP ARTHROPLASTY Right      Family History   Problem Relation Age of Onset    Hypertension Mother     Cancer Father     Diabetes Father     Heart disease Father     Hypertension Father      Stroke Father     Multiple myeloma Father     COPD Sister     Heart disease Sister     Cancer Maternal Grandfather     Lung cancer Maternal Grandfather     Cancer Paternal Grandfather     Lung cancer Paternal Grandfather      Social History     Tobacco Use    Smoking status: Never    Smokeless tobacco: Never   Substance Use Topics    Alcohol use: Not Currently    Drug use: Never       OBJECTIVE:     Vital Signs (Most Recent)  Temp: 97.8 °F (36.6 °C) (06/02/23 0740)  Pulse: 60 (06/02/23 0827)  Resp: 16 (06/02/23 0827)  BP: (!) 140/69 (06/02/23 0740)  SpO2: 98 % (06/02/23 0827)    Physical Exam:  Physical Exam  Vitals and nursing note reviewed.   Constitutional:       General: He is not in acute distress.     Appearance: Normal appearance. He is not ill-appearing.   HENT:      Head: Normocephalic and atraumatic.   Eyes:      Extraocular Movements: Extraocular movements intact.      Conjunctiva/sclera: Conjunctivae normal.      Pupils: Pupils are equal, round, and reactive to light.   Cardiovascular:      Rate and Rhythm: Normal rate.   Pulmonary:      Effort: Pulmonary effort is normal. No respiratory distress.   Abdominal:      General: Abdomen is flat. There is no distension.      Tenderness: There is no abdominal tenderness.      Comments: RLQ curvilinear incision with steri strips, healing well   Musculoskeletal:         General: No swelling. Normal range of motion.   Skin:     General: Skin is warm and dry.   Neurological:      General: No focal deficit present.      Mental Status: He is alert and oriented to person, place, and time.   Psychiatric:         Mood and Affect: Mood normal.         Behavior: Behavior normal.         Thought Content: Thought content normal.         Judgment: Judgment normal.       Laboratory  I have reviewed all pertinent lab results within the past 24 hours.  CBC:   Recent Labs   Lab 06/02/23  0503   WBC 4.50   RBC 2.29*   HGB 8.1*   HCT 25.4*      *   MCH 35.4*   MCHC  31.9*     BMP:   Recent Labs   Lab 06/02/23  0503   GLU 79      K 3.8      CO2 17*   BUN 28*   CREATININE 2.1*   CALCIUM 8.8   MG 1.8     CMP:   Recent Labs   Lab 06/02/23  0503   GLU 79   CALCIUM 8.8   ALBUMIN 2.6*   PROT 6.8      K 3.8   CO2 17*      BUN 28*   CREATININE 2.1*   ALKPHOS 82   ALT 22   AST 16   BILITOT 0.4     LFTs:   Recent Labs   Lab 06/02/23  0503   ALT 22   AST 16   ALKPHOS 82   BILITOT 0.4   PROT 6.8   ALBUMIN 2.6*     Coagulation: No results for input(s): LABPROT, INR, APTT in the last 168 hours.  Microbiology Results (last 7 days)       Procedure Component Value Units Date/Time    Clostridium difficile EIA [[id number]] Collected: 06/01/23 1713    Order Status: Completed Specimen: Stool Updated: 06/02/23 0455     C. diff Antigen Negative     C difficile Toxins A+B, EIA Negative     Comment: Testing not recommended for children <24 months old.       Urine Culture High Risk [[id number]] Collected: 06/01/23 1716    Order Status: Sent Specimen: Urine Updated: 06/01/23 1726            ASA/Mallampati  ASA: 3  Mallampati: 2    Imaging:  Recent imaging studies including  transplant kidney US  on 6/1/23 which was independently reviewed by Gurwinder Blanton MD.     EXAMINATION:  US TRANSPLANT KIDNEY WITH DOPPLER     CLINICAL HISTORY:  TRINO;     TECHNIQUE:  Real time gray scale and doppler ultrasound was performed over the patient's renal allograft.     COMPARISON:  U/S intraoperative 05/15/2023; transplant kidney with Doppler 05/15/2023     FINDINGS:  The transplant lies in the right lower quadrant and measures 11.9 cm.     The renal transplant demonstrates no focal parenchymal abnormality. No transplant hydronephrosis. Minimally complex peritransplant fluid collection extending across the entire allograft measuring 16.6 x 7.8 x 8.6 cm.     Renal arterial resistive indices are as follows: Interlobar 0.77, segmental Up 0.68, segmental Mid 0.80, segmental Low 0.79.  No evidence of a  tardus parvus waveform. The maximum velocity in the main renal artery is 249 cm/sec (previously 252 cm/sec).  The maximum velocity in the iliac artery measures 147 cm/sec.  The RA/iliac ratio measures 1.2.     The renal transplant venous system is unremarkable.  Improved velocity within the main renal vein anastomosis measuring 120 cm/sec.     Impression:     Improved intraparenchymal arterial resistive indices which measure mildly elevated on today's exam.     Improved main renal vein velocity at the anastomosis.     Large minimally complex peritransplant collection extending the entire length of the allograft.  Collection may be new or represent significant interval enlargement of prior collection.     This report was flagged in Epic as abnormal.     Electronically signed by resident: Chandan Montoya  Date:                                            06/01/2023  Time:                                           16:36     Electronically signed by: Al Read  Date:                                            06/01/2023  Time:                                           17:00    ASSESSMENT/PLAN:     Assessment:  62 y.o. male with a PMHx of ESRD s/p Ktx 5/10/23 c/b DGF (now resolved), Crohn's disease (s/p multiple abdominal surgeries), arnold-chiari malformation, and ventriculopleural shunt who has been referred to IR for image guided percutaneous drain placement for management of perinephric fluid collection. The procedure was discussed in great detail with the patient including thorough explanations of the potential risks and benefits of percutaneous drain placement. Risks include sepsis, severe infection, hemorrhage, damage to surrounding structures, catheter malfunction, inability to remove catheter, and catheter dislodgment. The patient is a candidate for US guided percutaneous drain placement under moderate sedation. Plan discussed with ordering physician.The pt verbalized understanding of the plan and would like to  proceed.    Plan:  Will proceed with US guided percutaneous drain placement under moderate sedation on 6/2/23.   Please keep pt NPO   Anticoagulation history reviewed.   Coagulation labs reviewed.  Thank you for the consult. Please contact with questions via [employer] secure chat or [employer] Link    Estefania Lucio PA-C  Interventional Radiology  Spectra: [phone]

## 2023-06-02 NOTE — HOSPITAL COURSE
"Patient admitted for TRINO and diarrhea. IVFs started, C diff negative, UA negative, Urine culture and CMV PCR pending. Myfortic held on admit due to GI upset and possible infection. Kidney trasnplant US obtained on admit with impression of "Improved intraparenchymal arterial resistive indices which measure mildly elevated on today's exam. Improved main renal vein velocity at the anastomosis. Large minimally complex peritransplant collection extending the entire length of the allograft.  Collection may be new or represent significant interval enlargement of prior collection.".  IR drained collection on 6/2- wbc 627/segs 82%, drain Cr >30. Nephrostogram w PCNU 6/3. Nephrostogram w/o clear leak. Started on Vanc/Cipro    Interval Course: No acute events overnight. Gr removed 6/4. UOP per PCNU good. CATHLEEN draining 190cc serosang drainage over past 24 hours. Plan to keep PCNy open and repeat nephrostogram in 1-2 weeks. Consulted ID for pos blood cx (GPC resembling staph) and abx recs for dispo. Per Dr Lopez, can likely discharge on Levofloxacin. Repeating blood cx today and waiting for final recs. Pt cont to report diarrhea despite holding Myfortic. Of note, pt has hx of Crohn's which was managed prior to transplant. He in fact reports constipation prior to txp d/t Renvela. Stool for c diff neg. Stool cx neg, no wbc's, stool for infectious etiology neg. Discontinued atovaquone 6/5. G6PD in am. Restarted Myfortic at half dose. Will cont to monitor diarrhea. Encourage PO intake. Cr returned to baseline. Electrolytes replaced as needed. VSS. Will continue to monitor closely.   "

## 2023-06-03 ENCOUNTER — ANESTHESIA (OUTPATIENT)
Dept: ENDOSCOPY | Facility: HOSPITAL | Age: 63
DRG: 699 | End: 2023-06-03
Payer: MEDICARE

## 2023-06-03 ENCOUNTER — ANESTHESIA EVENT (OUTPATIENT)
Dept: ENDOSCOPY | Facility: HOSPITAL | Age: 63
DRG: 699 | End: 2023-06-03
Payer: MEDICARE

## 2023-06-03 PROBLEM — T86.898 URINE LEAK FROM TRANSPLANTED URETER: Status: ACTIVE | Noted: 2023-06-03

## 2023-06-03 LAB
ALBUMIN SERPL BCP-MCNC: 2.7 G/DL (ref 3.5–5.2)
ANION GAP SERPL CALC-SCNC: 10 MMOL/L (ref 8–16)
BASOPHILS # BLD AUTO: 0.03 K/UL (ref 0–0.2)
BASOPHILS NFR BLD: 0.7 % (ref 0–1.9)
BUN SERPL-MCNC: 24 MG/DL (ref 8–23)
CALCIUM SERPL-MCNC: 8.4 MG/DL (ref 8.7–10.5)
CHLORIDE SERPL-SCNC: 108 MMOL/L (ref 95–110)
CO2 SERPL-SCNC: 23 MMOL/L (ref 23–29)
CREAT SERPL-MCNC: 1.7 MG/DL (ref 0.5–1.4)
DIFFERENTIAL METHOD: ABNORMAL
EOSINOPHIL # BLD AUTO: 0.1 K/UL (ref 0–0.5)
EOSINOPHIL NFR BLD: 1.3 % (ref 0–8)
ERYTHROCYTE [DISTWIDTH] IN BLOOD BY AUTOMATED COUNT: 19.6 % (ref 11.5–14.5)
EST. GFR  (NO RACE VARIABLE): 45.3 ML/MIN/1.73 M^2
GLUCOSE SERPL-MCNC: 79 MG/DL (ref 70–110)
HCT VFR BLD AUTO: 27.6 % (ref 40–54)
HGB BLD-MCNC: 8.9 G/DL (ref 14–18)
IMM GRANULOCYTES # BLD AUTO: 0.08 K/UL (ref 0–0.04)
IMM GRANULOCYTES NFR BLD AUTO: 1.8 % (ref 0–0.5)
LYMPHOCYTES # BLD AUTO: 0.5 K/UL (ref 1–4.8)
LYMPHOCYTES NFR BLD: 11.7 % (ref 18–48)
MAGNESIUM SERPL-MCNC: 1.6 MG/DL (ref 1.6–2.6)
MCH RBC QN AUTO: 35.7 PG (ref 27–31)
MCHC RBC AUTO-ENTMCNC: 32.2 G/DL (ref 32–36)
MCV RBC AUTO: 111 FL (ref 82–98)
MONOCYTES # BLD AUTO: 0.7 K/UL (ref 0.3–1)
MONOCYTES NFR BLD: 16.4 % (ref 4–15)
NEUTROPHILS # BLD AUTO: 3 K/UL (ref 1.8–7.7)
NEUTROPHILS NFR BLD: 68.1 % (ref 38–73)
NRBC BLD-RTO: 0 /100 WBC
PHOSPHATE SERPL-MCNC: 2 MG/DL (ref 2.7–4.5)
PHOSPHATE SERPL-MCNC: 2 MG/DL (ref 2.7–4.5)
PLATELET # BLD AUTO: 239 K/UL (ref 150–450)
PMV BLD AUTO: 10.6 FL (ref 9.2–12.9)
POTASSIUM SERPL-SCNC: 3.5 MMOL/L (ref 3.5–5.1)
RBC # BLD AUTO: 2.49 M/UL (ref 4.6–6.2)
SODIUM SERPL-SCNC: 141 MMOL/L (ref 136–145)
TACROLIMUS BLD-MCNC: 5.9 NG/ML (ref 5–15)
WBC # BLD AUTO: 4.45 K/UL (ref 3.9–12.7)

## 2023-06-03 PROCEDURE — 25000003 PHARM REV CODE 250: Performed by: PHYSICIAN ASSISTANT

## 2023-06-03 PROCEDURE — 94761 N-INVAS EAR/PLS OXIMETRY MLT: CPT

## 2023-06-03 PROCEDURE — 85025 COMPLETE CBC W/AUTO DIFF WBC: CPT | Performed by: PHYSICIAN ASSISTANT

## 2023-06-03 PROCEDURE — 37000009 HC ANESTHESIA EA ADD 15 MINS

## 2023-06-03 PROCEDURE — 63600175 PHARM REV CODE 636 W HCPCS: Performed by: NURSE PRACTITIONER

## 2023-06-03 PROCEDURE — 63600175 PHARM REV CODE 636 W HCPCS: Performed by: NURSE ANESTHETIST, CERTIFIED REGISTERED

## 2023-06-03 PROCEDURE — 37000008 HC ANESTHESIA 1ST 15 MINUTES

## 2023-06-03 PROCEDURE — 25000003 PHARM REV CODE 250: Performed by: NURSE PRACTITIONER

## 2023-06-03 PROCEDURE — 25000003 PHARM REV CODE 250

## 2023-06-03 PROCEDURE — 25000003 PHARM REV CODE 250: Performed by: INTERNAL MEDICINE

## 2023-06-03 PROCEDURE — 25500020 PHARM REV CODE 255: Performed by: INTERNAL MEDICINE

## 2023-06-03 PROCEDURE — 71000039 HC RECOVERY, EACH ADD'L HOUR

## 2023-06-03 PROCEDURE — 63600175 PHARM REV CODE 636 W HCPCS: Performed by: PHYSICIAN ASSISTANT

## 2023-06-03 PROCEDURE — C1751 CATH, INF, PER/CENT/MIDLINE: HCPCS

## 2023-06-03 PROCEDURE — D9220A PRA ANESTHESIA: Mod: ANES,,, | Performed by: ANESTHESIOLOGY

## 2023-06-03 PROCEDURE — 71000033 HC RECOVERY, INTIAL HOUR

## 2023-06-03 PROCEDURE — 87040 BLOOD CULTURE FOR BACTERIA: CPT | Mod: 59 | Performed by: INTERNAL MEDICINE

## 2023-06-03 PROCEDURE — 36415 COLL VENOUS BLD VENIPUNCTURE: CPT | Performed by: PHYSICIAN ASSISTANT

## 2023-06-03 PROCEDURE — D9220A PRA ANESTHESIA: ICD-10-PCS | Mod: CRNA,,, | Performed by: NURSE ANESTHETIST, CERTIFIED REGISTERED

## 2023-06-03 PROCEDURE — 87150 DNA/RNA AMPLIFIED PROBE: CPT | Mod: 59 | Performed by: INTERNAL MEDICINE

## 2023-06-03 PROCEDURE — 76937 US GUIDE VASCULAR ACCESS: CPT

## 2023-06-03 PROCEDURE — 36410 VNPNXR 3YR/> PHY/QHP DX/THER: CPT

## 2023-06-03 PROCEDURE — 99232 PR SUBSEQUENT HOSPITAL CARE,LEVL II: ICD-10-PCS | Mod: ,,, | Performed by: INTERNAL MEDICINE

## 2023-06-03 PROCEDURE — D9220A PRA ANESTHESIA: ICD-10-PCS | Mod: ANES,,, | Performed by: ANESTHESIOLOGY

## 2023-06-03 PROCEDURE — 20600001 HC STEP DOWN PRIVATE ROOM

## 2023-06-03 PROCEDURE — 80069 RENAL FUNCTION PANEL: CPT

## 2023-06-03 PROCEDURE — 25000003 PHARM REV CODE 250: Performed by: NURSE ANESTHETIST, CERTIFIED REGISTERED

## 2023-06-03 PROCEDURE — 83735 ASSAY OF MAGNESIUM: CPT | Performed by: PHYSICIAN ASSISTANT

## 2023-06-03 PROCEDURE — D9220A PRA ANESTHESIA: Mod: CRNA,,, | Performed by: NURSE ANESTHETIST, CERTIFIED REGISTERED

## 2023-06-03 PROCEDURE — 63600175 PHARM REV CODE 636 W HCPCS: Performed by: INTERNAL MEDICINE

## 2023-06-03 PROCEDURE — 80197 ASSAY OF TACROLIMUS: CPT | Performed by: PHYSICIAN ASSISTANT

## 2023-06-03 PROCEDURE — 94640 AIRWAY INHALATION TREATMENT: CPT

## 2023-06-03 PROCEDURE — 63600175 PHARM REV CODE 636 W HCPCS: Performed by: ANESTHESIOLOGY

## 2023-06-03 PROCEDURE — 99232 SBSQ HOSP IP/OBS MODERATE 35: CPT | Mod: ,,, | Performed by: INTERNAL MEDICINE

## 2023-06-03 RX ORDER — PROPOFOL 10 MG/ML
VIAL (ML) INTRAVENOUS
Status: DISCONTINUED | OUTPATIENT
Start: 2023-06-03 | End: 2023-06-03

## 2023-06-03 RX ORDER — LIDOCAINE HYDROCHLORIDE 20 MG/ML
JELLY TOPICAL
Status: DISCONTINUED | OUTPATIENT
Start: 2023-06-03 | End: 2023-06-03 | Stop reason: RX

## 2023-06-03 RX ORDER — HYDROMORPHONE HYDROCHLORIDE 1 MG/ML
0.2 INJECTION, SOLUTION INTRAMUSCULAR; INTRAVENOUS; SUBCUTANEOUS EVERY 6 HOURS PRN
Status: DISCONTINUED | OUTPATIENT
Start: 2023-06-03 | End: 2023-06-06 | Stop reason: HOSPADM

## 2023-06-03 RX ORDER — LIDOCAINE HYDROCHLORIDE 20 MG/ML
JELLY TOPICAL
Status: DISCONTINUED | OUTPATIENT
Start: 2023-06-03 | End: 2023-06-06 | Stop reason: HOSPADM

## 2023-06-03 RX ORDER — HYDROMORPHONE HYDROCHLORIDE 1 MG/ML
0.2 INJECTION, SOLUTION INTRAMUSCULAR; INTRAVENOUS; SUBCUTANEOUS EVERY 5 MIN PRN
Status: DISCONTINUED | OUTPATIENT
Start: 2023-06-03 | End: 2023-06-03

## 2023-06-03 RX ORDER — MIDAZOLAM HYDROCHLORIDE 1 MG/ML
INJECTION, SOLUTION INTRAMUSCULAR; INTRAVENOUS
Status: DISCONTINUED | OUTPATIENT
Start: 2023-06-03 | End: 2023-06-03

## 2023-06-03 RX ORDER — DROPERIDOL 2.5 MG/ML
0.62 INJECTION, SOLUTION INTRAMUSCULAR; INTRAVENOUS ONCE AS NEEDED
Status: DISCONTINUED | OUTPATIENT
Start: 2023-06-03 | End: 2023-06-03

## 2023-06-03 RX ORDER — LOPERAMIDE HYDROCHLORIDE 2 MG/1
2 CAPSULE ORAL 4 TIMES DAILY PRN
Status: DISCONTINUED | OUTPATIENT
Start: 2023-06-03 | End: 2023-06-06 | Stop reason: HOSPADM

## 2023-06-03 RX ORDER — LIDOCAINE HYDROCHLORIDE 20 MG/ML
INJECTION, SOLUTION EPIDURAL; INFILTRATION; INTRACAUDAL; PERINEURAL
Status: DISCONTINUED | OUTPATIENT
Start: 2023-06-03 | End: 2023-06-03

## 2023-06-03 RX ORDER — ONDANSETRON 2 MG/ML
4 INJECTION INTRAMUSCULAR; INTRAVENOUS ONCE AS NEEDED
Status: COMPLETED | OUTPATIENT
Start: 2023-06-03 | End: 2023-06-03

## 2023-06-03 RX ORDER — CIPROFLOXACIN 2 MG/ML
400 INJECTION, SOLUTION INTRAVENOUS
Status: DISCONTINUED | OUTPATIENT
Start: 2023-06-03 | End: 2023-06-06

## 2023-06-03 RX ORDER — FENTANYL CITRATE 50 UG/ML
INJECTION, SOLUTION INTRAMUSCULAR; INTRAVENOUS
Status: DISCONTINUED | OUTPATIENT
Start: 2023-06-03 | End: 2023-06-03

## 2023-06-03 RX ORDER — ONDANSETRON 2 MG/ML
INJECTION INTRAMUSCULAR; INTRAVENOUS
Status: DISCONTINUED | OUTPATIENT
Start: 2023-06-03 | End: 2023-06-03

## 2023-06-03 RX ADMIN — FENTANYL CITRATE 25 MCG: 0.05 INJECTION, SOLUTION INTRAMUSCULAR; INTRAVENOUS at 12:06

## 2023-06-03 RX ADMIN — CIPROFLOXACIN 400 MG: 2 INJECTION, SOLUTION INTRAVENOUS at 10:06

## 2023-06-03 RX ADMIN — HYDROMORPHONE HYDROCHLORIDE 0.2 MG: 1 INJECTION, SOLUTION INTRAMUSCULAR; INTRAVENOUS; SUBCUTANEOUS at 08:06

## 2023-06-03 RX ADMIN — HYDROMORPHONE HYDROCHLORIDE 0.2 MG: 1 INJECTION, SOLUTION INTRAMUSCULAR; INTRAVENOUS; SUBCUTANEOUS at 01:06

## 2023-06-03 RX ADMIN — LOPERAMIDE HYDROCHLORIDE 2 MG: 2 CAPSULE ORAL at 12:06

## 2023-06-03 RX ADMIN — SODIUM CHLORIDE, SODIUM GLUCONATE, SODIUM ACETATE, POTASSIUM CHLORIDE, MAGNESIUM CHLORIDE, SODIUM PHOSPHATE, DIBASIC, AND POTASSIUM PHOSPHATE: .53; .5; .37; .037; .03; .012; .00082 INJECTION, SOLUTION INTRAVENOUS at 11:06

## 2023-06-03 RX ADMIN — PREDNISONE 10 MG: 10 TABLET ORAL at 03:06

## 2023-06-03 RX ADMIN — SODIUM BICARBONATE: 84 INJECTION, SOLUTION INTRAVENOUS at 04:06

## 2023-06-03 RX ADMIN — PROPOFOL 50 MG: 10 INJECTION, EMULSION INTRAVENOUS at 11:06

## 2023-06-03 RX ADMIN — TACROLIMUS 2 MG: 1 CAPSULE ORAL at 08:06

## 2023-06-03 RX ADMIN — VANCOMYCIN HYDROCHLORIDE 1250 MG: 1.25 INJECTION, POWDER, LYOPHILIZED, FOR SOLUTION INTRAVENOUS at 02:06

## 2023-06-03 RX ADMIN — OXYCODONE HYDROCHLORIDE 5 MG: 5 TABLET ORAL at 05:06

## 2023-06-03 RX ADMIN — IOHEXOL 20 ML: 300 INJECTION, SOLUTION INTRAVENOUS at 12:06

## 2023-06-03 RX ADMIN — ONDANSETRON 4 MG: 2 INJECTION INTRAMUSCULAR; INTRAVENOUS at 12:06

## 2023-06-03 RX ADMIN — OXYCODONE HYDROCHLORIDE 5 MG: 5 TABLET ORAL at 01:06

## 2023-06-03 RX ADMIN — FLUTICASONE FUROATE 1 PUFF: 100 POWDER RESPIRATORY (INHALATION) at 07:06

## 2023-06-03 RX ADMIN — Medication 6 MG: at 08:06

## 2023-06-03 RX ADMIN — SODIUM BICARBONATE 1300 MG: 650 TABLET ORAL at 08:06

## 2023-06-03 RX ADMIN — ACYCLOVIR 400 MG: 200 CAPSULE ORAL at 03:06

## 2023-06-03 RX ADMIN — ACYCLOVIR 400 MG: 200 CAPSULE ORAL at 08:06

## 2023-06-03 RX ADMIN — ATOVAQUONE 1500 MG: 750 SUSPENSION ORAL at 05:06

## 2023-06-03 RX ADMIN — SODIUM BICARBONATE 1300 MG: 650 TABLET ORAL at 03:06

## 2023-06-03 RX ADMIN — MIDAZOLAM HYDROCHLORIDE 1 MG: 2 INJECTION, SOLUTION INTRAMUSCULAR; INTRAVENOUS at 11:06

## 2023-06-03 RX ADMIN — ONDANSETRON 4 MG: 2 INJECTION INTRAMUSCULAR; INTRAVENOUS at 01:06

## 2023-06-03 RX ADMIN — TACROLIMUS 2 MG: 1 CAPSULE ORAL at 05:06

## 2023-06-03 RX ADMIN — LIDOCAINE HYDROCHLORIDE 20 MG: 20 INJECTION, SOLUTION EPIDURAL; INFILTRATION; INTRACAUDAL at 11:06

## 2023-06-03 RX ADMIN — FAMOTIDINE 20 MG: 20 TABLET ORAL at 08:06

## 2023-06-03 RX ADMIN — LOPERAMIDE HYDROCHLORIDE 2 MG: 2 CAPSULE ORAL at 06:06

## 2023-06-03 RX ADMIN — HYDROMORPHONE HYDROCHLORIDE 0.2 MG: 1 INJECTION, SOLUTION INTRAMUSCULAR; INTRAVENOUS; SUBCUTANEOUS at 02:06

## 2023-06-03 RX ADMIN — SODIUM CHLORIDE: 9 INJECTION, SOLUTION INTRAVENOUS at 02:06

## 2023-06-03 NOTE — PROGRESS NOTES
[facility] - [facility] Step[facility]  Transplant Nephrology  Progress Note    Patient Name: Abdoulaye Parikh  MRN: [id number]  Admission Date: 6/1/2023  Hospital Length of Stay: 2 days  Attending Provider: Beatrice Mendez DO   Primary Care Physician: Primary Doctor No  Principal Problem:TRINO (acute kidney injury)    Consults  Subjective:     Interval History: patient seen and denies any issues. Denies any SOB, CP, nausea or vomiting.     Review of patient's allergies indicates:   Allergen Reactions    Bactrim [sulfamethoxazole-trimethoprim] Shortness Of Breath and Itching    Penicillins Shortness Of Breath and Itching     Current Facility-Administered Medications   Medication Frequency    0.9%  NaCl infusion PRN    acetaminophen tablet 650 mg Q8H PRN    acetaminophen tablet 650 mg Q4H PRN    acyclovir capsule 400 mg BID    atovaquone 750 mg/5 mL oral liquid 1,500 mg Daily    ciprofloxacin (CIPRO)400mg/200ml D5W IVPB 400 mg Q24H    famotidine tablet 20 mg QHS    fluticasone furoate 100 mcg/actuation inhaler 1 puff Daily    HYDROmorphone injection 0.2 mg Q6H PRN    hydrOXYzine HCL tablet 25 mg TID PRN    LIDOcaine HCl 2% urojet PRN    loperamide capsule 2 mg QID PRN    melatonin tablet 6 mg Nightly PRN    ondansetron disintegrating tablet 8 mg Q6H PRN    oxyCODONE immediate release tablet 5 mg Q4H PRN    predniSONE tablet 10 mg Daily    sodium bicarbonate tablet 1,300 mg TID    sodium chloride 0.9% flush 10 mL PRN    sodium chloride 0.9% flush 3 mL PRN    tacrolimus capsule 2 mg BID    vancomycin 1,250 mg in dextrose 5 % (D5W) 250 mL IVPB (Vial-Mate) Daily       Objective:     Vital Signs (Most Recent):  Temp: 97.8 °F (36.6 °C) (06/03/23 0745)  Pulse: 66 (06/03/23 0745)  Resp: 18 (06/03/23 0745)  BP: 128/60 (06/03/23 0745)  SpO2: 98 % (06/03/23 0745) Vital Signs (24h Range):  Temp:  [96.8 °F (36 °C)-97.9 °F (36.6 °C)] 97.8 °F (36.6 °C)  Pulse:  [55-97] 66  Resp:  [12-24] 18  SpO2:  [96 %-100 %] 98 %  BP: ()/(55-72) 128/60      Weight: 80.7 kg (177 lb 12.8 oz) (06/03/23 0400)  Body mass index is 26.26 kg/m².  Body surface area is 1.98 meters squared.    I/O last 3 completed shifts:  In: 2328.7 [P.O.:562; I.V.:1766.7]  Out: 1426 [Urine:448; Drains:375; Other:600; Stool:3]    Physical Exam  Vitals reviewed.   Constitutional:       General: He is not in acute distress.     Appearance: Normal appearance. He is not ill-appearing or toxic-appearing.      Comments: Appears stated age   HENT:      Head: Normocephalic and atraumatic.      Right Ear: External ear normal.      Left Ear: External ear normal.      Nose: Nose normal.   Eyes:      General: No scleral icterus.     Conjunctiva/sclera: Conjunctivae normal.   Cardiovascular:      Rate and Rhythm: Normal rate and regular rhythm.      Pulses: Normal pulses.      Heart sounds: Normal heart sounds. No murmur heard.    No friction rub.   Pulmonary:      Effort: Pulmonary effort is normal. No respiratory distress.      Breath sounds: Normal breath sounds. No wheezing or rhonchi.   Abdominal:      General: Bowel sounds are normal. There is no distension.      Palpations: Abdomen is soft.      Tenderness: There is no abdominal tenderness. There is no guarding.      Comments: Drain in place and noted to have clear drainage     Musculoskeletal:         General: No swelling or deformity. Normal range of motion.      Cervical back: Normal range of motion and neck supple. No tenderness.      Right lower leg: No edema.      Left lower leg: No edema.   Skin:     General: Skin is warm and dry.      Coloration: Skin is not jaundiced.      Findings: No erythema or rash.   Neurological:      General: No focal deficit present.      Mental Status: He is alert and oriented to person, place, and time.      Motor: No weakness.   Psychiatric:         Mood and Affect: Mood normal.         Behavior: Behavior normal.       Assessment/Plan:   62 yr old AAM with ESRD secondary to unknown etiology who underwent a  living unrelated (paired exchange) on 5/10/23. Thymo induction. Post transplant course complicated by take back to OR to assess arterial and venous anastomosis. Patient re-admitted due to elevation in his creatinine with US concerning for fluid collection. Studies consistent with urine leak    1) urine leak:  - management per transplant surgery   - IR to place PCNU today along with Gr catheter  - CATHLEEN drain in place  - check blood culture  - start on cipro and vancomycin empircally   2) s/p living unrelated kidney transplant:  - noted to have improved graft function. Monitor  - acceptable FK level. On FK 2 mg BID  - MMF on hold currently  - cont on prednisone taper  - on acyclovir and atovaquone  3) Anemia;   -stable. Monitor  4) Metabolic acidosis:   - cont on sodium bicarb 1300 TID  5) Sarcoidosis:   - with hx of neurosarcoidosis with patient having a  shunt in the past.  6) Hx of hep C s/p mehnaz Mendez,   Nephrology  [facility] - Transplant Step[facility]

## 2023-06-03 NOTE — ANESTHESIA PREPROCEDURE EVALUATION
[facility]  Anesthesia Pre-Operative Evaluation         Patient Name: Abdoulaye Parikh  YOB: 1960  MRN: [id number]    SUBJECTIVE:     Pre-operative evaluation for Procedure(s) (LRB):  Nephrostomy (N/A)     06/03/2023    Abdoulaye Parikh is a 62 y.o. 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    SP living unrelated kidney transplant on 5/10/23. Post op course significant for OR takeback 5/15 to assess flow due to elevated velocities in artery and vein on US and DGF (last HD 5/11 for hyperkalemia). Kidney biopsy obtained in the OR without evidence of rejection.     He now presents as a direct admit for TRINO. Cr noted to be elevated at 2.4 from 2.0. Large minimally complex peritransplant collection extending the entire length of the allograft.    BMP  Lab Results   Component Value Date     06/03/2023    K 3.5 06/03/2023     06/03/2023    CO2 23 06/03/2023    BUN 24 (H) 06/03/2023    CREATININE 1.7 (H) 06/03/2023    CALCIUM 8.4 (L) 06/03/2023    ANIONGAP 10 06/03/2023    EGFRNORACEVR 45.3 (A) 06/03/2023         LDA:        Hemodialysis AV Graft Left upper arm (Active)   Needle Size 15ga 05/11/23 2030   Site Assessment Clean;Dry;Intact;No redness;No swelling 06/02/23 1938   Patency Present;Thrill;Bruit 06/02/23 1938   Status Deaccessed 06/02/23 0740   Flows Good 05/13/23 2000   Dressing Intervention Integrity maintained 06/02/23 1938   Dressing Status Dry;Intact 06/02/23 1938   Site Condition No complications 06/02/23 1938   Dressing Open to air (None) 06/02/23 1938   Number of days:             Midline Catheter Insertion/Assessment  - Single Lumen 06/03/23 0928 Right brachial vein 18g x  8cm (Active)   Site Assessment Clean;Dry;Intact;No redness;No swelling 06/03/23 0928   IV Device Securement catheter securement device 06/03/23 0928   Line Status Blood return noted;Flushed;Saline locked 06/03/23 0928   Dressing Type CHG impregnated dressing/sponge;Central line dressing 06/03/23 0928   Dressing Status Clean;Dry;Intact 06/03/23 0928   Dressing Intervention First dressing 06/03/23 0928   Dressing Change Due 06/10/23 06/03/23 0928   Site Change Due 07/02/23 06/03/23 0928   Reason Not Rotated Not due 06/03/23 0928   Number of days: 0            Closed/Suction Drain 06/02/23 1619 Lateral RUQ Bulb 10 Fr. (Active)   Dressing Type Transparent (Tegaderm) 06/02/23 1938   Dressing Status Intact 06/02/23 1938   Dressing Intervention Integrity maintained 06/02/23 1938   Drainage Serosanguineous;Thin 06/02/23 1938   Status To bulb suction 06/02/23 1938   Output (mL) 50 mL 06/03/23 0407   Number of days: 0       Prev airway:      Blade:  Trivedi 3    Laryngeal View Grade: Grade I - full view of cords      Difficult Airway Encountered?: No      Complications:  None    Airway Device:  Oral endotracheal tube    Airway Device Size:  7.5      Patient Active Problem List   Diagnosis    S/P kidney transplant    At risk for opportunistic infections    Prophylactic immunotherapy    Long-term use of immunosuppressant medication    Anemia of renal disease    Constipation    Metabolic acidosis    Right arm pain    Delayed renal graft function    CKD (chronic kidney disease) stage 4, GFR 15-29 ml/min, slow graft function with sustaine creatinien improvement    Immunosuppressive management encounter following kidney transplant    TRINO (acute kidney injury)    Diarrhea, unspecified    Orthostatic hypotension    Perinephric fluid collection of kidney transplant    Hypophosphatemia       Review of patient's allergies indicates:   Allergen Reactions    Bactrim [sulfamethoxazole-trimethoprim] Shortness Of Breath and  Itching    Penicillins Shortness Of Breath and Itching       Current Inpatient Medications:   acyclovir  400 mg Oral BID    atovaquone  1,500 mg Oral Daily    ciprofloxacin  400 mg Intravenous Q24H    famotidine  20 mg Oral QHS    fluticasone furoate  1 puff Inhalation Daily    predniSONE  10 mg Oral Daily    sodium bicarbonate  1,300 mg Oral TID    tacrolimus  2 mg Oral BID    vancomycin (VANCOCIN) IVPB  15 mg/kg Intravenous Daily       No current facility-administered medications on file prior to encounter.     Current Outpatient Medications on File Prior to Encounter   Medication Sig Dispense Refill    acyclovir (ZOVIRAX) 400 MG tablet Take 1 tablet (400 mg total) by mouth 2 (two) times daily. Stop 8/9/23 60 tablet 2    atovaquone (MEPRON) 750 mg/5 mL Susp oral liquid Take 10 mLs (1,500 mg total) by mouth once daily. Stop 11/7/23 300 mL 5    bisacodyL (DULCOLAX) 5 mg EC tablet Take 2 tablets (10 mg total) by mouth daily as needed for Constipation.  0    docusate sodium (COLACE) 100 MG capsule Take 1 capsule (100 mg total) by mouth 3 (three) times daily as needed for Constipation.  0    famotidine (PEPCID) 20 MG tablet Take 1 tablet (20 mg total) by mouth every evening. 30 tablet 0    fluticasone furoate (ARNUITY ELLIPTA) 100 mcg/actuation inhaler Inhale 100 mcg into the lungs once daily. Controller      hydrOXYzine HCL (ATARAX) 25 MG tablet Take 1 tablet (25 mg total) by mouth 3 (three) times daily as needed for Anxiety. 90 tablet 0    metoprolol succinate (TOPROL-XL) 25 MG 24 hr tablet Take HALF tablet (12.5 mg total) by mouth once daily. 15 tablet 11    mycophenolate (MYFORTIC) 180 MG TbEC Take 4 tablets (720 mg total) by mouth 2 (two) times daily. 240 tablet 11    oxyCODONE (ROXICODONE) 5 MG immediate release tablet Take 1 tablet (5 mg total) by mouth every 4 (four) hours as needed for Pain. 40 tablet 0    predniSONE (DELTASONE) 5 MG tablet Take 20 mg by mouth daily 5/14-5/20; 15 mg daily  5/21-5/27; 10 mg daily 5/28-6/3; then 5 mg daily thereafter 6/4/23 70 tablet 11    sodium bicarbonate 650 MG tablet Take 2 tablets (1,300 mg total) by mouth 2 (two) times daily. 120 tablet 11    tacrolimus (PROGRAF) 1 MG Cap Take 2 capsules (2 mg total) by mouth every 12 (twelve) hours. 120 capsule 11       Past Surgical History:   Procedure Laterality Date    AV FISTULA PLACEMENT Left     BRAIN SURGERY      COLON SURGERY      Exploratory lap x 4 for Chron's disease. Likely right colectomy    CSF SHUNT      DIAGNOSTIC ULTRASOUND N/A 5/15/2023    Procedure: ULTRASOUND, DIAGNOSTIC;  Surgeon: Chivo Brown MD;  Location: [facility] OR [facility] FLR;  Service: Transplant;  Laterality: N/A;    JOINT REPLACEMENT Right     Hip    KIDNEY TRANSPLANT N/A 5/10/2023    Procedure: TRANSPLANT, KIDNEY - RQ 7PM START;  Surgeon: Chivo Brown MD;  Location: [facility] OR [facility] FLR;  Service: Transplant;  Laterality: N/A;    RENAL BIOPSY  5/15/2023    Procedure: BIOPSY, KIDNEY;  Surgeon: Chivo Brown MD;  [facility];  Service: Transplant;;    RENAL EXPLORATION  5/15/2023    Procedure: EXPLORATION, KIDNEY;  Surgeon: Chivo Brown MD;  [facility];  Service: Transplant;;    TOTAL HIP ARTHROPLASTY Right        Social History     Socioeconomic History    Marital status:      Spouse name: Kalani Parikh    Number of children: 2   Tobacco Use    Smoking status: Never    Smokeless tobacco: Never   Substance and Sexual Activity    Alcohol use: Not Currently    Drug use: Never    Sexual activity: Yes     Partners: Female   Social History Narrative    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       OBJECTIVE:     Vital Signs Range:  Vitals - 1 value per visit 6/3/2023 6/3/2023 6/3/2023   SYSTOLIC - - 128   DIASTOLIC - - 60   Pulse 82 - 66   Temp - - 97.8   Resp 16 - 18   SPO2 96 97 98   Weight (lb) - - -   Weight (kg) - - -   Height - - -   BMI (Calculated) - - -   VISIT REPORT - - -   Pain Score  -  - -   Some recent data might be hidden         CBC:   Lab Results   Component Value Date    WBC 4.45 06/03/2023    HGB 8.9 (L) 06/03/2023    HCT 27.6 (L) 06/03/2023     (H) 06/03/2023     06/03/2023         CMP:   Sodium   Date Value Ref Range Status   06/03/2023 141 136 - 145 mmol/L Final     Potassium   Date Value Ref Range Status   06/03/2023 3.5 3.5 - 5.1 mmol/L Final     Chloride   Date Value Ref Range Status   06/03/2023 108 95 - 110 mmol/L Final     CO2   Date Value Ref Range Status   06/03/2023 23 23 - 29 mmol/L Final     Glucose   Date Value Ref Range Status   06/03/2023 79 70 - 110 mg/dL Final     BUN   Date Value Ref Range Status   06/03/2023 24 (H) 8 - 23 mg/dL Final     Creatinine   Date Value Ref Range Status   06/03/2023 1.7 (H) 0.5 - 1.4 mg/dL Final     Calcium   Date Value Ref Range Status   06/03/2023 8.4 (L) 8.7 - 10.5 mg/dL Final     Total Protein   Date Value Ref Range Status   06/02/2023 6.8 6.0 - 8.4 g/dL Final     Albumin   Date Value Ref Range Status   06/03/2023 2.7 (L) 3.5 - 5.2 g/dL Final     Total Bilirubin   Date Value Ref Range Status   06/02/2023 0.4 0.1 - 1.0 mg/dL Final     Comment:     For infants and newborns, interpretation of results should be based  on gestational age, weight and in agreement with clinical  observations.    Premature Infant recommended reference ranges:  Up to 24 hours.............<8.0 mg/dL  Up to 48 hours............<12.0 mg/dL  3-5 days..................<15.0 mg/dL  6-29 days.................<15.0 mg/dL       Alkaline Phosphatase   Date Value Ref Range Status   06/02/2023 82 55 - 135 U/L Final     AST   Date Value Ref Range Status   06/02/2023 16 10 - 40 U/L Final     ALT   Date Value Ref Range Status   06/02/2023 22 10 - 44 U/L Final     Anion Gap   Date Value Ref Range Status   06/03/2023 10 8 - 16 mmol/L Final     eGFR if    Date Value Ref Range Status   11/12/2020 16.3 (A) >60 mL/min/1.73 m^2 Final     eGFR if non African  American   Date Value Ref Range Status   11/12/2020 14.1 (A) >60 mL/min/1.73 m^2 Final     Comment:     Calculation used to obtain the estimated glomerular filtration  rate (eGFR) is the CKD-EPI equation.          INR:  Lab Results   Component Value Date    INR 1.0 05/10/2023    INR 1.0 04/25/2023    INR 1.0 11/12/2020       EKG:   Results for orders placed or performed during the hospital encounter of 04/25/23   EKG 12-lead    Collection Time: 04/25/23 10:39 AM    Narrative    Test Reason : Z76.82,    Vent. Rate : 065 BPM     Atrial Rate : 065 BPM     P-R Int : 144 ms          QRS Dur : 092 ms      QT Int : 476 ms       P-R-T Axes : 065 048 038 degrees     QTc Int : 495 ms    Normal sinus rhythm  Nonspecific ST abnormality  Prolonged QT  Abnormal ECG  When compared with ECG of 03-DEC-2020 09:19,    QT has lengthened  Confirmed by NICHOLE QUINONES MD ([id number]) on 4/25/2023 11:19:23 AM    Referred By: BRE RAWLS           Confirmed By:NICHOLE QUINONES MD        2D ECHO:   Results for orders placed during the hospital encounter of 04/17/23    Echo Saline Bubble? No    Interpretation Summary  · The left ventricle is normal in size with concentric hypertrophy and normal systolic function.  · The estimated ejection fraction is 60%.  · Normal left ventricular diastolic function.  · Normal right ventricular size with normal right ventricular systolic function.  · Mild left atrial enlargement.  · Normal central venous pressure (3 mmHg).  · The estimated PA systolic pressure is 28 mmHg.         ASSESSMENT/PLAN:       Pre-op Assessment    I have reviewed the Patient Summary Reports.     I have reviewed the Nursing Notes. I have reviewed the NPO Status.   I have reviewed the Medications.     Review of Systems  Anesthesia Hx:  No problems with previous Anesthesia  History of prior surgery of interest to airway management or planning:  Denies Personal Hx of Anesthesia complications.   Social:  Non-Smoker, No Alcohol Use     Cardiovascular:   Hypertension    Pulmonary:   Sarcoidosis   Denies breathing difficulties    Renal/:   Chronic Renal Disease, ESRD S/p living unrelated kidney TXP on 5/10/2023.   Last dialysis 5/11/2023    Now, decision made to take patient back to OR to assess blood flow and obtain kidney biopsy   Hepatic/GI:   Liver Disease, Hepatitis (s/p Harvoni), C    Musculoskeletal:   Arthritis     Neurological:   Neuromuscular Disease, Neuro-sarcoidosis    Endocrine:   Denies Diabetes. Denies Hypothyroidism. Denies Hyperthyroidism.        Physical Exam  General: Well nourished, Cooperative, Alert and Oriented  Left upper arm AV fistula   Airway:  Mallampati: III / II  Mouth Opening: Normal  TM Distance: Normal  Tongue: Normal  Neck ROM: Normal ROM        Anesthesia Plan  Type of Anesthesia, risks & benefits discussed:    Anesthesia Type: Gen ETT  Intra-op Monitoring Plan: Standard ASA Monitors  Post Op Pain Control Plan: multimodal analgesia and IV/PO Opioids PRN  Induction:  IV and rapid sequence  Airway Plan: Video, Post-Induction  Informed Consent: Informed consent signed with the Patient and all parties understand the risks and agree with anesthesia plan.  All questions answered.   ASA Score: 3 Emergent  Day of Surgery Review of History & Physical: H&P Update referred to the surgeon/provider.  Anesthesia Plan Notes:   Last ate bite of eggs this morning before being told to be NPO.     Ready For Surgery From Anesthesia Perspective.     .

## 2023-06-03 NOTE — PLAN OF CARE
Vital signs stable. Afebrile. Alert, oriented and following commands. Pain and nausea controlled with PRN meds.   Gr in place to drainage. CATHLEEN to fluid collection to bulb suction. Nephrostomy to drainage. POC reviewed and understanding verbalized.

## 2023-06-03 NOTE — PROGRESS NOTES
Vancomycin update  - Adjusted time requested by nurse on 6/3/2023  - Adjusted time to 1500 on 6/4/23  - No vancomycin notes to follow- up  - Patient already received one dose at 1444 on 6/3/2023  - Will order a vancomycin trough on 6/4/23 at 1400 before the next dose     Please call [phone] if you have any questions  Jack Butt.D

## 2023-06-03 NOTE — PLAN OF CARE
- RLQ CATHLEEN drain in place -0- output since IR procedure this AM  - RLQ PCNU drain to gravity  - Gr in place.  Patient c/o burning pain in area, per patient request underwear cut off to relieve pressure  - Started Cipro & Vanc  - RLQ incision with steristrips in place, 1x suture in place following procedure today  - Cellcept remains on hold.  1 BM during day, improving consistency    Problem: Adult Inpatient Plan of Care  Goal: Plan of Care Review  Outcome: Ongoing, Progressing  Goal: Patient-Specific Goal (Individualized)  Outcome: Ongoing, Progressing  Goal: Absence of Hospital-Acquired Illness or Injury  Outcome: Ongoing, Progressing  Goal: Optimal Comfort and Wellbeing  Outcome: Ongoing, Progressing  Goal: Readiness for Transition of Care  Outcome: Ongoing, Progressing     Problem: Fluid and Electrolyte Imbalance (Acute Kidney Injury/Impairment)  Goal: Fluid and Electrolyte Balance  Outcome: Ongoing, Progressing     Problem: Oral Intake Inadequate (Acute Kidney Injury/Impairment)  Goal: Optimal Nutrition Intake  Outcome: Ongoing, Progressing     Problem: Renal Function Impairment (Acute Kidney Injury/Impairment)  Goal: Effective Renal Function  Outcome: Ongoing, Progressing     Problem: Infection  Goal: Absence of Infection Signs and Symptoms  Outcome: Ongoing, Progressing     Problem: Fall Injury Risk  Goal: Absence of Fall and Fall-Related Injury  Outcome: Ongoing, Progressing

## 2023-06-03 NOTE — PROGRESS NOTES
"Pt. Refused joe, Primary RN provided education & pt. Further refused. Pt. States "I think they gave me a bad kidney, why else would I have all these problems". Primary RN used active listening. Pt. Reassured team would round and further elaborate POC. No further concerns at this time.   "

## 2023-06-03 NOTE — NURSING TRANSFER
Nursing Transfer Note      6/3/2023     Reason patient is being transferred: per md order     Transfer To: [id number]    Transfer via stretcher    Transfer with n/a    Transported by pct    Medicines sent: n/a    Any special needs or follow-up needed: n/a    Chart send with patient: Yes    Notified: n/a

## 2023-06-03 NOTE — PROCEDURES
"  Pre Op Diagnosis: Ureteral leak  Post Op Diagnosis: Same    Procedure: PCNU placement    Procedure performed by: John    Written Informed Consent Obtained: Yes  Specimen Removed: NO  Estimated Blood Loss: Minimal    Findings:   Successful placement of 10fr PCNU (biliary type drain) with side holes extending into the renal pelvis and pigtail loop in the bladder. Incison from transplant opened during the procedure an 3-0 moncryl was used to close the incision wound.     Patient tolerated procedure well.    Jaswinder Lopez MD ([facility])  Interventional Radiology  [phone]      "

## 2023-06-03 NOTE — ANESTHESIA POSTPROCEDURE EVALUATION
Anesthesia Post Evaluation    Patient: Abdoulaye Parikh    Procedure(s) Performed: Procedure(s) (LRB):  Nephrostomy (N/A)    Final Anesthesia Type: general      Patient location during evaluation: PACU  Patient participation: Yes- Able to Participate  Level of consciousness: awake  Post-procedure vital signs: reviewed and stable  Pain management: adequate  Airway patency: patent    PONV status at discharge: No PONV  Anesthetic complications: no      Cardiovascular status: blood pressure returned to baseline  Respiratory status: unassisted  Hydration status: euvolemic  Follow-up not needed.          Vitals Value Taken Time   /78 06/03/23 1447   Temp 36.4 °C (97.5 °F) 06/03/23 1345   Pulse 88 06/03/23 1448   Resp 25 06/03/23 1448   SpO2 100 % 06/03/23 1448   Vitals shown include unvalidated device data.      Event Time   Out of Recovery 06/03/2023 14:04:00         Pain/Marci Score: Pain Rating Prior to Med Admin: 10 (6/3/2023  2:33 PM)  Pain Rating Post Med Admin: 5 (6/3/2023  1:30 PM)  Marci Score: 10 (6/3/2023  1:30 PM)

## 2023-06-03 NOTE — NURSING
Order in place for diaz catheter placement & patient with reservations about placement due to pain & discomfort with last diaz.  MD has consulted IR for percutaneous nephrostomy drain & requested diaz placed when patient is in IR

## 2023-06-03 NOTE — SEDATION DOCUMENTATION
Nephrostomy tube insertion procedure complete, 10F bulb drain placed to RUQ. Dressing clean, dry, and intact. Ureteral catheter placed per orders. Pt transported to recovery.

## 2023-06-03 NOTE — TRANSFER OF CARE
"Anesthesia Transfer of Care Note    Patient: Abdoulaye Parikh    Procedure(s) Performed: Procedure(s) (LRB):  Nephrostomy (N/A)    Patient location: PACU    Anesthesia Type: general    Transport from OR: Transported from OR on room air with adequate spontaneous ventilation    Post pain: adequate analgesia    Post assessment: no apparent anesthetic complications    Post vital signs: stable    Level of consciousness: awake, alert and oriented    Nausea/Vomiting: no nausea/vomiting    Complications: none    Transfer of care protocol was followed      Last vitals:   Visit Vitals  /60 (BP Location: Right leg, Patient Position: Sitting)   Pulse 66   Temp 36.6 °C (97.8 °F) (Oral)   Resp 18   Ht 5' 9" (1.753 m)   Wt 80.7 kg (177 lb 12.8 oz)   SpO2 98%   BMI 26.26 kg/m²     "

## 2023-06-03 NOTE — H&P
Inpatient Radiology Pre-procedure Note    History of Present Illness:  Abdoulaye Parikh is a 62 y.o. male who presents for nephrostomy tube/ NUS placement for urinary diversion for an infected perirenal transplant collection with concerns for urine leak. Also primary team request diaz placement under anesthesia as well.     Admission H&P reviewed.  Past Medical History:   Diagnosis Date    Acute blood loss anemia 5/11/2023    Anemia     Arthritis     Cirrhosis     CKD (chronic kidney disease) stage 4, GFR 15-29 ml/min, slow graft function with sustaine creatinien improvement 5/26/2023    Crohn's disease     Disorder of kidney and ureter     Stage 5    Encounter for blood transfusion     Gout     Hearing loss     Hepatitis     Had Hep C Cleared    Hypertension     Immunosuppressive management encounter following kidney transplant 5/26/2023    Neurosarcoidosis 2018    Obesity     Osteoarthritis     Sarcoid neuropathy     Sarcoidosis, lung     Steatosis      Past Surgical History:   Procedure Laterality Date    AV FISTULA PLACEMENT Left     BRAIN SURGERY      COLON SURGERY      Exploratory lap x 4 for Chron's disease. Likely right colectomy    CSF SHUNT      DIAGNOSTIC ULTRASOUND N/A 5/15/2023    Procedure: ULTRASOUND, DIAGNOSTIC;  Surgeon: Chivo Brown MD;  Location: [facility] OR [address] [place] [address];  Service: Transplant;  Laterality: N/A;    JOINT REPLACEMENT Right     Hip    KIDNEY TRANSPLANT N/A 5/10/2023    Procedure: TRANSPLANT, KIDNEY - RQ 7PM START;  Surgeon: Chivo Brown MD;  [facility];  Service: Transplant;  Laterality: N/A;    RENAL BIOPSY  5/15/2023    Procedure: BIOPSY, KIDNEY;  Surgeon: Chivo Brown MD;  [facility];  Service: Transplant;;    RENAL EXPLORATION  5/15/2023    Procedure: EXPLORATION, KIDNEY;  Surgeon: Chivo Brown MD;  Location: [facility] OR [address] [place] [address];  Service: Transplant;;    TOTAL HIP ARTHROPLASTY Right        Review of Systems:   As  documented in primary team H&P    Home Meds:   Prior to Admission medications    Medication Sig Start Date End Date Taking? Authorizing Provider   acyclovir (ZOVIRAX) 400 MG tablet Take 1 tablet (400 mg total) by mouth 2 (two) times daily. Stop 8/9/23 5/11/23 8/15/23  Zeke Devine Jr., MD   atovaquone (MEPRON) 750 mg/5 mL Susp oral liquid Take 10 mLs (1,500 mg total) by mouth once daily. Stop 11/7/23 5/11/23 11/7/23  Zeke Devine Jr., MD   bisacodyL (DULCOLAX) 5 mg EC tablet Take 2 tablets (10 mg total) by mouth daily as needed for Constipation. 5/11/23   Zeek Devine Jr., MD   docusate sodium (COLACE) 100 MG capsule Take 1 capsule (100 mg total) by mouth 3 (three) times daily as needed for Constipation. 5/11/23   Zeke Devine Jr., MD   famotidine (PEPCID) 20 MG tablet Take 1 tablet (20 mg total) by mouth every evening. 5/11/23   Zeke Devine Jr., MD   fluticasone furoate (ARNUITY ELLIPTA) 100 mcg/actuation inhaler Inhale 100 mcg into the lungs once daily. Controller    Historical Provider   hydrOXYzine HCL (ATARAX) 25 MG tablet Take 1 tablet (25 mg total) by mouth 3 (three) times daily as needed for Anxiety. 5/17/23   Rodolfo Samuels MD   metoprolol succinate (TOPROL-XL) 25 MG 24 hr tablet Take HALF tablet (12.5 mg total) by mouth once daily. 5/18/23 5/17/24  Rodolfo Samuels MD   mycophenolate (MYFORTIC) 180 MG TbEC Take 4 tablets (720 mg total) by mouth 2 (two) times daily. 5/11/23   Zeke Devine Jr., MD   oxyCODONE (ROXICODONE) 5 MG immediate release tablet Take 1 tablet (5 mg total) by mouth every 4 (four) hours as needed for Pain. 5/12/23   Kera Hernandez PA-C   predniSONE (DELTASONE) 5 MG tablet Take 20 mg by mouth daily 5/14-5/20; 15 mg daily 5/21-5/27; 10 mg daily 5/28-6/3; then 5 mg daily thereafter 6/4/23 5/11/23   Zeke Devine Jr., MD   sodium bicarbonate 650 MG tablet Take 2 tablets (1,300 mg total) by mouth 2 (two) times daily. 5/29/23 5/28/24  Darrin Verma MD    tacrolimus (PROGRAF) 1 MG Cap Take 2 capsules (2 mg total) by mouth every 12 (twelve) hours. 5/25/23   Darrin Verma MD     Scheduled Meds:    acyclovir  400 mg Oral BID    atovaquone  1,500 mg Oral Daily    ciprofloxacin  400 mg Intravenous Q24H    famotidine  20 mg Oral QHS    fluticasone furoate  1 puff Inhalation Daily    predniSONE  10 mg Oral Daily    sodium bicarbonate  1,300 mg Oral TID    tacrolimus  2 mg Oral BID    vancomycin (VANCOCIN) IVPB  15 mg/kg Intravenous Daily     Continuous Infusions:   PRN Meds:sodium chloride 0.9%, acetaminophen, acetaminophen, HYDROmorphone, hydrOXYzine HCL, LIDOcaine HCl 2%, loperamide, melatonin, ondansetron, oxyCODONE, sodium chloride 0.9%, sodium chloride 0.9%  Anticoagulants/Antiplatelets: no anticoagulation    Allergies:   Review of patient's allergies indicates:   Allergen Reactions    Bactrim [sulfamethoxazole-trimethoprim] Shortness Of Breath and Itching    Penicillins Shortness Of Breath and Itching     Sedation Hx: have not been any systemic reactions    Labs:  No results for input(s): INR in the last 168 hours.    Invalid input(s):  PT,  PTT    Recent Labs   Lab 06/03/23 0645   WBC 4.45   HGB 8.9*   HCT 27.6*   *         Recent Labs   Lab 06/02/23  0503 06/03/23 0645   GLU 79 79    141   K 3.8 3.5    108   CO2 17* 23   BUN 28* 24*   CREATININE 2.1* 1.7*   CALCIUM 8.8 8.4*   MG 1.8 1.6   ALT 22  --    AST 16  --    ALBUMIN 2.6* 2.7*   BILITOT 0.4  --          Vitals:  Temp: 97.8 °F (36.6 °C) (06/03/23 0745)  Pulse: 66 (06/03/23 0745)  Resp: 18 (06/03/23 0745)  BP: 128/60 (06/03/23 0745)  SpO2: 98 % (06/03/23 0745)     Physical Exam:  ASA: Per anesthesia  Mallampati: Per anesthesia    General: no acute distress  Mental Status: alert and oriented to person, place and time  HEENT: normocephalic, atraumatic  Chest: unlabored breathing  Heart: regular heart rate  Abdomen: nondistended  Extremity: moves all extremities    Plan: Placement  of a nephrostomy tube/NUS and urinary diaz   Sedation Plan: Per anesthesia    Estrada Claudio DO

## 2023-06-03 NOTE — CONSULTS
KEVIN placed 18 g x 8 cm midline in Right brachial vein using realtime ultrasound guidance.  Lot#[id number].  Max dwell date 7/2/23.  Imagery recorded and saved.

## 2023-06-03 NOTE — PROGRESS NOTES
Fall bundle in place. Pt. Remained free from falls/trauma/injuries. No complaints of CP/ SOB/discomfort. VSS. No tele, pulse rosio. A&Ox4. Pt. Reports pain this shift, given PRN Oxy. Pt. Has continuous sodium bicarb infusing @ 75 mls/hr. Pts' incision site & drain site CDI. CATHLEEN drain flushed. Pt. Continues to have loose stools, imodium approved by CHRISTY Vicente DNP; given to pt. X2 this shift. Approx. 0600 IV site infiltrated, fluids temp. Paused; midline consult placed ASAP. Total CATHLEEN drain output was 215 cc. The POC reviewed w/pt., questions were encouraged & answered. No further concerns at this time.

## 2023-06-03 NOTE — SEDATION DOCUMENTATION
Pt arrived to IR Room 88 for nephrostomy tube placement procedure. Pt under direct care of anesthesia staff.

## 2023-06-04 LAB
ALBUMIN SERPL BCP-MCNC: 2.3 G/DL (ref 3.5–5.2)
ANION GAP SERPL CALC-SCNC: 9 MMOL/L (ref 8–16)
BASOPHILS # BLD AUTO: 0.02 K/UL (ref 0–0.2)
BASOPHILS NFR BLD: 0.3 % (ref 0–1.9)
BUN SERPL-MCNC: 16 MG/DL (ref 8–23)
CALCIUM SERPL-MCNC: 8.3 MG/DL (ref 8.7–10.5)
CHLORIDE SERPL-SCNC: 109 MMOL/L (ref 95–110)
CO2 SERPL-SCNC: 20 MMOL/L (ref 23–29)
CREAT SERPL-MCNC: 1.5 MG/DL (ref 0.5–1.4)
DIFFERENTIAL METHOD: ABNORMAL
EOSINOPHIL # BLD AUTO: 0 K/UL (ref 0–0.5)
EOSINOPHIL NFR BLD: 0.5 % (ref 0–8)
ERYTHROCYTE [DISTWIDTH] IN BLOOD BY AUTOMATED COUNT: 19.6 % (ref 11.5–14.5)
EST. GFR  (NO RACE VARIABLE): 52.6 ML/MIN/1.73 M^2
GLUCOSE SERPL-MCNC: 83 MG/DL (ref 70–110)
HCT VFR BLD AUTO: 25.1 % (ref 40–54)
HGB BLD-MCNC: 8.2 G/DL (ref 14–18)
IMM GRANULOCYTES # BLD AUTO: 0.06 K/UL (ref 0–0.04)
IMM GRANULOCYTES NFR BLD AUTO: 1 % (ref 0–0.5)
LYMPHOCYTES # BLD AUTO: 0.2 K/UL (ref 1–4.8)
LYMPHOCYTES NFR BLD: 3.6 % (ref 18–48)
MCH RBC QN AUTO: 35.5 PG (ref 27–31)
MCHC RBC AUTO-ENTMCNC: 32.7 G/DL (ref 32–36)
MCV RBC AUTO: 109 FL (ref 82–98)
MONOCYTES # BLD AUTO: 0.7 K/UL (ref 0.3–1)
MONOCYTES NFR BLD: 12.6 % (ref 4–15)
MRSA ID BY PCR: NEGATIVE
NEUTROPHILS # BLD AUTO: 4.8 K/UL (ref 1.8–7.7)
NEUTROPHILS NFR BLD: 82 % (ref 38–73)
NRBC BLD-RTO: 0 /100 WBC
PHOSPHATE SERPL-MCNC: 2 MG/DL (ref 2.7–4.5)
PLATELET # BLD AUTO: 219 K/UL (ref 150–450)
PMV BLD AUTO: 10.3 FL (ref 9.2–12.9)
POTASSIUM SERPL-SCNC: 3.9 MMOL/L (ref 3.5–5.1)
RBC # BLD AUTO: 2.31 M/UL (ref 4.6–6.2)
SODIUM SERPL-SCNC: 138 MMOL/L (ref 136–145)
STAPH AUREUS ID BY PCR: NEGATIVE
TACROLIMUS BLD-MCNC: 7.3 NG/ML (ref 5–15)
WBC # BLD AUTO: 5.8 K/UL (ref 3.9–12.7)

## 2023-06-04 PROCEDURE — 25000003 PHARM REV CODE 250: Performed by: PHYSICIAN ASSISTANT

## 2023-06-04 PROCEDURE — 85025 COMPLETE CBC W/AUTO DIFF WBC: CPT | Performed by: PHYSICIAN ASSISTANT

## 2023-06-04 PROCEDURE — 99232 SBSQ HOSP IP/OBS MODERATE 35: CPT | Mod: ,,, | Performed by: INTERNAL MEDICINE

## 2023-06-04 PROCEDURE — 80197 ASSAY OF TACROLIMUS: CPT | Performed by: PHYSICIAN ASSISTANT

## 2023-06-04 PROCEDURE — 25000003 PHARM REV CODE 250: Performed by: NURSE PRACTITIONER

## 2023-06-04 PROCEDURE — 63600175 PHARM REV CODE 636 W HCPCS: Performed by: INTERNAL MEDICINE

## 2023-06-04 PROCEDURE — 63600175 PHARM REV CODE 636 W HCPCS: Performed by: NURSE PRACTITIONER

## 2023-06-04 PROCEDURE — 25000003 PHARM REV CODE 250

## 2023-06-04 PROCEDURE — 20600001 HC STEP DOWN PRIVATE ROOM

## 2023-06-04 PROCEDURE — 25000003 PHARM REV CODE 250: Performed by: INTERNAL MEDICINE

## 2023-06-04 PROCEDURE — 80069 RENAL FUNCTION PANEL: CPT | Performed by: INTERNAL MEDICINE

## 2023-06-04 PROCEDURE — 99232 PR SUBSEQUENT HOSPITAL CARE,LEVL II: ICD-10-PCS | Mod: ,,, | Performed by: INTERNAL MEDICINE

## 2023-06-04 PROCEDURE — 94761 N-INVAS EAR/PLS OXIMETRY MLT: CPT

## 2023-06-04 PROCEDURE — 63600175 PHARM REV CODE 636 W HCPCS: Performed by: PHYSICIAN ASSISTANT

## 2023-06-04 RX ADMIN — CIPROFLOXACIN 400 MG: 2 INJECTION, SOLUTION INTRAVENOUS at 12:06

## 2023-06-04 RX ADMIN — TACROLIMUS 2 MG: 1 CAPSULE ORAL at 08:06

## 2023-06-04 RX ADMIN — Medication 2 TABLET: at 08:06

## 2023-06-04 RX ADMIN — HYDROMORPHONE HYDROCHLORIDE 0.2 MG: 1 INJECTION, SOLUTION INTRAMUSCULAR; INTRAVENOUS; SUBCUTANEOUS at 08:06

## 2023-06-04 RX ADMIN — FLUTICASONE FUROATE 1 PUFF: 100 POWDER RESPIRATORY (INHALATION) at 08:06

## 2023-06-04 RX ADMIN — OXYCODONE HYDROCHLORIDE 5 MG: 5 TABLET ORAL at 07:06

## 2023-06-04 RX ADMIN — ACYCLOVIR 400 MG: 200 CAPSULE ORAL at 08:06

## 2023-06-04 RX ADMIN — LOPERAMIDE HYDROCHLORIDE 2 MG: 2 CAPSULE ORAL at 08:06

## 2023-06-04 RX ADMIN — TACROLIMUS 2 MG: 1 CAPSULE ORAL at 05:06

## 2023-06-04 RX ADMIN — FAMOTIDINE 20 MG: 20 TABLET ORAL at 08:06

## 2023-06-04 RX ADMIN — VANCOMYCIN HYDROCHLORIDE 1250 MG: 1.25 INJECTION, POWDER, LYOPHILIZED, FOR SOLUTION INTRAVENOUS at 02:06

## 2023-06-04 RX ADMIN — LOPERAMIDE HYDROCHLORIDE 2 MG: 2 CAPSULE ORAL at 04:06

## 2023-06-04 RX ADMIN — ATOVAQUONE 1500 MG: 750 SUSPENSION ORAL at 08:06

## 2023-06-04 RX ADMIN — OXYCODONE HYDROCHLORIDE 5 MG: 5 TABLET ORAL at 03:06

## 2023-06-04 RX ADMIN — SODIUM BICARBONATE 1300 MG: 650 TABLET ORAL at 08:06

## 2023-06-04 RX ADMIN — SODIUM BICARBONATE 1300 MG: 650 TABLET ORAL at 02:06

## 2023-06-04 RX ADMIN — PREDNISONE 10 MG: 10 TABLET ORAL at 08:06

## 2023-06-04 RX ADMIN — HYDROMORPHONE HYDROCHLORIDE 0.2 MG: 1 INJECTION, SOLUTION INTRAMUSCULAR; INTRAVENOUS; SUBCUTANEOUS at 09:06

## 2023-06-04 NOTE — PROGRESS NOTES
Fall bundle in place. Pt. Remained free from falls/trauma/injuries. No complaints of CP/ SOB/discomfort. VSS. No tele, pulse rosio. A&Ox4. Pt. Reports pain this shift, given PRN Oxy & Dilaudid. Pts' incision site & drain site remains dry & intact. CATHLEEN drain flushed. At approx. 0300 pt. Was ambulated to bathroom & back to bed w/ a changed mobility from indep. To assist x2. Pts' stat lock for CATHLEEN drain tubing was leaking due to crack & new stat lock was placed from unit supply. Stopcock leaking mildly improved, Charge Iona WEATHERS ordered diff. Stat lock to try and temp. Kerlix was wrapped around new leaking area between stopcock connection and tubing. Stopcock received from central supply is the same model on pt. KYM Boothe informed of leak. Currently, will keep kerlix until team can further assess it. No Bms this shift. Total CATHLEEN drain output was 1 cc, Neph tube 560cc & diaz 20cc. The POC reviewed w/pt. & pts' wife, questions were encouraged & answered. No further concerns at this time.

## 2023-06-04 NOTE — PROGRESS NOTES
LAB contacted primary RN to inform of pts' positive blood cultures w/ gram + cocci resembling staph. TKYM Ludwig notified. No further concerns at this time.

## 2023-06-04 NOTE — PROGRESS NOTES
[facility] - Transplant Stepdown  Nephrology  Progress Note    Patient Name: Abdoulaye Parikh  MRN: [id number]  Admission Date: 6/1/2023  Hospital Length of Stay: 3 days  Attending Provider: Beatrice Mendez DO   Primary Care Physician: Primary Doctor No  Principal Problem:TRINO (acute kidney injury)    Consults  Subjective:     Interval History: patient went to IR and had nephrostogram as well as PCNU placement and Gr catheter placement. Patient today denies any issues.     Review of patient's allergies indicates:   Allergen Reactions    Bactrim [sulfamethoxazole-trimethoprim] Shortness Of Breath and Itching    Penicillins Shortness Of Breath and Itching     Current Facility-Administered Medications   Medication Frequency    0.9%  NaCl infusion PRN    acetaminophen tablet 650 mg Q8H PRN    acetaminophen tablet 650 mg Q4H PRN    acyclovir capsule 400 mg BID    atovaquone 750 mg/5 mL oral liquid 1,500 mg Daily    ciprofloxacin (CIPRO)400mg/200ml D5W IVPB 400 mg Q24H    famotidine tablet 20 mg QHS    fluticasone furoate 100 mcg/actuation inhaler 1 puff Daily    HYDROmorphone injection 0.2 mg Q6H PRN    hydrOXYzine HCL tablet 25 mg TID PRN    k phos di & mono-sod phos mono 250 mg tablet 2 tablet BID    LIDOcaine HCl 2% urojet PRN    loperamide capsule 2 mg QID PRN    melatonin tablet 6 mg Nightly PRN    ondansetron disintegrating tablet 8 mg Q6H PRN    oxyCODONE immediate release tablet 5 mg Q4H PRN    predniSONE tablet 10 mg Daily    sodium bicarbonate tablet 1,300 mg TID    sodium chloride 0.9% flush 10 mL PRN    sodium chloride 0.9% flush 3 mL PRN    tacrolimus capsule 2 mg BID    vancomycin 1,250 mg in dextrose 5 % (D5W) 250 mL IVPB (Vial-Mate) Daily       Objective:     Vital Signs (Most Recent):  Temp: 97.5 °F (36.4 °C) (06/04/23 0719)  Pulse: 60 (06/04/23 0853)  Resp: 16 (06/04/23 0902)  BP: 131/64 (06/04/23 0719)  SpO2: 96 % (06/04/23 0853) Vital Signs (24h Range):  Temp:  [96.5 °F (35.8 °C)-98.2 °F (36.8 °C)] 97.5 °F  (36.4 °C)  Pulse:  [54-64] 60  Resp:  [14-20] 16  SpO2:  [96 %-100 %] 96 %  BP: (112-141)/(57-69) 131/64     Weight: 90.4 kg (199 lb 4.7 oz) (06/04/23 0400)  Body mass index is 29.43 kg/m².  Body surface area is 2.1 meters squared.    I/O last 3 completed shifts:  In: 3428.1 [P.O.:1192; I.V.:1386.1; IV Piggyback:850]  Out: 1937 [Urine:1538; Drains:396; Stool:3]    Physical Exam  Vitals reviewed.   Constitutional:       General: He is not in acute distress.     Appearance: Normal appearance. He is not ill-appearing or toxic-appearing.      Comments: Appears stated age   HENT:      Head: Normocephalic and atraumatic.      Right Ear: External ear normal.      Left Ear: External ear normal.      Nose: Nose normal.   Eyes:      General: No scleral icterus.     Conjunctiva/sclera: Conjunctivae normal.   Cardiovascular:      Rate and Rhythm: Normal rate and regular rhythm.      Pulses: Normal pulses.      Heart sounds: Normal heart sounds. No murmur heard.    No friction rub.   Pulmonary:      Effort: Pulmonary effort is normal. No respiratory distress.      Breath sounds: Normal breath sounds. No wheezing or rhonchi.   Abdominal:      General: Bowel sounds are normal. There is no distension.      Palpations: Abdomen is soft.      Tenderness: There is no abdominal tenderness. There is no guarding.      Comments: Drain and PCNU noted    Musculoskeletal:         General: No swelling or deformity. Normal range of motion.      Cervical back: Normal range of motion and neck supple. No tenderness.      Right lower leg: No edema.      Left lower leg: No edema.   Skin:     General: Skin is warm and dry.      Coloration: Skin is not jaundiced.      Findings: No erythema or rash.   Neurological:      General: No focal deficit present.      Mental Status: He is alert and oriented to person, place, and time.      Motor: No weakness.   Psychiatric:         Mood and Affect: Mood normal.         Behavior: Behavior normal.      Assessment/Plan:   62 yr old AAM with ESRD secondary to unknown etiology who underwent a living unrelated (paired exchange) on 5/10/23. Thymo induction. Post transplant course complicated by take back to OR to assess arterial and venous anastomosis. Patient re-admitted due to elevation in his creatinine with US concerning for fluid collection. Studies consistent with urine leak     1) urine leak:  - discussed with transplant surgery with nephrostogram from yesterday reviewed. Plan to remove Gr catheter and to have PCNU and CATHLEEN drain for roughlu 2 weeks. Will need repeat nephrostogram then.   - 1/2 blood culture with gram positive cocci. Monitor   - on cipro and vancomycin empircally   2) s/p living unrelated kidney transplant:  - noted to have improved graft function. Monitor  - acceptable FK level. On FK 2 mg BID  - MMF on hold currently  - cont on prednisone taper  - on acyclovir and atovaquone  3) Anemia;              -stable. Monitor  4) Metabolic acidosis:              - cont on sodium bicarb 1300 TID  5) Sarcoidosis:              - with hx of neurosarcoidosis with patient having a  shunt in the past.  6) Hx of hep C s/p mehnaz Mendez DO  Nephrology  [facility] - Transplant Stepdown

## 2023-06-04 NOTE — PLAN OF CARE
- RLQ CATHLEEN drain in place 100 mL clear yellow to pink output  - RLQ PCNU drain to gravity 525 mL pink output  - Gr d/c  - Cipro & Vanc continue  - RLQ incision with steristrips in place, 1x suture in place  - Cellcept remains on hold.  3 BM, administered loperamide     Problem: Adult Inpatient Plan of Care  Goal: Plan of Care Review  Outcome: Ongoing, Progressing  Goal: Patient-Specific Goal (Individualized)  Outcome: Ongoing, Progressing  Goal: Absence of Hospital-Acquired Illness or Injury  Outcome: Ongoing, Progressing  Goal: Optimal Comfort and Wellbeing  Outcome: Ongoing, Progressing  Goal: Readiness for Transition of Care  Outcome: Ongoing, Progressing     Problem: Fluid and Electrolyte Imbalance (Acute Kidney Injury/Impairment)  Goal: Fluid and Electrolyte Balance  Outcome: Ongoing, Progressing     Problem: Oral Intake Inadequate (Acute Kidney Injury/Impairment)  Goal: Optimal Nutrition Intake  Outcome: Ongoing, Progressing     Problem: Renal Function Impairment (Acute Kidney Injury/Impairment)  Goal: Effective Renal Function  Outcome: Ongoing, Progressing     Problem: Infection  Goal: Absence of Infection Signs and Symptoms  Outcome: Ongoing, Progressing     Problem: Fall Injury Risk  Goal: Absence of Fall and Fall-Related Injury  Outcome: Ongoing, Progressing

## 2023-06-05 PROBLEM — R78.81 BACTEREMIA: Status: ACTIVE | Noted: 2023-06-05

## 2023-06-05 LAB
ALBUMIN SERPL BCP-MCNC: 2.6 G/DL (ref 3.5–5.2)
ANION GAP SERPL CALC-SCNC: 8 MMOL/L (ref 8–16)
BASOPHILS # BLD AUTO: 0.05 K/UL (ref 0–0.2)
BASOPHILS NFR BLD: 1 % (ref 0–1.9)
BODY FLD TYPE: NORMAL
BUN SERPL-MCNC: 15 MG/DL (ref 8–23)
CALCIUM SERPL-MCNC: 8.6 MG/DL (ref 8.7–10.5)
CHLORIDE SERPL-SCNC: 109 MMOL/L (ref 95–110)
CO2 SERPL-SCNC: 23 MMOL/L (ref 23–29)
CREAT FLD-MCNC: 9.5 MG/DL
CREAT SERPL-MCNC: 1.6 MG/DL (ref 0.5–1.4)
DIFFERENTIAL METHOD: ABNORMAL
EOSINOPHIL # BLD AUTO: 0.1 K/UL (ref 0–0.5)
EOSINOPHIL NFR BLD: 1.2 % (ref 0–8)
ERYTHROCYTE [DISTWIDTH] IN BLOOD BY AUTOMATED COUNT: 19.3 % (ref 11.5–14.5)
EST. GFR  (NO RACE VARIABLE): 48.7 ML/MIN/1.73 M^2
GLUCOSE SERPL-MCNC: 76 MG/DL (ref 70–110)
HCT VFR BLD AUTO: 27.5 % (ref 40–54)
HGB BLD-MCNC: 9.2 G/DL (ref 14–18)
IMM GRANULOCYTES # BLD AUTO: 0.12 K/UL (ref 0–0.04)
IMM GRANULOCYTES NFR BLD AUTO: 2.4 % (ref 0–0.5)
LYMPHOCYTES # BLD AUTO: 0.5 K/UL (ref 1–4.8)
LYMPHOCYTES NFR BLD: 10.7 % (ref 18–48)
MAGNESIUM SERPL-MCNC: 1.5 MG/DL (ref 1.6–2.6)
MCH RBC QN AUTO: 36.1 PG (ref 27–31)
MCHC RBC AUTO-ENTMCNC: 33.5 G/DL (ref 32–36)
MCV RBC AUTO: 108 FL (ref 82–98)
MONOCYTES # BLD AUTO: 0.9 K/UL (ref 0.3–1)
MONOCYTES NFR BLD: 18.2 % (ref 4–15)
NEUTROPHILS # BLD AUTO: 3.4 K/UL (ref 1.8–7.7)
NEUTROPHILS NFR BLD: 66.5 % (ref 38–73)
NRBC BLD-RTO: 0 /100 WBC
PHOSPHATE SERPL-MCNC: 2.3 MG/DL (ref 2.7–4.5)
PHOSPHATE SERPL-MCNC: 2.3 MG/DL (ref 2.7–4.5)
PLATELET # BLD AUTO: 214 K/UL (ref 150–450)
PMV BLD AUTO: 10.3 FL (ref 9.2–12.9)
POTASSIUM SERPL-SCNC: 3.5 MMOL/L (ref 3.5–5.1)
RBC # BLD AUTO: 2.55 M/UL (ref 4.6–6.2)
SODIUM SERPL-SCNC: 140 MMOL/L (ref 136–145)
TACROLIMUS BLD-MCNC: 7.2 NG/ML (ref 5–15)
WBC # BLD AUTO: 5.06 K/UL (ref 3.9–12.7)

## 2023-06-05 PROCEDURE — 94761 N-INVAS EAR/PLS OXIMETRY MLT: CPT

## 2023-06-05 PROCEDURE — 63600175 PHARM REV CODE 636 W HCPCS: Performed by: INTERNAL MEDICINE

## 2023-06-05 PROCEDURE — 99223 PR INITIAL HOSPITAL CARE,LEVL III: ICD-10-PCS | Mod: ,,, | Performed by: INTERNAL MEDICINE

## 2023-06-05 PROCEDURE — 63600175 PHARM REV CODE 636 W HCPCS: Performed by: NURSE PRACTITIONER

## 2023-06-05 PROCEDURE — 99233 PR SUBSEQUENT HOSPITAL CARE,LEVL III: ICD-10-PCS | Mod: ,,, | Performed by: NURSE PRACTITIONER

## 2023-06-05 PROCEDURE — 25000003 PHARM REV CODE 250

## 2023-06-05 PROCEDURE — 25000003 PHARM REV CODE 250: Performed by: NURSE PRACTITIONER

## 2023-06-05 PROCEDURE — 87040 BLOOD CULTURE FOR BACTERIA: CPT | Mod: 59 | Performed by: NURSE PRACTITIONER

## 2023-06-05 PROCEDURE — 36415 COLL VENOUS BLD VENIPUNCTURE: CPT | Performed by: NURSE PRACTITIONER

## 2023-06-05 PROCEDURE — 85025 COMPLETE CBC W/AUTO DIFF WBC: CPT | Performed by: PHYSICIAN ASSISTANT

## 2023-06-05 PROCEDURE — 25000003 PHARM REV CODE 250: Performed by: INTERNAL MEDICINE

## 2023-06-05 PROCEDURE — 99233 SBSQ HOSP IP/OBS HIGH 50: CPT | Mod: ,,, | Performed by: NURSE PRACTITIONER

## 2023-06-05 PROCEDURE — 20600001 HC STEP DOWN PRIVATE ROOM

## 2023-06-05 PROCEDURE — 80069 RENAL FUNCTION PANEL: CPT

## 2023-06-05 PROCEDURE — 80197 ASSAY OF TACROLIMUS: CPT | Performed by: PHYSICIAN ASSISTANT

## 2023-06-05 PROCEDURE — 99223 1ST HOSP IP/OBS HIGH 75: CPT | Mod: ,,, | Performed by: INTERNAL MEDICINE

## 2023-06-05 PROCEDURE — 36415 COLL VENOUS BLD VENIPUNCTURE: CPT | Performed by: PHYSICIAN ASSISTANT

## 2023-06-05 PROCEDURE — 63600175 PHARM REV CODE 636 W HCPCS: Performed by: PHYSICIAN ASSISTANT

## 2023-06-05 PROCEDURE — 25000003 PHARM REV CODE 250: Performed by: PHYSICIAN ASSISTANT

## 2023-06-05 PROCEDURE — 83735 ASSAY OF MAGNESIUM: CPT | Performed by: PHYSICIAN ASSISTANT

## 2023-06-05 RX ORDER — MYCOPHENOLIC ACID 180 MG/1
180 TABLET, DELAYED RELEASE ORAL 2 TIMES DAILY
Status: DISCONTINUED | OUTPATIENT
Start: 2023-06-05 | End: 2023-06-06 | Stop reason: HOSPADM

## 2023-06-05 RX ADMIN — ACYCLOVIR 400 MG: 200 CAPSULE ORAL at 09:06

## 2023-06-05 RX ADMIN — TACROLIMUS 2 MG: 1 CAPSULE ORAL at 08:06

## 2023-06-05 RX ADMIN — ATOVAQUONE 1500 MG: 750 SUSPENSION ORAL at 09:06

## 2023-06-05 RX ADMIN — FAMOTIDINE 20 MG: 20 TABLET ORAL at 08:06

## 2023-06-05 RX ADMIN — OXYCODONE HYDROCHLORIDE 5 MG: 5 TABLET ORAL at 11:06

## 2023-06-05 RX ADMIN — SODIUM BICARBONATE 1300 MG: 650 TABLET ORAL at 08:06

## 2023-06-05 RX ADMIN — LOPERAMIDE HYDROCHLORIDE 2 MG: 2 CAPSULE ORAL at 02:06

## 2023-06-05 RX ADMIN — TACROLIMUS 2 MG: 1 CAPSULE ORAL at 06:06

## 2023-06-05 RX ADMIN — CIPROFLOXACIN 400 MG: 2 INJECTION, SOLUTION INTRAVENOUS at 12:06

## 2023-06-05 RX ADMIN — SODIUM BICARBONATE 1300 MG: 650 TABLET ORAL at 02:06

## 2023-06-05 RX ADMIN — MYCOPHENOLIC ACID 180 MG: 180 TABLET, DELAYED RELEASE ORAL at 08:06

## 2023-06-05 RX ADMIN — MYCOPHENOLIC ACID 180 MG: 180 TABLET, DELAYED RELEASE ORAL at 09:06

## 2023-06-05 RX ADMIN — Medication 2 TABLET: at 09:06

## 2023-06-05 RX ADMIN — HYDROMORPHONE HYDROCHLORIDE 0.2 MG: 1 INJECTION, SOLUTION INTRAMUSCULAR; INTRAVENOUS; SUBCUTANEOUS at 08:06

## 2023-06-05 RX ADMIN — PREDNISONE 10 MG: 10 TABLET ORAL at 09:06

## 2023-06-05 RX ADMIN — LOPERAMIDE HYDROCHLORIDE 2 MG: 2 CAPSULE ORAL at 08:06

## 2023-06-05 RX ADMIN — SODIUM BICARBONATE 1300 MG: 650 TABLET ORAL at 09:06

## 2023-06-05 RX ADMIN — FLUTICASONE FUROATE 1 PUFF: 100 POWDER RESPIRATORY (INHALATION) at 09:06

## 2023-06-05 RX ADMIN — ACYCLOVIR 400 MG: 200 CAPSULE ORAL at 08:06

## 2023-06-05 RX ADMIN — Medication 2 TABLET: at 08:06

## 2023-06-05 RX ADMIN — Medication 2 TABLET: at 02:06

## 2023-06-05 NOTE — PLAN OF CARE
Patient AAOx4. VSS on RA. Afebrile. Pt c/o abdominal pain- pain relief given per orders. RLQ kd inc beny w/ steri-strips, no s/s of infection. RLQ CATHLEEN drain ss output noted. RLQ nephrostomy tube in place- adequate UOP (see flowsheets for exact I&O). Multiple BM o/n 2/2 Crohn's flare up- antidiarrheals given per orders. Up independently to restroom. GRACIE fistula in place- +/+.  Vanc trough due @ 1430 6/5/23. Bed locked and lowered, call bell in reach, nonskid socks on when OOB. Reviewed plan of care and fall precautions with pt, pt verbalized understanding. Pt remained free from falls this shift.

## 2023-06-05 NOTE — ASSESSMENT & PLAN NOTE
"- Kidney US 6/1 with impression of "Improved intraparenchymal arterial resistive indices which measure mildly elevated on today's exam. Improved main renal vein velocity at the anastomosis. Large minimally complex peritransplant collection extending the entire length of the allograft.  Collection may be new or represent significant interval enlargement of prior collection.".   - IR consulted for drain placement; appreciate recs. Fluid studies w infection and Cr >30  - Nephrostogram w PCNU 6/2  - tentative plan for repeat nephrostogram in 1-2 weeks  - b cx 1/2 w GPC resembling staph, repeat cx 6/5 pending  - Vanc/Cipro, ID consulted, waiting on repeat bx  - can likely d/c on Levofloxacin    "

## 2023-06-05 NOTE — ASSESSMENT & PLAN NOTE
"- Cr noted to be elevated at 2.4 from 2.0 on outpt labs  - DGF  - Pt reports watery stools (hx of Crohns, BMs loose but not watery at BL).   - Plan to hydrate with IVFs. Cr improving with IV hydration. Switching NS infusion to Sodium Bicarb infusion today. Cr 2.1. CO2 17. 6/2  - c diff negative   - CMV PCR and GI pathogens panel pending  - UA negative; Urine culture pending   - Kidney US 6/1 with impression of "Improved intraparenchymal arterial resistive indices which measure mildly elevated on today's exam. Improved main renal vein velocity at the anastomosis. Large minimally complex peritransplant collection extending the entire length of the allograft.  Collection may be new or represent significant interval enlargement of prior collection.".   - IR consulted for drain placement. Fluid w infection and >30, cx neg  - nephrostogram w PCNU 6/2  - urine draining from PCNU  - Cr improved to baseline  "

## 2023-06-05 NOTE — ASSESSMENT & PLAN NOTE
- Reports watery stools (hx of Crohns, BMs loose but not watery at BL)  - C diff negative and CMV PCR pending   - Hydrating with IVFs; Cr improving with hydration, Cr 2.1. Hydrated while NPO for IR procedure  - Myfortic held on admit due to GI upset  - Patient cont to report diarrhea, Myfortic restarted 6/5, holding atovaquone  - stool studies neg  - continue to monitor

## 2023-06-05 NOTE — PLAN OF CARE
Cr=1.6 inc from 1.5. K+=3.5. MG+=1.5. Phos=2.3. K-Phos po inc to TID. Afebrile. Cipro and Vanc IV continues. BC X2 to be drawn today. Cellcept discontinued. Myfortic ordered to start today. Tolerating po. NT and CATHLEEN drain draining serous drainage. Pt does not void due NT. Having loose BM's. Taking Imodium. C/O pain to NT insertion site. Pain decreased with Oxycodone 5mg. Sitting in chair all day today. Reinforced to wear non-skid socks when ambulating to prevent falling. Verbalized understanding. Wife at BS.

## 2023-06-05 NOTE — ASSESSMENT & PLAN NOTE
- Continue prograf and steroids.  - Continue to monitor prograf levels daily, monitor for toxic side effects, and adjust for therapeutic dose.   - Held myfortic due to GI upset, cont to have diarrhea, restarted 6/5

## 2023-06-05 NOTE — SUBJECTIVE & OBJECTIVE
"  Subjective:   History of Present Illness:  Mr Abdoulaye Parikh is a 61 yo s/p living unrelated kidney transplant on 5/10/23. Post op course significant for OR takeback 5/15 to assess flow due to elevated velocities in artery and vein on US and DGF (last HD 5/11 for hyperkalemia). Kidney biopsy obtained in the OR without evidence of rejection. He now presents as a direct admit for TRINO. Cr noted to be elevated at 2.4 from 2.0. Pt reports watery stools (hx of Crohn's, BMs loose but not watery at BL). Pt also orthostatic in clinic but asymptomatic. Plan to hydrate with IVFs, send c diff and CMV PCR, Kidney US, UA w/cx, and hold metoprolol and myfortic. Pt discussed with Dr Mendez.       Hospital Course:  Patient admitted for TRINO and diarrhea. IVFs started, C diff negative, UA negative, Urine culture and CMV PCR pending. Myfortic held on admit due to GI upset and possible infection. Kidney trasnplant US obtained on admit with impression of "Improved intraparenchymal arterial resistive indices which measure mildly elevated on today's exam. Improved main renal vein velocity at the anastomosis. Large minimally complex peritransplant collection extending the entire length of the allograft.  Collection may be new or represent significant interval enlargement of prior collection.".  IR drained collection on 6/2- wbc 627/segs 82%, drain Cr >30. Nephrostogram w PCNU 6/3. Nephrostogram w/o clear leak. Started on Vanc/Cipro    Interval Course: No acute events overnight. Gr removed 6/4. UOP per PCNU good. CATHLEEN draining 190cc serosang drainage over past 24 hours. Plan to keep PCNy open and repeat nephrostogram in 1-2 weeks. Consulted ID for pos blood cx (GPC resembling staph) and abx recs for dispo. Per Dr Lopez, can likely discharge on Levofloxacin. Repeating blood cx today and waiting for final recs. Pt cont to report diarrhea despite holding Myfortic. Of note, pt has hx of Crohn's which was managed prior to transplant. He in fact " reports constipation prior to txp d/t Renvela. Stool for c diff neg. Stool cx neg, no wbc's, stool for infectious etiology neg. Discontinued atovaquone 6/5. G6PD in am. Restarted Myfortic at half dose. Will cont to monitor diarrhea. Encourage PO intake. Cr returned to baseline. Electrolytes replaced as needed. VSS. Will continue to monitor closely.       Past Medical, Surgical, Family, and Social History:   Unchanged from H&P.    Scheduled Meds:   acyclovir  400 mg Oral BID    ciprofloxacin  400 mg Intravenous Q24H    famotidine  20 mg Oral QHS    fluticasone furoate  1 puff Inhalation Daily    k phos di & mono-sod phos mono  2 tablet Oral TID    mycophenolate  180 mg Oral BID    predniSONE  10 mg Oral Daily    sodium bicarbonate  1,300 mg Oral TID    tacrolimus  2 mg Oral BID     Continuous Infusions:      PRN Meds:sodium chloride 0.9%, acetaminophen, acetaminophen, HYDROmorphone, hydrOXYzine HCL, LIDOcaine HCl 2%, loperamide, melatonin, ondansetron, oxyCODONE, sodium chloride 0.9%, sodium chloride 0.9%    Intake/Output - Last 3 Shifts         06/03 0700  06/04 0659 06/04 0700  06/05 0659 06/05 0700  06/06 0659    P.O. 730 1050 1440    I.V. (mL/kg) 1386.1 (15.3)      IV Piggyback 850 450 200    Total Intake(mL/kg) 2966.1 (32.8) 1500 (16.6) 1640 (18.1)    Urine (mL/kg/hr) 1415 (0.7) 1225 (0.6) 470 (0.6)    Emesis/NG output  0     Drains 131 190 30    Other       Stool 0 0     Total Output 1546 1415 500    Net +1420.1 +85 +1140           Urine Occurrence 1 x 0 x     Stool Occurrence 1 x 4 x 3 x    Emesis Occurrence  0 x              Review of Systems   Constitutional:  Negative for appetite change and fever.   HENT:  Negative for facial swelling and trouble swallowing.    Eyes:  Negative for visual disturbance.   Respiratory:  Negative for shortness of breath, wheezing and stridor.    Cardiovascular:  Negative for chest pain.   Gastrointestinal:  Positive for diarrhea (same). Negative for abdominal distention,  "abdominal pain, nausea and vomiting.   Genitourinary:  Negative for decreased urine volume and difficulty urinating.   Musculoskeletal:  Negative for myalgias.   Skin:  Negative for wound.   Allergic/Immunologic: Positive for immunocompromised state.   Neurological:  Negative for tremors and syncope.   Psychiatric/Behavioral:  Negative for agitation, behavioral problems, confusion, decreased concentration and hallucinations.     Objective:     Vital Signs (Most Recent):  Temp: 97.8 °F (36.6 °C) (06/05/23 1119)  Pulse: 70 (06/05/23 1119)  Resp: 20 (06/05/23 1157)  BP: (!) 119/56 (06/05/23 1119)  SpO2: 98 % (06/05/23 1119) Vital Signs (24h Range):  Temp:  [97.1 °F (36.2 °C)-98.4 °F (36.9 °C)] 97.8 °F (36.6 °C)  Pulse:  [61-70] 70  Resp:  [14-20] 20  SpO2:  [96 %-100 %] 98 %  BP: (116-131)/(56-59) 119/56     Weight: 90.4 kg (199 lb 4.7 oz)  Height: 5' 9" (175.3 cm)  Body mass index is 29.43 kg/m².     Physical Exam  Vitals and nursing note reviewed.   Constitutional:       General: He is not in acute distress.     Appearance: Normal appearance. He is well-developed. He is not toxic-appearing.   HENT:      Head: Normocephalic.   Eyes:      General: No scleral icterus.  Cardiovascular:      Rate and Rhythm: Normal rate and regular rhythm.      Pulses: Normal pulses.      Heart sounds: Murmur heard.   Pulmonary:      Effort: Pulmonary effort is normal.      Breath sounds: Normal breath sounds.   Abdominal:      General: A surgical scar is present. Bowel sounds are normal. There is no distension.      Palpations: Abdomen is soft.      Tenderness: There is no abdominal tenderness. There is no guarding or rebound.      Comments: CATHLEEN RLQ serosang drainage  PCNU w dark, clear urine   Musculoskeletal:      Right lower leg: No edema.      Left lower leg: No edema.   Skin:     General: Skin is warm and dry.      Capillary Refill: Capillary refill takes 2 to 3 seconds.   Neurological:      Mental Status: He is alert and oriented to " person, place, and time.      GCS: GCS eye subscore is 4. GCS verbal subscore is 5. GCS motor subscore is 6.   Psychiatric:         Attention and Perception: Attention normal.         Mood and Affect: Mood normal.         Speech: Speech normal.         Behavior: Behavior normal. Behavior is cooperative.         Thought Content: Thought content normal.         Judgment: Judgment normal.        Laboratory:  CBC:   Recent Labs   Lab 06/03/23  0645 06/04/23  0531 06/05/23  0757   WBC 4.45 5.80 5.06   RBC 2.49* 2.31* 2.55*   HGB 8.9* 8.2* 9.2*   HCT 27.6* 25.1* 27.5*    219 214   * 109* 108*   MCH 35.7* 35.5* 36.1*   MCHC 32.2 32.7 33.5       CMP:   Recent Labs   Lab 06/01/23  1548 06/02/23  0503 06/03/23  0645 06/04/23  0727 06/05/23  0757   GLU 95  94 79 79 83 76   CALCIUM 9.3  9.6 8.8 8.4* 8.3* 8.6*   ALBUMIN 3.4*  3.2* 2.6* 2.7* 2.3* 2.6*   PROT 8.7* 6.8  --   --   --      138 138 141 138 140   K 3.9  3.9 3.8 3.5 3.9 3.5   CO2 22*  24 17* 23 20* 23     106 110 108 109 109   BUN 27*  28* 28* 24* 16 15   CREATININE 2.3*  2.2* 2.1* 1.7* 1.5* 1.6*   ALKPHOS 104 82  --   --   --    ALT 33 22  --   --   --    AST 18 16  --   --   --        Labs within the past 24 hours have been reviewed.    Diagnostic Results:  US - Kidney: Results for orders placed during the hospital encounter of 06/01/23    US Transplant Kidney With Doppler    Narrative  EXAMINATION:  US TRANSPLANT KIDNEY WITH DOPPLER    CLINICAL HISTORY:  TRINO;    TECHNIQUE:  Real time gray scale and doppler ultrasound was performed over the patient's renal allograft.    COMPARISON:  U/S intraoperative 05/15/2023; transplant kidney with Doppler 05/15/2023    FINDINGS:  The transplant lies in the right lower quadrant and measures 11.9 cm.    The renal transplant demonstrates no focal parenchymal abnormality. No transplant hydronephrosis. Minimally complex peritransplant fluid collection extending across the entire allograft measuring  16.6 x 7.8 x 8.6 cm.    Renal arterial resistive indices are as follows: Interlobar 0.77, segmental Up 0.68, segmental Mid 0.80, segmental Low 0.79.  No evidence of a tardus parvus waveform. The maximum velocity in the main renal artery is 249 cm/sec (previously 252 cm/sec).  The maximum velocity in the iliac artery measures 147 cm/sec.  The RA/iliac ratio measures 1.2.    The renal transplant venous system is unremarkable.  Improved velocity within the main renal vein anastomosis measuring 120 cm/sec.    Impression  Improved intraparenchymal arterial resistive indices which measure mildly elevated on today's exam.    Improved main renal vein velocity at the anastomosis.    Large minimally complex peritransplant collection extending the entire length of the allograft.  Collection may be new or represent significant interval enlargement of prior collection.    This report was flagged in Epic as abnormal.    Electronically signed by resident: Chandan Mnotoya  Date:    06/01/2023  Time:    16:36    Electronically signed by: Al Read  Date:    06/01/2023  Time:    17:00

## 2023-06-05 NOTE — PROGRESS NOTES
"[facility] - [facility] Step[facility]  Kidney Transplant  Progress Note      Reason for Follow-up: Reassessment of Kidney Transplant - 5/10/2023  (#1) recipient and management of immunosuppression.    ORGAN: LEFT KIDNEY    Donor Type: Living    PHS Increased Risk: no   Cold Ischemia:   Induction Medications: Thymoglobulin      Subjective:   History of Present Illness:  Mr Abdoulaye Parikh is a 63 yo s/p living unrelated kidney transplant on 5/10/23. Post op course significant for OR takeback 5/15 to assess flow due to elevated velocities in artery and vein on US and DGF (last HD 5/11 for hyperkalemia). Kidney biopsy obtained in the OR without evidence of rejection. He now presents as a direct admit for TRINO. Cr noted to be elevated at 2.4 from 2.0. Pt reports watery stools (hx of Crohn's, BMs loose but not watery at BL). Pt also orthostatic in clinic but asymptomatic. Plan to hydrate with IVFs, send c diff and CMV PCR, Kidney US, UA w/cx, and hold metoprolol and myfortic. Pt discussed with Dr Mendez.       Hospital Course:  Patient admitted for TRINO and diarrhea. IVFs started, C diff negative, UA negative, Urine culture and CMV PCR pending. Myfortic held on admit due to GI upset and possible infection. Kidney trasnplant US obtained on admit with impression of "Improved intraparenchymal arterial resistive indices which measure mildly elevated on today's exam. Improved main renal vein velocity at the anastomosis. Large minimally complex peritransplant collection extending the entire length of the allograft.  Collection may be new or represent significant interval enlargement of prior collection.".  IR drained collection on 6/2- wbc 627/segs 82%, drain Cr >30. Nephrostogram w PCNU 6/3. Nephrostogram w/o clear leak. Started on Vanc/Cipro    Interval Course: No acute events overnight. Gr removed 6/4. UOP per PCNU good. CATHLEEN draining 190cc serosang drainage over past 24 hours. Plan to keep PCNy open and repeat nephrostogram in 1-2 " weeks. Consulted ID for pos blood cx (GPC resembling staph) and abx recs for dispo. Per Dr Lopez, can likely discharge on Levofloxacin. Repeating blood cx today and waiting for final recs. Pt cont to report diarrhea despite holding Myfortic. Of note, pt has hx of Crohn's which was managed prior to transplant. He in fact reports constipation prior to txp d/t Renvela. Stool for c diff neg. Stool cx neg, no wbc's, stool for infectious etiology neg. Discontinued atovaquone 6/5. G6PD in am. Restarted Myfortic at half dose. Will cont to monitor diarrhea. Encourage PO intake. Cr returned to baseline. Electrolytes replaced as needed. VSS. Will continue to monitor closely.       Past Medical, Surgical, Family, and Social History:   Unchanged from H&P.    Scheduled Meds:   acyclovir  400 mg Oral BID    ciprofloxacin  400 mg Intravenous Q24H    famotidine  20 mg Oral QHS    fluticasone furoate  1 puff Inhalation Daily    k phos di & mono-sod phos mono  2 tablet Oral TID    mycophenolate  180 mg Oral BID    predniSONE  10 mg Oral Daily    sodium bicarbonate  1,300 mg Oral TID    tacrolimus  2 mg Oral BID     Continuous Infusions:      PRN Meds:sodium chloride 0.9%, acetaminophen, acetaminophen, HYDROmorphone, hydrOXYzine HCL, LIDOcaine HCl 2%, loperamide, melatonin, ondansetron, oxyCODONE, sodium chloride 0.9%, sodium chloride 0.9%    Intake/Output - Last 3 Shifts         06/03 0700  06/04 0659 06/04 0700  06/05 0659 06/05 0700  06/06 0659    P.O. 730 1050 1440    I.V. (mL/kg) 1386.1 (15.3)      IV Piggyback 850 450 200    Total Intake(mL/kg) 2966.1 (32.8) 1500 (16.6) 1640 (18.1)    Urine (mL/kg/hr) 1415 (0.7) 1225 (0.6) 470 (0.6)    Emesis/NG output  0     Drains 131 190 30    Other       Stool 0 0     Total Output 1546 1415 500    Net +1420.1 +85 +1140           Urine Occurrence 1 x 0 x     Stool Occurrence 1 x 4 x 3 x    Emesis Occurrence  0 x              Review of Systems   Constitutional:  Negative for appetite  "change and fever.   HENT:  Negative for facial swelling and trouble swallowing.    Eyes:  Negative for visual disturbance.   Respiratory:  Negative for shortness of breath, wheezing and stridor.    Cardiovascular:  Negative for chest pain.   Gastrointestinal:  Positive for diarrhea (same). Negative for abdominal distention, abdominal pain, nausea and vomiting.   Genitourinary:  Negative for decreased urine volume and difficulty urinating.   Musculoskeletal:  Negative for myalgias.   Skin:  Negative for wound.   Allergic/Immunologic: Positive for immunocompromised state.   Neurological:  Negative for tremors and syncope.   Psychiatric/Behavioral:  Negative for agitation, behavioral problems, confusion, decreased concentration and hallucinations.     Objective:     Vital Signs (Most Recent):  Temp: 97.8 °F (36.6 °C) (06/05/23 1119)  Pulse: 70 (06/05/23 1119)  Resp: 20 (06/05/23 1157)  BP: (!) 119/56 (06/05/23 1119)  SpO2: 98 % (06/05/23 1119) Vital Signs (24h Range):  Temp:  [97.1 °F (36.2 °C)-98.4 °F (36.9 °C)] 97.8 °F (36.6 °C)  Pulse:  [61-70] 70  Resp:  [14-20] 20  SpO2:  [96 %-100 %] 98 %  BP: (116-131)/(56-59) 119/56     Weight: 90.4 kg (199 lb 4.7 oz)  Height: 5' 9" (175.3 cm)  Body mass index is 29.43 kg/m².    Physical Exam  Vitals and nursing note reviewed.   Constitutional:       General: He is not in acute distress.     Appearance: Normal appearance. He is well-developed. He is not toxic-appearing.   HENT:      Head: Normocephalic.   Eyes:      General: No scleral icterus.  Cardiovascular:      Rate and Rhythm: Normal rate and regular rhythm.      Pulses: Normal pulses.      Heart sounds: Murmur heard.   Pulmonary:      Effort: Pulmonary effort is normal.      Breath sounds: Normal breath sounds.   Abdominal:      General: A surgical scar is present. Bowel sounds are normal. There is no distension.      Palpations: Abdomen is soft.      Tenderness: There is no abdominal tenderness. There is no guarding or " rebound.      Comments: CATHLEEN RLQ serosang drainage  PCNU w dark, clear urine   Musculoskeletal:      Right lower leg: No edema.      Left lower leg: No edema.   Skin:     General: Skin is warm and dry.      Capillary Refill: Capillary refill takes 2 to 3 seconds.   Neurological:      Mental Status: He is alert and oriented to person, place, and time.      GCS: GCS eye subscore is 4. GCS verbal subscore is 5. GCS motor subscore is 6.   Psychiatric:         Attention and Perception: Attention normal.         Mood and Affect: Mood normal.         Speech: Speech normal.         Behavior: Behavior normal. Behavior is cooperative.         Thought Content: Thought content normal.         Judgment: Judgment normal.        Laboratory:  CBC:   Recent Labs   Lab 06/03/23  0645 06/04/23  0531 06/05/23  0757   WBC 4.45 5.80 5.06   RBC 2.49* 2.31* 2.55*   HGB 8.9* 8.2* 9.2*   HCT 27.6* 25.1* 27.5*    219 214   * 109* 108*   MCH 35.7* 35.5* 36.1*   MCHC 32.2 32.7 33.5       CMP:   Recent Labs   Lab 06/01/23  1548 06/02/23  0503 06/03/23  0645 06/04/23  0727 06/05/23  0757   GLU 95  94 79 79 83 76   CALCIUM 9.3  9.6 8.8 8.4* 8.3* 8.6*   ALBUMIN 3.4*  3.2* 2.6* 2.7* 2.3* 2.6*   PROT 8.7* 6.8  --   --   --      138 138 141 138 140   K 3.9  3.9 3.8 3.5 3.9 3.5   CO2 22*  24 17* 23 20* 23     106 110 108 109 109   BUN 27*  28* 28* 24* 16 15   CREATININE 2.3*  2.2* 2.1* 1.7* 1.5* 1.6*   ALKPHOS 104 82  --   --   --    ALT 33 22  --   --   --    AST 18 16  --   --   --        Labs within the past 24 hours have been reviewed.    Diagnostic Results:  US - Kidney: Results for orders placed during the hospital encounter of 06/01/23    US Transplant Kidney With Doppler    Narrative  EXAMINATION:  US TRANSPLANT KIDNEY WITH DOPPLER    CLINICAL HISTORY:  TRINO;    TECHNIQUE:  Real time gray scale and doppler ultrasound was performed over the patient's renal allograft.    COMPARISON:  U/S intraoperative 05/15/2023;  "transplant kidney with Doppler 05/15/2023    FINDINGS:  The transplant lies in the right lower quadrant and measures 11.9 cm.    The renal transplant demonstrates no focal parenchymal abnormality. No transplant hydronephrosis. Minimally complex peritransplant fluid collection extending across the entire allograft measuring 16.6 x 7.8 x 8.6 cm.    Renal arterial resistive indices are as follows: Interlobar 0.77, segmental Up 0.68, segmental Mid 0.80, segmental Low 0.79.  No evidence of a tardus parvus waveform. The maximum velocity in the main renal artery is 249 cm/sec (previously 252 cm/sec).  The maximum velocity in the iliac artery measures 147 cm/sec.  The RA/iliac ratio measures 1.2.    The renal transplant venous system is unremarkable.  Improved velocity within the main renal vein anastomosis measuring 120 cm/sec.    Impression  Improved intraparenchymal arterial resistive indices which measure mildly elevated on today's exam.    Improved main renal vein velocity at the anastomosis.    Large minimally complex peritransplant collection extending the entire length of the allograft.  Collection may be new or represent significant interval enlargement of prior collection.    This report was flagged in Epic as abnormal.    Electronically signed by resident: Chandan Montoya  Date:    06/01/2023  Time:    16:36    Electronically signed by: Al Read  Date:    06/01/2023  Time:    17:00    Assessment/Plan:     * TRINO (acute kidney injury)  - Cr noted to be elevated at 2.4 from 2.0 on outpt labs  - DGF  - Pt reports watery stools (hx of Crohns, BMs loose but not watery at BL).   - Plan to hydrate with IVFs. Cr improving with IV hydration. Switching NS infusion to Sodium Bicarb infusion today. Cr 2.1. CO2 17. 6/2  - c diff negative   - CMV PCR and GI pathogens panel pending  - UA negative; Urine culture pending   - Kidney US 6/1 with impression of "Improved intraparenchymal arterial resistive indices which measure " "mildly elevated on today's exam. Improved main renal vein velocity at the anastomosis. Large minimally complex peritransplant collection extending the entire length of the allograft.  Collection may be new or represent significant interval enlargement of prior collection.".   - IR consulted for drain placement. Fluid w infection and >30, cx neg  - nephrostogram w PCNU 6/2  - urine draining from PCNU  - Cr improved to baseline    Urine leak from transplanted ureter  - see perinephric fluid collection      Hypophosphatemia  - replace IV as needed      Perinephric fluid collection of kidney transplant  - Kidney US 6/1 with impression of "Improved intraparenchymal arterial resistive indices which measure mildly elevated on today's exam. Improved main renal vein velocity at the anastomosis. Large minimally complex peritransplant collection extending the entire length of the allograft.  Collection may be new or represent significant interval enlargement of prior collection.".   - IR consulted for drain placement; appreciate recs. Fluid studies w infection and Cr >30  - Nephrostogram w PCNU 6/2  - tentative plan for repeat nephrostogram in 1-2 weeks  - b cx 1/2 w GPC resembling staph, repeat cx 6/5 pending  - Vanc/Cipro, ID consulted, waiting on repeat bx  - can likely d/c on Levofloxacin      Orthostatic hypotension  - Hold metoprolol  - Monitor       Diarrhea, unspecified  - Reports watery stools (hx of Crohns, BMs loose but not watery at BL)  - C diff negative and CMV PCR pending   - Hydrating with IVFs; Cr improving with hydration, Cr 2.1. Hydrated while NPO for IR procedure  - Myfortic held on admit due to GI upset  - Patient cont to report diarrhea, Myfortic restarted 6/5, holding atovaquone  - stool studies neg  - continue to monitor     Delayed renal graft function  - Last HD 5/11 for hyperkalemia  - See "TRINO"      Metabolic acidosis  - Cont PO/IV bicarb   - Monitor       Anemia of renal disease  - H/H stable. " "Will continue to monitor with daily cbc.         Long-term use of immunosuppressant medication  - Continue prograf and steroids.  - Continue to monitor prograf levels daily, monitor for toxic side effects, and adjust for therapeutic dose.   - Held myfortic due to GI upset, cont to have diarrhea, restarted 6/5      Prophylactic immunotherapy  - see long term use of immunosuppression         At risk for opportunistic infections  - Cont appropriate OI prophylaxis per protocol.         S/P kidney transplant  - s/p living unrelated kidney transplant on 5/10/23  - Post op course significant for OR takeback 5/15 to assess flow due to elevated velocities in artery and vein on US and DGF (last HD 5/11 for hyperkalemia)  - See "TRINO"        Discharge Planning:  Discussed plan of care.  No plan for discharge today.    Greater than 30 minutes spent with patient discussing diagnosis including reviewing labs and imaging and plan of care.      Sandra Arredondo, NP  Kidney Transplant  [facility]  "

## 2023-06-06 VITALS
DIASTOLIC BLOOD PRESSURE: 67 MMHG | OXYGEN SATURATION: 98 % | RESPIRATION RATE: 20 BRPM | SYSTOLIC BLOOD PRESSURE: 144 MMHG | HEIGHT: 69 IN | TEMPERATURE: 98 F | WEIGHT: 199.31 LBS | HEART RATE: 62 BPM | BODY MASS INDEX: 29.52 KG/M2

## 2023-06-06 PROBLEM — E83.39 HYPOPHOSPHATEMIA: Status: RESOLVED | Noted: 2023-06-02 | Resolved: 2023-06-06

## 2023-06-06 PROBLEM — R78.81 BACTEREMIA: Status: RESOLVED | Noted: 2023-06-05 | Resolved: 2023-06-06

## 2023-06-06 PROBLEM — T86.19 DELAYED RENAL GRAFT FUNCTION: Status: RESOLVED | Noted: 2023-05-22 | Resolved: 2023-06-06

## 2023-06-06 PROBLEM — R19.7 DIARRHEA, UNSPECIFIED: Status: RESOLVED | Noted: 2023-06-01 | Resolved: 2023-06-06

## 2023-06-06 PROBLEM — N17.9 AKI (ACUTE KIDNEY INJURY): Status: RESOLVED | Noted: 2023-06-01 | Resolved: 2023-06-06

## 2023-06-06 LAB
ALBUMIN SERPL BCP-MCNC: 2.5 G/DL (ref 3.5–5.2)
ANION GAP SERPL CALC-SCNC: 9 MMOL/L (ref 8–16)
BACTERIA BLD CULT: ABNORMAL
BACTERIA SPEC AEROBE CULT: NO GROWTH
BASOPHILS # BLD AUTO: 0.02 K/UL (ref 0–0.2)
BASOPHILS NFR BLD: 0.3 % (ref 0–1.9)
BUN SERPL-MCNC: 13 MG/DL (ref 8–23)
CALCIUM SERPL-MCNC: 8.7 MG/DL (ref 8.7–10.5)
CHLORIDE SERPL-SCNC: 107 MMOL/L (ref 95–110)
CO2 SERPL-SCNC: 23 MMOL/L (ref 23–29)
CREAT SERPL-MCNC: 1.4 MG/DL (ref 0.5–1.4)
DIFFERENTIAL METHOD: ABNORMAL
EOSINOPHIL # BLD AUTO: 0 K/UL (ref 0–0.5)
EOSINOPHIL NFR BLD: 0.3 % (ref 0–8)
ERYTHROCYTE [DISTWIDTH] IN BLOOD BY AUTOMATED COUNT: 19.4 % (ref 11.5–14.5)
EST. GFR  (NO RACE VARIABLE): 57.2 ML/MIN/1.73 M^2
GLUCOSE SERPL-MCNC: 96 MG/DL (ref 70–110)
HCT VFR BLD AUTO: 26.1 % (ref 40–54)
HGB BLD-MCNC: 8.6 G/DL (ref 14–18)
IMM GRANULOCYTES # BLD AUTO: 0.09 K/UL (ref 0–0.04)
IMM GRANULOCYTES NFR BLD AUTO: 1.5 % (ref 0–0.5)
LYMPHOCYTES # BLD AUTO: 0.3 K/UL (ref 1–4.8)
LYMPHOCYTES NFR BLD: 5.3 % (ref 18–48)
MAGNESIUM SERPL-MCNC: 1.4 MG/DL (ref 1.6–2.6)
MCH RBC QN AUTO: 35.2 PG (ref 27–31)
MCHC RBC AUTO-ENTMCNC: 33 G/DL (ref 32–36)
MCV RBC AUTO: 107 FL (ref 82–98)
MONOCYTES # BLD AUTO: 0.7 K/UL (ref 0.3–1)
MONOCYTES NFR BLD: 12.3 % (ref 4–15)
NEUTROPHILS # BLD AUTO: 4.7 K/UL (ref 1.8–7.7)
NEUTROPHILS NFR BLD: 80.3 % (ref 38–73)
NRBC BLD-RTO: 0 /100 WBC
PHOSPHATE SERPL-MCNC: 2.3 MG/DL (ref 2.7–4.5)
PHOSPHATE SERPL-MCNC: 2.3 MG/DL (ref 2.7–4.5)
PLATELET # BLD AUTO: 210 K/UL (ref 150–450)
PMV BLD AUTO: 10.5 FL (ref 9.2–12.9)
POTASSIUM SERPL-SCNC: 3.5 MMOL/L (ref 3.5–5.1)
RBC # BLD AUTO: 2.44 M/UL (ref 4.6–6.2)
SODIUM SERPL-SCNC: 139 MMOL/L (ref 136–145)
TACROLIMUS BLD-MCNC: 9 NG/ML (ref 5–15)
WBC # BLD AUTO: 5.83 K/UL (ref 3.9–12.7)

## 2023-06-06 PROCEDURE — 25000003 PHARM REV CODE 250: Performed by: PHYSICIAN ASSISTANT

## 2023-06-06 PROCEDURE — 80197 ASSAY OF TACROLIMUS: CPT | Performed by: PHYSICIAN ASSISTANT

## 2023-06-06 PROCEDURE — 25000003 PHARM REV CODE 250: Performed by: NURSE PRACTITIONER

## 2023-06-06 PROCEDURE — 25000003 PHARM REV CODE 250

## 2023-06-06 PROCEDURE — 94640 AIRWAY INHALATION TREATMENT: CPT

## 2023-06-06 PROCEDURE — 83735 ASSAY OF MAGNESIUM: CPT | Performed by: PHYSICIAN ASSISTANT

## 2023-06-06 PROCEDURE — 99900035 HC TECH TIME PER 15 MIN (STAT)

## 2023-06-06 PROCEDURE — 63600175 PHARM REV CODE 636 W HCPCS: Performed by: NURSE PRACTITIONER

## 2023-06-06 PROCEDURE — 80069 RENAL FUNCTION PANEL: CPT

## 2023-06-06 PROCEDURE — 94761 N-INVAS EAR/PLS OXIMETRY MLT: CPT

## 2023-06-06 PROCEDURE — 25000242 PHARM REV CODE 250 ALT 637 W/ HCPCS

## 2023-06-06 PROCEDURE — 85025 COMPLETE CBC W/AUTO DIFF WBC: CPT | Performed by: PHYSICIAN ASSISTANT

## 2023-06-06 PROCEDURE — 63600175 PHARM REV CODE 636 W HCPCS

## 2023-06-06 PROCEDURE — 63600175 PHARM REV CODE 636 W HCPCS: Performed by: PHYSICIAN ASSISTANT

## 2023-06-06 PROCEDURE — 82955 ASSAY OF G6PD ENZYME: CPT | Performed by: NURSE PRACTITIONER

## 2023-06-06 RX ORDER — MYCOPHENOLIC ACID 180 MG/1
360 TABLET, DELAYED RELEASE ORAL 2 TIMES DAILY
Qty: 240 TABLET | Refills: 11 | Status: SHIPPED | OUTPATIENT
Start: 2023-06-06 | End: 2023-06-08 | Stop reason: DRUGHIGH

## 2023-06-06 RX ORDER — LEVOFLOXACIN 250 MG/1
750 TABLET ORAL DAILY
Status: DISCONTINUED | OUTPATIENT
Start: 2023-06-06 | End: 2023-06-06 | Stop reason: HOSPADM

## 2023-06-06 RX ORDER — PENTAMIDINE ISETHIONATE 300 MG/300MG
300 INHALANT RESPIRATORY (INHALATION) ONCE
Status: COMPLETED | OUTPATIENT
Start: 2023-06-06 | End: 2023-06-06

## 2023-06-06 RX ORDER — SODIUM BICARBONATE 650 MG/1
1300 TABLET ORAL 3 TIMES DAILY
Qty: 180 TABLET | Refills: 11 | Status: SHIPPED | OUTPATIENT
Start: 2023-06-06 | End: 2024-06-05

## 2023-06-06 RX ORDER — MAGNESIUM SULFATE HEPTAHYDRATE 40 MG/ML
2 INJECTION, SOLUTION INTRAVENOUS ONCE
Status: COMPLETED | OUTPATIENT
Start: 2023-06-06 | End: 2023-06-06

## 2023-06-06 RX ORDER — ALBUTEROL SULFATE 2.5 MG/.5ML
2.5 SOLUTION RESPIRATORY (INHALATION) ONCE
Status: COMPLETED | OUTPATIENT
Start: 2023-06-06 | End: 2023-06-06

## 2023-06-06 RX ORDER — LEVOFLOXACIN 750 MG/1
750 TABLET ORAL DAILY
Qty: 9 TABLET | Refills: 0 | Status: SHIPPED | OUTPATIENT
Start: 2023-06-07 | End: 2023-06-30

## 2023-06-06 RX ADMIN — OXYCODONE HYDROCHLORIDE 5 MG: 5 TABLET ORAL at 07:06

## 2023-06-06 RX ADMIN — ALBUTEROL SULFATE 2.5 MG: 2.5 SOLUTION RESPIRATORY (INHALATION) at 01:06

## 2023-06-06 RX ADMIN — PENTAMIDINE ISETHIONATE 300 MG: 300 INHALANT RESPIRATORY (INHALATION) at 02:06

## 2023-06-06 RX ADMIN — ACYCLOVIR 400 MG: 200 CAPSULE ORAL at 08:06

## 2023-06-06 RX ADMIN — LEVOFLOXACIN 750 MG: 250 TABLET, FILM COATED ORAL at 08:06

## 2023-06-06 RX ADMIN — FLUTICASONE FUROATE 1 PUFF: 100 POWDER RESPIRATORY (INHALATION) at 08:06

## 2023-06-06 RX ADMIN — PREDNISONE 10 MG: 10 TABLET ORAL at 08:06

## 2023-06-06 RX ADMIN — SODIUM BICARBONATE 1300 MG: 650 TABLET ORAL at 08:06

## 2023-06-06 RX ADMIN — TACROLIMUS 2 MG: 1 CAPSULE ORAL at 07:06

## 2023-06-06 RX ADMIN — Medication 2 TABLET: at 08:06

## 2023-06-06 RX ADMIN — MYCOPHENOLIC ACID 180 MG: 180 TABLET, DELAYED RELEASE ORAL at 08:06

## 2023-06-06 RX ADMIN — MAGNESIUM SULFATE 2 G: 2 INJECTION INTRAVENOUS at 10:06

## 2023-06-06 RX ADMIN — Medication 2 TABLET: at 02:06

## 2023-06-06 RX ADMIN — SODIUM BICARBONATE 1300 MG: 650 TABLET ORAL at 02:06

## 2023-06-06 NOTE — PLAN OF CARE
CHW met with patient/family at bedside. Patient experience rounding completed and reviewed the following.     Do you know your discharge plan? Yes      If yes, what is the plan? Home    If you are discharging home, do you have help at home? Yes    Do you think you will need help at home at discharge? No     Have you discussed your needs and preferences with your SW/CM? Yes     Assigned SW/CM notified of any patient/family needs or concerns.

## 2023-06-06 NOTE — PROGRESS NOTES
Discharge Medication Note:    Hospital Course:  Patient admitted for bacteremia. ID consulted and patient transitioned to oral abx (Levo) to complete a 14 day course, (EOT 6/16/23)    Met with Abdoulaye Parikh at discharge to review discharge medications and to update the blue medication card.           Medication List        START taking these medications      k phos di & mono-sod phos mono 250 mg Tab  Commonly known as: PHOSPHA 250 NEUTRAL  Take 2 tablets by mouth 3 (three) times daily.     levoFLOXacin 750 MG tablet  Commonly known as: LEVAQUIN  Take 1 tablet (750 mg total) by mouth once daily. for 9 days  Start taking on: June 7, 2023            CHANGE how you take these medications      mycophenolate 180 MG Tbec  Commonly known as: MYFORTIC  Take 2 tablets (360 mg total) by mouth 2 (two) times daily.  What changed: how much to take     sodium bicarbonate 650 MG tablet  Take 2 tablets (1,300 mg total) by mouth 3 (three) times daily.  What changed: when to take this            CONTINUE taking these medications      acyclovir 400 MG tablet  Commonly known as: ZOVIRAX  Take 1 tablet (400 mg total) by mouth 2 (two) times daily. Stop 8/9/23     ARNUITY ELLIPTA 100 mcg/actuation inhaler  Generic drug: fluticasone furoate     hydrOXYzine HCL 25 MG tablet  Commonly known as: ATARAX  Take 1 tablet (25 mg total) by mouth 3 (three) times daily as needed for Anxiety.     metoprolol succinate 25 MG 24 hr tablet  Commonly known as: TOPROL-XL  Take HALF tablet (12.5 mg total) by mouth once daily.     oxyCODONE 5 MG immediate release tablet  Commonly known as: ROXICODONE  Take 1 tablet (5 mg total) by mouth every 4 (four) hours as needed for Pain.     predniSONE 5 MG tablet  Commonly known as: DELTASONE  Take 20 mg by mouth daily 5/14-5/20; 15 mg daily 5/21-5/27; 10 mg daily 5/28-6/3; then 5 mg daily thereafter 6/4/23     tacrolimus 1 MG Cap  Commonly known as: PROGRAF  Take 2 capsules (2 mg total) by mouth every 12 (twelve)  hours.            STOP taking these medications      atovaquone 750 mg/5 mL Susp oral liquid  Commonly known as: MEPRON     bisacodyL 5 mg EC tablet  Commonly known as: DULCOLAX     docusate sodium 100 MG capsule  Commonly known as: COLACE     famotidine 20 MG tablet  Commonly known as: PEPCID               Where to Get Your Medications        These medications were sent to [facility]  [address] [facility] [address]      Hours: Mon-Fri 7a-7p, Sat-Sun 10a-4p Phone: [phone]   k phos di & mono-sod phos mono 250 mg Tab  levoFLOXacin 750 MG tablet  mycophenolate 180 MG Tbec  sodium bicarbonate 650 MG tablet            The following medications have been placed on HOLD and should be restarted in the outpatient setting (when appropriate): None, but MPA dose reduced to 360mg BID.    Abdoulaye Parikh verbalized understanding and had the opportunity to ask questions.

## 2023-06-06 NOTE — SUBJECTIVE & OBJECTIVE
Past Medical History:   Diagnosis Date    Acute blood loss anemia 5/11/2023    Anemia     Arthritis     Cirrhosis     CKD (chronic kidney disease) stage 4, GFR 15-29 ml/min, slow graft function with sustaine creatinien improvement 5/26/2023    Crohn's disease     Disorder of kidney and ureter     Stage 5    Encounter for blood transfusion     Gout     Hearing loss     Hepatitis     Had Hep C Cleared    Hypertension     Immunosuppressive management encounter following kidney transplant 5/26/2023    Neurosarcoidosis 2018    Obesity     Osteoarthritis     Sarcoid neuropathy     Sarcoidosis, lung     Steatosis        Past Surgical History:   Procedure Laterality Date    AV FISTULA PLACEMENT Left     BRAIN SURGERY      COLON SURGERY      Exploratory lap x 4 for Chron's disease. Likely right colectomy    CSF SHUNT      DIAGNOSTIC ULTRASOUND N/A 5/15/2023    Procedure: ULTRASOUND, DIAGNOSTIC;  Surgeon: Chivo Brown MD;  Location: [facility] OR [address] [place] [address];  Service: Transplant;  Laterality: N/A;    JOINT REPLACEMENT Right     Hip    KIDNEY TRANSPLANT N/A 5/10/2023    Procedure: TRANSPLANT, KIDNEY - RQ 7PM START;  Surgeon: Chivo Brown MD;  Location: [address];  Service: Transplant;  Laterality: N/A;    NEPHROSTOMY N/A 6/3/2023    Procedure: Nephrostomy;  Surgeon: Silvia Surgeon;  Location: [facility];  Service: Anesthesiology;  Laterality: N/A;    RENAL BIOPSY  5/15/2023    Procedure: BIOPSY, KIDNEY;  Surgeon: Chivo Brown MD;  Location: [address];  Service: Transplant;;    RENAL EXPLORATION  5/15/2023    Procedure: EXPLORATION, KIDNEY;  Surgeon: Chivo Brown MD;  Location: [address];  Service: Transplant;;    TOTAL HIP ARTHROPLASTY Right        Review of patient's allergies indicates:   Allergen Reactions    Bactrim [sulfamethoxazole-trimethoprim] Shortness Of Breath and Itching    Penicillins Shortness Of Breath and Itching       Medications:  Medications Prior to Admission    Medication Sig    acyclovir (ZOVIRAX) 400 MG tablet Take 1 tablet (400 mg total) by mouth 2 (two) times daily. Stop 8/9/23    atovaquone (MEPRON) 750 mg/5 mL Susp oral liquid Take 10 mLs (1,500 mg total) by mouth once daily. Stop 11/7/23    bisacodyL (DULCOLAX) 5 mg EC tablet Take 2 tablets (10 mg total) by mouth daily as needed for Constipation.    docusate sodium (COLACE) 100 MG capsule Take 1 capsule (100 mg total) by mouth 3 (three) times daily as needed for Constipation.    famotidine (PEPCID) 20 MG tablet Take 1 tablet (20 mg total) by mouth every evening.    fluticasone furoate (ARNUITY ELLIPTA) 100 mcg/actuation inhaler Inhale 100 mcg into the lungs once daily. Controller    hydrOXYzine HCL (ATARAX) 25 MG tablet Take 1 tablet (25 mg total) by mouth 3 (three) times daily as needed for Anxiety.    metoprolol succinate (TOPROL-XL) 25 MG 24 hr tablet Take HALF tablet (12.5 mg total) by mouth once daily.    mycophenolate (MYFORTIC) 180 MG TbEC Take 4 tablets (720 mg total) by mouth 2 (two) times daily.    oxyCODONE (ROXICODONE) 5 MG immediate release tablet Take 1 tablet (5 mg total) by mouth every 4 (four) hours as needed for Pain.    predniSONE (DELTASONE) 5 MG tablet Take 20 mg by mouth daily 5/14-5/20; 15 mg daily 5/21-5/27; 10 mg daily 5/28-6/3; then 5 mg daily thereafter 6/4/23    sodium bicarbonate 650 MG tablet Take 2 tablets (1,300 mg total) by mouth 2 (two) times daily.    tacrolimus (PROGRAF) 1 MG Cap Take 2 capsules (2 mg total) by mouth every 12 (twelve) hours.     Antibiotics (From admission, onward)      Start     Stop Route Frequency Ordered    06/03/23 1100  ciprofloxacin (CIPRO)400mg/200ml D5W IVPB 400 mg         -- IV Every 24 hours (non-standard times) 06/03/23 0956          Antifungals (From admission, onward)      None          Antivirals (From admission, onward)          Stop Route Frequency     acyclovir         -- Oral 2 times daily             Immunization History   Administered  Date(s) Administered    COVID-19, MRNA, LN-S, PF (MODERNA FULL 0.5 ML DOSE) 01/19/2021, 02/18/2021, 12/17/2021       Family History       Problem Relation (Age of Onset)    COPD Sister    Cancer Father, Maternal Grandfather, Paternal Grandfather    Diabetes Father    Heart disease Father, Sister    Hypertension Mother, Father    Lung cancer Maternal Grandfather, Paternal Grandfather    Multiple myeloma Father    Stroke Father          Social History     Socioeconomic History    Marital status:      Spouse name: Kalani Parikh    Number of children: 2   Tobacco Use    Smoking status: Never    Smokeless tobacco: Never   Substance and Sexual Activity    Alcohol use: Not Currently    Drug use: Never    Sexual activity: Yes     Partners: Female   Social History Narrative    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   HENT:  Negative for rhinorrhea and sore throat.    Respiratory:  Negative for cough and shortness of breath.    Cardiovascular:  Negative for chest pain and leg swelling.   Gastrointestinal:  Positive for diarrhea. Negative for abdominal pain, nausea and vomiting.   Genitourinary:  Negative for dysuria and hematuria.   Musculoskeletal:  Negative for arthralgias and myalgias.   Skin:  Negative for rash.   Allergic/Immunologic: Positive for immunocompromised state.   Neurological:  Negative for headaches.   Objective:     Vital Signs (Most Recent):  Temp: 97.5 °F (36.4 °C) (06/05/23 1624)  Pulse: 61 (06/05/23 1624)  Resp: 16 (06/05/23 1624)  BP: 135/60 (06/05/23 1624)  SpO2: 96 % (06/05/23 1624) Vital Signs (24h Range):  Temp:  [97.5 °F (36.4 °C)-98.4 °F (36.9 °C)] 97.5 °F (36.4 °C)  Pulse:  [61-70] 61  Resp:  [14-20] 16  SpO2:  [96 %-98 %] 96 %  BP: (116-135)/(56-60) 135/60     Weight: 90.4 kg (199 lb 4.7 oz)  Body mass index is 29.43 kg/m².    Estimated Creatinine Clearance: 53.9 mL/min (A) (based on SCr of 1.6 mg/dL (H)).     Physical  Exam  Vitals reviewed.   Constitutional:       General: He is not in acute distress.     Appearance: He is well-developed. He is not diaphoretic.   HENT:      Head: Normocephalic and atraumatic.      Nose: Nose normal.   Eyes:      Conjunctiva/sclera: Conjunctivae normal.   Pulmonary:      Effort: Pulmonary effort is normal. No respiratory distress.   Abdominal:      General: Abdomen is flat. There is no distension.      Comments: PCNU tube with urine. CATHLEEN drain in fluid collection   Musculoskeletal:      Cervical back: Normal range of motion.      Right lower leg: No edema.      Left lower leg: No edema.   Skin:     General: Skin is warm and dry.      Findings: No erythema or rash.   Neurological:      Mental Status: He is alert.   Psychiatric:         Behavior: Behavior normal.        Significant Labs: All pertinent labs within the past 24 hours have been reviewed.    Significant Imaging: I have reviewed all pertinent imaging results/findings within the past 24 hours.

## 2023-06-06 NOTE — CONSULTS
[facility] - Transplant Stepdown  Infectious Disease  Consult Note    Patient Name: Abdoulaye Parikh  MRN: [id number]  Admission Date: 6/1/2023  Hospital Length of Stay: 4 days  Attending Physician: Beatrice Mendez DO  Primary Care Provider: Primary Doctor No     Isolation Status: No active isolations    Patient information was obtained from patient, past medical records and primary team.      Inpatient consult to Infectious Diseases  Consult performed by: aMral Lopez MD  Consult ordered by: Sandra Arredondo NP        Assessment/Plan:     Renal/  Perinephric fluid collection of kidney transplant  62-year-old male with history of ESRD s/p LUKT 5/10/2023, post-op course c/b DGF and increased velocities in artery and vein requiring OR takeback 5/15/202, presented with TRINO and diarrhea, found to have perinephric fluid collection from urine leak, s/p IR drainage 6/2/2023 (, N82%), no growth on cultures.    Recommendations:  - Discontinue vancomycin IV as no MRSA on cultures  - Okay to transition cipro to levofloxacin 750 mg PO q24 hours, plan for 14 day course, last day 6/16/2023    ID  Bacteremia  Blood cultures 1 of 2 sets with CoNS, likely contaminant  Repeat blood cultures x2  Discontinue vancomycin IV      85 minutes of total time spent on the encounter, which includes face to face time and non-face to face time preparing to see the patient (eg, review of tests), obtaining and reviewing separately obtained history, documenting clinical information in the electronic or other health record, independently interpreting results (not separately reported) and communicating results to the patient/family/caregiver, and care coordination (not separately reported).       Thank you for your consult. I will sign off. Please contact us if you have any additional questions.    Maral Lopez MD  Infectious Disease  [facility] - Transplant Step[facility]    Subjective:     Principal Problem: TRINO (acute kidney injury)    HPI:  62-year-old male with history of ESRD s/p LUKT 5/10/2023, post-op course c/b DGF and increased velocities in artery and vein requiring OR takeback 5/15/202, presented with TRINO and diarrhea. Patient found to have large minimally complex peritransplant collection extending the entire length of the allograft. Patient underwent IR drainage 6/2/2023 -  with N 82%, Cr>30. PCNU tube placed 6/3/2023, started on vanc / cipro. Blood cultures 1/2 with CoNS. Patient reports diarrhea has resolved. He had rash to penicillin in his 30s. Developed tachycardia and chest pressure with TMP-SMX.      Past Medical History:   Diagnosis Date    Acute blood loss anemia 5/11/2023    Anemia     Arthritis     Cirrhosis     CKD (chronic kidney disease) stage 4, GFR 15-29 ml/min, slow graft function with sustaine creatinien improvement 5/26/2023    Crohn's disease     Disorder of kidney and ureter     Stage 5    Encounter for blood transfusion     Gout     Hearing loss     Hepatitis     Had Hep C Cleared    Hypertension     Immunosuppressive management encounter following kidney transplant 5/26/2023    Neurosarcoidosis 2018    Obesity     Osteoarthritis     Sarcoid neuropathy     Sarcoidosis, lung     Steatosis        Past Surgical History:   Procedure Laterality Date    AV FISTULA PLACEMENT Left     BRAIN SURGERY      COLON SURGERY      Exploratory lap x 4 for Chron's disease. Likely right colectomy    CSF SHUNT      DIAGNOSTIC ULTRASOUND N/A 5/15/2023    Procedure: ULTRASOUND, DIAGNOSTIC;  Surgeon: Chivo Brown MD;  Location: [facility] OR [address] [place] [address];  Service: Transplant;  Laterality: N/A;    JOINT REPLACEMENT Right     Hip    KIDNEY TRANSPLANT N/A 5/10/2023    Procedure: TRANSPLANT, KIDNEY - RQ 7PM START;  Surgeon: Chivo Brown MD;  Location: [facility] OR [address] [place] [address];  Service: Transplant;  Laterality: N/A;    NEPHROSTOMY N/A 6/3/2023    Procedure: Nephrostomy;  Surgeon: Silvia Jeter;  Location: [facility]  [facility];  Service: Anesthesiology;  Laterality: N/A;    RENAL BIOPSY  5/15/2023    Procedure: BIOPSY, KIDNEY;  Surgeon: Chivo Brown MD;  Location: [facility] [place] [address] [place] [address];  Service: Transplant;;    RENAL EXPLORATION  5/15/2023    Procedure: EXPLORATION, KIDNEY;  Surgeon: Chivo Brown MD;  Location: [facility] OR [address] [place] [address];  Service: Transplant;;    TOTAL HIP ARTHROPLASTY Right        Review of patient's allergies indicates:   Allergen Reactions    Bactrim [sulfamethoxazole-trimethoprim] Shortness Of Breath and Itching    Penicillins Shortness Of Breath and Itching       Medications:  Medications Prior to Admission   Medication Sig    acyclovir (ZOVIRAX) 400 MG tablet Take 1 tablet (400 mg total) by mouth 2 (two) times daily. Stop 8/9/23    atovaquone (MEPRON) 750 mg/5 mL Susp oral liquid Take 10 mLs (1,500 mg total) by mouth once daily. Stop 11/7/23    bisacodyL (DULCOLAX) 5 mg EC tablet Take 2 tablets (10 mg total) by mouth daily as needed for Constipation.    docusate sodium (COLACE) 100 MG capsule Take 1 capsule (100 mg total) by mouth 3 (three) times daily as needed for Constipation.    famotidine (PEPCID) 20 MG tablet Take 1 tablet (20 mg total) by mouth every evening.    fluticasone furoate (ARNUITY ELLIPTA) 100 mcg/actuation inhaler Inhale 100 mcg into the lungs once daily. Controller    hydrOXYzine HCL (ATARAX) 25 MG tablet Take 1 tablet (25 mg total) by mouth 3 (three) times daily as needed for Anxiety.    metoprolol succinate (TOPROL-XL) 25 MG 24 hr tablet Take HALF tablet (12.5 mg total) by mouth once daily.    mycophenolate (MYFORTIC) 180 MG TbEC Take 4 tablets (720 mg total) by mouth 2 (two) times daily.    oxyCODONE (ROXICODONE) 5 MG immediate release tablet Take 1 tablet (5 mg total) by mouth every 4 (four) hours as needed for Pain.    predniSONE (DELTASONE) 5 MG tablet Take 20 mg by mouth daily 5/14-5/20; 15 mg daily 5/21-5/27; 10 mg daily 5/28-6/3; then 5 mg daily thereafter 6/4/23     sodium bicarbonate 650 MG tablet Take 2 tablets (1,300 mg total) by mouth 2 (two) times daily.    tacrolimus (PROGRAF) 1 MG Cap Take 2 capsules (2 mg total) by mouth every 12 (twelve) hours.     Antibiotics (From admission, onward)      Start     Stop Route Frequency Ordered    06/03/23 1100  ciprofloxacin (CIPRO)400mg/200ml D5W IVPB 400 mg         -- IV Every 24 hours (non-standard times) 06/03/23 0956          Antifungals (From admission, onward)      None          Antivirals (From admission, onward)          Stop Route Frequency     acyclovir         -- Oral 2 times daily             Immunization History   Administered Date(s) Administered    COVID-19, MRNA, LN-S, PF (MODERNA FULL 0.5 ML DOSE) 01/19/2021, 02/18/2021, 12/17/2021       Family History       Problem Relation (Age of Onset)    COPD Sister    Cancer Father, Maternal Grandfather, Paternal Grandfather    Diabetes Father    Heart disease Father, Sister    Hypertension Mother, Father    Lung cancer Maternal Grandfather, Paternal Grandfather    Multiple myeloma Father    Stroke Father          Social History     Socioeconomic History    Marital status:      Spouse name: Kalani Parikh    Number of children: 2   Tobacco Use    Smoking status: Never    Smokeless tobacco: Never   Substance and Sexual Activity    Alcohol use: Not Currently    Drug use: Never    Sexual activity: Yes     Partners: Female   Social History Narrative    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   HENT:  Negative for rhinorrhea and sore throat.    Respiratory:  Negative for cough and shortness of breath.    Cardiovascular:  Negative for chest pain and leg swelling.   Gastrointestinal:  Positive for diarrhea. Negative for abdominal pain, nausea and vomiting.   Genitourinary:  Negative for dysuria and hematuria.   Musculoskeletal:  Negative for arthralgias and myalgias.   Skin:  Negative for rash.    Allergic/Immunologic: Positive for immunocompromised state.   Neurological:  Negative for headaches.   Objective:     Vital Signs (Most Recent):  Temp: 97.5 °F (36.4 °C) (06/05/23 1624)  Pulse: 61 (06/05/23 1624)  Resp: 16 (06/05/23 1624)  BP: 135/60 (06/05/23 1624)  SpO2: 96 % (06/05/23 1624) Vital Signs (24h Range):  Temp:  [97.5 °F (36.4 °C)-98.4 °F (36.9 °C)] 97.5 °F (36.4 °C)  Pulse:  [61-70] 61  Resp:  [14-20] 16  SpO2:  [96 %-98 %] 96 %  BP: (116-135)/(56-60) 135/60     Weight: 90.4 kg (199 lb 4.7 oz)  Body mass index is 29.43 kg/m².    Estimated Creatinine Clearance: 53.9 mL/min (A) (based on SCr of 1.6 mg/dL (H)).     Physical Exam  Vitals reviewed.   Constitutional:       General: He is not in acute distress.     Appearance: He is well-developed. He is not diaphoretic.   HENT:      Head: Normocephalic and atraumatic.      Nose: Nose normal.   Eyes:      Conjunctiva/sclera: Conjunctivae normal.   Pulmonary:      Effort: Pulmonary effort is normal. No respiratory distress.   Abdominal:      General: Abdomen is flat. There is no distension.      Comments: PCNU tube with urine. CATHLEEN drain in fluid collection   Musculoskeletal:      Cervical back: Normal range of motion.      Right lower leg: No edema.      Left lower leg: No edema.   Skin:     General: Skin is warm and dry.      Findings: No erythema or rash.   Neurological:      Mental Status: He is alert.   Psychiatric:         Behavior: Behavior normal.        Significant Labs: All pertinent labs within the past 24 hours have been reviewed.    Significant Imaging: I have reviewed all pertinent imaging results/findings within the past 24 hours.

## 2023-06-06 NOTE — PLAN OF CARE
Cr=1.4 dec from 1.6. Phos =2.3. CO2=23. Sodium Bicarb continues TID. Mg+2=1.4. Mg+2 rider given. Pt stated loose BM's are less since stopping Atovoquone. Plan for pt to receive Pentamadine and Albuterol RT for PCP prophylaxis. RLE with 3+ edema and LLE with 2+ edema this am. R pedal pulse audible with doppler but weak. Plan for U/S of RLE today. Afebrile. Vanc discontinued yesterday. Cipro discontinued today. Levofloxacin po started this am. Sitting in chair again today. Ambulating to BR without difficulty. Tolerating po well.  UOP adequate via NT. Had 2 loose BM's today. Oxycodone dec pain to RLQ abd. Wife at BS. Plan to discharge home later on today.

## 2023-06-06 NOTE — ASSESSMENT & PLAN NOTE
Blood cultures 1 of 2 sets with CoNS, likely contaminant  Repeat blood cultures x2  Discontinue vancomycin IV

## 2023-06-06 NOTE — ASSESSMENT & PLAN NOTE
62-year-old male with history of ESRD s/p LUKT 5/10/2023, post-op course c/b DGF and increased velocities in artery and vein requiring OR takeback 5/15/202, presented with TRINO and diarrhea, found to have perinephric fluid collection from urine leak, s/p IR drainage 6/2/2023 (, N82%), no growth on cultures.    Recommendations:  - Discontinue vancomycin IV as no MRSA on cultures  - Okay to transition cipro to levofloxacin 750 mg PO q24 hours, plan for 14 day course, last day 6/16/2023

## 2023-06-06 NOTE — PLAN OF CARE
Patient AAOx4. VSS on RA. Afebrile. Pt c/o abdominal pain- pain relief given per orders. RLQ kd inc beny w/ steri-strips, no s/s of infection. RLQ CATHLEEN drain serous output noted- flushed BID. RLQ nephrostomy tube in place- adequate UOP (see flowsheets for details). Up independently to restroom. GRACIE fistula in place- +/+.  Abx continued. Bed locked and lowered, call bell in reach, nonskid socks on when OOB. Reviewed plan of care and fall precautions with pt, pt verbalized understanding. Pt remained free from falls this shift.

## 2023-06-06 NOTE — PROGRESS NOTES
Transplant Social Work Discharge Note:    Pt will discharge to patient's home under the care of Kalani Parikh, patient's wife, phone number [phone].     Patient reported readiness to discharge at this time. Per JHON ANGELIQUE MACHUCA, patient does not require referrals at time of discharge. Patient agrees to contact transplant team with needs, questions, or concerns as they arise.     Pt aware of, involved in, and coping well with this discharge plan. Pt did not have any concerns with the discharge plan at this time. MAUREEN remains available at [phone].      Sally Cote LMSW

## 2023-06-07 DIAGNOSIS — Z94.0 KIDNEY REPLACED BY TRANSPLANT: Primary | ICD-10-CM

## 2023-06-07 LAB — G6PD RBC-CCNT: 1.9 U/G HB (ref 8–11.9)

## 2023-06-07 NOTE — NURSING
Discharge instructions given and reviewed with pt and wife. Demonstrated to pt and wife how to empty NT and CATHLEEN. Graduated cylinder, cups and med cups given to measure output from NT and CATHLEEN drain at home. Instructed to record date time and quantity for each drain and bring to clinic. Also gave pt 2 extra bags for NT in case bag needs changing. Needed meds for home delivered to room. Verbalized understanding.

## 2023-06-08 ENCOUNTER — PATIENT MESSAGE (OUTPATIENT)
Dept: ADMINISTRATIVE | Facility: OTHER | Age: 63
End: 2023-06-08
Payer: MEDICARE

## 2023-06-08 ENCOUNTER — TELEPHONE (OUTPATIENT)
Dept: TRANSPLANT | Facility: CLINIC | Age: 63
End: 2023-06-08
Payer: MEDICARE

## 2023-06-08 ENCOUNTER — LAB VISIT (OUTPATIENT)
Dept: LAB | Facility: HOSPITAL | Age: 63
End: 2023-06-08
Attending: INTERNAL MEDICINE
Payer: MEDICARE

## 2023-06-08 DIAGNOSIS — Z57.8 EMPLOYEE EXPOSURE TO BLOOD: ICD-10-CM

## 2023-06-08 DIAGNOSIS — Z94.0 KIDNEY REPLACED BY TRANSPLANT: ICD-10-CM

## 2023-06-08 DIAGNOSIS — Z21 ASYMPTOMATIC HUMAN IMMUNODEFICIENCY VIRUS (HIV) INFECTION STATUS: ICD-10-CM

## 2023-06-08 DIAGNOSIS — Z11.4 SCREENING FOR HUMAN IMMUNODEFICIENCY VIRUS: ICD-10-CM

## 2023-06-08 DIAGNOSIS — Z91.89 PERSONAL HISTORY OF POISONING, PRESENTING HAZARDS TO HEALTH: ICD-10-CM

## 2023-06-08 DIAGNOSIS — Z94.0 S/P KIDNEY TRANSPLANT: Primary | ICD-10-CM

## 2023-06-08 DIAGNOSIS — Z94.0 S/P KIDNEY TRANSPLANT: ICD-10-CM

## 2023-06-08 LAB
ALBUMIN SERPL BCP-MCNC: 2.7 G/DL (ref 3.5–5.2)
ALBUMIN SERPL BCP-MCNC: 2.7 G/DL (ref 3.5–5.2)
ALP SERPL-CCNC: 89 U/L (ref 55–135)
ALT SERPL W/O P-5'-P-CCNC: 64 U/L (ref 10–44)
ANION GAP SERPL CALC-SCNC: 8 MMOL/L (ref 8–16)
ANISOCYTOSIS BLD QL SMEAR: SLIGHT
AST SERPL-CCNC: 45 U/L (ref 10–40)
BACTERIA BLD CULT: NORMAL
BASOPHILS NFR BLD: 1 % (ref 0–1.9)
BILIRUB DIRECT SERPL-MCNC: 0.3 MG/DL (ref 0.1–0.3)
BILIRUB SERPL-MCNC: 0.6 MG/DL (ref 0.1–1)
BUN SERPL-MCNC: 13 MG/DL (ref 8–23)
CALCIUM SERPL-MCNC: 8.5 MG/DL (ref 8.7–10.5)
CHLORIDE SERPL-SCNC: 108 MMOL/L (ref 95–110)
CO2 SERPL-SCNC: 25 MMOL/L (ref 23–29)
CREAT SERPL-MCNC: 1.6 MG/DL (ref 0.5–1.4)
DIFFERENTIAL METHOD: ABNORMAL
EOSINOPHIL NFR BLD: 0 % (ref 0–8)
ERYTHROCYTE [DISTWIDTH] IN BLOOD BY AUTOMATED COUNT: 20.2 % (ref 11.5–14.5)
EST. GFR  (NO RACE VARIABLE): 48.7 ML/MIN/1.73 M^2
GLUCOSE SERPL-MCNC: 67 MG/DL (ref 70–110)
HCT VFR BLD AUTO: 28.6 % (ref 40–54)
HGB BLD-MCNC: 9.3 G/DL (ref 14–18)
HYPOCHROMIA BLD QL SMEAR: ABNORMAL
IMM GRANULOCYTES # BLD AUTO: ABNORMAL K/UL (ref 0–0.04)
IMM GRANULOCYTES NFR BLD AUTO: ABNORMAL % (ref 0–0.5)
LYMPHOCYTES NFR BLD: 11 % (ref 18–48)
MAGNESIUM SERPL-MCNC: 1.4 MG/DL (ref 1.6–2.6)
MCH RBC QN AUTO: 35.5 PG (ref 27–31)
MCHC RBC AUTO-ENTMCNC: 32.5 G/DL (ref 32–36)
MCV RBC AUTO: 109 FL (ref 82–98)
MONOCYTES NFR BLD: 3 % (ref 4–15)
NEUTROPHILS NFR BLD: 85 % (ref 38–73)
NRBC BLD-RTO: 0 /100 WBC
PHOSPHATE SERPL-MCNC: 2.4 MG/DL (ref 2.7–4.5)
PLATELET # BLD AUTO: 234 K/UL (ref 150–450)
PLATELET BLD QL SMEAR: ABNORMAL
PMV BLD AUTO: 10.4 FL (ref 9.2–12.9)
POIKILOCYTOSIS BLD QL SMEAR: SLIGHT
POTASSIUM SERPL-SCNC: 3 MMOL/L (ref 3.5–5.1)
PROT SERPL-MCNC: 6.9 G/DL (ref 6–8.4)
RBC # BLD AUTO: 2.62 M/UL (ref 4.6–6.2)
SODIUM SERPL-SCNC: 141 MMOL/L (ref 136–145)
TACROLIMUS BLD-MCNC: 5.5 NG/ML (ref 5–15)
WBC # BLD AUTO: 5.97 K/UL (ref 3.9–12.7)

## 2023-06-08 PROCEDURE — 84075 ASSAY ALKALINE PHOSPHATASE: CPT | Performed by: INTERNAL MEDICINE

## 2023-06-08 PROCEDURE — 80069 RENAL FUNCTION PANEL: CPT | Performed by: INTERNAL MEDICINE

## 2023-06-08 PROCEDURE — 87522 HEPATITIS C REVRS TRNSCRPJ: CPT | Performed by: INTERNAL MEDICINE

## 2023-06-08 PROCEDURE — 85027 COMPLETE CBC AUTOMATED: CPT | Performed by: INTERNAL MEDICINE

## 2023-06-08 PROCEDURE — 80197 ASSAY OF TACROLIMUS: CPT | Performed by: INTERNAL MEDICINE

## 2023-06-08 PROCEDURE — 85007 BL SMEAR W/DIFF WBC COUNT: CPT | Performed by: INTERNAL MEDICINE

## 2023-06-08 PROCEDURE — 87517 HEPATITIS B DNA QUANT: CPT | Performed by: INTERNAL MEDICINE

## 2023-06-08 PROCEDURE — 87799 DETECT AGENT NOS DNA QUANT: CPT | Performed by: INTERNAL MEDICINE

## 2023-06-08 PROCEDURE — 83735 ASSAY OF MAGNESIUM: CPT | Performed by: INTERNAL MEDICINE

## 2023-06-08 PROCEDURE — 87536 HIV-1 QUANT&REVRSE TRNSCRPJ: CPT | Performed by: INTERNAL MEDICINE

## 2023-06-08 PROCEDURE — 36415 COLL VENOUS BLD VENIPUNCTURE: CPT | Performed by: INTERNAL MEDICINE

## 2023-06-08 RX ORDER — MYCOPHENOLIC ACID 180 MG/1
360 TABLET, DELAYED RELEASE ORAL 3 TIMES DAILY
Qty: 180 TABLET | Refills: 11 | Status: SHIPPED | OUTPATIENT
Start: 2023-06-08 | End: 2023-06-14 | Stop reason: SDUPTHER

## 2023-06-08 RX ORDER — TACROLIMUS 1 MG/1
CAPSULE ORAL
Qty: 150 CAPSULE | Refills: 11 | Status: SHIPPED | OUTPATIENT
Start: 2023-06-08 | End: 2023-07-10 | Stop reason: DRUGHIGH

## 2023-06-08 SDOH — SOCIAL DETERMINANTS OF HEALTH (SDOH): OCCUPATIONAL EXPOSURE TO OTHER RISK FACTORS: Z57.8

## 2023-06-08 NOTE — PHYSICIAN QUERY
"PT Name: Abdoulaye Parikh  MR #: [id number]  DOCUMENTATION CLARIFICATION     CDS/: Feliberto Powers Jr, RN CCDS              Contact information:[email or web address]   This form is a permanent document in the medical record.     Query Date: June 8, 2023    By submitting this query, we are merely seeking further clarification of documentation. Please utilize your independent clinical judgment when addressing the question(s) below.    The Medical Record contains the following:   Indicators Supporting Clinical Findings Location in Medical Record   x CKD or Chronic Kidney (Renal) Failure/Disease CKD (chronic kidney disease) stage 4, GFR 15-29 ml/min, slow graft function with sustaine creatinien improvement  5/26/2023    62 yr old AAM with ESRD secondary to unknown etiology who underwent a living unrelated (paired exchange) on 5/10/23   6/1 H&P (Past Medical History     6/4 Kidney Transplant Progress Note   x BUN/Creatinine                          GFR BUN=27-->24-->16-->15    Creatinine=2.4-->2.1-->1.7-->1.6    GFR=31.5-->45.3-->52.6-->48.7    Cr returned to baseline    TRINO (acute kidney injury)  - Cr noted to be elevated at 2.4 from 2.0 on outpt labs  - DGF 6/1-6/5 Labs    6/1-6/5 Labs      6/1-6/5 Labs    6/5 Kidney Transplant Progress Note    6/5 Kidney Transplant Progress Note    Dehydration      Nausea / Vomiting     x Dialysis / CRRT Delayed renal graft function  - Last HD 5/11 for hyperkalemia  - See "TRINO" 6/5 Kidney Transplant Progress Note    Medication     x Treatment 63 yo s/p living unrelated kidney transplant on 5/10/23    His kidney function improved with IVFs and Cr on day of discharge is 1.4.  6/5 Kidney Transplant Progress Note      6/6 Discharge Summary     Other Chronic Conditions     x Other Gr removed 6/4. UOP per PCNU good 6/5 Kidney Transplant Progress Note     Provider, please further specify the stage of Chronic Kidney Disease (CKD):    [   ] Chronic Kidney Disease (CKD) stage 3a - " Mildly to moderately decreased eGFR 45-59   [   ] Chronic Kidney Disease (CKD) stage 3b - Moderately to severely decreased eGFR 30-44   [   ] Chronic Kidney Disease (CKD) stage 4 - Severely decreased eGFR 15-29   [   ] End Stage Renal Disease (ESRD) - Kidney failure, requiring renal replacement therapy or transplant, eGFR <15 (for 3 or more months)   [ x  ] Other (please specify): __ delayed graft function s/p kidney transplant_______________     Please document in your progress notes daily for the duration of treatment until resolved and include in your discharge summary.    Reference:     AZALIA Parkinson MD, & CROW Mendes MD, MS. (2020, June 18). Definition and staging of chronic kidney disease in adults ([id number] SHIVA Tillman MD, ScD & [id number] SERAFIN Newell MD, MSc, Eds.). Retrieved October 21, 2020, from https://www.[employer]/contents/definition-and-staging-of-chronic-kidney-disease-in-adults?search=ckd%20staging&source=search_result&selectedTitle=1~150&usage_type=default&display_rank=1    Form No. 87508

## 2023-06-08 NOTE — PROGRESS NOTES
Please increase prograf to 3/2  Please if his diarrhea has subsided or stable, increase myfortic to 360 tid   Labs next week  Encourage hydration

## 2023-06-08 NOTE — PHYSICIAN QUERY
"PT Name: Abdoulaye Parikh  MR #: [id number]  DOCUMENTATION CLARIFICATION     CDS/: Feliberto Powers Jr, RN CCDS              Contact information:[email or web address]   This form is a permanent document in the medical record.     Query Date: June 8, 2023    By submitting this query, we are merely seeking further clarification of documentation. Please utilize your independent clinical judgment when addressing the question(s) below.    The Medical Record contains the following:   Indicators Supporting Clinical Findings Location in Medical Record   x CKD or Chronic Kidney (Renal) Failure/Disease CKD (chronic kidney disease) stage 4, GFR 15-29 ml/min, slow graft function with sustaine creatinien improvement  5/26/2023    62 yr old AAM with ESRD secondary to unknown etiology who underwent a living unrelated (paired exchange) on 5/10/23   6/1 H&P (Past Medical History     6/4 Kidney Transplant Progress Note   x BUN/Creatinine                          GFR BUN=27-->24-->16-->15    Creatinine=2.4-->2.1-->1.7-->1.6    GFR=31.5-->45.3-->52.6-->48.7    Cr returned to baseline    TRINO (acute kidney injury)  - Cr noted to be elevated at 2.4 from 2.0 on outpt labs  - DGF 6/1-6/5 Labs    6/1-6/5 Labs      6/1-6/5 Labs    6/5 Kidney Transplant Progress Note    6/5 Kidney Transplant Progress Note    Dehydration      Nausea / Vomiting     x Dialysis / CRRT Delayed renal graft function  - Last HD 5/11 for hyperkalemia  - See "TRINO" 6/5 Kidney Transplant Progress Note    Medication     x Treatment 61 yo s/p living unrelated kidney transplant on 5/10/23    His kidney function improved with IVFs and Cr on day of discharge is 1.4.  6/5 Kidney Transplant Progress Note      6/6 Discharge Summary     Other Chronic Conditions     x Other Gr removed 6/4. UOP per PCNU good 6/5 Kidney Transplant Progress Note     Provider, please further specify the stage of Chronic Kidney Disease (CKD):    [   ] Chronic Kidney Disease (CKD) stage 3a - " Mildly to moderately decreased eGFR 45-59   [   ] Chronic Kidney Disease (CKD) stage 3b - Moderately to severely decreased eGFR 30-44   [   ] Chronic Kidney Disease (CKD) stage 4 - Severely decreased eGFR 15-29   [   ] End Stage Renal Disease (ESRD) - Kidney failure, requiring renal replacement therapy or transplant, eGFR <15 (for 3 or more months)   [  x ] Patient Creatinine/GFR would no longer affect CKD Grading. He is Post Transplant with Delayed Graft Function that was improving/acute   [   ] Other (please specify):      Please document in your progress notes daily for the duration of treatment until resolved and include in your discharge summary.    Reference:     AZALIA Parkinson MD, & CROW Mendes MD, MS. (2020, June 18). Definition and staging of chronic kidney disease in adults ([id number] SHIVA Tillman MD, ScD & [id number] SERAFIN Newell MD, MSc, Eds.). Retrieved October 21, 2020, from https://www.[employer]/contents/definition-and-staging-of-chronic-kidney-disease-in-adults?search=ckd%20staging&source=search_result&selectedTitle=1~150&usage_type=default&display_rank=1    Form No. 03068

## 2023-06-08 NOTE — TELEPHONE ENCOUNTER
Patient and wife repeated back and voice a understanding of orders.High K diet reviewed.Patient also notified to restart Mepron 1500mg QD until 11/7/2023.  Next labs on 6/12/2023.RX phone into [facility] pharmacy.  ----- Message from Darrin Verma MD sent at 6/8/2023 10:55 AM CDT -----  Let's start KCL 20 mmol daily for 2 days

## 2023-06-08 NOTE — TELEPHONE ENCOUNTER
Patient and wife repeated back and voice a understanding of orders. Next labs on 6/12/2023.   ----- Message from Darrin Verma MD sent at 6/8/2023 12:21 PM CDT -----  Please increase prograf to 3/2  Please if his diarrhea has subsided or stable, increase myfortic to 360 tid   Labs next week  Encourage hydration

## 2023-06-09 LAB
HCV RNA SERPL QL NAA+PROBE: NOT DETECTED
HCV RNA SPEC NAA+PROBE-ACNC: <12 IU/ML
HIV1 RNA # SERPL NAA+PROBE: NOT DETECTED COPIES/ML
HIV1 RNA SERPL QL NAA+PROBE: NOT DETECTED

## 2023-06-10 LAB
BACTERIA BLD CULT: NORMAL
BACTERIA BLD CULT: NORMAL

## 2023-06-12 ENCOUNTER — TELEPHONE (OUTPATIENT)
Dept: TRANSPLANT | Facility: CLINIC | Age: 63
End: 2023-06-12
Payer: MEDICARE

## 2023-06-12 ENCOUNTER — LAB VISIT (OUTPATIENT)
Dept: LAB | Facility: HOSPITAL | Age: 63
End: 2023-06-12
Attending: INTERNAL MEDICINE
Payer: MEDICARE

## 2023-06-12 DIAGNOSIS — Z94.0 KIDNEY REPLACED BY TRANSPLANT: ICD-10-CM

## 2023-06-12 DIAGNOSIS — Z94.0 S/P KIDNEY TRANSPLANT: ICD-10-CM

## 2023-06-12 LAB
ALBUMIN SERPL BCP-MCNC: 2.8 G/DL (ref 3.5–5.2)
ANION GAP SERPL CALC-SCNC: 9 MMOL/L (ref 8–16)
BACTERIA SPEC ANAEROBE CULT: NORMAL
BASOPHILS # BLD AUTO: 0.07 K/UL (ref 0–0.2)
BASOPHILS NFR BLD: 1 % (ref 0–1.9)
BKV DNA SERPL NAA+PROBE-ACNC: <125 COPIES/ML
BKV DNA SERPL NAA+PROBE-LOG#: <2.1 LOG (10) COPIES/ML
BKV DNA SERPL QL NAA+PROBE: NOT DETECTED
BUN SERPL-MCNC: 17 MG/DL (ref 8–23)
CALCIUM SERPL-MCNC: 8.6 MG/DL (ref 8.7–10.5)
CHLORIDE SERPL-SCNC: 112 MMOL/L (ref 95–110)
CO2 SERPL-SCNC: 23 MMOL/L (ref 23–29)
CREAT SERPL-MCNC: 1.7 MG/DL (ref 0.5–1.4)
DIFFERENTIAL METHOD: ABNORMAL
EOSINOPHIL # BLD AUTO: 0.1 K/UL (ref 0–0.5)
EOSINOPHIL NFR BLD: 0.8 % (ref 0–8)
ERYTHROCYTE [DISTWIDTH] IN BLOOD BY AUTOMATED COUNT: 20.2 % (ref 11.5–14.5)
EST. GFR  (NO RACE VARIABLE): 45.3 ML/MIN/1.73 M^2
GLUCOSE SERPL-MCNC: 75 MG/DL (ref 70–110)
HBV DNA SERPL NAA+PROBE-ACNC: <10 IU/ML
HBV DNA SERPL NAA+PROBE-LOG IU: <1 LOG (10) IU/ML
HBV DNA SERPL QL NAA+PROBE: NOT DETECTED
HCT VFR BLD AUTO: 28 % (ref 40–54)
HGB BLD-MCNC: 9.2 G/DL (ref 14–18)
IMM GRANULOCYTES # BLD AUTO: 0.19 K/UL (ref 0–0.04)
IMM GRANULOCYTES NFR BLD AUTO: 2.6 % (ref 0–0.5)
INR PPP: 1.1 (ref 0.8–1.2)
LYMPHOCYTES # BLD AUTO: 0.8 K/UL (ref 1–4.8)
LYMPHOCYTES NFR BLD: 11.2 % (ref 18–48)
MAGNESIUM SERPL-MCNC: 1.3 MG/DL (ref 1.6–2.6)
MCH RBC QN AUTO: 35.5 PG (ref 27–31)
MCHC RBC AUTO-ENTMCNC: 32.9 G/DL (ref 32–36)
MCV RBC AUTO: 108 FL (ref 82–98)
MONOCYTES # BLD AUTO: 0.9 K/UL (ref 0.3–1)
MONOCYTES NFR BLD: 12.2 % (ref 4–15)
NEUTROPHILS # BLD AUTO: 5.3 K/UL (ref 1.8–7.7)
NEUTROPHILS NFR BLD: 72.2 % (ref 38–73)
NRBC BLD-RTO: 0 /100 WBC
PHOSPHATE SERPL-MCNC: 3 MG/DL (ref 2.7–4.5)
PLATELET # BLD AUTO: 163 K/UL (ref 150–450)
PMV BLD AUTO: 10.2 FL (ref 9.2–12.9)
POTASSIUM SERPL-SCNC: 3.4 MMOL/L (ref 3.5–5.1)
PROTHROMBIN TIME: 11.6 SEC (ref 9–12.5)
RBC # BLD AUTO: 2.59 M/UL (ref 4.6–6.2)
SODIUM SERPL-SCNC: 144 MMOL/L (ref 136–145)
TACROLIMUS BLD-MCNC: 6.3 NG/ML (ref 5–15)
WBC # BLD AUTO: 7.3 K/UL (ref 3.9–12.7)

## 2023-06-12 PROCEDURE — 83735 ASSAY OF MAGNESIUM: CPT | Performed by: INTERNAL MEDICINE

## 2023-06-12 PROCEDURE — 36415 COLL VENOUS BLD VENIPUNCTURE: CPT

## 2023-06-12 PROCEDURE — 80069 RENAL FUNCTION PANEL: CPT | Performed by: INTERNAL MEDICINE

## 2023-06-12 PROCEDURE — 85025 COMPLETE CBC W/AUTO DIFF WBC: CPT | Performed by: INTERNAL MEDICINE

## 2023-06-12 PROCEDURE — 80197 ASSAY OF TACROLIMUS: CPT | Performed by: INTERNAL MEDICINE

## 2023-06-12 PROCEDURE — 85610 PROTHROMBIN TIME: CPT

## 2023-06-12 NOTE — TELEPHONE ENCOUNTER
Patient repeated back and voice a understanding of orders.  Patient voice a understanding that he needs to be drinking over 2 liters of H2O daily.  ----- Message from Darrin Verma MD sent at 6/12/2023 10:32 AM CDT -----  Encourage more water intake

## 2023-06-13 ENCOUNTER — PATIENT MESSAGE (OUTPATIENT)
Dept: TRANSPLANT | Facility: CLINIC | Age: 63
End: 2023-06-13
Payer: MEDICARE

## 2023-06-13 NOTE — PROGRESS NOTES
Kidney Post-Transplant Assessment    Referring Physician: Quinn Espino  Current Nephrologist: Quinn Espino    ORGAN: LEFT KIDNEY  Donor Type: living  PHS Increased Risk: no  Cold Ischemia:  mins  Induction Medications: thymo    Subjective:     CC:  Reassessment of renal allograft function and management of chronic immunosuppression.    HPI:  Mr. Parikh is a 62 y.o. year old Black or  male who received a living kidney transplant on 5/10/23. His most recent creatinine is 1.7. He takes mycophenolate mofetil, prednisone, and tacrolimus for maintenance immunosuppression. His post transplant course has been complicated by urine leak.    He has the nephrostomy catheter    He has a drain for a perinephric fluid collection    Seen by surgery today    Plan is to a nephrostogram on Friday (6/16), if no stenosis/stricture cap the nephrostomy cath    Keep the drain and check a for a creatinine on the drain on Monday 6/19. If ok drain can be d/c.    Last discharged summary 6/6/2023:    Post op course significant for OR takeback 5/15 to assess flow due to elevated velocities in artery and vein on US and DGF (last HD 5/11 for hyperkalemia). Kidney biopsy obtained in the OR without evidence of rejection. He now presents as a direct admit for TRINO. Cr noted to be elevated at 2.4 from 2.0. Pt reports watery stools (hx of Crohn's, BMs loose but not watery at BL). Pt also orthostatic in clinic but asymptomatic. Plan to hydrate with IVFs, send c diff and CMV PCR, Kidney US, UA w/cx, and hold metoprolol and myfortic. Pt discussed with Dr Mendez.         Procedure(s) (LRB):  Nephrostomy (N/A)      Hospital Course:    Patient admitted for TRINO and diarrhea. IVFs started. Myfortic held for GI upset and poss infection.   KTX US 6/1 with new or evolved, large minimally complex peritransplant collection   C diff negative, UA negative, Urine culture NGTD and CMV PCR not detected  IR consulted and drain placed in  fluid collection on 6/2.   Fluid studies NGTD, + for urinoma  Cont Levo x 14 days total (EOT 6/16)   Nephrostomy tube placed 6/3 for urine leak   Repeat in 1-2 weeks   RLE DVT US pending for RLE swelling. Will be completed and reviewed prior to dc.   Patient's diarrhea improving once atovaquone was held, restarted myfortic without issue. His kidney function improved with IVFs and Cr on day of discharge is 1.4.      Patient ready and stable for discharge at this time. On day of discharge, patient ambulating without assistance, tolerating diet, pain well controlled. CATHLEEN to remain in place, PCNU to remain uncapped. F/u labs 6/8. F/u clinic appointment 6/9.  Patient understands discharge instruction and importance of follow up.        \  Current Outpatient Medications on File Prior to Visit   Medication Sig Dispense Refill    acyclovir (ZOVIRAX) 400 MG tablet Take 1 tablet (400 mg total) by mouth 2 (two) times daily. Stop 8/9/23 60 tablet 2    atovaquone (MEPRON) 750 mg/5 mL Susp oral liquid Take 10 mLs (1,500 mg total) by mouth once daily UNTIL 11/7/2023 300 mL 12    fluticasone furoate (ARNUITY ELLIPTA) 100 mcg/actuation inhaler Inhale 100 mcg into the lungs once daily. Controller      hydrOXYzine HCL (ATARAX) 25 MG tablet Take 1 tablet (25 mg total) by mouth 3 (three) times daily as needed for Anxiety. 90 tablet 0    k phos di & mono-sod phos mono (PHOSPHA 250 NEUTRAL) 250 mg Tab Take 2 tablets by mouth 3 (three) times daily. 180 tablet 5    levoFLOXacin (LEVAQUIN) 750 MG tablet Take 1 tablet (750 mg total) by mouth once daily. for 9 days 9 tablet 0    metoprolol succinate (TOPROL-XL) 25 MG 24 hr tablet Take HALF tablet (12.5 mg total) by mouth once daily. 15 tablet 11    mycophenolate (MYFORTIC) 180 MG TbEC Take 2 tablets (360 mg total) by mouth 3 (three) times daily. Txp Date:5/10/2023 (Kidney) Disch Date:5/17/2023 ICD10:Z94.0 Txp Location:[facility] 180 tablet 11    oxyCODONE (ROXICODONE) 5 MG immediate release tablet  "Take 1 tablet (5 mg total) by mouth every 4 (four) hours as needed for Pain. 40 tablet 0    predniSONE (DELTASONE) 5 MG tablet Take 20 mg by mouth daily 5/14-5/20; 15 mg daily 5/21-5/27; 10 mg daily 5/28-6/3; then 5 mg daily thereafter 6/4/23 70 tablet 11    sodium bicarbonate 650 MG tablet Take 2 tablets (1,300 mg total) by mouth 3 (three) times daily. 180 tablet 11    tacrolimus (PROGRAF) 1 MG Cap Take 3 capsules (3 mg total) by mouth every morning AND 2 capsules (2 mg total) every evening. Txp Date:5/10/2023 (Kidney) Disch Date:5/17/2023 ICD10:Z94.0 Txp Location:[facility]. 150 capsule 11     No current facility-administered medications on file prior to visit.      Review of Systems    Skin: no skin rash  CNS; no headaches, blurred vision, seizure, or syncope  ENT: No JVD,  Adenopathies,  nasal congestion. No oral lesions  Cardiac: No chest pain, dyspnea, claudication, edema or palpitations  Respiratory: No SOB, cough, hemoptysis   Gastro-intestinal: No diarrhea, constipation, abdominal pain, nausea, vomit. No ascitis  Genitourinary: no hematuria, dysuria, frequency, frequency  Musculoskeletal: joint pain, arthritis or vasculitic changes  Psych: alert awake, oriented, No cranial nerves deficit.      Objective:       Physical Exam    /67 (BP Location: Right arm, Patient Position: Sitting, BP Method: Medium (Automatic))   Pulse 67   Temp 97.7 °F (36.5 °C) (Tympanic)   Resp 18   Ht 5' 9" (1.753 m)   Wt 79.6 kg (175 lb 7.8 oz)   SpO2 98%   BMI 25.91 kg/m²       Head: normocephalic  Neck: No JVD, cervical axillary, or femoral adenopathies  Heart: no murmurs, Normal s1 and s2, No gallops, no rubs, No murmurs  Lungs; CTA, good respiratory effort, no crackles  Abdomen: soft, non tender, no splenomegaly or hepatomegaly, no massess, no bruits. Nephrostomy and drain in place  Extremities: No edema, skin rash, joint pain  SNC: awake, alert oriented. Cranial nerves are intact, no focalized, sensitivity and strength " preserved      Labs:  Lab Results   Component Value Date    WBC 7.30 06/12/2023    HGB 9.2 (L) 06/12/2023    HCT 28.0 (L) 06/12/2023     06/12/2023    K 3.4 (L) 06/12/2023     (H) 06/12/2023    CO2 23 06/12/2023    BUN 17 06/12/2023    CREATININE 1.7 (H) 06/12/2023    EGFRNORACEVR 45.3 (A) 06/12/2023    CALCIUM 8.6 (L) 06/12/2023    PHOS 3.0 06/12/2023    MG 1.3 (L) 06/12/2023    ALBUMIN 2.8 (L) 06/12/2023    AST 45 (H) 06/08/2023    ALT 64 (H) 06/08/2023    UTPCR 0.67 (H) 05/22/2023    .7 (H) 05/10/2023    TACROLIMUS 6.3 06/12/2023       No results found for: EXTANC, EXTWBC, EXTSEGS, EXTPLATELETS, EXTHEMOGLOBI, EXTHEMATOCRI, EXTCREATININ, EXTSODIUM, EXTPOTASSIUM, EXTBUN, EXTCO2, EXTCALCIUM, EXTPHOSPHORU, EXTGLUCOSE, EXTALBUMIN, EXTAST, EXTALT, EXTBILITOTAL, EXTLIPASE, EXTAMYLASE    No results found for: EXTCYCLOSLVL, EXTSIROLIMUS, EXTTACROLVL, EXTPROTCRE, EXTPTHINTACT, EXTPROTEINUA, EXTWBCUA, EXTRBCUA    Labs were reviewed with the patient    Assessment:     1. Immunosuppressive management encounter following kidney transplant    2. Urine leak from transplanted ureter    3. Perinephric fluid collection of kidney transplant    4. At risk for opportunistic infections    5. S/P kidney transplant    6. Long-term use of immunosuppressant medication        Plan:        1. CKD stage: 3b with stable creatinine, encourage hydration  will continue follow up as per our center guidelines. patient to continue close follow up with the local General nephrologist. Education provided in appropriate fluid intake, potassium intake. Continue with oral hydration.    1A. Pancreatic Function: N/A for non-pancreas allograft recipients:     2. High risk immunosuppression medication for organ transplantation, requiring regular intensive follow up and monitoring : will increase Myfortic to 720 bid, maintain same dose of prograf for now, repeat labs next week . Prednisone per protocol.   Lab Results   Component Value Date     TACROLIMUS 6.3 06/12/2023    TACROLIMUS 5.5 06/08/2023    TACROLIMUS 9.0 06/06/2023     No results found for: CYCLOSPORINE  @   Will closely monitor for toxicities, education provided about adherence to medicines and need to communicate any side effects to the transplant nurse or physician.    3. Allograft Function:stable at baseline for the patient. Continue follow up as per our guidelines and with the local General nephrologist. Communication will be sent today.  Lab Results   Component Value Date    CREATININE 1.7 (H) 06/12/2023    CREATININE 1.6 (H) 06/08/2023    CREATININE 1.4 06/06/2023     No results found for: AMYLASE, LIPASE    4. Hypertension management: well controlled  Continue with home blood pressure monitoring, low salt and healthy life discussed with the patient..    5. Metabolic Bone Disease/Secondary Hyperparathyroidism:calcium and phosphorus level discussed with the patient, patient will continue follow up with the general nephrologist for management of metabolic bone disease. Will monitor PTH and Vit D per our transplant center guidelines.      Lab Results   Component Value Date    .7 (H) 05/10/2023    CALCIUM 8.6 (L) 06/12/2023    PHOS 3.0 06/12/2023    PHOS 2.4 (L) 06/08/2023    PHOS 2.3 (L) 06/06/2023    PHOS 2.3 (L) 06/06/2023       6. Electrolytes and acid base balance: reviewed with the patient, essentially within the normal range no need for acute changes today, will monitor as per our center guidelines.     Lab Results   Component Value Date     06/12/2023    K 3.4 (L) 06/12/2023     (H) 06/12/2023    CO2 23 06/12/2023    CO2 25 06/08/2023    CO2 23 06/06/2023       7. Anemia: will continue monitoring as per our center guidelines. No indication for acute intervention today.     Lab Results   Component Value Date    WBC 7.30 06/12/2023    HGB 9.2 (L) 06/12/2023    HCT 28.0 (L) 06/12/2023     (H) 06/12/2023     06/12/2023       8.Proteinuria: will  continue with pr/cr ratio as per our center guidelines.  Lab Results   Component Value Date    PROTEINURINE 63 (H) 05/22/2023    CREATRANDUR 94.0 05/22/2023    UTPCR 0.67 (H) 05/22/2023    UTPCR 1.35 (H) 05/14/2023    UTPCR 1.88 (H) 05/11/2023        9. BK virus infection screening: will continue with urine or blood PCR as per our guidelines to prevent BK virus viremia and allograft dysfunction  Lab Results   Component Value Date    BKVIRUSPCRQB <125 06/08/2023         10. Weight and metabolic management: education provided provided during the clinic visit.   There is no height or weight on file to calculate BMI.       11.Patient safety education regarding immunosuppression including prophylaxis posttransplant for CMV, PCP : Education provided about vaccination and prevention of infections.    12.  Cytopenias: no significant cytopenias will monitor as per our guidelines. Medicine list reviewed including potential causes of drug-induced cytopenias     Lab Results   Component Value Date    WBC 7.30 06/12/2023    HGB 9.2 (L) 06/12/2023    HCT 28.0 (L) 06/12/2023     (H) 06/12/2023     06/12/2023       13. Post-transplant Prophylaxis; CMV Infection, PJP and Candida mucosistis and other indicated for this particular patient. Atovaquone/acyclovir    I spoke with the patient for 30 minutes. More than half dedicated to counseling and education. All questions answered    Darrin Verma MD  Transplant Nephrology            Follow-up:   Clinic: return to transplant clinic weekly for the first month after transplant; every 2 weeks during months 2-3; then at 6-, 9-, 12-, 18-, 24-, and 36- months post-transplant to reassess for complications from immunosuppression toxicity and monitor for rejection.  Annually thereafter.    Labs: since patient remains at high risk for rejection and drug-related complications that warrant close monitoring, labs will be ordered as follows: continue twice weekly CBC, renal panel, and  drug level for first month; then same labs once weekly through 3rd month post-transplant.  Urine for UA and protein/creatinine ratio monthly.  Serum BK - PCR at 1-, 3-, 6-, 9-, 12-, 18-, 24-, 36-, 48-, and 60 months post-transplant.  Hepatic panel at 1-, 2-, 3-, 6-, 9-, 12-, 18-, 24-, and 36- months post-transplant.    Darrin Verma MD       Education:   Material provided to the patient.  Patient reminded to call with any health changes since these can be early signs of significant complications.  Also, I advised the patient to be sure any new medications or changes of old medications are discussed with either a pharmacist or physician knowledgeable with transplant to avoid rejection/drug toxicity related to significant drug interactions.    Patient advised that it is recommended that all transplanted patients, and their close contacts and household members receive Covid vaccination.

## 2023-06-14 ENCOUNTER — OFFICE VISIT (OUTPATIENT)
Dept: TRANSPLANT | Facility: CLINIC | Age: 63
End: 2023-06-14
Payer: MEDICARE

## 2023-06-14 VITALS
WEIGHT: 175.5 LBS | TEMPERATURE: 98 F | HEIGHT: 69 IN | RESPIRATION RATE: 18 BRPM | BODY MASS INDEX: 26 KG/M2 | HEART RATE: 67 BPM | BODY MASS INDEX: 26 KG/M2 | DIASTOLIC BLOOD PRESSURE: 67 MMHG | SYSTOLIC BLOOD PRESSURE: 127 MMHG | SYSTOLIC BLOOD PRESSURE: 127 MMHG | RESPIRATION RATE: 18 BRPM | DIASTOLIC BLOOD PRESSURE: 67 MMHG | HEIGHT: 69 IN | HEART RATE: 67 BPM | WEIGHT: 175.5 LBS | OXYGEN SATURATION: 98 % | TEMPERATURE: 98 F | OXYGEN SATURATION: 98 %

## 2023-06-14 DIAGNOSIS — H90.0 CONDUCTIVE HEARING LOSS, BILATERAL: ICD-10-CM

## 2023-06-14 DIAGNOSIS — Z91.89 AT RISK FOR OPPORTUNISTIC INFECTIONS: ICD-10-CM

## 2023-06-14 DIAGNOSIS — Z94.0 IMMUNOSUPPRESSIVE MANAGEMENT ENCOUNTER FOLLOWING KIDNEY TRANSPLANT: Primary | ICD-10-CM

## 2023-06-14 DIAGNOSIS — T86.898 URINE LEAK FROM TRANSPLANTED URETER: ICD-10-CM

## 2023-06-14 DIAGNOSIS — N28.89 PERINEPHRIC FLUID COLLECTION OF KIDNEY TRANSPLANT: ICD-10-CM

## 2023-06-14 DIAGNOSIS — Z79.899 IMMUNOSUPPRESSIVE MANAGEMENT ENCOUNTER FOLLOWING KIDNEY TRANSPLANT: Primary | ICD-10-CM

## 2023-06-14 DIAGNOSIS — Z79.60 LONG-TERM USE OF IMMUNOSUPPRESSANT MEDICATION: ICD-10-CM

## 2023-06-14 DIAGNOSIS — Z94.0 S/P KIDNEY TRANSPLANT: Primary | ICD-10-CM

## 2023-06-14 DIAGNOSIS — Z94.0 KIDNEY REPLACED BY TRANSPLANT: Primary | ICD-10-CM

## 2023-06-14 DIAGNOSIS — Z94.0 S/P KIDNEY TRANSPLANT: ICD-10-CM

## 2023-06-14 DIAGNOSIS — T86.19 PERINEPHRIC FLUID COLLECTION OF KIDNEY TRANSPLANT: ICD-10-CM

## 2023-06-14 PROCEDURE — 99215 OFFICE O/P EST HI 40 MIN: CPT | Mod: S$PBB,,, | Performed by: INTERNAL MEDICINE

## 2023-06-14 PROCEDURE — 99999 PR PBB SHADOW E&M-EST. PATIENT-LVL III: CPT | Mod: PBBFAC,,,

## 2023-06-14 PROCEDURE — 99214 PR OFFICE/OUTPT VISIT, EST, LEVL IV, 30-39 MIN: ICD-10-PCS | Mod: S$PBB,24,, | Performed by: TRANSPLANT SURGERY

## 2023-06-14 PROCEDURE — 99215 PR OFFICE/OUTPT VISIT, EST, LEVL V, 40-54 MIN: ICD-10-PCS | Mod: S$PBB,,, | Performed by: INTERNAL MEDICINE

## 2023-06-14 PROCEDURE — 99999 PR PBB SHADOW E&M-EST. PATIENT-LVL III: ICD-10-PCS | Mod: PBBFAC,,,

## 2023-06-14 PROCEDURE — 99999 PR PBB SHADOW E&M-EST. PATIENT-LVL III: CPT | Mod: PBBFAC,,, | Performed by: INTERNAL MEDICINE

## 2023-06-14 PROCEDURE — 99213 OFFICE O/P EST LOW 20 MIN: CPT | Mod: PBBFAC

## 2023-06-14 PROCEDURE — 99214 OFFICE O/P EST MOD 30 MIN: CPT | Mod: S$PBB,24,, | Performed by: TRANSPLANT SURGERY

## 2023-06-14 PROCEDURE — 99213 OFFICE O/P EST LOW 20 MIN: CPT | Mod: PBBFAC,27 | Performed by: INTERNAL MEDICINE

## 2023-06-14 PROCEDURE — 99999 PR PBB SHADOW E&M-EST. PATIENT-LVL III: ICD-10-PCS | Mod: PBBFAC,,, | Performed by: INTERNAL MEDICINE

## 2023-06-14 RX ORDER — MYCOPHENOLIC ACID 360 MG/1
720 TABLET, DELAYED RELEASE ORAL 2 TIMES DAILY
Qty: 120 TABLET | Refills: 11 | Status: SHIPPED | OUTPATIENT
Start: 2023-06-14 | End: 2023-09-28 | Stop reason: DRUGHIGH

## 2023-06-14 NOTE — LETTER
June 14, 2023        Quinn Espino  [address]  [address]  [facility] [address]  Phone: [phone]  Fax: [phone]             [facility]  [address]  [facility] [address]  Phone: [phone]   Patient: Abdoulaye Parikh   MR Number: [id number]   YOB: 1960   Date of Visit: 6/14/2023       Dear Dr. Quinn Espino    Thank you for referring Abdoulaye Parikh to me for evaluation. Attached you will find relevant portions of my assessment and plan of care.    If you have questions, please do not hesitate to call me. I look forward to following Abdoulaye Parikh along with you.    Sincerely,    Darrin Verma MD    Enclosure    If you would like to receive this communication electronically, please contact [email or web address] or [phone] to request "[employer]" Link access.    "[employer]" Link is a tool which provides read-only access to select patient information with whom you have a relationship. Its easy to use and provides real time access to review your patients record including encounter summaries, notes, results, and demographic information.    If you feel you have received this communication in error or would no longer like to receive these types of communications, please e-mail [email or web address]

## 2023-06-14 NOTE — PROGRESS NOTES
Transplant Surgery  Kidney Transplant Recipient Follow-up    Referring Physician: Quinn Espino  Current Nephrologist: Quinn Espino    Subjective:     Chief Complaint: Abdoulaye Parikh is a 62 y.o. year old Black or  male who is status post Kidney transplant performed on 5/10/2023.    ORGAN: LEFT KIDNEY   Disease Etiology: Other, Specify - Unknown  Donor Type: Living   Donor CMV Status:    Donor HBcAB:    Donor HCV Status:      History of Present Illness: He reports no concerns.  From a transplant perspective, he reports no incisional pain.  Abdoulaye is here for management of his immunosuppression medication.  Abdoulaye states that his immunosuppression is causing GI symptoms.  Hypertension is not present.    External provider notes reviewed: No    Review of Systems   Constitutional: Negative.    Gastrointestinal: Negative.      Objective:     Physical Exam  Constitutional:       Appearance: Normal appearance.   Pulmonary:      Effort: Pulmonary effort is normal.   Abdominal:      Palpations: Abdomen is soft.      Hernia: No hernia is present.       Skin:     General: Skin is warm and dry.   Neurological:      Mental Status: He is alert.   Psychiatric:         Mood and Affect: Mood normal.     Lab Results   Component Value Date    CREATININE 1.7 (H) 06/12/2023    BUN 17 06/12/2023     Lab Results   Component Value Date    WBC 7.30 06/12/2023    HGB 9.2 (L) 06/12/2023    HCT 28.0 (L) 06/12/2023    HCT 27 (L) 05/10/2023     06/12/2023     Lab Results   Component Value Date    TACROLIMUS 6.3 06/12/2023       Diagnostics:  The following labs have been reviewed: CBC  BMP  TACROLIMUS LEVEL  The following radiology images have been independently reviewed and interpreted: Renal US    Assessment and Plan:        S/P Kidney transplant.  Chronic immunosuppressive medications for rejection prophylaxis at target.  Plan: no adjustment needed.  Continue monitoring symptoms, labs and drug levels  for drug-related toxicity and side effects.  Urine leak: At time of PCNU on 6/3/2023 no leak identified.  PCNU has been left to gravity and currently all urine is draining externally with no urination.  Drain in urinoma is being emptied twice daily with falling output.  Today, the drain was interrogated and clogged with additional leak three-way connection.  When rectified, fluid was draining freely.    Leave drain and PCNU in place  Nephrostogram scheduled for Friday.  If no leak identified and urine freely entering bladder, then plan to cap PCNU.  Repeat labs and drain creatinine Monday.  If drain creatinine normal then drain can be removed next week with appointment scheduled for interval PCNU removal.      Additional testing to be completed according to the Kidney: Written Order Guideline for Kidney Transplant Follow-Up (KI-09)    Interpretation of tests and discussion of patient management involves all members of the multidisciplinary transplant team.  Patient advised that it is recommended that all transplant candidates, and their close contacts and household members receive Covid vaccination.  Follow-up: Patient reminded to call with any health changes, since these can be early signs of significant complications.  Also, I advised the patient to be sure any new medications or changes of old medications are discussed with either a pharmacist, or physician knowledgeable with transplant to avoid rejection/drug toxicity related to significant drug interactions.    Saran Guadalupe MD       [facility] Patient Status  Functional Status: 70% - Cares for self: unable to carry on normal activity or active work  Physical Capacity: No Limitations

## 2023-06-15 ENCOUNTER — PATIENT MESSAGE (OUTPATIENT)
Dept: ADMINISTRATIVE | Facility: OTHER | Age: 63
End: 2023-06-15
Payer: MEDICARE

## 2023-06-19 ENCOUNTER — TELEPHONE (OUTPATIENT)
Dept: INTERVENTIONAL RADIOLOGY/VASCULAR | Facility: HOSPITAL | Age: 63
End: 2023-06-19
Payer: MEDICARE

## 2023-06-19 ENCOUNTER — LAB VISIT (OUTPATIENT)
Dept: LAB | Facility: HOSPITAL | Age: 63
End: 2023-06-19
Attending: INTERNAL MEDICINE
Payer: MEDICARE

## 2023-06-19 DIAGNOSIS — Z94.0 KIDNEY REPLACED BY TRANSPLANT: ICD-10-CM

## 2023-06-19 LAB
ALBUMIN SERPL BCP-MCNC: 2.9 G/DL (ref 3.5–5.2)
ANION GAP SERPL CALC-SCNC: 10 MMOL/L (ref 8–16)
BASOPHILS # BLD AUTO: 0.06 K/UL (ref 0–0.2)
BASOPHILS NFR BLD: 1 % (ref 0–1.9)
BUN SERPL-MCNC: 9 MG/DL (ref 8–23)
CALCIUM SERPL-MCNC: 8.6 MG/DL (ref 8.7–10.5)
CHLORIDE SERPL-SCNC: 108 MMOL/L (ref 95–110)
CO2 SERPL-SCNC: 23 MMOL/L (ref 23–29)
CREAT SERPL-MCNC: 1.3 MG/DL (ref 0.5–1.4)
DIFFERENTIAL METHOD: ABNORMAL
EOSINOPHIL # BLD AUTO: 0.1 K/UL (ref 0–0.5)
EOSINOPHIL NFR BLD: 1.4 % (ref 0–8)
ERYTHROCYTE [DISTWIDTH] IN BLOOD BY AUTOMATED COUNT: 19.6 % (ref 11.5–14.5)
EST. GFR  (NO RACE VARIABLE): >60 ML/MIN/1.73 M^2
GLUCOSE SERPL-MCNC: 78 MG/DL (ref 70–110)
HCT VFR BLD AUTO: 31 % (ref 40–54)
HGB BLD-MCNC: 10.6 G/DL (ref 14–18)
IMM GRANULOCYTES # BLD AUTO: 0.08 K/UL (ref 0–0.04)
IMM GRANULOCYTES NFR BLD AUTO: 1.4 % (ref 0–0.5)
LYMPHOCYTES # BLD AUTO: 0.8 K/UL (ref 1–4.8)
LYMPHOCYTES NFR BLD: 13 % (ref 18–48)
MAGNESIUM SERPL-MCNC: 1.4 MG/DL (ref 1.6–2.6)
MCH RBC QN AUTO: 37.9 PG (ref 27–31)
MCHC RBC AUTO-ENTMCNC: 34.2 G/DL (ref 32–36)
MCV RBC AUTO: 111 FL (ref 82–98)
MONOCYTES # BLD AUTO: 0.6 K/UL (ref 0.3–1)
MONOCYTES NFR BLD: 9.7 % (ref 4–15)
NEUTROPHILS # BLD AUTO: 4.3 K/UL (ref 1.8–7.7)
NEUTROPHILS NFR BLD: 73.5 % (ref 38–73)
NRBC BLD-RTO: 0 /100 WBC
PHOSPHATE SERPL-MCNC: 2.3 MG/DL (ref 2.7–4.5)
PLATELET # BLD AUTO: 149 K/UL (ref 150–450)
PMV BLD AUTO: 10.9 FL (ref 9.2–12.9)
POTASSIUM SERPL-SCNC: 3.3 MMOL/L (ref 3.5–5.1)
RBC # BLD AUTO: 2.8 M/UL (ref 4.6–6.2)
SODIUM SERPL-SCNC: 141 MMOL/L (ref 136–145)
TACROLIMUS BLD-MCNC: 9.5 NG/ML (ref 5–15)
WBC # BLD AUTO: 5.86 K/UL (ref 3.9–12.7)

## 2023-06-19 PROCEDURE — 80069 RENAL FUNCTION PANEL: CPT | Performed by: INTERNAL MEDICINE

## 2023-06-19 PROCEDURE — 85025 COMPLETE CBC W/AUTO DIFF WBC: CPT | Performed by: INTERNAL MEDICINE

## 2023-06-19 PROCEDURE — 36415 COLL VENOUS BLD VENIPUNCTURE: CPT | Performed by: INTERNAL MEDICINE

## 2023-06-19 PROCEDURE — 83735 ASSAY OF MAGNESIUM: CPT | Performed by: INTERNAL MEDICINE

## 2023-06-19 PROCEDURE — 82570 ASSAY OF URINE CREATININE: CPT | Mod: 91 | Performed by: TRANSPLANT SURGERY

## 2023-06-19 PROCEDURE — 80197 ASSAY OF TACROLIMUS: CPT | Performed by: INTERNAL MEDICINE

## 2023-06-20 ENCOUNTER — HOSPITAL ENCOUNTER (OUTPATIENT)
Dept: INTERVENTIONAL RADIOLOGY/VASCULAR | Facility: HOSPITAL | Age: 63
Discharge: HOME | End: 2023-06-20
Attending: TRANSPLANT SURGERY
Payer: MEDICARE

## 2023-06-20 VITALS
HEIGHT: 69 IN | RESPIRATION RATE: 23 BRPM | OXYGEN SATURATION: 98 % | TEMPERATURE: 98 F | SYSTOLIC BLOOD PRESSURE: 121 MMHG | WEIGHT: 171 LBS | DIASTOLIC BLOOD PRESSURE: 63 MMHG | BODY MASS INDEX: 25.33 KG/M2 | HEART RATE: 58 BPM

## 2023-06-20 DIAGNOSIS — Z94.0 KIDNEY REPLACED BY TRANSPLANT: ICD-10-CM

## 2023-06-20 LAB
BODY FLUID SOURCE, CREATININE: NORMAL
CREAT FLD-MCNC: 1.3 MG/DL

## 2023-06-20 PROCEDURE — 63600175 PHARM REV CODE 636 W HCPCS: Performed by: STUDENT IN AN ORGANIZED HEALTH CARE EDUCATION/TRAINING PROGRAM

## 2023-06-20 PROCEDURE — 50387 IR NEPHROSTOGRAM: ICD-10-PCS | Mod: RT,,, | Performed by: RADIOLOGY

## 2023-06-20 PROCEDURE — 25000003 PHARM REV CODE 250: Performed by: STUDENT IN AN ORGANIZED HEALTH CARE EDUCATION/TRAINING PROGRAM

## 2023-06-20 PROCEDURE — A4550 SURGICAL TRAYS: HCPCS

## 2023-06-20 PROCEDURE — 99152 MOD SED SAME PHYS/QHP 5/>YRS: CPT | Performed by: RADIOLOGY

## 2023-06-20 PROCEDURE — 99152 MOD SED SAME PHYS/QHP 5/>YRS: CPT | Mod: ,,, | Performed by: RADIOLOGY

## 2023-06-20 PROCEDURE — 25500020 PHARM REV CODE 255: Performed by: TRANSPLANT SURGERY

## 2023-06-20 PROCEDURE — 99152 PR MOD CONSCIOUS SEDATION, SAME PHYS, 5+ YRS, FIRST 15 MIN: ICD-10-PCS | Mod: ,,, | Performed by: RADIOLOGY

## 2023-06-20 PROCEDURE — 50387 CHANGE NEPHROURETERAL CATH: CPT | Performed by: RADIOLOGY

## 2023-06-20 RX ORDER — MIDAZOLAM HYDROCHLORIDE 1 MG/ML
INJECTION INTRAMUSCULAR; INTRAVENOUS
Status: COMPLETED | OUTPATIENT
Start: 2023-06-20 | End: 2023-06-20

## 2023-06-20 RX ORDER — OXYCODONE HYDROCHLORIDE 5 MG/1
TABLET ORAL
Status: COMPLETED
Start: 2023-06-20 | End: 2023-06-20

## 2023-06-20 RX ORDER — SODIUM CHLORIDE 9 MG/ML
INJECTION, SOLUTION INTRAVENOUS CONTINUOUS
Status: DISCONTINUED | OUTPATIENT
Start: 2023-06-20 | End: 2023-06-21 | Stop reason: HOSPADM

## 2023-06-20 RX ORDER — LIDOCAINE HYDROCHLORIDE 10 MG/ML
1 INJECTION, SOLUTION EPIDURAL; INFILTRATION; INTRACAUDAL; PERINEURAL ONCE
Status: DISCONTINUED | OUTPATIENT
Start: 2023-06-20 | End: 2023-06-21 | Stop reason: HOSPADM

## 2023-06-20 RX ORDER — OXYCODONE HYDROCHLORIDE 5 MG/1
5 TABLET ORAL ONCE
Status: COMPLETED | OUTPATIENT
Start: 2023-06-20 | End: 2023-06-20

## 2023-06-20 RX ADMIN — MIDAZOLAM HYDROCHLORIDE 1 MG: 1 INJECTION INTRAMUSCULAR; INTRAVENOUS at 02:06

## 2023-06-20 RX ADMIN — OXYCODONE HYDROCHLORIDE 5 MG: 5 TABLET ORAL at 03:06

## 2023-06-20 RX ADMIN — CEFTRIAXONE 1 G: 1 INJECTION, POWDER, FOR SOLUTION INTRAMUSCULAR; INTRAVENOUS at 02:06

## 2023-06-20 RX ADMIN — IOHEXOL 30 ML: 300 INJECTION, SOLUTION INTRAVENOUS at 02:06

## 2023-06-20 NOTE — DISCHARGE SUMMARY
Radiology Discharge Summary      Hospital Course: No complications    Admit Date: 6/20/2023  Discharge Date: 06/20/2023     Instructions Given to Patient: Yes  Diet: Resume prior diet  Activity: activity as tolerated and no driving for today    Description of Condition on Discharge: Stable  Vital Signs (Most Recent): Temp: 97.7 °F (36.5 °C) (06/20/23 1237)  Pulse: (!) 58 (06/20/23 1425)  Resp: 18 (06/20/23 1425)  BP: 124/65 (06/20/23 1425)  SpO2: 95 % (06/20/23 1425)    Discharge Disposition: Home    Discharge Diagnosis: Status post nephrostogram and nephroureteral catheter exchange without immediate complication.    Follow Up: With transplant team.     Ymui White MD  Fellow, Dept.Of Interventional Radiology  [facility]

## 2023-06-20 NOTE — NURSING
Pt came in and admitted in room 5. Pt AAOx4. Pt independent but uses walker for stability. Pt Free from pain and s/s of distress. VS taken and WNL. Pt's preop checklist almost complete.

## 2023-06-20 NOTE — NURSING
Patient received s/p nephrostomy tube exchange in MPU bay 7. See VS flowsheet. Procedure site dressing to RLQ is clean and dry, no evidence of bleeding. Neph tube is clamped. Patient to recover for 2 hours and discharge home with wife. Fall precautions reviewed. Bed in lowest, locked position. Call light within reach. Patient received verbal instructions from physician on drain management. Verbalizes understanding.

## 2023-06-20 NOTE — H&P
Radiology History & Physical      SUBJECTIVE:     Chief Complaint: urine leak, existing PCNU    History of Present Illness:  Abdoulaye Parikh is a 62 y.o. male who presents for nephrostogram - patient had kidney TXP 5/10/23 - subsequent urine leak treated with PCNU 6/3/23 (no leak identified at that time) - here for nephrostogram and possible capping.    Past Medical History:   Diagnosis Date    Acute blood loss anemia 5/11/2023    Anemia     Arthritis     Cirrhosis     CKD (chronic kidney disease) stage 4, GFR 15-29 ml/min, slow graft function with sustaine creatinien improvement 5/26/2023    Crohn's disease     Disorder of kidney and ureter     Stage 5    Encounter for blood transfusion     Gout     Hearing loss     Hepatitis     Had Hep C Cleared    Hypertension     Immunosuppressive management encounter following kidney transplant 5/26/2023    Neurosarcoidosis 2018    Obesity     Osteoarthritis     Sarcoid neuropathy     Sarcoidosis, lung     Steatosis      Past Surgical History:   Procedure Laterality Date    AV FISTULA PLACEMENT Left     BRAIN SURGERY      COLON SURGERY      Exploratory lap x 4 for Chron's disease. Likely right colectomy    CSF SHUNT      DIAGNOSTIC ULTRASOUND N/A 5/15/2023    Procedure: ULTRASOUND, DIAGNOSTIC;  Surgeon: Chivo Brown MD;  Location: [address];  Service: Transplant;  Laterality: N/A;    JOINT REPLACEMENT Right     Hip    KIDNEY TRANSPLANT N/A 5/10/2023    Procedure: TRANSPLANT, KIDNEY - RQ 7PM START;  Surgeon: Chivo Brown MD;  Location: [address];  Service: Transplant;  Laterality: N/A;    NEPHROSTOMY N/A 6/3/2023    Procedure: Nephrostomy;  Surgeon: Silvia Surgeon;  Location: [facility];  Service: Anesthesiology;  Laterality: N/A;    RENAL BIOPSY  5/15/2023    Procedure: BIOPSY, KIDNEY;  Surgeon: Chivo Brown MD;  Location: [address];  Service: Transplant;;    RENAL EXPLORATION  5/15/2023    Procedure: EXPLORATION, KIDNEY;  Surgeon:  Chivo Brown MD;  Location: [address];  Service: Transplant;;    TOTAL HIP ARTHROPLASTY Right        Home Meds:   Prior to Admission medications    Medication Sig Start Date End Date Taking? Authorizing Provider   acyclovir (ZOVIRAX) 400 MG tablet Take 1 tablet (400 mg total) by mouth 2 (two) times daily. Stop 8/9/23 5/11/23 8/15/23  Zeke Devine Jr., MD   atovaquone (MEPRON) 750 mg/5 mL Susp oral liquid Take 10 mLs (1,500 mg total) by mouth once daily UNTIL 11/7/2023 6/8/23   Darrin Verma MD   fluticasone furoate (ARNUITY ELLIPTA) 100 mcg/actuation inhaler Inhale 100 mcg into the lungs once daily. Controller    Historical Provider   hydrOXYzine HCL (ATARAX) 25 MG tablet Take 1 tablet (25 mg total) by mouth 3 (three) times daily as needed for Anxiety. 5/17/23   Rodolfo Samuels MD   k phos di & mono-sod phos mono (PHOSPHA 250 NEUTRAL) 250 mg Tab Take 2 tablets by mouth 3 (three) times daily. 6/6/23 6/5/24  Beatrice Mendez,    metoprolol succinate (TOPROL-XL) 25 MG 24 hr tablet Take HALF tablet (12.5 mg total) by mouth once daily. 5/18/23 5/17/24  Rodolfo Samuels MD   mycophenolate (MYFORTIC) 360 MG TbEC Take 2 tablets (720 mg total) by mouth 2 (two) times daily. Txp Date:5/10/2023 (Kidney) Disch Date:5/17/2023 ICD10:Z94.0 Txp Location:[facility] 6/14/23 6/13/24  Darrin Verma MD   oxyCODONE (ROXICODONE) 5 MG immediate release tablet Take 1 tablet (5 mg total) by mouth every 4 (four) hours as needed for Pain. 5/12/23   Kera Hernandez PA-C   predniSONE (DELTASONE) 5 MG tablet Take 20 mg by mouth daily 5/14-5/20; 15 mg daily 5/21-5/27; 10 mg daily 5/28-6/3; then 5 mg daily thereafter 6/4/23  Patient taking differently: Take 5 mg by mouth once daily. 5/11/23   Zeke Devine Jr., MD   sodium bicarbonate 650 MG tablet Take 2 tablets (1,300 mg total) by mouth 3 (three) times daily. 6/6/23 6/5/24  Beatrice Mendez DO   tacrolimus (PROGRAF) 1 MG Cap Take 3 capsules (3 mg total) by mouth every  morning AND 2 capsules (2 mg total) every evening. Txp Date:5/10/2023 (Kidney) Disch Date:5/17/2023 ICD10:Z94.0 Txp Location:[facility]. 6/8/23 6/7/24  Darrin Verma MD     Anticoagulants/Antiplatelets: no anticoagulation    Allergies:   Review of patient's allergies indicates:   Allergen Reactions    Bactrim [sulfamethoxazole-trimethoprim] Shortness Of Breath and Itching    Penicillins Shortness Of Breath and Itching     Sedation History:  no adverse reactions    Review of Systems:   Hematological: no known coagulopathies  Respiratory: no shortness of breath  Cardiovascular: no chest pain  Gastrointestinal: no abdominal pain  Genito-Urinary: no dysuria  Musculoskeletal: negative  Neurological: no TIA or stroke symptoms         OBJECTIVE:     Vital Signs (Most Recent)       Physical Exam:  ASA: 2  Mallampati: 2    General: no acute distress  Mental Status: alert and oriented to person, place and time  HEENT: normocephalic, atraumatic  Chest: unlabored breathing  Heart: regular heart rate  Abdomen: nondistended  Extremity: moves all extremities    Laboratory  Lab Results   Component Value Date    INR 1.1 06/12/2023       Lab Results   Component Value Date    WBC 5.86 06/19/2023    HGB 10.6 (L) 06/19/2023    HCT 31.0 (L) 06/19/2023     (H) 06/19/2023     (L) 06/19/2023      Lab Results   Component Value Date    GLU 78 06/19/2023     06/19/2023    K 3.3 (L) 06/19/2023     06/19/2023    CO2 23 06/19/2023    BUN 9 06/19/2023    CREATININE 1.3 06/19/2023    CALCIUM 8.6 (L) 06/19/2023    MG 1.4 (L) 06/19/2023    ALT 64 (H) 06/08/2023    AST 45 (H) 06/08/2023    ALBUMIN 2.9 (L) 06/19/2023    BILITOT 0.6 06/08/2023    BILIDIR 0.3 06/08/2023       ASSESSMENT/PLAN:     Sedation Plan: up to moderate    Patient will undergo PCNU nephrostogram/possible capping.    Eliazar Mathew MD  PGY-4  RADIOLOGY

## 2023-06-20 NOTE — DISCHARGE INSTRUCTIONS
Please call with any questions or concerns.      Monday thru Friday 8:00 am - 5:00 pm    Interventional Radiology   [phone]    After Hours    Ask for the Radiology Resident on call  [phone]

## 2023-06-20 NOTE — NURSING
Procedure recovery complete. VSS. Patient denies pain. Procedure site dressing is clean, dry, and intact. Patient verbalizes understanding of discharge instructions including when to call the doctor. PIV removed. Patient to be discharged via wheelchair accompanied by wife.

## 2023-06-20 NOTE — PROCEDURES
IR POST PROCEDURE NOTE    Date of Procedure: 6/20/2023    Procedure: Nephrostogram, nephroureteral catheter exchange    Pre-Procedure Diagnosis: Urine leak    Post-Procedure Diagnosis: Same    Procedure Personnel: Wander Haywood MD and Yumi White MD    Written Informed Consent Obtained: Yes    Specimen Removed: Prior nephroureteral catheter    Estimated Blood Loss: Minimal    Findings: Nephrostogram via the existing catheter was initially performed, demonstrating appropriate positioning and antegrade flow. Then, sheath nephrostogram was performed. No extravasation was seen to suggest urine leak. A new 10 Fr nephroureteral catheter was advanced into position and secured. The catheter was left capped.    Complications: None immediate.      Yumi White MD  Fellow, Dept. Of Interventional Radiology  [facility]

## 2023-06-21 ENCOUNTER — TELEPHONE (OUTPATIENT)
Dept: TRANSPLANT | Facility: CLINIC | Age: 63
End: 2023-06-21
Payer: MEDICARE

## 2023-06-21 ENCOUNTER — OFFICE VISIT (OUTPATIENT)
Dept: TRANSPLANT | Facility: CLINIC | Age: 63
End: 2023-06-21
Payer: MEDICARE

## 2023-06-21 ENCOUNTER — TELEPHONE (OUTPATIENT)
Dept: TRANSPLANT | Facility: CLINIC | Age: 63
End: 2023-06-21

## 2023-06-21 VITALS
SYSTOLIC BLOOD PRESSURE: 135 MMHG | WEIGHT: 175.25 LBS | DIASTOLIC BLOOD PRESSURE: 65 MMHG | TEMPERATURE: 97 F | HEIGHT: 69 IN | BODY MASS INDEX: 25.96 KG/M2 | HEART RATE: 66 BPM | RESPIRATION RATE: 16 BRPM | OXYGEN SATURATION: 95 %

## 2023-06-21 DIAGNOSIS — Z94.0 KIDNEY REPLACED BY TRANSPLANT: Primary | ICD-10-CM

## 2023-06-21 DIAGNOSIS — N28.89 PERINEPHRIC FLUID COLLECTION OF KIDNEY TRANSPLANT: Primary | ICD-10-CM

## 2023-06-21 DIAGNOSIS — Z94.0 S/P KIDNEY TRANSPLANT: ICD-10-CM

## 2023-06-21 DIAGNOSIS — T86.19 PERINEPHRIC FLUID COLLECTION OF KIDNEY TRANSPLANT: Primary | ICD-10-CM

## 2023-06-21 PROCEDURE — 99213 OFFICE O/P EST LOW 20 MIN: CPT | Mod: PBBFAC

## 2023-06-21 PROCEDURE — 99999 PR PBB SHADOW E&M-EST. PATIENT-LVL III: CPT | Mod: PBBFAC,,,

## 2023-06-21 PROCEDURE — 99024 PR POST-OP FOLLOW-UP VISIT: ICD-10-PCS | Mod: POP,,, | Performed by: TRANSPLANT SURGERY

## 2023-06-21 PROCEDURE — 99999 PR PBB SHADOW E&M-EST. PATIENT-LVL III: ICD-10-PCS | Mod: PBBFAC,,,

## 2023-06-21 PROCEDURE — 99024 POSTOP FOLLOW-UP VISIT: CPT | Mod: POP,,, | Performed by: TRANSPLANT SURGERY

## 2023-06-21 NOTE — PROGRESS NOTES
62 M s/p kidney transplant complicated by urine leak. Nephrostomy tube and stent placed a few weeks ago. Nephrostogram yesterday demonstrated no evidence of leak. Drain with scant output and Cr 1.3.    Drain removed in clinic. Cap nephrostomy tube and repeat nephrostogram in 2 weeks. If no evidence of leak or stricture, can remove nephrostomy tube at that time.    Bar Hinton MD

## 2023-06-21 NOTE — TELEPHONE ENCOUNTER
Results reviewed with patient , next labs on 6/26/2023.  ----- Message from Sathya Hicks Jr., MD sent at 6/21/2023 10:45 AM CDT -----  Repeat tac in 1 week. Looks like troughs have been steady increasing since going up on tac dose. Other labs are acceptable and require no other changes.

## 2023-06-21 NOTE — TELEPHONE ENCOUNTER
----- Message from Sathya Hicks Jr., MD sent at 6/21/2023 10:45 AM CDT -----  Repeat tac in 1 week. Looks like troughs have been steady increasing since going up on tac dose. Other labs are acceptable and require no other changes.

## 2023-06-22 ENCOUNTER — TELEPHONE (OUTPATIENT)
Dept: INTERVENTIONAL RADIOLOGY/VASCULAR | Facility: CLINIC | Age: 63
End: 2023-06-22
Payer: MEDICARE

## 2023-06-22 ENCOUNTER — PATIENT MESSAGE (OUTPATIENT)
Dept: ADMINISTRATIVE | Facility: OTHER | Age: 63
End: 2023-06-22
Payer: MEDICARE

## 2023-06-22 NOTE — TELEPHONE ENCOUNTER
Spoke to pt on phone, Pt is scheduled on 7/6/2023 for IR procedure at  location.  Preop instructions given (NPO after midnight, MUST have a ride home, Nurse will call 1-2  days before to go over instructions and medications), pt verbally understood. Pt aware and confirmed, Thanks

## 2023-06-23 ENCOUNTER — CLINICAL SUPPORT (OUTPATIENT)
Dept: OTOLARYNGOLOGY | Facility: CLINIC | Age: 63
End: 2023-06-23
Payer: MEDICARE

## 2023-06-23 DIAGNOSIS — H91.8X3 ASYMMETRICAL HEARING LOSS: Primary | ICD-10-CM

## 2023-06-23 DIAGNOSIS — H90.0 CONDUCTIVE HEARING LOSS, BILATERAL: ICD-10-CM

## 2023-06-23 DIAGNOSIS — Z94.0 KIDNEY REPLACED BY TRANSPLANT: ICD-10-CM

## 2023-06-23 PROCEDURE — 92557 COMPREHENSIVE HEARING TEST: CPT | Mod: S$GLB,,,

## 2023-06-23 PROCEDURE — 92567 TYMPANOMETRY: CPT | Mod: S$GLB,,,

## 2023-06-23 PROCEDURE — 92557 PR COMPREHENSIVE HEARING TEST: ICD-10-PCS | Mod: S$GLB,,,

## 2023-06-23 PROCEDURE — 92567 PR TYMPA2METRY: ICD-10-PCS | Mod: S$GLB,,,

## 2023-06-23 NOTE — Clinical Note
Good morning Dr. Verma,   Here are the results from Mr. Parikh' hearing evaluation. Please let me know if you need any further information or have any questions. Thanks!  Lucas Brian

## 2023-06-23 NOTE — PROGRESS NOTES
Abdoulaye Parikh, a 62 y.o. male, was seen today in the clinic for an audiologic evaluation.  The patient reported he has experienced hearing difficulty in his right ear for years.  He stated he recently began to notice a decrease in hearing in his left ear.  Mr. Parikh reported that since starting medication after a recent kidney transplant he has noticed that voices sound like a static radio but it eventually clears up.  He also reported he has experienced tinnitus in the past.  Mr. Parikh reported he has a history of hydrocephalus and had a shunt placed on the right side of his head.  He wears binaural hearing aids that he purchased online.  The patient denied aural fullness, otalgia, and dizziness.     Tympanometry revealed Type A in the right ear and Type A in the left ear.  Audiogram results revealed a profound rising to severe sensorineural hearing loss (SNHL) in the right ear and a moderate sloping to moderately-severe SNHL in the left ear.  Limits of the audiometer were met when masking for the left ear bone conduction response at 1000 Hz.  Speech reception thresholds were noted at 90 dB in the right ear and 55 dB in the left ear.  Speech discrimination scores were 16% in the right ear and 96% in the left ear.  There is an asymmetry between ears.  An ipsilateral acoustic reflex screening was performed at 500 Hz, 1000 Hz, 2000 Hz, and 4000 Hz in both ears.  There was no response at all frequencies in both ears.     Recommendations:  Otologic evaluation  Hearing protection when in noise  Hearing aid consultation after medical clearance  Annual audiogram or sooner if change in hearing is perceived

## 2023-06-24 ENCOUNTER — PATIENT MESSAGE (OUTPATIENT)
Dept: TRANSPLANT | Facility: CLINIC | Age: 63
End: 2023-06-24
Payer: MEDICARE

## 2023-06-26 ENCOUNTER — LAB VISIT (OUTPATIENT)
Dept: LAB | Facility: HOSPITAL | Age: 63
End: 2023-06-26
Attending: INTERNAL MEDICINE
Payer: MEDICARE

## 2023-06-26 ENCOUNTER — PATIENT MESSAGE (OUTPATIENT)
Dept: TRANSPLANT | Facility: CLINIC | Age: 63
End: 2023-06-26
Payer: MEDICARE

## 2023-06-26 DIAGNOSIS — R19.7 DIARRHEA OF PRESUMED INFECTIOUS ORIGIN: ICD-10-CM

## 2023-06-26 DIAGNOSIS — E83.39 HYPOPHOSPHATEMIA: ICD-10-CM

## 2023-06-26 DIAGNOSIS — Z94.0 KIDNEY REPLACED BY TRANSPLANT: Primary | ICD-10-CM

## 2023-06-26 DIAGNOSIS — Z94.0 KIDNEY REPLACED BY TRANSPLANT: ICD-10-CM

## 2023-06-26 LAB
ALBUMIN SERPL BCP-MCNC: 3 G/DL (ref 3.5–5.2)
ANION GAP SERPL CALC-SCNC: 11 MMOL/L (ref 8–16)
BASOPHILS # BLD AUTO: 0.04 K/UL (ref 0–0.2)
BASOPHILS NFR BLD: 0.7 % (ref 0–1.9)
BUN SERPL-MCNC: 11 MG/DL (ref 8–23)
CALCIUM SERPL-MCNC: 8.6 MG/DL (ref 8.7–10.5)
CHLORIDE SERPL-SCNC: 106 MMOL/L (ref 95–110)
CO2 SERPL-SCNC: 25 MMOL/L (ref 23–29)
CREAT SERPL-MCNC: 1.2 MG/DL (ref 0.5–1.4)
DIFFERENTIAL METHOD: ABNORMAL
EOSINOPHIL # BLD AUTO: 0.1 K/UL (ref 0–0.5)
EOSINOPHIL NFR BLD: 2 % (ref 0–8)
ERYTHROCYTE [DISTWIDTH] IN BLOOD BY AUTOMATED COUNT: 18.8 % (ref 11.5–14.5)
EST. GFR  (NO RACE VARIABLE): >60 ML/MIN/1.73 M^2
GLUCOSE SERPL-MCNC: 80 MG/DL (ref 70–110)
HCT VFR BLD AUTO: 31.9 % (ref 40–54)
HGB BLD-MCNC: 10.9 G/DL (ref 14–18)
IMM GRANULOCYTES # BLD AUTO: 0.05 K/UL (ref 0–0.04)
IMM GRANULOCYTES NFR BLD AUTO: 0.9 % (ref 0–0.5)
LYMPHOCYTES # BLD AUTO: 1.1 K/UL (ref 1–4.8)
LYMPHOCYTES NFR BLD: 19 % (ref 18–48)
MAGNESIUM SERPL-MCNC: 1.3 MG/DL (ref 1.6–2.6)
MCH RBC QN AUTO: 38.2 PG (ref 27–31)
MCHC RBC AUTO-ENTMCNC: 34.2 G/DL (ref 32–36)
MCV RBC AUTO: 112 FL (ref 82–98)
MONOCYTES # BLD AUTO: 0.6 K/UL (ref 0.3–1)
MONOCYTES NFR BLD: 11.1 % (ref 4–15)
NEUTROPHILS # BLD AUTO: 3.7 K/UL (ref 1.8–7.7)
NEUTROPHILS NFR BLD: 66.3 % (ref 38–73)
NRBC BLD-RTO: 0 /100 WBC
PHOSPHATE SERPL-MCNC: 2.6 MG/DL (ref 2.7–4.5)
PLATELET # BLD AUTO: 168 K/UL (ref 150–450)
PMV BLD AUTO: 10.7 FL (ref 9.2–12.9)
POTASSIUM SERPL-SCNC: 3.5 MMOL/L (ref 3.5–5.1)
RBC # BLD AUTO: 2.85 M/UL (ref 4.6–6.2)
SODIUM SERPL-SCNC: 142 MMOL/L (ref 136–145)
TACROLIMUS BLD-MCNC: 8.9 NG/ML (ref 5–15)
WBC # BLD AUTO: 5.59 K/UL (ref 3.9–12.7)

## 2023-06-26 PROCEDURE — 85025 COMPLETE CBC W/AUTO DIFF WBC: CPT | Performed by: INTERNAL MEDICINE

## 2023-06-26 PROCEDURE — 80197 ASSAY OF TACROLIMUS: CPT | Performed by: INTERNAL MEDICINE

## 2023-06-26 PROCEDURE — 83735 ASSAY OF MAGNESIUM: CPT | Performed by: INTERNAL MEDICINE

## 2023-06-26 PROCEDURE — 36415 COLL VENOUS BLD VENIPUNCTURE: CPT | Performed by: INTERNAL MEDICINE

## 2023-06-26 PROCEDURE — 80069 RENAL FUNCTION PANEL: CPT | Performed by: INTERNAL MEDICINE

## 2023-06-26 NOTE — TELEPHONE ENCOUNTER
As previously agreed I left Voice message with Orders and to call coordinator back with any questions.  ----- Message from Elsy Ramachandran MD sent at 6/26/2023  4:12 PM CDT -----  UA looks fine.  Pt reporting loose, diarrhea stools - Lower KPN to 250 mg BID  if diarrhea continues 3-4 days after stopping KPN,  collect stool for c diff, O&P, culture

## 2023-06-26 NOTE — PROGRESS NOTES
Kidney Post-Transplant Assessment    Referring Physician: Quinn Espino  Current Nephrologist: Quinn Espino    ORGAN: LEFT KIDNEY  Donor Type: living  PHS Increased Risk: no  Cold Ischemia:  mins  Induction Medications: thymo    Subjective:     CC:  Reassessment of renal allograft function and management of chronic immunosuppression.    HPI:  Mr. Parikh is a 62 y.o. year old Black or  male who received a living kidney transplant on 5/10/23. His most recent creatinine is 1.7. He takes mycophenolate mofetil, prednisone, and tacrolimus for maintenance immunosuppression. His post transplant course has been complicated by urine leak.  Post op course significant for OR takeback 5/15 to assess flow due to elevated velocities in artery and vein on US and DGF (last HD 5/11 for hyperkalemia). Kidney biopsy obtained in the OR without evidence of rejection. He now presents as a direct admit for TRINO. Cr noted to be elevated at 2.4 from 2.0. Pt reports watery stools (hx of Crohn's, BMs loose but not watery at BL). Pt also orthostatic in clinic but asymptomatic. hydrate with IVFs, send c diff and CMV PCR, Kidney US, UA w/cx, and hold metoprolol and myfortic.     On 6/21/ seen surgeon in clinic. Nephrostogram 6/20/23 demonstrated no evidence of leak. Drain with scant output and stable Cr 1.3. Drain removed in clinic. Cap nephrostomy tube and repeat nephrostogram in 2 weeks. If no evidence of leak or stricture, can remove nephrostomy tube at that time.    Thus far stools studies neg. Reports diarrhea has improved but not completely gone. He is drinking 2L of water. He Neph tube in place clamped. Scheduled for IR on 7/6/23. Reports great UOP. 1+ edema to bilateral lower extremities, encourage elevation, compression hose.         Current Outpatient Medications on File Prior to Visit   Medication Sig Dispense Refill    acyclovir (ZOVIRAX) 400 MG tablet Take 1 tablet (400 mg total) by mouth 2 (two) times  daily. Stop 8/9/23 60 tablet 2    atovaquone (MEPRON) 750 mg/5 mL Susp oral liquid Take 10 mLs (1,500 mg total) by mouth once daily UNTIL 11/7/2023 300 mL 12    fluticasone propionate (FLOVENT HFA) 44 mcg/actuation inhaler Inhale 1 puff into the lungs 2 (two) times daily.      hydrOXYzine HCL (ATARAX) 25 MG tablet Take 1 tablet (25 mg total) by mouth 3 (three) times daily as needed for Anxiety. 90 tablet 0    k phos di & mono-sod phos mono (PHOSPHA 250 NEUTRAL) 250 mg Tab Take 1 tablet by mouth 2 (two) times a day. 60 tablet 11    metoprolol succinate (TOPROL-XL) 25 MG 24 hr tablet Take HALF tablet (12.5 mg total) by mouth once daily. 15 tablet 11    mycophenolate (MYFORTIC) 360 MG TbEC Take 2 tablets (720 mg total) by mouth 2 (two) times daily. Txp Date:5/10/2023 (Kidney) Disch Date:5/17/2023 ICD10:Z94.0 Txp Location:[facility] 120 tablet 11    predniSONE (DELTASONE) 5 MG tablet Take 20 mg by mouth daily 5/14-5/20; 15 mg daily 5/21-5/27; 10 mg daily 5/28-6/3; then 5 mg daily thereafter 6/4/23 (Patient taking differently: Take 5 mg by mouth once daily.) 70 tablet 11    sodium bicarbonate 650 MG tablet Take 2 tablets (1,300 mg total) by mouth 3 (three) times daily. 180 tablet 11    tacrolimus (PROGRAF) 1 MG Cap Take 3 capsules (3 mg total) by mouth every morning AND 2 capsules (2 mg total) every evening. Txp Date:5/10/2023 (Kidney) Disch Date:5/17/2023 ICD10:Z94.0 Txp Location:[facility]. 150 capsule 11    [DISCONTINUED] fluticasone furoate (ARNUITY ELLIPTA) 100 mcg/actuation inhaler Inhale 100 mcg into the lungs once daily. Controller      [DISCONTINUED] levoFLOXacin (LEVAQUIN) 750 MG tablet Take 1 tablet (750 mg total) by mouth once daily. for 9 days 9 tablet 0    [DISCONTINUED] oxyCODONE (ROXICODONE) 5 MG immediate release tablet Take 1 tablet (5 mg total) by mouth every 4 (four) hours as needed for Pain. (Patient not taking: Reported on 6/21/2023) 40 tablet 0     No current facility-administered medications on file prior to  "visit.      Review of Systems    Skin: no skin rash  CNS; no headaches, blurred vision, seizure, or syncope  ENT: No JVD,  Adenopathies,  nasal congestion. No oral lesions  Cardiac: No chest pain, dyspnea, claudication, edema or palpitations  Respiratory: No SOB, cough, hemoptysis   Gastro-intestinal: No diarrhea, constipation, abdominal pain, nausea, vomit. No ascitis  Genitourinary: no hematuria, dysuria, frequency, frequency  Musculoskeletal: joint pain, arthritis or vasculitic changes  Psych: alert awake, oriented, No cranial nerves deficit.      Objective:   BP (!) 149/79   Pulse (!) 56   Temp 97.3 °F (36.3 °C) (Temporal)   Resp 20   Ht 5' 9" (1.753 m)   Wt 78.7 kg (173 lb 8 oz)   SpO2 98%   BMI 25.62 kg/m²       Physical Exam  Head: normocephalic  Neck: No JVD, cervical axillary, or femoral adenopathies  Heart: no murmurs, Normal s1 and s2, No gallops, no rubs, No murmurs  Lungs; CTA, good respiratory effort, no crackles  Abdomen: soft, non tender, no splenomegaly or hepatomegaly, no massess, no bruits. Nephrostomy and drain in place  Extremities: No edema, skin rash, joint pain  SNC: awake, alert oriented. Cranial nerves are intact, no focalized, sensitivity and strength preserved      Labs:  Lab Results   Component Value Date    WBC 5.59 06/26/2023    HGB 10.9 (L) 06/26/2023    HCT 31.9 (L) 06/26/2023     06/26/2023    K 3.5 06/26/2023     06/26/2023    CO2 25 06/26/2023    BUN 11 06/26/2023    CREATININE 1.2 06/26/2023    EGFRNORACEVR >60.0 06/26/2023    GLUCOSE 82 04/08/2023    CALCIUM 8.6 (L) 06/26/2023    PHOS 2.6 (L) 06/26/2023    MG 1.3 (L) 06/26/2023    ALBUMIN 3.0 (L) 06/26/2023    AST 45 (H) 06/08/2023    ALT 64 (H) 06/08/2023    UTPCR 0.44 (H) 06/26/2023    .7 (H) 05/10/2023    TACROLIMUS 8.9 06/26/2023       No results found for: EXTANC, EXTWBC, EXTSEGS, EXTPLATELETS, EXTHEMOGLOBI, EXTHEMATOCRI, EXTCREATININ, EXTSODIUM, EXTPOTASSIUM, EXTBUN, EXTCO2, EXTCALCIUM, " EXTPHOSPHORU, EXTGLUCOSE, EXTALBUMIN, EXTAST, EXTALT, EXTBILITOTAL, EXTLIPASE, EXTAMYLASE    No results found for: EXTCYCLOSLVL, EXTSIROLIMUS, EXTTACROLVL, EXTPROTCRE, EXTPTHINTACT, EXTPROTEINUA, EXTWBCUA, EXTRBCUA    Labs were reviewed with the patient    Assessment:     1. S/P kidney transplant    2. Long-term use of immunosuppressant medication    3. Urine leak from transplanted ureter        Plan:    Encouraged increasing Mag and Phos in diet    1. CKD stage: 2 with stable creatinine, encourage hydration  will continue follow up as per our center guidelines. patient to continue close follow up with the local General nephrologist. Education provided in appropriate fluid intake, potassium intake. Continue with oral hydration.    1A. Pancreatic Function: N/A for non-pancreas allograft recipients:     2. High risk immunosuppression medication for organ transplantation, requiring regular intensive follow up and monitoring : will increase Myfortic to 720 bid, maintain same dose of prograf for now. Prednisone per protocol.   Lab Results   Component Value Date    TACROLIMUS 8.9 06/26/2023    TACROLIMUS 9.5 06/19/2023    TACROLIMUS 6.3 06/12/2023   Will closely monitor for toxicities, education provided about adherence to medicines and need to communicate any side effects to the transplant nurse or physician.    3. Allograft Function:stable at baseline for the patient. Continue follow up as per our guidelines and with the local General nephrologist. Communication will be sent today.  Lab Results   Component Value Date    CREATININE 1.2 06/26/2023    CREATININE 1.3 06/19/2023    CREATININE 1.7 (H) 06/12/2023     No results found for: AMYLASE, LIPASE    4. Hypertension management: well controlled  Continue with home blood pressure monitoring, low salt and healthy life discussed with the patient.  BP Readings from Last 3 Encounters:   06/30/23 (!) 149/79   06/21/23 135/65   06/20/23 121/63   MTP        5. Metabolic Bone  Disease/Secondary Hyperparathyroidism:calcium and phosphorus level discussed with the patient, patient will continue follow up with the general nephrologist for management of metabolic bone disease. Will monitor PTH and Vit D per our transplant center guidelines.   Lab Results   Component Value Date    .7 (H) 05/10/2023    CALCIUM 8.6 (L) 06/26/2023    PHOS 2.6 (L) 06/26/2023    PHOS 2.3 (L) 06/19/2023    PHOS 3.0 06/12/2023   KPN    6. Electrolytes and acid base balance: reviewed with the patient, essentially within the normal range no need for acute changes today, will monitor as per our center guidelines.  Lab Results   Component Value Date     06/26/2023    K 3.5 06/26/2023     06/26/2023    CO2 25 06/26/2023    CO2 23 06/19/2023    CO2 23 06/12/2023   NaBicarb    7. Anemia: will continue monitoring as per our center guidelines. No indication for acute intervention today.  Lab Results   Component Value Date    WBC 5.59 06/26/2023    HGB 10.9 (L) 06/26/2023    HCT 31.9 (L) 06/26/2023     (H) 06/26/2023     06/26/2023       8.Proteinuria: will continue with pr/cr ratio as per our center guidelines.  Lab Results   Component Value Date    PROTEINURINE 85 (H) 06/26/2023    CREATRANDUR 192.0 06/26/2023    UTPCR 0.44 (H) 06/26/2023    UTPCR 0.67 (H) 06/19/2023    UTPCR 0.67 (H) 05/22/2023        9. BK virus infection screening: will continue with urine or blood PCR as per our guidelines to prevent BK virus viremia and allograft dysfunction  Lab Results   Component Value Date    BKVIRUSPCRQB <125 06/08/2023         10. Weight and metabolic management: education provided provided during the clinic visit.   Body mass index is 25.62 kg/m².       11.Patient safety education regarding immunosuppression including prophylaxis posttransplant for CMV, PCP : Education provided about vaccination and prevention of infections.    12.  Cytopenias: no significant cytopenias will monitor as per our  guidelines. Medicine list reviewed including potential causes of drug-induced cytopenias  Lab Results   Component Value Date    WBC 5.59 06/26/2023    HGB 10.9 (L) 06/26/2023    HCT 31.9 (L) 06/26/2023     (H) 06/26/2023     06/26/2023       13. Post-transplant Prophylaxis; CMV Infection, PJP and Candida mucosistis and other indicated for this particular patient. Atovaquone/acyclovir  PCP PROPHYLAXIS: Atovaquone until 11/7/23 (bactrim allergy)  CMV PROPHYLAXIS: Acyclovir until 8/9/23    Follow-up:   Clinic: return to transplant clinic weekly for the first month after transplant; every 2 weeks during months 2-3; then at 6-, 9-, 12-, 18-, 24-, and 36- months post-transplant to reassess for complications from immunosuppression toxicity and monitor for rejection.  Annually thereafter.    Labs: since patient remains at high risk for rejection and drug-related complications that warrant close monitoring, labs will be ordered as follows: continue twice weekly CBC, renal panel, and drug level for first month; then same labs once weekly through 3rd month post-transplant.  Urine for UA and protein/creatinine ratio monthly.  Serum BK - PCR at 1-, 3-, 6-, 9-, 12-, 18-, 24-, 36-, 48-, and 60 months post-transplant.  Hepatic panel at 1-, 2-, 3-, 6-, 9-, 12-, 18-, 24-, and 36- months post-transplant.    Rose Mary Stroud NP       Education:   Material provided to the patient.  Patient reminded to call with any health changes since these can be early signs of significant complications.  Also, I advised the patient to be sure any new medications or changes of old medications are discussed with either a pharmacist or physician knowledgeable with transplant to avoid rejection/drug toxicity related to significant drug interactions.    Patient advised that it is recommended that all transplanted patients, and their close contacts and household members receive Covid vaccination.

## 2023-06-27 ENCOUNTER — LAB VISIT (OUTPATIENT)
Dept: LAB | Facility: HOSPITAL | Age: 63
End: 2023-06-27
Attending: INTERNAL MEDICINE
Payer: MEDICARE

## 2023-06-27 DIAGNOSIS — R19.7 DIARRHEA OF PRESUMED INFECTIOUS ORIGIN: ICD-10-CM

## 2023-06-27 DIAGNOSIS — Z94.0 KIDNEY REPLACED BY TRANSPLANT: Primary | ICD-10-CM

## 2023-06-27 DIAGNOSIS — Z94.0 KIDNEY REPLACED BY TRANSPLANT: ICD-10-CM

## 2023-06-27 LAB
C DIFF GDH STL QL: NEGATIVE
C DIFF TOX A+B STL QL IA: NEGATIVE
WBC #/AREA STL HPF: NORMAL /[HPF]

## 2023-06-27 PROCEDURE — 87046 STOOL CULTR AEROBIC BACT EA: CPT | Performed by: INTERNAL MEDICINE

## 2023-06-27 PROCEDURE — 87427 SHIGA-LIKE TOXIN AG IA: CPT | Performed by: INTERNAL MEDICINE

## 2023-06-27 PROCEDURE — 87507 IADNA-DNA/RNA PROBE TQ 12-25: CPT | Performed by: INTERNAL MEDICINE

## 2023-06-27 PROCEDURE — 87449 NOS EACH ORGANISM AG IA: CPT | Performed by: INTERNAL MEDICINE

## 2023-06-27 PROCEDURE — 89055 LEUKOCYTE ASSESSMENT FECAL: CPT | Performed by: INTERNAL MEDICINE

## 2023-06-27 PROCEDURE — 87449 NOS EACH ORGANISM AG IA: CPT | Mod: 91 | Performed by: INTERNAL MEDICINE

## 2023-06-27 PROCEDURE — 87045 FECES CULTURE AEROBIC BACT: CPT | Performed by: INTERNAL MEDICINE

## 2023-06-28 LAB
E COLI SXT1 STL QL IA: NEGATIVE
E COLI SXT2 STL QL IA: NEGATIVE

## 2023-06-29 ENCOUNTER — PATIENT MESSAGE (OUTPATIENT)
Dept: ADMINISTRATIVE | Facility: OTHER | Age: 63
End: 2023-06-29
Payer: MEDICARE

## 2023-06-30 ENCOUNTER — OFFICE VISIT (OUTPATIENT)
Dept: TRANSPLANT | Facility: CLINIC | Age: 63
End: 2023-06-30
Payer: MEDICARE

## 2023-06-30 VITALS
TEMPERATURE: 97 F | SYSTOLIC BLOOD PRESSURE: 149 MMHG | BODY MASS INDEX: 25.7 KG/M2 | HEIGHT: 69 IN | HEART RATE: 56 BPM | DIASTOLIC BLOOD PRESSURE: 79 MMHG | WEIGHT: 173.5 LBS | OXYGEN SATURATION: 98 % | RESPIRATION RATE: 20 BRPM

## 2023-06-30 DIAGNOSIS — Z79.60 LONG-TERM USE OF IMMUNOSUPPRESSANT MEDICATION: ICD-10-CM

## 2023-06-30 DIAGNOSIS — Z94.0 S/P KIDNEY TRANSPLANT: ICD-10-CM

## 2023-06-30 DIAGNOSIS — T86.898 URINE LEAK FROM TRANSPLANTED URETER: ICD-10-CM

## 2023-06-30 DIAGNOSIS — Z94.0 S/P KIDNEY TRANSPLANT: Primary | ICD-10-CM

## 2023-06-30 PROBLEM — N18.2 CKD (CHRONIC KIDNEY DISEASE) STAGE 2, GFR 60-89 ML/MIN: Status: ACTIVE | Noted: 2023-05-26

## 2023-06-30 LAB
BACTERIA STL CULT: NORMAL
BACTERIA STL CULT: NORMAL

## 2023-06-30 PROCEDURE — 99215 PR OFFICE/OUTPT VISIT, EST, LEVL V, 40-54 MIN: ICD-10-PCS | Mod: S$PBB,,, | Performed by: NURSE PRACTITIONER

## 2023-06-30 PROCEDURE — 99999 PR PBB SHADOW E&M-EST. PATIENT-LVL III: CPT | Mod: PBBFAC,,, | Performed by: NURSE PRACTITIONER

## 2023-06-30 PROCEDURE — 99213 OFFICE O/P EST LOW 20 MIN: CPT | Mod: PBBFAC | Performed by: NURSE PRACTITIONER

## 2023-06-30 PROCEDURE — 99215 OFFICE O/P EST HI 40 MIN: CPT | Mod: S$PBB,,, | Performed by: NURSE PRACTITIONER

## 2023-06-30 PROCEDURE — 99999 PR PBB SHADOW E&M-EST. PATIENT-LVL III: ICD-10-PCS | Mod: PBBFAC,,, | Performed by: NURSE PRACTITIONER

## 2023-06-30 RX ORDER — FLUTICASONE PROPIONATE 44 UG/1
1 AEROSOL, METERED RESPIRATORY (INHALATION) 2 TIMES DAILY
Status: ON HOLD | COMMUNITY
Start: 2023-06-26 | End: 2024-03-09 | Stop reason: HOSPADM

## 2023-06-30 RX ORDER — HYDROXYZINE HYDROCHLORIDE 25 MG/1
25 TABLET, FILM COATED ORAL 3 TIMES DAILY PRN
Qty: 90 TABLET | Refills: 0 | Status: CANCELLED | OUTPATIENT
Start: 2023-06-30

## 2023-06-30 NOTE — LETTER
June 30, 2023        Quinn Espino  [address]  [address]  [facility] [address]  Phone: [phone]  Fax: [phone]             [facility]st Fl  [address]  [facility] [address]  Phone: [phone]   Patient: Abdoulaye Parikh   MR Number: [id number]   YOB: 1960   Date of Visit: 6/30/2023       Dear Dr. Quinn Espino    Thank you for referring Abdoulaye Parikh to me for evaluation. Attached you will find relevant portions of my assessment and plan of care.    If you have questions, please do not hesitate to call me. I look forward to following Abdoulaye Parikh along with you.    Sincerely,    Rose Mary Stroud, NP    Enclosure    If you would like to receive this communication electronically, please contact [email or web address] or [phone] to request eCollect Link access.    [employer]Collect Link is a tool which provides read-only access to select patient information with whom you have a relationship. Its easy to use and provides real time access to review your patients record including encounter summaries, notes, results, and demographic information.    If you feel you have received this communication in error or would no longer like to receive these types of communications, please e-mail [email or web address]

## 2023-07-01 LAB
CAMPY SP DNA.DIARRHEA STL QL NAA+PROBE: NOT DETECTED
CRYPTOSP DNA SPEC QL NAA+PROBE: NOT DETECTED
E COLI O157H7 DNA SPEC QL NAA+PROBE: NOT DETECTED
E HISTOLYT DNA SPEC QL NAA+PROBE: NOT DETECTED
G LAMBLIA DNA SPEC QL NAA+PROBE: NOT DETECTED
GPP - ADENOVIRUS 40/41: NOT DETECTED
GPP - ENTEROTOXIGENIC E COLI (ETEC): NOT DETECTED
GPP - SHIGELLA: NOT DETECTED
LACTATE PLASV-SCNC: NOT DETECTED MMOL/L
NOROVIRUS RNA STL QL NAA+PROBE: NOT DETECTED
RV DSRNA STL QL NAA+PROBE: NOT DETECTED
SALMONELLA DNA SPEC QL NAA+PROBE: NOT DETECTED
V CHOLERAE DNA SPEC QL NAA+PROBE: NOT DETECTED
Y ENTERO RECN STL QL NAA+PROBE: NOT DETECTED

## 2023-07-03 ENCOUNTER — TELEPHONE (OUTPATIENT)
Dept: TRANSPLANT | Facility: CLINIC | Age: 63
End: 2023-07-03
Payer: MEDICARE

## 2023-07-03 ENCOUNTER — LAB VISIT (OUTPATIENT)
Dept: LAB | Facility: HOSPITAL | Age: 63
End: 2023-07-03
Attending: INTERNAL MEDICINE
Payer: MEDICARE

## 2023-07-03 DIAGNOSIS — Z94.0 KIDNEY REPLACED BY TRANSPLANT: ICD-10-CM

## 2023-07-03 DIAGNOSIS — Z94.0 S/P KIDNEY TRANSPLANT: ICD-10-CM

## 2023-07-03 LAB
ALBUMIN SERPL BCP-MCNC: 2.9 G/DL (ref 3.5–5.2)
ANION GAP SERPL CALC-SCNC: 11 MMOL/L (ref 8–16)
BASOPHILS # BLD AUTO: 0.03 K/UL (ref 0–0.2)
BASOPHILS NFR BLD: 0.5 % (ref 0–1.9)
BUN SERPL-MCNC: 15 MG/DL (ref 8–23)
CALCIUM SERPL-MCNC: 8.6 MG/DL (ref 8.7–10.5)
CHLORIDE SERPL-SCNC: 109 MMOL/L (ref 95–110)
CO2 SERPL-SCNC: 25 MMOL/L (ref 23–29)
CREAT SERPL-MCNC: 1.3 MG/DL (ref 0.5–1.4)
DIFFERENTIAL METHOD: ABNORMAL
EOSINOPHIL # BLD AUTO: 0.1 K/UL (ref 0–0.5)
EOSINOPHIL NFR BLD: 1 % (ref 0–8)
ERYTHROCYTE [DISTWIDTH] IN BLOOD BY AUTOMATED COUNT: 17.4 % (ref 11.5–14.5)
EST. GFR  (NO RACE VARIABLE): >60 ML/MIN/1.73 M^2
GLUCOSE SERPL-MCNC: 83 MG/DL (ref 70–110)
HCT VFR BLD AUTO: 33.8 % (ref 40–54)
HGB BLD-MCNC: 11.1 G/DL (ref 14–18)
IMM GRANULOCYTES # BLD AUTO: 0.06 K/UL (ref 0–0.04)
IMM GRANULOCYTES NFR BLD AUTO: 1 % (ref 0–0.5)
LYMPHOCYTES # BLD AUTO: 1.1 K/UL (ref 1–4.8)
LYMPHOCYTES NFR BLD: 18.3 % (ref 18–48)
MAGNESIUM SERPL-MCNC: 1.4 MG/DL (ref 1.6–2.6)
MCH RBC QN AUTO: 36.6 PG (ref 27–31)
MCHC RBC AUTO-ENTMCNC: 32.8 G/DL (ref 32–36)
MCV RBC AUTO: 112 FL (ref 82–98)
MONOCYTES # BLD AUTO: 0.7 K/UL (ref 0.3–1)
MONOCYTES NFR BLD: 12.2 % (ref 4–15)
NEUTROPHILS # BLD AUTO: 4 K/UL (ref 1.8–7.7)
NEUTROPHILS NFR BLD: 67 % (ref 38–73)
NRBC BLD-RTO: 0 /100 WBC
PHOSPHATE SERPL-MCNC: 2.4 MG/DL (ref 2.7–4.5)
PLATELET # BLD AUTO: 182 K/UL (ref 150–450)
PMV BLD AUTO: 11.3 FL (ref 9.2–12.9)
POTASSIUM SERPL-SCNC: 3.6 MMOL/L (ref 3.5–5.1)
RBC # BLD AUTO: 3.03 M/UL (ref 4.6–6.2)
SODIUM SERPL-SCNC: 145 MMOL/L (ref 136–145)
TACROLIMUS BLD-MCNC: 9.8 NG/ML (ref 5–15)
WBC # BLD AUTO: 6 K/UL (ref 3.9–12.7)

## 2023-07-03 PROCEDURE — 80069 RENAL FUNCTION PANEL: CPT | Performed by: INTERNAL MEDICINE

## 2023-07-03 PROCEDURE — 83735 ASSAY OF MAGNESIUM: CPT | Performed by: INTERNAL MEDICINE

## 2023-07-03 PROCEDURE — 85025 COMPLETE CBC W/AUTO DIFF WBC: CPT | Performed by: INTERNAL MEDICINE

## 2023-07-03 PROCEDURE — 36415 COLL VENOUS BLD VENIPUNCTURE: CPT | Performed by: INTERNAL MEDICINE

## 2023-07-03 PROCEDURE — 80197 ASSAY OF TACROLIMUS: CPT | Performed by: INTERNAL MEDICINE

## 2023-07-03 RX ORDER — HYDROXYZINE HYDROCHLORIDE 25 MG/1
25 TABLET, FILM COATED ORAL 3 TIMES DAILY PRN
Qty: 90 TABLET | Refills: 0 | Status: SHIPPED | OUTPATIENT
Start: 2023-07-03 | End: 2023-11-13

## 2023-07-03 NOTE — TELEPHONE ENCOUNTER
Left Voice Message to call coordinator back.  ----- Message from Maggie Mcgovern MD sent at 7/3/2023  8:39 AM CDT -----  Na 145: needs more water intake. Pending tac level

## 2023-07-03 NOTE — TELEPHONE ENCOUNTER
Patient repeated back and voice a understanding of orders.  Patient voice a understanding that he needs to be drinking over 2 liters of H2O daily.  ----- Message from Maggie Mcgovern MD sent at 7/3/2023  8:39 AM CDT -----  Na 145: needs more water intake. Pending tac level

## 2023-07-05 ENCOUNTER — TELEPHONE (OUTPATIENT)
Dept: INTERVENTIONAL RADIOLOGY/VASCULAR | Facility: HOSPITAL | Age: 63
End: 2023-07-05
Payer: MEDICARE

## 2023-07-06 ENCOUNTER — HOSPITAL ENCOUNTER (OUTPATIENT)
Dept: INTERVENTIONAL RADIOLOGY/VASCULAR | Facility: HOSPITAL | Age: 63
Discharge: HOME | End: 2023-07-06
Attending: TRANSPLANT SURGERY
Payer: MEDICARE

## 2023-07-06 ENCOUNTER — PATIENT MESSAGE (OUTPATIENT)
Dept: ADMINISTRATIVE | Facility: OTHER | Age: 63
End: 2023-07-06
Payer: MEDICARE

## 2023-07-06 VITALS
DIASTOLIC BLOOD PRESSURE: 70 MMHG | RESPIRATION RATE: 22 BRPM | OXYGEN SATURATION: 99 % | HEIGHT: 69 IN | SYSTOLIC BLOOD PRESSURE: 133 MMHG | HEART RATE: 53 BPM | BODY MASS INDEX: 26.22 KG/M2 | TEMPERATURE: 98 F | WEIGHT: 177 LBS

## 2023-07-06 DIAGNOSIS — Z94.0 KIDNEY REPLACED BY TRANSPLANT: ICD-10-CM

## 2023-07-06 PROCEDURE — 25000003 PHARM REV CODE 250: Performed by: RADIOLOGY

## 2023-07-06 PROCEDURE — 50706 BALLOON DILATE URTRL STRIX: CPT | Mod: RT | Performed by: RADIOLOGY

## 2023-07-06 PROCEDURE — 63600175 PHARM REV CODE 636 W HCPCS: Performed by: RADIOLOGY

## 2023-07-06 PROCEDURE — 99152 MOD SED SAME PHYS/QHP 5/>YRS: CPT | Performed by: RADIOLOGY

## 2023-07-06 PROCEDURE — 99152 MOD SED SAME PHYS/QHP 5/>YRS: CPT | Mod: ,,, | Performed by: RADIOLOGY

## 2023-07-06 PROCEDURE — 50435 PR EXCHANGE NEPHROSTOMY CATH, INCL GUID, S&I: ICD-10-PCS | Mod: 51,RT,, | Performed by: RADIOLOGY

## 2023-07-06 PROCEDURE — 50435 EXCHANGE NEPHROSTOMY CATH: CPT | Mod: RT | Performed by: RADIOLOGY

## 2023-07-06 PROCEDURE — 50706 BALLOON DILATE URTRL STRIX: CPT | Mod: RT,,, | Performed by: RADIOLOGY

## 2023-07-06 PROCEDURE — 50435 EXCHANGE NEPHROSTOMY CATH: CPT | Mod: 51,RT,, | Performed by: RADIOLOGY

## 2023-07-06 PROCEDURE — 99152 PR MOD CONSCIOUS SEDATION, SAME PHYS, 5+ YRS, FIRST 15 MIN: ICD-10-PCS | Mod: ,,, | Performed by: RADIOLOGY

## 2023-07-06 PROCEDURE — 50706 IR NEPHROSTOGRAM: ICD-10-PCS | Mod: RT,,, | Performed by: RADIOLOGY

## 2023-07-06 PROCEDURE — C1725 CATH, TRANSLUMIN NON-LASER: HCPCS

## 2023-07-06 RX ORDER — LIDOCAINE HYDROCHLORIDE 10 MG/ML
1 INJECTION, SOLUTION EPIDURAL; INFILTRATION; INTRACAUDAL; PERINEURAL ONCE
Status: DISCONTINUED | OUTPATIENT
Start: 2023-07-06 | End: 2023-07-07 | Stop reason: HOSPADM

## 2023-07-06 RX ORDER — SODIUM CHLORIDE 9 MG/ML
INJECTION, SOLUTION INTRAVENOUS
Status: COMPLETED | OUTPATIENT
Start: 2023-07-06 | End: 2023-07-06

## 2023-07-06 RX ORDER — ONDANSETRON 2 MG/ML
4 INJECTION INTRAMUSCULAR; INTRAVENOUS EVERY 6 HOURS PRN
Status: DISCONTINUED | OUTPATIENT
Start: 2023-07-06 | End: 2023-07-07 | Stop reason: HOSPADM

## 2023-07-06 RX ORDER — SODIUM CHLORIDE 9 MG/ML
INJECTION, SOLUTION INTRAVENOUS CONTINUOUS
Status: DISCONTINUED | OUTPATIENT
Start: 2023-07-06 | End: 2023-07-07 | Stop reason: HOSPADM

## 2023-07-06 RX ORDER — MIDAZOLAM HYDROCHLORIDE 1 MG/ML
INJECTION INTRAMUSCULAR; INTRAVENOUS
Status: COMPLETED | OUTPATIENT
Start: 2023-07-06 | End: 2023-07-06

## 2023-07-06 RX ORDER — FENTANYL CITRATE 50 UG/ML
INJECTION, SOLUTION INTRAMUSCULAR; INTRAVENOUS
Status: COMPLETED | OUTPATIENT
Start: 2023-07-06 | End: 2023-07-06

## 2023-07-06 RX ADMIN — SODIUM CHLORIDE 50 ML/HR: 9 INJECTION, SOLUTION INTRAVENOUS at 08:07

## 2023-07-06 RX ADMIN — FENTANYL CITRATE 25 MCG: 50 INJECTION, SOLUTION INTRAMUSCULAR; INTRAVENOUS at 08:07

## 2023-07-06 RX ADMIN — MIDAZOLAM HYDROCHLORIDE 2 MG: 1 INJECTION INTRAMUSCULAR; INTRAVENOUS at 08:07

## 2023-07-06 RX ADMIN — FENTANYL CITRATE 50 MCG: 50 INJECTION, SOLUTION INTRAMUSCULAR; INTRAVENOUS at 08:07

## 2023-07-06 RX ADMIN — CEFTRIAXONE 1 G: 1 INJECTION, POWDER, FOR SOLUTION INTRAMUSCULAR; INTRAVENOUS at 08:07

## 2023-07-06 NOTE — PLAN OF CARE
Patient arrived to room. PIV placed Admit assessment completed. Plan of care discussed with patient. Will monitor

## 2023-07-06 NOTE — DISCHARGE INSTRUCTIONS
Keep open to bag for 24 hours, can cap at 24 hours. Follow up with IR in 4 weeks for repeat nephrostogram and possible plasty.            Please call with any questions or concerns.      Monday thru Friday 8:00 am - 5:00 pm    Interventional Radiology   [phone]    After Hours    Ask for the Radiology Resident on call  [phone]

## 2023-07-06 NOTE — DISCHARGE SUMMARY
Radiology Discharge Summary      Hospital Course: No complications    Admit Date: 7/6/2023  Discharge Date: 07/06/2023     Instructions Given to Patient: Yes  Diet: Resume prior diet  Activity: activity as tolerated and no driving for today    Description of Condition on Discharge: Stable  Vital Signs (Most Recent): Temp: 97.2 °F (36.2 °C) (07/06/23 0739)  Pulse: (!) 55 (07/06/23 0854)  Resp: 19 (07/06/23 0854)  BP: 135/72 (07/06/23 0854)  SpO2: 99 % (07/06/23 0854)    Discharge Disposition: Home    Discharge Diagnosis: Ureteral stricture s/p nephrostogram and ureteroplasty    Follow up: As scheduled    Wander Haywood M.D.  Interventional Radiology  Department of Radiology  Pager: [phone]

## 2023-07-06 NOTE — H&P
Radiology Pre-procedure Note    History of Present Illness:  Abdoulaye Parikh is a 62 y.o. male who presents for nephrogram.    Past Medical History:   Diagnosis Date    Acute blood loss anemia 5/11/2023    Anemia     Arthritis     Cirrhosis     CKD (chronic kidney disease) stage 4, GFR 15-29 ml/min, slow graft function with sustaine creatinien improvement 5/26/2023    Crohn's disease     Disorder of kidney and ureter     Stage 5    Encounter for blood transfusion     Gout     Hearing loss     Hepatitis     Had Hep C Cleared    Hypertension     Immunosuppressive management encounter following kidney transplant 5/26/2023    Neurosarcoidosis 2018    Obesity     Osteoarthritis     Sarcoid neuropathy     Sarcoidosis, lung     Steatosis      Past Surgical History:   Procedure Laterality Date    AV FISTULA PLACEMENT Left     BRAIN SURGERY      COLON SURGERY      Exploratory lap x 4 for Chron's disease. Likely right colectomy    CSF SHUNT      DIAGNOSTIC ULTRASOUND N/A 5/15/2023    Procedure: ULTRASOUND, DIAGNOSTIC;  Surgeon: Chivo Brown MD;  Location: [facility] OR [address] [place] [address];  Service: Transplant;  Laterality: N/A;    JOINT REPLACEMENT Right     Hip    KIDNEY TRANSPLANT N/A 5/10/2023    Procedure: TRANSPLANT, KIDNEY - RQ 7PM START;  Surgeon: Chivo Brown MD;  Location: [facility] OR [address] [place] [address];  Service: Transplant;  Laterality: N/A;    NEPHROSTOMY N/A 6/3/2023    Procedure: Nephrostomy;  Surgeon: Silvia Surgeon;  Location: [facility];  Service: Anesthesiology;  Laterality: N/A;    RENAL BIOPSY  5/15/2023    Procedure: BIOPSY, KIDNEY;  Surgeon: Chivo Brown MD;  Location: [facility] OR [address] [place] [address];  Service: Transplant;;    RENAL EXPLORATION  5/15/2023    Procedure: EXPLORATION, KIDNEY;  Surgeon: Chivo Brown MD;  Location: [address];  Service: Transplant;;    TOTAL HIP ARTHROPLASTY Right        Review of Systems:   As documented in primary team H&P    Home Meds:   Prior to Admission medications     Medication Sig Start Date End Date Taking? Authorizing Provider   acyclovir (ZOVIRAX) 400 MG tablet Take 1 tablet (400 mg total) by mouth 2 (two) times daily. Stop 8/9/23 5/11/23 8/15/23 Yes Zeke Devine Jr., MD   atovaquone (MEPRON) 750 mg/5 mL Susp oral liquid Take 10 mLs (1,500 mg total) by mouth once daily UNTIL 11/7/2023 6/8/23  Yes Darrin Verma MD   fluticasone propionate (FLOVENT HFA) 44 mcg/actuation inhaler Inhale 1 puff into the lungs 2 (two) times daily. 6/26/23  Yes Historical Provider   hydrOXYzine HCL (ATARAX) 25 MG tablet Take 1 tablet (25 mg total) by mouth 3 (three) times daily as needed for Anxiety. 7/3/23  Yes Maggie Mcgovern MD   k phos di & mono-sod phos mono (PHOSPHA 250 NEUTRAL) 250 mg Tab Take 1 tablet by mouth 2 (two) times a day. 6/26/23 6/25/24 Yes Elsy Ramachandran MD   metoprolol succinate (TOPROL-XL) 25 MG 24 hr tablet Take HALF tablet (12.5 mg total) by mouth once daily. 5/18/23 5/17/24 Yes Rodolfo Samuels MD   mycophenolate (MYFORTIC) 360 MG TbEC Take 2 tablets (720 mg total) by mouth 2 (two) times daily. Txp Date:5/10/2023 (Kidney) Disch Date:5/17/2023 ICD10:Z94.0 Txp Location:[facility] 6/14/23 6/13/24 Yes Darrin Verma MD   predniSONE (DELTASONE) 5 MG tablet Take 20 mg by mouth daily 5/14-5/20; 15 mg daily 5/21-5/27; 10 mg daily 5/28-6/3; then 5 mg daily thereafter 6/4/23  Patient taking differently: Take 5 mg by mouth once daily. 5/11/23  Yes Zeke Devine Jr., MD   sodium bicarbonate 650 MG tablet Take 2 tablets (1,300 mg total) by mouth 3 (three) times daily. 6/6/23 6/5/24 Yes Beatrice Mendez DO   tacrolimus (PROGRAF) 1 MG Cap Take 3 capsules (3 mg total) by mouth every morning AND 2 capsules (2 mg total) every evening. Txp Date:5/10/2023 (Kidney) Disch Date:5/17/2023 ICD10:Z94.0 Txp Location:[facility]. 6/8/23 6/7/24 Yes Darrin Verma MD     Scheduled Meds:    LIDOcaine (PF) 10 mg/ml (1%)  1 mL Other Once     Continuous Infusions:    sodium chloride 0.9%        PRN Meds:  Anticoagulants/Antiplatelets: no anticoagulation    Allergies:   Review of patient's allergies indicates:   Allergen Reactions    Bactrim [sulfamethoxazole-trimethoprim] Shortness Of Breath and Itching    Penicillins Shortness Of Breath and Itching     Sedation Hx: have not been any systemic reactions    Labs:  No results for input(s): INR in the last 168 hours.    Invalid input(s):  PT,  PTT    Recent Labs   Lab 07/03/23 0725   WBC 6.00   HGB 11.1*   HCT 33.8*   *         Recent Labs   Lab 07/03/23 0725   GLU 83      K 3.6      CO2 25   BUN 15   CREATININE 1.3   CALCIUM 8.6*   MG 1.4*   ALBUMIN 2.9*         Vitals:  Temp: 97.2 °F (36.2 °C) (07/06/23 0739)  Pulse: (!) 55 (07/06/23 0739)  Resp: 16 (07/06/23 0739)  BP: (!) 153/74 (07/06/23 0739)  SpO2: 100 % (07/06/23 0739)     Physical Exam:  ASA: 2  Mallampati: 3    General: no acute distress  Mental Status: alert and oriented to person, place and time  HEENT: normocephalic, atraumatic  Chest: unlabored breathing  Heart: regular heart rate  Abdomen: nondistended  Extremity: moves all extremities    Plan: imaging guided nephrostomy   Sedation Plan: anthony Corbett MD PhD  Radiology Resident

## 2023-07-06 NOTE — PLAN OF CARE
Pt received into IR Rm 89. Patient AAOx3, no distress noted, respirations even and unlabored, VS stable, will continue to monitor. Acceptance of education, consents signed, H/P done. Labs reviewed.

## 2023-07-06 NOTE — PLAN OF CARE
Pt tolerates procedure well. VSS. NAD. Pt transferred to MPU [place] 5. Report to be given at bedside.

## 2023-07-06 NOTE — NURSING
Procedure recovery complete. VSS. Patient denies pain. Procedure site dressing is clean, dry, and intact. Patient verbalizes understanding of discharge instructions including when to call the doctor. PIV removed. Patient discharged via wheelchair accompanied by patient transport.

## 2023-07-06 NOTE — PROCEDURES
Radiology Post-Procedure Note    Pre Op Diagnosis: Urine leak    Post Op Diagnosis: Urine stricture    Procedure: Nephrostogram and ureteroplasty    Procedure performed by: Wander Haywood MD    Written Informed Consent Obtained: Yes  Specimen Removed: NO  Estimated Blood Loss: Minimal    Findings:   Antegrade nephrostogram shows UVJ stricture which was addressed w 8 mm balloon catheter. 12 Fr biliary drain placed with improved drainage. Keep open to bag for 24 hours, can cap at 24 hours. F/U with IR in 4 wks for repeat nephrostogram and possible plasty. No complications. See dictation.    Patient tolerated procedure well.    Wander Haywood M.D.  Interventional Radiology  Department of Radiology  Pager: [phone]

## 2023-07-06 NOTE — NURSING
Patient received s/p nephrostomy tube change in MPU bay 5. See VS flowsheet. Procedure site dressing is clean and dry, no evidence of bleeding or hematoma formation. Patient to recover for 1 hour and discharge home with wife. Fall precautions reviewed. Bed in lowest, locked position. Call light within reach.

## 2023-07-07 DIAGNOSIS — Z94.0 KIDNEY REPLACED BY TRANSPLANT: Primary | ICD-10-CM

## 2023-07-10 ENCOUNTER — LAB VISIT (OUTPATIENT)
Dept: LAB | Facility: HOSPITAL | Age: 63
End: 2023-07-10
Attending: INTERNAL MEDICINE
Payer: MEDICARE

## 2023-07-10 DIAGNOSIS — Z94.0 S/P KIDNEY TRANSPLANT: ICD-10-CM

## 2023-07-10 DIAGNOSIS — Z94.0 KIDNEY REPLACED BY TRANSPLANT: ICD-10-CM

## 2023-07-10 LAB
ALBUMIN SERPL BCP-MCNC: 3.1 G/DL (ref 3.5–5.2)
ALBUMIN SERPL BCP-MCNC: 3.1 G/DL (ref 3.5–5.2)
ALP SERPL-CCNC: 99 U/L (ref 55–135)
ALT SERPL W/O P-5'-P-CCNC: 33 U/L (ref 10–44)
ANION GAP SERPL CALC-SCNC: 9 MMOL/L (ref 8–16)
AST SERPL-CCNC: 34 U/L (ref 10–40)
BASOPHILS # BLD AUTO: 0.03 K/UL (ref 0–0.2)
BASOPHILS NFR BLD: 0.6 % (ref 0–1.9)
BILIRUB DIRECT SERPL-MCNC: 0.4 MG/DL (ref 0.1–0.3)
BILIRUB SERPL-MCNC: 0.9 MG/DL (ref 0.1–1)
BUN SERPL-MCNC: 9 MG/DL (ref 8–23)
CALCIUM SERPL-MCNC: 8.8 MG/DL (ref 8.7–10.5)
CHLORIDE SERPL-SCNC: 108 MMOL/L (ref 95–110)
CHOLEST SERPL-MCNC: 143 MG/DL (ref 120–199)
CHOLEST/HDLC SERPL: 2.3 {RATIO} (ref 2–5)
CO2 SERPL-SCNC: 23 MMOL/L (ref 23–29)
CREAT SERPL-MCNC: 1.1 MG/DL (ref 0.5–1.4)
DIFFERENTIAL METHOD: ABNORMAL
EOSINOPHIL # BLD AUTO: 0.1 K/UL (ref 0–0.5)
EOSINOPHIL NFR BLD: 1.7 % (ref 0–8)
ERYTHROCYTE [DISTWIDTH] IN BLOOD BY AUTOMATED COUNT: 16.1 % (ref 11.5–14.5)
EST. GFR  (NO RACE VARIABLE): >60 ML/MIN/1.73 M^2
GLUCOSE SERPL-MCNC: 71 MG/DL (ref 70–110)
HCT VFR BLD AUTO: 34.8 % (ref 40–54)
HDLC SERPL-MCNC: 62 MG/DL (ref 40–75)
HDLC SERPL: 43.4 % (ref 20–50)
HGB BLD-MCNC: 11.7 G/DL (ref 14–18)
IMM GRANULOCYTES # BLD AUTO: 0.09 K/UL (ref 0–0.04)
IMM GRANULOCYTES NFR BLD AUTO: 1.7 % (ref 0–0.5)
LDLC SERPL CALC-MCNC: 66 MG/DL (ref 63–159)
LYMPHOCYTES # BLD AUTO: 0.8 K/UL (ref 1–4.8)
LYMPHOCYTES NFR BLD: 14.4 % (ref 18–48)
MAGNESIUM SERPL-MCNC: 1.3 MG/DL (ref 1.6–2.6)
MCH RBC QN AUTO: 37.7 PG (ref 27–31)
MCHC RBC AUTO-ENTMCNC: 33.6 G/DL (ref 32–36)
MCV RBC AUTO: 112 FL (ref 82–98)
MONOCYTES # BLD AUTO: 0.4 K/UL (ref 0.3–1)
MONOCYTES NFR BLD: 7.7 % (ref 4–15)
NEUTROPHILS # BLD AUTO: 4 K/UL (ref 1.8–7.7)
NEUTROPHILS NFR BLD: 73.9 % (ref 38–73)
NONHDLC SERPL-MCNC: 81 MG/DL
NRBC BLD-RTO: 0 /100 WBC
PHOSPHATE SERPL-MCNC: 2.2 MG/DL (ref 2.7–4.5)
PLATELET # BLD AUTO: 151 K/UL (ref 150–450)
PMV BLD AUTO: 11.1 FL (ref 9.2–12.9)
POTASSIUM SERPL-SCNC: 3.4 MMOL/L (ref 3.5–5.1)
PROT SERPL-MCNC: 7.6 G/DL (ref 6–8.4)
RBC # BLD AUTO: 3.1 M/UL (ref 4.6–6.2)
SODIUM SERPL-SCNC: 140 MMOL/L (ref 136–145)
TACROLIMUS BLD-MCNC: 10 NG/ML (ref 5–15)
TRIGL SERPL-MCNC: 75 MG/DL (ref 30–150)
WBC # BLD AUTO: 5.43 K/UL (ref 3.9–12.7)

## 2023-07-10 PROCEDURE — 80197 ASSAY OF TACROLIMUS: CPT | Performed by: INTERNAL MEDICINE

## 2023-07-10 PROCEDURE — 85025 COMPLETE CBC W/AUTO DIFF WBC: CPT | Performed by: INTERNAL MEDICINE

## 2023-07-10 PROCEDURE — 36415 COLL VENOUS BLD VENIPUNCTURE: CPT | Performed by: INTERNAL MEDICINE

## 2023-07-10 PROCEDURE — 87799 DETECT AGENT NOS DNA QUANT: CPT | Performed by: INTERNAL MEDICINE

## 2023-07-10 PROCEDURE — 80061 LIPID PANEL: CPT | Performed by: INTERNAL MEDICINE

## 2023-07-10 PROCEDURE — 84075 ASSAY ALKALINE PHOSPHATASE: CPT | Performed by: INTERNAL MEDICINE

## 2023-07-10 PROCEDURE — 83735 ASSAY OF MAGNESIUM: CPT | Performed by: INTERNAL MEDICINE

## 2023-07-10 PROCEDURE — 80069 RENAL FUNCTION PANEL: CPT | Performed by: INTERNAL MEDICINE

## 2023-07-10 RX ORDER — TACROLIMUS 1 MG/1
CAPSULE ORAL
Qty: 120 CAPSULE | Refills: 11 | Status: SHIPPED | OUTPATIENT
Start: 2023-07-10 | End: 2023-11-06 | Stop reason: DRUGHIGH

## 2023-07-10 RX ORDER — LANOLIN ALCOHOL/MO/W.PET/CERES
400 CREAM (GRAM) TOPICAL 2 TIMES DAILY
Qty: 60 TABLET | Refills: 11 | Status: SHIPPED | OUTPATIENT
Start: 2023-07-10 | End: 2024-07-09

## 2023-07-10 NOTE — TELEPHONE ENCOUNTER
Patient repeated back and voice a understanding of orders.  High Mag and Phos diet reviewed.  ----- Message from Darrin Verma MD sent at 7/10/2023 11:20 AM CDT -----  Lower prograf to 2 mg PO bid  Start magnesium oxide 400 mg PO bid

## 2023-07-10 NOTE — TELEPHONE ENCOUNTER
Results reviewed with patient and states he forgot to clamp his Nephrostomy tube to 7/7 per IR instructions.  Patient will clamp IR tube now and will keep coordinator posted.  Voice a understanding to notify coordinator if he develops any fever temp > 100.4, allograft tenderness hematuria purulent secretion around exit site of his PCN  ----- Message -----  ----- Message from Darrin Verma MD sent at 7/10/2023  1:35 PM CDT -----  Ok, this is going to be an ongoing issue, just need to be vigilant if he voices any fever allograft tenderness hematuria purulent secretion around exit site of PCN  ----- Message -----  From: Robles Arita RN  Sent: 7/10/2023   1:32 PM CDT  To: Darrin Verma MD    Denies S/S of a UTI.  Submitted urine from Nephrostomy bag.  ----- Message -----  From: Darrin Verma MD  Sent: 7/10/2023  12:59 PM CDT  To: [facility] Post-Kidney Transplant Clinical    Does he have UTI symptoms?

## 2023-07-11 ENCOUNTER — PATIENT MESSAGE (OUTPATIENT)
Dept: TRANSPLANT | Facility: CLINIC | Age: 63
End: 2023-07-11

## 2023-07-11 DIAGNOSIS — Z94.0 KIDNEY REPLACED BY TRANSPLANT: Primary | ICD-10-CM

## 2023-07-12 LAB
BKV DNA SERPL NAA+PROBE-ACNC: <125 COPIES/ML
BKV DNA SERPL NAA+PROBE-LOG#: <2.1 LOG (10) COPIES/ML
BKV DNA SERPL QL NAA+PROBE: NOT DETECTED

## 2023-07-13 ENCOUNTER — PATIENT MESSAGE (OUTPATIENT)
Dept: ADMINISTRATIVE | Facility: OTHER | Age: 63
End: 2023-07-13
Payer: MEDICARE

## 2023-07-13 LAB — O+P STL MICRO: NORMAL

## 2023-07-17 ENCOUNTER — OFFICE VISIT (OUTPATIENT)
Dept: OTOLARYNGOLOGY | Facility: CLINIC | Age: 63
End: 2023-07-17
Payer: MEDICARE

## 2023-07-17 ENCOUNTER — LAB VISIT (OUTPATIENT)
Dept: LAB | Facility: HOSPITAL | Age: 63
End: 2023-07-17
Attending: INTERNAL MEDICINE
Payer: MEDICARE

## 2023-07-17 ENCOUNTER — CLINICAL SUPPORT (OUTPATIENT)
Dept: AUDIOLOGY | Facility: CLINIC | Age: 63
End: 2023-07-17
Payer: MEDICARE

## 2023-07-17 DIAGNOSIS — Z86.69 HISTORY OF HYDROCEPHALUS: ICD-10-CM

## 2023-07-17 DIAGNOSIS — H91.93: Primary | ICD-10-CM

## 2023-07-17 DIAGNOSIS — Z94.0 KIDNEY REPLACED BY TRANSPLANT: ICD-10-CM

## 2023-07-17 DIAGNOSIS — H91.93: ICD-10-CM

## 2023-07-17 DIAGNOSIS — H91.91 ACQUIRED DEAFNESS OF RIGHT EAR: ICD-10-CM

## 2023-07-17 DIAGNOSIS — Z98.2 S/P VP SHUNT: ICD-10-CM

## 2023-07-17 LAB
ALBUMIN SERPL BCP-MCNC: 3.3 G/DL (ref 3.5–5.2)
ANION GAP SERPL CALC-SCNC: 12 MMOL/L (ref 8–16)
BASOPHILS # BLD AUTO: 0.04 K/UL (ref 0–0.2)
BASOPHILS NFR BLD: 0.7 % (ref 0–1.9)
BUN SERPL-MCNC: 10 MG/DL (ref 8–23)
CALCIUM SERPL-MCNC: 9 MG/DL (ref 8.7–10.5)
CHLORIDE SERPL-SCNC: 100 MMOL/L (ref 95–110)
CO2 SERPL-SCNC: 27 MMOL/L (ref 23–29)
CREAT SERPL-MCNC: 1.2 MG/DL (ref 0.5–1.4)
DIFFERENTIAL METHOD: ABNORMAL
EOSINOPHIL # BLD AUTO: 0.1 K/UL (ref 0–0.5)
EOSINOPHIL NFR BLD: 1 % (ref 0–8)
ERYTHROCYTE [DISTWIDTH] IN BLOOD BY AUTOMATED COUNT: 14.7 % (ref 11.5–14.5)
EST. GFR  (NO RACE VARIABLE): >60 ML/MIN/1.73 M^2
GLUCOSE SERPL-MCNC: 74 MG/DL (ref 70–110)
HCT VFR BLD AUTO: 36.6 % (ref 40–54)
HGB BLD-MCNC: 12.2 G/DL (ref 14–18)
IMM GRANULOCYTES # BLD AUTO: 0.25 K/UL (ref 0–0.04)
IMM GRANULOCYTES NFR BLD AUTO: 4.2 % (ref 0–0.5)
LYMPHOCYTES # BLD AUTO: 0.8 K/UL (ref 1–4.8)
LYMPHOCYTES NFR BLD: 14.1 % (ref 18–48)
MAGNESIUM SERPL-MCNC: 1.4 MG/DL (ref 1.6–2.6)
MCH RBC QN AUTO: 37.1 PG (ref 27–31)
MCHC RBC AUTO-ENTMCNC: 33.3 G/DL (ref 32–36)
MCV RBC AUTO: 111 FL (ref 82–98)
MONOCYTES # BLD AUTO: 0.5 K/UL (ref 0.3–1)
MONOCYTES NFR BLD: 8.4 % (ref 4–15)
NEUTROPHILS # BLD AUTO: 4.3 K/UL (ref 1.8–7.7)
NEUTROPHILS NFR BLD: 71.6 % (ref 38–73)
NRBC BLD-RTO: 0 /100 WBC
PHOSPHATE SERPL-MCNC: 2.5 MG/DL (ref 2.7–4.5)
PLATELET # BLD AUTO: 172 K/UL (ref 150–450)
PMV BLD AUTO: 10.6 FL (ref 9.2–12.9)
POTASSIUM SERPL-SCNC: 3.5 MMOL/L (ref 3.5–5.1)
RBC # BLD AUTO: 3.29 M/UL (ref 4.6–6.2)
SODIUM SERPL-SCNC: 139 MMOL/L (ref 136–145)
TACROLIMUS BLD-MCNC: 8 NG/ML (ref 5–15)
WBC # BLD AUTO: 5.95 K/UL (ref 3.9–12.7)

## 2023-07-17 PROCEDURE — 99214 OFFICE O/P EST MOD 30 MIN: CPT | Mod: PBBFAC | Performed by: OTOLARYNGOLOGY

## 2023-07-17 PROCEDURE — 85025 COMPLETE CBC W/AUTO DIFF WBC: CPT | Performed by: INTERNAL MEDICINE

## 2023-07-17 PROCEDURE — 99203 OFFICE O/P NEW LOW 30 MIN: CPT | Mod: S$PBB,,, | Performed by: OTOLARYNGOLOGY

## 2023-07-17 PROCEDURE — 36415 COLL VENOUS BLD VENIPUNCTURE: CPT | Performed by: INTERNAL MEDICINE

## 2023-07-17 PROCEDURE — 99999 PR PBB SHADOW E&M-EST. PATIENT-LVL IV: ICD-10-PCS | Mod: PBBFAC,,, | Performed by: OTOLARYNGOLOGY

## 2023-07-17 PROCEDURE — 99203 PR OFFICE/OUTPT VISIT, NEW, LEVL III, 30-44 MIN: ICD-10-PCS | Mod: S$PBB,,, | Performed by: OTOLARYNGOLOGY

## 2023-07-17 PROCEDURE — 80069 RENAL FUNCTION PANEL: CPT | Performed by: INTERNAL MEDICINE

## 2023-07-17 PROCEDURE — 99999 PR PBB SHADOW E&M-EST. PATIENT-LVL IV: CPT | Mod: PBBFAC,,, | Performed by: OTOLARYNGOLOGY

## 2023-07-17 PROCEDURE — 99499 UNLISTED E&M SERVICE: CPT | Mod: S$PBB,,, | Performed by: AUDIOLOGIST

## 2023-07-17 PROCEDURE — 80197 ASSAY OF TACROLIMUS: CPT | Performed by: INTERNAL MEDICINE

## 2023-07-17 PROCEDURE — 83735 ASSAY OF MAGNESIUM: CPT | Performed by: INTERNAL MEDICINE

## 2023-07-17 NOTE — PROGRESS NOTES
[facility]    Subjective:      Patient: Abdoulaye Parikh Patient PCP: Primary Doctor No         :  1960     Sex:  male      MRN:  [id number]          Date of Visit: 2023      Chief Complaint: Hearing Loss (Right ear is a complete loss /Left ear is decreasing fast /Audiogram complete 2023/Has been happening for years )      Patient ID: Abdoulaye Parikh is a 62 y.o. male lifelong NON-smoker with  a history of kidney transplant 2023  On prednisone and Prograf as well as antihypertensives referred to me by Dr. Darrin Verma in consultation for  right-sided hearing loss and decreasing left-sided hearing over the course last few years.   Previous history of brain surgery/ CSF shunt placement for normal pressure hydrocephalus No sudden/ acute loss in the last 4 weeks subjectively. Audiogram today without prior audiogram for comparison shows profound to severe what appears to be sensorineural hearing loss to the limits of audiometry bone stimulation on the worse right side with mild-to-moderate sloping to moderately severe left-sided sensorineural hearing loss with left 55 dB SRT and 90 dB right SRT with  right sided WRS of 16% and left side WRS of 96% with normal tympanometry.    Patient experienced right-sided hearing loss around the time of diagnosis of hydrocephalus.  He is had 3 different shunts back-to-back with the 1st 2 failing/becoming infected.  No vertigo other than while he was undergoing treatment for hydrocephalus with placement of  shunt.  He wears over-the-counter hearing aids.  Some fluctuation of hearing loss on the left side subjectively but no more vertigo.     2022 CT head without contrast outside institution - Stable appearance of the supratentorial ventricular system with relative diminutive caliber for patient age may represent a component of chronic overshunting. Stable position of right parietal ventricular shunt with no discontinuity. No acute intracranial  abnormality.      08/26/2018 MRI brain without contrast outside institution - PERSISTENT HYDROCEPHALUS TO THE LEVEL OF THE 4TH VENTRICLE, WHICH MAY BE SECONDARY TO AQUEDUCT STENOSIS. WHEN COMPARED TO THE CT EXAMINATION PERFORMED IN DECEMBER 2008, THERE HAS BEEN NO SIGNIFICANT INTERVAL CHANGE IN THE SIZE AND CONFIGURATION OF THE VENTRICULAR SYSTEM. ADDITIONALLY, THERE IS INCREASED T2/FLAIR SIGNAL IN THE PERIVENTRICULAR WHITE MATTER THAT IS MOST LIKELY SECONDARY TO TRANSEPENDYMAL FLOW OF CSF; HOWEVER, FINDINGS MAY ALSO REPRESENT GLIOSIS SECONDARY TO LONG-STANDING HYDROCEPHALUS.             Review of Systems     Past Medical History  He has a past medical history of Acute blood loss anemia, Anemia, Arthritis, Cirrhosis, CKD (chronic kidney disease) stage 4, GFR 15-29 ml/min, slow graft function with sustaine creatinien improvement, Crohn's disease, Disorder of kidney and ureter, Encounter for blood transfusion, Gout, Hearing loss, Hepatitis, Hypertension, Immunosuppressive management encounter following kidney transplant, Neurosarcoidosis, Obesity, Osteoarthritis, Sarcoid neuropathy, Sarcoidosis, lung, and Steatosis.    Family / Surgical / Social History  His family history includes COPD in his sister; Cancer in his father, maternal grandfather, and paternal grandfather; Diabetes in his father; Heart disease in his father and sister; Hypertension in his father and mother; Lung cancer in his maternal grandfather and paternal grandfather; Multiple myeloma in his father; Stroke in his father.    Past Surgical History:   Procedure Laterality Date    AV FISTULA PLACEMENT Left     BRAIN SURGERY      COLON SURGERY      Exploratory lap x 4 for Chron's disease. Likely right colectomy    CSF SHUNT      DIAGNOSTIC ULTRASOUND N/A 5/15/2023    Procedure: ULTRASOUND, DIAGNOSTIC;  Surgeon: Chivo Brown MD;  Location: [facility] OR [address] [place] [address];  Service: Transplant;  Laterality: N/A;    JOINT REPLACEMENT Right     Hip    KIDNEY  TRANSPLANT N/A 5/10/2023    Procedure: TRANSPLANT, KIDNEY - RQ 7PM START;  Surgeon: Chivo Brown MD;  Location: [address];  Service: Transplant;  Laterality: N/A;    NEPHROSTOMY N/A 6/3/2023    Procedure: Nephrostomy;  Surgeon: Silvia Surgeon;  Location: [facility];  Service: Anesthesiology;  Laterality: N/A;    RENAL BIOPSY  5/15/2023    Procedure: BIOPSY, KIDNEY;  Surgeon: Chivo Brown MD;  Location: [facility] OR [address] [place] [address];  Service: Transplant;;    RENAL EXPLORATION  5/15/2023    Procedure: EXPLORATION, KIDNEY;  Surgeon: Chivo Brown MD;  Location: [address];  Service: Transplant;;    TOTAL HIP ARTHROPLASTY Right        Social History     Tobacco Use    Smoking status: Never    Smokeless tobacco: Never   Substance and Sexual Activity    Alcohol use: Not Currently    Drug use: Never    Sexual activity: Yes     Partners: Female       Medications  He has a current medication list which includes the following prescription(s): acyclovir, atovaquone, fluticasone propionate, furosemide, hydroxyzine hcl, k phos di & mono-sod phos mono, magnesium oxide, metoprolol succinate, mycophenolate, olmesartan, prednisone, sodium bicarbonate, and tacrolimus.      Allergies  Review of patient's allergies indicates:   Allergen Reactions    Bactrim [sulfamethoxazole-trimethoprim] Shortness Of Breath and Itching    Penicillins Shortness Of Breath and Itching       All medications, allergies, and past history have been reviewed.    Objective:      Vitals:  Vitals - 1 value per visit 7/11/2023 7/11/2023 7/17/2023   SYSTOLIC - 151 -   DIASTOLIC - 72 -   Pulse - 66 -   Temp - 97.3 -   Resp - 16 -   SPO2 - 97 -   Weight (lb) - 179.23 -   Weight (kg) - 81.3 -   Height - 69 -   BMI (Calculated) - 26.5 -   VISIT REPORT - - -   Pain Score  0 - 0   Some recent data might be hidden       There is no height or weight on file to calculate BSA.    Physical Exam:    GENERAL  APPEARANCE -  alert, appears stated age, and  cooperative  BARRIER(S) TO COMMUNICATION -  none VOICE - appropriate for age and gender    INTEGUMENTARY  no suspicious head and neck lesions    HEENT  HEAD: Normocephalic, without obvious abnormality, atraumatic  FACE: INSPECTION - Symmetric, no signs of trauma, no suspicious lesion(s)  STRENGTH - facial symmetry    EYES  Normal occular alignment and mobility with no visible nystagmus at rest    EARS/NOSE/MOUTH/THROAT  EARS  PINNAE AND EXTERNAL EARS - no suspicious lesion OTOSCOPIC EXAM (surgical microscopy was used for visualization/instrumentation): EAR EXAM - Normal ear canals, tympanic membranes and mobility, and middle ear spaces bilaterally.  HEARING - very poor/absent on right, functional on left    NOSE AND SINUSES  EXTERNAL NOSE - Grossly normal for age/sex      CHEST AND LUNG   INSPECTION & AUSCULTATION - normal effort, no stridor    CARDIOVASCULAR  AUSCULTATION & PERIPHERAL VASCULAR - regular rate and rhythm.    NEUROLOGIC  MENTAL STATUS - alert, interactive CRANIAL NERVES - normal          Procedure(s):  None    Labs:  WBC   Date Value Ref Range Status   07/17/2023 5.95 3.90 - 12.70 K/uL Final     Hemoglobin   Date Value Ref Range Status   07/17/2023 12.2 (L) 14.0 - 18.0 g/dL Final     Platelets   Date Value Ref Range Status   07/17/2023 172 150 - 450 K/uL Final     Creatinine   Date Value Ref Range Status   07/17/2023 1.2 0.5 - 1.4 mg/dL Final     Glucose   Date Value Ref Range Status   07/17/2023 74 70 - 110 mg/dL Final     Hemoglobin A1C   Date Value Ref Range Status   11/12/2020 4.4 4.0 - 5.6 % Final     Comment:     ADA Screening Guidelines:  5.7-6.4%  Consistent with prediabetes  >or=6.5%  Consistent with diabetes  High levels of fetal hemoglobin interfere with the HbA1C  assay. Heterozygous hemoglobin variants (HbS, HgC, etc)do  not significantly interfere with this assay.   However, presence of multiple variants may affect accuracy.           Assessment:      Problem List Items Addressed This  Visit    None  Visit Diagnoses       Moderately severe hearing loss of better ear    -  Primary    Acquired deafness of right ear        S/P  shunt        History of hydrocephalus                     Plan:        Severe to profound right-sided deafness for many years with subjectively progressive left-sided hearing loss which is ranging from 50-65 decibel hearing levels.  Close monitoring of hearing with repeat audiogram in 3 months with follow-up at that time with Neuro otology for consideration of cochlear implantation.      Patient knows to return sooner for hearing testing and follow-up if there is any subjective worsening of his hearing.

## 2023-07-17 NOTE — PATIENT INSTRUCTIONS
Severe to profound right-sided deafness for many years with subjectively progressive left-sided hearing loss which is ranging from 50-65 decibel hearing levels.  Close monitoring of hearing with repeat audiogram in 3 months with follow-up at that time with Neuro otology for consideration of cochlear implantation.      Patient knows to return sooner for hearing testing and follow-up if there is any subjective worsening of his hearing.

## 2023-07-21 NOTE — PROGRESS NOTES
Kidney Post-Transplant Assessment    Referring Physician: Qunin Espino  Current Nephrologist: Quinn Espino    ORGAN: LEFT KIDNEY  Donor Type: living  PHS Increased Risk: no  Cold Ischemia:  mins  Induction Medications:     Subjective:     CC:  Reassessment of renal allograft function and management of chronic immunosuppression.    HPI:  Mr. Parikh is a 62 y.o. year old Black or  male who received a living kidney transplant on 5/10/23. His most recent creatinine is 1. He takes mycophenolate mofetil, prednisone, and tacrolimus for maintenance immunosuppression. His post transplant course has been complicated by perinephric fluid collection and urine leak .    7/6/2023 Nephrostogram  Impression:     Antegrade nephrostogram demonstrates UVJ stricture.  This was addressed with 8 mm Conquest balloon catheter.  There was improved flow at the end of the procedure.  A new 12 Nigerian biliary drain was placed.     Plan:     Keep open to bag for 24 hours.  Consider capping at 24:00 hours.  Patient to follow-up in IR in 4 weeks for sheath nephrostogram and possible ureteroplasty versus externalization.    7/17/2023    ENT evaluation:  Severe to profound right-sided deafness for many years with subjectively progressive left-sided hearing loss which is ranging from 50-65 decibel hearing levels.  Close monitoring of hearing with repeat audiogram in 3 months with follow-up at that time with Neuro otology for consideration of cochlear implantation.       Patient knows to return sooner for hearing testing and follow-up if there is any subjective worsening of his hearing.      Patient feels fine today, refers sporadically diarrhea    No chest pain or SOB    No nausea vomit    He is here with his care giver    Nephrostomy is cap    He is making normal amount of urine     Current Outpatient Medications on File Prior to Visit   Medication Sig Dispense Refill    acyclovir (ZOVIRAX) 400 MG tablet Take 1  tablet (400 mg total) by mouth 2 (two) times daily. Stop 8/9/23 60 tablet 2    atovaquone (MEPRON) 750 mg/5 mL Susp oral liquid Take 10 mLs (1,500 mg total) by mouth once daily UNTIL 11/7/2023 300 mL 12    fluticasone propionate (FLOVENT HFA) 44 mcg/actuation inhaler Inhale 1 puff into the lungs 2 (two) times daily.      furosemide (LASIX) 20 MG tablet Take 2 tablets (40 mg total) by mouth 2 (two) times a day for 3 days, THEN 1 tablet (20 mg total) 2 (two) times a day for 10 days. 32 tablet 0    hydrOXYzine HCL (ATARAX) 25 MG tablet Take 1 tablet (25 mg total) by mouth 3 (three) times daily as needed for Anxiety. 90 tablet 0    k phos di & mono-sod phos mono (PHOSPHA 250 NEUTRAL) 250 mg Tab Take 1 tablet by mouth 2 (two) times a day. 60 tablet 11    magnesium oxide (MAG-OX) 400 mg (241.3 mg magnesium) tablet Take 1 tablet (400 mg total) by mouth 2 (two) times daily. 60 tablet 11    metoprolol succinate (TOPROL-XL) 25 MG 24 hr tablet Take HALF tablet (12.5 mg total) by mouth once daily. 15 tablet 11    mycophenolate (MYFORTIC) 360 MG TbEC Take 2 tablets (720 mg total) by mouth 2 (two) times daily. Txp Date:5/10/2023 (Kidney) Disch Date:5/17/2023 ICD10:Z94.0 Txp Location:[facility] 120 tablet 11    olmesartan (BENICAR) 20 MG tablet Take 1 tablet (20 mg total) by mouth once daily. 30 tablet 11    predniSONE (DELTASONE) 5 MG tablet Take 20 mg by mouth daily 5/14-5/20; 15 mg daily 5/21-5/27; 10 mg daily 5/28-6/3; then 5 mg daily thereafter 6/4/23 (Patient taking differently: Take 5 mg by mouth once daily.) 70 tablet 11    sodium bicarbonate 650 MG tablet Take 2 tablets (1,300 mg total) by mouth 3 (three) times daily. 180 tablet 11    tacrolimus (PROGRAF) 1 MG Cap Take 2 capsules (2 mg total) by mouth every morning AND 2 capsules (2 mg total) every evening. Txp Date:5/10/2023 (Kidney) Disch Date:5/17/2023 ICD10:Z94.0 Txp Location:[facility] capsule 11     No current facility-administered medications on file prior to visit.     "    Review of Systems    Skin: no skin rash  CNS; no headaches, blurred vision, seizure, or syncope  ENT: No JVD,  Adenopathies,  nasal congestion. No oral lesions  Cardiac: No chest pain, dyspnea, claudication, edema or palpitations  Respiratory: No SOB, cough, hemoptysis   Gastro-intestinal: No diarrhea, constipation, abdominal pain, nausea, vomit. No ascitis  Genitourinary: no hematuria, dysuria, frequency, frequency  Musculoskeletal: joint pain, arthritis or vasculitic changes  Psych: alert awake, oriented, No cranial nerves deficit.      Objective:       Physical Exam    BP (!) 148/70 (BP Location: Right arm, Patient Position: Sitting, BP Method: Medium (Automatic))   Pulse 63   Temp 97.3 °F (36.3 °C) (Tympanic)   Resp 18   Ht 5' 9" (1.753 m)   Wt 78.8 kg (173 lb 11.6 oz)   SpO2 97%   BMI 25.65 kg/m²         Head: normocephalic  Neck: No JVD, cervical axillary, or femoral adenopathies  Heart: no murmurs, Normal s1 and s2, No gallops, no rubs, No murmurs  Lungs; CTA, good respiratory effort, no crackles  Abdomen: soft, non tender, no splenomegaly or hepatomegaly, no massess, no bruits  Extremities: No edema, skin rash, joint pain  SNC: awake, alert oriented. Cranial nerves are intact, no focalized, sensitivity and strength preserved    Labs:  Lab Results   Component Value Date    WBC 5.95 07/17/2023    HGB 12.2 (L) 07/17/2023    HCT 36.6 (L) 07/17/2023     07/17/2023    K 3.5 07/17/2023     07/17/2023    CO2 27 07/17/2023    BUN 10 07/17/2023    CREATININE 1.2 07/17/2023    EGFRNORACEVR >60.0 07/17/2023    GLUCOSE 82 04/08/2023    CALCIUM 9.0 07/17/2023    PHOS 2.5 (L) 07/17/2023    MG 1.4 (L) 07/17/2023    ALBUMIN 3.3 (L) 07/17/2023    AST 34 07/10/2023    ALT 33 07/10/2023    UTPCR 0.27 (H) 07/17/2023    .7 (H) 05/10/2023    TACROLIMUS 8.0 07/17/2023       No results found for: EXTANC, EXTWBC, EXTSEGS, EXTPLATELETS, EXTHEMOGLOBI, EXTHEMATOCRI, EXTCREATININ, EXTSODIUM, EXTPOTASSIUM, " EXTBUN, EXTCO2, EXTCALCIUM, EXTPHOSPHORU, EXTGLUCOSE, EXTALBUMIN, EXTAST, EXTALT, EXTBILITOTAL, EXTLIPASE, EXTAMYLASE    No results found for: EXTCYCLOSLVL, EXTSIROLIMUS, EXTTACROLVL, EXTPROTCRE, EXTPTHINTACT, EXTPROTEINUA, EXTWBCUA, EXTRBCUA    Labs were reviewed with the patient    Assessment:     1. CKD (chronic kidney disease) stage 2, GFR 60-89 ml/min    2. S/P kidney transplant    3. At risk for opportunistic infections    4. Long-term use of immunosuppressant medication    5. Immunosuppressive management encounter following kidney transplant    6. Perinephric fluid collection of kidney transplant        Plan:        1. CKD stage 2 with stable creatinine from 1.1 to 1.3: will continue follow up as per our center guidelines. patient to continue close follow up with the local General nephrologist. Education provided in appropriate fluid intake, potassium intake. Continue with oral hydration.    1A. Pancreatic Function: N/A for non-pancreas allograft recipients:    2. High risk immunosuppression medication for organ transplantation, requiring regular intensive follow up and monitoring : prograf level pending will adjust when resulted Myfortic 720 bid prednisone 5   Lab Results   Component Value Date    TACROLIMUS 8.0 07/17/2023    TACROLIMUS 10.0 07/10/2023    TACROLIMUS 9.8 07/03/2023     No results found for: CYCLOSPORINE  @   Will closely monitor for toxicities, education provided about adherence to medicines and need to communicate any side effects to the transplant nurse or physician.    3. Allograft Function:stable at baseline for the patient. Continue follow up as per our guidelines and with the local General nephrologist. Communication will be sent today.  Lab Results   Component Value Date    CREATININE 1.2 07/17/2023    CREATININE 1.1 07/10/2023    CREATININE 1.3 07/03/2023     No results found for: AMYLASE, LIPASE    4. Hypertension management:  will start metoprolol 12.5 mg bid due to uncontrolled  hypertension I advised patient and care giver to call me with BP and HR. Continue with home blood pressure monitoring, low salt and healthy life discussed with the patient..    5. Metabolic Bone Disease/Secondary Hyperparathyroidism:calcium and phosphorus level discussed with the patient, patient will continue follow up with the general nephrologist for management of metabolic bone disease. Will monitor PTH and Vit D per our transplant center guidelines.      Lab Results   Component Value Date    .7 (H) 05/10/2023    CALCIUM 9.0 07/17/2023    PHOS 2.5 (L) 07/17/2023    PHOS 2.2 (L) 07/10/2023    PHOS 2.4 (L) 07/03/2023       6. Electrolytes and acid base balance: reviewed with the patient, essentially within the normal range no need for acute changes today, will monitor as per our center guidelines.     Lab Results   Component Value Date     07/17/2023    K 3.5 07/17/2023     07/17/2023    CO2 27 07/17/2023    CO2 23 07/10/2023    CO2 25 07/03/2023       7. Anemia: will continue monitoring as per our center guidelines. No indication for acute intervention today.     Lab Results   Component Value Date    WBC 5.95 07/17/2023    HGB 12.2 (L) 07/17/2023    HCT 36.6 (L) 07/17/2023     (H) 07/17/2023     07/17/2023       8.Proteinuria: will continue with pr/cr ratio as per our center guidelines.  Lab Results   Component Value Date    PROTEINURINE 29 (H) 07/17/2023    CREATRANDUR 107.0 07/17/2023    UTPCR 0.27 (H) 07/17/2023    UTPCR 1.36 (H) 07/10/2023    UTPCR 0.33 (H) 07/03/2023        9. BK virus infection screening: will continue with urine or blood PCR as per our guidelines to prevent BK virus viremia and allograft dysfunction  Lab Results   Component Value Date    BKVIRUSPCRQB <125 07/10/2023         10. Weight and metabolic management: education provided provided during the clinic visit.   There is no height or weight on file to calculate BMI.       11.Patient safety education  regarding immunosuppression including prophylaxis posttransplant for CMV, PCP : Education provided about vaccination and prevention of infections.    12.  Cytopenias: no significant cytopenias will monitor as per our guidelines. Medicine list reviewed including potential causes of drug-induced cytopenias     Lab Results   Component Value Date    WBC 5.95 07/17/2023    HGB 12.2 (L) 07/17/2023    HCT 36.6 (L) 07/17/2023     (H) 07/17/2023     07/17/2023       13. Post-transplant Prophylaxis; CMV Infection, PJP and Candida mucosistis and other indicated for this particular patient.   Acyclovir atovaquone   I spoke with the patient for 30 minutes. More than half dedicated to counseling and education. All questions answered    Darrin Verma MD  Transplant Nephrology            Follow-up:   Clinic: return to transplant clinic weekly for the first month after transplant; every 2 weeks during months 2-3; then at 6-, 9-, 12-, 18-, 24-, and 36- months post-transplant to reassess for complications from immunosuppression toxicity and monitor for rejection.  Annually thereafter.    Labs: since patient remains at high risk for rejection and drug-related complications that warrant close monitoring, labs will be ordered as follows: continue twice weekly CBC, renal panel, and drug level for first month; then same labs once weekly through 3rd month post-transplant.  Urine for UA and protein/creatinine ratio monthly.  Serum BK - PCR at 1-, 3-, 6-, 9-, 12-, 18-, 24-, 36-, 48-, and 60 months post-transplant.  Hepatic panel at 1-, 2-, 3-, 6-, 9-, 12-, 18-, 24-, and 36- months post-transplant.    Darrin Verma MD       Education:   Material provided to the patient.  Patient reminded to call with any health changes since these can be early signs of significant complications.  Also, I advised the patient to be sure any new medications or changes of old medications are discussed with either a pharmacist or physician  knowledgeable with transplant to avoid rejection/drug toxicity related to significant drug interactions.    Patient advised that it is recommended that all transplanted patients, and their close contacts and household members receive Covid vaccination.

## 2023-07-24 ENCOUNTER — OFFICE VISIT (OUTPATIENT)
Dept: TRANSPLANT | Facility: CLINIC | Age: 63
End: 2023-07-24
Payer: MEDICARE

## 2023-07-24 ENCOUNTER — LAB VISIT (OUTPATIENT)
Dept: LAB | Facility: HOSPITAL | Age: 63
End: 2023-07-24
Attending: INTERNAL MEDICINE
Payer: MEDICARE

## 2023-07-24 ENCOUNTER — PATIENT MESSAGE (OUTPATIENT)
Dept: TRANSPLANT | Facility: CLINIC | Age: 63
End: 2023-07-24

## 2023-07-24 VITALS
DIASTOLIC BLOOD PRESSURE: 70 MMHG | HEART RATE: 63 BPM | TEMPERATURE: 97 F | SYSTOLIC BLOOD PRESSURE: 148 MMHG | WEIGHT: 173.75 LBS | HEIGHT: 69 IN | OXYGEN SATURATION: 97 % | RESPIRATION RATE: 18 BRPM | BODY MASS INDEX: 25.73 KG/M2

## 2023-07-24 DIAGNOSIS — N28.89 PERINEPHRIC FLUID COLLECTION OF KIDNEY TRANSPLANT: ICD-10-CM

## 2023-07-24 DIAGNOSIS — Z94.0 KIDNEY REPLACED BY TRANSPLANT: ICD-10-CM

## 2023-07-24 DIAGNOSIS — Z91.89 AT RISK FOR OPPORTUNISTIC INFECTIONS: ICD-10-CM

## 2023-07-24 DIAGNOSIS — T86.19 PERINEPHRIC FLUID COLLECTION OF KIDNEY TRANSPLANT: ICD-10-CM

## 2023-07-24 DIAGNOSIS — N18.2 CKD (CHRONIC KIDNEY DISEASE) STAGE 2, GFR 60-89 ML/MIN: Primary | ICD-10-CM

## 2023-07-24 DIAGNOSIS — Z79.899 IMMUNOSUPPRESSIVE MANAGEMENT ENCOUNTER FOLLOWING KIDNEY TRANSPLANT: ICD-10-CM

## 2023-07-24 DIAGNOSIS — Z94.0 S/P KIDNEY TRANSPLANT: ICD-10-CM

## 2023-07-24 DIAGNOSIS — Z79.60 LONG-TERM USE OF IMMUNOSUPPRESSANT MEDICATION: ICD-10-CM

## 2023-07-24 DIAGNOSIS — Z94.0 IMMUNOSUPPRESSIVE MANAGEMENT ENCOUNTER FOLLOWING KIDNEY TRANSPLANT: ICD-10-CM

## 2023-07-24 LAB
ALBUMIN SERPL BCP-MCNC: 3.4 G/DL (ref 3.5–5.2)
ANION GAP SERPL CALC-SCNC: 11 MMOL/L (ref 8–16)
BASOPHILS # BLD AUTO: 0.04 K/UL (ref 0–0.2)
BASOPHILS NFR BLD: 0.8 % (ref 0–1.9)
BUN SERPL-MCNC: 14 MG/DL (ref 8–23)
CALCIUM SERPL-MCNC: 9.4 MG/DL (ref 8.7–10.5)
CHLORIDE SERPL-SCNC: 100 MMOL/L (ref 95–110)
CO2 SERPL-SCNC: 25 MMOL/L (ref 23–29)
CREAT SERPL-MCNC: 1.3 MG/DL (ref 0.5–1.4)
DIFFERENTIAL METHOD: ABNORMAL
EOSINOPHIL # BLD AUTO: 0.1 K/UL (ref 0–0.5)
EOSINOPHIL NFR BLD: 1.4 % (ref 0–8)
ERYTHROCYTE [DISTWIDTH] IN BLOOD BY AUTOMATED COUNT: 14.4 % (ref 11.5–14.5)
EST. GFR  (NO RACE VARIABLE): >60 ML/MIN/1.73 M^2
GLUCOSE SERPL-MCNC: 76 MG/DL (ref 70–110)
HCT VFR BLD AUTO: 38 % (ref 40–54)
HGB BLD-MCNC: 12.8 G/DL (ref 14–18)
IMM GRANULOCYTES # BLD AUTO: 0.25 K/UL (ref 0–0.04)
IMM GRANULOCYTES NFR BLD AUTO: 4.9 % (ref 0–0.5)
LYMPHOCYTES # BLD AUTO: 0.9 K/UL (ref 1–4.8)
LYMPHOCYTES NFR BLD: 17.4 % (ref 18–48)
MAGNESIUM SERPL-MCNC: 1.5 MG/DL (ref 1.6–2.6)
MCH RBC QN AUTO: 37.3 PG (ref 27–31)
MCHC RBC AUTO-ENTMCNC: 33.7 G/DL (ref 32–36)
MCV RBC AUTO: 111 FL (ref 82–98)
MONOCYTES # BLD AUTO: 0.5 K/UL (ref 0.3–1)
MONOCYTES NFR BLD: 9.6 % (ref 4–15)
NEUTROPHILS # BLD AUTO: 3.4 K/UL (ref 1.8–7.7)
NEUTROPHILS NFR BLD: 65.9 % (ref 38–73)
NRBC BLD-RTO: 0 /100 WBC
PHOSPHATE SERPL-MCNC: 2.5 MG/DL (ref 2.7–4.5)
PLATELET # BLD AUTO: 200 K/UL (ref 150–450)
PMV BLD AUTO: 10.3 FL (ref 9.2–12.9)
POTASSIUM SERPL-SCNC: 3.8 MMOL/L (ref 3.5–5.1)
RBC # BLD AUTO: 3.43 M/UL (ref 4.6–6.2)
SODIUM SERPL-SCNC: 136 MMOL/L (ref 136–145)
TACROLIMUS BLD-MCNC: 8.9 NG/ML (ref 5–15)
WBC # BLD AUTO: 5.12 K/UL (ref 3.9–12.7)

## 2023-07-24 PROCEDURE — 83735 ASSAY OF MAGNESIUM: CPT | Performed by: INTERNAL MEDICINE

## 2023-07-24 PROCEDURE — 99999 PR PBB SHADOW E&M-EST. PATIENT-LVL IV: ICD-10-PCS | Mod: PBBFAC,,, | Performed by: INTERNAL MEDICINE

## 2023-07-24 PROCEDURE — 80197 ASSAY OF TACROLIMUS: CPT | Performed by: INTERNAL MEDICINE

## 2023-07-24 PROCEDURE — 36415 COLL VENOUS BLD VENIPUNCTURE: CPT | Performed by: INTERNAL MEDICINE

## 2023-07-24 PROCEDURE — 80069 RENAL FUNCTION PANEL: CPT | Performed by: INTERNAL MEDICINE

## 2023-07-24 PROCEDURE — 99214 OFFICE O/P EST MOD 30 MIN: CPT | Mod: PBBFAC | Performed by: INTERNAL MEDICINE

## 2023-07-24 PROCEDURE — 85025 COMPLETE CBC W/AUTO DIFF WBC: CPT | Performed by: INTERNAL MEDICINE

## 2023-07-24 PROCEDURE — 99999 PR PBB SHADOW E&M-EST. PATIENT-LVL IV: CPT | Mod: PBBFAC,,, | Performed by: INTERNAL MEDICINE

## 2023-07-24 PROCEDURE — 99215 PR OFFICE/OUTPT VISIT, EST, LEVL V, 40-54 MIN: ICD-10-PCS | Mod: S$PBB,,, | Performed by: INTERNAL MEDICINE

## 2023-07-24 PROCEDURE — 99215 OFFICE O/P EST HI 40 MIN: CPT | Mod: S$PBB,,, | Performed by: INTERNAL MEDICINE

## 2023-07-24 RX ORDER — METOPROLOL TARTRATE 25 MG/1
12.5 TABLET, FILM COATED ORAL 2 TIMES DAILY
Qty: 30 TABLET | Refills: 11 | Status: SHIPPED | OUTPATIENT
Start: 2023-07-24 | End: 2024-07-23

## 2023-07-24 NOTE — LETTER
July 24, 2023        Quinn Espino  [address]  [address]  [facility] [address]  Phone: [phone]  Fax: [phone]             [facility] 1st Fl  [address]  [facility] [address]  Phone: [phone]   Patient: Abdoulaye Parikh   MR Number: [id number]   YOB: 1960   Date of Visit: 7/24/2023       Dear Dr. Quinn Espino    Thank you for referring Abdoulaye Parikh to me for evaluation. Attached you will find relevant portions of my assessment and plan of care.    If you have questions, please do not hesitate to call me. I look forward to following Abdoulaye Parikh along with you.    Sincerely,    Darrin Verma MD    Enclosure    If you would like to receive this communication electronically, please contact [email or web address] or [phone] to request [employer] Link access.    [employer] Link is a tool which provides read-only access to select patient information with whom you have a relationship. Its easy to use and provides real time access to review your patients record including encounter summaries, notes, results, and demographic information.    If you feel you have received this communication in error or would no longer like to receive these types of communications, please e-mail [email or web address]

## 2023-07-26 ENCOUNTER — PATIENT MESSAGE (OUTPATIENT)
Dept: TRANSPLANT | Facility: CLINIC | Age: 63
End: 2023-07-26
Payer: MEDICARE

## 2023-07-26 DIAGNOSIS — Z94.0 KIDNEY REPLACED BY TRANSPLANT: Primary | ICD-10-CM

## 2023-07-26 NOTE — TELEPHONE ENCOUNTER
Written order received from Dr. Verma.  Messaged pt regarding lab results and the need to treat the UTI.  Instructed pt to start doxycycline 100 mg twice a day for 7 days.  Will send Erx to patient's local pharmacy.        ----- Message from Jake Messina PharmD sent at 7/26/2023  2:55 PM CDT -----  Sure    Jake  ----- Message -----  From: Gertrudis TORRES Do, RN  Sent: 7/26/2023   2:45 PM CDT  To: Abdominal Transplant Pharmacists      ----- Message -----  From: Darrin Verma MD  Sent: 7/26/2023   1:20 PM CDT  To: [facility] Post-Kidney Transplant Clinical    Let's do doxycycline 100 mg PO bid for 7 days  if ok with our Pharm D

## 2023-07-27 ENCOUNTER — PATIENT MESSAGE (OUTPATIENT)
Dept: TRANSPLANT | Facility: CLINIC | Age: 63
End: 2023-07-27
Payer: MEDICARE

## 2023-07-27 RX ORDER — DOXYCYCLINE 100 MG/1
100 CAPSULE ORAL 2 TIMES DAILY
Qty: 14 CAPSULE | Refills: 0 | Status: SHIPPED | OUTPATIENT
Start: 2023-07-27 | End: 2023-08-18

## 2023-07-31 ENCOUNTER — LAB VISIT (OUTPATIENT)
Dept: LAB | Facility: HOSPITAL | Age: 63
End: 2023-07-31
Attending: INTERNAL MEDICINE
Payer: MEDICARE

## 2023-07-31 DIAGNOSIS — Z94.0 KIDNEY REPLACED BY TRANSPLANT: ICD-10-CM

## 2023-07-31 LAB
ALBUMIN SERPL BCP-MCNC: 3.1 G/DL (ref 3.5–5.2)
ANION GAP SERPL CALC-SCNC: 10 MMOL/L (ref 8–16)
BASOPHILS NFR BLD: 0 % (ref 0–1.9)
BUN SERPL-MCNC: 15 MG/DL (ref 8–23)
CALCIUM SERPL-MCNC: 9.2 MG/DL (ref 8.7–10.5)
CHLORIDE SERPL-SCNC: 106 MMOL/L (ref 95–110)
CO2 SERPL-SCNC: 23 MMOL/L (ref 23–29)
CREAT SERPL-MCNC: 1.3 MG/DL (ref 0.5–1.4)
DIFFERENTIAL METHOD: ABNORMAL
EOSINOPHIL NFR BLD: 0 % (ref 0–8)
ERYTHROCYTE [DISTWIDTH] IN BLOOD BY AUTOMATED COUNT: 13.9 % (ref 11.5–14.5)
EST. GFR  (NO RACE VARIABLE): >60 ML/MIN/1.73 M^2
GLUCOSE SERPL-MCNC: 76 MG/DL (ref 70–110)
HCT VFR BLD AUTO: 36.6 % (ref 40–54)
HGB BLD-MCNC: 12.3 G/DL (ref 14–18)
HYPOCHROMIA BLD QL SMEAR: ABNORMAL
IMM GRANULOCYTES # BLD AUTO: ABNORMAL K/UL (ref 0–0.04)
IMM GRANULOCYTES NFR BLD AUTO: ABNORMAL % (ref 0–0.5)
INR PPP: 1 (ref 0.8–1.2)
LYMPHOCYTES NFR BLD: 10 % (ref 18–48)
MAGNESIUM SERPL-MCNC: 1.5 MG/DL (ref 1.6–2.6)
MCH RBC QN AUTO: 36.7 PG (ref 27–31)
MCHC RBC AUTO-ENTMCNC: 33.6 G/DL (ref 32–36)
MCV RBC AUTO: 109 FL (ref 82–98)
METAMYELOCYTES NFR BLD MANUAL: 1 %
MONOCYTES NFR BLD: 7 % (ref 4–15)
NEUTROPHILS NFR BLD: 82 % (ref 38–73)
NRBC BLD-RTO: 0 /100 WBC
PHOSPHATE SERPL-MCNC: 2.8 MG/DL (ref 2.7–4.5)
PLATELET # BLD AUTO: 193 K/UL (ref 150–450)
PLATELET BLD QL SMEAR: ABNORMAL
PMV BLD AUTO: 10.4 FL (ref 9.2–12.9)
POTASSIUM SERPL-SCNC: 4.1 MMOL/L (ref 3.5–5.1)
PROTHROMBIN TIME: 11.1 SEC (ref 9–12.5)
RBC # BLD AUTO: 3.35 M/UL (ref 4.6–6.2)
SODIUM SERPL-SCNC: 139 MMOL/L (ref 136–145)
TACROLIMUS BLD-MCNC: 8.2 NG/ML (ref 5–15)
WBC # BLD AUTO: 4.83 K/UL (ref 3.9–12.7)

## 2023-07-31 PROCEDURE — 80197 ASSAY OF TACROLIMUS: CPT | Performed by: INTERNAL MEDICINE

## 2023-07-31 PROCEDURE — 83735 ASSAY OF MAGNESIUM: CPT | Performed by: INTERNAL MEDICINE

## 2023-07-31 PROCEDURE — 85610 PROTHROMBIN TIME: CPT

## 2023-07-31 PROCEDURE — 85027 COMPLETE CBC AUTOMATED: CPT | Performed by: INTERNAL MEDICINE

## 2023-07-31 PROCEDURE — 87799 DETECT AGENT NOS DNA QUANT: CPT | Performed by: INTERNAL MEDICINE

## 2023-07-31 PROCEDURE — 80069 RENAL FUNCTION PANEL: CPT | Performed by: INTERNAL MEDICINE

## 2023-07-31 PROCEDURE — 85007 BL SMEAR W/DIFF WBC COUNT: CPT | Performed by: INTERNAL MEDICINE

## 2023-08-01 ENCOUNTER — PATIENT MESSAGE (OUTPATIENT)
Dept: INTERVENTIONAL RADIOLOGY/VASCULAR | Facility: HOSPITAL | Age: 63
End: 2023-08-01
Payer: MEDICARE

## 2023-08-03 ENCOUNTER — HOSPITAL ENCOUNTER (OUTPATIENT)
Dept: INTERVENTIONAL RADIOLOGY/VASCULAR | Facility: HOSPITAL | Age: 63
Discharge: HOME | End: 2023-08-03
Payer: MEDICARE

## 2023-08-03 ENCOUNTER — TELEPHONE (OUTPATIENT)
Dept: TRANSPLANT | Facility: CLINIC | Age: 63
End: 2023-08-03
Payer: MEDICARE

## 2023-08-03 VITALS
OXYGEN SATURATION: 100 % | HEIGHT: 69 IN | WEIGHT: 174 LBS | DIASTOLIC BLOOD PRESSURE: 73 MMHG | HEART RATE: 51 BPM | SYSTOLIC BLOOD PRESSURE: 138 MMHG | TEMPERATURE: 97 F | RESPIRATION RATE: 16 BRPM | BODY MASS INDEX: 25.77 KG/M2

## 2023-08-03 DIAGNOSIS — Z94.0 KIDNEY REPLACED BY TRANSPLANT: Primary | ICD-10-CM

## 2023-08-03 PROCEDURE — 25500020 PHARM REV CODE 255

## 2023-08-03 PROCEDURE — 50706 BALLOON DILATE URTRL STRIX: CPT | Performed by: RADIOLOGY

## 2023-08-03 PROCEDURE — 27201059 IR NEPHROSTOGRAM

## 2023-08-03 PROCEDURE — 63600175 PHARM REV CODE 636 W HCPCS: Performed by: INTERNAL MEDICINE

## 2023-08-03 PROCEDURE — 99152 PR MOD CONSCIOUS SEDATION, SAME PHYS, 5+ YRS, FIRST 15 MIN: ICD-10-PCS | Mod: ,,, | Performed by: RADIOLOGY

## 2023-08-03 PROCEDURE — 50706 PR BALLOON URETERALSTRIX, INCL GUIDE, S&I: ICD-10-PCS | Mod: RT,,, | Performed by: RADIOLOGY

## 2023-08-03 PROCEDURE — 99152 MOD SED SAME PHYS/QHP 5/>YRS: CPT | Mod: ,,, | Performed by: RADIOLOGY

## 2023-08-03 PROCEDURE — 50387 IR NEPHROSTOGRAM: ICD-10-PCS | Mod: RT,,, | Performed by: RADIOLOGY

## 2023-08-03 PROCEDURE — 25000003 PHARM REV CODE 250

## 2023-08-03 PROCEDURE — 50387 CHANGE NEPHROURETERAL CATH: CPT | Performed by: RADIOLOGY

## 2023-08-03 PROCEDURE — 50706 BALLOON DILATE URTRL STRIX: CPT | Mod: RT,,, | Performed by: RADIOLOGY

## 2023-08-03 PROCEDURE — 99152 MOD SED SAME PHYS/QHP 5/>YRS: CPT | Performed by: RADIOLOGY

## 2023-08-03 RX ORDER — FENTANYL CITRATE 50 UG/ML
INJECTION, SOLUTION INTRAMUSCULAR; INTRAVENOUS
Status: COMPLETED | OUTPATIENT
Start: 2023-08-03 | End: 2023-08-03

## 2023-08-03 RX ORDER — LIDOCAINE HYDROCHLORIDE 10 MG/ML
1 INJECTION, SOLUTION EPIDURAL; INFILTRATION; INTRACAUDAL; PERINEURAL ONCE
Status: COMPLETED | OUTPATIENT
Start: 2023-08-03 | End: 2023-08-03

## 2023-08-03 RX ORDER — SODIUM CHLORIDE 9 MG/ML
INJECTION, SOLUTION INTRAVENOUS CONTINUOUS
Status: DISCONTINUED | OUTPATIENT
Start: 2023-08-03 | End: 2023-08-04 | Stop reason: HOSPADM

## 2023-08-03 RX ORDER — MIDAZOLAM HYDROCHLORIDE 1 MG/ML
INJECTION INTRAMUSCULAR; INTRAVENOUS
Status: COMPLETED | OUTPATIENT
Start: 2023-08-03 | End: 2023-08-03

## 2023-08-03 RX ADMIN — MIDAZOLAM HYDROCHLORIDE 1 MG: 2 INJECTION, SOLUTION INTRAMUSCULAR; INTRAVENOUS at 09:08

## 2023-08-03 RX ADMIN — FENTANYL CITRATE 25 MCG: 50 INJECTION, SOLUTION INTRAMUSCULAR; INTRAVENOUS at 09:08

## 2023-08-03 RX ADMIN — MIDAZOLAM HYDROCHLORIDE 0.5 MG: 2 INJECTION, SOLUTION INTRAMUSCULAR; INTRAVENOUS at 09:08

## 2023-08-03 RX ADMIN — FENTANYL CITRATE 50 MCG: 50 INJECTION, SOLUTION INTRAMUSCULAR; INTRAVENOUS at 10:08

## 2023-08-03 RX ADMIN — LIDOCAINE HYDROCHLORIDE 10 MG: 10 INJECTION, SOLUTION EPIDURAL; INFILTRATION; INTRACAUDAL; PERINEURAL at 09:08

## 2023-08-03 RX ADMIN — IOHEXOL 30 ML: 300 INJECTION, SOLUTION INTRAVENOUS at 10:08

## 2023-08-03 RX ADMIN — FENTANYL CITRATE 50 MCG: 50 INJECTION, SOLUTION INTRAMUSCULAR; INTRAVENOUS at 09:08

## 2023-08-03 RX ADMIN — MIDAZOLAM HYDROCHLORIDE 1 MG: 2 INJECTION, SOLUTION INTRAMUSCULAR; INTRAVENOUS at 10:08

## 2023-08-03 NOTE — SEDATION DOCUMENTATION
Pt arrived to IR Dept for sheath nephrostogram with possible intervention Pt oriented to unit and staff. Plan of care reviewed with patient, patient verbalizes understanding. Comfort measures utilized. Pt safely transferred from stretcher to procedural table. Fall risk reviewed with patient, fall risk interventions maintained. Safety strap applied, positioner pillows utilized to minimize pressure points. Blankets applied. Pt prepped and draped utilizing standard sterile technique. Patient placed on continuous monitoring, as required by sedation policy. Timeouts completed utilizing standard universal time-out, per department and facility policy. RN to remain at bedside, continuous monitoring maintained. Pt resting comfortably. Denies pain/discomfort. Will continue to monitor. See flow sheets for monitoring, medication administration, and updates.

## 2023-08-03 NOTE — PLAN OF CARE
Patient ambulated on unit this morning with wife at his side.  Denies pain or SOB.  Verbalized an understanding of procedure.  Oriented to unit and call bell provided.  Will continue to monitor.

## 2023-08-03 NOTE — PROGRESS NOTES
Pt discharged. D/c instructions reviewed with patient and family. All questions answered at this time. VSS. Site CDI. Pt transported via wheelchair to garage.

## 2023-08-03 NOTE — DISCHARGE INSTRUCTIONS
Please call with any questions or concerns.      Monday thru Friday 8:00 am - 4:30 pm    Interventional Radiology   [phone]    After Hours    Ask for the Radiology Resident on call  [phone]

## 2023-08-03 NOTE — TELEPHONE ENCOUNTER
Return call to patient asking what is the next step after Nephrostogram today.  Voice a understanding that we are still awaiting plan from IR.  ----- Message from Monet Bahena sent at 8/3/2023  1:57 PM CDT -----  Regarding: Consult Advisory  Pt is requesting a callback regarding a procedure he had grayson on today. Please adv pt            Confirmed contact below:   Contact Name:Abdoulaye Parikh  Phone Number: [phone]

## 2023-08-03 NOTE — PROCEDURES
VIR Post-Procedure Note    Pre Op Diagnosis:  RLQ transplant ureteral strictures  Post Op Diagnosis:  same    Procedure: RLQ  percutaneous nephroureteral tube replacement + Ureteroplasty    Procedure performed by: Ernie Garcia MD /  Wander Haywood MD    Written Informed Consent Obtained: Yes    Specimen Removed: NO    Estimated Blood Loss: Minimal    Findings:     Moderate sedation    Nephrostogram showed stricture at proximal and distal ureter.     Balloon ureteroplasty using 8 mm balloon with mild improvement.     Successful placement of 14 Fr biliary drain (upsized from 12 Fr)    Plan:     Place tube to bag drainage             Ernie Garcia MD  VIR Fellow

## 2023-08-03 NOTE — DISCHARGE SUMMARY
VIR Discharge Summary      Hospital Course: No complications    Admit Date: 8/3/2023  Discharge Date: 08/03/2023     Instructions Given to Patient: Yes  Diet: Resume prior diet  Activity: activity as tolerated and no driving for today    Description of Condition on Discharge: Stable  Vital Signs (Most Recent): Temp: 97.5 °F (36.4 °C) (08/03/23 0800)  Pulse: (!) 56 (08/03/23 1003)  Resp: 15 (08/03/23 1003)  BP: 126/71 (08/03/23 1003)  SpO2: 100 % (08/03/23 1003)    Discharge Disposition: Home    Discharge Diagnosis: ureteral stricture     Follow-up: patient to come back in 4 weeks for ureteroplasty and exchange      Ernie Garcia MD  VIR Fellow

## 2023-08-03 NOTE — H&P
Radiology History & Physical      SUBJECTIVE:     Chief Complaint: ureteral stricture    History of Present Illness:  Abdoulaye Parikh is a 62 y.o. male who presents for sheath nephrostogram with possible intervention. No issues with current catheter.    Past Medical History:   Diagnosis Date    Acute blood loss anemia 5/11/2023    Anemia     Arthritis     Cirrhosis     CKD (chronic kidney disease) stage 4, GFR 15-29 ml/min, slow graft function with sustaine creatinien improvement 5/26/2023    Crohn's disease     Disorder of kidney and ureter     Stage 5    Encounter for blood transfusion     Gout     Hearing loss     Hepatitis     Had Hep C Cleared    Hypertension     Immunosuppressive management encounter following kidney transplant 5/26/2023    Neurosarcoidosis 2018    Obesity     Osteoarthritis     Sarcoid neuropathy     Sarcoidosis, lung     Steatosis      Past Surgical History:   Procedure Laterality Date    AV FISTULA PLACEMENT Left     BRAIN SURGERY      COLON SURGERY      Exploratory lap x 4 for Chron's disease. Likely right colectomy    CSF SHUNT      DIAGNOSTIC ULTRASOUND N/A 5/15/2023    Procedure: ULTRASOUND, DIAGNOSTIC;  Surgeon: Chivo Brown MD;  Location: [address];  Service: Transplant;  Laterality: N/A;    JOINT REPLACEMENT Right     Hip    KIDNEY TRANSPLANT N/A 5/10/2023    Procedure: TRANSPLANT, KIDNEY - RQ 7PM START;  Surgeon: Chivo Brown MD;  Location: [address];  Service: Transplant;  Laterality: N/A;    NEPHROSTOMY N/A 6/3/2023    Procedure: Nephrostomy;  Surgeon: Silvia Surgeon;  Location: [facility];  Service: Anesthesiology;  Laterality: N/A;    RENAL BIOPSY  5/15/2023    Procedure: BIOPSY, KIDNEY;  Surgeon: Chivo Brown MD;  Location: [address];  Service: Transplant;;    RENAL EXPLORATION  5/15/2023    Procedure: EXPLORATION, KIDNEY;  Surgeon: Chivo Brown MD;  Location: [address];  Service: Transplant;;    TOTAL HIP  ARTHROPLASTY Right        Home Meds:   Prior to Admission medications    Medication Sig Start Date End Date Taking? Authorizing Provider   acyclovir (ZOVIRAX) 400 MG tablet Take 1 tablet (400 mg total) by mouth 2 (two) times daily. Stop 8/9/23 5/11/23 8/15/23 Yes Zeke Devine Jr., MD   atovaquone (MEPRON) 750 mg/5 mL Susp oral liquid Take 10 mLs (1,500 mg total) by mouth once daily UNTIL 11/7/2023 6/8/23  Yes Darrin Verma MD   doxycycline (VIBRAMYCIN) 100 MG Cap Take 1 capsule (100 mg total) by mouth 2 (two) times daily. for 7 days 7/27/23 8/4/23 Yes Darrin Verma MD   fluticasone propionate (FLOVENT HFA) 44 mcg/actuation inhaler Inhale 1 puff into the lungs 2 (two) times daily. 6/26/23  Yes Provider, Chriss   hydrOXYzine HCL (ATARAX) 25 MG tablet Take 1 tablet (25 mg total) by mouth 3 (three) times daily as needed for Anxiety. 7/3/23  Yes Maggie Mcgovern MD   k phos di & mono-sod phos mono (PHOSPHA 250 NEUTRAL) 250 mg Tab Take 1 tablet by mouth 2 (two) times a day. 6/26/23 6/25/24 Yes Elsy Ramachandran MD   magnesium oxide (MAG-OX) 400 mg (241.3 mg magnesium) tablet Take 1 tablet (400 mg total) by mouth 2 (two) times daily. 7/10/23 7/9/24 Yes Darrin Verma MD   metoprolol succinate (TOPROL-XL) 25 MG 24 hr tablet Take HALF tablet (12.5 mg total) by mouth once daily. 5/18/23 5/17/24 Yes Rodolfo Samuels MD   metoprolol tartrate (LOPRESSOR) 25 MG tablet Take 0.5 tablets (12.5 mg total) by mouth 2 (two) times daily. 7/24/23 7/23/24 Yes Darrin Verma MD   mycophenolate (MYFORTIC) 360 MG TbEC Take 2 tablets (720 mg total) by mouth 2 (two) times daily. Txp Date:5/10/2023 (Kidney) Disch Date:5/17/2023 ICD10:Z94.0 Txp Location:[facility] 6/14/23 6/13/24 Yes Darrin Verma MD   olmesartan (BENICAR) 20 MG tablet Take 1 tablet (20 mg total) by mouth once daily. 7/13/23  Yes Darrin Verma MD   predniSONE (DELTASONE) 5 MG tablet Take 20 mg by mouth daily 5/14-5/20; 15 mg daily 5/21-5/27;  10 mg daily 5/28-6/3; then 5 mg daily thereafter 6/4/23  Patient taking differently: Take 5 mg by mouth once daily. 5/11/23  Yes Zeke Devine Jr., MD   sodium bicarbonate 650 MG tablet Take 2 tablets (1,300 mg total) by mouth 3 (three) times daily. 6/6/23 6/5/24 Yes Beatrice Mendez DO   tacrolimus (PROGRAF) 1 MG Cap Take 2 capsules (2 mg total) by mouth every morning AND 2 capsules (2 mg total) every evening. Txp Date:5/10/2023 (Kidney) Disch Date:5/17/2023 ICD10:Z94.0 Txp Location:[facility]. 7/10/23 7/9/24 Yes Darrin Verma MD   furosemide (LASIX) 20 MG tablet Take 2 tablets (40 mg total) by mouth 2 (two) times a day for 3 days, THEN 1 tablet (20 mg total) 2 (two) times a day for 10 days. 7/13/23 7/27/23  Darrin Verma MD     Anticoagulants/Antiplatelets:  reviewed    Allergies:   Review of patient's allergies indicates:   Allergen Reactions    Bactrim [sulfamethoxazole-trimethoprim] Shortness Of Breath and Itching    Penicillins Shortness Of Breath and Itching     Sedation History:  no adverse reactions    Review of Systems:   Hematological: no known coagulopathies  Respiratory: no shortness of breath  Cardiovascular: no chest pain  Gastrointestinal: no abdominal pain  Genito-Urinary: no dysuria  Musculoskeletal: negative  Neurological: no TIA or stroke symptoms         OBJECTIVE:     Vital Signs (Most Recent)  Temp: 97.5 °F (36.4 °C) (08/03/23 0800)  Pulse: (!) 55 (08/03/23 0800)  Resp: 17 (08/03/23 0800)  BP: (!) 156/72 (08/03/23 0801)  SpO2: 99 % (08/03/23 0800)    Physical Exam:  ASA: II  Mallampati: II    General: no acute distress  Mental Status: alert and oriented to person, place and time  HEENT: normocephalic, atraumatic  Chest: unlabored breathing  Heart: regular heart rate  Abdomen: nondistended  Extremity: moves all extremities    Laboratory  Lab Results   Component Value Date    INR 1.0 07/31/2023       Lab Results   Component Value Date    WBC 4.83 07/31/2023    HGB 12.3 (L) 07/31/2023     HCT 36.6 (L) 07/31/2023     (H) 07/31/2023     07/31/2023      Lab Results   Component Value Date    GLU 76 07/31/2023     07/31/2023    K 4.1 07/31/2023     07/31/2023    CO2 23 07/31/2023    BUN 15 07/31/2023    CREATININE 1.3 07/31/2023    CALCIUM 9.2 07/31/2023    MG 1.5 (L) 07/31/2023    ALT 33 07/10/2023    AST 34 07/10/2023    ALBUMIN 3.1 (L) 07/31/2023    BILITOT 0.9 07/10/2023    BILIDIR 0.4 (H) 07/10/2023       ASSESSMENT/PLAN:     Sedation Plan: up to moderate  Patient will undergo sheath nephrostogram with possible intervention.    Jose Chatterjee MD PGY-3  Department of Radiology  [facility]

## 2023-08-04 DIAGNOSIS — Z94.0 KIDNEY REPLACED BY TRANSPLANT: Primary | ICD-10-CM

## 2023-08-04 DIAGNOSIS — Z94.0 S/P KIDNEY TRANSPLANT: ICD-10-CM

## 2023-08-07 ENCOUNTER — TELEPHONE (OUTPATIENT)
Dept: TRANSPLANT | Facility: CLINIC | Age: 63
End: 2023-08-07
Payer: MEDICARE

## 2023-08-07 ENCOUNTER — LAB VISIT (OUTPATIENT)
Dept: LAB | Facility: HOSPITAL | Age: 63
End: 2023-08-07
Attending: INTERNAL MEDICINE
Payer: MEDICARE

## 2023-08-07 ENCOUNTER — TELEPHONE (OUTPATIENT)
Dept: INTERVENTIONAL RADIOLOGY/VASCULAR | Facility: CLINIC | Age: 63
End: 2023-08-07
Payer: MEDICARE

## 2023-08-07 DIAGNOSIS — Z94.0 KIDNEY REPLACED BY TRANSPLANT: ICD-10-CM

## 2023-08-07 LAB
ALBUMIN SERPL BCP-MCNC: 3.1 G/DL (ref 3.5–5.2)
ALBUMIN SERPL BCP-MCNC: 3.1 G/DL (ref 3.5–5.2)
ALP SERPL-CCNC: 131 U/L (ref 55–135)
ALT SERPL W/O P-5'-P-CCNC: 18 U/L (ref 10–44)
ANION GAP SERPL CALC-SCNC: 12 MMOL/L (ref 8–16)
AST SERPL-CCNC: 23 U/L (ref 10–40)
BASOPHILS # BLD AUTO: 0.03 K/UL (ref 0–0.2)
BASOPHILS NFR BLD: 0.6 % (ref 0–1.9)
BILIRUB DIRECT SERPL-MCNC: 0.4 MG/DL (ref 0.1–0.3)
BILIRUB SERPL-MCNC: 0.7 MG/DL (ref 0.1–1)
BUN SERPL-MCNC: 14 MG/DL (ref 8–23)
CALCIUM SERPL-MCNC: 9.2 MG/DL (ref 8.7–10.5)
CHLORIDE SERPL-SCNC: 103 MMOL/L (ref 95–110)
CO2 SERPL-SCNC: 21 MMOL/L (ref 23–29)
CREAT SERPL-MCNC: 1.2 MG/DL (ref 0.5–1.4)
DIFFERENTIAL METHOD: ABNORMAL
EOSINOPHIL # BLD AUTO: 0 K/UL (ref 0–0.5)
EOSINOPHIL NFR BLD: 0.9 % (ref 0–8)
ERYTHROCYTE [DISTWIDTH] IN BLOOD BY AUTOMATED COUNT: 13.6 % (ref 11.5–14.5)
EST. GFR  (NO RACE VARIABLE): >60 ML/MIN/1.73 M^2
GLUCOSE SERPL-MCNC: 69 MG/DL (ref 70–110)
HCT VFR BLD AUTO: 37.2 % (ref 40–54)
HGB BLD-MCNC: 12.7 G/DL (ref 14–18)
IMM GRANULOCYTES # BLD AUTO: 0.23 K/UL (ref 0–0.04)
IMM GRANULOCYTES NFR BLD AUTO: 4.9 % (ref 0–0.5)
LYMPHOCYTES # BLD AUTO: 1 K/UL (ref 1–4.8)
LYMPHOCYTES NFR BLD: 22.2 % (ref 18–48)
MAGNESIUM SERPL-MCNC: 1.7 MG/DL (ref 1.6–2.6)
MCH RBC QN AUTO: 37.1 PG (ref 27–31)
MCHC RBC AUTO-ENTMCNC: 34.1 G/DL (ref 32–36)
MCV RBC AUTO: 109 FL (ref 82–98)
MONOCYTES # BLD AUTO: 0.3 K/UL (ref 0.3–1)
MONOCYTES NFR BLD: 6.5 % (ref 4–15)
NEUTROPHILS # BLD AUTO: 3 K/UL (ref 1.8–7.7)
NEUTROPHILS NFR BLD: 64.9 % (ref 38–73)
NRBC BLD-RTO: 0 /100 WBC
PHOSPHATE SERPL-MCNC: 2.6 MG/DL (ref 2.7–4.5)
PLATELET # BLD AUTO: 207 K/UL (ref 150–450)
PMV BLD AUTO: 10.2 FL (ref 9.2–12.9)
POTASSIUM SERPL-SCNC: 4.1 MMOL/L (ref 3.5–5.1)
PROT SERPL-MCNC: 8.3 G/DL (ref 6–8.4)
RBC # BLD AUTO: 3.42 M/UL (ref 4.6–6.2)
SODIUM SERPL-SCNC: 136 MMOL/L (ref 136–145)
TACROLIMUS BLD-MCNC: 5.9 NG/ML (ref 5–15)
WBC # BLD AUTO: 4.65 K/UL (ref 3.9–12.7)

## 2023-08-07 PROCEDURE — 83735 ASSAY OF MAGNESIUM: CPT | Performed by: INTERNAL MEDICINE

## 2023-08-07 PROCEDURE — 80197 ASSAY OF TACROLIMUS: CPT | Performed by: INTERNAL MEDICINE

## 2023-08-07 PROCEDURE — 87799 DETECT AGENT NOS DNA QUANT: CPT | Performed by: INTERNAL MEDICINE

## 2023-08-07 PROCEDURE — 85025 COMPLETE CBC W/AUTO DIFF WBC: CPT | Performed by: INTERNAL MEDICINE

## 2023-08-07 PROCEDURE — 80069 RENAL FUNCTION PANEL: CPT | Performed by: INTERNAL MEDICINE

## 2023-08-07 PROCEDURE — 84075 ASSAY ALKALINE PHOSPHATASE: CPT | Performed by: INTERNAL MEDICINE

## 2023-08-07 NOTE — TELEPHONE ENCOUNTER
Spoke to pt on phone,  Pt is scheduled on 9/1/2023 for IR procedure at  location.  Preop instructions given (NPO after midnight, MUST have a ride home, Nurse will call 1-2  days before to go over instructions and medications), pt verbally understood. Pt aware and confirmed, Thanks

## 2023-08-07 NOTE — TELEPHONE ENCOUNTER
Patient repeated back and voice a understanding of orders.  States Tacro level is not a 12 hour trough.  States he has been taking his Prograf dose 2 mg at 8am and 2mg at 2pm.  Patient voice  understanding that he needs to be taking Prograf at 8am and 8pm, 12 hour prograf trough reviewed with patient and agreed to repeat level tomorrow 8/8.   ----- Message from Darrin Verma MD sent at 8/7/2023 10:39 AM CDT -----  Please increase prograf to 3/2

## 2023-08-08 ENCOUNTER — TELEPHONE (OUTPATIENT)
Dept: TRANSPLANT | Facility: CLINIC | Age: 63
End: 2023-08-08
Payer: MEDICARE

## 2023-08-08 ENCOUNTER — LAB VISIT (OUTPATIENT)
Dept: LAB | Facility: HOSPITAL | Age: 63
End: 2023-08-08
Attending: INTERNAL MEDICINE
Payer: MEDICARE

## 2023-08-08 DIAGNOSIS — Z94.0 KIDNEY REPLACED BY TRANSPLANT: ICD-10-CM

## 2023-08-08 LAB — TACROLIMUS BLD-MCNC: 6.7 NG/ML (ref 5–15)

## 2023-08-08 PROCEDURE — 36415 COLL VENOUS BLD VENIPUNCTURE: CPT | Performed by: INTERNAL MEDICINE

## 2023-08-08 PROCEDURE — 80197 ASSAY OF TACROLIMUS: CPT | Performed by: INTERNAL MEDICINE

## 2023-08-08 NOTE — TELEPHONE ENCOUNTER
Results reviewed with patient and reminded to take Tacrolimus BID at 8am and 8pm. Next labs on 8/14.  ----- Message from Darrin Verma MD sent at 8/8/2023 10:19 AM CDT -----  Labs and diagnostic tests were reviewed. No action/changes indicated.

## 2023-08-10 ENCOUNTER — TELEPHONE (OUTPATIENT)
Dept: TRANSPLANT | Facility: CLINIC | Age: 63
End: 2023-08-10
Payer: MEDICARE

## 2023-08-10 NOTE — TELEPHONE ENCOUNTER
RONEN from Dr COLTON Devine : Cap Nephrostomy to see if could void without any issues.  _____________  Patient repeated back and voice a understanding of orders and capped his Nephrostomy during conversation.  Patient to keep coordinator posted.

## 2023-08-12 ENCOUNTER — PATIENT MESSAGE (OUTPATIENT)
Dept: TRANSPLANT | Facility: CLINIC | Age: 63
End: 2023-08-12
Payer: MEDICARE

## 2023-08-14 ENCOUNTER — LAB VISIT (OUTPATIENT)
Dept: LAB | Facility: HOSPITAL | Age: 63
End: 2023-08-14
Attending: INTERNAL MEDICINE
Payer: MEDICARE

## 2023-08-14 DIAGNOSIS — Z94.0 KIDNEY REPLACED BY TRANSPLANT: ICD-10-CM

## 2023-08-14 LAB
ALBUMIN SERPL BCP-MCNC: 3.1 G/DL (ref 3.5–5.2)
ANION GAP SERPL CALC-SCNC: 12 MMOL/L (ref 8–16)
BASOPHILS # BLD AUTO: 0.06 K/UL (ref 0–0.2)
BASOPHILS NFR BLD: 1.3 % (ref 0–1.9)
BUN SERPL-MCNC: 11 MG/DL (ref 8–23)
CALCIUM SERPL-MCNC: 8.8 MG/DL (ref 8.7–10.5)
CHLORIDE SERPL-SCNC: 102 MMOL/L (ref 95–110)
CO2 SERPL-SCNC: 22 MMOL/L (ref 23–29)
CREAT SERPL-MCNC: 1.1 MG/DL (ref 0.5–1.4)
DIFFERENTIAL METHOD: ABNORMAL
EOSINOPHIL # BLD AUTO: 0.1 K/UL (ref 0–0.5)
EOSINOPHIL NFR BLD: 1.5 % (ref 0–8)
ERYTHROCYTE [DISTWIDTH] IN BLOOD BY AUTOMATED COUNT: 13.7 % (ref 11.5–14.5)
EST. GFR  (NO RACE VARIABLE): >60 ML/MIN/1.73 M^2
GLUCOSE SERPL-MCNC: 65 MG/DL (ref 70–110)
HCT VFR BLD AUTO: 37.9 % (ref 40–54)
HGB BLD-MCNC: 13 G/DL (ref 14–18)
IMM GRANULOCYTES # BLD AUTO: 0.22 K/UL (ref 0–0.04)
IMM GRANULOCYTES NFR BLD AUTO: 4.6 % (ref 0–0.5)
LYMPHOCYTES # BLD AUTO: 0.9 K/UL (ref 1–4.8)
LYMPHOCYTES NFR BLD: 18.1 % (ref 18–48)
MAGNESIUM SERPL-MCNC: 1.7 MG/DL (ref 1.6–2.6)
MCH RBC QN AUTO: 36.7 PG (ref 27–31)
MCHC RBC AUTO-ENTMCNC: 34.3 G/DL (ref 32–36)
MCV RBC AUTO: 107 FL (ref 82–98)
MONOCYTES # BLD AUTO: 0.4 K/UL (ref 0.3–1)
MONOCYTES NFR BLD: 9.2 % (ref 4–15)
NEUTROPHILS # BLD AUTO: 3.1 K/UL (ref 1.8–7.7)
NEUTROPHILS NFR BLD: 65.3 % (ref 38–73)
NRBC BLD-RTO: 0 /100 WBC
PHOSPHATE SERPL-MCNC: 2.3 MG/DL (ref 2.7–4.5)
PLATELET # BLD AUTO: 184 K/UL (ref 150–450)
PMV BLD AUTO: 11.1 FL (ref 9.2–12.9)
POTASSIUM SERPL-SCNC: 4.8 MMOL/L (ref 3.5–5.1)
RBC # BLD AUTO: 3.54 M/UL (ref 4.6–6.2)
SODIUM SERPL-SCNC: 136 MMOL/L (ref 136–145)
TACROLIMUS BLD-MCNC: 7.6 NG/ML (ref 5–15)
WBC # BLD AUTO: 4.76 K/UL (ref 3.9–12.7)

## 2023-08-14 PROCEDURE — 85025 COMPLETE CBC W/AUTO DIFF WBC: CPT | Performed by: INTERNAL MEDICINE

## 2023-08-14 PROCEDURE — 80069 RENAL FUNCTION PANEL: CPT | Performed by: INTERNAL MEDICINE

## 2023-08-14 PROCEDURE — 80197 ASSAY OF TACROLIMUS: CPT | Performed by: INTERNAL MEDICINE

## 2023-08-14 PROCEDURE — 83735 ASSAY OF MAGNESIUM: CPT | Performed by: INTERNAL MEDICINE

## 2023-08-14 PROCEDURE — 36415 COLL VENOUS BLD VENIPUNCTURE: CPT | Performed by: INTERNAL MEDICINE

## 2023-08-15 ENCOUNTER — LAB VISIT (OUTPATIENT)
Dept: LAB | Facility: HOSPITAL | Age: 63
End: 2023-08-15
Attending: INTERNAL MEDICINE
Payer: MEDICARE

## 2023-08-15 DIAGNOSIS — Z94.0 KIDNEY REPLACED BY TRANSPLANT: ICD-10-CM

## 2023-08-15 LAB
BACTERIA #/AREA URNS AUTO: ABNORMAL /HPF
BILIRUB UR QL STRIP: NEGATIVE
CLARITY UR REFRACT.AUTO: CLEAR
COLOR UR AUTO: YELLOW
GLUCOSE UR QL STRIP: NEGATIVE
HGB UR QL STRIP: ABNORMAL
HYALINE CASTS UR QL AUTO: 0 /LPF
KETONES UR QL STRIP: NEGATIVE
LEUKOCYTE ESTERASE UR QL STRIP: ABNORMAL
MICROSCOPIC COMMENT: ABNORMAL
NITRITE UR QL STRIP: NEGATIVE
PH UR STRIP: 7 [PH] (ref 5–8)
PROT UR QL STRIP: ABNORMAL
RBC #/AREA URNS AUTO: >100 /HPF (ref 0–4)
SP GR UR STRIP: 1.02 (ref 1–1.03)
URN SPEC COLLECT METH UR: ABNORMAL
WBC #/AREA URNS AUTO: 41 /HPF (ref 0–5)

## 2023-08-15 PROCEDURE — 81001 URINALYSIS AUTO W/SCOPE: CPT | Performed by: INTERNAL MEDICINE

## 2023-08-15 PROCEDURE — 87086 URINE CULTURE/COLONY COUNT: CPT | Performed by: INTERNAL MEDICINE

## 2023-08-15 NOTE — PROGRESS NOTES
Kidney Post-Transplant Assessment    Referring Physician: Quinn Espino  Current Nephrologist: Quinn Espino    ORGAN: LEFT KIDNEY  Donor Type: living  PHS Increased Risk: no  Cold Ischemia:  mins  Induction Medications:     Subjective:     CC:  Reassessment of renal allograft function and management of chronic immunosuppression.    HPI:  Mr. Parikh is a 62 y.o. year old Black or  male who received a living kidney transplant on 5/10/23. His most recent creatinine is 1. He takes mycophenolate mofetil, prednisone, and tacrolimus for maintenance immunosuppression. His post transplant course has been complicated by perinephric fluid collection and urine leak .  Nephrostomy tube was placed and Last tube placement on 7/7/2023    Medical Issues  Crohn's  Sarcoidosis  Right sided hearing loss  Left sided newer following with ENT. Last seen 7/17/2023 f/u in 3 monts  5/15/23-Kidney Bx: Good sample, No IFTA no glomerulosclerosis ,ATI no rejection C4d negative, moderate arterionephrosclerosis     Reporting burning when urinating earlier this week which seems to have improved. Good UOP and drinking 2-2.5L. His BP at home 138-156/70s-80s. Appetite has improved. Walking well. Has no complaints but is looking forward on nephrostomy tube removal. Tube has been capped and reports urinating normally.     Current Outpatient Medications on File Prior to Visit   Medication Sig Dispense Refill    atovaquone (MEPRON) 750 mg/5 mL Susp oral liquid Take 10 mLs (1,500 mg total) by mouth once daily UNTIL 11/7/2023 300 mL 12    fluticasone propionate (FLOVENT HFA) 44 mcg/actuation inhaler Inhale 1 puff into the lungs 2 (two) times daily.      hydrOXYzine HCL (ATARAX) 25 MG tablet Take 1 tablet (25 mg total) by mouth 3 (three) times daily as needed for Anxiety. 90 tablet 0    k phos di & mono-sod phos mono (PHOSPHA 250 NEUTRAL) 250 mg Tab Take 1 tablet by mouth 2 (two) times a day. 60 tablet 11    magnesium oxide  (MAG-OX) 400 mg (241.3 mg magnesium) tablet Take 1 tablet (400 mg total) by mouth 2 (two) times daily. 60 tablet 11    metoprolol succinate (TOPROL-XL) 25 MG 24 hr tablet Take HALF tablet (12.5 mg total) by mouth once daily. 15 tablet 11    metoprolol tartrate (LOPRESSOR) 25 MG tablet Take 0.5 tablets (12.5 mg total) by mouth 2 (two) times daily. 30 tablet 11    mycophenolate (MYFORTIC) 360 MG TbEC Take 2 tablets (720 mg total) by mouth 2 (two) times daily. Txp Date:5/10/2023 (Kidney) Disch Date:5/17/2023 ICD10:Z94.0 Txp Location:[facility] 120 tablet 11    predniSONE (DELTASONE) 5 MG tablet Take 20 mg by mouth daily 5/14-5/20; 15 mg daily 5/21-5/27; 10 mg daily 5/28-6/3; then 5 mg daily thereafter 6/4/23 (Patient taking differently: Take 5 mg by mouth once daily.) 70 tablet 11    sodium bicarbonate 650 MG tablet Take 2 tablets (1,300 mg total) by mouth 3 (three) times daily. 180 tablet 11    tacrolimus (PROGRAF) 1 MG Cap Take 2 capsules (2 mg total) by mouth every morning AND 2 capsules (2 mg total) every evening. Txp Date:5/10/2023 (Kidney) Disch Date:5/17/2023 ICD10:Z94.0 Txp Location:[facility]. 120 capsule 11    [DISCONTINUED] olmesartan (BENICAR) 20 MG tablet Take 1 tablet (20 mg total) by mouth once daily. 30 tablet 11    [DISCONTINUED] acyclovir (ZOVIRAX) 400 MG tablet Take 1 tablet (400 mg total) by mouth 2 (two) times daily. Stop 8/9/23 60 tablet 2    [DISCONTINUED] doxycycline (VIBRAMYCIN) 100 MG Cap Take 1 capsule (100 mg total) by mouth 2 (two) times daily. for 7 days 14 capsule 0    [DISCONTINUED] furosemide (LASIX) 20 MG tablet Take 2 tablets (40 mg total) by mouth 2 (two) times a day for 3 days, THEN 1 tablet (20 mg total) 2 (two) times a day for 10 days. 32 tablet 0     No current facility-administered medications on file prior to visit.        Review of Systems    Skin: no skin rash  CNS; no headaches, blurred vision, seizure, or syncope  ENT: No JVD,  Adenopathies,  nasal congestion. No oral lesions  Cardiac:  "No chest pain, dyspnea, claudication, edema or palpitations  Respiratory: No SOB, cough, hemoptysis   Gastro-intestinal: No diarrhea, constipation, abdominal pain, nausea, vomit. No ascitis  Genitourinary: no hematuria, dysuria, frequency, frequency  Musculoskeletal: joint pain, arthritis or vasculitic changes  Psych: alert awake, oriented, No cranial nerves deficit.      Objective:   /67 (BP Location: Right arm, Patient Position: Sitting, BP Method: Medium (Automatic))   Pulse 62   Temp 97.3 °F (36.3 °C) (Temporal)   Resp 18   Ht 5' 9" (1.753 m)   Wt 79.8 kg (175 lb 14.8 oz)   SpO2 98%   BMI 25.98 kg/m²       Physical Exam  Head: normocephalic  Neck: No JVD, cervical axillary, or femoral adenopathies  Heart: no murmurs, Normal s1 and s2, No gallops, no rubs, No murmurs  Lungs; CTA, good respiratory effort, no crackles  Abdomen: soft, non tender, no splenomegaly or hepatomegaly, no massess, no bruits  Extremities: No edema, skin rash, joint pain  SNC: awake, alert oriented. Cranial nerves are intact, no focalized, sensitivity and strength preserved    Labs:  Lab Results   Component Value Date    WBC 4.76 08/14/2023    HGB 13.0 (L) 08/14/2023    HCT 37.9 (L) 08/14/2023     08/14/2023    K 4.8 08/14/2023     08/14/2023    CO2 22 (L) 08/14/2023    BUN 11 08/14/2023    CREATININE 1.1 08/14/2023    EGFRNORACEVR >60.0 08/14/2023    GLUCOSE 82 04/08/2023    CALCIUM 8.8 08/14/2023    PHOS 2.3 (L) 08/14/2023    MG 1.7 08/14/2023    ALBUMIN 3.1 (L) 08/14/2023    AST 23 08/07/2023    ALT 18 08/07/2023    UTPCR 0.40 (H) 08/14/2023    .7 (H) 05/10/2023    TACROLIMUS 7.6 08/14/2023       No results found for: "EXTANC", "EXTWBC", "EXTSEGS", "EXTPLATELETS", "EXTHEMOGLOBI", "EXTHEMATOCRI", "EXTCREATININ", "EXTSODIUM", "EXTPOTASSIUM", "EXTBUN", "EXTCO2", "EXTCALCIUM", "EXTPHOSPHORU", "EXTGLUCOSE", "EXTALBUMIN", "EXTAST", "EXTALT", "EXTBILITOTAL", "EXTLIPASE", "EXTAMYLASE"    No results found for: " ""EXTCYCLOSLVL", "EXTSIROLIMUS", "EXTTACROLVL", "EXTPROTCRE", "EXTPTHINTACT", "EXTPROTEINUA", "EXTWBCUA", "EXTRBCUA"    Labs were reviewed with the patient    Assessment:     1. S/P kidney transplant    2. CKD (chronic kidney disease) stage 2, GFR 60-89 ml/min    3. Immunosuppressive management encounter following kidney transplant        Plan:   UTI symptoms earlier this week  Due to increase WBCs and having nephrostomy tube will obtain UA and culture today  Awaiting urine studies to determine need for antibiotics and will tailor abx according to culture.   BP borderline high in clinic. Home logs with -155. Increase benicar to 40mg daily. Continue to monitor BP and patient to notify coordinator if BP low or has symptoms.       1. CKD stage 2 with stable creatinine from 1.1 to 1.3: will continue follow up as per our center guidelines. patient to continue close follow up with the local General nephrologist. Education provided in appropriate fluid intake, potassium intake. Continue with oral hydration.    1A. Pancreatic Function: N/A for non-pancreas allograft recipients:    2. High risk immunosuppression medication for organ transplantation, requiring regular intensive follow up and monitoring : prograf 2/2 will adjust when resulted Myfortic 720 bid prednisone taper  Lab Results   Component Value Date    TACROLIMUS 7.6 08/14/2023    TACROLIMUS 6.7 08/08/2023    TACROLIMUS 5.9 08/07/2023   Will closely monitor for toxicities, education provided about adherence to medicines and need to communicate any side effects to the transplant nurse or physician.      3. Allograft Function:stable at baseline for the patient. Continue follow up as per our guidelines and with the local General nephrologist. Communication will be sent today.  Lab Results   Component Value Date    CREATININE 1.1 08/14/2023    CREATININE 1.2 08/07/2023    CREATININE 1.3 07/31/2023     No results found for: "AMYLASE", "LIPASE"    4. Hypertension " management:  metoprolol 12.5 mg bid, benicar due to uncontrolled hypertension I advised patient and care giver to call me with BP and HR. Continue with home blood pressure monitoring, low salt and healthy life discussed with the patient..    5. Metabolic Bone Disease/Secondary Hyperparathyroidism:calcium and phosphorus level discussed with the patient, patient will continue follow up with the general nephrologist for management of metabolic bone disease. Will monitor PTH and Vit D per our transplant center guidelines.   Lab Results   Component Value Date    .7 (H) 05/10/2023    CALCIUM 8.8 08/14/2023    PHOS 2.3 (L) 08/14/2023    PHOS 2.6 (L) 08/07/2023    PHOS 2.8 07/31/2023   KPN, MagOx    6. Electrolytes and acid base balance: reviewed with the patient, essentially within the normal range no need for acute changes today, will monitor as per our center guidelines.  Lab Results   Component Value Date     08/14/2023    K 4.8 08/14/2023     08/14/2023    CO2 22 (L) 08/14/2023    CO2 21 (L) 08/07/2023    CO2 23 07/31/2023   NaBicarb    7. Anemia: will continue monitoring as per our center guidelines. No indication for acute intervention today.  Lab Results   Component Value Date    WBC 4.76 08/14/2023    HGB 13.0 (L) 08/14/2023    HCT 37.9 (L) 08/14/2023     (H) 08/14/2023     08/14/2023       8.Proteinuria: will continue with pr/cr ratio as per our center guidelines.  Lab Results   Component Value Date    PROTEINURINE 32 (H) 08/14/2023    CREATRANDUR 81.0 08/14/2023    UTPCR 0.40 (H) 08/14/2023    UTPCR 0.46 (H) 08/07/2023    UTPCR 0.42 (H) 07/31/2023        9. BK virus infection screening: will continue with urine or blood PCR as per our guidelines to prevent BK virus viremia and allograft dysfunction  Lab Results   Component Value Date    BKVIRUSPCRQB <125 08/07/2023         10. Weight and metabolic management: education provided provided during the clinic visit.   Body mass index  is 25.98 kg/m².       11.Patient safety education regarding immunosuppression including prophylaxis posttransplant for CMV, PCP : Education provided about vaccination and prevention of infections.    12.  Cytopenias: no significant cytopenias will monitor as per our guidelines. Medicine list reviewed including potential causes of drug-induced cytopenias     Lab Results   Component Value Date    WBC 4.76 08/14/2023    HGB 13.0 (L) 08/14/2023    HCT 37.9 (L) 08/14/2023     (H) 08/14/2023     08/14/2023       13. Post-transplant Prophylaxis; CMV Infection, PJP and Candida mucosistis and other indicated for this particular patient.   Acyclovir atovaquone     Follow-up:   Clinic: return to transplant clinic weekly for the first month after transplant; every 2 weeks during months 2-3; then at 6-, 9-, 12-, 18-, 24-, and 36- months post-transplant to reassess for complications from immunosuppression toxicity and monitor for rejection.  Annually thereafter.    Labs: since patient remains at high risk for rejection and drug-related complications that warrant close monitoring, labs will be ordered as follows: continue twice weekly CBC, renal panel, and drug level for first month; then same labs once weekly through 3rd month post-transplant.  Urine for UA and protein/creatinine ratio monthly.  Serum BK - PCR at 1-, 3-, 6-, 9-, 12-, 18-, 24-, 36-, 48-, and 60 months post-transplant.  Hepatic panel at 1-, 2-, 3-, 6-, 9-, 12-, 18-, 24-, and 36- months post-transplant.    Rose Mary Stroud NP       Education:   Material provided to the patient.  Patient reminded to call with any health changes since these can be early signs of significant complications.  Also, I advised the patient to be sure any new medications or changes of old medications are discussed with either a pharmacist or physician knowledgeable with transplant to avoid rejection/drug toxicity related to significant drug interactions.    Patient advised that  it is recommended that all transplanted patients, and their close contacts and household members receive Covid vaccination.

## 2023-08-16 LAB — BACTERIA UR CULT: NO GROWTH

## 2023-08-17 ENCOUNTER — TELEPHONE (OUTPATIENT)
Dept: TRANSPLANT | Facility: CLINIC | Age: 63
End: 2023-08-17
Payer: MEDICARE

## 2023-08-17 NOTE — TELEPHONE ENCOUNTER
Results reviewed with patient and will reminded of 8/18 clinic appointment.  ----- Message from Darrin Verma MD sent at 8/17/2023  9:40 AM CDT -----  Labs and diagnostic tests were reviewed. No action/changes indicated.

## 2023-08-18 ENCOUNTER — OFFICE VISIT (OUTPATIENT)
Dept: TRANSPLANT | Facility: CLINIC | Age: 63
End: 2023-08-18
Payer: MEDICARE

## 2023-08-18 VITALS
HEIGHT: 69 IN | BODY MASS INDEX: 26.06 KG/M2 | SYSTOLIC BLOOD PRESSURE: 138 MMHG | HEART RATE: 62 BPM | RESPIRATION RATE: 18 BRPM | WEIGHT: 175.94 LBS | TEMPERATURE: 97 F | DIASTOLIC BLOOD PRESSURE: 67 MMHG | OXYGEN SATURATION: 98 %

## 2023-08-18 DIAGNOSIS — N18.2 CKD (CHRONIC KIDNEY DISEASE) STAGE 2, GFR 60-89 ML/MIN: ICD-10-CM

## 2023-08-18 DIAGNOSIS — Z79.899 IMMUNOSUPPRESSIVE MANAGEMENT ENCOUNTER FOLLOWING KIDNEY TRANSPLANT: ICD-10-CM

## 2023-08-18 DIAGNOSIS — Z94.0 S/P KIDNEY TRANSPLANT: Primary | ICD-10-CM

## 2023-08-18 DIAGNOSIS — Z94.0 KIDNEY REPLACED BY TRANSPLANT: Primary | ICD-10-CM

## 2023-08-18 DIAGNOSIS — Z94.0 IMMUNOSUPPRESSIVE MANAGEMENT ENCOUNTER FOLLOWING KIDNEY TRANSPLANT: ICD-10-CM

## 2023-08-18 PROCEDURE — 99999 PR PBB SHADOW E&M-EST. PATIENT-LVL III: CPT | Mod: PBBFAC,,, | Performed by: NURSE PRACTITIONER

## 2023-08-18 PROCEDURE — 99213 OFFICE O/P EST LOW 20 MIN: CPT | Mod: PBBFAC | Performed by: NURSE PRACTITIONER

## 2023-08-18 PROCEDURE — 99999 PR PBB SHADOW E&M-EST. PATIENT-LVL III: ICD-10-PCS | Mod: PBBFAC,,, | Performed by: NURSE PRACTITIONER

## 2023-08-18 PROCEDURE — 87086 URINE CULTURE/COLONY COUNT: CPT | Performed by: NURSE PRACTITIONER

## 2023-08-18 PROCEDURE — 99215 OFFICE O/P EST HI 40 MIN: CPT | Mod: S$PBB,,, | Performed by: NURSE PRACTITIONER

## 2023-08-18 PROCEDURE — 99215 PR OFFICE/OUTPT VISIT, EST, LEVL V, 40-54 MIN: ICD-10-PCS | Mod: S$PBB,,, | Performed by: NURSE PRACTITIONER

## 2023-08-18 RX ORDER — OLMESARTAN MEDOXOMIL 40 MG/1
40 TABLET ORAL DAILY
Qty: 30 TABLET | Refills: 11 | Status: ON HOLD | OUTPATIENT
Start: 2023-08-18 | End: 2024-03-17

## 2023-08-18 NOTE — LETTER
August 18, 2023        Quinn Espino  [address]  [address]  [facility] [address]  Phone: [phone]  Fax: [phone]             [facility] 1st Fl  [address]  [facility] [address]  Phone: [phone]   Patient: Abdoulaye Parikh   MR Number: [id number]   YOB: 1960   Date of Visit: 8/18/2023       Dear Dr. Quinn Espino    Thank you for referring Abdoulaye Parikh to me for evaluation. Attached you will find relevant portions of my assessment and plan of care.    If you have questions, please do not hesitate to call me. I look forward to following Abdoulaye Parikh along with you.    Sincerely,    Rose Mary Stroud, NP    Enclosure    If you would like to receive this communication electronically, please contact [email or web address] or [phone] to request [employer] Link access.    [employer] Link is a tool which provides read-only access to select patient information with whom you have a relationship. Its easy to use and provides real time access to review your patients record including encounter summaries, notes, results, and demographic information.    If you feel you have received this communication in error or would no longer like to receive these types of communications, please e-mail [email or web address]

## 2023-08-20 LAB — BACTERIA UR CULT: NO GROWTH

## 2023-08-21 ENCOUNTER — LAB VISIT (OUTPATIENT)
Dept: LAB | Facility: HOSPITAL | Age: 63
End: 2023-08-21
Attending: INTERNAL MEDICINE
Payer: MEDICARE

## 2023-08-21 DIAGNOSIS — Z94.0 KIDNEY REPLACED BY TRANSPLANT: ICD-10-CM

## 2023-08-21 LAB
ALBUMIN SERPL BCP-MCNC: 3 G/DL (ref 3.5–5.2)
ANION GAP SERPL CALC-SCNC: 8 MMOL/L (ref 8–16)
ANISOCYTOSIS BLD QL SMEAR: SLIGHT
BASOPHILS # BLD AUTO: ABNORMAL K/UL (ref 0–0.2)
BASOPHILS NFR BLD: 0 % (ref 0–1.9)
BUN SERPL-MCNC: 9 MG/DL (ref 8–23)
CALCIUM SERPL-MCNC: 9 MG/DL (ref 8.7–10.5)
CHLORIDE SERPL-SCNC: 104 MMOL/L (ref 95–110)
CO2 SERPL-SCNC: 24 MMOL/L (ref 23–29)
CREAT SERPL-MCNC: 1.1 MG/DL (ref 0.5–1.4)
DIFFERENTIAL METHOD: ABNORMAL
EOSINOPHIL # BLD AUTO: ABNORMAL K/UL (ref 0–0.5)
EOSINOPHIL NFR BLD: 1 % (ref 0–8)
ERYTHROCYTE [DISTWIDTH] IN BLOOD BY AUTOMATED COUNT: 13.4 % (ref 11.5–14.5)
EST. GFR  (NO RACE VARIABLE): >60 ML/MIN/1.73 M^2
GLUCOSE SERPL-MCNC: 71 MG/DL (ref 70–110)
HCT VFR BLD AUTO: 36.1 % (ref 40–54)
HGB BLD-MCNC: 12 G/DL (ref 14–18)
HYPOCHROMIA BLD QL SMEAR: ABNORMAL
IMM GRANULOCYTES # BLD AUTO: ABNORMAL K/UL (ref 0–0.04)
IMM GRANULOCYTES NFR BLD AUTO: ABNORMAL % (ref 0–0.5)
LYMPHOCYTES # BLD AUTO: ABNORMAL K/UL (ref 1–4.8)
LYMPHOCYTES NFR BLD: 19 % (ref 18–48)
MAGNESIUM SERPL-MCNC: 1.5 MG/DL (ref 1.6–2.6)
MCH RBC QN AUTO: 36.6 PG (ref 27–31)
MCHC RBC AUTO-ENTMCNC: 33.2 G/DL (ref 32–36)
MCV RBC AUTO: 110 FL (ref 82–98)
MONOCYTES # BLD AUTO: ABNORMAL K/UL (ref 0.3–1)
MONOCYTES NFR BLD: 6 % (ref 4–15)
NEUTROPHILS NFR BLD: 74 % (ref 38–73)
NRBC BLD-RTO: 0 /100 WBC
OVALOCYTES BLD QL SMEAR: ABNORMAL
PHOSPHATE SERPL-MCNC: 2.2 MG/DL (ref 2.7–4.5)
PLATELET # BLD AUTO: 183 K/UL (ref 150–450)
PLATELET BLD QL SMEAR: ABNORMAL
PMV BLD AUTO: 10.1 FL (ref 9.2–12.9)
POIKILOCYTOSIS BLD QL SMEAR: SLIGHT
POLYCHROMASIA BLD QL SMEAR: ABNORMAL
POTASSIUM SERPL-SCNC: 3.9 MMOL/L (ref 3.5–5.1)
RBC # BLD AUTO: 3.28 M/UL (ref 4.6–6.2)
SODIUM SERPL-SCNC: 136 MMOL/L (ref 136–145)
TACROLIMUS BLD-MCNC: 7.1 NG/ML (ref 5–15)
WBC # BLD AUTO: 4.62 K/UL (ref 3.9–12.7)

## 2023-08-21 PROCEDURE — 85027 COMPLETE CBC AUTOMATED: CPT | Performed by: INTERNAL MEDICINE

## 2023-08-21 PROCEDURE — 36415 COLL VENOUS BLD VENIPUNCTURE: CPT | Performed by: INTERNAL MEDICINE

## 2023-08-21 PROCEDURE — 80069 RENAL FUNCTION PANEL: CPT | Performed by: INTERNAL MEDICINE

## 2023-08-21 PROCEDURE — 83735 ASSAY OF MAGNESIUM: CPT | Performed by: INTERNAL MEDICINE

## 2023-08-21 PROCEDURE — 85007 BL SMEAR W/DIFF WBC COUNT: CPT | Performed by: INTERNAL MEDICINE

## 2023-08-21 PROCEDURE — 80197 ASSAY OF TACROLIMUS: CPT | Performed by: INTERNAL MEDICINE

## 2023-08-28 ENCOUNTER — LAB VISIT (OUTPATIENT)
Dept: LAB | Facility: HOSPITAL | Age: 63
End: 2023-08-28
Attending: INTERNAL MEDICINE
Payer: MEDICARE

## 2023-08-28 ENCOUNTER — PATIENT MESSAGE (OUTPATIENT)
Dept: TRANSPLANT | Facility: CLINIC | Age: 63
End: 2023-08-28
Payer: MEDICARE

## 2023-08-28 DIAGNOSIS — Z94.0 KIDNEY REPLACED BY TRANSPLANT: ICD-10-CM

## 2023-08-28 LAB
ALBUMIN SERPL BCP-MCNC: 3.2 G/DL (ref 3.5–5.2)
ANION GAP SERPL CALC-SCNC: 11 MMOL/L (ref 8–16)
BASOPHILS # BLD AUTO: 0.03 K/UL (ref 0–0.2)
BASOPHILS NFR BLD: 0.6 % (ref 0–1.9)
BUN SERPL-MCNC: 9 MG/DL (ref 8–23)
CALCIUM SERPL-MCNC: 9.2 MG/DL (ref 8.7–10.5)
CHLORIDE SERPL-SCNC: 106 MMOL/L (ref 95–110)
CO2 SERPL-SCNC: 24 MMOL/L (ref 23–29)
CREAT SERPL-MCNC: 1.3 MG/DL (ref 0.5–1.4)
DIFFERENTIAL METHOD: ABNORMAL
EOSINOPHIL # BLD AUTO: 0.1 K/UL (ref 0–0.5)
EOSINOPHIL NFR BLD: 1.3 % (ref 0–8)
ERYTHROCYTE [DISTWIDTH] IN BLOOD BY AUTOMATED COUNT: 13.2 % (ref 11.5–14.5)
EST. GFR  (NO RACE VARIABLE): >60 ML/MIN/1.73 M^2
GLUCOSE SERPL-MCNC: 83 MG/DL (ref 70–110)
HCT VFR BLD AUTO: 39.6 % (ref 40–54)
HGB BLD-MCNC: 12.9 G/DL (ref 14–18)
IMM GRANULOCYTES # BLD AUTO: 0.26 K/UL (ref 0–0.04)
IMM GRANULOCYTES NFR BLD AUTO: 4.9 % (ref 0–0.5)
LYMPHOCYTES # BLD AUTO: 0.9 K/UL (ref 1–4.8)
LYMPHOCYTES NFR BLD: 17.2 % (ref 18–48)
MCH RBC QN AUTO: 35.6 PG (ref 27–31)
MCHC RBC AUTO-ENTMCNC: 32.6 G/DL (ref 32–36)
MCV RBC AUTO: 109 FL (ref 82–98)
MONOCYTES # BLD AUTO: 0.6 K/UL (ref 0.3–1)
MONOCYTES NFR BLD: 10.4 % (ref 4–15)
NEUTROPHILS # BLD AUTO: 3.5 K/UL (ref 1.8–7.7)
NEUTROPHILS NFR BLD: 65.6 % (ref 38–73)
NRBC BLD-RTO: 0 /100 WBC
PHOSPHATE SERPL-MCNC: 3.1 MG/DL (ref 2.7–4.5)
PLATELET # BLD AUTO: 170 K/UL (ref 150–450)
PMV BLD AUTO: 9.7 FL (ref 9.2–12.9)
POTASSIUM SERPL-SCNC: 4.3 MMOL/L (ref 3.5–5.1)
RBC # BLD AUTO: 3.62 M/UL (ref 4.6–6.2)
SODIUM SERPL-SCNC: 141 MMOL/L (ref 136–145)
TACROLIMUS BLD-MCNC: 7.9 NG/ML (ref 5–15)
WBC # BLD AUTO: 5.3 K/UL (ref 3.9–12.7)

## 2023-08-28 PROCEDURE — 85025 COMPLETE CBC W/AUTO DIFF WBC: CPT | Performed by: INTERNAL MEDICINE

## 2023-08-28 PROCEDURE — 80197 ASSAY OF TACROLIMUS: CPT | Performed by: INTERNAL MEDICINE

## 2023-08-28 PROCEDURE — 36415 COLL VENOUS BLD VENIPUNCTURE: CPT | Performed by: INTERNAL MEDICINE

## 2023-08-28 PROCEDURE — 80069 RENAL FUNCTION PANEL: CPT | Performed by: INTERNAL MEDICINE

## 2023-08-30 ENCOUNTER — TELEPHONE (OUTPATIENT)
Dept: TRANSPLANT | Facility: CLINIC | Age: 63
End: 2023-08-30
Payer: MEDICARE

## 2023-08-30 ENCOUNTER — TELEPHONE (OUTPATIENT)
Dept: INTERVENTIONAL RADIOLOGY/VASCULAR | Facility: HOSPITAL | Age: 63
End: 2023-08-30
Payer: MEDICARE

## 2023-08-30 DIAGNOSIS — N30.01 ACUTE CYSTITIS WITH HEMATURIA: Primary | ICD-10-CM

## 2023-08-30 RX ORDER — DOXYCYCLINE 100 MG/1
100 CAPSULE ORAL 2 TIMES DAILY
Qty: 14 CAPSULE | Refills: 0 | Status: SHIPPED | OUTPATIENT
Start: 2023-08-30 | End: 2023-09-06

## 2023-08-30 RX ORDER — CIPROFLOXACIN 500 MG/1
500 TABLET ORAL 2 TIMES DAILY
Qty: 14 TABLET | Refills: 0 | Status: SHIPPED | OUTPATIENT
Start: 2023-08-30 | End: 2023-09-06

## 2023-08-30 NOTE — TELEPHONE ENCOUNTER
RONEN From DR TYLER Verma.  Cipro 500mg BID PO for  7 Days.  Doxycycline 100 mg PO BID for 7 days.  _______________________________  Patient repeated back and voice a understanding of orders.      ----- Message from Darrin Verma MD sent at 8/30/2023  1:39 PM CDT -----  Please see below, is he going to undergo a procedure anytime soon?  ----- Message -----  From: Jeanne Xavier DO  Sent: 8/30/2023   1:05 PM CDT  To: Darrin Verma MD    Only if plans to undergo a urologic procedure/tube exchange   Otherwise, just monitor for symptoms  ----- Message -----  From: Darrin Verma MD  Sent: 8/30/2023  10:23 AM CDT  To: Jeanne Xavier,     Would you treat asymptomatic bacteriuria in someone s/p kidney transplant (4 months ago) with a nephrostomy catheter in place.

## 2023-08-30 NOTE — TELEPHONE ENCOUNTER
See previous note.  ----- Message from Darrin Verma MD sent at 8/30/2023  3:05 PM CDT -----  Yes let's start before this procedure Doxy 100 bid PO for 7 days and cipro 500 bid PO for 7 days   ----- Message -----  From: Robles Arita, RN  Sent: 8/30/2023   2:55 PM CDT  To: Darrin Verma MD; #    Do we want to start Antibiotics now ?  Or wait until sensitivities   ----- Message -----  From: Darrin Verma MD  Sent: 8/30/2023   1:40 PM CDT  To: Robles Arita, RN    Please see below, is he going to undergo a procedure anytime soon?  ----- Message -----  From: Jeanne Xavier DO  Sent: 8/30/2023   1:05 PM CDT  To: Darrin Verma MD    Only if plans to undergo a urologic procedure/tube exchange   Otherwise, just monitor for symptoms  ----- Message -----  From: Darrin Verma MD  Sent: 8/30/2023  10:23 AM CDT  To: Jeanne Xavier, DO    Would you treat asymptomatic bacteriuria in someone s/p kidney transplant (4 months ago) with a nephrostomy catheter in place.

## 2023-08-31 ENCOUNTER — TELEPHONE (OUTPATIENT)
Dept: TRANSPLANT | Facility: CLINIC | Age: 63
End: 2023-08-31
Payer: MEDICARE

## 2023-08-31 NOTE — TELEPHONE ENCOUNTER
Results reviewed with patient and states he started Doxy and Cipro yesterday.  ----- Message from Darrin Verma MD sent at 8/31/2023  9:48 AM CDT -----  This should be covered with doxy and cipro ordered yesterday

## 2023-09-01 ENCOUNTER — HOSPITAL ENCOUNTER (OUTPATIENT)
Dept: INTERVENTIONAL RADIOLOGY/VASCULAR | Facility: HOSPITAL | Age: 63
Discharge: HOME | End: 2023-09-01
Payer: MEDICARE

## 2023-09-01 ENCOUNTER — TELEPHONE (OUTPATIENT)
Dept: TRANSPLANT | Facility: CLINIC | Age: 63
End: 2023-09-01
Payer: MEDICARE

## 2023-09-01 VITALS
SYSTOLIC BLOOD PRESSURE: 138 MMHG | TEMPERATURE: 97 F | BODY MASS INDEX: 25.92 KG/M2 | OXYGEN SATURATION: 100 % | WEIGHT: 175 LBS | DIASTOLIC BLOOD PRESSURE: 74 MMHG | HEIGHT: 69 IN | RESPIRATION RATE: 18 BRPM | HEART RATE: 67 BPM

## 2023-09-01 DIAGNOSIS — Z94.0 KIDNEY REPLACED BY TRANSPLANT: ICD-10-CM

## 2023-09-01 DIAGNOSIS — Z94.0 S/P KIDNEY TRANSPLANT: ICD-10-CM

## 2023-09-01 PROCEDURE — 25000003 PHARM REV CODE 250: Performed by: RADIOLOGY

## 2023-09-01 PROCEDURE — 63600175 PHARM REV CODE 636 W HCPCS: Performed by: RADIOLOGY

## 2023-09-01 PROCEDURE — 50435 EXCHANGE NEPHROSTOMY CATH: CPT | Performed by: STUDENT IN AN ORGANIZED HEALTH CARE EDUCATION/TRAINING PROGRAM

## 2023-09-01 PROCEDURE — 50435 IR NEPHROSTOGRAM: ICD-10-PCS | Mod: RT,,, | Performed by: STUDENT IN AN ORGANIZED HEALTH CARE EDUCATION/TRAINING PROGRAM

## 2023-09-01 PROCEDURE — 99152 PR MOD CONSCIOUS SEDATION, SAME PHYS, 5+ YRS, FIRST 15 MIN: ICD-10-PCS | Mod: ,,, | Performed by: STUDENT IN AN ORGANIZED HEALTH CARE EDUCATION/TRAINING PROGRAM

## 2023-09-01 PROCEDURE — 99152 MOD SED SAME PHYS/QHP 5/>YRS: CPT | Mod: ,,, | Performed by: STUDENT IN AN ORGANIZED HEALTH CARE EDUCATION/TRAINING PROGRAM

## 2023-09-01 PROCEDURE — 50706 BALLOON DILATE URTRL STRIX: CPT | Performed by: STUDENT IN AN ORGANIZED HEALTH CARE EDUCATION/TRAINING PROGRAM

## 2023-09-01 PROCEDURE — 25500020 PHARM REV CODE 255

## 2023-09-01 PROCEDURE — 50706 PR BALLOON URETERALSTRIX, INCL GUIDE, S&I: ICD-10-PCS | Mod: RT,,, | Performed by: STUDENT IN AN ORGANIZED HEALTH CARE EDUCATION/TRAINING PROGRAM

## 2023-09-01 PROCEDURE — 50706 BALLOON DILATE URTRL STRIX: CPT | Mod: RT,,, | Performed by: STUDENT IN AN ORGANIZED HEALTH CARE EDUCATION/TRAINING PROGRAM

## 2023-09-01 PROCEDURE — 99152 MOD SED SAME PHYS/QHP 5/>YRS: CPT | Performed by: STUDENT IN AN ORGANIZED HEALTH CARE EDUCATION/TRAINING PROGRAM

## 2023-09-01 PROCEDURE — C1729 CATH, DRAINAGE: HCPCS

## 2023-09-01 PROCEDURE — 27201059 IR NEPHROSTOGRAM

## 2023-09-01 RX ORDER — SODIUM CHLORIDE 9 MG/ML
INJECTION, SOLUTION INTRAVENOUS CONTINUOUS
Status: DISCONTINUED | OUTPATIENT
Start: 2023-09-01 | End: 2023-09-02 | Stop reason: HOSPADM

## 2023-09-01 RX ORDER — MIDAZOLAM HYDROCHLORIDE 1 MG/ML
INJECTION INTRAMUSCULAR; INTRAVENOUS
Status: COMPLETED | OUTPATIENT
Start: 2023-09-01 | End: 2023-09-01

## 2023-09-01 RX ORDER — FENTANYL CITRATE 50 UG/ML
INJECTION, SOLUTION INTRAMUSCULAR; INTRAVENOUS
Status: COMPLETED | OUTPATIENT
Start: 2023-09-01 | End: 2023-09-01

## 2023-09-01 RX ORDER — LIDOCAINE HYDROCHLORIDE 10 MG/ML
1 INJECTION, SOLUTION EPIDURAL; INFILTRATION; INTRACAUDAL; PERINEURAL ONCE
Status: DISCONTINUED | OUTPATIENT
Start: 2023-09-01 | End: 2023-09-02 | Stop reason: HOSPADM

## 2023-09-01 RX ADMIN — MIDAZOLAM HYDROCHLORIDE 0.5 MG: 2 INJECTION, SOLUTION INTRAMUSCULAR; INTRAVENOUS at 09:09

## 2023-09-01 RX ADMIN — FENTANYL CITRATE 25 MCG: 50 INJECTION, SOLUTION INTRAMUSCULAR; INTRAVENOUS at 09:09

## 2023-09-01 RX ADMIN — IOHEXOL 30 ML: 300 INJECTION, SOLUTION INTRAVENOUS at 09:09

## 2023-09-01 RX ADMIN — CEFTRIAXONE 1 G: 1 INJECTION, POWDER, FOR SOLUTION INTRAMUSCULAR; INTRAVENOUS at 09:09

## 2023-09-01 RX ADMIN — MIDAZOLAM HYDROCHLORIDE 1 MG: 2 INJECTION, SOLUTION INTRAMUSCULAR; INTRAVENOUS at 09:09

## 2023-09-01 NOTE — PLAN OF CARE
Pt arrived to IR room 189 via stretcher w/ RN. CRIS x4. Name//allergies/procedure verified. Pt will be monitored by RN @ bedside throughout procedure/sedation.

## 2023-09-01 NOTE — DISCHARGE INSTRUCTIONS
Please call with any questions or concerns.      Monday through Friday 8:00 am - 5:00 pm    Interventional Radiology   [phone] clinic    After Hours    Ask the  for the Radiology Resident on call  [phone]

## 2023-09-01 NOTE — TELEPHONE ENCOUNTER
Return call to states he would like to know plan, since he had a Nephrostogram earlier today.  Patient voice  a understanding that we are still waiting plan from IR and once plan is obtain I will call him back.  ----- Message from Mirella Hernandez sent at 9/1/2023 12:46 PM CDT -----  Regarding: Speak to coordinator  Contact: Abdoulaye  Regarding:speak to coordinator          Name Of Caller: Abdoulaye        Contact Preference: [phone] (Mobile) Preferred phone number        Nature of call:  pt is calling to speak to coordinator regarding a procedure he had this morning.

## 2023-09-01 NOTE — H&P
Radiology History & Physical      SUBJECTIVE:     Chief Complaint: ureteral stricture    History of Present Illness:  Abdoulaye Parikh is a 62 y.o. male w PMH Ktx 5/2023 c/b ureteral stricture s/p nephrostomy placement. Patient presents for nephrostogram with ureteroplasty and exchange.     Past Medical History:   Diagnosis Date    Acute blood loss anemia 5/11/2023    Anemia     Arthritis     Cirrhosis     CKD (chronic kidney disease) stage 4, GFR 15-29 ml/min, slow graft function with sustaine creatinien improvement 5/26/2023    Crohn's disease     Disorder of kidney and ureter     Stage 5    Encounter for blood transfusion     Gout     Hearing loss     Hepatitis     Had Hep C Cleared    Hypertension     Immunosuppressive management encounter following kidney transplant 5/26/2023    Neurosarcoidosis 2018    Obesity     Osteoarthritis     Sarcoid neuropathy     Sarcoidosis, lung     Steatosis      Past Surgical History:   Procedure Laterality Date    AV FISTULA PLACEMENT Left     BRAIN SURGERY      COLON SURGERY      Exploratory lap x 4 for Chron's disease. Likely right colectomy    CSF SHUNT      DIAGNOSTIC ULTRASOUND N/A 5/15/2023    Procedure: ULTRASOUND, DIAGNOSTIC;  Surgeon: Chivo Brown MD;  Location: [address];  Service: Transplant;  Laterality: N/A;    JOINT REPLACEMENT Right     Hip    KIDNEY TRANSPLANT N/A 5/10/2023    Procedure: TRANSPLANT, KIDNEY - RQ 7PM START;  Surgeon: Chivo Brown MD;  Location: [address];  Service: Transplant;  Laterality: N/A;    NEPHROSTOMY N/A 6/3/2023    Procedure: Nephrostomy;  Surgeon: Silvia Surgeon;  Location: [facility];  Service: Anesthesiology;  Laterality: N/A;    RENAL BIOPSY  5/15/2023    Procedure: BIOPSY, KIDNEY;  Surgeon: Chivo Brown MD;  Location: [address];  Service: Transplant;;    RENAL EXPLORATION  5/15/2023    Procedure: EXPLORATION, KIDNEY;  Surgeon: Chivo Brown MD;  Location: [address];  Service:  Transplant;;    TOTAL HIP ARTHROPLASTY Right        Home Meds:   Prior to Admission medications    Medication Sig Start Date End Date Taking? Authorizing Provider   atovaquone (MEPRON) 750 mg/5 mL Susp oral liquid Take 10 mLs (1,500 mg total) by mouth once daily UNTIL 11/7/2023 6/8/23   Darrin Verma MD   ciprofloxacin HCl (CIPRO) 500 MG tablet Take 1 tablet (500 mg total) by mouth 2 (two) times a day. for 7 days 8/30/23 9/6/23  Darrin Verma MD   doxycycline (VIBRAMYCIN) 100 MG Cap Take 1 capsule (100 mg total) by mouth 2 (two) times a day. for 7 days 8/30/23 9/6/23  Darrin Verma MD   fluticasone propionate (FLOVENT HFA) 44 mcg/actuation inhaler Inhale 1 puff into the lungs 2 (two) times daily. 6/26/23   Provider, Historical   hydrOXYzine HCL (ATARAX) 25 MG tablet Take 1 tablet (25 mg total) by mouth 3 (three) times daily as needed for Anxiety. 7/3/23   Maggie Mcgovern MD   k phos di & mono-sod phos mono (PHOSPHA 250 NEUTRAL) 250 mg Tab Take 1 tablet by mouth 2 (two) times a day. 6/26/23 6/25/24  Elsy Ramachandran MD   magnesium oxide (MAG-OX) 400 mg (241.3 mg magnesium) tablet Take 1 tablet (400 mg total) by mouth 2 (two) times daily. 7/10/23 7/9/24  Darrin Verma MD   metoprolol succinate (TOPROL-XL) 25 MG 24 hr tablet Take HALF tablet (12.5 mg total) by mouth once daily. 5/18/23 5/17/24  Rodolfo Samuels MD   metoprolol tartrate (LOPRESSOR) 25 MG tablet Take 0.5 tablets (12.5 mg total) by mouth 2 (two) times daily. 7/24/23 7/23/24  Darrin Verma MD   mycophenolate (MYFORTIC) 360 MG TbEC Take 2 tablets (720 mg total) by mouth 2 (two) times daily. Txp Date:5/10/2023 (Kidney) Disch Date:5/17/2023 ICD10:Z94.0 Txp Location:[facility] 6/14/23 6/13/24  Darrin Verma MD   olmesartan (BENICAR) 40 MG tablet Take 1 tablet (40 mg total) by mouth once daily. 8/18/23   Rose Mary Stroud NP   predniSONE (DELTASONE) 5 MG tablet Take 20 mg by mouth daily 5/14-5/20; 15 mg daily 5/21-5/27; 10 mg  daily 5/28-6/3; then 5 mg daily thereafter 6/4/23  Patient taking differently: Take 5 mg by mouth once daily. 5/11/23   Zeke Devine Jr., MD   sodium bicarbonate 650 MG tablet Take 2 tablets (1,300 mg total) by mouth 3 (three) times daily. 6/6/23 6/5/24  Beatrice Mendez DO   tacrolimus (PROGRAF) 1 MG Cap Take 2 capsules (2 mg total) by mouth every morning AND 2 capsules (2 mg total) every evening. Txp Date:5/10/2023 (Kidney) Disch Date:5/17/2023 ICD10:Z94.0 Txp Location:[facility]. 7/10/23 7/9/24  Darrin Verma MD     Anticoagulants/Antiplatelets: no anticoagulation    Allergies:   Review of patient's allergies indicates:   Allergen Reactions    Bactrim [sulfamethoxazole-trimethoprim] Shortness Of Breath and Itching    Penicillins Shortness Of Breath and Itching     Sedation History:  no adverse reactions    Review of Systems:   Hematological: no known coagulopathies  Respiratory: no shortness of breath  Cardiovascular: no chest pain  Gastrointestinal: no abdominal pain  Genito-Urinary: nephrostomy  Musculoskeletal: negative  Neurological: no TIA or stroke symptoms         OBJECTIVE:     Vital Signs (Most Recent)       Physical Exam:  ASA: 2  Mallampati: 2    General: no acute distress  Mental Status: alert and oriented to person, place and time  HEENT: normocephalic, atraumatic  Chest: unlabored breathing  Heart: regular heart rate  Abdomen: nephrostomy  Extremity: moves all extremities    Laboratory  Lab Results   Component Value Date    INR 1.0 07/31/2023       Lab Results   Component Value Date    WBC 5.30 08/28/2023    HGB 12.9 (L) 08/28/2023    HCT 39.6 (L) 08/28/2023     (H) 08/28/2023     08/28/2023      Lab Results   Component Value Date    GLU 83 08/28/2023     08/28/2023    K 4.3 08/28/2023     08/28/2023    CO2 24 08/28/2023    BUN 9 08/28/2023    CREATININE 1.3 08/28/2023    CALCIUM 9.2 08/28/2023    MG 1.5 (L) 08/21/2023    ALT 18 08/07/2023    AST 23 08/07/2023    ALBUMIN  3.2 (L) 08/28/2023    BILITOT 0.7 08/07/2023    BILIDIR 0.4 (H) 08/07/2023       ASSESSMENT/PLAN:     Sedation Plan: moderate  Patient will undergo nephrostogram with ureteroplasty and exchange.    Charli Garcia MD PGY4  Department of Radiology  [facility]

## 2023-09-01 NOTE — PROCEDURES
VIR Post-Procedure Note    Pre Op Diagnosis:   Urinary obstruction (Ureteral stricture)    Post Op Diagnosis:  same    Procedure:Transplant percutaneous nephroureteral tube change + Ureteroplasty    Procedure performed by: Ernie Garcia MD /  Angélica Krishna MD    Written Informed Consent Obtained:  Yes    Specimen Removed: No     Estimated Blood Loss:  Minimal     Findings:     Local anesthesia and moderate sedation were used.      Nephrostogram through existing tube showed satisfactory position of the tube.    Sheath nephrostogram showed severe entire segment stricture of the ureter    Ureteroplasty using 8 mm balloon with mild improvement    A new 16 Fr nephroureteral tube was placed in exchange for the old 14 Fr nephroureteral tube.     Plan:    - Return to [facility] in 1 month for sheath nephrostogram and possible additional ureteroplasty.         Ernie Garcia MD  VIR Fellow

## 2023-09-01 NOTE — PLAN OF CARE
Pt transferred to MPU room 6 via stretcher w/ RN. Post procedure bedside report given to MPU RNDouglas.

## 2023-09-01 NOTE — PROGRESS NOTES
Pt  arrived to room MPU #6 @ 09:30 placed to monitor AAO w/ NAD wife called and escorted to room / plan for 1 hour recovery

## 2023-09-05 ENCOUNTER — LAB VISIT (OUTPATIENT)
Dept: LAB | Facility: HOSPITAL | Age: 63
End: 2023-09-05
Attending: INTERNAL MEDICINE
Payer: MEDICARE

## 2023-09-05 DIAGNOSIS — Z94.0 KIDNEY REPLACED BY TRANSPLANT: ICD-10-CM

## 2023-09-05 LAB
ALBUMIN SERPL BCP-MCNC: 3.3 G/DL (ref 3.5–5.2)
ANION GAP SERPL CALC-SCNC: 11 MMOL/L (ref 8–16)
BASOPHILS # BLD AUTO: 0.04 K/UL (ref 0–0.2)
BASOPHILS NFR BLD: 0.7 % (ref 0–1.9)
BUN SERPL-MCNC: 18 MG/DL (ref 8–23)
CALCIUM SERPL-MCNC: 9.1 MG/DL (ref 8.7–10.5)
CHLORIDE SERPL-SCNC: 104 MMOL/L (ref 95–110)
CO2 SERPL-SCNC: 20 MMOL/L (ref 23–29)
CREAT SERPL-MCNC: 1.3 MG/DL (ref 0.5–1.4)
DIFFERENTIAL METHOD: ABNORMAL
EOSINOPHIL # BLD AUTO: 0.1 K/UL (ref 0–0.5)
EOSINOPHIL NFR BLD: 1.6 % (ref 0–8)
ERYTHROCYTE [DISTWIDTH] IN BLOOD BY AUTOMATED COUNT: 12.9 % (ref 11.5–14.5)
EST. GFR  (NO RACE VARIABLE): >60 ML/MIN/1.73 M^2
GLUCOSE SERPL-MCNC: 72 MG/DL (ref 70–110)
HCT VFR BLD AUTO: 39.1 % (ref 40–54)
HGB BLD-MCNC: 12.8 G/DL (ref 14–18)
IMM GRANULOCYTES # BLD AUTO: 0.28 K/UL (ref 0–0.04)
IMM GRANULOCYTES NFR BLD AUTO: 4.9 % (ref 0–0.5)
LYMPHOCYTES # BLD AUTO: 1.1 K/UL (ref 1–4.8)
LYMPHOCYTES NFR BLD: 18.4 % (ref 18–48)
MAGNESIUM SERPL-MCNC: 1.4 MG/DL (ref 1.6–2.6)
MCH RBC QN AUTO: 35.2 PG (ref 27–31)
MCHC RBC AUTO-ENTMCNC: 32.7 G/DL (ref 32–36)
MCV RBC AUTO: 107 FL (ref 82–98)
MONOCYTES # BLD AUTO: 0.6 K/UL (ref 0.3–1)
MONOCYTES NFR BLD: 10.8 % (ref 4–15)
NEUTROPHILS # BLD AUTO: 3.7 K/UL (ref 1.8–7.7)
NEUTROPHILS NFR BLD: 63.6 % (ref 38–73)
NRBC BLD-RTO: 0 /100 WBC
PHOSPHATE SERPL-MCNC: 3 MG/DL (ref 2.7–4.5)
PLATELET # BLD AUTO: 196 K/UL (ref 150–450)
PMV BLD AUTO: 9.7 FL (ref 9.2–12.9)
POTASSIUM SERPL-SCNC: 3.8 MMOL/L (ref 3.5–5.1)
RBC # BLD AUTO: 3.64 M/UL (ref 4.6–6.2)
SODIUM SERPL-SCNC: 135 MMOL/L (ref 136–145)
TACROLIMUS BLD-MCNC: 8 NG/ML (ref 5–15)
WBC # BLD AUTO: 5.76 K/UL (ref 3.9–12.7)

## 2023-09-05 PROCEDURE — 85025 COMPLETE CBC W/AUTO DIFF WBC: CPT | Performed by: INTERNAL MEDICINE

## 2023-09-05 PROCEDURE — 80197 ASSAY OF TACROLIMUS: CPT | Performed by: INTERNAL MEDICINE

## 2023-09-05 PROCEDURE — 80069 RENAL FUNCTION PANEL: CPT | Performed by: INTERNAL MEDICINE

## 2023-09-05 PROCEDURE — 36415 COLL VENOUS BLD VENIPUNCTURE: CPT | Performed by: INTERNAL MEDICINE

## 2023-09-05 PROCEDURE — 83735 ASSAY OF MAGNESIUM: CPT | Performed by: INTERNAL MEDICINE

## 2023-09-06 DIAGNOSIS — Z94.0 KIDNEY REPLACED BY TRANSPLANT: Primary | ICD-10-CM

## 2023-09-07 ENCOUNTER — TELEPHONE (OUTPATIENT)
Dept: TRANSPLANT | Facility: CLINIC | Age: 63
End: 2023-09-07
Payer: MEDICARE

## 2023-09-07 NOTE — TELEPHONE ENCOUNTER
RONEN from Dr COLTON Devine.  Notify patient to Cap Nephrostomy to see tolerance.  __________________  Patient repeated back and voice a understanding of orders and agreed to keep coordinator posted.

## 2023-09-11 ENCOUNTER — LAB VISIT (OUTPATIENT)
Dept: LAB | Facility: HOSPITAL | Age: 63
End: 2023-09-11
Attending: INTERNAL MEDICINE
Payer: MEDICARE

## 2023-09-11 DIAGNOSIS — Z94.0 KIDNEY REPLACED BY TRANSPLANT: ICD-10-CM

## 2023-09-11 LAB
ALBUMIN SERPL BCP-MCNC: 3.3 G/DL (ref 3.5–5.2)
ANION GAP SERPL CALC-SCNC: 11 MMOL/L (ref 8–16)
BASOPHILS # BLD AUTO: 0.03 K/UL (ref 0–0.2)
BASOPHILS NFR BLD: 0.6 % (ref 0–1.9)
BUN SERPL-MCNC: 12 MG/DL (ref 8–23)
CALCIUM SERPL-MCNC: 9.3 MG/DL (ref 8.7–10.5)
CHLORIDE SERPL-SCNC: 106 MMOL/L (ref 95–110)
CO2 SERPL-SCNC: 23 MMOL/L (ref 23–29)
CREAT SERPL-MCNC: 1.2 MG/DL (ref 0.5–1.4)
DIFFERENTIAL METHOD: ABNORMAL
EOSINOPHIL # BLD AUTO: 0.1 K/UL (ref 0–0.5)
EOSINOPHIL NFR BLD: 1.1 % (ref 0–8)
ERYTHROCYTE [DISTWIDTH] IN BLOOD BY AUTOMATED COUNT: 13 % (ref 11.5–14.5)
EST. GFR  (NO RACE VARIABLE): >60 ML/MIN/1.73 M^2
GLUCOSE SERPL-MCNC: 67 MG/DL (ref 70–110)
HCT VFR BLD AUTO: 39.6 % (ref 40–54)
HGB BLD-MCNC: 12.9 G/DL (ref 14–18)
IMM GRANULOCYTES # BLD AUTO: 0.08 K/UL (ref 0–0.04)
IMM GRANULOCYTES NFR BLD AUTO: 1.7 % (ref 0–0.5)
LYMPHOCYTES # BLD AUTO: 1.1 K/UL (ref 1–4.8)
LYMPHOCYTES NFR BLD: 22.5 % (ref 18–48)
MAGNESIUM SERPL-MCNC: 1.8 MG/DL (ref 1.6–2.6)
MCH RBC QN AUTO: 34.9 PG (ref 27–31)
MCHC RBC AUTO-ENTMCNC: 32.6 G/DL (ref 32–36)
MCV RBC AUTO: 107 FL (ref 82–98)
MONOCYTES # BLD AUTO: 0.6 K/UL (ref 0.3–1)
MONOCYTES NFR BLD: 11.8 % (ref 4–15)
NEUTROPHILS # BLD AUTO: 2.9 K/UL (ref 1.8–7.7)
NEUTROPHILS NFR BLD: 62.3 % (ref 38–73)
NRBC BLD-RTO: 0 /100 WBC
PHOSPHATE SERPL-MCNC: 2.9 MG/DL (ref 2.7–4.5)
PLATELET # BLD AUTO: 181 K/UL (ref 150–450)
PMV BLD AUTO: 10.1 FL (ref 9.2–12.9)
POTASSIUM SERPL-SCNC: 4 MMOL/L (ref 3.5–5.1)
RBC # BLD AUTO: 3.7 M/UL (ref 4.6–6.2)
SODIUM SERPL-SCNC: 140 MMOL/L (ref 136–145)
TACROLIMUS BLD-MCNC: 9.3 NG/ML (ref 5–15)
WBC # BLD AUTO: 4.67 K/UL (ref 3.9–12.7)

## 2023-09-11 PROCEDURE — 80197 ASSAY OF TACROLIMUS: CPT | Performed by: INTERNAL MEDICINE

## 2023-09-11 PROCEDURE — 80069 RENAL FUNCTION PANEL: CPT | Performed by: INTERNAL MEDICINE

## 2023-09-11 PROCEDURE — 36415 COLL VENOUS BLD VENIPUNCTURE: CPT | Performed by: INTERNAL MEDICINE

## 2023-09-11 PROCEDURE — 85025 COMPLETE CBC W/AUTO DIFF WBC: CPT | Performed by: INTERNAL MEDICINE

## 2023-09-11 PROCEDURE — 83735 ASSAY OF MAGNESIUM: CPT | Performed by: INTERNAL MEDICINE

## 2023-09-25 ENCOUNTER — LAB VISIT (OUTPATIENT)
Dept: LAB | Facility: HOSPITAL | Age: 63
End: 2023-09-25
Attending: INTERNAL MEDICINE
Payer: MEDICARE

## 2023-09-25 DIAGNOSIS — Z94.0 KIDNEY REPLACED BY TRANSPLANT: ICD-10-CM

## 2023-09-25 LAB
ALBUMIN SERPL BCP-MCNC: 3.2 G/DL (ref 3.5–5.2)
ANION GAP SERPL CALC-SCNC: 9 MMOL/L (ref 8–16)
BASOPHILS # BLD AUTO: 0.03 K/UL (ref 0–0.2)
BASOPHILS NFR BLD: 0.6 % (ref 0–1.9)
BUN SERPL-MCNC: 10 MG/DL (ref 8–23)
CALCIUM SERPL-MCNC: 8.9 MG/DL (ref 8.7–10.5)
CHLORIDE SERPL-SCNC: 108 MMOL/L (ref 95–110)
CO2 SERPL-SCNC: 24 MMOL/L (ref 23–29)
CREAT SERPL-MCNC: 1.3 MG/DL (ref 0.5–1.4)
DIFFERENTIAL METHOD: ABNORMAL
EOSINOPHIL # BLD AUTO: 0.1 K/UL (ref 0–0.5)
EOSINOPHIL NFR BLD: 1.3 % (ref 0–8)
ERYTHROCYTE [DISTWIDTH] IN BLOOD BY AUTOMATED COUNT: 13.2 % (ref 11.5–14.5)
EST. GFR  (NO RACE VARIABLE): >60 ML/MIN/1.73 M^2
GLUCOSE SERPL-MCNC: 71 MG/DL (ref 70–110)
HCT VFR BLD AUTO: 36.4 % (ref 40–54)
HGB BLD-MCNC: 12 G/DL (ref 14–18)
IMM GRANULOCYTES # BLD AUTO: 0.07 K/UL (ref 0–0.04)
IMM GRANULOCYTES NFR BLD AUTO: 1.3 % (ref 0–0.5)
LYMPHOCYTES # BLD AUTO: 1.1 K/UL (ref 1–4.8)
LYMPHOCYTES NFR BLD: 20 % (ref 18–48)
MAGNESIUM SERPL-MCNC: 1.4 MG/DL (ref 1.6–2.6)
MCH RBC QN AUTO: 33.6 PG (ref 27–31)
MCHC RBC AUTO-ENTMCNC: 33 G/DL (ref 32–36)
MCV RBC AUTO: 102 FL (ref 82–98)
MONOCYTES # BLD AUTO: 0.7 K/UL (ref 0.3–1)
MONOCYTES NFR BLD: 12.3 % (ref 4–15)
NEUTROPHILS # BLD AUTO: 3.5 K/UL (ref 1.8–7.7)
NEUTROPHILS NFR BLD: 64.5 % (ref 38–73)
NRBC BLD-RTO: 0 /100 WBC
PHOSPHATE SERPL-MCNC: 3.3 MG/DL (ref 2.7–4.5)
PLATELET # BLD AUTO: 243 K/UL (ref 150–450)
PMV BLD AUTO: 10.2 FL (ref 9.2–12.9)
POTASSIUM SERPL-SCNC: 3.8 MMOL/L (ref 3.5–5.1)
RBC # BLD AUTO: 3.57 M/UL (ref 4.6–6.2)
SODIUM SERPL-SCNC: 141 MMOL/L (ref 136–145)
TACROLIMUS BLD-MCNC: 6.3 NG/ML (ref 5–15)
WBC # BLD AUTO: 5.35 K/UL (ref 3.9–12.7)

## 2023-09-25 PROCEDURE — 80197 ASSAY OF TACROLIMUS: CPT | Performed by: INTERNAL MEDICINE

## 2023-09-25 PROCEDURE — 87799 DETECT AGENT NOS DNA QUANT: CPT | Performed by: INTERNAL MEDICINE

## 2023-09-25 PROCEDURE — 80069 RENAL FUNCTION PANEL: CPT | Performed by: INTERNAL MEDICINE

## 2023-09-25 PROCEDURE — 85025 COMPLETE CBC W/AUTO DIFF WBC: CPT | Performed by: INTERNAL MEDICINE

## 2023-09-25 PROCEDURE — 83735 ASSAY OF MAGNESIUM: CPT | Performed by: INTERNAL MEDICINE

## 2023-09-27 RX ORDER — CEPHALEXIN 500 MG/1
500 CAPSULE ORAL EVERY 8 HOURS
Qty: 21 CAPSULE | Refills: 0 | Status: SHIPPED | OUTPATIENT
Start: 2023-09-27 | End: 2023-10-04

## 2023-09-27 NOTE — TELEPHONE ENCOUNTER
Patient repeated back and voice a understanding of orders.   ----- Message from Christie Castro PharmD sent at 9/27/2023 11:01 AM CDT -----  Cephalexin 500 q8 x7 days    ----- Message -----  From: Robles Arita RN  Sent: 9/27/2023  10:26 AM CDT  To: Abdominal Transplant Pharmacists      ----- Message -----  From: Darrin Verma MD  Sent: 9/27/2023   9:02 AM CDT  To: [facility] Post-Kidney Transplant Clinical    Please consult pharmacy to start some treatment due to the incoming procedure

## 2023-09-27 NOTE — TELEPHONE ENCOUNTER
As previously agreed I left voice message with orders and to call coordinator back with any questions.  ----- Message from Christie Castro PharmD sent at 9/27/2023 11:01 AM CDT -----  Cephalexin 500 q8 x7 days    ----- Message -----  From: Robles Arita RN  Sent: 9/27/2023  10:26 AM CDT  To: Abdominal Transplant Pharmacists      ----- Message -----  From: Darrin Verma MD  Sent: 9/27/2023   9:02 AM CDT  To: [facility] Post-Kidney Transplant Clinical    Please consult pharmacy to start some treatment due to the incoming procedure

## 2023-09-28 ENCOUNTER — TELEPHONE (OUTPATIENT)
Dept: TRANSPLANT | Facility: CLINIC | Age: 63
End: 2023-09-28
Payer: MEDICARE

## 2023-09-28 DIAGNOSIS — Z94.0 S/P KIDNEY TRANSPLANT: ICD-10-CM

## 2023-09-28 LAB
BKV DNA SERPL NAA+PROBE-ACNC: ABNORMAL COPIES/ML
BKV DNA SERPL NAA+PROBE-LOG#: 3.05 LOG (10) COPIES/ML
BKV DNA SERPL QL NAA+PROBE: DETECTED

## 2023-09-28 RX ORDER — MYCOPHENOLIC ACID 360 MG/1
360 TABLET, DELAYED RELEASE ORAL 2 TIMES DAILY
Qty: 60 TABLET | Refills: 11 | Status: SHIPPED | OUTPATIENT
Start: 2023-09-28 | End: 2023-10-12

## 2023-09-28 NOTE — TELEPHONE ENCOUNTER
Patient repeated back and voice a understanding of orders,  Next labs on 10/9/2023.  ----- Message from Darrin Verma MD sent at 9/28/2023 12:04 PM CDT -----  Lower myfortic 360 bid please, start serum BK every 2 weeks pcr per protocol

## 2023-09-29 ENCOUNTER — TELEPHONE (OUTPATIENT)
Dept: INTERVENTIONAL RADIOLOGY/VASCULAR | Facility: HOSPITAL | Age: 63
End: 2023-09-29
Payer: MEDICARE

## 2023-09-29 ENCOUNTER — LAB VISIT (OUTPATIENT)
Dept: LAB | Facility: HOSPITAL | Age: 63
End: 2023-09-29
Payer: MEDICARE

## 2023-09-29 DIAGNOSIS — Z94.0 KIDNEY REPLACED BY TRANSPLANT: ICD-10-CM

## 2023-09-29 LAB
INR PPP: 1.1 (ref 0.8–1.2)
PROTHROMBIN TIME: 11.3 SEC (ref 9–12.5)

## 2023-09-29 PROCEDURE — 85610 PROTHROMBIN TIME: CPT | Performed by: FAMILY MEDICINE

## 2023-09-29 PROCEDURE — 36415 COLL VENOUS BLD VENIPUNCTURE: CPT | Performed by: FAMILY MEDICINE

## 2023-09-30 ENCOUNTER — NURSE TRIAGE (OUTPATIENT)
Dept: ADMINISTRATIVE | Facility: CLINIC | Age: 63
End: 2023-09-30
Payer: MEDICARE

## 2023-09-30 NOTE — TELEPHONE ENCOUNTER
Kidney transplant 5/10/23    Neph tube, capped off. Starting to leak behind cap.   Call out to MD Samuels for recommendation. Advised to attach bag until change out on Monday. Pt advised and VU.     Reason for Disposition   Nursing judgment or information in reference    Protocols used: No Guideline [id number]

## 2023-10-02 ENCOUNTER — TELEPHONE (OUTPATIENT)
Dept: TRANSPLANT | Facility: CLINIC | Age: 63
End: 2023-10-02
Payer: MEDICARE

## 2023-10-02 ENCOUNTER — HOSPITAL ENCOUNTER (OUTPATIENT)
Dept: INTERVENTIONAL RADIOLOGY/VASCULAR | Facility: HOSPITAL | Age: 63
Discharge: HOME | End: 2023-10-02
Attending: TRANSPLANT SURGERY
Payer: MEDICARE

## 2023-10-02 VITALS
DIASTOLIC BLOOD PRESSURE: 81 MMHG | SYSTOLIC BLOOD PRESSURE: 159 MMHG | BODY MASS INDEX: 25.92 KG/M2 | WEIGHT: 175 LBS | TEMPERATURE: 98 F | OXYGEN SATURATION: 99 % | HEIGHT: 69 IN | RESPIRATION RATE: 18 BRPM | HEART RATE: 66 BPM

## 2023-10-02 DIAGNOSIS — Z94.0 KIDNEY REPLACED BY TRANSPLANT: ICD-10-CM

## 2023-10-02 DIAGNOSIS — Z94.0 KIDNEY REPLACED BY TRANSPLANT: Primary | ICD-10-CM

## 2023-10-02 PROCEDURE — 25000003 PHARM REV CODE 250: Performed by: STUDENT IN AN ORGANIZED HEALTH CARE EDUCATION/TRAINING PROGRAM

## 2023-10-02 PROCEDURE — 25500020 PHARM REV CODE 255: Performed by: TRANSPLANT SURGERY

## 2023-10-02 PROCEDURE — 99152 MOD SED SAME PHYS/QHP 5/>YRS: CPT | Mod: ,,, | Performed by: STUDENT IN AN ORGANIZED HEALTH CARE EDUCATION/TRAINING PROGRAM

## 2023-10-02 PROCEDURE — 99152 MOD SED SAME PHYS/QHP 5/>YRS: CPT | Performed by: STUDENT IN AN ORGANIZED HEALTH CARE EDUCATION/TRAINING PROGRAM

## 2023-10-02 PROCEDURE — 50435 EXCHANGE NEPHROSTOMY CATH: CPT | Mod: RT | Performed by: STUDENT IN AN ORGANIZED HEALTH CARE EDUCATION/TRAINING PROGRAM

## 2023-10-02 PROCEDURE — 50706 PR BALLOON URETERALSTRIX, INCL GUIDE, S&I: ICD-10-PCS | Mod: ,,, | Performed by: STUDENT IN AN ORGANIZED HEALTH CARE EDUCATION/TRAINING PROGRAM

## 2023-10-02 PROCEDURE — 50387 CHANGE NEPHROURETERAL CATH: CPT | Mod: RT,,, | Performed by: STUDENT IN AN ORGANIZED HEALTH CARE EDUCATION/TRAINING PROGRAM

## 2023-10-02 PROCEDURE — 50706 BALLOON DILATE URTRL STRIX: CPT | Performed by: STUDENT IN AN ORGANIZED HEALTH CARE EDUCATION/TRAINING PROGRAM

## 2023-10-02 PROCEDURE — 50706 BALLOON DILATE URTRL STRIX: CPT | Mod: ,,, | Performed by: STUDENT IN AN ORGANIZED HEALTH CARE EDUCATION/TRAINING PROGRAM

## 2023-10-02 PROCEDURE — A4550 SURGICAL TRAYS: HCPCS

## 2023-10-02 PROCEDURE — 50387 IR NEPHROSTOGRAM: ICD-10-PCS | Mod: RT,,, | Performed by: STUDENT IN AN ORGANIZED HEALTH CARE EDUCATION/TRAINING PROGRAM

## 2023-10-02 PROCEDURE — 99152 PR MOD CONSCIOUS SEDATION, SAME PHYS, 5+ YRS, FIRST 15 MIN: ICD-10-PCS | Mod: ,,, | Performed by: STUDENT IN AN ORGANIZED HEALTH CARE EDUCATION/TRAINING PROGRAM

## 2023-10-02 PROCEDURE — 63600175 PHARM REV CODE 636 W HCPCS: Performed by: STUDENT IN AN ORGANIZED HEALTH CARE EDUCATION/TRAINING PROGRAM

## 2023-10-02 RX ORDER — FENTANYL CITRATE 50 UG/ML
INJECTION, SOLUTION INTRAMUSCULAR; INTRAVENOUS
Status: COMPLETED | OUTPATIENT
Start: 2023-10-02 | End: 2023-10-02

## 2023-10-02 RX ORDER — MIDAZOLAM HYDROCHLORIDE 1 MG/ML
INJECTION INTRAMUSCULAR; INTRAVENOUS
Status: COMPLETED | OUTPATIENT
Start: 2023-10-02 | End: 2023-10-02

## 2023-10-02 RX ORDER — LIDOCAINE HYDROCHLORIDE 10 MG/ML
1 INJECTION, SOLUTION EPIDURAL; INFILTRATION; INTRACAUDAL; PERINEURAL ONCE
Status: DISCONTINUED | OUTPATIENT
Start: 2023-10-02 | End: 2023-10-03 | Stop reason: HOSPADM

## 2023-10-02 RX ORDER — SODIUM CHLORIDE 9 MG/ML
INJECTION, SOLUTION INTRAVENOUS CONTINUOUS
Status: DISCONTINUED | OUTPATIENT
Start: 2023-10-02 | End: 2023-10-03 | Stop reason: HOSPADM

## 2023-10-02 RX ORDER — LIDOCAINE HYDROCHLORIDE 10 MG/ML
INJECTION INFILTRATION; PERINEURAL
Status: COMPLETED | OUTPATIENT
Start: 2023-10-02 | End: 2023-10-02

## 2023-10-02 RX ORDER — CIPROFLOXACIN 2 MG/ML
INJECTION, SOLUTION INTRAVENOUS
Status: COMPLETED | OUTPATIENT
Start: 2023-10-02 | End: 2023-10-02

## 2023-10-02 RX ADMIN — IOHEXOL 20 ML: 300 INJECTION, SOLUTION INTRAVENOUS at 09:10

## 2023-10-02 RX ADMIN — FENTANYL CITRATE 50 MCG: 50 INJECTION, SOLUTION INTRAMUSCULAR; INTRAVENOUS at 08:10

## 2023-10-02 RX ADMIN — MIDAZOLAM HYDROCHLORIDE 1 MG: 2 INJECTION, SOLUTION INTRAMUSCULAR; INTRAVENOUS at 08:10

## 2023-10-02 RX ADMIN — LIDOCAINE HYDROCHLORIDE 1 ML: 10 INJECTION, SOLUTION INFILTRATION; PERINEURAL at 08:10

## 2023-10-02 RX ADMIN — CIPROFLOXACIN 400 MG: 2 INJECTION, SOLUTION INTRAVENOUS at 08:10

## 2023-10-02 NOTE — NURSING
Procedure recovery complete. VSS. Procedure site dressing to RLQ is clean, dry, and intact. Patient verbalizes understanding of discharge instructions including when to call the doctor. PIV removed. Patient to be discharged home via wheelchair with wife.

## 2023-10-02 NOTE — PROCEDURES
IR Post-Procedure Note    Pre Op Diagnosis:   ureteral stricture    Post Op Diagnosis:  same    Procedure:  Transplant percutaneous nephroureteral tube change + Ureteroplasty    Procedure performed by: Miesha Head MD / Angélica Krishna MD       Written Informed Consent Obtained:  Yes    Specimen Removed: No     Estimated Blood Loss:  Minimal     Findings:     Local anesthesia and moderate sedation were used.      Nephrostogram through existing tube showed satisfactory position of the tube.    Antegrade sheath nephrostogram with severe multifocal stricture of the entire ureter, similar to prior.     Ureteroplasty using 8 mm Conquest [place]on without significant improvement in ureteral caliber.     A new 16 Fr nephroureteral tube (biliary drain) was placed in exchange for the old 16 Fr nephroureteral tube.     Plan:    - 16 Fr PCNU tube trial for total of 6 months. Return to IR  in every 8-12 weeks for routine nephrograms and exchange.     - Tube to gravity bag for 24 hr, then cap.    Miesha Head MD MSCR  PGY-5 Radiology Resident

## 2023-10-02 NOTE — H&P
Inpatient Radiology Pre-procedure Note    History of Present Illness:  Abdoulaye Parikh is a 62 y.o. male with pmhx Ktx 05/2023 c/b ureteral stricture s/p nephrostomy placement presents for sheath nephrostogram with porrible ureteroplasty, last nephrostogram was 8/4/23 with upsize of NU tube to 16 Fr (biliary drain). Of note he was recently started on keflex after presenting with dysuria and UA showed pyruria 9/27/23.    Admission H&P reviewed.  Past Medical History:   Diagnosis Date    Acute blood loss anemia 5/11/2023    Anemia     Arthritis     Cirrhosis     CKD (chronic kidney disease) stage 4, GFR 15-29 ml/min, slow graft function with sustaine creatinien improvement 5/26/2023    Crohn's disease     Disorder of kidney and ureter     Stage 5    Encounter for blood transfusion     Gout     Hearing loss     Hepatitis     Had Hep C Cleared    Hypertension     Immunosuppressive management encounter following kidney transplant 5/26/2023    Neurosarcoidosis 2018    Obesity     Osteoarthritis     Sarcoid neuropathy     Sarcoidosis, lung     Steatosis      Past Surgical History:   Procedure Laterality Date    AV FISTULA PLACEMENT Left     BRAIN SURGERY      COLON SURGERY      Exploratory lap x 4 for Chron's disease. Likely right colectomy    CSF SHUNT      DIAGNOSTIC ULTRASOUND N/A 5/15/2023    Procedure: ULTRASOUND, DIAGNOSTIC;  Surgeon: Chivo Brown MD;  Location: [address];  Service: Transplant;  Laterality: N/A;    JOINT REPLACEMENT Right     Hip    KIDNEY TRANSPLANT N/A 5/10/2023    Procedure: TRANSPLANT, KIDNEY - RQ 7PM START;  Surgeon: Chivo Brown MD;  Location: [address];  Service: Transplant;  Laterality: N/A;    NEPHROSTOMY N/A 6/3/2023    Procedure: Nephrostomy;  Surgeon: Silvia Surgeon;  Location: [facility];  Service: Anesthesiology;  Laterality: N/A;    RENAL BIOPSY  5/15/2023    Procedure: BIOPSY, KIDNEY;  Surgeon: Chivo Brown MD;  Location: [address];   Service: Transplant;;    RENAL EXPLORATION  5/15/2023    Procedure: EXPLORATION, KIDNEY;  Surgeon: Chivo Brown MD;  Location: [facility] OR [address] [place] [address];  Service: Transplant;;    TOTAL HIP ARTHROPLASTY Right        Review of Systems:   As documented in primary team H&P    Home Meds:   Prior to Admission medications    Medication Sig Start Date End Date Taking? Authorizing Provider   atovaquone (MEPRON) 750 mg/5 mL Susp oral liquid Take 10 mLs (1,500 mg total) by mouth once daily UNTIL 11/7/2023 6/8/23  Yes Darrin Verma MD   cephALEXin (KEFLEX) 500 MG capsule Take 1 capsule (500 mg total) by mouth every 8 (eight) hours. for 7 days 9/27/23 10/4/23 Yes Darrin Verma MD   fluticasone propionate (FLOVENT HFA) 44 mcg/actuation inhaler Inhale 1 puff into the lungs 2 (two) times daily. 6/26/23  Yes Provider, Historical   hydrOXYzine HCL (ATARAX) 25 MG tablet Take 1 tablet (25 mg total) by mouth 3 (three) times daily as needed for Anxiety. 7/3/23  Yes Maggie Mcgovern MD   k phos di & mono-sod phos mono (PHOSPHA 250 NEUTRAL) 250 mg Tab Take 1 tablet by mouth 2 (two) times a day. 6/26/23 6/25/24 Yes Elsy Ramachandran MD   loperamide HCl (IMODIUM A-D ORAL) Take by mouth.   Yes Provider, Historical   magnesium oxide (MAG-OX) 400 mg (241.3 mg magnesium) tablet Take 1 tablet (400 mg total) by mouth 2 (two) times daily. 7/10/23 7/9/24 Yes Darrin Verma MD   metoprolol succinate (TOPROL-XL) 25 MG 24 hr tablet Take HALF tablet (12.5 mg total) by mouth once daily. 5/18/23 5/17/24 Yes Rodolfo Samuels MD   metoprolol tartrate (LOPRESSOR) 25 MG tablet Take 0.5 tablets (12.5 mg total) by mouth 2 (two) times daily. 7/24/23 7/23/24 Yes Darrin Verma MD   mycophenolate sodium (MYFORTIC) 360 MG TbEC Take 1 tablet (360 mg total) by mouth 2 (two) times daily. 9/28/23 9/27/24 Yes Darrin Verma MD   olmesartan (BENICAR) 40 MG tablet Take 1 tablet (40 mg total) by mouth once daily. 8/18/23  Yes Rose Mary Stroud,  "NP   predniSONE (DELTASONE) 5 MG tablet Take 20 mg by mouth daily 5/14-5/20; 15 mg daily 5/21-5/27; 10 mg daily 5/28-6/3; then 5 mg daily thereafter 6/4/23  Patient taking differently: Take 5 mg by mouth once daily. 5/11/23  Yes Zeke Devine Jr., MD   sodium bicarbonate 650 MG tablet Take 2 tablets (1,300 mg total) by mouth 3 (three) times daily. 6/6/23 6/5/24 Yes Beatrice Mendez DO   tacrolimus (PROGRAF) 1 MG Cap Take 2 capsules (2 mg total) by mouth every morning AND 2 capsules (2 mg total) every evening. Txp Date:5/10/2023 (Kidney) Disch Date:5/17/2023 ICD10:Z94.0 Txp Location:[facility]. 7/10/23 7/9/24 Yes Darrin Verma MD     Scheduled Meds:    LIDOcaine (PF) 10 mg/ml (1%)  1 mL Other Once     Continuous Infusions:    sodium chloride 0.9%       PRN Meds:  Anticoagulants/Antiplatelets: no anticoagulation    Allergies:   Review of patient's allergies indicates:   Allergen Reactions    Bactrim [sulfamethoxazole-trimethoprim] Shortness Of Breath and Itching    Penicillins Shortness Of Breath and Itching     Sedation Hx: have not been any systemic reactions    Labs:  Recent Labs   Lab 09/29/23  1424   INR 1.1     No results for input(s): "WBC", "HGB", "HCT", "MCV", "PLT" in the last 168 hours. No results for input(s): "GLU", "NA", "K", "CL", "CO2", "BUN", "CREATININE", "CALCIUM", "MG", "ALT", "AST", "ALBUMIN", "BILITOT", "BILIDIR" in the last 168 hours.      Vitals:  Temp: 98.1 °F (36.7 °C) (10/02/23 0655)  Pulse: (!) 58 (10/02/23 0655)  Resp: 16 (10/02/23 0655)  BP: (!) 156/77 (10/02/23 0704)  SpO2: 100 % (10/02/23 0655)     Physical Exam:  ASA: 2  Mallampati: 2    General: no acute distress  Mental Status: alert and oriented   HEENT: normocephalic, atraumatic  Chest: unlabored breathing  Abdomen: nondistended, R nephrostomy in place, minimal clear drainage    Plan:   Proceed with sheath nephrostogram and possible additional ureteroplasty.  Sedation plan: up to moderate    Filomena Saldaña MD  Diagnostic Radiology "

## 2023-10-02 NOTE — DISCHARGE INSTRUCTIONS
Please call with any questions or concerns.      Monday through Friday 8:00 am - 5:00 pm    Interventional Radiology   [phone] clinic    After Hours    Ask the  for the Radiology Resident on call  [phone]    May cap drain tube off tomorrow (24 hrs)

## 2023-10-02 NOTE — NURSING
Nephrostogram and tube/drain exchange complete. Pt tolerated well. VSS. No signs or symptoms of distress noted. Pt will be transferred to MPU bed escorted by RN and report to RN on arrival.

## 2023-10-02 NOTE — NURSING
Patient received s/p nephrostomy tube change in MPU bay 5. Procedure site dressing to RLQ is clean and dry, no evidence of bleeding or hematoma formation. Patient to recover for 1 hour and discharge home with wife. Fall precautions reviewed. Bed in lowest, locked position. Call light within reach.

## 2023-10-02 NOTE — NURSING
Pt arrived to 189 for Nephrostogram. Pt oriented to unit and staff. Plan of care reviewed with patient, patient verbalizes understanding. Comfort measures utilized. Pt safely transferred from stretcher to procedural table. Fall risk reviewed with patient, fall risk interventions maintained with direct observation/attendance.  positioner pillows utilized to minimize pressure points. Blankets applied. Pt prepped and draped utilizing standard sterile technique. Patient placed on continuous monitoring, as required by sedation policy. Timeouts completed utilizing standard universal time-out, per department and facility policy. RN to remain at bedside, continuous monitoring maintained. Pt resting comfortably. Denies pain/discomfort. Will continue to monitor. See flow sheets for monitoring, medication administration, and updates.

## 2023-10-06 ENCOUNTER — TELEPHONE (OUTPATIENT)
Dept: TRANSPLANT | Facility: CLINIC | Age: 63
End: 2023-10-06
Payer: MEDICARE

## 2023-10-06 ENCOUNTER — PATIENT MESSAGE (OUTPATIENT)
Dept: TRANSPLANT | Facility: CLINIC | Age: 63
End: 2023-10-06
Payer: MEDICARE

## 2023-10-06 NOTE — TELEPHONE ENCOUNTER
Return call to patient stating that he has notice blood in his urine off and on since his Nephrostogram on 10/2.  States urine is now clear. Denies pain ,fever, N/V/D.  Patient to keep coordinator posted and voice a understanding to present to the Er if he develops, Pain < temp >100.4, and or consistent Hematuria when he voids.  ----- Message from Dorys Jefferson sent at 10/5/2023  4:04 PM CDT -----  Regarding: Questions      Name Of Caller:   Abdoulaye      Contact Preference:   [phone]      Nature of call:   Pt requesting to speak w/ Robles. He's experiencing some bleeding when he urinates.

## 2023-10-09 ENCOUNTER — LAB VISIT (OUTPATIENT)
Dept: LAB | Facility: HOSPITAL | Age: 63
End: 2023-10-09
Attending: INTERNAL MEDICINE
Payer: MEDICARE

## 2023-10-09 DIAGNOSIS — Z94.0 KIDNEY REPLACED BY TRANSPLANT: ICD-10-CM

## 2023-10-09 LAB
ALBUMIN SERPL BCP-MCNC: 3.4 G/DL (ref 3.5–5.2)
ANION GAP SERPL CALC-SCNC: 13 MMOL/L (ref 8–16)
BASOPHILS # BLD AUTO: 0.03 K/UL (ref 0–0.2)
BASOPHILS NFR BLD: 0.6 % (ref 0–1.9)
BUN SERPL-MCNC: 9 MG/DL (ref 8–23)
CALCIUM SERPL-MCNC: 9.2 MG/DL (ref 8.7–10.5)
CHLORIDE SERPL-SCNC: 104 MMOL/L (ref 95–110)
CO2 SERPL-SCNC: 22 MMOL/L (ref 23–29)
CREAT SERPL-MCNC: 1.2 MG/DL (ref 0.5–1.4)
DIFFERENTIAL METHOD: ABNORMAL
EOSINOPHIL # BLD AUTO: 0.1 K/UL (ref 0–0.5)
EOSINOPHIL NFR BLD: 1.2 % (ref 0–8)
ERYTHROCYTE [DISTWIDTH] IN BLOOD BY AUTOMATED COUNT: 13.7 % (ref 11.5–14.5)
EST. GFR  (NO RACE VARIABLE): >60 ML/MIN/1.73 M^2
GLUCOSE SERPL-MCNC: 75 MG/DL (ref 70–110)
HCT VFR BLD AUTO: 38.6 % (ref 40–54)
HGB BLD-MCNC: 12.4 G/DL (ref 14–18)
IMM GRANULOCYTES # BLD AUTO: 0.03 K/UL (ref 0–0.04)
IMM GRANULOCYTES NFR BLD AUTO: 0.6 % (ref 0–0.5)
LYMPHOCYTES # BLD AUTO: 1.1 K/UL (ref 1–4.8)
LYMPHOCYTES NFR BLD: 22.2 % (ref 18–48)
MAGNESIUM SERPL-MCNC: 1.5 MG/DL (ref 1.6–2.6)
MCH RBC QN AUTO: 33.8 PG (ref 27–31)
MCHC RBC AUTO-ENTMCNC: 32.1 G/DL (ref 32–36)
MCV RBC AUTO: 105 FL (ref 82–98)
MONOCYTES # BLD AUTO: 0.5 K/UL (ref 0.3–1)
MONOCYTES NFR BLD: 9.8 % (ref 4–15)
NEUTROPHILS # BLD AUTO: 3.3 K/UL (ref 1.8–7.7)
NEUTROPHILS NFR BLD: 65.6 % (ref 38–73)
NRBC BLD-RTO: 0 /100 WBC
PHOSPHATE SERPL-MCNC: 2.5 MG/DL (ref 2.7–4.5)
PLATELET # BLD AUTO: 168 K/UL (ref 150–450)
PMV BLD AUTO: 11.2 FL (ref 9.2–12.9)
POTASSIUM SERPL-SCNC: 3.6 MMOL/L (ref 3.5–5.1)
RBC # BLD AUTO: 3.67 M/UL (ref 4.6–6.2)
SODIUM SERPL-SCNC: 139 MMOL/L (ref 136–145)
TACROLIMUS BLD-MCNC: 6.4 NG/ML (ref 5–15)
WBC # BLD AUTO: 5 K/UL (ref 3.9–12.7)

## 2023-10-09 PROCEDURE — 83735 ASSAY OF MAGNESIUM: CPT | Performed by: INTERNAL MEDICINE

## 2023-10-09 PROCEDURE — 80197 ASSAY OF TACROLIMUS: CPT | Performed by: INTERNAL MEDICINE

## 2023-10-09 PROCEDURE — 87799 DETECT AGENT NOS DNA QUANT: CPT | Performed by: INTERNAL MEDICINE

## 2023-10-09 PROCEDURE — 80069 RENAL FUNCTION PANEL: CPT | Performed by: INTERNAL MEDICINE

## 2023-10-09 PROCEDURE — 85025 COMPLETE CBC W/AUTO DIFF WBC: CPT | Performed by: INTERNAL MEDICINE

## 2023-10-11 ENCOUNTER — TELEPHONE (OUTPATIENT)
Dept: TRANSPLANT | Facility: CLINIC | Age: 63
End: 2023-10-11
Payer: MEDICARE

## 2023-10-11 NOTE — TELEPHONE ENCOUNTER
Left Voice Message to call coordinator back.  ----- Message from Darrin Verma MD sent at 10/11/2023 10:26 AM CDT -----  Ok do nothing, just educate the patient to call if he seems any change or develops any symptoms.   ----- Message -----  From: Robles Arita RN  Sent: 10/10/2023   4:05 PM CDT  To: Darrin Verma MD    Patient has a PCNU that is presently capped.  States he is voiding without difficulty and denies any Signs and Symptoms of a UTI.  ----- Message -----  From: Darrin Verma MD  Sent: 10/10/2023   3:19 PM CDT  To: [facility] Post-Kidney Transplant Clinical    As with the prior culture, Any Symptoms now or any incoming procedure?

## 2023-10-11 NOTE — TELEPHONE ENCOUNTER
Results reviewed with patient and will keep coordinator posted.  ----- Message from Darrin Verma MD sent at 10/11/2023 10:26 AM CDT -----  Ok do nothing, just educate the patient to call if he seems any change or develops any symptoms.   ----- Message -----  From: Robles Arita RN  Sent: 10/10/2023   4:05 PM CDT  To: Darrin Verma MD    Patient has a PCNU that is presently capped.  States he is voiding without difficulty and denies any Signs and Symptoms of a UTI.  ----- Message -----  From: Darrin Verma MD  Sent: 10/10/2023   3:19 PM CDT  To: [facility] Post-Kidney Transplant Clinical    As with the prior culture, Any Symptoms now or any incoming procedure?

## 2023-10-12 ENCOUNTER — TELEPHONE (OUTPATIENT)
Dept: TRANSPLANT | Facility: CLINIC | Age: 63
End: 2023-10-12
Payer: MEDICARE

## 2023-10-12 LAB
BKV DNA SERPL NAA+PROBE-ACNC: ABNORMAL COPIES/ML
BKV DNA SERPL NAA+PROBE-LOG#: 3.29 LOG (10) COPIES/ML
BKV DNA SERPL QL NAA+PROBE: DETECTED

## 2023-10-12 NOTE — TELEPHONE ENCOUNTER
Patient repeated back and voice a understanding of orders.  Next labs on 10/23/2023.  ----- Message from Darrin Verma MD sent at 10/12/2023  1:29 PM CDT -----  Please discontinue Myfortic and continue with BK serum BK per protocol every two weeks

## 2023-10-23 ENCOUNTER — PATIENT MESSAGE (OUTPATIENT)
Dept: TRANSPLANT | Facility: CLINIC | Age: 63
End: 2023-10-23
Payer: MEDICARE

## 2023-10-24 ENCOUNTER — LAB VISIT (OUTPATIENT)
Dept: LAB | Facility: HOSPITAL | Age: 63
End: 2023-10-24
Payer: MEDICARE

## 2023-10-24 ENCOUNTER — TELEPHONE (OUTPATIENT)
Dept: TRANSPLANT | Facility: CLINIC | Age: 63
End: 2023-10-24
Payer: MEDICARE

## 2023-10-24 DIAGNOSIS — Z94.0 KIDNEY REPLACED BY TRANSPLANT: ICD-10-CM

## 2023-10-24 LAB
ALBUMIN SERPL BCP-MCNC: 3.3 G/DL (ref 3.5–5.2)
ANION GAP SERPL CALC-SCNC: 13 MMOL/L (ref 8–16)
BASOPHILS # BLD AUTO: 0.03 K/UL (ref 0–0.2)
BASOPHILS NFR BLD: 0.6 % (ref 0–1.9)
BUN SERPL-MCNC: 12 MG/DL (ref 8–23)
CALCIUM SERPL-MCNC: 9.1 MG/DL (ref 8.7–10.5)
CHLORIDE SERPL-SCNC: 105 MMOL/L (ref 95–110)
CO2 SERPL-SCNC: 21 MMOL/L (ref 23–29)
CREAT SERPL-MCNC: 1.4 MG/DL (ref 0.5–1.4)
DIFFERENTIAL METHOD: ABNORMAL
EOSINOPHIL # BLD AUTO: 0.1 K/UL (ref 0–0.5)
EOSINOPHIL NFR BLD: 1.1 % (ref 0–8)
ERYTHROCYTE [DISTWIDTH] IN BLOOD BY AUTOMATED COUNT: 14.4 % (ref 11.5–14.5)
EST. GFR  (NO RACE VARIABLE): 56.8 ML/MIN/1.73 M^2
GLUCOSE SERPL-MCNC: 78 MG/DL (ref 70–110)
HCT VFR BLD AUTO: 39.5 % (ref 40–54)
HGB BLD-MCNC: 12.7 G/DL (ref 14–18)
IMM GRANULOCYTES # BLD AUTO: 0.04 K/UL (ref 0–0.04)
IMM GRANULOCYTES NFR BLD AUTO: 0.7 % (ref 0–0.5)
LYMPHOCYTES # BLD AUTO: 0.9 K/UL (ref 1–4.8)
LYMPHOCYTES NFR BLD: 16.3 % (ref 18–48)
MAGNESIUM SERPL-MCNC: 1.7 MG/DL (ref 1.6–2.6)
MCH RBC QN AUTO: 34.2 PG (ref 27–31)
MCHC RBC AUTO-ENTMCNC: 32.2 G/DL (ref 32–36)
MCV RBC AUTO: 107 FL (ref 82–98)
MONOCYTES # BLD AUTO: 0.4 K/UL (ref 0.3–1)
MONOCYTES NFR BLD: 7.9 % (ref 4–15)
NEUTROPHILS # BLD AUTO: 3.9 K/UL (ref 1.8–7.7)
NEUTROPHILS NFR BLD: 73.4 % (ref 38–73)
NRBC BLD-RTO: 0 /100 WBC
PHOSPHATE SERPL-MCNC: 2.7 MG/DL (ref 2.7–4.5)
PLATELET # BLD AUTO: 225 K/UL (ref 150–450)
PMV BLD AUTO: 10.3 FL (ref 9.2–12.9)
POTASSIUM SERPL-SCNC: 3.6 MMOL/L (ref 3.5–5.1)
RBC # BLD AUTO: 3.71 M/UL (ref 4.6–6.2)
SODIUM SERPL-SCNC: 139 MMOL/L (ref 136–145)
TACROLIMUS BLD-MCNC: 8.6 NG/ML (ref 5–15)
WBC # BLD AUTO: 5.35 K/UL (ref 3.9–12.7)

## 2023-10-24 PROCEDURE — 80069 RENAL FUNCTION PANEL: CPT | Performed by: INTERNAL MEDICINE

## 2023-10-24 PROCEDURE — 80197 ASSAY OF TACROLIMUS: CPT | Performed by: INTERNAL MEDICINE

## 2023-10-24 PROCEDURE — 85025 COMPLETE CBC W/AUTO DIFF WBC: CPT | Performed by: INTERNAL MEDICINE

## 2023-10-24 PROCEDURE — 83735 ASSAY OF MAGNESIUM: CPT | Performed by: INTERNAL MEDICINE

## 2023-10-24 PROCEDURE — 87799 DETECT AGENT NOS DNA QUANT: CPT | Performed by: INTERNAL MEDICINE

## 2023-10-24 NOTE — TELEPHONE ENCOUNTER
Patient repeated back and voice a understanding of orders.  Voice a understanding that he needs to be drinking 2-2.5 liters of H2O daily.  ----- Message from Darrin Verma MD sent at 10/24/2023  8:47 AM CDT -----  Please d/c KPN  Please encourage hydration

## 2023-10-24 NOTE — TELEPHONE ENCOUNTER
Left voice message to call coordinator back.  ----- Message from Darrin Verma MD sent at 10/24/2023  8:47 AM CDT -----  Please d/c KPN  Please encourage hydration

## 2023-10-26 ENCOUNTER — PATIENT MESSAGE (OUTPATIENT)
Dept: TRANSPLANT | Facility: CLINIC | Age: 63
End: 2023-10-26
Payer: MEDICARE

## 2023-10-27 LAB
BKV DNA SERPL NAA+PROBE-ACNC: 302 COPIES/ML
BKV DNA SERPL NAA+PROBE-LOG#: 2.48 LOG (10) COPIES/ML
BKV DNA SERPL QL NAA+PROBE: DETECTED

## 2023-10-30 ENCOUNTER — TELEPHONE (OUTPATIENT)
Dept: TRANSPLANT | Facility: CLINIC | Age: 63
End: 2023-10-30
Payer: MEDICARE

## 2023-10-30 ENCOUNTER — LAB VISIT (OUTPATIENT)
Dept: LAB | Facility: HOSPITAL | Age: 63
End: 2023-10-30
Attending: INTERNAL MEDICINE
Payer: MEDICARE

## 2023-10-30 DIAGNOSIS — Z94.0 KIDNEY REPLACED BY TRANSPLANT: ICD-10-CM

## 2023-10-30 LAB
BACTERIA #/AREA URNS AUTO: ABNORMAL /HPF
BILIRUB UR QL STRIP: NEGATIVE
CLARITY UR REFRACT.AUTO: ABNORMAL
COLOR UR AUTO: YELLOW
CREAT UR-MCNC: 34 MG/DL (ref 23–375)
GLUCOSE UR QL STRIP: NEGATIVE
HGB UR QL STRIP: ABNORMAL
HYALINE CASTS UR QL AUTO: 0 /LPF
KETONES UR QL STRIP: NEGATIVE
LEUKOCYTE ESTERASE UR QL STRIP: ABNORMAL
MICROSCOPIC COMMENT: ABNORMAL
NITRITE UR QL STRIP: NEGATIVE
PH UR STRIP: 8 [PH] (ref 5–8)
PROT UR QL STRIP: ABNORMAL
PROT UR-MCNC: 43 MG/DL (ref 0–15)
PROT/CREAT UR: 1.26 MG/G{CREAT} (ref 0–0.2)
RBC #/AREA URNS AUTO: >100 /HPF (ref 0–4)
SP GR UR STRIP: 1 (ref 1–1.03)
URN SPEC COLLECT METH UR: ABNORMAL
WBC #/AREA URNS AUTO: 72 /HPF (ref 0–5)
WBC CLUMPS UR QL AUTO: ABNORMAL

## 2023-10-30 PROCEDURE — 84156 ASSAY OF PROTEIN URINE: CPT | Performed by: INTERNAL MEDICINE

## 2023-10-30 PROCEDURE — 81001 URINALYSIS AUTO W/SCOPE: CPT | Performed by: INTERNAL MEDICINE

## 2023-10-30 PROCEDURE — 87086 URINE CULTURE/COLONY COUNT: CPT | Performed by: INTERNAL MEDICINE

## 2023-10-30 NOTE — PROGRESS NOTES
If he does not have any symptoms please do not order a urine culture, unless there is a urologic  procedure scheduled soon

## 2023-10-30 NOTE — TELEPHONE ENCOUNTER
Left Voice message to call coordinator back.  ----- Message from Darrin Verma MD sent at 10/30/2023 10:49 AM CDT -----  If he does not have any symptoms please do not order a urine culture, unless there is a urologic  procedure scheduled soon

## 2023-10-31 LAB
BACTERIA UR CULT: NORMAL
BACTERIA UR CULT: NORMAL

## 2023-11-06 ENCOUNTER — LAB VISIT (OUTPATIENT)
Dept: LAB | Facility: HOSPITAL | Age: 63
End: 2023-11-06
Attending: INTERNAL MEDICINE
Payer: MEDICARE

## 2023-11-06 DIAGNOSIS — Z94.0 S/P KIDNEY TRANSPLANT: ICD-10-CM

## 2023-11-06 DIAGNOSIS — Z94.0 KIDNEY REPLACED BY TRANSPLANT: ICD-10-CM

## 2023-11-06 LAB
BACTERIA #/AREA URNS AUTO: ABNORMAL /HPF
BILIRUB UR QL STRIP: NEGATIVE
CLARITY UR REFRACT.AUTO: ABNORMAL
COLOR UR AUTO: YELLOW
CREAT UR-MCNC: 84 MG/DL (ref 23–375)
GLUCOSE UR QL STRIP: NEGATIVE
HGB UR QL STRIP: ABNORMAL
KETONES UR QL STRIP: NEGATIVE
LEUKOCYTE ESTERASE UR QL STRIP: ABNORMAL
MICROSCOPIC COMMENT: ABNORMAL
NITRITE UR QL STRIP: POSITIVE
NON-SQ EPI CELLS #/AREA URNS AUTO: 1 /HPF
PH UR STRIP: 8 [PH] (ref 5–8)
PROT UR QL STRIP: NEGATIVE
PROT UR-MCNC: 17 MG/DL (ref 0–15)
PROT/CREAT UR: 0.2 MG/G{CREAT} (ref 0–0.2)
RBC #/AREA URNS AUTO: 53 /HPF (ref 0–4)
SP GR UR STRIP: 1.01 (ref 1–1.03)
URN SPEC COLLECT METH UR: ABNORMAL
WBC #/AREA URNS AUTO: >100 /HPF (ref 0–5)
WBC CLUMPS UR QL AUTO: ABNORMAL

## 2023-11-06 PROCEDURE — 87088 URINE BACTERIA CULTURE: CPT | Performed by: INTERNAL MEDICINE

## 2023-11-06 PROCEDURE — 84156 ASSAY OF PROTEIN URINE: CPT | Performed by: INTERNAL MEDICINE

## 2023-11-06 PROCEDURE — 87086 URINE CULTURE/COLONY COUNT: CPT | Performed by: INTERNAL MEDICINE

## 2023-11-06 PROCEDURE — 81001 URINALYSIS AUTO W/SCOPE: CPT | Performed by: INTERNAL MEDICINE

## 2023-11-06 RX ORDER — TACROLIMUS 1 MG/1
CAPSULE ORAL
Qty: 150 CAPSULE | Refills: 11 | Status: SHIPPED | OUTPATIENT
Start: 2023-11-06 | End: 2023-12-04 | Stop reason: DRUGHIGH

## 2023-11-06 NOTE — TELEPHONE ENCOUNTER
Patient repeated back and voice a understanding of orders.  Next labs and clinic on Monday 11/13.  ----- Message from Maggie Mcgovern MD sent at 11/6/2023  9:23 AM CST -----  Tac to 3/2 mg if it is a true level. Check tac level in a week.

## 2023-11-07 LAB — BACTERIA UR CULT: ABNORMAL

## 2023-11-13 ENCOUNTER — OFFICE VISIT (OUTPATIENT)
Dept: TRANSPLANT | Facility: CLINIC | Age: 63
End: 2023-11-13
Payer: MEDICARE

## 2023-11-13 VITALS
SYSTOLIC BLOOD PRESSURE: 155 MMHG | WEIGHT: 182.31 LBS | RESPIRATION RATE: 16 BRPM | OXYGEN SATURATION: 100 % | HEIGHT: 69 IN | HEART RATE: 58 BPM | TEMPERATURE: 97 F | BODY MASS INDEX: 27 KG/M2 | DIASTOLIC BLOOD PRESSURE: 82 MMHG

## 2023-11-13 DIAGNOSIS — Z94.0 IMMUNOSUPPRESSIVE MANAGEMENT ENCOUNTER FOLLOWING KIDNEY TRANSPLANT: ICD-10-CM

## 2023-11-13 DIAGNOSIS — Z94.0 S/P KIDNEY TRANSPLANT: Primary | ICD-10-CM

## 2023-11-13 DIAGNOSIS — Z79.899 IMMUNOSUPPRESSIVE MANAGEMENT ENCOUNTER FOLLOWING KIDNEY TRANSPLANT: ICD-10-CM

## 2023-11-13 DIAGNOSIS — T86.898 URINE LEAK FROM TRANSPLANTED URETER: ICD-10-CM

## 2023-11-13 DIAGNOSIS — N18.2 CKD (CHRONIC KIDNEY DISEASE) STAGE 2, GFR 60-89 ML/MIN: ICD-10-CM

## 2023-11-13 PROCEDURE — 99215 OFFICE O/P EST HI 40 MIN: CPT | Mod: S$PBB,,, | Performed by: NURSE PRACTITIONER

## 2023-11-13 PROCEDURE — 99215 PR OFFICE/OUTPT VISIT, EST, LEVL V, 40-54 MIN: ICD-10-PCS | Mod: S$PBB,,, | Performed by: NURSE PRACTITIONER

## 2023-11-13 PROCEDURE — 99214 OFFICE O/P EST MOD 30 MIN: CPT | Mod: PBBFAC | Performed by: NURSE PRACTITIONER

## 2023-11-13 PROCEDURE — 99999 PR PBB SHADOW E&M-EST. PATIENT-LVL IV: ICD-10-PCS | Mod: PBBFAC,,, | Performed by: NURSE PRACTITIONER

## 2023-11-13 PROCEDURE — 99999 PR PBB SHADOW E&M-EST. PATIENT-LVL IV: CPT | Mod: PBBFAC,,, | Performed by: NURSE PRACTITIONER

## 2023-11-13 NOTE — PROGRESS NOTES
Kidney Post-Transplant Assessment    Referring Physician: Quinn Espino  Current Nephrologist: Quinn Espino    ORGAN: LEFT KIDNEY  Donor Type: living  PHS Increased Risk: no  Cold Ischemia:  mins  Induction Medications:     Subjective:     CC:  Reassessment of renal allograft function and management of chronic immunosuppression.    HPI:  Mr. Parikh is a 63 y.o. year old Black or  male who received a living kidney transplant on 5/10/23. His most recent creatinine is 1. He takes mycophenolate mofetil, prednisone, and tacrolimus for maintenance immunosuppression. His post transplant course has been complicated by perinephric fluid collection and urine leak .  Nephrostomy tube was placed and Last tube placement on 10/2/2023  Next exchange 11/27/23    Medical Issues  Crohn's  Sarcoidosis  Right sided hearing loss  Left sided newer following with ENT. Last seen 7/17/2023 f/u in 3 monts  5/15/23-Kidney Bx: Good sample, No IFTA no glomerulosclerosis ,ATI no rejection C4d negative, moderate arterionephrosclerosis     Reporting burning x1 earlier this week. Good UOP and drinking 2-2.5L. His BP at home 138-156/70s-80s. Appetite has improved. Walking well. 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 Tube has been capped and reports urinating normally. Exchange planned on 11/27/23    Current Outpatient Medications on File Prior to Visit   Medication Sig Dispense Refill    atovaquone (MEPRON) 750 mg/5 mL Susp oral liquid Take 10 mLs (1,500 mg total) by mouth once daily UNTIL 11/7/2023 300 mL 12    fluticasone propionate (FLOVENT HFA) 44 mcg/actuation inhaler Inhale 1 puff into the lungs 2 (two) times daily.      loperamide HCl (IMODIUM A-D ORAL) Take by mouth.      magnesium oxide (MAG-OX) 400 mg (241.3 mg magnesium) tablet Take 1 tablet (400 mg total) by mouth 2 (two) times daily. 60 tablet 11    metoprolol tartrate (LOPRESSOR) 25 MG tablet Take HALF tablet (12.5 mg total) by mouth 2 (two)  "times daily. 30 tablet 11    olmesartan (BENICAR) 40 MG tablet Take 1 tablet (40 mg total) by mouth once daily. 30 tablet 11    predniSONE (DELTASONE) 5 MG tablet Take 20 mg by mouth daily 5/14-5/20; 15 mg daily 5/21-5/27; 10 mg daily 5/28-6/3; then 5 mg daily thereafter 6/4/23 (Patient taking differently: Take 5 mg by mouth once daily.) 70 tablet 11    sodium bicarbonate 650 MG tablet Take 2 tablets (1,300 mg total) by mouth 3 (three) times daily. 180 tablet 11    tacrolimus (PROGRAF) 1 MG Cap Take 3 capsules (3 mg total) by mouth every morning AND 2 capsules (2 mg total) every evening. Txp Date:5/10/2023 (Kidney) Disch Date:5/17/2023 ICD10:Z94.0 Txp Location:[facility]. 150 capsule 11    [DISCONTINUED] hydrOXYzine HCL (ATARAX) 25 MG tablet Take 1 tablet (25 mg total) by mouth 3 (three) times daily as needed for Anxiety. 90 tablet 0    [DISCONTINUED] k phos di & mono-sod phos mono (PHOSPHA 250 NEUTRAL) 250 mg Tab Take 1 tablet by mouth 2 (two) times a day. 60 tablet 11    [DISCONTINUED] metoprolol succinate (TOPROL-XL) 25 MG 24 hr tablet Take HALF tablet (12.5 mg total) by mouth once daily. 15 tablet 11     No current facility-administered medications on file prior to visit.        Review of Systems    Skin: no skin rash  CNS; no headaches, blurred vision, seizure, or syncope  ENT: No JVD,  Adenopathies,  nasal congestion. No oral lesions  Cardiac: No chest pain, dyspnea, claudication, edema or palpitations  Respiratory: No SOB, cough, hemoptysis   Gastro-intestinal: No diarrhea, constipation, abdominal pain, nausea, vomit. No ascitis  Genitourinary: no hematuria, dysuria, frequency, frequency  Musculoskeletal: joint pain, arthritis or vasculitic changes  Psych: alert awake, oriented, No cranial nerves deficit.      Objective:   BP (!) 155/82 (BP Location: Right arm, Patient Position: Sitting)   Pulse (!) 58   Temp 97.3 °F (36.3 °C) (Temporal)   Resp 16   Ht 5' 9" (1.753 m)   Wt 82.7 kg (182 lb 5.1 oz)   SpO2 100%  " " BMI 26.92 kg/m²       Physical Exam  Head: normocephalic  Neck: No JVD, cervical axillary, or femoral adenopathies  Heart: no murmurs, Normal s1 and s2, No gallops, no rubs, No murmurs  Lungs; CTA, good respiratory effort, no crackles  Abdomen: soft, non tender, no splenomegaly or hepatomegaly, no massess, no bruits  Extremities: No edema, skin rash, joint pain  SNC: awake, alert oriented. Cranial nerves are intact, no focalized, sensitivity and strength preserved    Labs:  Lab Results   Component Value Date    WBC 5.42 11/13/2023    HGB 13.2 (L) 11/13/2023    HCT 40.8 11/13/2023     11/13/2023    K 3.9 11/13/2023     11/13/2023    CO2 25 11/13/2023    BUN 12 11/13/2023    CREATININE 1.4 11/13/2023    EGFRNORACEVR 56.8 (A) 11/13/2023    GLUCOSE 82 04/08/2023    CALCIUM 9.2 11/13/2023    PHOS 3.1 11/13/2023    MG 1.6 11/13/2023    ALBUMIN 3.3 (L) 11/13/2023    AST 11 11/06/2023    ALT 8 (L) 11/06/2023    UTPCR 0.20 11/06/2023    .7 (H) 05/10/2023    TACROLIMUS 7.2 11/13/2023       No results found for: "EXTANC", "EXTWBC", "EXTSEGS", "EXTPLATELETS", "EXTHEMOGLOBI", "EXTHEMATOCRI", "EXTCREATININ", "EXTSODIUM", "EXTPOTASSIUM", "EXTBUN", "EXTCO2", "EXTCALCIUM", "EXTPHOSPHORU", "EXTGLUCOSE", "EXTALBUMIN", "EXTAST", "EXTALT", "EXTBILITOTAL", "EXTLIPASE", "EXTAMYLASE"    No results found for: "EXTCYCLOSLVL", "EXTSIROLIMUS", "EXTTACROLVL", "EXTPROTCRE", "EXTPTHINTACT", "EXTPROTEINUA", "EXTWBCUA", "EXTRBCUA"    Labs were reviewed with the patient    Assessment:     1. S/P kidney transplant    2. Immunosuppressive management encounter following kidney transplant    3. CKD (chronic kidney disease) stage 2, GFR 60-89 ml/min    4. Urine leak from transplanted ureter          Plan:   UTI symptoms earlier this week but stopped. Due to abnormal UA and neph tube needs repeat UA.   Repeat urine studies. Patient just urinated prior to this clinic visit and unable to produce a specimen.   BK viremia--labs per " "protocol, MYF on hold      1. CKD stage 2 with stable creatinine from 1.1 to 1.3: will continue follow up as per our center guidelines. patient to continue close follow up with the local General nephrologist. Education provided in appropriate fluid intake, potassium intake. Continue with oral hydration.    1A. Pancreatic Function: N/A for non-pancreas allograft recipients:    2. High risk immunosuppression medication for organ transplantation, requiring regular intensive follow up and monitoring : prograf 3/2 will adjust when resulted Myfortic 720 bid (holding  BK) prednisone taper  Lab Results   Component Value Date    TACROLIMUS 7.2 11/13/2023    TACROLIMUS 5.5 11/06/2023    TACROLIMUS 8.6 10/24/2023   Will closely monitor for toxicities, education provided about adherence to medicines and need to communicate any side effects to the transplant nurse or physician.      3. Allograft Function:stable at baseline for the patient. Continue follow up as per our guidelines and with the local General nephrologist. Communication will be sent today.  Lab Results   Component Value Date    CREATININE 1.4 11/13/2023    CREATININE 1.2 11/06/2023    CREATININE 1.4 10/24/2023     No results found for: "AMYLASE", "LIPASE"    4. Hypertension management:  metoprolol 12.5 mg bid, benicar due to uncontrolled hypertension I advised patient and care giver to call me with BP and HR. Continue with home blood pressure monitoring, low salt and healthy life discussed with the patient..    5. Metabolic Bone Disease/Secondary Hyperparathyroidism:calcium and phosphorus level discussed with the patient, patient will continue follow up with the general nephrologist for management of metabolic bone disease. Will monitor PTH and Vit D per our transplant center guidelines.   Lab Results   Component Value Date    .7 (H) 05/10/2023    CALCIUM 9.2 11/13/2023    PHOS 3.1 11/13/2023    PHOS 2.9 11/06/2023    PHOS 2.7 10/24/2023   KPN, MagOx    6. " Electrolytes and acid base balance: reviewed with the patient, essentially within the normal range no need for acute changes today, will monitor as per our center guidelines.  Lab Results   Component Value Date     11/13/2023    K 3.9 11/13/2023     11/13/2023    CO2 25 11/13/2023    CO2 26 11/06/2023    CO2 21 (L) 10/24/2023   NaBicarb    7. Anemia: will continue monitoring as per our center guidelines. No indication for acute intervention today.  Lab Results   Component Value Date    WBC 5.42 11/13/2023    HGB 13.2 (L) 11/13/2023    HCT 40.8 11/13/2023     (H) 11/13/2023     (L) 11/13/2023       8.Proteinuria: will continue with pr/cr ratio as per our center guidelines.  Lab Results   Component Value Date    PROTEINURINE 17 (H) 11/06/2023    CREATRANDUR 84.0 11/06/2023    UTPCR 0.20 11/06/2023    UTPCR 1.26 (H) 10/30/2023    UTPCR 0.39 (H) 10/24/2023        9. BK virus infection screening: will continue with urine or blood PCR as per our guidelines to prevent BK virus viremia and allograft dysfunction  Lab Results   Component Value Date    BKVIRUSPCRQB 308 (H) 11/06/2023         10. Weight and metabolic management: education provided provided during the clinic visit.   Body mass index is 26.92 kg/m².       11.Patient safety education regarding immunosuppression including prophylaxis posttransplant for CMV, PCP : Education provided about vaccination and prevention of infections.    12.  Cytopenias: no significant cytopenias will monitor as per our guidelines. Medicine list reviewed including potential causes of drug-induced cytopenias     Lab Results   Component Value Date    WBC 5.42 11/13/2023    HGB 13.2 (L) 11/13/2023    HCT 40.8 11/13/2023     (H) 11/13/2023     (L) 11/13/2023       13. Post-transplant Prophylaxis; CMV Infection, PJP and Candida mucosistis and other indicated for this particular patient.       Follow-up:   Clinic: return to transplant clinic weekly for  the first month after transplant; every 2 weeks during months 2-3; then at 6-, 9-, 12-, 18-, 24-, and 36- months post-transplant to reassess for complications from immunosuppression toxicity and monitor for rejection.  Annually thereafter.    Labs: since patient remains at high risk for rejection and drug-related complications that warrant close monitoring, labs will be ordered as follows: continue twice weekly CBC, renal panel, and drug level for first month; then same labs once weekly through 3rd month post-transplant.  Urine for UA and protein/creatinine ratio monthly.  Serum BK - PCR at 1-, 3-, 6-, 9-, 12-, 18-, 24-, 36-, 48-, and 60 months post-transplant.  Hepatic panel at 1-, 2-, 3-, 6-, 9-, 12-, 18-, 24-, and 36- months post-transplant.    Rose Mary Stroud NP       Education:   Material provided to the patient.  Patient reminded to call with any health changes since these can be early signs of significant complications.  Also, I advised the patient to be sure any new medications or changes of old medications are discussed with either a pharmacist or physician knowledgeable with transplant to avoid rejection/drug toxicity related to significant drug interactions.    Patient advised that it is recommended that all transplanted patients, and their close contacts and household members receive Covid vaccination.

## 2023-11-13 NOTE — LETTER
November 13, 2023        Quinn Espino  [address]  [address]  [facility] [address]  Phone: [phone]  Fax: [phone]             [facility]st [facility]  [address]  [facility] [address]  Phone: [phone]   Patient: Abdoulaye Parikh   MR Number: [id number]   YOB: 1960   Date of Visit: 11/13/2023       Dear Dr. Quinn Espino    Thank you for referring Abdoulaye Parikh to me for evaluation. Attached you will find relevant portions of my assessment and plan of care.    If you have questions, please do not hesitate to call me. I look forward to following Abdoulaye Parikh along with you.    Sincerely,    Rose Mary Stroud, NP    Enclosure    If you would like to receive this communication electronically, please contact [email or web address] or [phone] to request [employer] Link access.    [employer] Link is a tool which provides read-only access to select patient information with whom you have a relationship. Its easy to use and provides real time access to review your patients record including encounter summaries, notes, results, and demographic information.    If you feel you have received this communication in error or would no longer like to receive these types of communications, please e-mail [email or web address]

## 2023-11-20 ENCOUNTER — LAB VISIT (OUTPATIENT)
Dept: LAB | Facility: HOSPITAL | Age: 63
End: 2023-11-20
Attending: INTERNAL MEDICINE
Payer: MEDICARE

## 2023-11-20 ENCOUNTER — TELEPHONE (OUTPATIENT)
Dept: INTERVENTIONAL RADIOLOGY/VASCULAR | Facility: HOSPITAL | Age: 63
End: 2023-11-20
Payer: MEDICARE

## 2023-11-20 DIAGNOSIS — Z94.0 KIDNEY REPLACED BY TRANSPLANT: ICD-10-CM

## 2023-11-20 LAB
ALBUMIN SERPL BCP-MCNC: 3.3 G/DL (ref 3.5–5.2)
ANION GAP SERPL CALC-SCNC: 8 MMOL/L (ref 8–16)
BASOPHILS # BLD AUTO: 0.04 K/UL (ref 0–0.2)
BASOPHILS NFR BLD: 1 % (ref 0–1.9)
BUN SERPL-MCNC: 11 MG/DL (ref 8–23)
CALCIUM SERPL-MCNC: 9.1 MG/DL (ref 8.7–10.5)
CHLORIDE SERPL-SCNC: 104 MMOL/L (ref 95–110)
CO2 SERPL-SCNC: 24 MMOL/L (ref 23–29)
CREAT SERPL-MCNC: 1.4 MG/DL (ref 0.5–1.4)
DIFFERENTIAL METHOD: ABNORMAL
EOSINOPHIL # BLD AUTO: 0 K/UL (ref 0–0.5)
EOSINOPHIL NFR BLD: 1 % (ref 0–8)
ERYTHROCYTE [DISTWIDTH] IN BLOOD BY AUTOMATED COUNT: 15.2 % (ref 11.5–14.5)
EST. GFR  (NO RACE VARIABLE): 56.8 ML/MIN/1.73 M^2
GLUCOSE SERPL-MCNC: 73 MG/DL (ref 70–110)
HCT VFR BLD AUTO: 39.5 % (ref 40–54)
HGB BLD-MCNC: 13.4 G/DL (ref 14–18)
IMM GRANULOCYTES # BLD AUTO: 0.12 K/UL (ref 0–0.04)
IMM GRANULOCYTES NFR BLD AUTO: 2.9 % (ref 0–0.5)
LYMPHOCYTES # BLD AUTO: 0.8 K/UL (ref 1–4.8)
LYMPHOCYTES NFR BLD: 18.1 % (ref 18–48)
MAGNESIUM SERPL-MCNC: 1.6 MG/DL (ref 1.6–2.6)
MCH RBC QN AUTO: 35 PG (ref 27–31)
MCHC RBC AUTO-ENTMCNC: 33.9 G/DL (ref 32–36)
MCV RBC AUTO: 103 FL (ref 82–98)
MONOCYTES # BLD AUTO: 0.4 K/UL (ref 0.3–1)
MONOCYTES NFR BLD: 8.6 % (ref 4–15)
NEUTROPHILS # BLD AUTO: 2.9 K/UL (ref 1.8–7.7)
NEUTROPHILS NFR BLD: 68.4 % (ref 38–73)
NRBC BLD-RTO: 0 /100 WBC
PHOSPHATE SERPL-MCNC: 2.6 MG/DL (ref 2.7–4.5)
PLATELET # BLD AUTO: 186 K/UL (ref 150–450)
PLATELET BLD QL SMEAR: ABNORMAL
PMV BLD AUTO: 11.4 FL (ref 9.2–12.9)
POTASSIUM SERPL-SCNC: 4.2 MMOL/L (ref 3.5–5.1)
RBC # BLD AUTO: 3.83 M/UL (ref 4.6–6.2)
SODIUM SERPL-SCNC: 136 MMOL/L (ref 136–145)
TACROLIMUS BLD-MCNC: 7.9 NG/ML (ref 5–15)
WBC # BLD AUTO: 4.2 K/UL (ref 3.9–12.7)

## 2023-11-20 PROCEDURE — 83735 ASSAY OF MAGNESIUM: CPT | Performed by: INTERNAL MEDICINE

## 2023-11-20 PROCEDURE — 85025 COMPLETE CBC W/AUTO DIFF WBC: CPT | Performed by: INTERNAL MEDICINE

## 2023-11-20 PROCEDURE — 80197 ASSAY OF TACROLIMUS: CPT | Performed by: INTERNAL MEDICINE

## 2023-11-20 PROCEDURE — 87799 DETECT AGENT NOS DNA QUANT: CPT | Performed by: INTERNAL MEDICINE

## 2023-11-20 PROCEDURE — 36415 COLL VENOUS BLD VENIPUNCTURE: CPT | Performed by: INTERNAL MEDICINE

## 2023-11-20 PROCEDURE — 80069 RENAL FUNCTION PANEL: CPT | Performed by: INTERNAL MEDICINE

## 2023-11-21 DIAGNOSIS — Z94.0 KIDNEY REPLACED BY TRANSPLANT: Primary | ICD-10-CM

## 2023-11-22 LAB
BKV DNA SERPL NAA+PROBE-ACNC: <125 COPIES/ML
BKV DNA SERPL NAA+PROBE-LOG#: <2.1 LOG (10) COPIES/ML
BKV DNA SERPL QL NAA+PROBE: DETECTED

## 2023-11-27 ENCOUNTER — HOSPITAL ENCOUNTER (OUTPATIENT)
Dept: INTERVENTIONAL RADIOLOGY/VASCULAR | Facility: HOSPITAL | Age: 63
Discharge: HOME | End: 2023-11-27
Attending: TRANSPLANT SURGERY
Payer: MEDICARE

## 2023-11-27 VITALS
HEART RATE: 58 BPM | RESPIRATION RATE: 16 BRPM | BODY MASS INDEX: 26.51 KG/M2 | TEMPERATURE: 98 F | WEIGHT: 179 LBS | DIASTOLIC BLOOD PRESSURE: 74 MMHG | HEIGHT: 69 IN | SYSTOLIC BLOOD PRESSURE: 154 MMHG | OXYGEN SATURATION: 96 %

## 2023-11-27 DIAGNOSIS — Z94.0 KIDNEY REPLACED BY TRANSPLANT: ICD-10-CM

## 2023-11-27 PROCEDURE — 99152 MOD SED SAME PHYS/QHP 5/>YRS: CPT | Mod: ,,, | Performed by: RADIOLOGY

## 2023-11-27 PROCEDURE — 50387 IR NEPHROSTOGRAM: ICD-10-PCS | Mod: RT,,, | Performed by: RADIOLOGY

## 2023-11-27 PROCEDURE — 25500020 PHARM REV CODE 255: Performed by: TRANSPLANT SURGERY

## 2023-11-27 PROCEDURE — 99153 MOD SED SAME PHYS/QHP EA: CPT

## 2023-11-27 PROCEDURE — 99152 PR MOD CONSCIOUS SEDATION, SAME PHYS, 5+ YRS, FIRST 15 MIN: ICD-10-PCS | Mod: ,,, | Performed by: RADIOLOGY

## 2023-11-27 PROCEDURE — 50387 CHANGE NEPHROURETERAL CATH: CPT | Mod: RT,,, | Performed by: RADIOLOGY

## 2023-11-27 PROCEDURE — 50387 CHANGE NEPHROURETERAL CATH: CPT | Mod: RT

## 2023-11-27 PROCEDURE — C1894 INTRO/SHEATH, NON-LASER: HCPCS

## 2023-11-27 PROCEDURE — 63600175 PHARM REV CODE 636 W HCPCS: Performed by: RADIOLOGY

## 2023-11-27 PROCEDURE — A4550 SURGICAL TRAYS: HCPCS

## 2023-11-27 PROCEDURE — 99152 MOD SED SAME PHYS/QHP 5/>YRS: CPT

## 2023-11-27 RX ORDER — SODIUM CHLORIDE 9 MG/ML
INJECTION, SOLUTION INTRAVENOUS CONTINUOUS
Status: DISCONTINUED | OUTPATIENT
Start: 2023-11-27 | End: 2023-11-28 | Stop reason: HOSPADM

## 2023-11-27 RX ORDER — LIDOCAINE HYDROCHLORIDE 10 MG/ML
1 INJECTION, SOLUTION EPIDURAL; INFILTRATION; INTRACAUDAL; PERINEURAL ONCE
Status: DISCONTINUED | OUTPATIENT
Start: 2023-11-27 | End: 2023-11-28 | Stop reason: HOSPADM

## 2023-11-27 RX ORDER — FENTANYL CITRATE 50 UG/ML
INJECTION, SOLUTION INTRAMUSCULAR; INTRAVENOUS
Status: COMPLETED | OUTPATIENT
Start: 2023-11-27 | End: 2023-11-27

## 2023-11-27 RX ORDER — MIDAZOLAM HYDROCHLORIDE 1 MG/ML
INJECTION INTRAMUSCULAR; INTRAVENOUS
Status: COMPLETED | OUTPATIENT
Start: 2023-11-27 | End: 2023-11-27

## 2023-11-27 RX ADMIN — MIDAZOLAM HYDROCHLORIDE 0.5 MG: 2 INJECTION, SOLUTION INTRAMUSCULAR; INTRAVENOUS at 11:11

## 2023-11-27 RX ADMIN — FENTANYL CITRATE 25 MCG: 50 INJECTION, SOLUTION INTRAMUSCULAR; INTRAVENOUS at 11:11

## 2023-11-27 RX ADMIN — FENTANYL CITRATE 50 MCG: 50 INJECTION, SOLUTION INTRAMUSCULAR; INTRAVENOUS at 10:11

## 2023-11-27 RX ADMIN — MIDAZOLAM HYDROCHLORIDE 1 MG: 2 INJECTION, SOLUTION INTRAMUSCULAR; INTRAVENOUS at 10:11

## 2023-11-27 RX ADMIN — IOHEXOL 30 ML: 300 INJECTION, SOLUTION INTRAVENOUS at 12:11

## 2023-11-27 NOTE — PLAN OF CARE
Patient ambulated on unit this morning with spouse at side.  Verbalized an understanding of procedure.  Oriented to unit and call bell provided.  Pt c/o pain of 2 on scale of 0-10 to right nephrostomy tube site.  Right nephrostomy tube site clamped.  No drainage noted to dressing.  Will continue to monitor.

## 2023-11-27 NOTE — PLAN OF CARE
Pt arrived to IR room 188 for Nephrostomy tube exchange. Pt oriented to unit and staff, Pt safely transferred from stretcher to procedural table. Fall risk reviewed and comfort measures utilized with interventions. Safety strap applied, position pillows to minimize pressure points. Blankets applied. Pt prepped and draped utilizing standard sterile technique. Patient placed on continuous monitoring, as required by sedation policy. Timeouts implemented utilizing standard universal time-out per department and facility policy. CRNA to remain at bedside with continuous monitoring. Pt resting comfortably. Denies pain/discomfort. Will continue to monitor. See flow sheets for monitoring, medication administration, and updates. patient verbalizes understanding.

## 2023-11-27 NOTE — H&P
Radiology History & Physical      SUBJECTIVE:     Chief Complaint: nephrostomy tube in place.     History of Present Illness:  Abdoulaye Parikh is a 63 y.o. male who presents for nephrostomy tube exchange.     Past Medical History:   Diagnosis Date    Acute blood loss anemia 5/11/2023    Anemia     Arthritis     Cirrhosis     CKD (chronic kidney disease) stage 4, GFR 15-29 ml/min, slow graft function with sustaine creatinien improvement 5/26/2023    Crohn's disease     Disorder of kidney and ureter     Stage 5    Encounter for blood transfusion     Gout     Hearing loss     Hepatitis     Had Hep C Cleared    Hypertension     Immunosuppressive management encounter following kidney transplant 5/26/2023    Neurosarcoidosis 2018    Obesity     Osteoarthritis     Sarcoid neuropathy     Sarcoidosis, lung     Steatosis      Past Surgical History:   Procedure Laterality Date    AV FISTULA PLACEMENT Left     BRAIN SURGERY      COLON SURGERY      Exploratory lap x 4 for Chron's disease. Likely right colectomy    CSF SHUNT      DIAGNOSTIC ULTRASOUND N/A 5/15/2023    Procedure: ULTRASOUND, DIAGNOSTIC;  Surgeon: Chivo Brown MD;  Location: [address];  Service: Transplant;  Laterality: N/A;    JOINT REPLACEMENT Right     Hip    KIDNEY TRANSPLANT N/A 5/10/2023    Procedure: TRANSPLANT, KIDNEY - RQ 7PM START;  Surgeon: Chivo Brown MD;  Location: [address];  Service: Transplant;  Laterality: N/A;    NEPHROSTOMY N/A 6/3/2023    Procedure: Nephrostomy;  Surgeon: Silvia Surgeon;  Location: [facility];  Service: Anesthesiology;  Laterality: N/A;    RENAL BIOPSY  5/15/2023    Procedure: BIOPSY, KIDNEY;  Surgeon: Chivo Brown MD;  Location: [address];  Service: Transplant;;    RENAL EXPLORATION  5/15/2023    Procedure: EXPLORATION, KIDNEY;  Surgeon: Chivo Brown MD;  Location: [address];  Service: Transplant;;    TOTAL HIP ARTHROPLASTY Right        Home Meds:   Prior to  Admission medications    Medication Sig Start Date End Date Taking? Authorizing Provider   fluticasone propionate (FLOVENT HFA) 44 mcg/actuation inhaler Inhale 1 puff into the lungs 2 (two) times daily. 6/26/23  Yes Provider, Historical   loperamide HCl (IMODIUM A-D ORAL) Take by mouth.   Yes Provider, Historical   magnesium oxide (MAG-OX) 400 mg (241.3 mg magnesium) tablet Take 1 tablet (400 mg total) by mouth 2 (two) times daily. 7/10/23 7/9/24 Yes Darrin Verma MD   metoprolol tartrate (LOPRESSOR) 25 MG tablet Take HALF tablet (12.5 mg total) by mouth 2 (two) times daily. 7/24/23 7/23/24 Yes Darrin Verma MD   olmesartan (BENICAR) 40 MG tablet Take 1 tablet (40 mg total) by mouth once daily. 8/18/23  Yes Rose Mary Stroud NP   predniSONE (DELTASONE) 5 MG tablet Take 20 mg by mouth daily 5/14-5/20; 15 mg daily 5/21-5/27; 10 mg daily 5/28-6/3; then 5 mg daily thereafter 6/4/23  Patient taking differently: Take 5 mg by mouth once daily. 5/11/23  Yes Zeke Devine Jr., MD   sodium bicarbonate 650 MG tablet Take 2 tablets (1,300 mg total) by mouth 3 (three) times daily. 6/6/23 6/5/24 Yes Beatrice Mendez DO   tacrolimus (PROGRAF) 1 MG Cap Take 3 capsules (3 mg total) by mouth every morning AND 2 capsules (2 mg total) every evening. Txp Date:5/10/2023 (Kidney) Disch Date:5/17/2023 ICD10:Z94.0 Txp Location:[facility]. 11/6/23 11/5/24 Yes Maggie Mcgovern MD     Anticoagulants/Antiplatelets: no anticoagulation    Allergies:   Review of patient's allergies indicates:   Allergen Reactions    Bactrim [sulfamethoxazole-trimethoprim] Shortness Of Breath and Itching    Penicillins Shortness Of Breath and Itching     Sedation History:  no adverse reactions    Review of Systems:   Hematological: no known coagulopathies  Respiratory: no shortness of breath  Cardiovascular: no chest pain  Gastrointestinal: no abdominal pain  Genito-Urinary: no dysuria  Musculoskeletal: negative  Neurological: no TIA or stroke symptoms          OBJECTIVE:     Vital Signs (Most Recent)  Temp: 97.8 °F (36.6 °C) (11/27/23 0703)  Pulse: (!) 57 (11/27/23 0703)  Resp: 16 (11/27/23 0703)  BP: (!) 174/74 (11/27/23 0711)  SpO2: 100 % (11/27/23 0703)    Physical Exam:  ASA: 2  Mallampati: 2    General: no acute distress  Mental Status: alert and oriented to person, place and time  HEENT: normocephalic, atraumatic  Chest: unlabored breathing  Heart: regular heart rate  Abdomen: nondistended  Extremity: moves all extremities    Laboratory  Lab Results   Component Value Date    INR 1.1 09/29/2023       Lab Results   Component Value Date    WBC 4.20 11/20/2023    HGB 13.4 (L) 11/20/2023    HCT 39.5 (L) 11/20/2023     (H) 11/20/2023     11/20/2023      Lab Results   Component Value Date    GLU 73 11/20/2023     11/20/2023    K 4.2 11/20/2023     11/20/2023    CO2 24 11/20/2023    BUN 11 11/20/2023    CREATININE 1.4 11/20/2023    CALCIUM 9.1 11/20/2023    MG 1.6 11/20/2023    ALT 8 (L) 11/06/2023    AST 11 11/06/2023    ALBUMIN 3.3 (L) 11/20/2023    BILITOT 0.7 11/06/2023    BILIDIR 0.3 11/06/2023       ASSESSMENT/PLAN:     Sedation Plan: up to moderate.  Patient will undergo nephrostomy tube exchange.    Bowen Bernabe MD  Department of Radiology

## 2023-11-27 NOTE — PROGRESS NOTES
Pt and  wife  meet  with Dr Bar  in  room and  discuss D/C  inst  and  plan  of  care for  tube / pt understanding and  will  return  in  a  week  for follow up

## 2023-11-28 ENCOUNTER — TELEPHONE (OUTPATIENT)
Dept: TRANSPLANT | Facility: CLINIC | Age: 63
End: 2023-11-28
Payer: MEDICARE

## 2023-11-28 DIAGNOSIS — Z94.0 KIDNEY REPLACED BY TRANSPLANT: Primary | ICD-10-CM

## 2023-11-28 NOTE — TELEPHONE ENCOUNTER
Return call to patient and notified of IR Plan follow up.  Plan:   Plan to leave drain open to back for 1 week.  After 1 week if no signs of pain, fever, bleeding, or leaking around the tube, we will plan to cap the drain.  Recommend repeat nephrostogram for possible tube removal in 2 weeks.  Patient agrees with plan and will keep coordinator posted.  ----- Message from Robles Arita RN sent at 11/28/2023  1:10 PM CST -----  Regarding: FW: miss call    ----- Message -----  From: Abbe Rose  Sent: 11/28/2023  12:54 PM CST  To: [facility] Post-Kidney Transplant Clinical  Subject: miss call                                        Pt returning Robles miss call     Call

## 2023-11-28 NOTE — TELEPHONE ENCOUNTER
Return call to patient wanting to know follow up plan for  Nephrostomy on 11/27.  Patient voice a understanding that we still still awaiting IR note and we will keep him posted.  ----- Message from Robles Arita RN sent at 11/28/2023 11:26 AM CST -----  Regarding: FW: Procedure issues  Contact: Abdoulaye    ----- Message -----  From: Mirella Hernandez  Sent: 11/28/2023  10:56 AM CST  To: [facility] Post-Kidney Transplant Clinical  Subject: Procedure issues                                 Regarding:Lab Results          Name Of Caller: Abdoulaye        Contact Preference: [phone]           Nature of call: pt  is calling in regards to some problems with procedure that was on :  11 /27 / 2023       Please advise. Requesting a call back

## 2023-12-01 NOTE — PROGRESS NOTES
4 Eyes Skin Assessment Completed by Janice RN and Morales RN.    Head WDL  Ears WDL  Nose WDL  Mouth WDL  Neck WDL  Breast/Chest WDL  Shoulder Blades WDL  Spine WDL  (R) Arm/Elbow/Hand WDL  (L) Arm/Elbow/Hand WDL  Abdomen WDL  Groin WDL  Scrotum/Coccyx/Buttocks WDL  (R) Leg WDL  (L) Leg WDL  (R) Heel/Foot/Toe WDL  (L) Heel/Foot/Toe WDL          Devices In Places Tele Box, Blood Pressure Cuff, Pulse Ox, and Nasal Cannula      Interventions In Place Gray Ear Foams, NC W/Ear Foams, and Pillows    Possible Skin Injury No    Pictures Uploaded Into Epic N/A  Wound Consult Placed N/A  RN Wound Prevention Protocol Ordered No      DISCHARGE NOTE:    Abdoulaye Parikh is a 62 y.o. male s/p LEFT KIDNEY   Living   transplant on 5/10/2023 (Kidney) for ESRD secondary to Other, Specify - Unknown.      Past Medical History:   Diagnosis Date    Anemia     Arthritis     Cirrhosis     Crohn's disease     Disorder of kidney and ureter     Stage 5    Encounter for blood transfusion     Gout     Hearing loss     Hepatitis     Had Hep C Cleared    Hypertension     Neurosarcoidosis 2018    Obesity     Osteoarthritis     Sarcoid neuropathy     Sarcoidosis, lung     Steatosis        Hospital Course:     61 yo AAM LURT (paired) 5/10/23 for unknown;   CMV -/-  cPRA 0; HD MWF; UO 1 c/day; CIT 12 h   PMH: HCV s/p harvoni 2020, crohns, arthritis, anemia, lung sarcoidosis, Cirrhosis     Post-op: DGF, HD for elevated K and minimal UO, checked hapto and LDH - unremarkable; OR take back to assess blood flow. Repeat US showed elevated RI's. Biopsy done but not resulted by the time of discharge. Some edema so discharging on 40 mg lasix po BID     #IMS  Tac 2 mg BID   FK 11 on day of discharge but AM dose yesterday was held and only got 2 mg late last night so continuing 2 mg BID  Of note, pt was a hard stick and labs were difficult to assess   Myfortic 720 mg BID   Pred per taper - do not stop     #OI  Acyclovir 400 mg BID until 8/9/23  # CMV -/- (confirmed)  Atovaquone 1500 mg daily until 11/7/23   Bactrim allergy   #Gout  Was on home allopurinol 300 mg daily   Per Dr. Mendez, do not start immediately. Recheck outpt   #OTHER   Bicarb 650 mg BID   Lasix 40 mg BID x 5 days   Home metoprolol XL 12.5 mg daily restarted (unclear indication)     Allergies:   Review of patient's allergies indicates:   Allergen Reactions    Bactrim [sulfamethoxazole-trimethoprim] Shortness Of Breath and Itching    Penicillins Shortness Of Breath and Itching       [facility]: [facility]    Discharge Medications:     Medication List        START taking these medications      acyclovir 400 MG  tablet  Commonly known as: ZOVIRAX  Take 1 tablet (400 mg total) by mouth 2 (two) times daily. Stop 8/9/23     atovaquone 750 mg/5 mL Susp oral liquid  Commonly known as: MEPRON  Take 10 mLs (1,500 mg total) by mouth once daily. Stop 11/7/23     bisacodyL 5 mg EC tablet  Commonly known as: DULCOLAX  Take 2 tablets (10 mg total) by mouth daily as needed for Constipation.     docusate sodium 100 MG capsule  Commonly known as: COLACE  Take 1 capsule (100 mg total) by mouth 3 (three) times daily as needed for Constipation.     famotidine 20 MG tablet  Commonly known as: PEPCID  Take 1 tablet (20 mg total) by mouth every evening.     furosemide 40 MG tablet  Commonly known as: LASIX  Take 1 tablet (40 mg total) by mouth 2 (two) times daily. for 5 days     hydrOXYzine HCL 25 MG tablet  Commonly known as: ATARAX  Take 1 tablet (25 mg total) by mouth 3 (three) times daily as needed for Anxiety.     mycophenolate 180 MG Tbec  Commonly known as: MYFORTIC  Take 4 tablets (720 mg total) by mouth 2 (two) times daily.     oxyCODONE 5 MG immediate release tablet  Commonly known as: ROXICODONE  Take 1 tablet (5 mg total) by mouth every 4 (four) hours as needed for Pain.     sodium bicarbonate 650 MG tablet  Take 1 tablet (650 mg total) by mouth 2 (two) times daily.     tacrolimus 1 MG Cap  Commonly known as: PROGRAF  Take 2 capsules (2 mg total) by mouth every 12 (twelve) hours.            CHANGE how you take these medications      metoprolol succinate 25 MG 24 hr tablet  Commonly known as: TOPROL-XL  Take HALF tablet (12.5 mg total) by mouth once daily.  Start taking on: May 18, 2023  What changed: when to take this     predniSONE 5 MG tablet  Commonly known as: DELTASONE  Take 20 mg by mouth daily 5/14-5/20; 15 mg daily 5/21-5/27; 10 mg daily 5/28-6/3; then 5 mg daily thereafter 6/4/23  What changed:   how much to take  how to take this  when to take this            CONTINUE taking these medications      ARNUITY ELLIPTA 100  mcg/actuation inhaler  Generic drug: fluticasone furoate            STOP taking these medications      allopurinoL 300 MG tablet  Commonly known as: ZYLOPRIM     benzonatate 200 MG capsule  Commonly known as: TESSALON     cinacalcet 30 MG Tab  Commonly known as: SENSIPAR     magnesium oxide 400 mg (241.3 mg magnesium) tablet  Commonly known as: MAG-OX     melatonin  Commonly known as: MELATIN     sevelamer carbonate 2.4 gram Pwpk  Commonly known as: RENVELA               Where to Get Your Medications        These medications were sent to [facility]  [address] [place] [address]      Hours: Mon-Fri 7a-7p, Sat-Sun 10a-4p Phone: [phone]   acyclovir 400 MG tablet  atovaquone 750 mg/5 mL Susp oral liquid  famotidine 20 MG tablet  furosemide 40 MG tablet  hydrOXYzine HCL 25 MG tablet  metoprolol succinate 25 MG 24 hr tablet  mycophenolate 180 MG Tbec  oxyCODONE 5 MG immediate release tablet  predniSONE 5 MG tablet  sodium bicarbonate 650 MG tablet  tacrolimus 1 MG Cap       You can get these medications from any pharmacy    You don't need a prescription for these medications  bisacodyL 5 mg EC tablet  docusate sodium 100 MG capsule          Pharmacy Interventions/Recommendations:  1) Transplant Immunosuppression: Induction thymo, and maintenance tac 2 mg BID, myfortic 720 mg BID, pred per taper do not stop     2) Opportunistic Infection prophylaxis:   Atovaquone 1500 mg daily x 6 months   Acyclovir 400 mg BID x 3 months     3) Osteoporosis Prevention measures (liver txp): NA    4) Patient Counseling/Education: Demonstrated the use of the BP cuff, thermometer.    5) Follow-Up/Discharge Needs:  none     6) Patient Assistance Information: remind patient to call about coordination of benefits.     7) The following medications have been placed on HOLD and should be restarted in the outpatient setting (when appropriate): CATHY Stanford and his caregiver verbalized their understanding and had  the opportunity to ask questions.

## 2023-12-04 ENCOUNTER — LAB VISIT (OUTPATIENT)
Dept: LAB | Facility: HOSPITAL | Age: 63
End: 2023-12-04
Attending: INTERNAL MEDICINE
Payer: MEDICARE

## 2023-12-04 DIAGNOSIS — Z94.0 S/P KIDNEY TRANSPLANT: ICD-10-CM

## 2023-12-04 DIAGNOSIS — Z94.0 KIDNEY REPLACED BY TRANSPLANT: ICD-10-CM

## 2023-12-04 LAB
ALBUMIN SERPL BCP-MCNC: 3.6 G/DL (ref 3.5–5.2)
ANION GAP SERPL CALC-SCNC: 9 MMOL/L (ref 8–16)
BASOPHILS # BLD AUTO: 0.04 K/UL (ref 0–0.2)
BASOPHILS NFR BLD: 0.7 % (ref 0–1.9)
BUN SERPL-MCNC: 12 MG/DL (ref 8–23)
CALCIUM SERPL-MCNC: 9.4 MG/DL (ref 8.7–10.5)
CHLORIDE SERPL-SCNC: 105 MMOL/L (ref 95–110)
CO2 SERPL-SCNC: 23 MMOL/L (ref 23–29)
CREAT SERPL-MCNC: 1.4 MG/DL (ref 0.5–1.4)
DIFFERENTIAL METHOD: ABNORMAL
EOSINOPHIL # BLD AUTO: 0.1 K/UL (ref 0–0.5)
EOSINOPHIL NFR BLD: 1.1 % (ref 0–8)
ERYTHROCYTE [DISTWIDTH] IN BLOOD BY AUTOMATED COUNT: 14.8 % (ref 11.5–14.5)
EST. GFR  (NO RACE VARIABLE): 56.8 ML/MIN/1.73 M^2
GLUCOSE SERPL-MCNC: 85 MG/DL (ref 70–110)
HCT VFR BLD AUTO: 43.3 % (ref 40–54)
HGB BLD-MCNC: 14.3 G/DL (ref 14–18)
IMM GRANULOCYTES # BLD AUTO: 0.03 K/UL (ref 0–0.04)
IMM GRANULOCYTES NFR BLD AUTO: 0.6 % (ref 0–0.5)
LYMPHOCYTES # BLD AUTO: 1.2 K/UL (ref 1–4.8)
LYMPHOCYTES NFR BLD: 22.3 % (ref 18–48)
MAGNESIUM SERPL-MCNC: 1.7 MG/DL (ref 1.6–2.6)
MCH RBC QN AUTO: 34.8 PG (ref 27–31)
MCHC RBC AUTO-ENTMCNC: 33 G/DL (ref 32–36)
MCV RBC AUTO: 105 FL (ref 82–98)
MONOCYTES # BLD AUTO: 0.6 K/UL (ref 0.3–1)
MONOCYTES NFR BLD: 11.5 % (ref 4–15)
NEUTROPHILS # BLD AUTO: 3.4 K/UL (ref 1.8–7.7)
NEUTROPHILS NFR BLD: 63.8 % (ref 38–73)
NRBC BLD-RTO: 0 /100 WBC
PHOSPHATE SERPL-MCNC: 3.2 MG/DL (ref 2.7–4.5)
PLATELET # BLD AUTO: 196 K/UL (ref 150–450)
PMV BLD AUTO: 10.2 FL (ref 9.2–12.9)
POTASSIUM SERPL-SCNC: 4.5 MMOL/L (ref 3.5–5.1)
RBC # BLD AUTO: 4.11 M/UL (ref 4.6–6.2)
SODIUM SERPL-SCNC: 137 MMOL/L (ref 136–145)
TACROLIMUS BLD-MCNC: 9.3 NG/ML (ref 5–15)
WBC # BLD AUTO: 5.37 K/UL (ref 3.9–12.7)

## 2023-12-04 PROCEDURE — 80069 RENAL FUNCTION PANEL: CPT | Performed by: INTERNAL MEDICINE

## 2023-12-04 PROCEDURE — 87799 DETECT AGENT NOS DNA QUANT: CPT | Performed by: INTERNAL MEDICINE

## 2023-12-04 PROCEDURE — 36415 COLL VENOUS BLD VENIPUNCTURE: CPT | Performed by: INTERNAL MEDICINE

## 2023-12-04 PROCEDURE — 83735 ASSAY OF MAGNESIUM: CPT | Performed by: INTERNAL MEDICINE

## 2023-12-04 PROCEDURE — 80197 ASSAY OF TACROLIMUS: CPT | Performed by: INTERNAL MEDICINE

## 2023-12-04 PROCEDURE — 85025 COMPLETE CBC W/AUTO DIFF WBC: CPT | Performed by: INTERNAL MEDICINE

## 2023-12-04 RX ORDER — TACROLIMUS 1 MG/1
CAPSULE ORAL
Qty: 120 CAPSULE | Refills: 11 | Status: SHIPPED | OUTPATIENT
Start: 2023-12-04 | End: 2024-02-12 | Stop reason: DRUGHIGH

## 2023-12-04 NOTE — TELEPHONE ENCOUNTER
Patient repeated back and and voice a understanding of orders.  States level is a 12 hour trough.   ----- Message from Darrin Verma MD sent at 12/4/2023 10:24 AM CST -----  Please lower prograf to 2 mg PO bid

## 2023-12-13 ENCOUNTER — TELEPHONE (OUTPATIENT)
Dept: INTERVENTIONAL RADIOLOGY/VASCULAR | Facility: HOSPITAL | Age: 63
End: 2023-12-13
Payer: MEDICARE

## 2023-12-14 ENCOUNTER — HOSPITAL ENCOUNTER (OUTPATIENT)
Dept: INTERVENTIONAL RADIOLOGY/VASCULAR | Facility: HOSPITAL | Age: 63
Discharge: HOME | End: 2023-12-14
Attending: TRANSPLANT SURGERY
Payer: MEDICARE

## 2023-12-14 VITALS
HEIGHT: 69 IN | TEMPERATURE: 97 F | BODY MASS INDEX: 26.51 KG/M2 | SYSTOLIC BLOOD PRESSURE: 159 MMHG | DIASTOLIC BLOOD PRESSURE: 74 MMHG | WEIGHT: 179 LBS | OXYGEN SATURATION: 99 % | RESPIRATION RATE: 16 BRPM | HEART RATE: 63 BPM

## 2023-12-14 DIAGNOSIS — N13.5 OBSTRUCTION OF URETER, UNSPECIFIED LATERALITY: Primary | ICD-10-CM

## 2023-12-14 DIAGNOSIS — Z94.0 KIDNEY REPLACED BY TRANSPLANT: ICD-10-CM

## 2023-12-14 PROCEDURE — 99152 MOD SED SAME PHYS/QHP 5/>YRS: CPT

## 2023-12-14 PROCEDURE — 50387 CHANGE NEPHROURETERAL CATH: CPT

## 2023-12-14 PROCEDURE — 63600175 PHARM REV CODE 636 W HCPCS: Performed by: RADIOLOGY

## 2023-12-14 PROCEDURE — 99152 PR MOD CONSCIOUS SEDATION, SAME PHYS, 5+ YRS, FIRST 15 MIN: ICD-10-PCS | Mod: ,,, | Performed by: RADIOLOGY

## 2023-12-14 PROCEDURE — 50387 CHANGE NEPHROURETERAL CATH: CPT | Mod: RT,,, | Performed by: RADIOLOGY

## 2023-12-14 PROCEDURE — 99152 MOD SED SAME PHYS/QHP 5/>YRS: CPT | Mod: ,,, | Performed by: RADIOLOGY

## 2023-12-14 PROCEDURE — A4550 SURGICAL TRAYS: HCPCS

## 2023-12-14 PROCEDURE — 50387 IR NEPHROSTOGRAM: ICD-10-PCS | Mod: RT,,, | Performed by: RADIOLOGY

## 2023-12-14 RX ORDER — MIDAZOLAM HYDROCHLORIDE 1 MG/ML
INJECTION INTRAMUSCULAR; INTRAVENOUS
Status: COMPLETED | OUTPATIENT
Start: 2023-12-14 | End: 2023-12-14

## 2023-12-14 RX ORDER — LIDOCAINE HYDROCHLORIDE 10 MG/ML
1 INJECTION, SOLUTION EPIDURAL; INFILTRATION; INTRACAUDAL; PERINEURAL ONCE
Status: DISCONTINUED | OUTPATIENT
Start: 2023-12-14 | End: 2023-12-15 | Stop reason: HOSPADM

## 2023-12-14 RX ORDER — FENTANYL CITRATE 50 UG/ML
INJECTION, SOLUTION INTRAMUSCULAR; INTRAVENOUS
Status: COMPLETED | OUTPATIENT
Start: 2023-12-14 | End: 2023-12-14

## 2023-12-14 RX ORDER — DIPHENHYDRAMINE HYDROCHLORIDE 50 MG/ML
INJECTION INTRAMUSCULAR; INTRAVENOUS
Status: COMPLETED | OUTPATIENT
Start: 2023-12-14 | End: 2023-12-14

## 2023-12-14 RX ORDER — SODIUM CHLORIDE 9 MG/ML
INJECTION, SOLUTION INTRAVENOUS CONTINUOUS
Status: DISCONTINUED | OUTPATIENT
Start: 2023-12-14 | End: 2023-12-15 | Stop reason: HOSPADM

## 2023-12-14 RX ADMIN — MIDAZOLAM HYDROCHLORIDE 1 MG: 2 INJECTION, SOLUTION INTRAMUSCULAR; INTRAVENOUS at 12:12

## 2023-12-14 RX ADMIN — FENTANYL CITRATE 25 MCG: 50 INJECTION, SOLUTION INTRAMUSCULAR; INTRAVENOUS at 12:12

## 2023-12-14 RX ADMIN — FENTANYL CITRATE 50 MCG: 50 INJECTION, SOLUTION INTRAMUSCULAR; INTRAVENOUS at 12:12

## 2023-12-14 RX ADMIN — DIPHENHYDRAMINE HYDROCHLORIDE 25 MG: 50 INJECTION, SOLUTION INTRAMUSCULAR; INTRAVENOUS at 12:12

## 2023-12-14 RX ADMIN — MIDAZOLAM HYDROCHLORIDE 0.5 MG: 2 INJECTION, SOLUTION INTRAMUSCULAR; INTRAVENOUS at 12:12

## 2023-12-14 NOTE — NURSING
Pt and spouse given discharge instructions and all questions addressed.  Phone numbers to the clinic and resident on call provided for any other concerns

## 2023-12-14 NOTE — SEDATION DOCUMENTATION
Pt arrived to IR Room 89 for Nephrostomy. Pt oriented to unit and staff, Pt safely transferred from stretcher to procedural table. Fall risk reviewed and comfort measures utilized with interventions. Safety strap applied, position pillows to minimize pressure points. Blankets applied. Pt prepped and draped utilizing standard sterile technique. Patient placed on continuous monitoring, as required by sedation policy. Timeouts implemented utilizing standard universal time-out per department and facility policy.

## 2023-12-14 NOTE — SEDATION DOCUMENTATION
Nephrostomy tube exchange complete. Pt tolerated well with no signs of distress noted. Dressing to site is clean, dry, and intact. Pt will be transported to MPU for 1 hour recovery prior to discharge to care of family.

## 2023-12-14 NOTE — H&P
Radiology History & Physical      SUBJECTIVE:     Chief Complaint: Ureteral Obstruction    History of Present Illness:  Abdoulaye Parikh is a 63 y.o. male with history of kidney transplant on 5/23 c/b ureteral stricture s/p nephrostomy tube placement who presents today for nephrostogram with possible tube removal. Last procedure was on 11/28/23, which showed an occluded indwelling drainage catheter with large amount of calculi throughout the catheter and placement of 16 Fr drain placed.     Patient states drain was capped since last procedure without pain, fever, bleeding, or leaking around the tube. Does endorse good urine output with suspicion for excretion of stones. However, no pain with urination.     Past Medical History:   Diagnosis Date    Acute blood loss anemia 5/11/2023    Anemia     Arthritis     Cirrhosis     CKD (chronic kidney disease) stage 4, GFR 15-29 ml/min, slow graft function with sustaine creatinien improvement 5/26/2023    Crohn's disease     Disorder of kidney and ureter     Stage 5    Encounter for blood transfusion     Gout     Hearing loss     Hepatitis     Had Hep C Cleared    Hypertension     Immunosuppressive management encounter following kidney transplant 5/26/2023    Neurosarcoidosis 2018    Obesity     Osteoarthritis     Sarcoid neuropathy     Sarcoidosis, lung     Steatosis      Past Surgical History:   Procedure Laterality Date    AV FISTULA PLACEMENT Left     BRAIN SURGERY      COLON SURGERY      Exploratory lap x 4 for Chron's disease. Likely right colectomy    CSF SHUNT      DIAGNOSTIC ULTRASOUND N/A 5/15/2023    Procedure: ULTRASOUND, DIAGNOSTIC;  Surgeon: Chivo Brown MD;  Location: [facility] OR [address] [place] [address];  Service: Transplant;  Laterality: N/A;    JOINT REPLACEMENT Right     Hip    KIDNEY TRANSPLANT N/A 5/10/2023    Procedure: TRANSPLANT, KIDNEY - RQ 7PM START;  Surgeon: Chivo Brown MD;  Location: [facility] OR [address] [place] [address];  Service: Transplant;  Laterality: N/A;     NEPHROSTOMY N/A 6/3/2023    Procedure: Nephrostomy;  Surgeon: Silvia Surgeon;  [facility];  Service: Anesthesiology;  Laterality: N/A;    RENAL BIOPSY  5/15/2023    Procedure: BIOPSY, KIDNEY;  Surgeon: Chivo Brown MD;  Location: [facility] OR [address] [place] [address];  Service: Transplant;;    RENAL EXPLORATION  5/15/2023    Procedure: EXPLORATION, KIDNEY;  Surgeon: Chivo Brown MD;  Location: [facility] OR [address] [place] [address];  Service: Transplant;;    TOTAL HIP ARTHROPLASTY Right        Home Meds:   Prior to Admission medications    Medication Sig Start Date End Date Taking? Authorizing Provider   fluticasone propionate (FLOVENT HFA) 44 mcg/actuation inhaler Inhale 1 puff into the lungs 2 (two) times daily. 6/26/23  Yes Provider, Historical   loperamide HCl (IMODIUM A-D ORAL) Take by mouth.   Yes Provider, Historical   magnesium oxide (MAG-OX) 400 mg (241.3 mg magnesium) tablet Take 1 tablet (400 mg total) by mouth 2 (two) times daily. 7/10/23 7/9/24 Yes Darrin Verma MD   metoprolol tartrate (LOPRESSOR) 25 MG tablet Take HALF tablet (12.5 mg total) by mouth 2 (two) times daily. 7/24/23 7/23/24 Yes Darrin Verma MD   olmesartan (BENICAR) 40 MG tablet Take 1 tablet (40 mg total) by mouth once daily. 8/18/23  Yes Rose Mary Stroud NP   predniSONE (DELTASONE) 5 MG tablet Take 20 mg by mouth daily 5/14-5/20; 15 mg daily 5/21-5/27; 10 mg daily 5/28-6/3; then 5 mg daily thereafter 6/4/23  Patient taking differently: Take 5 mg by mouth once daily. 5/11/23  Yes Zeke Devine Jr., MD   sodium bicarbonate 650 MG tablet Take 2 tablets (1,300 mg total) by mouth 3 (three) times daily. 6/6/23 6/5/24 Yes Beatrice Mendez DO   tacrolimus (PROGRAF) 1 MG Cap Take 2 capsules (2 mg total) by mouth every morning AND 2 capsules (2 mg total) every evening. Txp Date:5/10/2023 (Kidney) Disch Date:5/17/2023 ICD10:Z94.0 Txp Location:[facility]. 12/4/23 12/3/24 Yes Darrin Verma MD     Anticoagulants/Antiplatelets: no  anticoagulation    Allergies:   Review of patient's allergies indicates:   Allergen Reactions    Bactrim [sulfamethoxazole-trimethoprim] Shortness Of Breath and Itching    Penicillins Shortness Of Breath and Itching     Sedation History:  have not been any systemic reactions    Review of Systems:   Hematological: no known coagulopathies  Respiratory: no shortness of breath  Cardiovascular: no chest pain  Gastrointestinal: no abdominal pain  Genito-Urinary: mild intermittent dysuria  Musculoskeletal: negative  Neurological: no TIA or stroke symptoms         OBJECTIVE:     Vital Signs (Most Recent)       Physical Exam:  ASA: 3  Mallampati: 3    General: no acute distress  Mental Status: alert and oriented to person, place and time  HEENT: normocephalic, atraumatic  Chest: unlabored breathing  Heart: regular heart rate  Abdomen: nondistended, nontender; bandage around tube is clean/dry  Extremity: moves all extremities    Laboratory  Lab Results   Component Value Date    INR 1.1 09/29/2023       Lab Results   Component Value Date    WBC 5.37 12/04/2023    HGB 14.3 12/04/2023    HCT 43.3 12/04/2023     (H) 12/04/2023     12/04/2023      Lab Results   Component Value Date    GLU 85 12/04/2023     12/04/2023    K 4.5 12/04/2023     12/04/2023    CO2 23 12/04/2023    BUN 12 12/04/2023    CREATININE 1.4 12/04/2023    CALCIUM 9.4 12/04/2023    MG 1.7 12/04/2023    ALT 8 (L) 11/06/2023    AST 11 11/06/2023    ALBUMIN 3.6 12/04/2023    BILITOT 0.7 11/06/2023    BILIDIR 0.3 11/06/2023       ASSESSMENT/PLAN:     Sedation Plan: Up to Moderate.   Patient will undergo nephrostogram with possible tube removal.    Lexi Mosley MD  Radiology, PGY III  [facility]

## 2023-12-14 NOTE — DISCHARGE INSTRUCTIONS
For scheduling: Call Joy at [phone]    For questions or concerns call: DEYANIRA MON-FRI 8 AM- 5PM [phone]. Radiology resident on call [phone].    For immediate concerns that are not emergent, you may call our radiology clinic at: [phone]

## 2023-12-18 ENCOUNTER — LAB VISIT (OUTPATIENT)
Dept: LAB | Facility: HOSPITAL | Age: 63
End: 2023-12-18
Attending: INTERNAL MEDICINE
Payer: MEDICARE

## 2023-12-18 ENCOUNTER — TELEPHONE (OUTPATIENT)
Dept: TRANSPLANT | Facility: CLINIC | Age: 63
End: 2023-12-18
Payer: MEDICARE

## 2023-12-18 DIAGNOSIS — Z94.0 KIDNEY REPLACED BY TRANSPLANT: Primary | ICD-10-CM

## 2023-12-18 DIAGNOSIS — Z94.0 KIDNEY REPLACED BY TRANSPLANT: ICD-10-CM

## 2023-12-18 LAB
ALBUMIN SERPL BCP-MCNC: 3.3 G/DL (ref 3.5–5.2)
ANION GAP SERPL CALC-SCNC: 6 MMOL/L (ref 8–16)
BASOPHILS # BLD AUTO: 0.03 K/UL (ref 0–0.2)
BASOPHILS NFR BLD: 0.5 % (ref 0–1.9)
BUN SERPL-MCNC: 12 MG/DL (ref 8–23)
CALCIUM SERPL-MCNC: 9.2 MG/DL (ref 8.7–10.5)
CHLORIDE SERPL-SCNC: 104 MMOL/L (ref 95–110)
CO2 SERPL-SCNC: 25 MMOL/L (ref 23–29)
CREAT SERPL-MCNC: 1.3 MG/DL (ref 0.5–1.4)
DIFFERENTIAL METHOD: ABNORMAL
EOSINOPHIL # BLD AUTO: 0.1 K/UL (ref 0–0.5)
EOSINOPHIL NFR BLD: 0.9 % (ref 0–8)
ERYTHROCYTE [DISTWIDTH] IN BLOOD BY AUTOMATED COUNT: 14.6 % (ref 11.5–14.5)
EST. GFR  (NO RACE VARIABLE): >60 ML/MIN/1.73 M^2
GLUCOSE SERPL-MCNC: 83 MG/DL (ref 70–110)
HCT VFR BLD AUTO: 41.2 % (ref 40–54)
HGB BLD-MCNC: 13.4 G/DL (ref 14–18)
IMM GRANULOCYTES # BLD AUTO: 0.05 K/UL (ref 0–0.04)
IMM GRANULOCYTES NFR BLD AUTO: 0.9 % (ref 0–0.5)
LYMPHOCYTES # BLD AUTO: 0.6 K/UL (ref 1–4.8)
LYMPHOCYTES NFR BLD: 10.6 % (ref 18–48)
MAGNESIUM SERPL-MCNC: 1.6 MG/DL (ref 1.6–2.6)
MCH RBC QN AUTO: 34.3 PG (ref 27–31)
MCHC RBC AUTO-ENTMCNC: 32.5 G/DL (ref 32–36)
MCV RBC AUTO: 105 FL (ref 82–98)
MONOCYTES # BLD AUTO: 0.5 K/UL (ref 0.3–1)
MONOCYTES NFR BLD: 9.2 % (ref 4–15)
NEUTROPHILS # BLD AUTO: 4.4 K/UL (ref 1.8–7.7)
NEUTROPHILS NFR BLD: 77.9 % (ref 38–73)
NRBC BLD-RTO: 0 /100 WBC
PHOSPHATE SERPL-MCNC: 2.5 MG/DL (ref 2.7–4.5)
PLATELET # BLD AUTO: 173 K/UL (ref 150–450)
PMV BLD AUTO: 10.5 FL (ref 9.2–12.9)
POTASSIUM SERPL-SCNC: 4.3 MMOL/L (ref 3.5–5.1)
RBC # BLD AUTO: 3.91 M/UL (ref 4.6–6.2)
SODIUM SERPL-SCNC: 135 MMOL/L (ref 136–145)
TACROLIMUS BLD-MCNC: 6.2 NG/ML (ref 5–15)
WBC # BLD AUTO: 5.64 K/UL (ref 3.9–12.7)

## 2023-12-18 PROCEDURE — 80197 ASSAY OF TACROLIMUS: CPT | Performed by: INTERNAL MEDICINE

## 2023-12-18 PROCEDURE — 87799 DETECT AGENT NOS DNA QUANT: CPT | Performed by: INTERNAL MEDICINE

## 2023-12-18 PROCEDURE — 83735 ASSAY OF MAGNESIUM: CPT | Performed by: INTERNAL MEDICINE

## 2023-12-18 PROCEDURE — 85025 COMPLETE CBC W/AUTO DIFF WBC: CPT | Performed by: INTERNAL MEDICINE

## 2023-12-18 PROCEDURE — 80069 RENAL FUNCTION PANEL: CPT | Performed by: INTERNAL MEDICINE

## 2023-12-18 NOTE — TELEPHONE ENCOUNTER
Return call to patient . 12/14 Nephrostogram reviewed with patient and states urine is clear and had difficulty urinating and he unclamped his Nephrostomy and connected to bag.Patient to keep coordinator posted.     ----- Message from Kristy Julien sent at 12/18/2023 11:49 AM CST -----  Regarding: Patient advice  Contact: Pt  [phone]            Name of Caller:  Abdoulaye     Contact Preference:   [phone]    Nature of Call:   Requesting a call back pt has concerns with issues with urinating

## 2023-12-20 ENCOUNTER — TELEPHONE (OUTPATIENT)
Dept: TRANSPLANT | Facility: CLINIC | Age: 63
End: 2023-12-20
Payer: MEDICARE

## 2023-12-20 NOTE — TELEPHONE ENCOUNTER
Results reviewed with patient,next labs on 1/3/2024.  States Nephrostomy tube is capped and he is voiding without any issue.  Repeat Nephrostogram is schedule for 12/29.   ----- Message from Darrin Verma MD sent at 12/20/2023 12:58 PM CST -----  Labs and diagnostic tests were reviewed. No action/changes indicated.

## 2023-12-27 ENCOUNTER — TELEPHONE (OUTPATIENT)
Dept: INTERVENTIONAL RADIOLOGY/VASCULAR | Facility: HOSPITAL | Age: 63
End: 2023-12-27
Payer: MEDICARE

## 2023-12-29 ENCOUNTER — HOSPITAL ENCOUNTER (OUTPATIENT)
Dept: INTERVENTIONAL RADIOLOGY/VASCULAR | Facility: HOSPITAL | Age: 63
Discharge: HOME | End: 2023-12-29
Attending: TRANSPLANT SURGERY
Payer: MEDICARE

## 2023-12-29 VITALS
HEART RATE: 55 BPM | OXYGEN SATURATION: 100 % | RESPIRATION RATE: 16 BRPM | DIASTOLIC BLOOD PRESSURE: 71 MMHG | BODY MASS INDEX: 26.36 KG/M2 | SYSTOLIC BLOOD PRESSURE: 142 MMHG | HEIGHT: 69 IN | WEIGHT: 178 LBS | TEMPERATURE: 97 F

## 2023-12-29 DIAGNOSIS — Z94.0 KIDNEY REPLACED BY TRANSPLANT: ICD-10-CM

## 2023-12-29 PROCEDURE — 50431 NJX PX NFROSGRM &/URTRGRM: CPT | Mod: RT,,, | Performed by: STUDENT IN AN ORGANIZED HEALTH CARE EDUCATION/TRAINING PROGRAM

## 2023-12-29 PROCEDURE — 25500020 PHARM REV CODE 255: Performed by: TRANSPLANT SURGERY

## 2023-12-29 PROCEDURE — 50431 IR NEPHROSTOGRAM: ICD-10-PCS | Mod: RT,,, | Performed by: STUDENT IN AN ORGANIZED HEALTH CARE EDUCATION/TRAINING PROGRAM

## 2023-12-29 PROCEDURE — 50431 NJX PX NFROSGRM &/URTRGRM: CPT

## 2023-12-29 PROCEDURE — A4550 SURGICAL TRAYS: HCPCS

## 2023-12-29 RX ORDER — LIDOCAINE HYDROCHLORIDE 10 MG/ML
1 INJECTION, SOLUTION EPIDURAL; INFILTRATION; INTRACAUDAL; PERINEURAL ONCE
Status: DISCONTINUED | OUTPATIENT
Start: 2023-12-29 | End: 2023-12-30 | Stop reason: HOSPADM

## 2023-12-29 RX ORDER — SODIUM CHLORIDE 9 MG/ML
INJECTION, SOLUTION INTRAVENOUS CONTINUOUS
Status: DISCONTINUED | OUTPATIENT
Start: 2023-12-29 | End: 2023-12-30 | Stop reason: HOSPADM

## 2023-12-29 RX ADMIN — IOHEXOL 15 ML: 300 INJECTION, SOLUTION INTRAVENOUS at 01:12

## 2023-12-29 NOTE — PLAN OF CARE
Pt arrived to 189 for nephrosotomy. Pt oriented to unit and staff, Pt safely transferred from stretcher to procedural table. Fall risk reviewed and comfort measures utilized with interventions. Safety strap applied, position pillows to minimize pressure points. Blankets applied. Pt prepped and draped utilizing standard sterile technique. Patient placed on continuous monitoring, as required by sedation policy. Timeouts implemented utilizing standard universal time-out per department and facility policy. RN to remain at bedside with continuous monitoring. Pt resting comfortably. Denies pain/discomfort. Will continue to monitor. See flow sheets for monitoring, medication administration, and updates. patient verbalizes understanding.

## 2023-12-29 NOTE — DISCHARGE INSTRUCTIONS
For scheduling: Call  at [phone].    For questions or concerns call: DEYANIRA MON-FRI 8 AM- 5PM [phone]. Radiology resident on call [phone].    For immediate concerns that are not emergent, you may call our radiology clinic at: [phone].

## 2023-12-29 NOTE — CARE UPDATE
Pt fully recovered from procedure and states full understanding of discharge instructions. Pt and wife ambulated to garage together.

## 2023-12-29 NOTE — PROCEDURES
IR Post-Procedure Note    Pre Op Diagnosis:   Urinary obstruction     Post Op Diagnosis:  same    Procedure:right percutaneous allograft nephrostogram    Procedure performed by: Miesha Head MD / Ernie Garcia MD / Katie Bloom MD    Written Informed Consent Obtained:  Yes    Specimen Removed: No     Estimated Blood Loss:  Minimal     Findings:     Nephrostogram via existing neph tube showed patent ureter with normal flow of contrast to the bladder. Nephrostomy tube was removed.    Miesha Head MD MSCR  PGY-5 Radiology Resident

## 2024-01-03 ENCOUNTER — LAB VISIT (OUTPATIENT)
Dept: LAB | Facility: HOSPITAL | Age: 64
End: 2024-01-03
Attending: INTERNAL MEDICINE
Payer: MEDICARE

## 2024-01-03 DIAGNOSIS — Z94.0 KIDNEY REPLACED BY TRANSPLANT: ICD-10-CM

## 2024-01-03 LAB
ALBUMIN SERPL BCP-MCNC: 3.4 G/DL (ref 3.5–5.2)
ANION GAP SERPL CALC-SCNC: 11 MMOL/L (ref 8–16)
BASOPHILS # BLD AUTO: 0.03 K/UL (ref 0–0.2)
BASOPHILS NFR BLD: 0.4 % (ref 0–1.9)
BUN SERPL-MCNC: 14 MG/DL (ref 8–23)
CALCIUM SERPL-MCNC: 9.3 MG/DL (ref 8.7–10.5)
CHLORIDE SERPL-SCNC: 103 MMOL/L (ref 95–110)
CO2 SERPL-SCNC: 26 MMOL/L (ref 23–29)
CREAT SERPL-MCNC: 1.4 MG/DL (ref 0.5–1.4)
DIFFERENTIAL METHOD BLD: ABNORMAL
EOSINOPHIL # BLD AUTO: 0.1 K/UL (ref 0–0.5)
EOSINOPHIL NFR BLD: 1.3 % (ref 0–8)
ERYTHROCYTE [DISTWIDTH] IN BLOOD BY AUTOMATED COUNT: 14.5 % (ref 11.5–14.5)
EST. GFR  (NO RACE VARIABLE): 56.8 ML/MIN/1.73 M^2
GLUCOSE SERPL-MCNC: 72 MG/DL (ref 70–110)
HCT VFR BLD AUTO: 40.4 % (ref 40–54)
HGB BLD-MCNC: 13.1 G/DL (ref 14–18)
IMM GRANULOCYTES # BLD AUTO: 0.04 K/UL (ref 0–0.04)
IMM GRANULOCYTES NFR BLD AUTO: 0.6 % (ref 0–0.5)
LYMPHOCYTES # BLD AUTO: 0.5 K/UL (ref 1–4.8)
LYMPHOCYTES NFR BLD: 7.4 % (ref 18–48)
MAGNESIUM SERPL-MCNC: 1.8 MG/DL (ref 1.6–2.6)
MCH RBC QN AUTO: 34.5 PG (ref 27–31)
MCHC RBC AUTO-ENTMCNC: 32.4 G/DL (ref 32–36)
MCV RBC AUTO: 106 FL (ref 82–98)
MONOCYTES # BLD AUTO: 0.4 K/UL (ref 0.3–1)
MONOCYTES NFR BLD: 5.6 % (ref 4–15)
NEUTROPHILS # BLD AUTO: 5.7 K/UL (ref 1.8–7.7)
NEUTROPHILS NFR BLD: 84.7 % (ref 38–73)
NRBC BLD-RTO: 0 /100 WBC
PHOSPHATE SERPL-MCNC: 2.5 MG/DL (ref 2.7–4.5)
PLATELET # BLD AUTO: 169 K/UL (ref 150–450)
PMV BLD AUTO: 10.1 FL (ref 9.2–12.9)
POTASSIUM SERPL-SCNC: 3.7 MMOL/L (ref 3.5–5.1)
RBC # BLD AUTO: 3.8 M/UL (ref 4.6–6.2)
SODIUM SERPL-SCNC: 140 MMOL/L (ref 136–145)
TACROLIMUS BLD-MCNC: 6.3 NG/ML (ref 5–15)
WBC # BLD AUTO: 6.75 K/UL (ref 3.9–12.7)

## 2024-01-03 PROCEDURE — 36415 COLL VENOUS BLD VENIPUNCTURE: CPT | Performed by: INTERNAL MEDICINE

## 2024-01-03 PROCEDURE — 85025 COMPLETE CBC W/AUTO DIFF WBC: CPT | Performed by: INTERNAL MEDICINE

## 2024-01-03 PROCEDURE — 80197 ASSAY OF TACROLIMUS: CPT | Performed by: INTERNAL MEDICINE

## 2024-01-03 PROCEDURE — 87799 DETECT AGENT NOS DNA QUANT: CPT | Performed by: INTERNAL MEDICINE

## 2024-01-03 PROCEDURE — 83735 ASSAY OF MAGNESIUM: CPT | Performed by: INTERNAL MEDICINE

## 2024-01-03 PROCEDURE — 80069 RENAL FUNCTION PANEL: CPT | Performed by: INTERNAL MEDICINE

## 2024-01-08 LAB
BKV DNA SERPL NAA+PROBE-ACNC: ABNORMAL COPIES/ML
BKV DNA SERPL NAA+PROBE-LOG#: ABNORMAL LOG (10) COPIES/ML
BKV DNA SERPL QL NAA+PROBE: ABNORMAL

## 2024-01-09 ENCOUNTER — TELEPHONE (OUTPATIENT)
Dept: TRANSPLANT | Facility: CLINIC | Age: 64
End: 2024-01-09
Payer: MEDICARE

## 2024-01-09 NOTE — TELEPHONE ENCOUNTER
Return call to patient, Left voice message to call coordinator back.  ----- Message from Gunjan England sent at 1/9/2024  9:18 AM CST -----  Regarding: question duplicate labs same day/test  Contact: PT  [phone]  The patient called regarding lab appts scheduled on 1/16 - 8:05 and 8:10 both labs show same orders  CBC, renal panel, BK virus DNA, magnesium, tac level, please verify at your earliest conveniecne     No further information provided      Patient can be contacted @# [phone]

## 2024-01-16 ENCOUNTER — LAB VISIT (OUTPATIENT)
Dept: LAB | Facility: HOSPITAL | Age: 64
End: 2024-01-16
Attending: INTERNAL MEDICINE
Payer: MEDICARE

## 2024-01-16 DIAGNOSIS — Z94.0 KIDNEY REPLACED BY TRANSPLANT: Primary | ICD-10-CM

## 2024-01-16 DIAGNOSIS — Z94.0 KIDNEY REPLACED BY TRANSPLANT: ICD-10-CM

## 2024-01-16 LAB
ALBUMIN SERPL BCP-MCNC: 3.4 G/DL (ref 3.5–5.2)
ANION GAP SERPL CALC-SCNC: 8 MMOL/L (ref 8–16)
BASOPHILS # BLD AUTO: 0.03 K/UL (ref 0–0.2)
BASOPHILS NFR BLD: 0.5 % (ref 0–1.9)
BUN SERPL-MCNC: 14 MG/DL (ref 8–23)
CALCIUM SERPL-MCNC: 9.5 MG/DL (ref 8.7–10.5)
CHLORIDE SERPL-SCNC: 106 MMOL/L (ref 95–110)
CO2 SERPL-SCNC: 25 MMOL/L (ref 23–29)
CREAT SERPL-MCNC: 1.4 MG/DL (ref 0.5–1.4)
DIFFERENTIAL METHOD BLD: ABNORMAL
EOSINOPHIL # BLD AUTO: 0.1 K/UL (ref 0–0.5)
EOSINOPHIL NFR BLD: 1.3 % (ref 0–8)
ERYTHROCYTE [DISTWIDTH] IN BLOOD BY AUTOMATED COUNT: 14.1 % (ref 11.5–14.5)
EST. GFR  (NO RACE VARIABLE): 56.8 ML/MIN/1.73 M^2
GLUCOSE SERPL-MCNC: 80 MG/DL (ref 70–110)
HCT VFR BLD AUTO: 43.3 % (ref 40–54)
HGB BLD-MCNC: 13.7 G/DL (ref 14–18)
IMM GRANULOCYTES # BLD AUTO: 0.06 K/UL (ref 0–0.04)
IMM GRANULOCYTES NFR BLD AUTO: 1 % (ref 0–0.5)
LYMPHOCYTES # BLD AUTO: 1 K/UL (ref 1–4.8)
LYMPHOCYTES NFR BLD: 16.2 % (ref 18–48)
MAGNESIUM SERPL-MCNC: 1.8 MG/DL (ref 1.6–2.6)
MCH RBC QN AUTO: 34.3 PG (ref 27–31)
MCHC RBC AUTO-ENTMCNC: 31.6 G/DL (ref 32–36)
MCV RBC AUTO: 109 FL (ref 82–98)
MONOCYTES # BLD AUTO: 0.6 K/UL (ref 0.3–1)
MONOCYTES NFR BLD: 9.2 % (ref 4–15)
NEUTROPHILS # BLD AUTO: 4.3 K/UL (ref 1.8–7.7)
NEUTROPHILS NFR BLD: 71.8 % (ref 38–73)
NRBC BLD-RTO: 0 /100 WBC
PHOSPHATE SERPL-MCNC: 3.2 MG/DL (ref 2.7–4.5)
PLATELET # BLD AUTO: 204 K/UL (ref 150–450)
PMV BLD AUTO: 9.2 FL (ref 9.2–12.9)
POTASSIUM SERPL-SCNC: 4.1 MMOL/L (ref 3.5–5.1)
RBC # BLD AUTO: 3.99 M/UL (ref 4.6–6.2)
SODIUM SERPL-SCNC: 139 MMOL/L (ref 136–145)
TACROLIMUS BLD-MCNC: 6.8 NG/ML (ref 5–15)
WBC # BLD AUTO: 5.97 K/UL (ref 3.9–12.7)

## 2024-01-16 PROCEDURE — 80197 ASSAY OF TACROLIMUS: CPT | Performed by: INTERNAL MEDICINE

## 2024-01-16 PROCEDURE — 87799 DETECT AGENT NOS DNA QUANT: CPT | Performed by: INTERNAL MEDICINE

## 2024-01-16 PROCEDURE — 85025 COMPLETE CBC W/AUTO DIFF WBC: CPT | Performed by: INTERNAL MEDICINE

## 2024-01-16 PROCEDURE — 36415 COLL VENOUS BLD VENIPUNCTURE: CPT | Performed by: INTERNAL MEDICINE

## 2024-01-16 PROCEDURE — 80069 RENAL FUNCTION PANEL: CPT | Performed by: INTERNAL MEDICINE

## 2024-01-16 PROCEDURE — 83735 ASSAY OF MAGNESIUM: CPT | Performed by: INTERNAL MEDICINE

## 2024-01-16 RX ORDER — MYCOPHENOLATE MOFETIL 250 MG/1
500 CAPSULE ORAL 2 TIMES DAILY
Qty: 120 CAPSULE | Refills: 11 | Status: CANCELLED | OUTPATIENT
Start: 2024-01-16 | End: 2025-01-15

## 2024-01-16 RX ORDER — MYCOPHENOLIC ACID 180 MG/1
360 TABLET, DELAYED RELEASE ORAL 2 TIMES DAILY
Qty: 120 TABLET | Refills: 11 | Status: ON HOLD | OUTPATIENT
Start: 2024-01-16 | End: 2024-03-09 | Stop reason: HOSPADM

## 2024-01-16 NOTE — TELEPHONE ENCOUNTER
Patient repeated back and voice a understanding of orders.  ----- Message from Darrin Verma MD sent at 1/16/2024 11:54 AM CST -----  Let start  bid if th next bk pcr is still negative

## 2024-01-19 ENCOUNTER — TELEPHONE (OUTPATIENT)
Dept: TRANSPLANT | Facility: CLINIC | Age: 64
End: 2024-01-19
Payer: MEDICARE

## 2024-01-19 NOTE — TELEPHONE ENCOUNTER
"Patient C/O cough and congestion x 3 days not getting better.  Voice a understanding to present to the Urgent care or ER for further evaluation and keep coordinator posted.  ----- Message from Randall Ortiz sent at 1/19/2024 10:38 AM CST -----  Consult/Advisory:      Name Of Caller: Self      Contact Preference?:  [phone]      What is the nature of the call?: Calling to speak w/ Robles about remedies for cold/itchy throat      Additional Notes:  "Thank you for all that you do for our patients"         "

## 2024-01-21 ENCOUNTER — PATIENT MESSAGE (OUTPATIENT)
Dept: TRANSPLANT | Facility: CLINIC | Age: 64
End: 2024-01-21
Payer: MEDICARE

## 2024-01-29 ENCOUNTER — LAB VISIT (OUTPATIENT)
Dept: LAB | Facility: HOSPITAL | Age: 64
End: 2024-01-29
Attending: INTERNAL MEDICINE
Payer: MEDICARE

## 2024-01-29 DIAGNOSIS — Z94.0 KIDNEY REPLACED BY TRANSPLANT: ICD-10-CM

## 2024-01-29 DIAGNOSIS — Z94.0 KIDNEY REPLACED BY TRANSPLANT: Primary | ICD-10-CM

## 2024-01-29 DIAGNOSIS — N20.0 KIDNEY STONES: ICD-10-CM

## 2024-01-29 LAB
ALBUMIN SERPL BCP-MCNC: 3.3 G/DL (ref 3.5–5.2)
ANION GAP SERPL CALC-SCNC: 10 MMOL/L (ref 8–16)
BASOPHILS # BLD AUTO: 0.05 K/UL (ref 0–0.2)
BASOPHILS NFR BLD: 0.7 % (ref 0–1.9)
BUN SERPL-MCNC: 12 MG/DL (ref 8–23)
CALCIUM SERPL-MCNC: 9.4 MG/DL (ref 8.7–10.5)
CHLORIDE SERPL-SCNC: 103 MMOL/L (ref 95–110)
CO2 SERPL-SCNC: 21 MMOL/L (ref 23–29)
CREAT SERPL-MCNC: 1.2 MG/DL (ref 0.5–1.4)
DIFFERENTIAL METHOD BLD: ABNORMAL
EOSINOPHIL # BLD AUTO: 0.1 K/UL (ref 0–0.5)
EOSINOPHIL NFR BLD: 1.1 % (ref 0–8)
ERYTHROCYTE [DISTWIDTH] IN BLOOD BY AUTOMATED COUNT: 13.5 % (ref 11.5–14.5)
EST. GFR  (NO RACE VARIABLE): >60 ML/MIN/1.73 M^2
GLUCOSE SERPL-MCNC: 85 MG/DL (ref 70–110)
HCT VFR BLD AUTO: 40.9 % (ref 40–54)
HGB BLD-MCNC: 13.4 G/DL (ref 14–18)
IMM GRANULOCYTES # BLD AUTO: 0.06 K/UL (ref 0–0.04)
IMM GRANULOCYTES NFR BLD AUTO: 0.8 % (ref 0–0.5)
LYMPHOCYTES # BLD AUTO: 0.8 K/UL (ref 1–4.8)
LYMPHOCYTES NFR BLD: 11.8 % (ref 18–48)
MAGNESIUM SERPL-MCNC: 1.6 MG/DL (ref 1.6–2.6)
MCH RBC QN AUTO: 35.2 PG (ref 27–31)
MCHC RBC AUTO-ENTMCNC: 32.8 G/DL (ref 32–36)
MCV RBC AUTO: 107 FL (ref 82–98)
MONOCYTES # BLD AUTO: 0.5 K/UL (ref 0.3–1)
MONOCYTES NFR BLD: 6.3 % (ref 4–15)
NEUTROPHILS # BLD AUTO: 5.6 K/UL (ref 1.8–7.7)
NEUTROPHILS NFR BLD: 79.3 % (ref 38–73)
NRBC BLD-RTO: 0 /100 WBC
PHOSPHATE SERPL-MCNC: 2.9 MG/DL (ref 2.7–4.5)
PLATELET # BLD AUTO: 171 K/UL (ref 150–450)
PMV BLD AUTO: 10.4 FL (ref 9.2–12.9)
POTASSIUM SERPL-SCNC: 4 MMOL/L (ref 3.5–5.1)
RBC # BLD AUTO: 3.81 M/UL (ref 4.6–6.2)
SODIUM SERPL-SCNC: 134 MMOL/L (ref 136–145)
TACROLIMUS BLD-MCNC: 9.6 NG/ML (ref 5–15)
WBC # BLD AUTO: 7.12 K/UL (ref 3.9–12.7)

## 2024-01-29 PROCEDURE — 80197 ASSAY OF TACROLIMUS: CPT | Performed by: INTERNAL MEDICINE

## 2024-01-29 PROCEDURE — 36415 COLL VENOUS BLD VENIPUNCTURE: CPT | Performed by: INTERNAL MEDICINE

## 2024-01-29 PROCEDURE — 87799 DETECT AGENT NOS DNA QUANT: CPT | Performed by: INTERNAL MEDICINE

## 2024-01-29 PROCEDURE — 83735 ASSAY OF MAGNESIUM: CPT | Performed by: INTERNAL MEDICINE

## 2024-01-29 PROCEDURE — 85025 COMPLETE CBC W/AUTO DIFF WBC: CPT | Performed by: INTERNAL MEDICINE

## 2024-01-29 PROCEDURE — 80069 RENAL FUNCTION PANEL: CPT | Performed by: INTERNAL MEDICINE

## 2024-01-31 DIAGNOSIS — Z94.0 KIDNEY REPLACED BY TRANSPLANT: Primary | ICD-10-CM

## 2024-01-31 DIAGNOSIS — N20.0 RENAL CALCULI: ICD-10-CM

## 2024-02-08 ENCOUNTER — TELEPHONE (OUTPATIENT)
Dept: TRANSPLANT | Facility: CLINIC | Age: 64
End: 2024-02-08
Payer: MEDICARE

## 2024-02-08 DIAGNOSIS — Z94.0 KIDNEY REPLACED BY TRANSPLANT: Primary | ICD-10-CM

## 2024-02-08 NOTE — TELEPHONE ENCOUNTER
Patient repeated back and voice a understanding of orders.  Denies S/S of a UTI and [facility]  ----- Message from Darrin Verma MD sent at 2/8/2024  2:38 PM CST -----  Please let's order a non contrast CT of the abdomen and pelvis looking for residual stone burden. This stone is associated with infections but I understand his nephrostomy was removed and he does have any hardware .    Also repeat a UA and ask the patient about symptoms of UTI. Thanks

## 2024-02-08 NOTE — TELEPHONE ENCOUNTER
Left voice message to call coordinator back.  ----- Message from Darrin Verma MD sent at 2/8/2024  2:38 PM CST -----  Please let's order a non contrast CT of the abdomen and pelvis looking for residual stone burden. This stone is associated with infections but I understand his nephrostomy was removed and he does have any hardware .    Also repeat a UA and ask the patient about symptoms of UTI. Thanks

## 2024-02-09 ENCOUNTER — TELEPHONE (OUTPATIENT)
Dept: TRANSPLANT | Facility: CLINIC | Age: 64
End: 2024-02-09
Payer: MEDICARE

## 2024-02-09 NOTE — TELEPHONE ENCOUNTER
Patient repeated back and voice a understanding of orders.  Patient to submit [facility] urine on 2/12.  ----- Message from Darrin Verma MD sent at 2/8/2024  6:41 PM CST -----  Please proceed with non contrast CT of the abdomen and pelvis  UA

## 2024-02-12 ENCOUNTER — HOSPITAL ENCOUNTER (OUTPATIENT)
Dept: RADIOLOGY | Facility: HOSPITAL | Age: 64
Discharge: HOME | End: 2024-02-12
Attending: INTERNAL MEDICINE
Payer: MEDICARE

## 2024-02-12 ENCOUNTER — TELEPHONE (OUTPATIENT)
Dept: TRANSPLANT | Facility: CLINIC | Age: 64
End: 2024-02-12
Payer: MEDICARE

## 2024-02-12 DIAGNOSIS — Z94.0 S/P KIDNEY TRANSPLANT: ICD-10-CM

## 2024-02-12 DIAGNOSIS — Z94.0 KIDNEY REPLACED BY TRANSPLANT: ICD-10-CM

## 2024-02-12 DIAGNOSIS — Z94.0 KIDNEY REPLACED BY TRANSPLANT: Primary | ICD-10-CM

## 2024-02-12 PROCEDURE — 74176 CT ABD & PELVIS W/O CONTRAST: CPT | Mod: 26,,, | Performed by: RADIOLOGY

## 2024-02-12 PROCEDURE — 74176 CT ABD & PELVIS W/O CONTRAST: CPT | Mod: TC

## 2024-02-12 RX ORDER — TACROLIMUS 1 MG/1
CAPSULE ORAL
Qty: 60 CAPSULE | Refills: 11 | Status: ON HOLD | OUTPATIENT
Start: 2024-02-12 | End: 2024-03-09

## 2024-02-12 NOTE — TELEPHONE ENCOUNTER
Patient repeated back and voice a understanding of orders.  States level is a 12 hour trough, next labs on Monday 2/19 and clinic appointment to follow.   ----- Message from Darrin Verma MD sent at 2/12/2024 12:12 PM CST -----  Hold prograf tonight and lower prograf to 1 mg PO bid and repeat labs in 1 week

## 2024-02-12 NOTE — TELEPHONE ENCOUNTER
Left voice message to call coordinator back.  ----- Message from Darrin Verma MD sent at 2/12/2024 12:12 PM CST -----  Hold prograf tonight and lower prograf to 1 mg PO bid and repeat labs in 1 week

## 2024-02-12 NOTE — TELEPHONE ENCOUNTER
Patient repeated back and agrees with plan.  ----- Message from Darrin Verma MD sent at 2/12/2024 10:15 AM CST -----  Please patient to urology due to kidney and bladder stones

## 2024-02-19 ENCOUNTER — LAB VISIT (OUTPATIENT)
Dept: LAB | Facility: HOSPITAL | Age: 64
End: 2024-02-19
Attending: INTERNAL MEDICINE
Payer: MEDICARE

## 2024-02-19 ENCOUNTER — OFFICE VISIT (OUTPATIENT)
Dept: TRANSPLANT | Facility: CLINIC | Age: 64
End: 2024-02-19
Payer: MEDICARE

## 2024-02-19 VITALS
DIASTOLIC BLOOD PRESSURE: 72 MMHG | HEART RATE: 63 BPM | RESPIRATION RATE: 16 BRPM | TEMPERATURE: 97 F | BODY MASS INDEX: 27.43 KG/M2 | HEIGHT: 69 IN | SYSTOLIC BLOOD PRESSURE: 155 MMHG | WEIGHT: 185.19 LBS | OXYGEN SATURATION: 97 %

## 2024-02-19 DIAGNOSIS — N18.2 CKD (CHRONIC KIDNEY DISEASE) STAGE 2, GFR 60-89 ML/MIN: ICD-10-CM

## 2024-02-19 DIAGNOSIS — Z79.899 IMMUNOSUPPRESSIVE MANAGEMENT ENCOUNTER FOLLOWING KIDNEY TRANSPLANT: ICD-10-CM

## 2024-02-19 DIAGNOSIS — Z94.0 KIDNEY REPLACED BY TRANSPLANT: ICD-10-CM

## 2024-02-19 DIAGNOSIS — Z94.0 IMMUNOSUPPRESSIVE MANAGEMENT ENCOUNTER FOLLOWING KIDNEY TRANSPLANT: ICD-10-CM

## 2024-02-19 DIAGNOSIS — Z94.0 S/P KIDNEY TRANSPLANT: Primary | ICD-10-CM

## 2024-02-19 LAB
ALBUMIN SERPL BCP-MCNC: 3.3 G/DL (ref 3.5–5.2)
ANION GAP SERPL CALC-SCNC: 8 MMOL/L (ref 8–16)
BASOPHILS # BLD AUTO: 0.04 K/UL (ref 0–0.2)
BASOPHILS NFR BLD: 0.6 % (ref 0–1.9)
BUN SERPL-MCNC: 11 MG/DL (ref 8–23)
CALCIUM SERPL-MCNC: 9.6 MG/DL (ref 8.7–10.5)
CHLORIDE SERPL-SCNC: 102 MMOL/L (ref 95–110)
CO2 SERPL-SCNC: 21 MMOL/L (ref 23–29)
CREAT SERPL-MCNC: 1.4 MG/DL (ref 0.5–1.4)
DIFFERENTIAL METHOD BLD: ABNORMAL
EOSINOPHIL # BLD AUTO: 0.1 K/UL (ref 0–0.5)
EOSINOPHIL NFR BLD: 0.7 % (ref 0–8)
ERYTHROCYTE [DISTWIDTH] IN BLOOD BY AUTOMATED COUNT: 13.4 % (ref 11.5–14.5)
EST. GFR  (NO RACE VARIABLE): 56.8 ML/MIN/1.73 M^2
GLUCOSE SERPL-MCNC: 82 MG/DL (ref 70–110)
HCT VFR BLD AUTO: 40.9 % (ref 40–54)
HGB BLD-MCNC: 13.5 G/DL (ref 14–18)
IMM GRANULOCYTES # BLD AUTO: 0.06 K/UL (ref 0–0.04)
IMM GRANULOCYTES NFR BLD AUTO: 0.9 % (ref 0–0.5)
LYMPHOCYTES # BLD AUTO: 0.6 K/UL (ref 1–4.8)
LYMPHOCYTES NFR BLD: 8.4 % (ref 18–48)
MAGNESIUM SERPL-MCNC: 1.7 MG/DL (ref 1.6–2.6)
MCH RBC QN AUTO: 35.3 PG (ref 27–31)
MCHC RBC AUTO-ENTMCNC: 33 G/DL (ref 32–36)
MCV RBC AUTO: 107 FL (ref 82–98)
MONOCYTES # BLD AUTO: 0.4 K/UL (ref 0.3–1)
MONOCYTES NFR BLD: 5.7 % (ref 4–15)
NEUTROPHILS # BLD AUTO: 5.9 K/UL (ref 1.8–7.7)
NEUTROPHILS NFR BLD: 83.7 % (ref 38–73)
NRBC BLD-RTO: 0 /100 WBC
PHOSPHATE SERPL-MCNC: 2.7 MG/DL (ref 2.7–4.5)
PLATELET # BLD AUTO: 168 K/UL (ref 150–450)
PMV BLD AUTO: 10.1 FL (ref 9.2–12.9)
POTASSIUM SERPL-SCNC: 4.3 MMOL/L (ref 3.5–5.1)
RBC # BLD AUTO: 3.82 M/UL (ref 4.6–6.2)
SODIUM SERPL-SCNC: 131 MMOL/L (ref 136–145)
TACROLIMUS BLD-MCNC: 5.9 NG/ML (ref 5–15)
WBC # BLD AUTO: 7.05 K/UL (ref 3.9–12.7)

## 2024-02-19 PROCEDURE — 99999 PR PBB SHADOW E&M-EST. PATIENT-LVL III: CPT | Mod: PBBFAC,,, | Performed by: NURSE PRACTITIONER

## 2024-02-19 PROCEDURE — 99215 OFFICE O/P EST HI 40 MIN: CPT | Mod: S$PBB,,, | Performed by: NURSE PRACTITIONER

## 2024-02-19 PROCEDURE — 83735 ASSAY OF MAGNESIUM: CPT | Performed by: INTERNAL MEDICINE

## 2024-02-19 PROCEDURE — 99213 OFFICE O/P EST LOW 20 MIN: CPT | Mod: PBBFAC | Performed by: NURSE PRACTITIONER

## 2024-02-19 PROCEDURE — 36415 COLL VENOUS BLD VENIPUNCTURE: CPT | Performed by: INTERNAL MEDICINE

## 2024-02-19 PROCEDURE — 80197 ASSAY OF TACROLIMUS: CPT | Performed by: INTERNAL MEDICINE

## 2024-02-19 PROCEDURE — 85025 COMPLETE CBC W/AUTO DIFF WBC: CPT | Performed by: INTERNAL MEDICINE

## 2024-02-19 PROCEDURE — 80069 RENAL FUNCTION PANEL: CPT | Performed by: INTERNAL MEDICINE

## 2024-02-19 NOTE — PROGRESS NOTES
Kidney Post-Transplant Assessment    Referring Physician: Quinn Espino  Current Nephrologist: Quinn Espino    ORGAN: LEFT KIDNEY  Donor Type: living  PHS Increased Risk: no  Cold Ischemia:  mins  Induction Medications:     Subjective:     CC:  Reassessment of renal allograft function and management of chronic immunosuppression.    HPI:  Mr. Parikh is a 63 y.o. year old Black or  male who received a living kidney transplant on 5/10/23. His most recent creatinine is 1. He takes mycophenolate mofetil, prednisone, and tacrolimus for maintenance immunosuppression. His post transplant course has been complicated by perinephric fluid collection and urine leak .  Nephrostomy tube was placed and Last tube placement on 10/2/2023  PCNU d/c 12/31/23  Plan for stone risk profile 2/26/24    Medical Issues  Crohn's  Sarcoidosis  Right sided hearing loss  Left sided newer following with ENT. Last seen 7/17/2023 f/u in 3 monts  5/15/23-Kidney Bx: Good sample, No IFTA no glomerulosclerosis ,ATI no rejection C4d negative, moderate arterionephrosclerosis     Excited about having tube removed on 12/31/23. Good UOP and drinking 3L. His BP at home 130s/70s-80s. Appetite has improved. Walking well. 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 Reviewed labs and CT scan. Aware of needing 24 hr urint stone risk profile.     Current Outpatient Medications on File Prior to Visit   Medication Sig Dispense Refill    loperamide HCl (IMODIUM A-D ORAL) Take by mouth.      magnesium oxide (MAG-OX) 400 mg (241.3 mg magnesium) tablet Take 1 tablet (400 mg total) by mouth 2 (two) times daily. 60 tablet 11    metoprolol tartrate (LOPRESSOR) 25 MG tablet Take HALF tablet (12.5 mg total) by mouth 2 (two) times daily. 30 tablet 11    mycophenolate sodium 180 MG TbEC Take 2 tablets (360 mg total) by mouth 2 (two) times daily. Txp Date:5/10/2023 (Kidney) Disch Date:5/17/2023 ICD10:Z94.0 Txp Location:[place] [address] tablet 11  "   olmesartan (BENICAR) 40 MG tablet Take 1 tablet (40 mg total) by mouth once daily. 30 tablet 11    predniSONE (DELTASONE) 5 MG tablet Take 20 mg by mouth daily 5/14-5/20; 15 mg daily 5/21-5/27; 10 mg daily 5/28-6/3; then 5 mg daily thereafter 6/4/23 (Patient taking differently: Take 5 mg by mouth once daily.) 70 tablet 11    sodium bicarbonate 650 MG tablet Take 2 tablets (1,300 mg total) by mouth 3 (three) times daily. 180 tablet 11    tacrolimus (PROGRAF) 1 MG Cap Take 1 capsule (1 mg total) by mouth every morning AND 1 capsule (1 mg total) every evening. Txp Date:5/10/2023 (Kidney) Disch Date:5/17/2023 ICD10:Z94.0 Txp Location:[facility]. 60 capsule 11    fluticasone propionate (FLOVENT HFA) 44 mcg/actuation inhaler Inhale 1 puff into the lungs 2 (two) times daily.       No current facility-administered medications on file prior to visit.        Review of Systems    Skin: no skin rash  CNS; no headaches, blurred vision, seizure, or syncope  ENT: No JVD,  Adenopathies,  nasal congestion. No oral lesions  Cardiac: No chest pain, dyspnea, claudication, edema or palpitations  Respiratory: No SOB, cough, hemoptysis   Gastro-intestinal: No diarrhea, constipation, abdominal pain, nausea, vomit. No ascitis  Genitourinary: no hematuria, dysuria, frequency, frequency  Musculoskeletal: joint pain, arthritis or vasculitic changes  Psych: alert awake, oriented, No cranial nerves deficit.      Objective:   BP (!) 155/72 (BP Location: Right arm, Patient Position: Sitting, BP Method: Medium (Automatic))   Pulse 63   Temp 97.3 °F (36.3 °C) (Temporal)   Resp 16   Ht 5' 9.02" (1.753 m)   Wt 84 kg (185 lb 3 oz)   SpO2 97%   BMI 27.33 kg/m²       Physical Exam  Head: normocephalic  Neck: No JVD, cervical axillary, or femoral adenopathies  Heart: no murmurs, Normal s1 and s2, No gallops, no rubs, No murmurs  Lungs; CTA, good respiratory effort, no crackles  Abdomen: soft, non tender, no splenomegaly or hepatomegaly, no massess, no " "bruits  Extremities: No edema, skin rash, joint pain  SNC: awake, alert oriented. Cranial nerves are intact, no focalized, sensitivity and strength preserved    Labs:  Lab Results   Component Value Date    WBC 7.05 02/19/2024    HGB 13.5 (L) 02/19/2024    HCT 40.9 02/19/2024     (L) 02/19/2024    K 4.3 02/19/2024     02/19/2024    CO2 21 (L) 02/19/2024    BUN 11 02/19/2024    CREATININE 1.4 02/19/2024    EGFRNORACEVR 56.8 (A) 02/19/2024    GLUCOSE 82 04/08/2023    CALCIUM 9.6 02/19/2024    PHOS 2.7 02/19/2024    MG 1.7 02/19/2024    ALBUMIN 3.3 (L) 02/19/2024    AST 15 02/12/2024    ALT 13 02/12/2024    UTPCR 0.16 02/12/2024    .7 (H) 05/10/2023    TACROLIMUS 5.9 02/19/2024       No results found for: "EXTANC", "EXTWBC", "EXTSEGS", "EXTPLATELETS", "EXTHEMOGLOBI", "EXTHEMATOCRI", "EXTCREATININ", "EXTSODIUM", "EXTPOTASSIUM", "EXTBUN", "EXTCO2", "EXTCALCIUM", "EXTPHOSPHORU", "EXTGLUCOSE", "EXTALBUMIN", "EXTAST", "EXTALT", "EXTBILITOTAL", "EXTLIPASE", "EXTAMYLASE"    No results found for: "EXTCYCLOSLVL", "EXTSIROLIMUS", "EXTTACROLVL", "EXTPROTCRE", "EXTPTHINTACT", "EXTPROTEINUA", "EXTWBCUA", "EXTRBCUA"    Labs were reviewed with the patient    Assessment:     1. S/P kidney transplant    2. CKD (chronic kidney disease) stage 2, GFR 60-89 ml/min    3. Immunosuppressive management encounter following kidney transplant          Plan:   BK viremia--labs per protocol, back on   Stone risk profile  Needs repeat Tac and BK level in 2 weeks      1. CKD stage 2 with stable creatinine from 1.1 to 1.3: will continue follow up as per our center guidelines. patient to continue close follow up with the local General nephrologist. Education provided in appropriate fluid intake, potassium intake. Continue with oral hydration.    1A. Pancreatic Function: N/A for non-pancreas allograft recipients:    2. High risk immunosuppression medication for organ transplantation, requiring regular intensive follow up and " "monitoring : prograf 1/1 will adjust when resulted Myfortic 360 bid prednisone taper  Lab Results   Component Value Date    TACROLIMUS 5.9 02/19/2024    TACROLIMUS 14.0 02/12/2024    TACROLIMUS 9.6 01/29/2024   Will closely monitor for toxicities, education provided about adherence to medicines and need to communicate any side effects to the transplant nurse or physician.      3. Allograft Function:stable at baseline for the patient. Continue follow up as per our guidelines and with the local General nephrologist. Communication will be sent today.  Lab Results   Component Value Date    CREATININE 1.4 02/19/2024    CREATININE 1.4 02/12/2024    CREATININE 1.2 01/29/2024     No results found for: "AMYLASE", "LIPASE"    4. Hypertension management:  metoprolol 12.5 mg bid, benicar due to uncontrolled hypertension I advised patient and care giver to call me with BP and HR. Continue with home blood pressure monitoring, low salt and healthy life discussed with the patient..    5. Metabolic Bone Disease/Secondary Hyperparathyroidism:calcium and phosphorus level discussed with the patient, patient will continue follow up with the general nephrologist for management of metabolic bone disease. Will monitor PTH and Vit D per our transplant center guidelines.   Lab Results   Component Value Date    .7 (H) 05/10/2023    CALCIUM 9.6 02/19/2024    PHOS 2.7 02/19/2024    PHOS 2.3 (L) 02/12/2024    PHOS 2.9 01/29/2024   KPN, MagOx    6. Electrolytes and acid base balance: reviewed with the patient, essentially within the normal range no need for acute changes today, will monitor as per our center guidelines.  Lab Results   Component Value Date     (L) 02/19/2024    K 4.3 02/19/2024     02/19/2024    CO2 21 (L) 02/19/2024    CO2 24 02/12/2024    CO2 21 (L) 01/29/2024   NaBicarb    7. Anemia: will continue monitoring as per our center guidelines. No indication for acute intervention today.  Lab Results   Component " Value Date    WBC 7.05 02/19/2024    HGB 13.5 (L) 02/19/2024    HCT 40.9 02/19/2024     (H) 02/19/2024     02/19/2024       8.Proteinuria: will continue with pr/cr ratio as per our center guidelines.  Lab Results   Component Value Date    PROTEINURINE 16 (H) 02/12/2024    CREATRANDUR 97.0 02/12/2024    UTPCR 0.16 02/12/2024    UTPCR 0.36 (H) 12/04/2023    UTPCR 0.32 (H) 11/20/2023        9. BK virus infection screening: will continue with urine or blood PCR as per our guidelines to prevent BK virus viremia and allograft dysfunction  Lab Results   Component Value Date    BKVIRUSPCRQB <125 02/12/2024         10. Weight and metabolic management: education provided provided during the clinic visit.   Body mass index is 27.33 kg/m².       11.Patient safety education regarding immunosuppression including prophylaxis posttransplant for CMV, PCP : Education provided about vaccination and prevention of infections.    12.  Cytopenias: no significant cytopenias will monitor as per our guidelines. Medicine list reviewed including potential causes of drug-induced cytopenias     Lab Results   Component Value Date    WBC 7.05 02/19/2024    HGB 13.5 (L) 02/19/2024    HCT 40.9 02/19/2024     (H) 02/19/2024     02/19/2024       13. Post-transplant Prophylaxis; CMV Infection, PJP and Candida mucosistis and other indicated for this particular patient.       Follow-up:   Clinic: return to transplant clinic weekly for the first month after transplant; every 2 weeks during months 2-3; then at 6-, 9-, 12-, 18-, 24-, and 36- months post-transplant to reassess for complications from immunosuppression toxicity and monitor for rejection.  Annually thereafter.    Labs: since patient remains at high risk for rejection and drug-related complications that warrant close monitoring, labs will be ordered as follows: continue twice weekly CBC, renal panel, and drug level for first month; then same labs once weekly through  3rd month post-transplant.  Urine for UA and protein/creatinine ratio monthly.  Serum BK - PCR at 1-, 3-, 6-, 9-, 12-, 18-, 24-, 36-, 48-, and 60 months post-transplant.  Hepatic panel at 1-, 2-, 3-, 6-, 9-, 12-, 18-, 24-, and 36- months post-transplant.    Rose Mary Stroud NP       Education:   Material provided to the patient.  Patient reminded to call with any health changes since these can be early signs of significant complications.  Also, I advised the patient to be sure any new medications or changes of old medications are discussed with either a pharmacist or physician knowledgeable with transplant to avoid rejection/drug toxicity related to significant drug interactions.    Patient advised that it is recommended that all transplanted patients, and their close contacts and household members receive Covid vaccination.

## 2024-02-19 NOTE — LETTER
February 19, 2024        Quinn Espino  [address]  [address]  [facility] [address]  Phone: [phone]  Fax: [phone]             [facility]st [facility]  [address]  [facility] [address]  Phone: [phone]   Patient: Abdoulaye Parikh   MR Number: [id number]   YOB: 1960   Date of Visit: 2/19/2024       Dear Dr. Quinn Espino    Thank you for referring Abdoulaye Parikh to me for evaluation. Attached you will find relevant portions of my assessment and plan of care.    If you have questions, please do not hesitate to call me. I look forward to following Abdoulaye Parikh along with you.    Sincerely,    Rose Mary Stroud, NP    Enclosure    If you would like to receive this communication electronically, please contact [email or web address] or [phone] to request [employer] [facility] access.    [employer] [facility] is a tool which provides read-only access to select patient information with whom you have a relationship. Its easy to use and provides real time access to review your patients record including encounter summaries, notes, results, and demographic information.    If you feel you have received this communication in error or would no longer like to receive these types of communications, please e-mail [email or web address]

## 2024-02-21 ENCOUNTER — OFFICE VISIT (OUTPATIENT)
Dept: UROLOGY | Facility: CLINIC | Age: 64
End: 2024-02-21
Payer: MEDICARE

## 2024-02-21 VITALS
DIASTOLIC BLOOD PRESSURE: 76 MMHG | SYSTOLIC BLOOD PRESSURE: 134 MMHG | BODY MASS INDEX: 27.17 KG/M2 | HEART RATE: 70 BPM | WEIGHT: 184.06 LBS

## 2024-02-21 DIAGNOSIS — N20.0 NEPHROLITHIASIS: ICD-10-CM

## 2024-02-21 DIAGNOSIS — N21.0 BLADDER STONE: ICD-10-CM

## 2024-02-21 DIAGNOSIS — N13.8 BPH WITH URINARY OBSTRUCTION: Primary | ICD-10-CM

## 2024-02-21 DIAGNOSIS — N40.1 BPH WITH URINARY OBSTRUCTION: Primary | ICD-10-CM

## 2024-02-21 PROBLEM — M79.601 RIGHT ARM PAIN: Status: RESOLVED | Noted: 2023-05-22 | Resolved: 2024-02-21

## 2024-02-21 PROBLEM — T86.19 PERINEPHRIC FLUID COLLECTION OF KIDNEY TRANSPLANT: Status: RESOLVED | Noted: 2023-06-02 | Resolved: 2024-02-21

## 2024-02-21 PROBLEM — K59.00 CONSTIPATION: Status: RESOLVED | Noted: 2023-05-16 | Resolved: 2024-02-21

## 2024-02-21 PROBLEM — E87.20 METABOLIC ACIDOSIS: Status: RESOLVED | Noted: 2023-05-22 | Resolved: 2024-02-21

## 2024-02-21 PROBLEM — N28.89 PERINEPHRIC FLUID COLLECTION OF KIDNEY TRANSPLANT: Status: RESOLVED | Noted: 2023-06-02 | Resolved: 2024-02-21

## 2024-02-21 PROBLEM — T86.898 URINE LEAK FROM TRANSPLANTED URETER: Status: RESOLVED | Noted: 2023-06-03 | Resolved: 2024-02-21

## 2024-02-21 PROBLEM — Z94.0 S/P KIDNEY TRANSPLANT: Status: RESOLVED | Noted: 2023-05-11 | Resolved: 2024-02-21

## 2024-02-21 PROCEDURE — 99204 OFFICE O/P NEW MOD 45 MIN: CPT | Mod: S$PBB,,, | Performed by: UROLOGY

## 2024-02-21 PROCEDURE — 99999 PR PBB SHADOW E&M-EST. PATIENT-LVL III: CPT | Mod: PBBFAC,,, | Performed by: UROLOGY

## 2024-02-21 PROCEDURE — 99213 OFFICE O/P EST LOW 20 MIN: CPT | Mod: PBBFAC | Performed by: UROLOGY

## 2024-02-21 RX ORDER — LIDOCAINE HYDROCHLORIDE 20 MG/ML
JELLY TOPICAL ONCE
Status: CANCELLED | OUTPATIENT
Start: 2024-02-21 | End: 2024-02-21

## 2024-02-21 RX ORDER — TAMSULOSIN HYDROCHLORIDE 0.4 MG/1
0.4 CAPSULE ORAL DAILY
Qty: 30 CAPSULE | Refills: 11 | Status: SHIPPED | OUTPATIENT
Start: 2024-02-21 | End: 2025-02-20

## 2024-02-21 NOTE — PROGRESS NOTES
CHIEF COMPLAINT:    Mr. Parikh is a 63 y.o. male presenting for bladder stones.    PRESENTING ILLNESS:    Abdoulaye Parikh is a 63 y.o. male s/p living kidney transplant on 5/10/23.  The transplant service ordered a CT abd/pelvis showing stones in both native kidneys and the transplant kidney and bladder stones. I interpreted the CT independently.    He has LUTS.  Nocturia x 2-3.  Is pleased with how he voids.    REVIEW OF SYSTEMS:    Abdoulaye Parikh denies headache, blurred vision, fever, nausea, vomiting, chills, abdominal pain, chest pain, sore throat, bleeding per rectum, cough, SOB, recent loss of consciousness, recent mental status changes, seizures, dizziness, or upper or lower extremity weakness.    OLVIN  1. 4  2. 4  3. 4  4. 5  5. 5      PATIENT HISTORY:    Past Medical History:   Diagnosis Date    Acute blood loss anemia 05/11/2023    Anemia     Arthritis     Cirrhosis     CKD (chronic kidney disease) stage 4, GFR 15-29 ml/min, slow graft function with sustaine creatinien improvement 05/26/2023    Crohn's disease     Disorder of kidney and ureter     Stage 5    Encounter for blood transfusion     Gout     Hearing loss     Hepatitis     Had Hep C Cleared    Hypertension     Immunosuppressive management encounter following kidney transplant 05/26/2023    Neurosarcoidosis 2018    Obesity     Osteoarthritis     S/P kidney transplant 05/11/2023    Sarcoid neuropathy     Sarcoidosis, lung     Steatosis        Past Surgical History:   Procedure Laterality Date    AV FISTULA PLACEMENT Left     BRAIN SURGERY      COLON SURGERY      Exploratory lap x 4 for Chron's disease. Likely right colectomy    CSF SHUNT      DIAGNOSTIC ULTRASOUND N/A 5/15/2023    Procedure: ULTRASOUND, DIAGNOSTIC;  Surgeon: Chivo Brown MD;  Location: [facility] OR [address] [place] [address];  Service: Transplant;  Laterality: N/A;    JOINT REPLACEMENT Right     Hip    KIDNEY TRANSPLANT N/A 5/10/2023    Procedure: TRANSPLANT, KIDNEY - RQ 7PM START;  Surgeon:  Chivo Brown MD;  [facility];  Service: Transplant;  Laterality: N/A;    NEPHROSTOMY N/A 6/3/2023    Procedure: Nephrostomy;  Surgeon: Silvia Surgeon;  Location: [facility];  Service: Anesthesiology;  Laterality: N/A;    RENAL BIOPSY  5/15/2023    Procedure: BIOPSY, KIDNEY;  Surgeon: Chivo Brown MD;  [facility];  Service: Transplant;;    RENAL EXPLORATION  5/15/2023    Procedure: EXPLORATION, KIDNEY;  Surgeon: Chivo Brown MD;  [facility];  Service: Transplant;;    TOTAL HIP ARTHROPLASTY Right        Family History   Problem Relation Age of Onset    Hypertension Mother     Cancer Father     Diabetes Father     Heart disease Father     Hypertension Father     Stroke Father     Multiple myeloma Father     COPD Sister     Heart disease Sister     Cancer Maternal Grandfather     Lung cancer Maternal Grandfather     Cancer Paternal Grandfather     Lung cancer Paternal Grandfather        Social History     Socioeconomic History    Marital status:      Spouse name: Kalani Parikh    Number of children: 2   Tobacco Use    Smoking status: Never    Smokeless tobacco: Never   Substance and Sexual Activity    Alcohol use: Not Currently    Drug use: Never    Sexual activity: Yes     Partners: Female   Social History Narrative    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     Social Determinants of Health     Financial Resource Strain: Low Risk  (2/12/2024)    Overall Financial Resource Strain (CARDIA)     Difficulty of Paying Living Expenses: Not hard at all   Food Insecurity: No Food Insecurity (2/12/2024)    Hunger Vital Sign     Worried About Running Out of Food in the Last Year: Never true     Ran Out of Food in the Last Year: Never true   Transportation Needs: No Transportation Needs (2/12/2024)    PRAPARE - Transportation     Lack of Transportation (Medical): No     Lack of Transportation (Non-Medical): No   Physical Activity: Insufficiently Active (2/12/2024)    Exercise  Vital Sign     Days of Exercise per Week: 3 days     Minutes of Exercise per Session: 20 min   Stress: Stress Concern Present (2/12/2024)    [employer] - Occupational Stress Questionnaire     Feeling of Stress : To some extent   Social Connections: Unknown (2/12/2024)    Social Connection and Isolation Panel [NHANES]     Frequency of Communication with Friends and Family: Twice a week     Frequency of Social Gatherings with Friends and Family: Once a week     Active Member of Clubs or Organizations: Yes     Attends Club or Organization Meetings: 1 to 4 times per year     Marital Status:    Housing Stability: Low Risk  (2/12/2024)    Housing Stability Vital Sign     Unable to Pay for Housing in the Last Year: No     Number of Places Lived in the Last Year: 1     Unstable Housing in the Last Year: No       Allergies:  Bactrim [sulfamethoxazole-trimethoprim] and Penicillins    Medications:    Current Outpatient Medications:     fluticasone propionate (FLOVENT HFA) 44 mcg/actuation inhaler, Inhale 1 puff into the lungs 2 (two) times daily., Disp: , Rfl:     loperamide HCl (IMODIUM A-D ORAL), Take by mouth., Disp: , Rfl:     magnesium oxide (MAG-OX) 400 mg (241.3 mg magnesium) tablet, Take 1 tablet (400 mg total) by mouth 2 (two) times daily., Disp: 60 tablet, Rfl: 11    metoprolol tartrate (LOPRESSOR) 25 MG tablet, Take HALF tablet (12.5 mg total) by mouth 2 (two) times daily., Disp: 30 tablet, Rfl: 11    mycophenolate sodium 180 MG TbEC, Take 2 tablets (360 mg total) by mouth 2 (two) times daily. Txp Date:5/10/2023 (Kidney) Disch Date:5/17/2023 ICD10:Z94.0 Txp Location:[place], Disp: 120 tablet, Rfl: 11    olmesartan (BENICAR) 40 MG tablet, Take 1 tablet (40 mg total) by mouth once daily., Disp: 30 tablet, Rfl: 11    predniSONE (DELTASONE) 5 MG tablet, Take 20 mg by mouth daily 5/14-5/20; 15 mg daily 5/21-5/27; 10 mg daily 5/28-6/3; then 5 mg daily thereafter 6/4/23 (Patient taking  differently: Take 5 mg by mouth once daily.), Disp: 70 tablet, Rfl: 11    sodium bicarbonate 650 MG tablet, Take 2 tablets (1,300 mg total) by mouth 3 (three) times daily., Disp: 180 tablet, Rfl: 11    tacrolimus (PROGRAF) 1 MG Cap, Take 1 capsule (1 mg total) by mouth every morning AND 1 capsule (1 mg total) every evening. Txp Date:5/10/2023 (Kidney) Disch Date:5/17/2023 ICD10:Z94.0 Txp Location:[facility] Disp: 60 capsule, Rfl: 11    PHYSICAL EXAMINATION:    The patient generally appears in good health, is appropriately interactive, and is in no apparent distress.     Eyes: anicteric sclerae, moist conjunctivae; no lid-lag; PERRLA     HENT: Atraumatic; oropharynx clear with moist mucous membranes and no mucosal ulcerations;normal hard and soft palate.  No evidence of lymphadenopathy.    Neck: Trachea midline.  No thyromegaly.    Skin: No lesions.    Mental: Cooperative with normal affect.  Is oriented to time, place, and person.    Neuro: Grossly intact.    Chest: Normal inspiratory effort.   No accessory muscles.  No audible wheezes.  Respirations symmetric on inspiration and expiration.    Heart: Regular rhythm.      Abdomen:  Soft, non-tender. No masses or organomegaly. Bladder is not palpable. No evidence of flank discomfort. No evidence of inguinal hernia.    Genitourinary: The penis is not circumcised with no evidence of plaques or induration. The urethral meatus is normal. The testes, epididymides, and cord structures are normal in size and contour bilaterally. The scrotum is normal in size and contour.    Normal anal sphincter tone. No rectal mass.    The prostate is 40 g. Normal landmarks. Lateral sulci. Median furrow intact.  No nodularity or induration. Seminal vesicles are normal.    Extremities: No clubbing, cyanosis, or edema      LABS:    UA dipped negative today  Lab Results   Component Value Date    PSA 2.0 04/18/2023    PSA 2.6 03/10/2022    PSA 0.90 11/12/2020       IMPRESSION:    Encounter  Diagnoses   Name Primary?    BPH with urinary obstruction Yes    Bladder stone     Nephrolithiasis          PLAN:    1. Will start flomax due to the likely bladder stones. Side effects discussed.  A new Rx was given  2. Will cysto to confirm the bladder stones.  I  have explained the risk, benefits, and alternatives of the procedure in detail. The patient voices understanding and all questions have been answered. The patient agrees to proceed as planned.    3. Will have him see Dr. Arboleda for stones in a transplant kidney after the bladder stones have been addressed.    Copy to:

## 2024-02-27 ENCOUNTER — LAB VISIT (OUTPATIENT)
Dept: LAB | Facility: HOSPITAL | Age: 64
End: 2024-02-27
Attending: INTERNAL MEDICINE
Payer: MEDICARE

## 2024-02-27 DIAGNOSIS — N20.0 RENAL CALCULI: ICD-10-CM

## 2024-02-27 DIAGNOSIS — Z94.0 KIDNEY REPLACED BY TRANSPLANT: ICD-10-CM

## 2024-02-27 PROCEDURE — 83735 ASSAY OF MAGNESIUM: CPT

## 2024-02-27 PROCEDURE — 82570 ASSAY OF URINE CREATININE: CPT

## 2024-02-27 PROCEDURE — 82340 ASSAY OF CALCIUM IN URINE: CPT

## 2024-02-27 PROCEDURE — 82140 ASSAY OF AMMONIA: CPT | Performed by: INTERNAL MEDICINE

## 2024-02-27 PROCEDURE — 84560 ASSAY OF URINE/URIC ACID: CPT

## 2024-02-27 PROCEDURE — 82507 ASSAY OF CITRATE: CPT

## 2024-02-27 PROCEDURE — 83986 ASSAY PH BODY FLUID NOS: CPT

## 2024-02-27 PROCEDURE — 84133 ASSAY OF URINE POTASSIUM: CPT

## 2024-02-27 PROCEDURE — 84300 ASSAY OF URINE SODIUM: CPT

## 2024-02-27 PROCEDURE — 83945 ASSAY OF OXALATE: CPT

## 2024-02-27 PROCEDURE — 84105 ASSAY OF URINE PHOSPHORUS: CPT

## 2024-02-27 PROCEDURE — 84392 ASSAY OF URINE SULFATE: CPT

## 2024-02-28 ENCOUNTER — PATIENT MESSAGE (OUTPATIENT)
Dept: TRANSPLANT | Facility: CLINIC | Age: 64
End: 2024-02-28
Payer: MEDICARE

## 2024-03-01 ENCOUNTER — TELEPHONE (OUTPATIENT)
Dept: TRANSPLANT | Facility: CLINIC | Age: 64
End: 2024-03-01
Payer: MEDICARE

## 2024-03-01 ENCOUNTER — LAB VISIT (OUTPATIENT)
Dept: LAB | Facility: HOSPITAL | Age: 64
End: 2024-03-01
Attending: INTERNAL MEDICINE
Payer: MEDICARE

## 2024-03-01 DIAGNOSIS — Z94.0 KIDNEY REPLACED BY TRANSPLANT: Primary | ICD-10-CM

## 2024-03-01 DIAGNOSIS — N30.01 ACUTE CYSTITIS WITH HEMATURIA: Primary | ICD-10-CM

## 2024-03-01 DIAGNOSIS — Z94.0 KIDNEY REPLACED BY TRANSPLANT: ICD-10-CM

## 2024-03-01 LAB
ALBUMIN SERPL BCP-MCNC: 2.9 G/DL (ref 3.5–5.2)
ANION GAP SERPL CALC-SCNC: 11 MMOL/L (ref 8–16)
BASOPHILS # BLD AUTO: 0.02 K/UL (ref 0–0.2)
BASOPHILS NFR BLD: 0.3 % (ref 0–1.9)
BUN SERPL-MCNC: 21 MG/DL (ref 8–23)
CALCIUM SERPL-MCNC: 9.5 MG/DL (ref 8.7–10.5)
CHLORIDE SERPL-SCNC: 103 MMOL/L (ref 95–110)
CO2 SERPL-SCNC: 25 MMOL/L (ref 23–29)
CREAT SERPL-MCNC: 1.7 MG/DL (ref 0.5–1.4)
DIFFERENTIAL METHOD BLD: ABNORMAL
EOSINOPHIL # BLD AUTO: 0.1 K/UL (ref 0–0.5)
EOSINOPHIL NFR BLD: 0.8 % (ref 0–8)
ERYTHROCYTE [DISTWIDTH] IN BLOOD BY AUTOMATED COUNT: 13.2 % (ref 11.5–14.5)
EST. GFR  (NO RACE VARIABLE): 45 ML/MIN/1.73 M^2
GLUCOSE SERPL-MCNC: 87 MG/DL (ref 70–110)
HCT VFR BLD AUTO: 37.9 % (ref 40–54)
HGB BLD-MCNC: 12.3 G/DL (ref 14–18)
IMM GRANULOCYTES # BLD AUTO: 0.11 K/UL (ref 0–0.04)
IMM GRANULOCYTES NFR BLD AUTO: 1.5 % (ref 0–0.5)
LYMPHOCYTES # BLD AUTO: 0.9 K/UL (ref 1–4.8)
LYMPHOCYTES NFR BLD: 11.6 % (ref 18–48)
MAGNESIUM SERPL-MCNC: 2 MG/DL (ref 1.6–2.6)
MCH RBC QN AUTO: 34.1 PG (ref 27–31)
MCHC RBC AUTO-ENTMCNC: 32.5 G/DL (ref 32–36)
MCV RBC AUTO: 105 FL (ref 82–98)
MONOCYTES # BLD AUTO: 0.7 K/UL (ref 0.3–1)
MONOCYTES NFR BLD: 8.7 % (ref 4–15)
NEUTROPHILS # BLD AUTO: 5.8 K/UL (ref 1.8–7.7)
NEUTROPHILS NFR BLD: 77.1 % (ref 38–73)
NRBC BLD-RTO: 0 /100 WBC
PHOSPHATE SERPL-MCNC: 2.9 MG/DL (ref 2.7–4.5)
PLATELET # BLD AUTO: 169 K/UL (ref 150–450)
PMV BLD AUTO: 11 FL (ref 9.2–12.9)
POTASSIUM SERPL-SCNC: 4.1 MMOL/L (ref 3.5–5.1)
RBC # BLD AUTO: 3.61 M/UL (ref 4.6–6.2)
SODIUM SERPL-SCNC: 139 MMOL/L (ref 136–145)
TACROLIMUS BLD-MCNC: 7.2 NG/ML (ref 5–15)
WBC # BLD AUTO: 7.57 K/UL (ref 3.9–12.7)

## 2024-03-01 PROCEDURE — 36415 COLL VENOUS BLD VENIPUNCTURE: CPT | Performed by: INTERNAL MEDICINE

## 2024-03-01 PROCEDURE — 85025 COMPLETE CBC W/AUTO DIFF WBC: CPT | Performed by: INTERNAL MEDICINE

## 2024-03-01 PROCEDURE — 83735 ASSAY OF MAGNESIUM: CPT | Performed by: INTERNAL MEDICINE

## 2024-03-01 PROCEDURE — 80197 ASSAY OF TACROLIMUS: CPT | Performed by: INTERNAL MEDICINE

## 2024-03-01 PROCEDURE — 80069 RENAL FUNCTION PANEL: CPT | Performed by: INTERNAL MEDICINE

## 2024-03-01 PROCEDURE — 87799 DETECT AGENT NOS DNA QUANT: CPT | Performed by: INTERNAL MEDICINE

## 2024-03-01 NOTE — TELEPHONE ENCOUNTER
Left voice message to call coordinator back.  ----- Message from Darrin Verma MD sent at 3/1/2024 12:44 PM CST -----  Let's encourage hydration Does he voice any symptoms? Diarrhea?

## 2024-03-01 NOTE — TELEPHONE ENCOUNTER
Patient repeated back and voice a understanding of orders.  Requesting to have additional labs and submit CCMS urine for U/A and C&S on Monday 3/4.  Voice a understanding to present to the ER if he is not feeling well.  ----- Message from Darrin Verma MD sent at 3/1/2024  3:59 PM CST -----  Lets send this man for a serum CMV pcr, blood culture,  UA and urine culture. If he is not feeling well, advise to go to the ER  ----- Message -----  From: Robles Arita, RN  Sent: 3/1/2024   3:25 PM CST  To: Darrin Verma MD    Patient C/O Cough , Congestion and Body aches that started Monday night 2/26.  Has had a total of 5 episodes of loose watery stools since Monday 2/26.  States he was seen at the urgent care earlier today and tested Negative for COVID and Influenza.    ----- Message -----  From: Darrin Verma MD  Sent: 3/1/2024  12:44 PM CST  To: [facility] Post-Kidney Transplant Clinical    Let's encourage hydration Does he voice any symptoms? Diarrhea?

## 2024-03-05 ENCOUNTER — HOSPITAL ENCOUNTER (INPATIENT)
Facility: HOSPITAL | Age: 64
LOS: 4 days | Discharge: HOME | DRG: 872 | End: 2024-03-09
Attending: INTERNAL MEDICINE | Admitting: INTERNAL MEDICINE
Payer: MEDICARE

## 2024-03-05 ENCOUNTER — TELEPHONE (OUTPATIENT)
Dept: TRANSPLANT | Facility: CLINIC | Age: 64
End: 2024-03-05
Payer: MEDICARE

## 2024-03-05 DIAGNOSIS — R91.1 LUNG NODULE: ICD-10-CM

## 2024-03-05 DIAGNOSIS — Z79.60 LONG-TERM USE OF IMMUNOSUPPRESSANT MEDICATION: ICD-10-CM

## 2024-03-05 DIAGNOSIS — Z94.0 S/P KIDNEY TRANSPLANT: ICD-10-CM

## 2024-03-05 DIAGNOSIS — Z94.0 IMMUNOSUPPRESSIVE MANAGEMENT ENCOUNTER FOLLOWING KIDNEY TRANSPLANT: ICD-10-CM

## 2024-03-05 DIAGNOSIS — Z79.899 IMMUNOSUPPRESSIVE MANAGEMENT ENCOUNTER FOLLOWING KIDNEY TRANSPLANT: ICD-10-CM

## 2024-03-05 DIAGNOSIS — D63.1 ANEMIA OF RENAL DISEASE: ICD-10-CM

## 2024-03-05 DIAGNOSIS — N18.9 ANEMIA OF RENAL DISEASE: ICD-10-CM

## 2024-03-05 DIAGNOSIS — R78.81 BACTEREMIA: Primary | ICD-10-CM

## 2024-03-05 DIAGNOSIS — Z91.89 AT RISK FOR OPPORTUNISTIC INFECTIONS: ICD-10-CM

## 2024-03-05 DIAGNOSIS — Z29.89 PROPHYLACTIC IMMUNOTHERAPY: ICD-10-CM

## 2024-03-05 DIAGNOSIS — I95.1 ORTHOSTATIC HYPOTENSION: ICD-10-CM

## 2024-03-05 DIAGNOSIS — R50.9 FEVER: ICD-10-CM

## 2024-03-05 LAB
ALBUMIN SERPL BCP-MCNC: 2.7 G/DL (ref 3.5–5.2)
ANION GAP SERPL CALC-SCNC: 9 MMOL/L (ref 8–16)
BASOPHILS # BLD AUTO: 0.02 K/UL (ref 0–0.2)
BASOPHILS NFR BLD: 0.2 % (ref 0–1.9)
BUN SERPL-MCNC: 18 MG/DL (ref 8–23)
CALCIUM SERPL-MCNC: 9.5 MG/DL (ref 8.7–10.5)
CHLORIDE SERPL-SCNC: 108 MMOL/L (ref 95–110)
CO2 SERPL-SCNC: 22 MMOL/L (ref 23–29)
CREAT SERPL-MCNC: 1.6 MG/DL (ref 0.5–1.4)
DIFFERENTIAL METHOD BLD: ABNORMAL
EOSINOPHIL # BLD AUTO: 0 K/UL (ref 0–0.5)
EOSINOPHIL NFR BLD: 0.4 % (ref 0–8)
ERYTHROCYTE [DISTWIDTH] IN BLOOD BY AUTOMATED COUNT: 13.5 % (ref 11.5–14.5)
EST. GFR  (NO RACE VARIABLE): 48.4 ML/MIN/1.73 M^2
GLUCOSE SERPL-MCNC: 105 MG/DL (ref 70–110)
HCT VFR BLD AUTO: 32.8 % (ref 40–54)
HGB BLD-MCNC: 10.8 G/DL (ref 14–18)
IMM GRANULOCYTES # BLD AUTO: 0.07 K/UL (ref 0–0.04)
IMM GRANULOCYTES NFR BLD AUTO: 0.9 % (ref 0–0.5)
LYMPHOCYTES # BLD AUTO: 0.6 K/UL (ref 1–4.8)
LYMPHOCYTES NFR BLD: 7.6 % (ref 18–48)
MAGNESIUM SERPL-MCNC: 1.8 MG/DL (ref 1.6–2.6)
MCH RBC QN AUTO: 34.5 PG (ref 27–31)
MCHC RBC AUTO-ENTMCNC: 32.9 G/DL (ref 32–36)
MCV RBC AUTO: 105 FL (ref 82–98)
MONOCYTES # BLD AUTO: 0.5 K/UL (ref 0.3–1)
MONOCYTES NFR BLD: 6.6 % (ref 4–15)
NEUTROPHILS # BLD AUTO: 6.9 K/UL (ref 1.8–7.7)
NEUTROPHILS NFR BLD: 84.3 % (ref 38–73)
NRBC BLD-RTO: 0 /100 WBC
PHOSPHATE SERPL-MCNC: 2.2 MG/DL (ref 2.7–4.5)
PLATELET # BLD AUTO: 214 K/UL (ref 150–450)
PMV BLD AUTO: 10.8 FL (ref 9.2–12.9)
POTASSIUM SERPL-SCNC: 4.1 MMOL/L (ref 3.5–5.1)
RBC # BLD AUTO: 3.13 M/UL (ref 4.6–6.2)
SARS-COV-2 RDRP RESP QL NAA+PROBE: NEGATIVE
SODIUM SERPL-SCNC: 139 MMOL/L (ref 136–145)
WBC # BLD AUTO: 8.2 K/UL (ref 3.9–12.7)

## 2024-03-05 PROCEDURE — 83735 ASSAY OF MAGNESIUM: CPT | Performed by: PHYSICIAN ASSISTANT

## 2024-03-05 PROCEDURE — 80069 RENAL FUNCTION PANEL: CPT | Performed by: PHYSICIAN ASSISTANT

## 2024-03-05 PROCEDURE — 63600175 PHARM REV CODE 636 W HCPCS: Performed by: PHYSICIAN ASSISTANT

## 2024-03-05 PROCEDURE — 85025 COMPLETE CBC W/AUTO DIFF WBC: CPT | Performed by: PHYSICIAN ASSISTANT

## 2024-03-05 PROCEDURE — 25000003 PHARM REV CODE 250: Performed by: PHYSICIAN ASSISTANT

## 2024-03-05 PROCEDURE — U0002 COVID-19 LAB TEST NON-CDC: HCPCS

## 2024-03-05 PROCEDURE — 20600001 HC STEP DOWN PRIVATE ROOM

## 2024-03-05 PROCEDURE — 87040 BLOOD CULTURE FOR BACTERIA: CPT | Mod: 59 | Performed by: PHYSICIAN ASSISTANT

## 2024-03-05 PROCEDURE — 25000003 PHARM REV CODE 250

## 2024-03-05 PROCEDURE — 99223 1ST HOSP IP/OBS HIGH 75: CPT | Mod: AI,,,

## 2024-03-05 RX ORDER — SODIUM BICARBONATE 650 MG/1
1300 TABLET ORAL 3 TIMES DAILY
Status: DISCONTINUED | OUTPATIENT
Start: 2024-03-05 | End: 2024-03-09 | Stop reason: HOSPADM

## 2024-03-05 RX ORDER — PROCHLORPERAZINE EDISYLATE 5 MG/ML
5 INJECTION INTRAMUSCULAR; INTRAVENOUS EVERY 6 HOURS PRN
Status: DISCONTINUED | OUTPATIENT
Start: 2024-03-05 | End: 2024-03-09 | Stop reason: HOSPADM

## 2024-03-05 RX ORDER — METOPROLOL TARTRATE 25 MG/1
12.5 TABLET ORAL 2 TIMES DAILY
Status: DISCONTINUED | OUTPATIENT
Start: 2024-03-05 | End: 2024-03-09 | Stop reason: HOSPADM

## 2024-03-05 RX ORDER — TALC
6 POWDER (GRAM) TOPICAL NIGHTLY PRN
Status: DISCONTINUED | OUTPATIENT
Start: 2024-03-05 | End: 2024-03-09 | Stop reason: HOSPADM

## 2024-03-05 RX ORDER — TAMSULOSIN HYDROCHLORIDE 0.4 MG/1
0.4 CAPSULE ORAL DAILY
Status: DISCONTINUED | OUTPATIENT
Start: 2024-03-06 | End: 2024-03-09 | Stop reason: HOSPADM

## 2024-03-05 RX ORDER — PREDNISONE 5 MG/1
5 TABLET ORAL DAILY
Status: DISCONTINUED | OUTPATIENT
Start: 2024-03-06 | End: 2024-03-09 | Stop reason: HOSPADM

## 2024-03-05 RX ORDER — ONDANSETRON HYDROCHLORIDE 2 MG/ML
4 INJECTION, SOLUTION INTRAVENOUS EVERY 6 HOURS PRN
Status: DISCONTINUED | OUTPATIENT
Start: 2024-03-05 | End: 2024-03-09 | Stop reason: HOSPADM

## 2024-03-05 RX ORDER — ACETAMINOPHEN 325 MG/1
650 TABLET ORAL EVERY 6 HOURS PRN
Status: DISCONTINUED | OUTPATIENT
Start: 2024-03-05 | End: 2024-03-09 | Stop reason: HOSPADM

## 2024-03-05 RX ORDER — TACROLIMUS 1 MG/1
1 CAPSULE ORAL 2 TIMES DAILY
Status: DISCONTINUED | OUTPATIENT
Start: 2024-03-05 | End: 2024-03-07

## 2024-03-05 RX ADMIN — METOPROLOL TARTRATE 12.5 MG: 25 TABLET, FILM COATED ORAL at 08:03

## 2024-03-05 RX ADMIN — TACROLIMUS 1 MG: 1 CAPSULE ORAL at 08:03

## 2024-03-05 RX ADMIN — DIBASIC SODIUM PHOSPHATE, MONOBASIC POTASSIUM PHOSPHATE AND MONOBASIC SODIUM PHOSPHATE 1 TABLET: 852; 155; 130 TABLET ORAL at 10:03

## 2024-03-05 RX ADMIN — DAPTOMYCIN 670 MG: 350 INJECTION, POWDER, LYOPHILIZED, FOR SOLUTION INTRAVENOUS at 09:03

## 2024-03-05 RX ADMIN — SODIUM BICARBONATE 1300 MG: 650 TABLET ORAL at 08:03

## 2024-03-05 NOTE — TELEPHONE ENCOUNTER
Incoming call from [facility], Positive Blood Cultures drawn 3/4/24.  Positive Aerobic and Anaerobic positive for Gram + Cocci.  Message sent to Dr Verma, Plan Direct Admit.  ______________________  Results reviewed with patient and agrees with Plan Direct admit.  All appropriate personnel notified.  Admit reservation in ,CSN # [id number].

## 2024-03-05 NOTE — HPI
Mr Abdoulaye Parikh is a 63 yo s/p living unrelated kidney transplant on 5/10/23. Post op course significant for OR takeback 5/15 to assess flow due to elevated velocities in artery and vein on US and DGF (last HD 5/11 for hyperkalemia).Post op course notable for:  - Urine leak - neph tube placed 6/3; PCNU removed 12/31.  - possible bladder/kidney stones. Seen by urology 2/2024. Started on flomax with plan for cystoscopy.      He now presents as a direct admit for bacteremia. He contacted the team due to congestion, body aches, chills, sore throat, and diarrhea for the past week. He underwent infectious work up outpatient. Blood cultures resulted with Gram stain aerobic & anaerobic  bottle: Gram positive cocci in chains resembling Strep. Rapid ID + enterococcus.  UA dirty, culture pending. Plan for admission to [facility] for management. D/w staff.

## 2024-03-05 NOTE — ASSESSMENT & PLAN NOTE
- continue prograf and steroid taper per protocol  - will check daily prograf levels, monitor for toxic side effects, and adjust for therapeutic dose  - hold cellcept

## 2024-03-05 NOTE — TELEPHONE ENCOUNTER
Results reviewed with patient and notified Per TSU Charge nurse to be at the admit office for 5pm.  Patient agrees with plan.  ----- Message from Darrin Verma MD sent at 3/5/2024  1:09 PM CST -----  Admission in progress for therapy and work up of bacteremia

## 2024-03-05 NOTE — ASSESSMENT & PLAN NOTE
- blood cultures: Gram stain priti and aerobic bottle: Gram positive cocci in chains resembling Strep    - rapid ID with enterococcus  - consult ID   - start IV ABX  - repeat blood cultures  - f/up urine culture

## 2024-03-06 LAB
ALBUMIN SERPL BCP-MCNC: 2.7 G/DL (ref 3.5–5.2)
ANION GAP SERPL CALC-SCNC: 9 MMOL/L (ref 8–16)
ASCENDING AORTA: 2.85 CM
AV INDEX (PROSTH): 0.87
AV MEAN GRADIENT: 3 MMHG
AV PEAK GRADIENT: 5 MMHG
AV VALVE AREA BY VELOCITY RATIO: 3.14 CM²
AV VALVE AREA: 3.44 CM²
AV VELOCITY RATIO: 0.8
BASOPHILS # BLD AUTO: 0.03 K/UL (ref 0–0.2)
BASOPHILS NFR BLD: 0.4 % (ref 0–1.9)
BSA FOR ECHO PROCEDURE: 2.01 M2
BUN SERPL-MCNC: 19 MG/DL (ref 8–23)
CALCIUM SERPL-MCNC: 8.7 MG/DL (ref 8.7–10.5)
CHLORIDE SERPL-SCNC: 108 MMOL/L (ref 95–110)
CO2 SERPL-SCNC: 23 MMOL/L (ref 23–29)
CREAT SERPL-MCNC: 1.4 MG/DL (ref 0.5–1.4)
CV ECHO LV RWT: 0.4 CM
DIFFERENTIAL METHOD BLD: ABNORMAL
DOP CALC AO PEAK VEL: 1.08 M/S
DOP CALC AO VTI: 22.45 CM
DOP CALC LVOT AREA: 3.9 CM2
DOP CALC LVOT DIAMETER: 2.24 CM
DOP CALC LVOT PEAK VEL: 0.86 M/S
DOP CALC LVOT STROKE VOLUME: 77.28 CM3
DOP CALCLVOT PEAK VEL VTI: 19.62 CM
E WAVE DECELERATION TIME: 188.39 MSEC
E/A RATIO: 1.23
E/E' RATIO: 6.09 M/S
ECHO LV POSTERIOR WALL: 0.89 CM (ref 0.6–1.1)
EOSINOPHIL # BLD AUTO: 0 K/UL (ref 0–0.5)
EOSINOPHIL NFR BLD: 0.4 % (ref 0–8)
ERYTHROCYTE [DISTWIDTH] IN BLOOD BY AUTOMATED COUNT: 13.7 % (ref 11.5–14.5)
EST. GFR  (NO RACE VARIABLE): 56.8 ML/MIN/1.73 M^2
FRACTIONAL SHORTENING: 36 % (ref 28–44)
GLUCOSE SERPL-MCNC: 84 MG/DL (ref 70–110)
GROUP A STREP, MOLECULAR: NEGATIVE
HCT VFR BLD AUTO: 34.9 % (ref 40–54)
HGB BLD-MCNC: 11.2 G/DL (ref 14–18)
IMM GRANULOCYTES # BLD AUTO: 0.06 K/UL (ref 0–0.04)
IMM GRANULOCYTES NFR BLD AUTO: 0.8 % (ref 0–0.5)
INTERVENTRICULAR SEPTUM: 0.63 CM (ref 0.6–1.1)
LA MAJOR: 5.45 CM
LA MINOR: 4.72 CM
LA WIDTH: 4.22 CM
LEFT ATRIUM SIZE: 3.88 CM
LEFT ATRIUM VOLUME INDEX MOD: 34.6 ML/M2
LEFT ATRIUM VOLUME INDEX: 35.4 ML/M2
LEFT ATRIUM VOLUME MOD: 68.81 CM3
LEFT ATRIUM VOLUME: 70.41 CM3
LEFT INTERNAL DIMENSION IN SYSTOLE: 2.86 CM (ref 2.1–4)
LEFT VENTRICLE DIASTOLIC VOLUME INDEX: 45.77 ML/M2
LEFT VENTRICLE DIASTOLIC VOLUME: 91.08 ML
LEFT VENTRICLE MASS INDEX: 53 G/M2
LEFT VENTRICLE SYSTOLIC VOLUME INDEX: 15.6 ML/M2
LEFT VENTRICLE SYSTOLIC VOLUME: 31.02 ML
LEFT VENTRICULAR INTERNAL DIMENSION IN DIASTOLE: 4.47 CM (ref 3.5–6)
LEFT VENTRICULAR MASS: 105.11 G
LV LATERAL E/E' RATIO: 5.38 M/S
LV SEPTAL E/E' RATIO: 7 M/S
LYMPHOCYTES # BLD AUTO: 0.9 K/UL (ref 1–4.8)
LYMPHOCYTES NFR BLD: 12 % (ref 18–48)
MAGNESIUM SERPL-MCNC: 1.7 MG/DL (ref 1.6–2.6)
MCH RBC QN AUTO: 34.6 PG (ref 27–31)
MCHC RBC AUTO-ENTMCNC: 32.1 G/DL (ref 32–36)
MCV RBC AUTO: 108 FL (ref 82–98)
MONOCYTES # BLD AUTO: 0.5 K/UL (ref 0.3–1)
MONOCYTES NFR BLD: 7.2 % (ref 4–15)
MV PEAK A VEL: 0.57 M/S
MV PEAK E VEL: 0.7 M/S
MV STENOSIS PRESSURE HALF TIME: 54.63 MS
MV VALVE AREA P 1/2 METHOD: 4.03 CM2
NEUTROPHILS # BLD AUTO: 5.9 K/UL (ref 1.8–7.7)
NEUTROPHILS NFR BLD: 79.2 % (ref 38–73)
NRBC BLD-RTO: 0 /100 WBC
PHOSPHATE SERPL-MCNC: 3.4 MG/DL (ref 2.7–4.5)
PISA MRMAX VEL: 0.05 M/S
PISA TR MAX VEL: 2.55 M/S
PLATELET # BLD AUTO: 204 K/UL (ref 150–450)
PMV BLD AUTO: 10.8 FL (ref 9.2–12.9)
POTASSIUM SERPL-SCNC: 4.1 MMOL/L (ref 3.5–5.1)
RA MAJOR: 5.07 CM
RA PRESSURE ESTIMATED: 3 MMHG
RA WIDTH: 4.24 CM
RBC # BLD AUTO: 3.24 M/UL (ref 4.6–6.2)
RV TB RVSP: 6 MMHG
SINUS: 3.44 CM
SODIUM SERPL-SCNC: 140 MMOL/L (ref 136–145)
STJ: 2.71 CM
TACROLIMUS BLD-MCNC: 6.7 NG/ML (ref 5–15)
TDI LATERAL: 0.13 M/S
TDI SEPTAL: 0.1 M/S
TDI: 0.12 M/S
TR MAX PG: 26 MMHG
TRICUSPID ANNULAR PLANE SYSTOLIC EXCURSION: 1.88 CM
TV REST PULMONARY ARTERY PRESSURE: 29 MMHG
WBC # BLD AUTO: 7.41 K/UL (ref 3.9–12.7)
Z-SCORE OF LEFT VENTRICULAR DIMENSION IN END DIASTOLE: -2.56
Z-SCORE OF LEFT VENTRICULAR DIMENSION IN END SYSTOLE: -1.7

## 2024-03-06 PROCEDURE — 99233 SBSQ HOSP IP/OBS HIGH 50: CPT | Mod: ,,, | Performed by: PHYSICIAN ASSISTANT

## 2024-03-06 PROCEDURE — 85025 COMPLETE CBC W/AUTO DIFF WBC: CPT | Performed by: PHYSICIAN ASSISTANT

## 2024-03-06 PROCEDURE — 83735 ASSAY OF MAGNESIUM: CPT | Performed by: PHYSICIAN ASSISTANT

## 2024-03-06 PROCEDURE — 99223 1ST HOSP IP/OBS HIGH 75: CPT | Mod: ,,, | Performed by: INTERNAL MEDICINE

## 2024-03-06 PROCEDURE — 87651 STREP A DNA AMP PROBE: CPT | Performed by: PHYSICIAN ASSISTANT

## 2024-03-06 PROCEDURE — 63600175 PHARM REV CODE 636 W HCPCS: Performed by: PHYSICIAN ASSISTANT

## 2024-03-06 PROCEDURE — 25000003 PHARM REV CODE 250: Performed by: PHYSICIAN ASSISTANT

## 2024-03-06 PROCEDURE — 36415 COLL VENOUS BLD VENIPUNCTURE: CPT | Performed by: PHYSICIAN ASSISTANT

## 2024-03-06 PROCEDURE — 80069 RENAL FUNCTION PANEL: CPT | Performed by: PHYSICIAN ASSISTANT

## 2024-03-06 PROCEDURE — 20600001 HC STEP DOWN PRIVATE ROOM

## 2024-03-06 PROCEDURE — 80197 ASSAY OF TACROLIMUS: CPT | Performed by: PHYSICIAN ASSISTANT

## 2024-03-06 RX ORDER — BENZONATATE 100 MG/1
100 CAPSULE ORAL 3 TIMES DAILY PRN
Status: DISCONTINUED | OUTPATIENT
Start: 2024-03-06 | End: 2024-03-09 | Stop reason: HOSPADM

## 2024-03-06 RX ADMIN — TACROLIMUS 1 MG: 1 CAPSULE ORAL at 09:03

## 2024-03-06 RX ADMIN — DAPTOMYCIN 835 MG: 350 INJECTION, POWDER, LYOPHILIZED, FOR SOLUTION INTRAVENOUS at 09:03

## 2024-03-06 RX ADMIN — SODIUM BICARBONATE 1300 MG: 650 TABLET ORAL at 08:03

## 2024-03-06 RX ADMIN — TACROLIMUS 1 MG: 1 CAPSULE ORAL at 06:03

## 2024-03-06 RX ADMIN — BENZONATATE 100 MG: 100 CAPSULE ORAL at 09:03

## 2024-03-06 RX ADMIN — TAMSULOSIN HYDROCHLORIDE 0.4 MG: 0.4 CAPSULE ORAL at 09:03

## 2024-03-06 RX ADMIN — PREDNISONE 5 MG: 5 TABLET ORAL at 09:03

## 2024-03-06 RX ADMIN — METOPROLOL TARTRATE 12.5 MG: 25 TABLET, FILM COATED ORAL at 09:03

## 2024-03-06 RX ADMIN — SODIUM BICARBONATE 1300 MG: 650 TABLET ORAL at 09:03

## 2024-03-06 RX ADMIN — SODIUM BICARBONATE 1300 MG: 650 TABLET ORAL at 03:03

## 2024-03-06 NOTE — NURSING
Nurses Note -- 4 Eyes      3/6/2024   3:45 AM      Skin assessed during: Admit      [x] No Altered Skin Integrity Present    [x]Prevention Measures Documented      [] Yes- Altered Skin Integrity Present or Discovered   [] LDA Added if Not in Epic (Describe Wound)   [] New Altered Skin Integrity was Present on Admit and Documented in LDA   [] Wound Image Taken    Wound Care Consulted? Yes    Attending Nurse:  Melissa Phillips RN/Staff Member:  JASIEL Stephenson

## 2024-03-06 NOTE — SUBJECTIVE & OBJECTIVE
Subjective:     Chief Complaint/Reason for Admission: Bacteremia    History of Present Illness:  Mr Abdoulaye Parikh is a 63 yo s/p living unrelated kidney transplant on 5/10/23. Post op course significant for OR takeback 5/15 to assess flow due to elevated velocities in artery and vein on US and DGF (last HD 5/11 for hyperkalemia).Post op course notable for:  - Urine leak - neph tube placed 6/3; PCNU removed 12/31.  - possible bladder/kidney stones. Seen by urology 2/2024. Started on flomax with plan for cystoscopy.      He now presents as a direct admit for bacteremia. He contacted the team due to congestion, body aches, chills, sore throat, and diarrhea for the past week. He underwent infectious work up outpatient. Blood cultures resulted with Gram stain aerobic & anaerobic  bottle: Gram positive cocci in chains resembling Strep. Rapid ID + enterococcus.  UA dirty, culture pending. Plan for admission to TSU for management. D/w staff.         PTA Medications   Medication Sig    fluticasone propionate (FLOVENT HFA) 44 mcg/actuation inhaler Inhale 1 puff into the lungs 2 (two) times daily.    loperamide HCl (IMODIUM A-D ORAL) Take by mouth.    magnesium oxide (MAG-OX) 400 mg (241.3 mg magnesium) tablet Take 1 tablet (400 mg total) by mouth 2 (two) times daily.    metoprolol tartrate (LOPRESSOR) 25 MG tablet Take HALF tablet (12.5 mg total) by mouth 2 (two) times daily.    mycophenolate sodium 180 MG TbEC Take 2 tablets (360 mg total) by mouth 2 (two) times daily. Txp Date:5/10/2023 (Kidney) Disch Date:5/17/2023 ICD10:Z94.0 Txp Location:[facility]    olmesartan (BENICAR) 40 MG tablet Take 1 tablet (40 mg total) by mouth once daily.    predniSONE (DELTASONE) 5 MG tablet Take 20 mg by mouth daily 5/14-5/20; 15 mg daily 5/21-5/27; 10 mg daily 5/28-6/3; then 5 mg daily thereafter 6/4/23 (Patient taking differently: Take 5 mg by mouth once daily.)    sodium bicarbonate 650 MG tablet Take 2 tablets (1,300 mg total) by mouth 3  (three) times daily.    tacrolimus (PROGRAF) 1 MG Cap Take 1 capsule (1 mg total) by mouth every morning AND 1 capsule (1 mg total) every evening. Txp Date:5/10/2023 (Kidney) Disch Date:5/17/2023 ICD10:Z94.0 Txp Location:[facility].    tamsulosin (FLOMAX) 0.4 mg Cap Take 1 capsule (0.4 mg total) by mouth once daily.       Review of patient's allergies indicates:   Allergen Reactions    Bactrim [sulfamethoxazole-trimethoprim] Shortness Of Breath and Itching    Penicillins Shortness Of Breath and Itching       Past Medical History:   Diagnosis Date    Acute blood loss anemia 05/11/2023    Anemia     Arthritis     Cirrhosis     CKD (chronic kidney disease) stage 4, GFR 15-29 ml/min, slow graft function with sustaine creatinien improvement 05/26/2023    Crohn's disease     Disorder of kidney and ureter     Stage 5    Encounter for blood transfusion     Gout     Hearing loss     Hepatitis     Had Hep C Cleared    Hypertension     Immunosuppressive management encounter following kidney transplant 05/26/2023    Neurosarcoidosis 2018    Obesity     Osteoarthritis     S/P kidney transplant 05/11/2023    Sarcoid neuropathy     Sarcoidosis, lung     Steatosis      Past Surgical History:   Procedure Laterality Date    AV FISTULA PLACEMENT Left     BRAIN SURGERY      COLON SURGERY      Exploratory lap x 4 for Chron's disease. Likely right colectomy    CSF SHUNT      DIAGNOSTIC ULTRASOUND N/A 5/15/2023    Procedure: ULTRASOUND, DIAGNOSTIC;  Surgeon: Chivo Brown MD;  Location: [address];  Service: Transplant;  Laterality: N/A;    JOINT REPLACEMENT Right     Hip    KIDNEY TRANSPLANT N/A 5/10/2023    Procedure: TRANSPLANT, KIDNEY - RQ 7PM START;  Surgeon: Chivo Brown MD;  Location: [facility] OR [address] [place] [address];  Service: Transplant;  Laterality: N/A;    NEPHROSTOMY N/A 6/3/2023    Procedure: Nephrostomy;  Surgeon: Silvia Surgeon;  Location: [facility];  Service: Anesthesiology;  Laterality: N/A;    RENAL BIOPSY  5/15/2023     Procedure: BIOPSY, KIDNEY;  Surgeon: Chivo Brown MD;  Location: [facility] OR [address] [place] [address];  Service: Transplant;;    RENAL EXPLORATION  5/15/2023    Procedure: EXPLORATION, KIDNEY;  Surgeon: Chivo Brown MD;  Location: [facility] OR [address] [place] [address];  Service: Transplant;;    TOTAL HIP ARTHROPLASTY Right      Family History       Problem Relation (Age of Onset)    COPD Sister    Cancer Father, Maternal Grandfather, Paternal Grandfather    Diabetes Father    Heart disease Father, Sister    Hypertension Mother, Father    Lung cancer Maternal Grandfather, Paternal Grandfather    Multiple myeloma Father    Stroke Father          Tobacco Use    Smoking status: Never    Smokeless tobacco: Never   Substance and Sexual Activity    Alcohol use: Not Currently    Drug use: Never    Sexual activity: Yes     Partners: Female        Review of Systems   Constitutional:  Negative for appetite change and fever.   HENT:  Negative for facial swelling and trouble swallowing.    Eyes:  Negative for visual disturbance.   Respiratory:  Negative for shortness of breath, wheezing and stridor.    Cardiovascular:  Negative for chest pain.   Gastrointestinal:  Negative for abdominal distention, abdominal pain, diarrhea, nausea and vomiting.   Genitourinary:  Negative for decreased urine volume and difficulty urinating.   Musculoskeletal:  Negative for myalgias.   Skin:  Negative for wound.   Allergic/Immunologic: Positive for immunocompromised state.   Neurological:  Negative for tremors, syncope and speech difficulty.   Psychiatric/Behavioral:  Negative for agitation, behavioral problems, confusion, decreased concentration and hallucinations.      Objective:     Vital Signs (Most Recent):           There is no height or weight on file to calculate BMI.      Physical Exam  Vitals and nursing note reviewed.   Constitutional:       General: He is not in acute distress.     Appearance: Normal appearance. He is well-developed. He is not  toxic-appearing.   HENT:      Head: Normocephalic.   Eyes:      General: No scleral icterus.  Cardiovascular:      Rate and Rhythm: Normal rate and regular rhythm.      Pulses: Normal pulses.      Heart sounds: Murmur heard.   Pulmonary:      Effort: Pulmonary effort is normal.      Breath sounds: Normal breath sounds.   Abdominal:      General: A surgical scar is present. Bowel sounds are normal. There is no distension.      Palpations: Abdomen is soft.      Tenderness: There is no abdominal tenderness. There is no guarding or rebound.   Musculoskeletal:      Right lower leg: No edema.      Left lower leg: No edema.   Skin:     General: Skin is warm and dry.      Capillary Refill: Capillary refill takes 2 to 3 seconds.   Neurological:      Mental Status: He is alert and oriented to person, place, and time.      GCS: GCS eye subscore is 4. GCS verbal subscore is 5. GCS motor subscore is 6.   Psychiatric:         Attention and Perception: Attention normal.         Mood and Affect: Mood normal.         Speech: Speech normal.         Behavior: Behavior normal. Behavior is cooperative.         Thought Content: Thought content normal.         Judgment: Judgment normal.          Laboratory  CBC:   Recent Labs   Lab 03/01/24  0712 03/04/24  0802 03/05/24 2028   WBC 7.57 5.98 8.20   RBC 3.61* 3.39* 3.13*   HGB 12.3* 11.7* 10.8*   HCT 37.9* 36.4* 32.8*    193 214   * 107* 105*   MCH 34.1* 34.5* 34.5*   MCHC 32.5 32.1 32.9     CMP:   Recent Labs   Lab 03/01/24  0712 03/04/24  0802 03/05/24 2028   GLU 87 82 105   CALCIUM 9.5 9.4 9.5   ALBUMIN 2.9* 2.9* 2.7*    139 139   K 4.1 3.9 4.1   CO2 25 22* 22*    107 108   BUN 21 16 18   CREATININE 1.7* 1.5* 1.6*     Labs within the past 24 hours have been reviewed.    Diagnostic Results:  US - Kidney: Results for orders placed during the hospital encounter of 06/01/23    US Transplant Kidney With Doppler    Narrative  EXAMINATION:  US TRANSPLANT KIDNEY WITH  DOPPLER    CLINICAL HISTORY:  TRINO;    TECHNIQUE:  Real time gray scale and doppler ultrasound was performed over the patient's renal allograft.    COMPARISON:  U/S intraoperative 05/15/2023; transplant kidney with Doppler 05/15/2023    FINDINGS:  The transplant lies in the right lower quadrant and measures 11.9 cm.    The renal transplant demonstrates no focal parenchymal abnormality. No transplant hydronephrosis. Minimally complex peritransplant fluid collection extending across the entire allograft measuring 16.6 x 7.8 x 8.6 cm.    Renal arterial resistive indices are as follows: Interlobar 0.77, segmental Up 0.68, segmental Mid 0.80, segmental Low 0.79.  No evidence of a tardus parvus waveform. The maximum velocity in the main renal artery is 249 cm/sec (previously 252 cm/sec).  The maximum velocity in the iliac artery measures 147 cm/sec.  The RA/iliac ratio measures 1.2.    The renal transplant venous system is unremarkable.  Improved velocity within the main renal vein anastomosis measuring 120 cm/sec.    Impression  Improved intraparenchymal arterial resistive indices which measure mildly elevated on today's exam.    Improved main renal vein velocity at the anastomosis.    Large minimally complex peritransplant collection extending the entire length of the allograft.  Collection may be new or represent significant interval enlargement of prior collection.    This report was flagged in Epic as abnormal.    Electronically signed by resident: Chandan Montoya  Date:    06/01/2023  Time:    16:36    Electronically signed by: Al Read  Date:    06/01/2023  Time:    17:00    Patient was SARS-CoV-2 /COVID-19 tested with negative results.

## 2024-03-06 NOTE — ASSESSMENT & PLAN NOTE
- blood cultures: Gram stain priti and aerobic bottle: Gram positive cocci in chains resembling Strep    - rapid ID with enterococcus faecalis   - ID on board  - obtain 2D echo  - continue daptomycin   - repeat blood cultures  - f/up urine culture

## 2024-03-06 NOTE — PLAN OF CARE
Pt AAOx4. Independent. Wife at bedside. Chest xray done - small pleural effusion found in the right lobe base. Echo ordered. Pt having diarrhea x2 on the day - most likely from the IV antibiotics - encouraged to eat yogurt for probiotics. Strep swab sent for strep rule out. VS stable. Pt hearing aids in place.

## 2024-03-06 NOTE — HOSPITAL COURSE
Admitted for bacteremia. Pt states his symptoms of congestion, body aches, and diarrhea resolved and he is feeling much better than he was a week ago. He only c/o dry cough and sore throat . Started on Daptomycin for bacteremia. ID on board. Repeat infectious work up in process.Urine culture pending.     Interval history: NAEON. Pt feeling fine today. He is having diarrhea - 7 episodes in last 24 hours. C diff negative. Pt states he alternates between diarrhea and constipation due to his Crohn's disease and takes miralax/imodium per his GI doctor accordingly. Will start PRN imodium. Admit blood cultures positive (prior to starting treatment). Repeat blood cultures 3/7 NGTD. Will need to 4 weeks of IV daptomycin. Plan to place midline tomorrow as long as blood cultures remain negative. CT a/p unremarkable for source, did show new RLL nodule. Will obtain a baseline CT chest. Afebrile and VSS. Continue to monitor.

## 2024-03-06 NOTE — PROGRESS NOTES
[facility] Transplant Stepdown  Kidney Transplant  Progress Note      Reason for Follow-up: Reassessment of Kidney Transplant - 5/10/2023  (#1) recipient and management of immunosuppression.    ORGAN: LEFT KIDNEY    Donor Type: Living    Donor CMV Status:   Donor HBcAB:  Donor HCV Status:  Donor HBV AURORA:   Donor HCV AURORA:       Subjective:   History of Present Illness:  Mr Abdoulaye Parikh is a 63 yo s/p living unrelated kidney transplant on 5/10/23. Post op course significant for OR takeback 5/15 to assess flow due to elevated velocities in artery and vein on US and DGF (last HD 5/11 for hyperkalemia).Post op course notable for:  - Urine leak - neph tube placed 6/3; PCNU removed 12/31.  - possible bladder/kidney stones. Seen by urology 2/2024. Started on flomax with plan for cystoscopy.      He now presents as a direct admit for bacteremia. He contacted the team due to congestion, body aches, chills, sore throat, and diarrhea for the past week. He underwent infectious work up outpatient. Blood cultures resulted with Gram stain aerobic & anaerobic  bottle: Gram positive cocci in chains resembling Strep. Rapid ID + enterococcus.  UA dirty, culture pending. Plan for admission to TSU for management. D/w staff.     Hospital Course:  Admitted for bacteremia. Pt states his symptoms of congestion, body aches, and diarrhea resolved and he is feeling much better than he was a week ago. He only c/o dry cough and sore throat today. Started on Daptomycin for bacteremia. ID on board. Repeat infectious work up in process.Urine culture pending. Obtain echo per ID recs. Continue to monitor.       Past Medical, Surgical, Family, and Social History:   Unchanged from H&P.    Scheduled Meds:   DAPTOmycin (CUBICIN) IV (PEDS and ADULTS)  10 mg/kg (Order-Specific) Intravenous Q24H    metoprolol tartrate  12.5 mg Oral BID    predniSONE  5 mg Oral Daily    sodium bicarbonate  1,300 mg Oral TID    tacrolimus  1 mg Oral BID    tamsulosin  0.4 mg  "Oral Daily     Continuous Infusions:  PRN Meds:acetaminophen, melatonin, ondansetron, prochlorperazine    Intake/Output - Last 3 Shifts       None             Review of Systems   Constitutional:  Negative for appetite change and fever.   HENT:  Positive for sore throat. Negative for facial swelling and trouble swallowing.    Eyes:  Negative for visual disturbance.   Respiratory:  Positive for cough. Negative for shortness of breath, wheezing and stridor.    Cardiovascular:  Negative for chest pain.   Gastrointestinal:  Negative for abdominal distention, abdominal pain, diarrhea, nausea and vomiting.   Genitourinary:  Negative for decreased urine volume and difficulty urinating.   Musculoskeletal:  Negative for myalgias.   Skin:  Negative for wound.   Allergic/Immunologic: Positive for immunocompromised state.   Neurological:  Negative for tremors, syncope and speech difficulty.   Psychiatric/Behavioral:  Negative for agitation, behavioral problems, confusion, decreased concentration and hallucinations.       Objective:     Vital Signs (Most Recent):  Temp: 98 °F (36.7 °C) (03/06/24 1142)  Pulse: 61 (03/06/24 1142)  Resp: 18 (03/06/24 1142)  BP: (!) 106/56 (03/06/24 1142)  SpO2: (!) 92 % (03/06/24 1142) Vital Signs (24h Range):  Temp:  [97.6 °F (36.4 °C)-98 °F (36.7 °C)] 98 °F (36.7 °C)  Pulse:  [61-68] 61  Resp:  [18] 18  SpO2:  [92 %-97 %] 92 %  BP: (106-136)/(56-63) 106/56     Weight: 83.3 kg (183 lb 8.5 oz)  Height: 5' 9" (175.3 cm)  Body mass index is 27.1 kg/m².     Physical Exam  Vitals and nursing note reviewed.   Constitutional:       General: He is not in acute distress.     Appearance: He is not diaphoretic.   HENT:      Head: Normocephalic and atraumatic.   Eyes:      General: No scleral icterus.        Right eye: No discharge.         Left eye: No discharge.   Cardiovascular:      Rate and Rhythm: Normal rate and regular rhythm.      Heart sounds: Normal heart sounds.   Pulmonary:      Effort: Pulmonary " effort is normal.      Breath sounds: Normal breath sounds. No wheezing or rales.   Abdominal:      General: Bowel sounds are normal. There is no distension.      Palpations: Abdomen is soft.      Tenderness: There is no abdominal tenderness. There is no guarding.   Musculoskeletal:      Cervical back: Normal range of motion and neck supple.   Skin:     General: Skin is warm and dry.      Capillary Refill: Capillary refill takes less than 2 seconds.   Neurological:      Mental Status: He is alert and oriented to person, place, and time.      Cranial Nerves: No cranial nerve deficit.   Psychiatric:         Thought Content: Thought content normal.         Judgment: Judgment normal.          Laboratory:  CBC:   Recent Labs   Lab 03/04/24 0802 03/05/24 2028 03/06/24  0616   WBC 5.98 8.20 7.41   RBC 3.39* 3.13* 3.24*   HGB 11.7* 10.8* 11.2*   HCT 36.4* 32.8* 34.9*    214 204   * 105* 108*   MCH 34.5* 34.5* 34.6*   MCHC 32.1 32.9 32.1     CMP:   Recent Labs   Lab 03/04/24 0802 03/05/24 2028 03/06/24  0616   GLU 82 105 84   CALCIUM 9.4 9.5 8.7   ALBUMIN 2.9* 2.7* 2.7*    139 140   K 3.9 4.1 4.1   CO2 22* 22* 23    108 108   BUN 16 18 19   CREATININE 1.5* 1.6* 1.4     Labs within the past 24 hours have been reviewed.    Diagnostic Results: Reviewed.  Assessment/Plan:     * Bacteremia  - blood cultures: Gram stain priti and aerobic bottle: Gram positive cocci in chains resembling Strep    - rapid ID with enterococcus faecalis   - ID on board  - obtain 2D echo  - continue daptomycin   - repeat blood cultures  - f/up urine culture    Anemia of renal disease  - 2/2 h/o renal disease  - daily CBC      Long-term use of immunosuppressant medication  - see prophylactic immunotherapy      Prophylactic immunotherapy  - continue prograf and steroid taper per protocol  - will check daily prograf levels, monitor for toxic side effects, and adjust for therapeutic dose  - hold cellcept      At risk for  opportunistic infections  - cont OI prophylaxis per protocol  - CMV PCR pending          Discharge Planning: Not a candidate at this time.     Medical decision making for this encounter includes review of pertinent labs and diagnostic studies, assessment and planning, discussions with consulting providers, discussion with patient/family, and participation in multidisciplinary rounds. Time spent caring for patient: 60 minutes    Kera Hernandez PA-C  Kidney Transplant  [facility] - Transplant Stepdown

## 2024-03-06 NOTE — H&P
[facility]  Kidney Transplant  H&P      Subjective:     Chief Complaint/Reason for Admission: Bacteremia    History of Present Illness:  Mr Abdoulaye Parikh is a 61 yo s/p living unrelated kidney transplant on 5/10/23. Post op course significant for OR takeback 5/15 to assess flow due to elevated velocities in artery and vein on US and DGF (last HD 5/11 for hyperkalemia).Post op course notable for:  - Urine leak - neph tube placed 6/3; PCNU removed 12/31.  - possible bladder/kidney stones. Seen by urology 2/2024. Started on flomax with plan for cystoscopy.      He now presents as a direct admit for bacteremia. He contacted the team due to congestion, body aches, chills, sore throat, and diarrhea for the past week. He underwent infectious work up outpatient. Blood cultures resulted with Gram stain aerobic & anaerobic  bottle: Gram positive cocci in chains resembling Strep. Rapid ID + enterococcus.  UA dirty, culture pending. Plan for admission to TSU for management. D/w staff.         PTA Medications   Medication Sig    fluticasone propionate (FLOVENT HFA) 44 mcg/actuation inhaler Inhale 1 puff into the lungs 2 (two) times daily.    loperamide HCl (IMODIUM A-D ORAL) Take by mouth.    magnesium oxide (MAG-OX) 400 mg (241.3 mg magnesium) tablet Take 1 tablet (400 mg total) by mouth 2 (two) times daily.    metoprolol tartrate (LOPRESSOR) 25 MG tablet Take HALF tablet (12.5 mg total) by mouth 2 (two) times daily.    mycophenolate sodium 180 MG TbEC Take 2 tablets (360 mg total) by mouth 2 (two) times daily. Txp Date:5/10/2023 (Kidney) Disch Date:5/17/2023 ICD10:Z94.0 Txp Location:[facility]    olmesartan (BENICAR) 40 MG tablet Take 1 tablet (40 mg total) by mouth once daily.    predniSONE (DELTASONE) 5 MG tablet Take 20 mg by mouth daily 5/14-5/20; 15 mg daily 5/21-5/27; 10 mg daily 5/28-6/3; then 5 mg daily thereafter 6/4/23 (Patient taking differently: Take 5 mg by mouth once daily.)    sodium bicarbonate  650 MG tablet Take 2 tablets (1,300 mg total) by mouth 3 (three) times daily.    tacrolimus (PROGRAF) 1 MG Cap Take 1 capsule (1 mg total) by mouth every morning AND 1 capsule (1 mg total) every evening. Txp Date:5/10/2023 (Kidney) Disch Date:5/17/2023 ICD10:Z94.0 Txp Location:[facility].    tamsulosin (FLOMAX) 0.4 mg Cap Take 1 capsule (0.4 mg total) by mouth once daily.       Review of patient's allergies indicates:   Allergen Reactions    Bactrim [sulfamethoxazole-trimethoprim] Shortness Of Breath and Itching    Penicillins Shortness Of Breath and Itching       Past Medical History:   Diagnosis Date    Acute blood loss anemia 05/11/2023    Anemia     Arthritis     Cirrhosis     CKD (chronic kidney disease) stage 4, GFR 15-29 ml/min, slow graft function with sustaine creatinien improvement 05/26/2023    Crohn's disease     Disorder of kidney and ureter     Stage 5    Encounter for blood transfusion     Gout     Hearing loss     Hepatitis     Had Hep C Cleared    Hypertension     Immunosuppressive management encounter following kidney transplant 05/26/2023    Neurosarcoidosis 2018    Obesity     Osteoarthritis     S/P kidney transplant 05/11/2023    Sarcoid neuropathy     Sarcoidosis, lung     Steatosis      Past Surgical History:   Procedure Laterality Date    AV FISTULA PLACEMENT Left     BRAIN SURGERY      COLON SURGERY      Exploratory lap x 4 for Chron's disease. Likely right colectomy    CSF SHUNT      DIAGNOSTIC ULTRASOUND N/A 5/15/2023    Procedure: ULTRASOUND, DIAGNOSTIC;  Surgeon: Chivo Brown MD;  Location: [address];  Service: Transplant;  Laterality: N/A;    JOINT REPLACEMENT Right     Hip    KIDNEY TRANSPLANT N/A 5/10/2023    Procedure: TRANSPLANT, KIDNEY - RQ 7PM START;  Surgeon: Chivo Brown MD;  Location: [address];  Service: Transplant;  Laterality: N/A;    NEPHROSTOMY N/A 6/3/2023    Procedure: Nephrostomy;  Surgeon: Silvia Surgeon;  Location: [facility];  Service:  Anesthesiology;  Laterality: N/A;    RENAL BIOPSY  5/15/2023    Procedure: BIOPSY, KIDNEY;  Surgeon: Chivo Brown MD;  [facility];  Service: Transplant;;    RENAL EXPLORATION  5/15/2023    Procedure: EXPLORATION, KIDNEY;  Surgeon: Chivo Brown MD;  [facility];  Service: Transplant;;    TOTAL HIP ARTHROPLASTY Right      Family History       Problem Relation (Age of Onset)    COPD Sister    Cancer Father, Maternal Grandfather, Paternal Grandfather    Diabetes Father    Heart disease Father, Sister    Hypertension Mother, Father    Lung cancer Maternal Grandfather, Paternal Grandfather    Multiple myeloma Father    Stroke Father          Tobacco Use    Smoking status: Never    Smokeless tobacco: Never   Substance and Sexual Activity    Alcohol use: Not Currently    Drug use: Never    Sexual activity: Yes     Partners: Female        Review of Systems   Constitutional:  Negative for appetite change and fever.   HENT:  Negative for facial swelling and trouble swallowing.    Eyes:  Negative for visual disturbance.   Respiratory:  Negative for shortness of breath, wheezing and stridor.    Cardiovascular:  Negative for chest pain.   Gastrointestinal:  Negative for abdominal distention, abdominal pain, diarrhea, nausea and vomiting.   Genitourinary:  Negative for decreased urine volume and difficulty urinating.   Musculoskeletal:  Negative for myalgias.   Skin:  Negative for wound.   Allergic/Immunologic: Positive for immunocompromised state.   Neurological:  Negative for tremors, syncope and speech difficulty.   Psychiatric/Behavioral:  Negative for agitation, behavioral problems, confusion, decreased concentration and hallucinations.      Objective:     Vital Signs (Most Recent):           There is no height or weight on file to calculate BMI.      Physical Exam  Vitals and nursing note reviewed.   Constitutional:       General: He is not in acute distress.     Appearance:  Normal appearance. He is well-developed. He is not toxic-appearing.   HENT:      Head: Normocephalic.   Eyes:      General: No scleral icterus.  Cardiovascular:      Rate and Rhythm: Normal rate and regular rhythm.      Pulses: Normal pulses.      Heart sounds: Murmur heard.   Pulmonary:      Effort: Pulmonary effort is normal.      Breath sounds: Normal breath sounds.   Abdominal:      General: A surgical scar is present. Bowel sounds are normal. There is no distension.      Palpations: Abdomen is soft.      Tenderness: There is no abdominal tenderness. There is no guarding or rebound.   Musculoskeletal:      Right lower leg: No edema.      Left lower leg: No edema.   Skin:     General: Skin is warm and dry.      Capillary Refill: Capillary refill takes 2 to 3 seconds.   Neurological:      Mental Status: He is alert and oriented to person, place, and time.      GCS: GCS eye subscore is 4. GCS verbal subscore is 5. GCS motor subscore is 6.   Psychiatric:         Attention and Perception: Attention normal.         Mood and Affect: Mood normal.         Speech: Speech normal.         Behavior: Behavior normal. Behavior is cooperative.         Thought Content: Thought content normal.         Judgment: Judgment normal.          Laboratory  CBC:   Recent Labs   Lab 03/01/24  0712 03/04/24  0802 03/05/24 2028   WBC 7.57 5.98 8.20   RBC 3.61* 3.39* 3.13*   HGB 12.3* 11.7* 10.8*   HCT 37.9* 36.4* 32.8*    193 214   * 107* 105*   MCH 34.1* 34.5* 34.5*   MCHC 32.5 32.1 32.9     CMP:   Recent Labs   Lab 03/01/24  0712 03/04/24  0802 03/05/24 2028   GLU 87 82 105   CALCIUM 9.5 9.4 9.5   ALBUMIN 2.9* 2.9* 2.7*    139 139   K 4.1 3.9 4.1   CO2 25 22* 22*    107 108   BUN 21 16 18   CREATININE 1.7* 1.5* 1.6*     Labs within the past 24 hours have been reviewed.    Diagnostic Results:  US - Kidney: Results for orders placed during the hospital encounter of 06/01/23    US Transplant Kidney With  Doppler    Narrative  EXAMINATION:  US TRANSPLANT KIDNEY WITH DOPPLER    CLINICAL HISTORY:  TRINO;    TECHNIQUE:  Real time gray scale and doppler ultrasound was performed over the patient's renal allograft.    COMPARISON:  U/S intraoperative 05/15/2023; transplant kidney with Doppler 05/15/2023    FINDINGS:  The transplant lies in the right lower quadrant and measures 11.9 cm.    The renal transplant demonstrates no focal parenchymal abnormality. No transplant hydronephrosis. Minimally complex peritransplant fluid collection extending across the entire allograft measuring 16.6 x 7.8 x 8.6 cm.    Renal arterial resistive indices are as follows: Interlobar 0.77, segmental Up 0.68, segmental Mid 0.80, segmental Low 0.79.  No evidence of a tardus parvus waveform. The maximum velocity in the main renal artery is 249 cm/sec (previously 252 cm/sec).  The maximum velocity in the iliac artery measures 147 cm/sec.  The RA/iliac ratio measures 1.2.    The renal transplant venous system is unremarkable.  Improved velocity within the main renal vein anastomosis measuring 120 cm/sec.    Impression  Improved intraparenchymal arterial resistive indices which measure mildly elevated on today's exam.    Improved main renal vein velocity at the anastomosis.    Large minimally complex peritransplant collection extending the entire length of the allograft.  Collection may be new or represent significant interval enlargement of prior collection.    This report was flagged in Epic as abnormal.    Electronically signed by resident: Chadnan Montoya  Date:    06/01/2023  Time:    16:36    Electronically signed by: Al Read  Date:    06/01/2023  Time:    17:00    Patient was SARS-CoV-2 /COVID-19 tested with negative results.   Assessment/Plan:     Renal/  Immunosuppressive management encounter following kidney transplant-resolved as of 3/5/2024        ID  * Bacteremia  - blood cultures: Gram stain priti and aerobic bottle: Gram positive  cocci in chains resembling Strep    - rapid ID with enterococcus  - consult ID   - start IV ABX  - repeat blood cultures  - f/up urine culture    At risk for opportunistic infections  - cont OI prophylaxis per protocol  - CMV PCR pending      Immunology/Multi System  Prophylactic immunotherapy  - continue prograf and steroid taper per protocol  - will check daily prograf levels, monitor for toxic side effects, and adjust for therapeutic dose  - hold cellcept      Oncology  Anemia of renal disease  - 2/2 h/o renal disease  - daily CBC      Palliative Care  Long-term use of immunosuppressant medication  - see prophylactic immunotherapy              Discharge Planning:  Not a candidate at this time    Medical decision making for this encounter includes review of pertinent labs and diagnostic studies, assessment and planning, discussions with consulting providers, discussion with patient/family, and participation in multidisciplinary rounds. Time spent caring for patient: 30 minutes    Kera Houston NP  Kidney Transplant  [facility]

## 2024-03-06 NOTE — PLAN OF CARE
Pt admitted to floor as direct admit, with wife at bedside. Pt AAOx4, pt with decreased hearing in both ears--hearing aids in place. VSS. Daptomycin started. Blood cultures obtained, NGTD. 22g PIV placed in RFA. Pt denies pain/acute distress. Bed in lowest locked position, call light within reach, POC ongoing.

## 2024-03-06 NOTE — SUBJECTIVE & OBJECTIVE
Subjective:   History of Present Illness:  Mr Abdoulaye Parikh is a 61 yo s/p living unrelated kidney transplant on 5/10/23. Post op course significant for OR takeback 5/15 to assess flow due to elevated velocities in artery and vein on US and DGF (last HD 5/11 for hyperkalemia).Post op course notable for:  - Urine leak - neph tube placed 6/3; PCNU removed 12/31.  - possible bladder/kidney stones. Seen by urology 2/2024. Started on flomax with plan for cystoscopy.      He now presents as a direct admit for bacteremia. He contacted the team due to congestion, body aches, chills, sore throat, and diarrhea for the past week. He underwent infectious work up outpatient. Blood cultures resulted with Gram stain aerobic & anaerobic  bottle: Gram positive cocci in chains resembling Strep. Rapid ID + enterococcus.  UA dirty, culture pending. Plan for admission to TSU for management. D/w staff.     Hospital Course:  Admitted for bacteremia. Pt states his symptoms of congestion, body aches, and diarrhea resolved and he is feeling much better than he was a week ago. He only c/o dry cough and sore throat today. Started on Daptomycin for bacteremia. ID on board. Repeat infectious work up in process.Urine culture pending. Obtain echo per ID recs. Continue to monitor.       Past Medical, Surgical, Family, and Social History:   Unchanged from H&P.    Scheduled Meds:   DAPTOmycin (CUBICIN) IV (PEDS and ADULTS)  10 mg/kg (Order-Specific) Intravenous Q24H    metoprolol tartrate  12.5 mg Oral BID    predniSONE  5 mg Oral Daily    sodium bicarbonate  1,300 mg Oral TID    tacrolimus  1 mg Oral BID    tamsulosin  0.4 mg Oral Daily     Continuous Infusions:  PRN Meds:acetaminophen, melatonin, ondansetron, prochlorperazine    Intake/Output - Last 3 Shifts       None             Review of Systems   Constitutional:  Negative for appetite change and fever.   HENT:  Positive for sore throat. Negative for facial swelling and trouble swallowing.   "  Eyes:  Negative for visual disturbance.   Respiratory:  Positive for cough. Negative for shortness of breath, wheezing and stridor.    Cardiovascular:  Negative for chest pain.   Gastrointestinal:  Negative for abdominal distention, abdominal pain, diarrhea, nausea and vomiting.   Genitourinary:  Negative for decreased urine volume and difficulty urinating.   Musculoskeletal:  Negative for myalgias.   Skin:  Negative for wound.   Allergic/Immunologic: Positive for immunocompromised state.   Neurological:  Negative for tremors, syncope and speech difficulty.   Psychiatric/Behavioral:  Negative for agitation, behavioral problems, confusion, decreased concentration and hallucinations.       Objective:     Vital Signs (Most Recent):  Temp: 98 °F (36.7 °C) (03/06/24 1142)  Pulse: 61 (03/06/24 1142)  Resp: 18 (03/06/24 1142)  BP: (!) 106/56 (03/06/24 1142)  SpO2: (!) 92 % (03/06/24 1142) Vital Signs (24h Range):  Temp:  [97.6 °F (36.4 °C)-98 °F (36.7 °C)] 98 °F (36.7 °C)  Pulse:  [61-68] 61  Resp:  [18] 18  SpO2:  [92 %-97 %] 92 %  BP: (106-136)/(56-63) 106/56     Weight: 83.3 kg (183 lb 8.5 oz)  Height: 5' 9" (175.3 cm)  Body mass index is 27.1 kg/m².     Physical Exam  Vitals and nursing note reviewed.   Constitutional:       General: He is not in acute distress.     Appearance: He is not diaphoretic.   HENT:      Head: Normocephalic and atraumatic.   Eyes:      General: No scleral icterus.        Right eye: No discharge.         Left eye: No discharge.   Cardiovascular:      Rate and Rhythm: Normal rate and regular rhythm.      Heart sounds: Normal heart sounds.   Pulmonary:      Effort: Pulmonary effort is normal.      Breath sounds: Normal breath sounds. No wheezing or rales.   Abdominal:      General: Bowel sounds are normal. There is no distension.      Palpations: Abdomen is soft.      Tenderness: There is no abdominal tenderness. There is no guarding.   Musculoskeletal:      Cervical back: Normal range of " motion and neck supple.   Skin:     General: Skin is warm and dry.      Capillary Refill: Capillary refill takes less than 2 seconds.   Neurological:      Mental Status: He is alert and oriented to person, place, and time.      Cranial Nerves: No cranial nerve deficit.   Psychiatric:         Thought Content: Thought content normal.         Judgment: Judgment normal.          Laboratory:  CBC:   Recent Labs   Lab 03/04/24 0802 03/05/24 2028 03/06/24  0616   WBC 5.98 8.20 7.41   RBC 3.39* 3.13* 3.24*   HGB 11.7* 10.8* 11.2*   HCT 36.4* 32.8* 34.9*    214 204   * 105* 108*   MCH 34.5* 34.5* 34.6*   MCHC 32.1 32.9 32.1     CMP:   Recent Labs   Lab 03/04/24 0802 03/05/24 2028 03/06/24  0616   GLU 82 105 84   CALCIUM 9.4 9.5 8.7   ALBUMIN 2.9* 2.7* 2.7*    139 140   K 3.9 4.1 4.1   CO2 22* 22* 23    108 108   BUN 16 18 19   CREATININE 1.5* 1.6* 1.4     Labs within the past 24 hours have been reviewed.    Diagnostic Results: Reviewed.

## 2024-03-07 PROBLEM — R91.1 LUNG NODULE: Status: ACTIVE | Noted: 2024-03-07

## 2024-03-07 LAB
ALBUMIN SERPL BCP-MCNC: 2.6 G/DL (ref 3.5–5.2)
ANION GAP SERPL CALC-SCNC: 5 MMOL/L (ref 8–16)
BASOPHILS # BLD AUTO: 0.03 K/UL (ref 0–0.2)
BASOPHILS NFR BLD: 0.5 % (ref 0–1.9)
BUN SERPL-MCNC: 18 MG/DL (ref 8–23)
CALCIUM SERPL-MCNC: 9 MG/DL (ref 8.7–10.5)
CHLORIDE SERPL-SCNC: 111 MMOL/L (ref 95–110)
CO2 SERPL-SCNC: 22 MMOL/L (ref 23–29)
CREAT SERPL-MCNC: 1.3 MG/DL (ref 0.5–1.4)
DIFFERENTIAL METHOD BLD: ABNORMAL
EOSINOPHIL # BLD AUTO: 0 K/UL (ref 0–0.5)
EOSINOPHIL NFR BLD: 0.3 % (ref 0–8)
ERYTHROCYTE [DISTWIDTH] IN BLOOD BY AUTOMATED COUNT: 13.5 % (ref 11.5–14.5)
EST. GFR  (NO RACE VARIABLE): >60 ML/MIN/1.73 M^2
GLUCOSE SERPL-MCNC: 94 MG/DL (ref 70–110)
HCT VFR BLD AUTO: 33 % (ref 40–54)
HGB BLD-MCNC: 10.6 G/DL (ref 14–18)
IMM GRANULOCYTES # BLD AUTO: 0.04 K/UL (ref 0–0.04)
IMM GRANULOCYTES NFR BLD AUTO: 0.6 % (ref 0–0.5)
LYMPHOCYTES # BLD AUTO: 0.9 K/UL (ref 1–4.8)
LYMPHOCYTES NFR BLD: 13.4 % (ref 18–48)
MAGNESIUM SERPL-MCNC: 1.6 MG/DL (ref 1.6–2.6)
MCH RBC QN AUTO: 33.8 PG (ref 27–31)
MCHC RBC AUTO-ENTMCNC: 32.1 G/DL (ref 32–36)
MCV RBC AUTO: 105 FL (ref 82–98)
MONOCYTES # BLD AUTO: 0.6 K/UL (ref 0.3–1)
MONOCYTES NFR BLD: 8.7 % (ref 4–15)
NEUTROPHILS # BLD AUTO: 4.9 K/UL (ref 1.8–7.7)
NEUTROPHILS NFR BLD: 76.5 % (ref 38–73)
NRBC BLD-RTO: 0 /100 WBC
PHOSPHATE SERPL-MCNC: 2.7 MG/DL (ref 2.7–4.5)
PLATELET # BLD AUTO: 209 K/UL (ref 150–450)
PMV BLD AUTO: 10.7 FL (ref 9.2–12.9)
POTASSIUM SERPL-SCNC: 3.9 MMOL/L (ref 3.5–5.1)
RBC # BLD AUTO: 3.14 M/UL (ref 4.6–6.2)
SODIUM SERPL-SCNC: 138 MMOL/L (ref 136–145)
TACROLIMUS BLD-MCNC: 5.6 NG/ML (ref 5–15)
WBC # BLD AUTO: 6.41 K/UL (ref 3.9–12.7)

## 2024-03-07 PROCEDURE — 99233 SBSQ HOSP IP/OBS HIGH 50: CPT | Mod: ,,, | Performed by: PHYSICIAN ASSISTANT

## 2024-03-07 PROCEDURE — 99233 SBSQ HOSP IP/OBS HIGH 50: CPT | Mod: ,,, | Performed by: INTERNAL MEDICINE

## 2024-03-07 PROCEDURE — 25000003 PHARM REV CODE 250: Performed by: PHYSICIAN ASSISTANT

## 2024-03-07 PROCEDURE — 80197 ASSAY OF TACROLIMUS: CPT | Performed by: PHYSICIAN ASSISTANT

## 2024-03-07 PROCEDURE — 83735 ASSAY OF MAGNESIUM: CPT | Performed by: PHYSICIAN ASSISTANT

## 2024-03-07 PROCEDURE — 87385 HISTOPLASMA CAPSUL AG IA: CPT | Performed by: INTERNAL MEDICINE

## 2024-03-07 PROCEDURE — 63600175 PHARM REV CODE 636 W HCPCS: Performed by: PHYSICIAN ASSISTANT

## 2024-03-07 PROCEDURE — 87449 NOS EACH ORGANISM AG IA: CPT | Performed by: INTERNAL MEDICINE

## 2024-03-07 PROCEDURE — 87040 BLOOD CULTURE FOR BACTERIA: CPT | Performed by: INTERNAL MEDICINE

## 2024-03-07 PROCEDURE — 20600001 HC STEP DOWN PRIVATE ROOM

## 2024-03-07 PROCEDURE — 80069 RENAL FUNCTION PANEL: CPT | Performed by: PHYSICIAN ASSISTANT

## 2024-03-07 PROCEDURE — 36415 COLL VENOUS BLD VENIPUNCTURE: CPT | Performed by: PHYSICIAN ASSISTANT

## 2024-03-07 PROCEDURE — 85025 COMPLETE CBC W/AUTO DIFF WBC: CPT | Performed by: PHYSICIAN ASSISTANT

## 2024-03-07 RX ORDER — TACROLIMUS 1 MG/1
2 CAPSULE ORAL 2 TIMES DAILY
Status: DISCONTINUED | OUTPATIENT
Start: 2024-03-07 | End: 2024-03-08

## 2024-03-07 RX ADMIN — BENZONATATE 100 MG: 100 CAPSULE ORAL at 11:03

## 2024-03-07 RX ADMIN — DAPTOMYCIN 835 MG: 350 INJECTION, POWDER, LYOPHILIZED, FOR SOLUTION INTRAVENOUS at 10:03

## 2024-03-07 RX ADMIN — PREDNISONE 5 MG: 5 TABLET ORAL at 08:03

## 2024-03-07 RX ADMIN — SODIUM BICARBONATE 1300 MG: 650 TABLET ORAL at 02:03

## 2024-03-07 RX ADMIN — SODIUM BICARBONATE 1300 MG: 650 TABLET ORAL at 07:03

## 2024-03-07 RX ADMIN — METOPROLOL TARTRATE 12.5 MG: 25 TABLET, FILM COATED ORAL at 07:03

## 2024-03-07 RX ADMIN — TACROLIMUS 1 MG: 1 CAPSULE ORAL at 08:03

## 2024-03-07 RX ADMIN — SODIUM BICARBONATE 1300 MG: 650 TABLET ORAL at 08:03

## 2024-03-07 RX ADMIN — TACROLIMUS 2 MG: 1 CAPSULE ORAL at 05:03

## 2024-03-07 RX ADMIN — TAMSULOSIN HYDROCHLORIDE 0.4 MG: 0.4 CAPSULE ORAL at 08:03

## 2024-03-07 NOTE — CONSULTS
[facility] - Transplant Stepdown  Infectious Disease  Consult Note    Patient Name: Abdoulaye Parikh  MRN: [id number]  Admission Date: 3/5/2024  Hospital Length of Stay: 1 days  Attending Physician: Beatrice Mendez DO  Primary Care Provider: No, Primary Doctor     Isolation Status: No active isolations    Patient information was obtained from patient, spouse/SO, and past medical records.      Inpatient consult to Infectious Diseases  Consult performed by: Maral Lopez MD  Consult ordered by: Kera Hernandez PA-C        Assessment/Plan:     ID  * Bacteremia  63M with history of LUKT 5/20/2023, bladder / kidney stones, crohn's disease, AVG in left arm, right prosthetic hip, presents with a week of malaise, found to have E faecalis bacteremia - unclear source, possibly dental, urinary tract (known stones), GI tract. TTE with no evidence of endocarditis.    Recommendations:  - Continue daptomycin IV  - Repeat blood cultures from 3/5 are positive but these were drawn before daptomycin was administered  - Repeat blood cultures x2 ordered for AM  - Recommend CT abd/pelvis to further evaluate for any other possible source      75 minutes of total time spent on the encounter, which includes face to face time and non-face to face time preparing to see the patient (eg, review of tests), obtaining and reviewing separately obtained history, documenting clinical information in the electronic or other health record, independently interpreting results (not separately reported) and communicating results to the patient/family/caregiver, and care coordination (not separately reported).       Thank you for your consult. I will follow-up with patient. Please contact us if you have any additional questions.    Maral Lopez MD  Infectious Disease  [facility]    Subjective:     Principal Problem: Bacteremia    HPI: 63M with history of LUKT 5/20/2023, bladder / kidney stones, presents with E faecalis bacteremia.  Patient reports he was having malaise, fatigue, congestion, sore throat, diarrhea for a week. He presented to urgent care, influenza and COVID-19 were negative. Patient had blood cultures drawn - grew E faecalis. Patient reports some of his teeth have been chipping since starting the immunosuppression. No recent dental procedures. He did pass a kidney stone a couple of weeks ago. He has an AVG and a prosthetic right hip. He is feeling back to his baseline since starting antibiotics.    Past Medical History:   Diagnosis Date    Acute blood loss anemia 05/11/2023    Anemia     Arthritis     Cirrhosis     CKD (chronic kidney disease) stage 4, GFR 15-29 ml/min, slow graft function with sustaine creatinien improvement 05/26/2023    Crohn's disease     Disorder of kidney and ureter     Stage 5    Encounter for blood transfusion     Gout     Hearing loss     Hepatitis     Had Hep C Cleared    Hypertension     Immunosuppressive management encounter following kidney transplant 05/26/2023    Neurosarcoidosis 2018    Obesity     Osteoarthritis     S/P kidney transplant 05/11/2023    Sarcoid neuropathy     Sarcoidosis, lung     Steatosis        Past Surgical History:   Procedure Laterality Date    AV FISTULA PLACEMENT Left     BRAIN SURGERY      COLON SURGERY      Exploratory lap x 4 for Chron's disease. Likely right colectomy    CSF SHUNT      DIAGNOSTIC ULTRASOUND N/A 5/15/2023    Procedure: ULTRASOUND, DIAGNOSTIC;  Surgeon: Chivo Brown MD;  Location: [address];  Service: Transplant;  Laterality: N/A;    JOINT REPLACEMENT Right     Hip    KIDNEY TRANSPLANT N/A 5/10/2023    Procedure: TRANSPLANT, KIDNEY - RQ 7PM START;  Surgeon: Chivo Brown MD;  Location: [address];  Service: Transplant;  Laterality: N/A;    NEPHROSTOMY N/A 6/3/2023    Procedure: Nephrostomy;  Surgeon: Silvia Surgeon;  [facility];  Service: Anesthesiology;  Laterality: N/A;    RENAL BIOPSY  5/15/2023    Procedure:  BIOPSY, KIDNEY;  Surgeon: Chivo Brown MD;  Location: [address];  Service: Transplant;;    RENAL EXPLORATION  5/15/2023    Procedure: EXPLORATION, KIDNEY;  Surgeon: Chivo Brown MD;  Location: [facility] OR [address] [place] [address];  Service: Transplant;;    TOTAL HIP ARTHROPLASTY Right        Review of patient's allergies indicates:   Allergen Reactions    Bactrim [sulfamethoxazole-trimethoprim] Shortness Of Breath and Itching    Penicillins Shortness Of Breath and Itching       Medications:  Medications Prior to Admission   Medication Sig    fluticasone propionate (FLOVENT HFA) 44 mcg/actuation inhaler Inhale 1 puff into the lungs 2 (two) times daily.    loperamide HCl (IMODIUM A-D ORAL) Take by mouth.    magnesium oxide (MAG-OX) 400 mg (241.3 mg magnesium) tablet Take 1 tablet (400 mg total) by mouth 2 (two) times daily.    metoprolol tartrate (LOPRESSOR) 25 MG tablet Take HALF tablet (12.5 mg total) by mouth 2 (two) times daily.    mycophenolate sodium 180 MG TbEC Take 2 tablets (360 mg total) by mouth 2 (two) times daily. Txp Date:5/10/2023 (Kidney) Disch Date:5/17/2023 ICD10:Z94.0 Txp Location:[facility]    olmesartan (BENICAR) 40 MG tablet Take 1 tablet (40 mg total) by mouth once daily.    predniSONE (DELTASONE) 5 MG tablet Take 20 mg by mouth daily 5/14-5/20; 15 mg daily 5/21-5/27; 10 mg daily 5/28-6/3; then 5 mg daily thereafter 6/4/23 (Patient taking differently: Take 5 mg by mouth once daily.)    sodium bicarbonate 650 MG tablet Take 2 tablets (1,300 mg total) by mouth 3 (three) times daily.    tacrolimus (PROGRAF) 1 MG Cap Take 1 capsule (1 mg total) by mouth every morning AND 1 capsule (1 mg total) every evening. Txp Date:5/10/2023 (Kidney) Disch Date:5/17/2023 ICD10:Z94.0 Txp Location:[facility].    tamsulosin (FLOMAX) 0.4 mg Cap Take 1 capsule (0.4 mg total) by mouth once daily.     Antibiotics (From admission, onward)      Start     Stop Route Frequency Ordered    03/06/24 2100  DAPTOmycin (CUBICIN) 835 mg  in sodium chloride 0.9% SolP 50 mL IVPB         -- IV Every 24 hours (non-standard times) 03/06/24 1027          Antifungals (From admission, onward)      None          Antivirals (From admission, onward)      None             There is no immunization history for the selected administration types on file for this patient.    Family History       Problem Relation (Age of Onset)    COPD Sister    Cancer Father, Maternal Grandfather, Paternal Grandfather    Diabetes Father    Heart disease Father, Sister    Hypertension Mother, Father    Lung cancer Maternal Grandfather, Paternal Grandfather    Multiple myeloma Father    Stroke Father          Social History     Socioeconomic History    Marital status:      Spouse name: Kalani Parikh    Number of children: 2   Tobacco Use    Smoking status: Never    Smokeless tobacco: Never   Substance and Sexual Activity    Alcohol use: Not Currently    Drug use: Never    Sexual activity: Yes     Partners: Female   Social History Narrative    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     Social Determinants of Health     Financial Resource Strain: Low Risk  (2/12/2024)    Overall Financial Resource Strain (CARDIA)     Difficulty of Paying Living Expenses: Not hard at all   Food Insecurity: No Food Insecurity (2/12/2024)    Hunger Vital Sign     Worried About Running Out of Food in the Last Year: Never true     Ran Out of Food in the Last Year: Never true   Transportation Needs: No Transportation Needs (2/12/2024)    PRAPARE - Transportation     Lack of Transportation (Medical): No     Lack of Transportation (Non-Medical): No   Physical Activity: Insufficiently Active (2/12/2024)    Exercise Vital Sign     Days of Exercise per Week: 3 days     Minutes of Exercise per Session: 20 min   Stress: Stress Concern Present (2/12/2024)    [employer] [place] [employer] - Occupational Stress Questionnaire     Feeling of Stress : To some extent   Social Connections: Unknown (2/12/2024)    Social  Connection and Isolation Panel [NHANES]     Frequency of Communication with Friends and Family: Twice a week     Frequency of Social Gatherings with Friends and Family: Once a week     Active Member of Clubs or Organizations: Yes     Attends Club or Organization Meetings: 1 to 4 times per year     Marital Status:    Housing Stability: Low Risk  (2/12/2024)    Housing Stability Vital Sign     Unable to Pay for Housing in the Last Year: No     Number of Places Lived in the Last Year: 1     Unstable Housing in the Last Year: No     Review of Systems   Constitutional:  Negative for chills, diaphoresis and fever.   HENT:  Negative for rhinorrhea and sore throat.    Respiratory:  Negative for cough and shortness of breath.    Cardiovascular:  Negative for chest pain and leg swelling.   Gastrointestinal:  Negative for abdominal pain, diarrhea, nausea and vomiting.   Genitourinary:  Negative for dysuria and hematuria.   Musculoskeletal:  Negative for arthralgias and myalgias.   Skin:  Negative for rash.   Neurological:  Negative for headaches.     Objective:     Vital Signs (Most Recent):  Temp: 97.4 °F (36.3 °C) (03/06/24 1623)  Pulse: 62 (03/06/24 1623)  Resp: 18 (03/06/24 1623)  BP: 114/60 (03/06/24 1623)  SpO2: 96 % (03/06/24 1623) Vital Signs (24h Range):  Temp:  [97.4 °F (36.3 °C)-98 °F (36.7 °C)] 97.4 °F (36.3 °C)  Pulse:  [61-68] 62  Resp:  [18] 18  SpO2:  [92 %-97 %] 96 %  BP: (106-136)/(56-63) 114/60     Weight: 83 kg (183 lb)  Body mass index is 27.02 kg/m².    Estimated Creatinine Clearance: 54.7 mL/min (based on SCr of 1.4 mg/dL).     Physical Exam  Vitals reviewed.   Constitutional:       General: He is not in acute distress.     Appearance: He is well-developed. He is not diaphoretic.   HENT:      Head: Normocephalic and atraumatic.      Nose: Nose normal.   Eyes:      Conjunctiva/sclera: Conjunctivae normal.   Pulmonary:      Effort: Pulmonary effort is normal. No respiratory distress.      Breath  sounds: No wheezing or rales.   Abdominal:      General: Abdomen is flat. There is no distension.      Tenderness: There is no abdominal tenderness. There is no guarding.   Musculoskeletal:      Cervical back: Normal range of motion.      Right lower leg: No edema.      Left lower leg: No edema.   Skin:     General: Skin is warm and dry.      Findings: No erythema or rash.   Neurological:      Mental Status: He is alert.   Psychiatric:         Behavior: Behavior normal.          Significant Labs: All pertinent labs within the past 24 hours have been reviewed.    Significant Imaging: I have reviewed all pertinent imaging results/findings within the past 24 hours.

## 2024-03-07 NOTE — ASSESSMENT & PLAN NOTE
- blood cultures: Gram stain priti and aerobic bottle: Gram positive cocci in chains resembling Strep    - rapid ID with enterococcus faecalis   - ID on board  - echo without signs of infection  - continue daptomycin   - repeat blood cultures on admit positive, repeat sent this AM  - obtain ct a/p  - f/up urine culture

## 2024-03-07 NOTE — PROGRESS NOTES
[facility] - Transplant Stepdown  Infectious Disease  Progress Note    Patient Name: Abdoulaye Parikh  MRN: [id number]  Admission Date: 3/5/2024  Length of Stay: 2 days  Attending Physician: Beatrice Mendez DO  Primary Care Provider: No, Primary Doctor    Isolation Status: No active isolations  Assessment/Plan:      Pulmonary  Lung nodule  Fungal biomarkers sent  Will repeat CT chest in one month to reassess    ID  * Bacteremia  63M with history of LUKT 5/20/2023, bladder / kidney stones, crohn's disease,  shunt, AVG in left arm, right prosthetic hip, presents with a week of malaise, found to have E faecalis bacteremia - unclear source, possibly dental, urinary tract (known stones), GI tract. TTE with no evidence of endocarditis, CT abd/pelvis with no acute intraabdominal findings. Noted to have new RLL 1.3 cm nodule.    Repeat blood cultures from 3/5 are positive but these were drawn before daptomycin was administered    Recommendations:  - Continue daptomycin 10 mg/kg IV q24 hours  - Follow-up repeat blood cultures from 3/7/2024 to ensure clearance          50 minutes of total time spent on the encounter, which includes face to face time and non-face to face time preparing to see the patient (eg, review of tests), obtaining and reviewing separately obtained history, documenting clinical information in the electronic or other health record, independently interpreting results (not separately reported) and communicating results to the patient/family/caregiver, and care coordination (not separately reported).       Thank you for your consult. I will follow-up with patient. Please contact us if you have any additional questions.    Maral Lopez MD  Infectious Disease  [facility] - Transplant Stepdown    Subjective:     Principal Problem:Bacteremia    HPI: 63M with history of LUKT 5/20/2023, bladder / kidney stones, presents with E faecalis bacteremia. Patient reports he was having malaise, fatigue, congestion, sore throat,  Dr Kyle Moreno diarrhea for a week. He presented to urgent care, influenza and COVID-19 were negative. Patient had blood cultures drawn - grew E faecalis. Patient reports some of his teeth have been chipping since starting the immunosuppression. No recent dental procedures. He did pass a kidney stone a couple of weeks ago. He has an AVG and a prosthetic right hip. He is feeling back to his baseline since starting antibiotics.  Interval History  No fevers overnight, no complaints    Review of Systems   Constitutional:  Negative for chills, diaphoresis and fever.   HENT:  Negative for rhinorrhea and sore throat.    Respiratory:  Negative for cough and shortness of breath.    Cardiovascular:  Negative for chest pain and leg swelling.   Gastrointestinal:  Negative for abdominal pain, diarrhea, nausea and vomiting.   Genitourinary:  Negative for dysuria and hematuria.   Musculoskeletal:  Negative for arthralgias and myalgias.   Skin:  Negative for rash.   Neurological:  Negative for headaches.     Objective:     Vital Signs (Most Recent):  Temp: 98.9 °F (37.2 °C) (03/07/24 1547)  Pulse: 73 (03/07/24 1547)  Resp: 18 (03/07/24 1547)  BP: 130/63 (03/07/24 1547)  SpO2: 95 % (03/07/24 1547) Vital Signs (24h Range):  Temp:  [97.6 °F (36.4 °C)-98.9 °F (37.2 °C)] 98.9 °F (37.2 °C)  Pulse:  [59-73] 73  Resp:  [17-18] 18  SpO2:  [95 %-97 %] 95 %  BP: (105-130)/(60-68) 130/63     Weight: 83 kg (183 lb)  Body mass index is 27.02 kg/m².    Estimated Creatinine Clearance: 58.9 mL/min (based on SCr of 1.3 mg/dL).     Physical Exam  Vitals reviewed.   Constitutional:       General: He is not in acute distress.     Appearance: He is well-developed. He is not diaphoretic.   HENT:      Head: Normocephalic and atraumatic.      Nose: Nose normal.   Eyes:      Conjunctiva/sclera: Conjunctivae normal.   Pulmonary:      Effort: Pulmonary effort is normal. No respiratory distress.      Breath sounds: No wheezing or rales.   Abdominal:      General: Abdomen is  flat. There is no distension.      Tenderness: There is no abdominal tenderness. There is no guarding.   Musculoskeletal:      Cervical back: Normal range of motion.      Right lower leg: No edema.      Left lower leg: No edema.   Skin:     General: Skin is warm and dry.      Findings: No erythema or rash.   Neurological:      Mental Status: He is alert.   Psychiatric:         Behavior: Behavior normal.          Significant Labs: All pertinent labs within the past 24 hours have been reviewed.    Significant Imaging: I have reviewed all pertinent imaging results/findings within the past 24 hours.

## 2024-03-07 NOTE — PROGRESS NOTES
Pt to CT scan at 1305 via wheelchair, no distress noted.     Pt back in bed at 1330, no complaints at this time.

## 2024-03-07 NOTE — SUBJECTIVE & OBJECTIVE
Interval History  No fevers overnight, no complaints    Review of Systems   Constitutional:  Negative for chills, diaphoresis and fever.   HENT:  Negative for rhinorrhea and sore throat.    Respiratory:  Negative for cough and shortness of breath.    Cardiovascular:  Negative for chest pain and leg swelling.   Gastrointestinal:  Negative for abdominal pain, diarrhea, nausea and vomiting.   Genitourinary:  Negative for dysuria and hematuria.   Musculoskeletal:  Negative for arthralgias and myalgias.   Skin:  Negative for rash.   Neurological:  Negative for headaches.     Objective:     Vital Signs (Most Recent):  Temp: 98.9 °F (37.2 °C) (03/07/24 1547)  Pulse: 73 (03/07/24 1547)  Resp: 18 (03/07/24 1547)  BP: 130/63 (03/07/24 1547)  SpO2: 95 % (03/07/24 1547) Vital Signs (24h Range):  Temp:  [97.6 °F (36.4 °C)-98.9 °F (37.2 °C)] 98.9 °F (37.2 °C)  Pulse:  [59-73] 73  Resp:  [17-18] 18  SpO2:  [95 %-97 %] 95 %  BP: (105-130)/(60-68) 130/63     Weight: 83 kg (183 lb)  Body mass index is 27.02 kg/m².    Estimated Creatinine Clearance: 58.9 mL/min (based on SCr of 1.3 mg/dL).     Physical Exam  Vitals reviewed.   Constitutional:       General: He is not in acute distress.     Appearance: He is well-developed. He is not diaphoretic.   HENT:      Head: Normocephalic and atraumatic.      Nose: Nose normal.   Eyes:      Conjunctiva/sclera: Conjunctivae normal.   Pulmonary:      Effort: Pulmonary effort is normal. No respiratory distress.      Breath sounds: No wheezing or rales.   Abdominal:      General: Abdomen is flat. There is no distension.      Tenderness: There is no abdominal tenderness. There is no guarding.   Musculoskeletal:      Cervical back: Normal range of motion.      Right lower leg: No edema.      Left lower leg: No edema.   Skin:     General: Skin is warm and dry.      Findings: No erythema or rash.   Neurological:      Mental Status: He is alert.   Psychiatric:         Behavior: Behavior normal.           Significant Labs: All pertinent labs within the past 24 hours have been reviewed.    Significant Imaging: I have reviewed all pertinent imaging results/findings within the past 24 hours.

## 2024-03-07 NOTE — ASSESSMENT & PLAN NOTE
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 TTE with no evidence of endocarditis, CT abd/pelvis with no acute intraabdominal findings. Noted to have new RLL 1.3 cm nodule.    Repeat blood cultures from 3/5 are positive but these were drawn before daptomycin was administered    Recommendations:  - Continue daptomycin 10 mg/kg IV q24 hours  - Follow-up repeat blood cultures from 3/7/2024 to ensure clearance

## 2024-03-07 NOTE — ASSESSMENT & PLAN NOTE
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 TTE with no evidence of endocarditis.    Recommendations:  - Continue daptomycin IV  - Repeat blood cultures from 3/5 are positive but these were drawn before daptomycin was administered  - Repeat blood cultures x2 ordered for AM  - Recommend CT abd/pelvis to further evaluate for any other possible source

## 2024-03-07 NOTE — HPI
63M with history of LUKT 5/20/2023, bladder / kidney stones, presents with E faecalis bacteremia. Patient reports he was having malaise, fatigue, congestion, sore throat, diarrhea for a week. He presented to urgent care, influenza and COVID-19 were negative. Patient had blood cultures drawn - grew E faecalis. Patient reports some of his teeth have been chipping since starting the immunosuppression. No recent dental procedures. He did pass a kidney stone a couple of weeks ago. He has an AVG and a prosthetic right hip. He is feeling back to his baseline since starting antibiotics.

## 2024-03-07 NOTE — SUBJECTIVE & OBJECTIVE
Past Medical History:   Diagnosis Date    Acute blood loss anemia 05/11/2023    Anemia     Arthritis     Cirrhosis     CKD (chronic kidney disease) stage 4, GFR 15-29 ml/min, slow graft function with sustaine creatinien improvement 05/26/2023    Crohn's disease     Disorder of kidney and ureter     Stage 5    Encounter for blood transfusion     Gout     Hearing loss     Hepatitis     Had Hep C Cleared    Hypertension     Immunosuppressive management encounter following kidney transplant 05/26/2023    Neurosarcoidosis 2018    Obesity     Osteoarthritis     S/P kidney transplant 05/11/2023    Sarcoid neuropathy     Sarcoidosis, lung     Steatosis        Past Surgical History:   Procedure Laterality Date    AV FISTULA PLACEMENT Left     BRAIN SURGERY      COLON SURGERY      Exploratory lap x 4 for Chron's disease. Likely right colectomy    CSF SHUNT      DIAGNOSTIC ULTRASOUND N/A 5/15/2023    Procedure: ULTRASOUND, DIAGNOSTIC;  Surgeon: Chivo Brown MD;  Location: [facility] OR [address] [place] [address];  Service: Transplant;  Laterality: N/A;    JOINT REPLACEMENT Right     Hip    KIDNEY TRANSPLANT N/A 5/10/2023    Procedure: TRANSPLANT, KIDNEY - RQ 7PM START;  Surgeon: Chivo Brown MD;  Location: [address];  Service: Transplant;  Laterality: N/A;    NEPHROSTOMY N/A 6/3/2023    Procedure: Nephrostomy;  Surgeon: Silvia Surgeon;  Location: [facility];  Service: Anesthesiology;  Laterality: N/A;    RENAL BIOPSY  5/15/2023    Procedure: BIOPSY, KIDNEY;  Surgeon: Chivo Brown MD;  Location: [address];  Service: Transplant;;    RENAL EXPLORATION  5/15/2023    Procedure: EXPLORATION, KIDNEY;  Surgeon: Chivo Brown MD;  Location: [address];  Service: Transplant;;    TOTAL HIP ARTHROPLASTY Right        Review of patient's allergies indicates:   Allergen Reactions    Bactrim [sulfamethoxazole-trimethoprim] Shortness Of Breath and Itching    Penicillins Shortness Of Breath and Itching        Medications:  Medications Prior to Admission   Medication Sig    fluticasone propionate (FLOVENT HFA) 44 mcg/actuation inhaler Inhale 1 puff into the lungs 2 (two) times daily.    loperamide HCl (IMODIUM A-D ORAL) Take by mouth.    magnesium oxide (MAG-OX) 400 mg (241.3 mg magnesium) tablet Take 1 tablet (400 mg total) by mouth 2 (two) times daily.    metoprolol tartrate (LOPRESSOR) 25 MG tablet Take HALF tablet (12.5 mg total) by mouth 2 (two) times daily.    mycophenolate sodium 180 MG TbEC Take 2 tablets (360 mg total) by mouth 2 (two) times daily. Txp Date:5/10/2023 (Kidney) Disch Date:5/17/2023 ICD10:Z94.0 Txp Location:[facility]    olmesartan (BENICAR) 40 MG tablet Take 1 tablet (40 mg total) by mouth once daily.    predniSONE (DELTASONE) 5 MG tablet Take 20 mg by mouth daily 5/14-5/20; 15 mg daily 5/21-5/27; 10 mg daily 5/28-6/3; then 5 mg daily thereafter 6/4/23 (Patient taking differently: Take 5 mg by mouth once daily.)    sodium bicarbonate 650 MG tablet Take 2 tablets (1,300 mg total) by mouth 3 (three) times daily.    tacrolimus (PROGRAF) 1 MG Cap Take 1 capsule (1 mg total) by mouth every morning AND 1 capsule (1 mg total) every evening. Txp Date:5/10/2023 (Kidney) Disch Date:5/17/2023 ICD10:Z94.0 Txp Location:[facility].    tamsulosin (FLOMAX) 0.4 mg Cap Take 1 capsule (0.4 mg total) by mouth once daily.     Antibiotics (From admission, onward)      Start     Stop Route Frequency Ordered    03/06/24 2100  DAPTOmycin (CUBICIN) 835 mg in sodium chloride 0.9% SolP 50 mL IVPB         -- IV Every 24 hours (non-standard times) 03/06/24 1027          Antifungals (From admission, onward)      None          Antivirals (From admission, onward)      None             There is no immunization history for the selected administration types on file for this patient.    Family History       Problem Relation (Age of Onset)    COPD Sister    Cancer Father, Maternal Grandfather, Paternal Grandfather    Diabetes Father    Heart  disease Father, Sister    Hypertension Mother, Father    Lung cancer Maternal Grandfather, Paternal Grandfather    Multiple myeloma Father    Stroke Father          Social History     Socioeconomic History    Marital status:      Spouse name: Kalani Parikh    Number of children: 2   Tobacco Use    Smoking status: Never    Smokeless tobacco: Never   Substance and Sexual Activity    Alcohol use: Not Currently    Drug use: Never    Sexual activity: Yes     Partners: Female   Social History Narrative    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     Social Determinants of Health     Financial Resource Strain: Low Risk  (2/12/2024)    Overall Financial Resource Strain (CARDIA)     Difficulty of Paying Living Expenses: Not hard at all   Food Insecurity: No Food Insecurity (2/12/2024)    Hunger Vital Sign     Worried About Running Out of Food in the Last Year: Never true     Ran Out of Food in the Last Year: Never true   Transportation Needs: No Transportation Needs (2/12/2024)    PRAPARE - Transportation     Lack of Transportation (Medical): No     Lack of Transportation (Non-Medical): No   Physical Activity: Insufficiently Active (2/12/2024)    Exercise Vital Sign     Days of Exercise per Week: 3 days     Minutes of Exercise per Session: 20 min   Stress: Stress Concern Present (2/12/2024)    [employer] - Occupational Stress Questionnaire     Feeling of Stress : To some extent   Social Connections: Unknown (2/12/2024)    Social Connection and Isolation Panel [NHANES]     Frequency of Communication with Friends and Family: Twice a week     Frequency of Social Gatherings with Friends and Family: Once a week     Active Member of Clubs or Organizations: Yes     Attends Club or Organization Meetings: 1 to 4 times per year     Marital Status:    Housing Stability: Low Risk  (2/12/2024)    Housing Stability Vital Sign     Unable to Pay for Housing in the Last Year: No     Number of Places Lived in the Last Year:  1     Unstable Housing in the Last Year: No     Review of Systems   Constitutional:  Negative for chills, diaphoresis and fever.   HENT:  Negative for rhinorrhea and sore throat.    Respiratory:  Negative for cough and shortness of breath.    Cardiovascular:  Negative for chest pain and leg swelling.   Gastrointestinal:  Negative for abdominal pain, diarrhea, nausea and vomiting.   Genitourinary:  Negative for dysuria and hematuria.   Musculoskeletal:  Negative for arthralgias and myalgias.   Skin:  Negative for rash.   Neurological:  Negative for headaches.     Objective:     Vital Signs (Most Recent):  Temp: 97.4 °F (36.3 °C) (03/06/24 1623)  Pulse: 62 (03/06/24 1623)  Resp: 18 (03/06/24 1623)  BP: 114/60 (03/06/24 1623)  SpO2: 96 % (03/06/24 1623) Vital Signs (24h Range):  Temp:  [97.4 °F (36.3 °C)-98 °F (36.7 °C)] 97.4 °F (36.3 °C)  Pulse:  [61-68] 62  Resp:  [18] 18  SpO2:  [92 %-97 %] 96 %  BP: (106-136)/(56-63) 114/60     Weight: 83 kg (183 lb)  Body mass index is 27.02 kg/m².    Estimated Creatinine Clearance: 54.7 mL/min (based on SCr of 1.4 mg/dL).     Physical Exam  Vitals reviewed.   Constitutional:       General: He is not in acute distress.     Appearance: He is well-developed. He is not diaphoretic.   HENT:      Head: Normocephalic and atraumatic.      Nose: Nose normal.   Eyes:      Conjunctiva/sclera: Conjunctivae normal.   Pulmonary:      Effort: Pulmonary effort is normal. No respiratory distress.      Breath sounds: No wheezing or rales.   Abdominal:      General: Abdomen is flat. There is no distension.      Tenderness: There is no abdominal tenderness. There is no guarding.   Musculoskeletal:      Cervical back: Normal range of motion.      Right lower leg: No edema.      Left lower leg: No edema.   Skin:     General: Skin is warm and dry.      Findings: No erythema or rash.   Neurological:      Mental Status: He is alert.   Psychiatric:         Behavior: Behavior normal.          Significant  Labs: All pertinent labs within the past 24 hours have been reviewed.    Significant Imaging: I have reviewed all pertinent imaging results/findings within the past 24 hours.

## 2024-03-07 NOTE — PLAN OF CARE
PLAN OF CARE NOTE     Fall bundle in place. Pt. Remained free from falls/trauma/injuries. No complaints of CP/ SOB/discomfort. VSS. RA. A&Ox4. No tele, pulse WNL. Pt. denies pain this shift. FAIZA Samuels PA-C approved PRN benzonatate, given for cough relief. Pt. Continues Daptomycin IVPB treatment. Strep Neg. No Bms overnight. The POC reviewed w/ pt. & pts' wife @ bedside, questions were answered & encouraged. No further concerns at this time.

## 2024-03-07 NOTE — SUBJECTIVE & OBJECTIVE
Subjective:   History of Present Illness:  Mr Abdoulaye Parikh is a 63 yo s/p living unrelated kidney transplant on 5/10/23. Post op course significant for OR takeback 5/15 to assess flow due to elevated velocities in artery and vein on US and DGF (last HD 5/11 for hyperkalemia).Post op course notable for:  - Urine leak - neph tube placed 6/3; PCNU removed 12/31.  - possible bladder/kidney stones. Seen by urology 2/2024. Started on flomax with plan for cystoscopy.      He now presents as a direct admit for bacteremia. He contacted the team due to congestion, body aches, chills, sore throat, and diarrhea for the past week. He underwent infectious work up outpatient. Blood cultures resulted with Gram stain aerobic & anaerobic  bottle: Gram positive cocci in chains resembling Strep. Rapid ID + enterococcus.  UA dirty, culture pending. Plan for admission to [facility] for management. D/w staff.       Hospital Course:  Admitted for bacteremia. Pt states his symptoms of congestion, body aches, and diarrhea resolved and he is feeling much better than he was a week ago. He only c/o dry cough and sore throat . Started on Daptomycin for bacteremia. ID on board. Repeat infectious work up in process.Urine culture pending.     Interval history: NAEON. Pt feeling fine today. Admit blood cultures positive (prior to starting treatment). Repeat blood cultures sent this morning. Continue daptomycin while waiting for sensitivities. Obtain ct a/p per ID recs. Echo without vegetation. Urine culture still pending. Otherwise labs are stable. Continue to monitor.     Past Medical, Surgical, Family, and Social History:   Unchanged from H&P.    Scheduled Meds:   DAPTOmycin (CUBICIN) IV (PEDS and ADULTS)  10 mg/kg (Order-Specific) Intravenous Q24H    metoprolol tartrate  12.5 mg Oral BID    predniSONE  5 mg Oral Daily    sodium bicarbonate  1,300 mg Oral TID    tacrolimus  2 mg Oral BID    tamsulosin  0.4 mg Oral Daily     Continuous Infusions:  PRN  "Meds:acetaminophen, benzonatate, melatonin, ondansetron, prochlorperazine    Intake/Output - Last 3 Shifts         03/05 0700  03/06 0659 03/06 0700  03/07 0659 03/07 0700  03/08 0659    P.O.  990     Total Intake(mL/kg)  990 (11.9)     Net  +990            Urine Occurrence  4 x     Stool Occurrence  2 x 0 x             Review of Systems   Constitutional:  Negative for appetite change and fever.   HENT:  Negative for facial swelling, sore throat and trouble swallowing.    Eyes:  Negative for visual disturbance.   Respiratory:  Positive for cough. Negative for shortness of breath, wheezing and stridor.    Cardiovascular:  Negative for chest pain.   Gastrointestinal:  Negative for abdominal distention, abdominal pain, diarrhea, nausea and vomiting.   Genitourinary:  Negative for decreased urine volume and difficulty urinating.   Musculoskeletal:  Negative for myalgias.   Skin:  Negative for wound.   Allergic/Immunologic: Positive for immunocompromised state.   Neurological:  Negative for tremors, syncope and speech difficulty.   Psychiatric/Behavioral:  Negative for agitation, behavioral problems, confusion, decreased concentration and hallucinations.       Objective:     Vital Signs (Most Recent):  Temp: 97.6 °F (36.4 °C) (03/07/24 0750)  Pulse: 63 (03/07/24 0834)  Resp: 18 (03/07/24 0750)  BP: 116/63 (03/07/24 0834)  SpO2: 97 % (03/07/24 0750) Vital Signs (24h Range):  Temp:  [97.4 °F (36.3 °C)-98.3 °F (36.8 °C)] 97.6 °F (36.4 °C)  Pulse:  [59-69] 63  Resp:  [17-18] 18  SpO2:  [92 %-97 %] 97 %  BP: (105-119)/(56-68) 116/63     Weight: 83 kg (183 lb)  Height: 5' 9" (175.3 cm)  Body mass index is 27.02 kg/m².     Physical Exam  Vitals reviewed.   Constitutional:       General: He is not in acute distress.     Appearance: He is well-developed. He is not diaphoretic.   HENT:      Head: Normocephalic and atraumatic.      Nose: Nose normal.   Eyes:      Conjunctiva/sclera: Conjunctivae normal.   Pulmonary:      Effort: " Pulmonary effort is normal. No respiratory distress.      Breath sounds: No wheezing or rales.   Abdominal:      General: Abdomen is flat. There is no distension.      Tenderness: There is no abdominal tenderness. There is no guarding.   Musculoskeletal:      Cervical back: Normal range of motion.      Right lower leg: No edema.      Left lower leg: No edema.   Skin:     General: Skin is warm and dry.      Findings: No erythema or rash.   Neurological:      Mental Status: He is alert.   Psychiatric:         Behavior: Behavior normal.          Laboratory:  CBC:   Recent Labs   Lab 03/05/24 2028 03/06/24 0616 03/07/24  0626   WBC 8.20 7.41 6.41   RBC 3.13* 3.24* 3.14*   HGB 10.8* 11.2* 10.6*   HCT 32.8* 34.9* 33.0*    204 209   * 108* 105*   MCH 34.5* 34.6* 33.8*   MCHC 32.9 32.1 32.1     CMP:   Recent Labs   Lab 03/05/24 2028 03/06/24  0616 03/07/24  0626    84 94   CALCIUM 9.5 8.7 9.0   ALBUMIN 2.7* 2.7* 2.6*    140 138   K 4.1 4.1 3.9   CO2 22* 23 22*    108 111*   BUN 18 19 18   CREATININE 1.6* 1.4 1.3     Labs within the past 24 hours have been reviewed.    Diagnostic Results:Reviewed

## 2024-03-07 NOTE — PROGRESS NOTES
Admit Note     Met with patient and pt's spouse to assess patients needs.   Patient is a 63 y.o.  male, s/p kidney transplant from 5/10/2023; admitted for Kidney transplant status [Z94.0]  Other complication of kidney transplant [T86.19]  Bacteremia [R78.81]     Patient admitted from home on 3/5/2024 .  At this time, patient presents as alert and oriented x 4, pleasant, good eye contact, well groomed, recall good, concentration/judgement good, average intelligence, calm, communicative, cooperative, and asking and answering questions appropriately.  At this time, patients caregiver presents as alert and oriented x 4, pleasant, good eye contact, well groomed, recall good, concentration/judgement good, average intelligence, calm, communicative, cooperative, and asking and answering questions appropriately.      Household/Family Systems    Patient resides with patient's spouse Kalani Parikh, pt's mother-in-law Milly Ricardo, and pt's sister-in-law Elenora Ricardo, at [address] [place] [address].  Support system includes patient's spouse Kalani Parikh, pt's mother-in-law Milly Ricardo, and pt's sister-in-law Elenora Ricardo.  Patient does not have dependents that are need of being cared for.     Patients primary caregiver is Kalani Parikh, patients spouse, phone number [phone].  Confirmed patients contact information is [phone] (home);   Telephone Information:   Mobile [phone]   .    During admission, patient's caregiver plans to stay at home.  Confirmed patient and patients caregivers do have access to reliable transportation.     Cognitive Status/Learning     Patient reports patients reading ability as college and states patient does have difficulty with hearing and uses bilateral hearing aids. Patient reports patient learns best by hands-on, practicing.   Needed: No.   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 Pt reports having PTO and FMLA available.     Adherence     Patient reports patient has a high level of adherence to patients health care regimen.  Adherence counseling and education provided.  Patient verbalizes understanding.    Substance Use    Patient reports substance usage as the following:    Tobacco: none, patient denies any use.  Alcohol: none, patient denies any use.  Illicit Drugs/Non-prescribed Medications: none, patient denies any use.  Patient states clear understanding of the potential impact of substance use.  Substance abstinence/cessation counseling, education and resources provided and reviewed.     Services Utilizing/ADLS    Infusion Service: Prior to admission, patient utilizing? no  Home Health: Prior to admission, patient utilizing? no  DME: Prior to admission, yes, [facility] and glucometer  Pulmonary/Cardiac Rehab: Prior to admission, no  Dialysis:  Prior to admission, no, pt was on MWF 2-6 pm at [facility] following transplant due to DGF concerns.  OP HD no longer needed.  Transplant Specialty Pharmacy:  Prior to admission, yes; [facility].    Prior to admission, patient was independent with ADLS and was driving.  Patients caregiver reports patient is able to care for self at this time..  Patients caregiver reports patient indicates a willingness to care for self once medically cleared to do so.    Insurance/Medications    Insured by  Payer/Plan Subscr  Sex Relation Sub. Ins. ID Effective Group Num   1. MEDICARE - ME* MATT BHANDARI 1960 Male Self [id number] 10/1/20                                    [address]   2. BLUE CROSS * MATT BHANDARI 1960 Male Self [id number]                                   [address] [address]     Primary Insurance (for UNOS reporting): Private Insurance  Secondary Insurance (for  UNOS reporting): Public Insurance - Medicare FFS (Fee For Service)    Patient reports patient is able to obtain and afford medications at this time and at time of discharge.    Living Will/Healthcare Power of     Patient reports patient has a LW and/or HCPA.   provided education regarding LW and HCPA and the completion of forms.    Coping/Mental Health    Patient is coping adequately with the aid of  family members.  Patients caregiver is coping adequately with the aid of  family members.  Patient denies mental health difficulties.    Discharge Planning    At time of discharge, patient plans to return to patient's home under the care of self/spouse.  Patients spouse and friend will transport patient.  Per rounds today, expected discharge date has not been medically determined at this time. Patient and patients caretaker verbalizes understanding and is involved in treatment planning and discharge process.    Additional Concerns    Patient's caretaker denies additional needs and/or concerns at this time. Patient is being followed for needs, education, resources, information, emotional support, supportive counseling, and for supportive and skilled discharge plan of care.  providing ongoing psychosocial support, education, resources and d/c planning as needed.  SW remains available.  remains available. Patient's caregiver verbalizes understanding and agreement with information reviewed,  availability and how to access available resources as needed. Patient denies additional needs and/or concerns at this time. Patient verbalizes understanding and agreement with information reviewed, social work availability, and how to access available resources as needed.

## 2024-03-07 NOTE — PROGRESS NOTES
[facility]  Kidney Transplant  Progress Note      Reason for Follow-up: Reassessment of Kidney Transplant - 5/10/2023  (#1) recipient and management of immunosuppression.    ORGAN: LEFT KIDNEY    Donor Type: Living    Donor CMV Status:   Donor HBcAB:  Donor HCV Status:  Donor HBV AURORA:   Donor HCV AURORA:       Subjective:   History of Present Illness:  Mr Abdoulaye Parikh is a 63 yo s/p living unrelated kidney transplant on 5/10/23. Post op course significant for OR takeback 5/15 to assess flow due to elevated velocities in artery and vein on US and DGF (last HD 5/11 for hyperkalemia).Post op course notable for:  - Urine leak - neph tube placed 6/3; PCNU removed 12/31.  - possible bladder/kidney stones. Seen by urology 2/2024. Started on flomax with plan for cystoscopy.      He now presents as a direct admit for bacteremia. He contacted the team due to congestion, body aches, chills, sore throat, and diarrhea for the past week. He underwent infectious work up outpatient. Blood cultures resulted with Gram stain aerobic & anaerobic  bottle: Gram positive cocci in chains resembling Strep. Rapid ID + enterococcus.  UA dirty, culture pending. Plan for admission to TSU for management. D/w staff.       Hospital Course:  Admitted for bacteremia. Pt states his symptoms of congestion, body aches, and diarrhea resolved and he is feeling much better than he was a week ago. He only c/o dry cough and sore throat . Started on Daptomycin for bacteremia. ID on board. Repeat infectious work up in process.Urine culture pending.     Interval history: NAEON. Pt feeling fine today. Admit blood cultures positive (prior to starting treatment). Repeat blood cultures sent this morning. Continue daptomycin while waiting for sensitivities. Obtain ct a/p per ID recs. Echo without vegetation. Urine culture still pending. Otherwise labs are stable. Continue to monitor.     Past Medical, Surgical, Family, and Social History:  "  Unchanged from H&P.    Scheduled Meds:   DAPTOmycin (CUBICIN) IV (PEDS and ADULTS)  10 mg/kg (Order-Specific) Intravenous Q24H    metoprolol tartrate  12.5 mg Oral BID    predniSONE  5 mg Oral Daily    sodium bicarbonate  1,300 mg Oral TID    tacrolimus  2 mg Oral BID    tamsulosin  0.4 mg Oral Daily     Continuous Infusions:  PRN Meds:acetaminophen, benzonatate, melatonin, ondansetron, prochlorperazine    Intake/Output - Last 3 Shifts         03/05 0700  03/06 0659 03/06 0700  03/07 0659 03/07 0700  03/08 0659    P.O.  990     Total Intake(mL/kg)  990 (11.9)     Net  +990            Urine Occurrence  4 x     Stool Occurrence  2 x 0 x             Review of Systems   Constitutional:  Negative for appetite change and fever.   HENT:  Negative for facial swelling, sore throat and trouble swallowing.    Eyes:  Negative for visual disturbance.   Respiratory:  Positive for cough. Negative for shortness of breath, wheezing and stridor.    Cardiovascular:  Negative for chest pain.   Gastrointestinal:  Negative for abdominal distention, abdominal pain, diarrhea, nausea and vomiting.   Genitourinary:  Negative for decreased urine volume and difficulty urinating.   Musculoskeletal:  Negative for myalgias.   Skin:  Negative for wound.   Allergic/Immunologic: Positive for immunocompromised state.   Neurological:  Negative for tremors, syncope and speech difficulty.   Psychiatric/Behavioral:  Negative for agitation, behavioral problems, confusion, decreased concentration and hallucinations.       Objective:     Vital Signs (Most Recent):  Temp: 97.6 °F (36.4 °C) (03/07/24 0750)  Pulse: 63 (03/07/24 0834)  Resp: 18 (03/07/24 0750)  BP: 116/63 (03/07/24 0834)  SpO2: 97 % (03/07/24 0750) Vital Signs (24h Range):  Temp:  [97.4 °F (36.3 °C)-98.3 °F (36.8 °C)] 97.6 °F (36.4 °C)  Pulse:  [59-69] 63  Resp:  [17-18] 18  SpO2:  [92 %-97 %] 97 %  BP: (105-119)/(56-68) 116/63     Weight: 83 kg (183 lb)  Height: 5' 9" (175.3 cm)  Body mass " index is 27.02 kg/m².     Physical Exam  Vitals reviewed.   Constitutional:       General: He is not in acute distress.     Appearance: He is well-developed. He is not diaphoretic.   HENT:      Head: Normocephalic and atraumatic.      Nose: Nose normal.   Eyes:      Conjunctiva/sclera: Conjunctivae normal.   Pulmonary:      Effort: Pulmonary effort is normal. No respiratory distress.      Breath sounds: No wheezing or rales.   Abdominal:      General: Abdomen is flat. There is no distension.      Tenderness: There is no abdominal tenderness. There is no guarding.   Musculoskeletal:      Cervical back: Normal range of motion.      Right lower leg: No edema.      Left lower leg: No edema.   Skin:     General: Skin is warm and dry.      Findings: No erythema or rash.   Neurological:      Mental Status: He is alert.   Psychiatric:         Behavior: Behavior normal.          Laboratory:  CBC:   Recent Labs   Lab 03/05/24 2028 03/06/24  0616 03/07/24  0626   WBC 8.20 7.41 6.41   RBC 3.13* 3.24* 3.14*   HGB 10.8* 11.2* 10.6*   HCT 32.8* 34.9* 33.0*    204 209   * 108* 105*   MCH 34.5* 34.6* 33.8*   MCHC 32.9 32.1 32.1     CMP:   Recent Labs   Lab 03/05/24 2028 03/06/24  0616 03/07/24  0626    84 94   CALCIUM 9.5 8.7 9.0   ALBUMIN 2.7* 2.7* 2.6*    140 138   K 4.1 4.1 3.9   CO2 22* 23 22*    108 111*   BUN 18 19 18   CREATININE 1.6* 1.4 1.3     Labs within the past 24 hours have been reviewed.    Diagnostic Results:Reviewed    Assessment/Plan:     * Bacteremia  - blood cultures: Gram stain priti and aerobic bottle: Gram positive cocci in chains resembling Strep    - rapid ID with enterococcus faecalis   - ID on board  - echo without signs of infection  - continue daptomycin   - repeat blood cultures on admit positive, repeat sent this AM  - obtain ct a/p  - f/up urine culture    Anemia of renal disease  - 2/2 h/o renal disease  - daily CBC      Long-term use of immunosuppressant  medication  - see prophylactic immunotherapy      Prophylactic immunotherapy  - continue prograf and steroid taper per protocol  - will check daily prograf levels, monitor for toxic side effects, and adjust for therapeutic dose  - hold cellcept      At risk for opportunistic infections  - cont OI prophylaxis per protocol  - CMV PCR pending          Discharge Planning: Not a candidate at this time.     Medical decision making for this encounter includes review of pertinent labs and diagnostic studies, assessment and planning, discussions with consulting providers, discussion with patient/family, and participation in multidisciplinary rounds. Time spent caring for patient: 60 minutes    Kera Hernandez PA-C  Kidney Transplant  [facility]

## 2024-03-08 LAB
ALBUMIN SERPL BCP-MCNC: 2.9 G/DL (ref 3.5–5.2)
ANION GAP SERPL CALC-SCNC: 11 MMOL/L (ref 8–16)
BACTERIA BLD CULT: ABNORMAL
BASOPHILS # BLD AUTO: 0.02 K/UL (ref 0–0.2)
BASOPHILS NFR BLD: 0.3 % (ref 0–1.9)
BUN SERPL-MCNC: 19 MG/DL (ref 8–23)
C DIFF GDH STL QL: NEGATIVE
C DIFF TOX A+B STL QL IA: NEGATIVE
CALCIUM SERPL-MCNC: 9.6 MG/DL (ref 8.7–10.5)
CHLORIDE SERPL-SCNC: 110 MMOL/L (ref 95–110)
CK SERPL-CCNC: 47 U/L (ref 20–200)
CO2 SERPL-SCNC: 19 MMOL/L (ref 23–29)
CREAT SERPL-MCNC: 1.5 MG/DL (ref 0.5–1.4)
CRYPTOC AG SER QL LA: NEGATIVE
DIFFERENTIAL METHOD BLD: ABNORMAL
EOSINOPHIL # BLD AUTO: 0 K/UL (ref 0–0.5)
EOSINOPHIL NFR BLD: 0.4 % (ref 0–8)
ERYTHROCYTE [DISTWIDTH] IN BLOOD BY AUTOMATED COUNT: 13.4 % (ref 11.5–14.5)
EST. GFR  (NO RACE VARIABLE): 52.3 ML/MIN/1.73 M^2
GLUCOSE SERPL-MCNC: 78 MG/DL (ref 70–110)
HCT VFR BLD AUTO: 34.7 % (ref 40–54)
HGB BLD-MCNC: 11 G/DL (ref 14–18)
IMM GRANULOCYTES # BLD AUTO: 0.04 K/UL (ref 0–0.04)
IMM GRANULOCYTES NFR BLD AUTO: 0.6 % (ref 0–0.5)
LYMPHOCYTES # BLD AUTO: 1.2 K/UL (ref 1–4.8)
LYMPHOCYTES NFR BLD: 17.1 % (ref 18–48)
MAGNESIUM SERPL-MCNC: 1.5 MG/DL (ref 1.6–2.6)
MCH RBC QN AUTO: 34.1 PG (ref 27–31)
MCHC RBC AUTO-ENTMCNC: 31.7 G/DL (ref 32–36)
MCV RBC AUTO: 107 FL (ref 82–98)
MONOCYTES # BLD AUTO: 0.5 K/UL (ref 0.3–1)
MONOCYTES NFR BLD: 7 % (ref 4–15)
NEUTROPHILS # BLD AUTO: 5.1 K/UL (ref 1.8–7.7)
NEUTROPHILS NFR BLD: 74.6 % (ref 38–73)
NRBC BLD-RTO: 0 /100 WBC
PHOSPHATE SERPL-MCNC: 2.8 MG/DL (ref 2.7–4.5)
PLATELET # BLD AUTO: 235 K/UL (ref 150–450)
PMV BLD AUTO: 10.7 FL (ref 9.2–12.9)
POTASSIUM SERPL-SCNC: 3.8 MMOL/L (ref 3.5–5.1)
RBC # BLD AUTO: 3.23 M/UL (ref 4.6–6.2)
SODIUM SERPL-SCNC: 140 MMOL/L (ref 136–145)
TACROLIMUS BLD-MCNC: 5.6 NG/ML (ref 5–15)
WBC # BLD AUTO: 6.85 K/UL (ref 3.9–12.7)

## 2024-03-08 PROCEDURE — 63600175 PHARM REV CODE 636 W HCPCS: Performed by: PHYSICIAN ASSISTANT

## 2024-03-08 PROCEDURE — 85025 COMPLETE CBC W/AUTO DIFF WBC: CPT | Performed by: PHYSICIAN ASSISTANT

## 2024-03-08 PROCEDURE — 87449 NOS EACH ORGANISM AG IA: CPT | Mod: 91 | Performed by: INTERNAL MEDICINE

## 2024-03-08 PROCEDURE — 80069 RENAL FUNCTION PANEL: CPT | Performed by: PHYSICIAN ASSISTANT

## 2024-03-08 PROCEDURE — 83735 ASSAY OF MAGNESIUM: CPT | Performed by: PHYSICIAN ASSISTANT

## 2024-03-08 PROCEDURE — 25000003 PHARM REV CODE 250: Performed by: PHYSICIAN ASSISTANT

## 2024-03-08 PROCEDURE — 99233 SBSQ HOSP IP/OBS HIGH 50: CPT | Mod: ,,, | Performed by: INTERNAL MEDICINE

## 2024-03-08 PROCEDURE — 86403 PARTICLE AGGLUT ANTBDY SCRN: CPT | Performed by: INTERNAL MEDICINE

## 2024-03-08 PROCEDURE — 86635 COCCIDIOIDES ANTIBODY: CPT | Performed by: INTERNAL MEDICINE

## 2024-03-08 PROCEDURE — 20600001 HC STEP DOWN PRIVATE ROOM

## 2024-03-08 PROCEDURE — 87305 ASPERGILLUS AG IA: CPT | Performed by: INTERNAL MEDICINE

## 2024-03-08 PROCEDURE — 80197 ASSAY OF TACROLIMUS: CPT | Performed by: PHYSICIAN ASSISTANT

## 2024-03-08 PROCEDURE — 87449 NOS EACH ORGANISM AG IA: CPT | Performed by: PHYSICIAN ASSISTANT

## 2024-03-08 PROCEDURE — 82550 ASSAY OF CK (CPK): CPT | Performed by: INTERNAL MEDICINE

## 2024-03-08 PROCEDURE — 99233 SBSQ HOSP IP/OBS HIGH 50: CPT | Mod: ,,, | Performed by: PHYSICIAN ASSISTANT

## 2024-03-08 RX ORDER — SODIUM CHLORIDE, SODIUM LACTATE, POTASSIUM CHLORIDE, CALCIUM CHLORIDE 600; 310; 30; 20 MG/100ML; MG/100ML; MG/100ML; MG/100ML
INJECTION, SOLUTION INTRAVENOUS CONTINUOUS
Status: ACTIVE | OUTPATIENT
Start: 2024-03-08 | End: 2024-03-08

## 2024-03-08 RX ORDER — TACROLIMUS 1 MG/1
3 CAPSULE ORAL EVERY MORNING
Status: DISCONTINUED | OUTPATIENT
Start: 2024-03-09 | End: 2024-03-09 | Stop reason: HOSPADM

## 2024-03-08 RX ORDER — LOPERAMIDE HYDROCHLORIDE 2 MG/1
2 CAPSULE ORAL 4 TIMES DAILY PRN
Status: DISCONTINUED | OUTPATIENT
Start: 2024-03-08 | End: 2024-03-09 | Stop reason: HOSPADM

## 2024-03-08 RX ORDER — TACROLIMUS 1 MG/1
2 CAPSULE ORAL EVERY EVENING
Status: DISCONTINUED | OUTPATIENT
Start: 2024-03-08 | End: 2024-03-09 | Stop reason: HOSPADM

## 2024-03-08 RX ORDER — MAGNESIUM SULFATE HEPTAHYDRATE 40 MG/ML
2 INJECTION, SOLUTION INTRAVENOUS ONCE
Status: COMPLETED | OUTPATIENT
Start: 2024-03-08 | End: 2024-03-08

## 2024-03-08 RX ADMIN — METOPROLOL TARTRATE 12.5 MG: 25 TABLET, FILM COATED ORAL at 08:03

## 2024-03-08 RX ADMIN — TAMSULOSIN HYDROCHLORIDE 0.4 MG: 0.4 CAPSULE ORAL at 08:03

## 2024-03-08 RX ADMIN — MAGNESIUM SULFATE HEPTAHYDRATE 2 G: 40 INJECTION, SOLUTION INTRAVENOUS at 10:03

## 2024-03-08 RX ADMIN — METOPROLOL TARTRATE 12.5 MG: 25 TABLET, FILM COATED ORAL at 07:03

## 2024-03-08 RX ADMIN — TACROLIMUS 2 MG: 1 CAPSULE ORAL at 05:03

## 2024-03-08 RX ADMIN — LOPERAMIDE HYDROCHLORIDE 2 MG: 2 CAPSULE ORAL at 06:03

## 2024-03-08 RX ADMIN — BENZONATATE 100 MG: 100 CAPSULE ORAL at 07:03

## 2024-03-08 RX ADMIN — DAPTOMYCIN 835 MG: 350 INJECTION, POWDER, LYOPHILIZED, FOR SOLUTION INTRAVENOUS at 07:03

## 2024-03-08 RX ADMIN — PREDNISONE 5 MG: 5 TABLET ORAL at 08:03

## 2024-03-08 RX ADMIN — SODIUM BICARBONATE 1300 MG: 650 TABLET ORAL at 08:03

## 2024-03-08 RX ADMIN — LOPERAMIDE HYDROCHLORIDE 2 MG: 2 CAPSULE ORAL at 12:03

## 2024-03-08 RX ADMIN — SODIUM BICARBONATE 1300 MG: 650 TABLET ORAL at 07:03

## 2024-03-08 RX ADMIN — SODIUM BICARBONATE 1300 MG: 650 TABLET ORAL at 02:03

## 2024-03-08 RX ADMIN — TACROLIMUS 2 MG: 1 CAPSULE ORAL at 08:03

## 2024-03-08 RX ADMIN — SODIUM CHLORIDE, POTASSIUM CHLORIDE, SODIUM LACTATE AND CALCIUM CHLORIDE: 600; 310; 30; 20 INJECTION, SOLUTION INTRAVENOUS at 12:03

## 2024-03-08 NOTE — ASSESSMENT & PLAN NOTE
- blood cultures: Gram stain priti and aerobic bottle: Gram positive cocci in chains resembling Strep    - rapid ID with enterococcus faecalis   - ID on board  - echo without signs of infection  - continue daptomycin, needs 4 weeks (EOT 4/3/24); plan for midline tomorrow as long as blood cultures 3/7 remain NGTD  - HH orders placed and SW arranging teaching for outpatient IV ABX  - ct a/p no acute source but did not new RLL nodule, obtain CT chest for baseline per ID

## 2024-03-08 NOTE — PROGRESS NOTES
Pt off to CT via wheelchair at this time. No distress noted.     Pt back in bed at 1030, no complaints.

## 2024-03-08 NOTE — PROGRESS NOTES
Transplant Social Work (Tentative) Weekend Discharge Note:     Pt will discharge to patient's home under the care of self and/or Kalani Parikh, patient's wife, phone number [phone].      Patient reported readiness to discharge at this time. Per K[facility] rounds, patient in need of continued IV daptomycin infusion upon discharge.  SW discussed same with patient and wife.  Both express understanding and agreement with referral being made to home health and home infusion agencies.  Patient denies preference for agency placement.  SW completed referrals with [facility] ([facility]) and [facility] ([facility]).  [facility] representative Ellyn Salazar states tentative plans of having pt seen at home for HH admission on Sunday, 3/10/2024.  [facility] representative Nataly Harris states pt approved from insurance standpoint and will be visited in hospital by [facility] staff for teaching in preparation of tentative weekend discharge.    Pt and wife aware of, involved in, and coping well with this discharge plan. Pt did not have any concerns with the discharge plan at this time.  Patient agrees to contact transplant team with needs, questions, or concerns as they arise.  No additional psychosocial needs indicated for discharge at this time.  MAUREEN remains available at [phone].

## 2024-03-08 NOTE — ASSESSMENT & PLAN NOTE
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 TTE with no evidence of endocarditis, CT abd/pelvis with no acute intraabdominal findings. Noted to have new RLL 1.3 cm nodule.    Repeat blood cultures from 3/5 are positive but these were drawn before daptomycin was administered    Recommendations:  - Continue daptomycin 10 mg/kg IV q24 hours  - Follow-up repeat blood cultures from 3/7/2024 to ensure clearance  - If blood cultures from 3/7/2024 remain clear, okay to place midline for home IV daptomycin

## 2024-03-08 NOTE — SUBJECTIVE & OBJECTIVE
Subjective:   History of Present Illness:  Mr Abdoulaye Parikh is a 61 yo s/p living unrelated kidney transplant on 5/10/23. Post op course significant for OR takeback 5/15 to assess flow due to elevated velocities in artery and vein on US and DGF (last HD 5/11 for hyperkalemia).Post op course notable for:  - Urine leak - neph tube placed 6/3; PCNU removed 12/31.  - possible bladder/kidney stones. Seen by urology 2/2024. Started on flomax with plan for cystoscopy.      He now presents as a direct admit for bacteremia. He contacted the team due to congestion, body aches, chills, sore throat, and diarrhea for the past week. He underwent infectious work up outpatient. Blood cultures resulted with Gram stain aerobic & anaerobic  bottle: Gram positive cocci in chains resembling Strep. Rapid ID + enterococcus.  UA dirty, culture pending. Plan for admission to TSU for management. D/w staff.     Hospital Course:  Admitted for bacteremia. Pt states his symptoms of congestion, body aches, and diarrhea resolved and he is feeling much better than he was a week ago. He only c/o dry cough and sore throat . Started on Daptomycin for bacteremia. ID on board. Repeat infectious work up in process.Urine culture pending.     Interval history: NAEON. Pt feeling fine today. He is having diarrhea - 7 episodes in last 24 hours. C diff negative. Pt states he alternates between diarrhea and constipation due to his Crohn's disease and takes miralax/imodium per his GI doctor accordingly. Will start PRN imodium. Admit blood cultures positive (prior to starting treatment). Repeat blood cultures 3/7 NGTD. Will need to 4 weeks of IV daptomycin. Plan to place midline tomorrow as long as blood cultures remain negative. CT a/p unremarkable for source, did show new RLL nodule. Will obtain a baseline CT chest. Afebrile and VSS. Continue to monitor.     Past Medical, Surgical, Family, and Social History:   Unchanged from H&P.    Scheduled Meds:    DAPTOmycin (CUBICIN) IV (PEDS and ADULTS)  10 mg/kg (Order-Specific) Intravenous Q24H    magnesium sulfate IVPB  2 g Intravenous Once    metoprolol tartrate  12.5 mg Oral BID    predniSONE  5 mg Oral Daily    sodium bicarbonate  1,300 mg Oral TID    tacrolimus  2 mg Oral BID    tamsulosin  0.4 mg Oral Daily     Continuous Infusions:  PRN Meds:acetaminophen, benzonatate, melatonin, ondansetron, prochlorperazine    Intake/Output - Last 3 Shifts         03/06 0700  03/07 0659 03/07 0700  03/08 0659 03/08 0700  03/09 0659    P.O. 990 1580     Total Intake(mL/kg) 990 (11.9) 1580 (19)     Net +990 +1580            Urine Occurrence 4 x 9 x     Stool Occurrence 2 x 7 x 0 x    Emesis Occurrence  0 x              Review of Systems   Constitutional:  Negative for appetite change and fever.   HENT:  Negative for facial swelling, sore throat and trouble swallowing.    Eyes:  Negative for visual disturbance.   Respiratory:  Positive for cough. Negative for shortness of breath, wheezing and stridor.    Cardiovascular:  Negative for chest pain.   Gastrointestinal:  Positive for diarrhea. Negative for abdominal distention, abdominal pain, nausea and vomiting.   Genitourinary:  Negative for decreased urine volume and difficulty urinating.   Musculoskeletal:  Negative for myalgias.   Skin:  Negative for wound.   Allergic/Immunologic: Positive for immunocompromised state.   Neurological:  Negative for tremors, syncope and speech difficulty.   Psychiatric/Behavioral:  Negative for agitation, behavioral problems, confusion, decreased concentration and hallucinations.       Objective:     Vital Signs (Most Recent):  Temp: 97.6 °F (36.4 °C) (03/08/24 0413)  Pulse: 64 (03/08/24 0853)  Resp: 20 (03/08/24 0752)  BP: 134/66 (03/08/24 0853)  SpO2: 98 % (03/08/24 0752) Vital Signs (24h Range):  Temp:  [97.5 °F (36.4 °C)-98.9 °F (37.2 °C)] 97.6 °F (36.4 °C)  Pulse:  [61-73] 64  Resp:  [18-20] 20  SpO2:  [95 %-99 %] 98 %  BP: (112-153)/(63-75)  "134/66     Weight: 83 kg (183 lb)  Height: 5' 9" (175.3 cm)  Body mass index is 27.02 kg/m².     Physical Exam  Vitals reviewed.   Constitutional:       General: He is not in acute distress.     Appearance: He is well-developed. He is not diaphoretic.   HENT:      Head: Normocephalic and atraumatic.      Nose: Nose normal.   Eyes:      Conjunctiva/sclera: Conjunctivae normal.   Pulmonary:      Effort: Pulmonary effort is normal. No respiratory distress.      Breath sounds: No wheezing or rales.   Abdominal:      General: Abdomen is flat. There is no distension.      Tenderness: There is no abdominal tenderness. There is no guarding.   Musculoskeletal:      Cervical back: Normal range of motion.      Right lower leg: No edema.      Left lower leg: No edema.   Skin:     General: Skin is warm and dry.      Findings: No erythema or rash.   Neurological:      Mental Status: He is alert.   Psychiatric:         Behavior: Behavior normal.          Laboratory:  CBC:   Recent Labs   Lab 03/06/24  0616 03/07/24  0626 03/08/24  0614   WBC 7.41 6.41 6.85   RBC 3.24* 3.14* 3.23*   HGB 11.2* 10.6* 11.0*   HCT 34.9* 33.0* 34.7*    209 235   * 105* 107*   MCH 34.6* 33.8* 34.1*   MCHC 32.1 32.1 31.7*     CMP:   Recent Labs   Lab 03/06/24  0616 03/07/24  0626 03/08/24  0614   GLU 84 94 78   CALCIUM 8.7 9.0 9.6   ALBUMIN 2.7* 2.6* 2.9*    138 140   K 4.1 3.9 3.8   CO2 23 22* 19*    111* 110   BUN 19 18 19   CREATININE 1.4 1.3 1.5*     Labs within the past 24 hours have been reviewed.    Diagnostic Results: Reviewed.   "

## 2024-03-08 NOTE — PROGRESS NOTES
[facility]  Kidney Transplant  Progress Note      Reason for Follow-up: Reassessment of Kidney Transplant - 5/10/2023  (#1) recipient and management of immunosuppression.    ORGAN: LEFT KIDNEY    Donor Type: Living    Donor CMV Status:   Donor HBcAB:  Donor HCV Status:  Donor HBV AURORA:   Donor HCV AURORA:       Subjective:   History of Present Illness:  Mr Abdoulaye Parikh is a 63 yo s/p living unrelated kidney transplant on 5/10/23. Post op course significant for OR takeback 5/15 to assess flow due to elevated velocities in artery and vein on US and DGF (last HD 5/11 for hyperkalemia).Post op course notable for:  - Urine leak - neph tube placed 6/3; PCNU removed 12/31.  - possible bladder/kidney stones. Seen by urology 2/2024. Started on flomax with plan for cystoscopy.      He now presents as a direct admit for bacteremia. He contacted the team due to congestion, body aches, chills, sore throat, and diarrhea for the past week. He underwent infectious work up outpatient. Blood cultures resulted with Gram stain aerobic & anaerobic  bottle: Gram positive cocci in chains resembling Strep. Rapid ID + enterococcus.  UA dirty, culture pending. Plan for admission to TSU for management. D/w staff.     Hospital Course:  Admitted for bacteremia. Pt states his symptoms of congestion, body aches, and diarrhea resolved and he is feeling much better than he was a week ago. He only c/o dry cough and sore throat . Started on Daptomycin for bacteremia. ID on board. Repeat infectious work up in process.Urine culture pending.     Interval history: NAEON. Pt feeling fine today. He is having diarrhea - 7 episodes in last 24 hours. C diff negative. Pt states he alternates between diarrhea and constipation due to his Crohn's disease and takes miralax/imodium per his GI doctor accordingly. Will start PRN imodium. Admit blood cultures positive (prior to starting treatment). Repeat blood cultures 3/7 NGTD. Will need to 4 weeks  of IV daptomycin. Plan to place midline tomorrow as long as blood cultures remain negative. CT a/p unremarkable for source, did show new RLL nodule. Will obtain a baseline CT chest. Afebrile and VSS. Continue to monitor.     Past Medical, Surgical, Family, and Social History:   Unchanged from H&P.    Scheduled Meds:   DAPTOmycin (CUBICIN) IV (PEDS and ADULTS)  10 mg/kg (Order-Specific) Intravenous Q24H    magnesium sulfate IVPB  2 g Intravenous Once    metoprolol tartrate  12.5 mg Oral BID    predniSONE  5 mg Oral Daily    sodium bicarbonate  1,300 mg Oral TID    tacrolimus  2 mg Oral BID    tamsulosin  0.4 mg Oral Daily     Continuous Infusions:  PRN Meds:acetaminophen, benzonatate, melatonin, ondansetron, prochlorperazine    Intake/Output - Last 3 Shifts         03/06 0700  03/07 0659 03/07 0700  03/08 0659 03/08 0700  03/09 0659    P.O. 990 1580     Total Intake(mL/kg) 990 (11.9) 1580 (19)     Net +990 +1580            Urine Occurrence 4 x 9 x     Stool Occurrence 2 x 7 x 0 x    Emesis Occurrence  0 x              Review of Systems   Constitutional:  Negative for appetite change and fever.   HENT:  Negative for facial swelling, sore throat and trouble swallowing.    Eyes:  Negative for visual disturbance.   Respiratory:  Positive for cough. Negative for shortness of breath, wheezing and stridor.    Cardiovascular:  Negative for chest pain.   Gastrointestinal:  Positive for diarrhea. Negative for abdominal distention, abdominal pain, nausea and vomiting.   Genitourinary:  Negative for decreased urine volume and difficulty urinating.   Musculoskeletal:  Negative for myalgias.   Skin:  Negative for wound.   Allergic/Immunologic: Positive for immunocompromised state.   Neurological:  Negative for tremors, syncope and speech difficulty.   Psychiatric/Behavioral:  Negative for agitation, behavioral problems, confusion, decreased concentration and hallucinations.       Objective:     Vital Signs (Most Recent):  Temp:  "97.6 °F (36.4 °C) (03/08/24 0413)  Pulse: 64 (03/08/24 0853)  Resp: 20 (03/08/24 0752)  BP: 134/66 (03/08/24 0853)  SpO2: 98 % (03/08/24 0752) Vital Signs (24h Range):  Temp:  [97.5 °F (36.4 °C)-98.9 °F (37.2 °C)] 97.6 °F (36.4 °C)  Pulse:  [61-73] 64  Resp:  [18-20] 20  SpO2:  [95 %-99 %] 98 %  BP: (112-153)/(63-75) 134/66     Weight: 83 kg (183 lb)  Height: 5' 9" (175.3 cm)  Body mass index is 27.02 kg/m².     Physical Exam  Vitals reviewed.   Constitutional:       General: He is not in acute distress.     Appearance: He is well-developed. He is not diaphoretic.   HENT:      Head: Normocephalic and atraumatic.      Nose: Nose normal.   Eyes:      Conjunctiva/sclera: Conjunctivae normal.   Pulmonary:      Effort: Pulmonary effort is normal. No respiratory distress.      Breath sounds: No wheezing or rales.   Abdominal:      General: Abdomen is flat. There is no distension.      Tenderness: There is no abdominal tenderness. There is no guarding.   Musculoskeletal:      Cervical back: Normal range of motion.      Right lower leg: No edema.      Left lower leg: No edema.   Skin:     General: Skin is warm and dry.      Findings: No erythema or rash.   Neurological:      Mental Status: He is alert.   Psychiatric:         Behavior: Behavior normal.          Laboratory:  CBC:   Recent Labs   Lab 03/06/24  0616 03/07/24 0626 03/08/24 0614   WBC 7.41 6.41 6.85   RBC 3.24* 3.14* 3.23*   HGB 11.2* 10.6* 11.0*   HCT 34.9* 33.0* 34.7*    209 235   * 105* 107*   MCH 34.6* 33.8* 34.1*   MCHC 32.1 32.1 31.7*     CMP:   Recent Labs   Lab 03/06/24  0616 03/07/24  0626 03/08/24  0614   GLU 84 94 78   CALCIUM 8.7 9.0 9.6   ALBUMIN 2.7* 2.6* 2.9*    138 140   K 4.1 3.9 3.8   CO2 23 22* 19*    111* 110   BUN 19 18 19   CREATININE 1.4 1.3 1.5*     Labs within the past 24 hours have been reviewed.    Diagnostic Results: Reviewed.   Assessment/Plan:     * Bacteremia  - blood cultures: Gram stain priti and aerobic " bottle: Gram positive cocci in chains resembling Strep    - rapid ID with enterococcus faecalis   - ID on board  - echo without signs of infection  - continue daptomycin, needs 4 weeks (EOT 4/3/24); plan for midline tomorrow as long as blood cultures 3/7 remain NGTD  - HH orders placed and SW arranging teaching for outpatient IV ABX  - ct a/p no acute source but did not new RLL nodule, obtain CT chest for baseline per ID      Lung nodule  - obtain CT chest for baseline per ID      Anemia of renal disease  - 2/2 h/o renal disease  - daily CBC      Long-term use of immunosuppressant medication  - see prophylactic immunotherapy      Prophylactic immunotherapy  - continue prograf and steroid taper per protocol  - will check daily prograf levels, monitor for toxic side effects, and adjust for therapeutic dose  - hold cellcept      At risk for opportunistic infections  - cont OI prophylaxis per protocol  - CMV PCR undetected          Discharge Planning: Not a candidate at this time.     Medical decision making for this encounter includes review of pertinent labs and diagnostic studies, assessment and planning, discussions with consulting providers, discussion with patient/family, and participation in multidisciplinary rounds. Time spent caring for patient: 60 minutes    Kera Hernandez PA-C  Kidney Transplant  [facility]

## 2024-03-08 NOTE — DISCHARGE SUMMARY
[facility]  Kidney Transplant  Discharge Summary    Patient Name: Abdoulaye Parikh  MRN: [id number]  Admission Date: 3/5/2024  Hospital Length of Stay: 4 days  Discharge Date and Time:  03/09/2024 9:38 AM  Attending Physician: Beatrice Mendez DO   Discharging Provider: Mary Sexton PA-C  Primary Care Provider: [facility] Primary Doctor    HPI:   Mr Abdoulaye Parikh is a 63 yo s/p living unrelated kidney transplant on 5/10/23. Post op course significant for OR takeback 5/15 to assess flow due to elevated velocities in artery and vein on US and DGF (last HD 5/11 for hyperkalemia).Post op course notable for:  - Urine leak - neph tube placed 6/3; PCNU removed 12/31.  - possible bladder/kidney stones. Seen by urology 2/2024. Started on flomax with plan for cystoscopy.      He now presents as a direct admit for bacteremia. He contacted the team due to congestion, body aches, chills, sore throat, and diarrhea for the past week. He underwent infectious work up outpatient. Blood cultures resulted with Gram stain aerobic & anaerobic  bottle: Gram positive cocci in chains resembling Strep. Rapid ID + enterococcus.  UA dirty, culture pending. Plan for admission to [facility] for management. D/w staff.     Hospital Course:    Admitted for bacteremia on 3/5. On admission, pt states his symptoms of congestion, body aches, and diarrhea resolved and he is feeling much better than he was a week ago. He was only c/o dry cough and sore throat . Started on Daptomycin for bacteremia per ID. Admit cultures positive (prior to starting ABX). Final cultures resulted with Enterococcus Faecalis. Blood cultures sent 3/7 no growth. CT A/P unremarkable for acute source of infection. Echo without signs of infection. UA dirty, urine culture did not result. Possible source for bacteremia. Afebrile and overall feeling well. Did develop some diarrhea but c diff negative. He takes imodium at home per his GI doctor for intermittent diarrhea due to  "Crohn's disease. Midline placed for outpatient IV ABX. Pt underwent teaching for outpatient IV ABX. Per ID, plan for 4 weeks Daptomycin. EOT for daptomycin 4/3/24. Of note, Myfortic on hold while on abx; resume outpatient after treatment ends.    Of note, CT A/P did show new RLL nodule, CT chest obtained 3/8 for baseline per ID recs. Shows "irregular right lower lobe solid nodule, new when compared to CT 02/12/2024.  indings could represent infectious/inflammatory process.  However, underlying neoplastic process cannot be ruled out. Multiple ground-glass nodules measuring up to 6 mm. Short-term follow-up in 3 months recommended." Refer to outpatient pulmonology for follow up.     Pt feeling well and ready for discharge. Met with pharm D for education. He will follow up with weekly labs including CPK while on Dapto (first on Monday, 3/11) and in clinic per coordinator. Also needs follow up with Dr. Arboleda in urology for nephrolithiasis (native and transplant kidney), pulmonology (scheduled 6/21/24), and ID (to be scheduled by ID department). Pt expressed understanding of discharge instructions and importance of follow up.     Goals of Care Treatment Preferences:  Code Status: Full Code      Final Active Diagnoses:    Diagnosis Date Noted POA    PRINCIPAL PROBLEM:  Bacteremia [R78.81] 06/05/2023 Yes    Lung nodule [R91.1] 03/07/2024 Yes    At risk for opportunistic infections [Z91.89] 05/11/2023 Yes    Prophylactic immunotherapy [Z29.89] 05/11/2023 Not Applicable    Long-term use of immunosuppressant medication [Z79.60] 05/11/2023 Not Applicable    Anemia of renal disease [N18.9, D63.1] 05/11/2023 Yes      Problems Resolved During this Admission:    Diagnosis Date Noted Date Resolved POA    Orthostatic hypotension [I95.1] 06/01/2023 03/05/2024 Yes    Immunosuppressive management encounter following kidney transplant [Z79.899, Z94.0] 05/26/2023 03/05/2024 Not Applicable       Treatments: See above.    Consults (From " admission, onward)          Status Ordering Provider     Inpatient consult to Midline team  Once        Provider:  (Not yet assigned)    Acknowledged KARAN CONTEH     Inpatient consult to Infectious Diseases  Once        Provider:  (Not yet assigned)    Completed KARAN CONTEH            Pending Diagnostic Studies:       Procedure Component Value Units Date/Time    Aspergillus Antigen [[id number]] Collected: 03/08/24 0614    Order Status: Sent Lab Status: In process Updated: 03/08/24 0633    Specimen: Blood     Blastomyces Antigen, Urine [[id number]] Collected: 03/07/24 2229    Order Status: Sent Lab Status: In process Updated: 03/07/24 2233    Specimen: Urine     CBC auto differential [[id number]] Collected: 03/09/24 0634    Order Status: Sent Lab Status: In process Updated: 03/09/24 0701    Specimen: Blood     Fungal Immunodiffusion - Blood [[id number]] Collected: 03/08/24 0614    Order Status: Sent Lab Status: In process Updated: 03/08/24 0633    Specimen: Blood     Fungitell Assay For (1.3)-B-D-Glucans [[id number]] Collected: 03/08/24 0614    Order Status: Sent Lab Status: In process Updated: 03/08/24 0633    Specimen: Blood     Histoplasma antigen, urine [[id number]] Collected: 03/07/24 2228    Order Status: Sent Lab Status: In process Updated: 03/07/24 2233    Specimen: Urine, Clean Catch     Histoplasma/Blastomyces antigen, serum [[id number]] Collected: 03/08/24 0614    Order Status: Sent Lab Status: In process Updated: 03/08/24 0633    Specimen: Blood     Tacrolimus level [[id number]] Collected: 03/09/24 0634    Order Status: Sent Lab Status: In process Updated: 03/09/24 0701    Specimen: Blood           Significant Diagnostic Studies: Labs: CMP   Recent Labs   Lab 03/08/24 0614 03/09/24 0634    139   K 3.8 4.5    112*   CO2 19* 18*   GLU 78 72   BUN 19 16   CREATININE 1.5* 1.1   CALCIUM 9.6 8.9   ALBUMIN 2.9* 2.7*   ANIONGAP 11 9   , CBC   Recent Labs   Lab 03/08/24 0614   WBC  6.85   HGB 11.0*   HCT 34.7*      , and INR   Lab Results   Component Value Date    INR 1.1 09/29/2023    INR 1.0 07/31/2023    INR 1.1 06/12/2023       Discharged Condition: stable    Disposition: Dc home.    Follow Up: As above.    Patient Instructions:      Ambulatory referral/consult to Home Health   Standing Status: Future   Referral Priority: Routine Referral Type: Home Health Care   Referral Reason: Specialty Services Required   Requested Specialty: Home Health Services   Number of Visits Requested: 1     Ambulatory referral/consult to Pulmonology   Standing Status: Future   Referral Priority: Routine Referral Type: Consultation   Referral Reason: Specialty Services Required   Requested Specialty: Pulmonary Disease   Number of Visits Requested: 1     Medications:  Reconciled Home Medications:      Medication List        START taking these medications      sodium chloride 0.9% SolP 50 mL with DAPTOmycin 500 mg SolR 835 mg  Inject 835 mg into the vein once daily. STOP 4/4/24            CHANGE how you take these medications      tacrolimus 1 MG Cap  Commonly known as: PROGRAF  Take 3 capsules (3 mg total) by mouth every morning AND 2 capsules (2 mg total) every evening. Txp Date:5/10/2023 (Kidney) Disch Date:5/17/2023 ICD10:Z94.0 Txp Location:[facility].  What changed: See the new instructions.            CONTINUE taking these medications      IMODIUM A-D ORAL  Take by mouth.     magnesium oxide 400 mg (241.3 mg magnesium) tablet  Commonly known as: MAG-OX  Take 1 tablet (400 mg total) by mouth 2 (two) times daily.     metoprolol tartrate 25 MG tablet  Commonly known as: LOPRESSOR  Take HALF tablet (12.5 mg total) by mouth 2 (two) times daily.     olmesartan 40 MG tablet  Commonly known as: BENICAR  Take 1 tablet (40 mg total) by mouth once daily.     predniSONE 5 MG tablet  Commonly known as: DELTASONE  Take 1 tablet (5 mg total) by mouth once daily.     sodium bicarbonate 650 MG tablet  Take 2 tablets (1,300  mg total) by mouth 3 (three) times daily.     tamsulosin 0.4 mg Cap  Commonly known as: FLOMAX  Take 1 capsule (0.4 mg total) by mouth once daily.            STOP taking these medications      fluticasone propionate 44 mcg/actuation inhaler  Commonly known as: FLOVENT HFA     mycophenolate sodium 180 MG Tbec            Time spent caring for patient (Greater than 1/2 spent in direct face-to-face contact): > 30 minutes    Mary Sexton PA-C  Kidney Transplant  [facility] - Transplant Stepdown

## 2024-03-08 NOTE — PROGRESS NOTES
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 TTE with no evidence of endocarditis, CT abd/pelvis with no acute intraabdominal findings. Noted to have new RLL 1.3 cm nodule.    Repeat blood cultures from 3/5 are positive but these were drawn before daptomycin was administered    Recommendations:  - Continue daptomycin 10 mg/kg IV q24 hours  - Follow-up repeat blood cultures from 3/7/2024 to ensure clearance  - If blood cultures from 3/7/2024 remain clear, okay to place midline for home IV daptomycin          50 minutes of total time spent on the encounter, which includes face to face time and non-face to face time preparing to see the patient (eg, review of tests), obtaining and reviewing separately obtained history, documenting clinical information in the electronic or other health record, independently interpreting results (not separately reported) and communicating results to the patient/family/caregiver, and care coordination (not separately reported).       Thank you for your consult. I will follow-up with patient. Please contact us if you have any additional questions.    Maral Lopez MD  Infectious Disease  [facility]    Subjective:     Principal Problem:Bacteremia    HPI: 63M with history of LUKT 5/20/2023, bladder / kidney stones, presents with E faecalis  bacteremia. Patient reports he was having malaise, fatigue, congestion, sore throat, diarrhea for a week. He presented to urgent care, influenza and COVID-19 were negative. Patient had blood cultures drawn - grew E faecalis. Patient reports some of his teeth have been chipping since starting the immunosuppression. No recent dental procedures. He did pass a kidney stone a couple of weeks ago. He has an AVG and a prosthetic right hip. He is feeling back to his baseline since starting antibiotics.  Interval History  No fevers overnight, no complaints    Review of Systems   Constitutional:  Negative for chills, diaphoresis and fever.   HENT:  Negative for rhinorrhea and sore throat.    Respiratory:  Negative for cough and shortness of breath.    Cardiovascular:  Negative for chest pain and leg swelling.   Gastrointestinal:  Negative for abdominal pain, diarrhea, nausea and vomiting.   Genitourinary:  Negative for dysuria and hematuria.   Musculoskeletal:  Negative for arthralgias and myalgias.   Skin:  Negative for rash.   Neurological:  Negative for headaches.     Objective:     Vital Signs (Most Recent):  Temp: 97.9 °F (36.6 °C) (03/08/24 1542)  Pulse: 60 (03/08/24 1542)  Resp: 20 (03/08/24 1542)  BP: 128/66 (03/08/24 1542)  SpO2: 98 % (03/08/24 1542) Vital Signs (24h Range):  Temp:  [97.5 °F (36.4 °C)-97.9 °F (36.6 °C)] 97.9 °F (36.6 °C)  Pulse:  [58-67] 60  Resp:  [18-20] 20  SpO2:  [98 %-99 %] 98 %  BP: (123-153)/(58-75) 128/66     Weight: 83 kg (183 lb)  Body mass index is 27.02 kg/m².    Estimated Creatinine Clearance: 51.1 mL/min (A) (based on SCr of 1.5 mg/dL (H)).     Physical Exam  Vitals reviewed.   Constitutional:       General: He is not in acute distress.     Appearance: He is well-developed. He is not diaphoretic.   HENT:      Head: Normocephalic and atraumatic.      Nose: Nose normal.   Eyes:      Conjunctiva/sclera: Conjunctivae normal.   Pulmonary:      Effort: Pulmonary effort is normal. No  respiratory distress.      Breath sounds: No wheezing or rales.   Abdominal:      General: Abdomen is flat. There is no distension.      Tenderness: There is no abdominal tenderness. There is no guarding.   Musculoskeletal:      Cervical back: Normal range of motion.      Right lower leg: No edema.      Left lower leg: No edema.   Skin:     General: Skin is warm and dry.      Findings: No erythema or rash.   Neurological:      Mental Status: He is alert.   Psychiatric:         Behavior: Behavior normal.          Significant Labs: All pertinent labs within the past 24 hours have been reviewed.    Significant Imaging: I have reviewed all pertinent imaging results/findings within the past 24 hours.

## 2024-03-08 NOTE — PLAN OF CARE
PLAN OF CARE NOTE     Fall bundle in place. Pt. Remained free from falls/trauma/injuries. No complaints of CP/ SOB/discomfort. VSS. RA. A&Ox4. No tele, pulse WNL. Pt. denies pain this shift. FAIZA Samuels PA-C approved order for C. Diff rule out, pt. NEG. Pt. Had multiple loose stools overnight (refer to flowsheet). Pt. Is visibly more weak when ambulating this shift. Repeat Bcx show NGTD (preliminary). The POC reviewed w/ pt. @ bedside, questions were answered & encouraged. No further concerns at this time.

## 2024-03-08 NOTE — SUBJECTIVE & OBJECTIVE
Interval History  No fevers overnight, no complaints    Review of Systems   Constitutional:  Negative for chills, diaphoresis and fever.   HENT:  Negative for rhinorrhea and sore throat.    Respiratory:  Negative for cough and shortness of breath.    Cardiovascular:  Negative for chest pain and leg swelling.   Gastrointestinal:  Negative for abdominal pain, diarrhea, nausea and vomiting.   Genitourinary:  Negative for dysuria and hematuria.   Musculoskeletal:  Negative for arthralgias and myalgias.   Skin:  Negative for rash.   Neurological:  Negative for headaches.     Objective:     Vital Signs (Most Recent):  Temp: 97.9 °F (36.6 °C) (03/08/24 1542)  Pulse: 60 (03/08/24 1542)  Resp: 20 (03/08/24 1542)  BP: 128/66 (03/08/24 1542)  SpO2: 98 % (03/08/24 1542) Vital Signs (24h Range):  Temp:  [97.5 °F (36.4 °C)-97.9 °F (36.6 °C)] 97.9 °F (36.6 °C)  Pulse:  [58-67] 60  Resp:  [18-20] 20  SpO2:  [98 %-99 %] 98 %  BP: (123-153)/(58-75) 128/66     Weight: 83 kg (183 lb)  Body mass index is 27.02 kg/m².    Estimated Creatinine Clearance: 51.1 mL/min (A) (based on SCr of 1.5 mg/dL (H)).     Physical Exam  Vitals reviewed.   Constitutional:       General: He is not in acute distress.     Appearance: He is well-developed. He is not diaphoretic.   HENT:      Head: Normocephalic and atraumatic.      Nose: Nose normal.   Eyes:      Conjunctiva/sclera: Conjunctivae normal.   Pulmonary:      Effort: Pulmonary effort is normal. No respiratory distress.      Breath sounds: No wheezing or rales.   Abdominal:      General: Abdomen is flat. There is no distension.      Tenderness: There is no abdominal tenderness. There is no guarding.   Musculoskeletal:      Cervical back: Normal range of motion.      Right lower leg: No edema.      Left lower leg: No edema.   Skin:     General: Skin is warm and dry.      Findings: No erythema or rash.   Neurological:      Mental Status: He is alert.   Psychiatric:         Behavior: Behavior normal.           Significant Labs: All pertinent labs within the past 24 hours have been reviewed.    Significant Imaging: I have reviewed all pertinent imaging results/findings within the past 24 hours.

## 2024-03-08 NOTE — PLAN OF CARE
Pt remains aaox4, vss on bedside monitor, afebrile, on RA. 1x episodes of loose stools so far today, imodium started. Repeat blood cultures NGTD. Plan for midline placement tomorrow for outpatient abx. Chest CT to establish baseline today. Mag rider x1 today for Mg 1.5. Creatinine 1.5 today, LR @ 100cc/hr x10hrs. Pt is up independently, remains free from falls/injuries so far today. Nonskid socks on, call bell within reach, bed locked/lowest position. Pt verbalized understanding to call for needs/assistance.

## 2024-03-08 NOTE — PROGRESS NOTES
[facility]    [facility] educator met with the patient and discussed the discharge plan for home IVABX. Abdoulaye Parikh will discharge home with family support. The patient will infuse his medication via Elastomeric Pump. The patient was educated on the S.A.S.H procedure and a  S.A.S.H mat was provided. The patient education checklist was reviewed and acknowledged by the above person and he is agreeable to discharge with the home infusion plan of care. The IV administration process using aspetic technique was reviewed with successful return demonstration. The patient feels comfortable with doing his own home infusions. The patient will possibly discharge home with Daptomycin 835 mg IV every 24 hours for estimated end of therapy date of TBD. Dosing schedule times are 0900 daily. An extension was left at the bedside so it can be placed after his single lumen midline is placed tomorrow. [facility] will follow the patient for weekly dressing changes and lab draws. Time was allotted for questions. The patient's nurse and the case management team were notified that the teaching has been completed.     Medication delivery will be made to the home.    Patient accepted to care by [facility] and report called to [facility]   Phone number [phone]    [facility]  Giselle Lima RN, Clinical Educator  Cell [phone]  Office [phone]  Fax [phone]

## 2024-03-09 VITALS
SYSTOLIC BLOOD PRESSURE: 125 MMHG | HEART RATE: 68 BPM | BODY MASS INDEX: 27.11 KG/M2 | HEIGHT: 69 IN | OXYGEN SATURATION: 100 % | RESPIRATION RATE: 21 BRPM | DIASTOLIC BLOOD PRESSURE: 73 MMHG | WEIGHT: 183 LBS | TEMPERATURE: 98 F

## 2024-03-09 DIAGNOSIS — Z94.0 KIDNEY REPLACED BY TRANSPLANT: Primary | ICD-10-CM

## 2024-03-09 LAB
ALBUMIN SERPL BCP-MCNC: 2.7 G/DL (ref 3.5–5.2)
ANION GAP SERPL CALC-SCNC: 9 MMOL/L (ref 8–16)
ANISOCYTOSIS BLD QL SMEAR: SLIGHT
BASOPHILS # BLD AUTO: ABNORMAL K/UL (ref 0–0.2)
BASOPHILS NFR BLD: 0 % (ref 0–1.9)
BUN SERPL-MCNC: 16 MG/DL (ref 8–23)
CALCIUM SERPL-MCNC: 8.9 MG/DL (ref 8.7–10.5)
CHLORIDE SERPL-SCNC: 112 MMOL/L (ref 95–110)
CO2 SERPL-SCNC: 18 MMOL/L (ref 23–29)
CREAT SERPL-MCNC: 1.1 MG/DL (ref 0.5–1.4)
DACRYOCYTES BLD QL SMEAR: ABNORMAL
DIFFERENTIAL METHOD BLD: ABNORMAL
EOSINOPHIL # BLD AUTO: ABNORMAL K/UL (ref 0–0.5)
EOSINOPHIL NFR BLD: 0 % (ref 0–8)
ERYTHROCYTE [DISTWIDTH] IN BLOOD BY AUTOMATED COUNT: 13.5 % (ref 11.5–14.5)
EST. GFR  (NO RACE VARIABLE): >60 ML/MIN/1.73 M^2
GLUCOSE SERPL-MCNC: 72 MG/DL (ref 70–110)
HCT VFR BLD AUTO: 34.9 % (ref 40–54)
HGB BLD-MCNC: 11.6 G/DL (ref 14–18)
HYPOCHROMIA BLD QL SMEAR: ABNORMAL
IMM GRANULOCYTES # BLD AUTO: ABNORMAL K/UL (ref 0–0.04)
IMM GRANULOCYTES NFR BLD AUTO: ABNORMAL % (ref 0–0.5)
LYMPHOCYTES # BLD AUTO: ABNORMAL K/UL (ref 1–4.8)
LYMPHOCYTES NFR BLD: 13 % (ref 18–48)
MAGNESIUM SERPL-MCNC: 1.8 MG/DL (ref 1.6–2.6)
MCH RBC QN AUTO: 34 PG (ref 27–31)
MCHC RBC AUTO-ENTMCNC: 33.2 G/DL (ref 32–36)
MCV RBC AUTO: 102 FL (ref 82–98)
MONOCYTES # BLD AUTO: ABNORMAL K/UL (ref 0.3–1)
MONOCYTES NFR BLD: 7 % (ref 4–15)
NEUTROPHILS # BLD AUTO: ABNORMAL K/UL (ref 1.8–7.7)
NEUTROPHILS NFR BLD: 80 % (ref 38–73)
NRBC BLD-RTO: 0 /100 WBC
OVALOCYTES BLD QL SMEAR: ABNORMAL
PHOSPHATE SERPL-MCNC: 3.2 MG/DL (ref 2.7–4.5)
PLATELET # BLD AUTO: 218 K/UL (ref 150–450)
PMV BLD AUTO: 10.9 FL (ref 9.2–12.9)
POIKILOCYTOSIS BLD QL SMEAR: SLIGHT
POLYCHROMASIA BLD QL SMEAR: ABNORMAL
POTASSIUM SERPL-SCNC: 4.5 MMOL/L (ref 3.5–5.1)
RBC # BLD AUTO: 3.41 M/UL (ref 4.6–6.2)
SODIUM SERPL-SCNC: 139 MMOL/L (ref 136–145)
SPHEROCYTES BLD QL SMEAR: ABNORMAL
WBC # BLD AUTO: 6.1 K/UL (ref 3.9–12.7)

## 2024-03-09 PROCEDURE — 25000003 PHARM REV CODE 250: Performed by: PHYSICIAN ASSISTANT

## 2024-03-09 PROCEDURE — 36415 COLL VENOUS BLD VENIPUNCTURE: CPT | Performed by: PHYSICIAN ASSISTANT

## 2024-03-09 PROCEDURE — 76937 US GUIDE VASCULAR ACCESS: CPT

## 2024-03-09 PROCEDURE — C1751 CATH, INF, PER/CENT/MIDLINE: HCPCS

## 2024-03-09 PROCEDURE — 63600175 PHARM REV CODE 636 W HCPCS: Performed by: PHYSICIAN ASSISTANT

## 2024-03-09 PROCEDURE — 85025 COMPLETE CBC W/AUTO DIFF WBC: CPT | Performed by: PHYSICIAN ASSISTANT

## 2024-03-09 PROCEDURE — 80069 RENAL FUNCTION PANEL: CPT | Performed by: PHYSICIAN ASSISTANT

## 2024-03-09 PROCEDURE — 36410 VNPNXR 3YR/> PHY/QHP DX/THER: CPT

## 2024-03-09 PROCEDURE — 83735 ASSAY OF MAGNESIUM: CPT | Performed by: PHYSICIAN ASSISTANT

## 2024-03-09 RX ORDER — SODIUM CHLORIDE 0.9 % (FLUSH) 0.9 %
10 SYRINGE (ML) INJECTION EVERY 6 HOURS
Status: DISCONTINUED | OUTPATIENT
Start: 2024-03-09 | End: 2024-03-09 | Stop reason: HOSPADM

## 2024-03-09 RX ORDER — TACROLIMUS 1 MG/1
CAPSULE ORAL
Qty: 150 CAPSULE | Refills: 11 | Status: SHIPPED | OUTPATIENT
Start: 2024-03-09 | End: 2024-03-11 | Stop reason: DRUGHIGH

## 2024-03-09 RX ORDER — PREDNISONE 5 MG/1
5 TABLET ORAL DAILY
Qty: 30 TABLET | Refills: 11 | Status: SHIPPED | OUTPATIENT
Start: 2024-03-09

## 2024-03-09 RX ORDER — SODIUM CHLORIDE 0.9 % (FLUSH) 0.9 %
10 SYRINGE (ML) INJECTION
Status: DISCONTINUED | OUTPATIENT
Start: 2024-03-09 | End: 2024-03-09 | Stop reason: HOSPADM

## 2024-03-09 RX ADMIN — TAMSULOSIN HYDROCHLORIDE 0.4 MG: 0.4 CAPSULE ORAL at 08:03

## 2024-03-09 RX ADMIN — TACROLIMUS 3 MG: 1 CAPSULE ORAL at 08:03

## 2024-03-09 RX ADMIN — LOPERAMIDE HYDROCHLORIDE 2 MG: 2 CAPSULE ORAL at 05:03

## 2024-03-09 RX ADMIN — SODIUM BICARBONATE 1300 MG: 650 TABLET ORAL at 08:03

## 2024-03-09 RX ADMIN — METOPROLOL TARTRATE 12.5 MG: 25 TABLET, FILM COATED ORAL at 08:03

## 2024-03-09 RX ADMIN — DAPTOMYCIN 835 MG: 350 INJECTION, POWDER, LYOPHILIZED, FOR SOLUTION INTRAVENOUS at 12:03

## 2024-03-09 RX ADMIN — PREDNISONE 5 MG: 5 TABLET ORAL at 08:03

## 2024-03-09 NOTE — CONSULTS
KEVIN placed 18 g x 10 cm midline in the Right brachial vein for 4 weeks of daptomycin using realtime ultrasound guidance.  Lot#[id number].  Max dwell date 4/7/24.  Imagery recorded and saved.

## 2024-03-09 NOTE — PLAN OF CARE
Outpatient Antibiotic Therapy Plan:    Please send referral to [facility].    1) Infection:  E faecalis bacteremia    2) Discharge Antibiotics:    Intravenous antibiotics:  Dapto 10 mg /kg IV q24 hours        3) Therapy Duration:  E faecalis bacteremia    Estimated end date of IV antibiotics: 4/3/2024    4) Outpatient Weekly Labs:    Order the following labs to be drawn on Mondays:   CBC  CMP   CPK (when on Daptomycin)      5) Fax Lab Results to Infectious Diseases Provider: [facility] Fax Number: [phone]    6) Outpatient Infectious Diseases Follow-up    Follow-up appointment will be arranged by the ID clinic and will be found in the patient's appointments tab.    Prior to discharge, please ensure the patient's follow-up has been scheduled.    If there is still no follow-up scheduled prior to discharge, please send an EPIC message to Dionne Esquivel in Infectious Diseases.        /

## 2024-03-09 NOTE — PROGRESS NOTES
AVS printout reviewed with pt and he verbalized understanding. Time allowed for questions. PIV removed, midline in place. Extension tubing to be placed with HH RN. Awaiting pt's transportation ride home.

## 2024-03-09 NOTE — NURSING
Lab notified this RN that venipuncture did not collect enough blood for tacro level this am. Pt already took his dose this am. BRIGETTE López notified.

## 2024-03-09 NOTE — PROGRESS NOTES
Discharge Medication Note:    Hospital Course:  Mr Parikh is s/p kidney transplant from 5/10/23 readmitted with bacteremia.  Blood cultures with amp sensitive enterococcus faecalis, however has a significant PCN allergy.  ID consulted and pt started on daptomycin 10mg/kg q24 x4 weeks (STOP 4/4/24).  Pts home tacrolimus dose increased per trough and MMF held due to infection.  Will give IV dapto early today prior to discharge with plans for home health to assist pt with tomorrow's infusion.    Met with Abdoulaye Parikh at discharge to review discharge medications and to update the blue medication card.           Medication List        START taking these medications      sodium chloride 0.9% SolP 50 mL with DAPTOmycin 500 mg SolR 835 mg  Inject 835 mg into the vein once daily. STOP 4/4/24            CHANGE how you take these medications      tacrolimus 1 MG Cap  Commonly known as: PROGRAF  Take 3 capsules (3 mg total) by mouth every morning AND 2 capsules (2 mg total) every evening. Txp Date:5/10/2023 (Kidney) Disch Date:5/17/2023 ICD10:Z94.0 Txp Location:[facility].  What changed: See the new instructions.            CONTINUE taking these medications      IMODIUM A-D ORAL     magnesium oxide 400 mg (241.3 mg magnesium) tablet  Commonly known as: MAG-OX  Take 1 tablet (400 mg total) by mouth 2 (two) times daily.     metoprolol tartrate 25 MG tablet  Commonly known as: LOPRESSOR  Take HALF tablet (12.5 mg total) by mouth 2 (two) times daily.     olmesartan 40 MG tablet  Commonly known as: BENICAR  Take 1 tablet (40 mg total) by mouth once daily.     predniSONE 5 MG tablet  Commonly known as: DELTASONE  Take 1 tablet (5 mg total) by mouth once daily.     sodium bicarbonate 650 MG tablet  Take 2 tablets (1,300 mg total) by mouth 3 (three) times daily.     tamsulosin 0.4 mg Cap  Commonly known as: FLOMAX  Take 1 capsule (0.4 mg total) by mouth once daily.            STOP taking these medications      fluticasone propionate 44  mcg/actuation inhaler  Commonly known as: FLOVENT HFA     mycophenolate sodium 180 MG Tbec               Where to Get Your Medications        These [facility]  [address] [place] [address]      Hours: Mon-Fri 7a-7p, Sat-Sun 10a-4p Phone: [phone]   predniSONE 5 MG tablet  tacrolimus 1 MG Cap       Information about where to get these medications is not yet available    Ask your nurse or doctor about these medications  sodium chloride 0.9% SolP 50 mL with DAPTOmycin 500 mg SolR 835 mg            The following medications have been placed on HOLD and should be restarted in the outpatient setting (when appropriate): MMF (infection/bacteremia on abx)    Abdoulaye Parikh verbalized understanding and had the opportunity to ask questions.

## 2024-03-09 NOTE — PLAN OF CARE
PLAN OF CARE NOTE     Fall bundle in place. Pt. Remained free from falls/trauma/injuries. No complaints of CP/ SOB/discomfort. VSS. RA. A&Ox4. No tele, pulse WNL. Pt. denies pain this shift. Pt. Reports improvement of stools w/ PRN Imodium. Pt. Potentially d/c pending repeat Bcx remaining neg. (Preliminary result NGTD). Pt. Per ID to d/c w/ midline & Dapto abx for 4 weeks. Pt. Also given PRN Tessalon Perle for cough this shift. The POC reviewed w/ pt. @ bedside, questions were answered & encouraged. No further concerns at this time.

## 2024-03-09 NOTE — PLAN OF CARE
Pt remains aaox4. Vss on bedside monitor, afebrile, on ra. Pt to discharge home this afternoon. Midline to remain in place for iv abx outpt. Dose of dapto given today prior to discharge. Pt reporting improvement in diarrhea with prn imodium. Pt remains free from falls/injuries so far today. Nonskid socks on, call bell within reach, bed/chair locked/lowest position. Verbalized understanding to call for needs/assistance.

## 2024-03-10 LAB
1,3 BETA GLUCAN SER-MCNC: <31 PG/ML
B DERMAT AG UR QL IA: NOT DETECTED
BLASTOMYCES AG RESULT: NOT DETECTED NG/ML
FUNGITELL COMMENTS: NEGATIVE
H CAPSUL AG UR-MCNC: NOT DETECTED NG/ML
HISTOPLASMA ANTIGEN URINE: NOT DETECTED
HISTOPLASMA/BLASTOMYCES AG VALUE: NOT DETECTED NG/ML
HISTOPLASMA/BLASTOMYCES RESULT: NOT DETECTED

## 2024-03-11 ENCOUNTER — TELEPHONE (OUTPATIENT)
Dept: UROLOGY | Facility: CLINIC | Age: 64
End: 2024-03-11
Payer: MEDICARE

## 2024-03-11 ENCOUNTER — LAB VISIT (OUTPATIENT)
Dept: LAB | Facility: HOSPITAL | Age: 64
End: 2024-03-11
Attending: INTERNAL MEDICINE
Payer: MEDICARE

## 2024-03-11 ENCOUNTER — PATIENT MESSAGE (OUTPATIENT)
Dept: TRANSPLANT | Facility: CLINIC | Age: 64
End: 2024-03-11
Payer: MEDICARE

## 2024-03-11 DIAGNOSIS — Z94.0 KIDNEY REPLACED BY TRANSPLANT: ICD-10-CM

## 2024-03-11 DIAGNOSIS — Z94.0 S/P KIDNEY TRANSPLANT: ICD-10-CM

## 2024-03-11 LAB
ALBUMIN SERPL BCP-MCNC: 3.3 G/DL (ref 3.5–5.2)
ALBUMIN SERPL BCP-MCNC: 3.3 G/DL (ref 3.5–5.2)
ALP SERPL-CCNC: 75 U/L (ref 55–135)
ALT SERPL W/O P-5'-P-CCNC: 12 U/L (ref 10–44)
ANION GAP SERPL CALC-SCNC: 9 MMOL/L (ref 8–16)
ANION GAP SERPL CALC-SCNC: 9 MMOL/L (ref 8–16)
AST SERPL-CCNC: 26 U/L (ref 10–40)
BASOPHILS # BLD AUTO: 0.03 K/UL (ref 0–0.2)
BASOPHILS NFR BLD: 0.5 % (ref 0–1.9)
BILIRUB SERPL-MCNC: 0.5 MG/DL (ref 0.1–1)
BUN SERPL-MCNC: 18 MG/DL (ref 8–23)
BUN SERPL-MCNC: 18 MG/DL (ref 8–23)
CALCIUM SERPL-MCNC: 9.5 MG/DL (ref 8.7–10.5)
CALCIUM SERPL-MCNC: 9.5 MG/DL (ref 8.7–10.5)
CHLORIDE SERPL-SCNC: 109 MMOL/L (ref 95–110)
CHLORIDE SERPL-SCNC: 109 MMOL/L (ref 95–110)
CK SERPL-CCNC: 95 U/L (ref 20–200)
CO2 SERPL-SCNC: 22 MMOL/L (ref 23–29)
CO2 SERPL-SCNC: 22 MMOL/L (ref 23–29)
CREAT SERPL-MCNC: 1.5 MG/DL (ref 0.5–1.4)
CREAT SERPL-MCNC: 1.5 MG/DL (ref 0.5–1.4)
DIFFERENTIAL METHOD BLD: ABNORMAL
EOSINOPHIL # BLD AUTO: 0.1 K/UL (ref 0–0.5)
EOSINOPHIL NFR BLD: 0.9 % (ref 0–8)
ERYTHROCYTE [DISTWIDTH] IN BLOOD BY AUTOMATED COUNT: 13.6 % (ref 11.5–14.5)
EST. GFR  (NO RACE VARIABLE): 52.3 ML/MIN/1.73 M^2
EST. GFR  (NO RACE VARIABLE): 52.3 ML/MIN/1.73 M^2
GALACTOMANNAN AG SERPL IA-ACNC: <0.5 INDEX
GLUCOSE SERPL-MCNC: 77 MG/DL (ref 70–110)
GLUCOSE SERPL-MCNC: 77 MG/DL (ref 70–110)
HCT VFR BLD AUTO: 40 % (ref 40–54)
HGB BLD-MCNC: 12.8 G/DL (ref 14–18)
IMM GRANULOCYTES # BLD AUTO: 0.03 K/UL (ref 0–0.04)
IMM GRANULOCYTES NFR BLD AUTO: 0.5 % (ref 0–0.5)
LYMPHOCYTES # BLD AUTO: 1.3 K/UL (ref 1–4.8)
LYMPHOCYTES NFR BLD: 23.5 % (ref 18–48)
MAGNESIUM SERPL-MCNC: 1.7 MG/DL (ref 1.6–2.6)
MCH RBC QN AUTO: 34.4 PG (ref 27–31)
MCHC RBC AUTO-ENTMCNC: 32 G/DL (ref 32–36)
MCV RBC AUTO: 108 FL (ref 82–98)
MONOCYTES # BLD AUTO: 0.6 K/UL (ref 0.3–1)
MONOCYTES NFR BLD: 10.3 % (ref 4–15)
NEUTROPHILS # BLD AUTO: 3.6 K/UL (ref 1.8–7.7)
NEUTROPHILS NFR BLD: 64.3 % (ref 38–73)
NRBC BLD-RTO: 0 /100 WBC
PHOSPHATE SERPL-MCNC: 3.2 MG/DL (ref 2.7–4.5)
PLATELET # BLD AUTO: 247 K/UL (ref 150–450)
PMV BLD AUTO: 11 FL (ref 9.2–12.9)
POTASSIUM SERPL-SCNC: 4.2 MMOL/L (ref 3.5–5.1)
POTASSIUM SERPL-SCNC: 4.2 MMOL/L (ref 3.5–5.1)
PROT SERPL-MCNC: 9.3 G/DL (ref 6–8.4)
RBC # BLD AUTO: 3.72 M/UL (ref 4.6–6.2)
SODIUM SERPL-SCNC: 140 MMOL/L (ref 136–145)
SODIUM SERPL-SCNC: 140 MMOL/L (ref 136–145)
TACROLIMUS BLD-MCNC: 11.5 NG/ML (ref 5–15)
WBC # BLD AUTO: 5.54 K/UL (ref 3.9–12.7)

## 2024-03-11 PROCEDURE — 83735 ASSAY OF MAGNESIUM: CPT | Performed by: INTERNAL MEDICINE

## 2024-03-11 PROCEDURE — 87799 DETECT AGENT NOS DNA QUANT: CPT | Performed by: INTERNAL MEDICINE

## 2024-03-11 PROCEDURE — 36415 COLL VENOUS BLD VENIPUNCTURE: CPT | Performed by: INTERNAL MEDICINE

## 2024-03-11 PROCEDURE — 80053 COMPREHEN METABOLIC PANEL: CPT | Performed by: INTERNAL MEDICINE

## 2024-03-11 PROCEDURE — 82550 ASSAY OF CK (CPK): CPT | Performed by: INTERNAL MEDICINE

## 2024-03-11 PROCEDURE — 80197 ASSAY OF TACROLIMUS: CPT | Performed by: INTERNAL MEDICINE

## 2024-03-11 PROCEDURE — 85025 COMPLETE CBC W/AUTO DIFF WBC: CPT | Performed by: INTERNAL MEDICINE

## 2024-03-11 PROCEDURE — 84100 ASSAY OF PHOSPHORUS: CPT | Performed by: INTERNAL MEDICINE

## 2024-03-11 RX ORDER — TACROLIMUS 1 MG/1
CAPSULE ORAL
Qty: 150 CAPSULE | Refills: 11 | Status: ON HOLD | OUTPATIENT
Start: 2024-03-11 | End: 2024-03-17

## 2024-03-11 NOTE — TELEPHONE ENCOUNTER
Pt made aware of below orders          ----- Message from Darrin Verma MD sent at 3/11/2024  1:45 PM CDT -----  Please lower prograf to 2 mg PO bid, encourage more hydration

## 2024-03-11 NOTE — TELEPHONE ENCOUNTER
----- Message from Floresita Hewitt MA sent at 3/11/2024  2:22 PM CDT -----  Contact: [phone]  Dr Verma from transplant submitted a referral on 3/9/2024 to Dr. Arboleda for nephrolithiasis (native and transplant kidney). Pt was also seen by Dr. Haider on Feb 21, 2024 and had ordered a cystoscope for bladder stones..    I spoke with Mr Parikh and he has not had the cystoscope and would like to not have it if at all possible. Would that affect him having an appt. With Dr Arboleda for the kidney  stones?       Dr Anderson note:     PLAN:     1. Will start flomax due to the likely bladder stones. Side effects discussed.  A new Rx was given  2. Will cysto to confirm the bladder stones.  I  have explained the risk, benefits, and alternatives of the procedure in detail. The patient voices understanding and all questions have been answered. The patient agrees to proceed as planned.    3. Will have him see Dr. Arboleda for stones in a transplant kidney after the bladder stones have been addressed.    Pts # [phone]

## 2024-03-12 ENCOUNTER — PATIENT OUTREACH (OUTPATIENT)
Dept: ADMINISTRATIVE | Facility: CLINIC | Age: 64
End: 2024-03-12
Payer: MEDICARE

## 2024-03-12 ENCOUNTER — PATIENT MESSAGE (OUTPATIENT)
Dept: ADMINISTRATIVE | Facility: CLINIC | Age: 64
End: 2024-03-12
Payer: MEDICARE

## 2024-03-12 DIAGNOSIS — Z94.0 KIDNEY REPLACED BY TRANSPLANT: Primary | ICD-10-CM

## 2024-03-12 LAB
BACTERIA BLD CULT: NORMAL
BACTERIA BLD CULT: NORMAL
MISCELLANEOUS TEST NAME: NORMAL
REFERENCE LAB: NORMAL
SPECIMEN TYPE: NORMAL
TEST RESULT: NORMAL

## 2024-03-12 NOTE — PROGRESS NOTES
C3 nurse attempted to contact Abdoulaye Parikh  for a TCC post hospital discharge follow up call. No answer. The patient does not have a scheduled HOSFU appointment, and the pt does not have an [facility] PCP.

## 2024-03-13 ENCOUNTER — TELEPHONE (OUTPATIENT)
Dept: INFECTIOUS DISEASES | Facility: CLINIC | Age: 64
End: 2024-03-13
Payer: MEDICARE

## 2024-03-13 ENCOUNTER — TELEPHONE (OUTPATIENT)
Dept: TRANSPLANT | Facility: CLINIC | Age: 64
End: 2024-03-13
Payer: MEDICARE

## 2024-03-13 DIAGNOSIS — Z79.2 RECEIVING INTRAVENOUS ANTIBIOTIC TREATMENT AS OUTPATIENT: Primary | ICD-10-CM

## 2024-03-13 DIAGNOSIS — R78.81 BACTEREMIA: Primary | ICD-10-CM

## 2024-03-13 DIAGNOSIS — R78.81 BACTEREMIA: ICD-10-CM

## 2024-03-13 NOTE — TELEPHONE ENCOUNTER
[facility] home [facility] is not longer able to do weekly labs and dressing changes.   Patient is not home bound and due to his insurance he's not able to get labs/dressing changes at [facility] outpatient infusion.     Orders placed for qweekly CBC with diff, CMP, CK.

## 2024-03-13 NOTE — TELEPHONE ENCOUNTER
"Return call to patient and wife and states that Home Health is presently at his home changing his dressing but will need to be st up starting next week for dressing changes.  ----- Message from Bebe Agudelo RN sent at 3/13/2024  3:19 PM CDT -----    ----- Message -----  From: Randall Ortiz  Sent: 3/13/2024   2:40 PM CDT  To: [facility] Post-Kidney Transplant Clinical    Consult/Advisory:      Name Of Caller: Self      Contact Preference?: [phone]      What is the nature of the call?: Calling to speak w/ Robles about being recently discharged on Saturday and his midline port       Additional Notes:  "Thank you for all that you do for our patients"           "

## 2024-03-13 NOTE — PATIENT INSTRUCTIONS
C3 nurse spoke with Abdoulaye Parikh  for a TCC post hospital discharge follow up call. The patient has a scheduled HOSFU appointment with  Non [facility] PCP on 03/16/2024 @ 0558.

## 2024-03-14 ENCOUNTER — TELEPHONE (OUTPATIENT)
Dept: INFECTIOUS DISEASES | Facility: CLINIC | Age: 64
End: 2024-03-14
Payer: MEDICARE

## 2024-03-14 DIAGNOSIS — R78.81 BACTEREMIA: Primary | ICD-10-CM

## 2024-03-14 DIAGNOSIS — Z79.2 RECEIVING INTRAVENOUS ANTIBIOTIC TREATMENT AS OUTPATIENT: ICD-10-CM

## 2024-03-15 LAB
ASPERGILLUS AB SER QL ID: NOT DETECTED
B DERMAT AB SER QL ID: NOT DETECTED
C IMMITIS AB SER QL ID: NOT DETECTED
H CAPSUL AB SER QL ID: NOT DETECTED

## 2024-03-16 ENCOUNTER — HOSPITAL ENCOUNTER (OUTPATIENT)
Facility: HOSPITAL | Age: 64
Discharge: HOME | End: 2024-03-17
Attending: EMERGENCY MEDICINE | Admitting: INTERNAL MEDICINE
Payer: MEDICARE

## 2024-03-16 DIAGNOSIS — N18.9 ANEMIA OF RENAL DISEASE: ICD-10-CM

## 2024-03-16 DIAGNOSIS — Z79.60 LONG-TERM USE OF IMMUNOSUPPRESSANT MEDICATION: ICD-10-CM

## 2024-03-16 DIAGNOSIS — D63.1 ANEMIA OF RENAL DISEASE: ICD-10-CM

## 2024-03-16 DIAGNOSIS — Z91.89 AT RISK FOR OPPORTUNISTIC INFECTIONS: ICD-10-CM

## 2024-03-16 DIAGNOSIS — N18.2 CKD (CHRONIC KIDNEY DISEASE) STAGE 2, GFR 60-89 ML/MIN: ICD-10-CM

## 2024-03-16 DIAGNOSIS — Z94.0 S/P KIDNEY TRANSPLANT: ICD-10-CM

## 2024-03-16 DIAGNOSIS — R06.02 SOB (SHORTNESS OF BREATH): ICD-10-CM

## 2024-03-16 DIAGNOSIS — E87.70 HYPERVOLEMIA: ICD-10-CM

## 2024-03-16 DIAGNOSIS — R78.81 BACTEREMIA: ICD-10-CM

## 2024-03-16 DIAGNOSIS — Z29.89 PROPHYLACTIC IMMUNOTHERAPY: ICD-10-CM

## 2024-03-16 DIAGNOSIS — R91.1 LUNG NODULE: ICD-10-CM

## 2024-03-16 DIAGNOSIS — E87.79 OTHER HYPERVOLEMIA: Primary | ICD-10-CM

## 2024-03-16 LAB
ALBUMIN SERPL BCP-MCNC: 2.8 G/DL (ref 3.5–5.2)
ALP SERPL-CCNC: 67 U/L (ref 55–135)
ALT SERPL W/O P-5'-P-CCNC: 16 U/L (ref 10–44)
AMORPH CRY UR QL COMP ASSIST: ABNORMAL
ANION GAP SERPL CALC-SCNC: 7 MMOL/L (ref 8–16)
AST SERPL-CCNC: 33 U/L (ref 10–40)
BACTERIA #/AREA URNS AUTO: ABNORMAL /HPF
BASOPHILS # BLD AUTO: 0.03 K/UL (ref 0–0.2)
BASOPHILS NFR BLD: 0.7 % (ref 0–1.9)
BILIRUB SERPL-MCNC: 0.6 MG/DL (ref 0.1–1)
BILIRUB UR QL STRIP: NEGATIVE
BNP SERPL-MCNC: 374 PG/ML (ref 0–99)
BUN SERPL-MCNC: 16 MG/DL (ref 8–23)
CALCIUM SERPL-MCNC: 8.9 MG/DL (ref 8.7–10.5)
CHLORIDE SERPL-SCNC: 109 MMOL/L (ref 95–110)
CLARITY UR REFRACT.AUTO: ABNORMAL
CO2 SERPL-SCNC: 21 MMOL/L (ref 23–29)
COLOR UR AUTO: YELLOW
CREAT SERPL-MCNC: 1.5 MG/DL (ref 0.5–1.4)
DIFFERENTIAL METHOD BLD: ABNORMAL
EOSINOPHIL # BLD AUTO: 0 K/UL (ref 0–0.5)
EOSINOPHIL NFR BLD: 0.5 % (ref 0–8)
ERYTHROCYTE [DISTWIDTH] IN BLOOD BY AUTOMATED COUNT: 13.8 % (ref 11.5–14.5)
EST. GFR  (NO RACE VARIABLE): 52.3 ML/MIN/1.73 M^2
GLUCOSE SERPL-MCNC: 76 MG/DL (ref 70–110)
GLUCOSE UR QL STRIP: NEGATIVE
HCT VFR BLD AUTO: 33.3 % (ref 40–54)
HGB BLD-MCNC: 10.6 G/DL (ref 14–18)
HGB UR QL STRIP: ABNORMAL
IMM GRANULOCYTES # BLD AUTO: 0.02 K/UL (ref 0–0.04)
IMM GRANULOCYTES NFR BLD AUTO: 0.5 % (ref 0–0.5)
KETONES UR QL STRIP: NEGATIVE
LEUKOCYTE ESTERASE UR QL STRIP: NEGATIVE
LYMPHOCYTES # BLD AUTO: 1 K/UL (ref 1–4.8)
LYMPHOCYTES NFR BLD: 24 % (ref 18–48)
MCH RBC QN AUTO: 34.5 PG (ref 27–31)
MCHC RBC AUTO-ENTMCNC: 31.8 G/DL (ref 32–36)
MCV RBC AUTO: 109 FL (ref 82–98)
MICROSCOPIC COMMENT: ABNORMAL
MONOCYTES # BLD AUTO: 0.6 K/UL (ref 0.3–1)
MONOCYTES NFR BLD: 15 % (ref 4–15)
NEUTROPHILS # BLD AUTO: 2.4 K/UL (ref 1.8–7.7)
NEUTROPHILS NFR BLD: 59.3 % (ref 38–73)
NITRITE UR QL STRIP: NEGATIVE
NRBC BLD-RTO: 0 /100 WBC
OHS QRS DURATION: 92 MS
OHS QTC CALCULATION: 429 MS
PH UR STRIP: 8 [PH] (ref 5–8)
PLATELET # BLD AUTO: 152 K/UL (ref 150–450)
PMV BLD AUTO: 10.1 FL (ref 9.2–12.9)
POCT GLUCOSE: 71 MG/DL (ref 70–110)
POTASSIUM SERPL-SCNC: 4.2 MMOL/L (ref 3.5–5.1)
PROT SERPL-MCNC: 7.2 G/DL (ref 6–8.4)
PROT UR QL STRIP: ABNORMAL
RBC # BLD AUTO: 3.07 M/UL (ref 4.6–6.2)
RBC #/AREA URNS AUTO: 30 /HPF (ref 0–4)
SARS-COV-2 RDRP RESP QL NAA+PROBE: NEGATIVE
SODIUM SERPL-SCNC: 137 MMOL/L (ref 136–145)
SP GR UR STRIP: 1.01 (ref 1–1.03)
SQUAMOUS #/AREA URNS AUTO: 0 /HPF
TACROLIMUS BLD-MCNC: 14.8 NG/ML (ref 5–15)
TROPONIN I SERPL DL<=0.01 NG/ML-MCNC: 0.06 NG/ML (ref 0–0.03)
TROPONIN I SERPL DL<=0.01 NG/ML-MCNC: 0.07 NG/ML (ref 0–0.03)
UNSPECIFIED CRY UR QL COMP ASSIST: 7
URN SPEC COLLECT METH UR: ABNORMAL
WBC # BLD AUTO: 4.12 K/UL (ref 3.9–12.7)
WBC #/AREA URNS AUTO: 3 /HPF (ref 0–5)

## 2024-03-16 PROCEDURE — 96375 TX/PRO/DX INJ NEW DRUG ADDON: CPT

## 2024-03-16 PROCEDURE — G0378 HOSPITAL OBSERVATION PER HR: HCPCS

## 2024-03-16 PROCEDURE — 84484 ASSAY OF TROPONIN QUANT: CPT | Mod: 91 | Performed by: CLINICAL NURSE SPECIALIST

## 2024-03-16 PROCEDURE — 96372 THER/PROPH/DIAG INJ SC/IM: CPT | Mod: 59 | Performed by: CLINICAL NURSE SPECIALIST

## 2024-03-16 PROCEDURE — U0002 COVID-19 LAB TEST NON-CDC: HCPCS | Performed by: CLINICAL NURSE SPECIALIST

## 2024-03-16 PROCEDURE — 63600175 PHARM REV CODE 636 W HCPCS: Performed by: CLINICAL NURSE SPECIALIST

## 2024-03-16 PROCEDURE — 80053 COMPREHEN METABOLIC PANEL: CPT | Performed by: EMERGENCY MEDICINE

## 2024-03-16 PROCEDURE — 25000003 PHARM REV CODE 250: Performed by: PHYSICIAN ASSISTANT

## 2024-03-16 PROCEDURE — 93005 ELECTROCARDIOGRAM TRACING: CPT

## 2024-03-16 PROCEDURE — 85025 COMPLETE CBC W/AUTO DIFF WBC: CPT | Performed by: EMERGENCY MEDICINE

## 2024-03-16 PROCEDURE — 99215 OFFICE O/P EST HI 40 MIN: CPT | Mod: ,,, | Performed by: CLINICAL NURSE SPECIALIST

## 2024-03-16 PROCEDURE — 36415 COLL VENOUS BLD VENIPUNCTURE: CPT | Performed by: CLINICAL NURSE SPECIALIST

## 2024-03-16 PROCEDURE — 99285 EMERGENCY DEPT VISIT HI MDM: CPT | Mod: 25

## 2024-03-16 PROCEDURE — 80197 ASSAY OF TACROLIMUS: CPT | Performed by: EMERGENCY MEDICINE

## 2024-03-16 PROCEDURE — 96365 THER/PROPH/DIAG IV INF INIT: CPT

## 2024-03-16 PROCEDURE — 84484 ASSAY OF TROPONIN QUANT: CPT | Performed by: EMERGENCY MEDICINE

## 2024-03-16 PROCEDURE — 82962 GLUCOSE BLOOD TEST: CPT

## 2024-03-16 PROCEDURE — 25000003 PHARM REV CODE 250: Performed by: CLINICAL NURSE SPECIALIST

## 2024-03-16 PROCEDURE — 93010 ELECTROCARDIOGRAM REPORT: CPT | Mod: ,,, | Performed by: INTERNAL MEDICINE

## 2024-03-16 PROCEDURE — 81001 URINALYSIS AUTO W/SCOPE: CPT | Performed by: EMERGENCY MEDICINE

## 2024-03-16 PROCEDURE — 83880 ASSAY OF NATRIURETIC PEPTIDE: CPT | Performed by: EMERGENCY MEDICINE

## 2024-03-16 RX ORDER — PREDNISONE 5 MG/1
5 TABLET ORAL DAILY
Status: DISCONTINUED | OUTPATIENT
Start: 2024-03-16 | End: 2024-03-17 | Stop reason: HOSPADM

## 2024-03-16 RX ORDER — LOPERAMIDE HYDROCHLORIDE 2 MG/1
2 CAPSULE ORAL EVERY 6 HOURS PRN
Status: DISCONTINUED | OUTPATIENT
Start: 2024-03-16 | End: 2024-03-17 | Stop reason: HOSPADM

## 2024-03-16 RX ORDER — SODIUM BICARBONATE 650 MG/1
1300 TABLET ORAL 3 TIMES DAILY
Status: DISCONTINUED | OUTPATIENT
Start: 2024-03-16 | End: 2024-03-17 | Stop reason: HOSPADM

## 2024-03-16 RX ORDER — LOSARTAN POTASSIUM 50 MG/1
100 TABLET ORAL DAILY
Status: DISCONTINUED | OUTPATIENT
Start: 2024-03-16 | End: 2024-03-17 | Stop reason: HOSPADM

## 2024-03-16 RX ORDER — TACROLIMUS 1 MG/1
2 CAPSULE ORAL 2 TIMES DAILY
Status: DISCONTINUED | OUTPATIENT
Start: 2024-03-16 | End: 2024-03-16

## 2024-03-16 RX ORDER — SODIUM CHLORIDE 0.9 % (FLUSH) 0.9 %
3 SYRINGE (ML) INJECTION
Status: DISCONTINUED | OUTPATIENT
Start: 2024-03-16 | End: 2024-03-17 | Stop reason: HOSPADM

## 2024-03-16 RX ORDER — ONDANSETRON HYDROCHLORIDE 2 MG/ML
4 INJECTION, SOLUTION INTRAVENOUS EVERY 8 HOURS PRN
Status: DISCONTINUED | OUTPATIENT
Start: 2024-03-16 | End: 2024-03-17 | Stop reason: HOSPADM

## 2024-03-16 RX ORDER — TAMSULOSIN HYDROCHLORIDE 0.4 MG/1
0.4 CAPSULE ORAL DAILY
Status: DISCONTINUED | OUTPATIENT
Start: 2024-03-16 | End: 2024-03-17 | Stop reason: HOSPADM

## 2024-03-16 RX ORDER — ACETAMINOPHEN 325 MG/1
650 TABLET ORAL EVERY 6 HOURS PRN
Status: DISCONTINUED | OUTPATIENT
Start: 2024-03-16 | End: 2024-03-17 | Stop reason: HOSPADM

## 2024-03-16 RX ORDER — FUROSEMIDE 10 MG/ML
40 INJECTION INTRAMUSCULAR; INTRAVENOUS ONCE
Status: COMPLETED | OUTPATIENT
Start: 2024-03-16 | End: 2024-03-16

## 2024-03-16 RX ORDER — TALC
6 POWDER (GRAM) TOPICAL NIGHTLY PRN
Status: DISCONTINUED | OUTPATIENT
Start: 2024-03-16 | End: 2024-03-17 | Stop reason: HOSPADM

## 2024-03-16 RX ORDER — LANOLIN ALCOHOL/MO/W.PET/CERES
400 CREAM (GRAM) TOPICAL 2 TIMES DAILY
Status: DISCONTINUED | OUTPATIENT
Start: 2024-03-16 | End: 2024-03-17 | Stop reason: HOSPADM

## 2024-03-16 RX ORDER — HEPARIN SODIUM 5000 [USP'U]/ML
5000 INJECTION, SOLUTION INTRAVENOUS; SUBCUTANEOUS EVERY 8 HOURS
Status: DISCONTINUED | OUTPATIENT
Start: 2024-03-16 | End: 2024-03-17 | Stop reason: HOSPADM

## 2024-03-16 RX ADMIN — SODIUM BICARBONATE 650 MG TABLET 1300 MG: at 03:03

## 2024-03-16 RX ADMIN — FUROSEMIDE 40 MG: 10 INJECTION, SOLUTION INTRAVENOUS at 06:03

## 2024-03-16 RX ADMIN — DAPTOMYCIN 835 MG: 350 INJECTION, POWDER, LYOPHILIZED, FOR SOLUTION INTRAVENOUS at 09:03

## 2024-03-16 RX ADMIN — TAMSULOSIN HYDROCHLORIDE 0.4 MG: 0.4 CAPSULE ORAL at 09:03

## 2024-03-16 RX ADMIN — HEPARIN SODIUM 5000 UNITS: 5000 INJECTION INTRAVENOUS; SUBCUTANEOUS at 03:03

## 2024-03-16 RX ADMIN — HEPARIN SODIUM 5000 UNITS: 5000 INJECTION INTRAVENOUS; SUBCUTANEOUS at 06:03

## 2024-03-16 RX ADMIN — HEPARIN SODIUM 5000 UNITS: 5000 INJECTION INTRAVENOUS; SUBCUTANEOUS at 08:03

## 2024-03-16 RX ADMIN — Medication 400 MG: at 08:03

## 2024-03-16 RX ADMIN — PREDNISONE 5 MG: 5 TABLET ORAL at 09:03

## 2024-03-16 RX ADMIN — TACROLIMUS 2 MG: 0.5 CAPSULE ORAL at 07:03

## 2024-03-16 RX ADMIN — SODIUM BICARBONATE 650 MG TABLET 1300 MG: at 08:03

## 2024-03-16 RX ADMIN — SODIUM BICARBONATE 650 MG TABLET 1300 MG: at 09:03

## 2024-03-16 RX ADMIN — Medication 400 MG: at 09:03

## 2024-03-16 RX ADMIN — LOSARTAN POTASSIUM 100 MG: 50 TABLET, FILM COATED ORAL at 12:03

## 2024-03-16 NOTE — HPI
Mr Abdoulaye Parikh is a 64 yo male s/p living unrelated kidney transplant on 5/10/23 w/ OR takeback 5/15 to assess flow due to elevated velocities in artery and vein on US and DGF (last HD 5/11 for hyperkalemia). Post op course notable for:  Urine leak - neph tube placed 6/3; PCNU removed 12/31.  possible bladder/kidney stones. Seen by urology 2/2024. Started on flomax. Cystoscopy planned but patient declined.   Bacteremia - Enterococcus faecalis bacteremia March 2024. Believe urine source although urine cx not available. Started on Daptomycin per ID with EOT date 4/3/24.      Patient now presents to the ER with new onset BLE edema starting 2 days prior. He reports swelling to ankles which is new since discharge from hospital on 3/9. He also reports intermittent SOB for the past several days which occurs nightly shortly after his daptomycin infusion. SOB is self-limiting, patient denies SOB at this time. Of note, CT chest done last admission w/ an Irregular right lower lobe solid nodule, new when compared to CT 02/12/2024 ; also with Multiple ground-glass nodules measuring up to 6 mm. Pt was to f/u outpt with pulmonology for further work-up. CXR in ED with small R pleural effusion and atelectasis. Labs in ED notable for elevated BNP ( 374) and elevated troponin (0.07). Patient denies chest pain, EKG stable. Creatinine near baseline at 1.5. Echo done previous admission w/ EF 55-60%. Discussed case with Dr. Verma. Plan to admit to observation to trend troponin, check BLE US for DVT, IV lasix therapy.

## 2024-03-16 NOTE — ASSESSMENT & PLAN NOTE
- Enterococcus bacteremia during previous admission early March 2024  - Believe urine source but urine cx negative  - Seen by ID, on Daptomycin with EOT 4/3/24

## 2024-03-16 NOTE — ED TRIAGE NOTES
"Abdoulaye Parikh, a 63 y.o. male presents to the ED w/ complaint of bilateral LE edema. Pt reports noticing swelling starting x2 days ago after starting home IV antibiotics. Pt has hx of kidney transplant and the transplant coordinator advised him to come to the ER. Pt reports "slight" shortness of breath last night. Denies SOB, CP at this time.     Triage note:  Chief Complaint   Patient presents with    Leg Swelling     Kidney transplant in may. On iv antibiotics. Noticed bilateral ankle swelling starting 2 days ago. Transplant coordinator advised to come to er     Review of patient's allergies indicates:   Allergen Reactions    Bactrim [sulfamethoxazole-trimethoprim] Shortness Of Breath and Itching    Penicillins Shortness Of Breath and Itching     Past Medical History:   Diagnosis Date    Acute blood loss anemia 05/11/2023    Anemia     Arthritis     Cirrhosis     CKD (chronic kidney disease) stage 4, GFR 15-29 ml/min, slow graft function with sustaine creatinien improvement 05/26/2023    Crohn's disease     Disorder of kidney and ureter     Stage 5    Encounter for blood transfusion     Gout     Hearing loss     Hepatitis     Had Hep C Cleared    Hypertension     Immunosuppressive management encounter following kidney transplant 05/26/2023    Neurosarcoidosis 2018    Obesity     Osteoarthritis     S/P kidney transplant 05/11/2023    Sarcoid neuropathy     Sarcoidosis, lung     Steatosis        "

## 2024-03-16 NOTE — ASSESSMENT & PLAN NOTE
- BLE edema X 2 days with elevated BNP (374)  - Echo from previous admission reviewed  - Lasix therapy ordered  - Check BLE US to r/o DVT

## 2024-03-16 NOTE — PROGRESS NOTES
Nurses Note -- 4 Eyes      3/16/2024   10:17 AM      Skin assessed during: Transfer      [x] No Altered Skin Integrity Present    []Prevention Measures Documented      [] Yes- Altered Skin Integrity Present or Discovered   [] LDA Added if Not in Epic (Describe Wound)   [] New Altered Skin Integrity was Present on Admit and Documented in LDA   [] Wound Image Taken    Wound Care Consulted? No    Attending Nurse:  Nelson Phillips RN/Staff Member:  Monet Akins

## 2024-03-16 NOTE — PLAN OF CARE
Problem: Adult Inpatient Plan of Care  Goal: Plan of Care Review  Outcome: Ongoing, Progressing     Problem: Adult Inpatient Plan of Care  Goal: Optimal Comfort and Wellbeing  Outcome: Ongoing, Progressing   Trace edema to BLE. US (-) DVT. Family at bedside. Lasix given x 1. Troponin trending down (0.070, 0.060), monitoring x 3. BUTCH midline in place upon arrival. AAOx4, vitals stable.

## 2024-03-16 NOTE — CLINICAL REVIEW
"Patient was discussed with JHON at 5:30 am  I spoke with patient and wife in the observation unite around 7;20 am. No acute distress  He refers some lower extremity edema and some mild dyspnea  BP (!) 148/86   Pulse 60   Temp 97.7 °F (36.5 °C) (Oral)   Resp 19   Ht 5' 9" (1.753 m)   Wt 82.1 kg (181 lb)   SpO2 99%   BMI 26.73 kg/m²     Trace leg edema    Plan: will monitor for 24 hrs   Lasix  DVT study of lower extremity  Repeat troponin    All questions answered  Blood work available reviewed. GFR at baseline     Darrin Verma MD  "

## 2024-03-16 NOTE — SUBJECTIVE & OBJECTIVE
Subjective:     Chief Complaint/Reason for Admission: Hypervolemia, elevated BNP    History of Present Illness:  Mr Abdoulaye Parikh is a 62 yo male s/p living unrelated kidney transplant on 5/10/23 w/ OR takeback 5/15 to assess flow due to elevated velocities in artery and vein on US and DGF (last HD 5/11 for hyperkalemia). Post op course notable for:  Urine leak - neph tube placed 6/3; PCNU removed 12/31.  possible bladder/kidney stones. Seen by urology 2/2024. Started on flomax. Cystoscopy planned but patient declined.   Bacteremia - Enterococcus faecalis bacteremia March 2024. Believe urine source although urine cx negative. Started on Daptomycin per ID with EOT date 4/3/24.      Patient now presents to the ER with new onset BLE edema starting 2 days prior. He reports swelling to ankles which is new since discharge from hospital on 3/9. He also reports intermittent SOB for the past several days which occurs nightly shortly after his daptomycin infusion. SOB is self-limiting, patient denies SOB at this time. Of note, CT chest done last admission w/ an Irregular right lower lobe solid nodule, new when compared to CT 02/12/2024 ; also with Multiple ground-glass nodules measuring up to 6 mm. Pt was to f/u outpt with pulmonology for further work-up. CXR in ED with small R pleural effusion and atelectasis. Labs in ED notable for elevated BNP ( 374) and elevated troponin (0.07). Patient denies chest pain, EKG stable. Creatinine near baseline at 1.5. Echo done previous admission w/ EF 55-60%. Discussed case with Dr. Verma. Plan to admit to observation to trend troponin, check BLE US for DVT, IV lasix therapy.            (Not in a hospital admission)      Review of patient's allergies indicates:   Allergen Reactions    Bactrim [sulfamethoxazole-trimethoprim] Shortness Of Breath and Itching    Penicillins Shortness Of Breath and Itching       Past Medical History:   Diagnosis Date    Acute blood loss anemia 05/11/2023     Anemia     Arthritis     Cirrhosis     CKD (chronic kidney disease) stage 4, GFR 15-29 ml/min, slow graft function with sustaine creatinien improvement 05/26/2023    Crohn's disease     Disorder of kidney and ureter     Stage 5    Encounter for blood transfusion     Gout     Hearing loss     Hepatitis     Had Hep C Cleared    Hypertension     Immunosuppressive management encounter following kidney transplant 05/26/2023    Neurosarcoidosis 2018    Obesity     Osteoarthritis     S/P kidney transplant 05/11/2023    Sarcoid neuropathy     Sarcoidosis, lung     Steatosis      Past Surgical History:   Procedure Laterality Date    AV FISTULA PLACEMENT Left     BRAIN SURGERY      COLON SURGERY      Exploratory lap x 4 for Chron's disease. Likely right colectomy    CSF SHUNT      DIAGNOSTIC ULTRASOUND N/A 5/15/2023    Procedure: ULTRASOUND, DIAGNOSTIC;  Surgeon: Chivo Brown MD;  Location: [facility] OR [address] [place] [address];  Service: Transplant;  Laterality: N/A;    JOINT REPLACEMENT Right     Hip    KIDNEY TRANSPLANT N/A 5/10/2023    Procedure: TRANSPLANT, KIDNEY - RQ 7PM START;  Surgeon: Chivo Brown MD;  Location: [facility] OR [address] [place] [address];  Service: Transplant;  Laterality: N/A;    NEPHROSTOMY N/A 6/3/2023    Procedure: Nephrostomy;  Surgeon: Silvia Surgeon;  Location: [facility];  Service: Anesthesiology;  Laterality: N/A;    RENAL BIOPSY  5/15/2023    Procedure: BIOPSY, KIDNEY;  Surgeon: Chivo Brown MD;  Location: [facility] OR [address] [place] [address];  Service: Transplant;;    RENAL EXPLORATION  5/15/2023    Procedure: EXPLORATION, KIDNEY;  Surgeon: Chivo Brown MD;  Location: [address];  Service: Transplant;;    TOTAL HIP ARTHROPLASTY Right      Family History       Problem Relation (Age of Onset)    COPD Sister    Cancer Father, Maternal Grandfather, Paternal Grandfather    Diabetes Father    Heart disease Father, Sister    Hypertension Mother, Father    Lung cancer Maternal Grandfather, Paternal Grandfather    Multiple  "myeloma Father    Stroke Father          Tobacco Use    Smoking status: Never    Smokeless tobacco: Never   Substance and Sexual Activity    Alcohol use: Not Currently    Drug use: Never    Sexual activity: Yes     Partners: Female        Review of Systems   Constitutional:  Negative for activity change, appetite change, chills and fever.   HENT: Negative.     Respiratory:  Positive for cough and shortness of breath (intermittent).    Cardiovascular:  Positive for leg swelling. Negative for chest pain and palpitations.   Gastrointestinal:  Positive for diarrhea (chronic). Negative for abdominal distention and abdominal pain.   Genitourinary:  Negative for decreased urine volume and dysuria.   Allergic/Immunologic: Positive for immunocompromised state.   Neurological:  Negative for dizziness and headaches.   Psychiatric/Behavioral:  Negative for agitation and confusion.      Objective:     Vital Signs (Most Recent):  Temp: 97.5 °F (36.4 °C) (24)  Pulse: (!) 58 (24)  Resp: 19 (24)  BP: 137/65 (24)  SpO2: 98 % (24)  Height: 5' 9" (175.3 cm)  Weight: 82.1 kg (181 lb)  Body mass index is 26.73 kg/m².      Physical Exam  Vitals and nursing note reviewed.   Constitutional:       Appearance: Normal appearance.   Cardiovascular:      Rate and Rhythm: Bradycardia present.   Pulmonary:      Effort: Pulmonary effort is normal.      Breath sounds: Examination of the right-lower field reveals decreased breath sounds. Examination of the left-lower field reveals decreased breath sounds. Decreased breath sounds present.   Abdominal:      General: Bowel sounds are normal.      Palpations: Abdomen is soft.   Musculoskeletal:      Right lower le+ Pitting Edema present.      Left lower le+ Pitting Edema present.   Skin:     General: Skin is warm.   Neurological:      Mental Status: He is alert and oriented to person, place, and time. Mental status is at baseline. "   Psychiatric:         Mood and Affect: Mood normal.         Behavior: Behavior normal.          Laboratory  CBC:   Recent Labs   Lab 03/09/24  0634 03/11/24  0720 03/16/24  0127   WBC 6.10 5.54 4.12   RBC 3.41* 3.72* 3.07*   HGB 11.6* 12.8* 10.6*   HCT 34.9* 40.0 33.3*    247 152   * 108* 109*   MCH 34.0* 34.4* 34.5*   MCHC 33.2 32.0 31.8*     CMP:   Recent Labs   Lab 03/09/24  0634 03/11/24  0720 03/16/24  0127   GLU 72 77  77 76   CALCIUM 8.9 9.5  9.5 8.9   ALBUMIN 2.7* 3.3*  3.3* 2.8*   PROT  --  9.3* 7.2    140  140 137   K 4.5 4.2  4.2 4.2   CO2 18* 22*  22* 21*   * 109  109 109   BUN 16 18  18 16   CREATININE 1.1 1.5*  1.5* 1.5*   ALKPHOS  --  75 67   ALT  --  12 16   AST  --  26 33       Diagnostic Results:  None

## 2024-03-16 NOTE — ASSESSMENT & PLAN NOTE
- Cr 1.5 on admit  - Monitor daily renal panel  - Avoid nephrotoxic medications  - Strict I's/O's

## 2024-03-16 NOTE — H&P
[facility] - Emergency Dept  Kidney Transplant  H&P      Subjective:     Chief Complaint/Reason for Admission: Hypervolemia, elevated BNP    History of Present Illness:  Mr Abdoulaye Parikh is a 62 yo male s/p living unrelated kidney transplant on 5/10/23 w/ OR takeback 5/15 to assess flow due to elevated velocities in artery and vein on US and DGF (last HD 5/11 for hyperkalemia). Post op course notable for:  Urine leak - neph tube placed 6/3; PCNU removed 12/31.  possible bladder/kidney stones. Seen by urology 2/2024. Started on flomax. Cystoscopy planned but patient declined.   Bacteremia - Enterococcus faecalis bacteremia March 2024. Believe urine source although urine cx negative. Started on Daptomycin per ID with EOT date 4/3/24.      Patient now presents to the ER with new onset BLE edema starting 2 days prior. He reports swelling to ankles which is new since discharge from hospital on 3/9. He also reports intermittent SOB for the past several days which occurs nightly shortly after his daptomycin infusion. SOB is self-limiting, patient denies SOB at this time. Of note, CT chest done last admission w/ an Irregular right lower lobe solid nodule, new when compared to CT 02/12/2024 ; also with Multiple ground-glass nodules measuring up to 6 mm. Pt was to f/u outpt with pulmonology for further work-up. CXR in ED with small R pleural effusion and atelectasis. Labs in ED notable for elevated BNP ( 374) and elevated troponin (0.07). Patient denies chest pain, EKG stable. Creatinine near baseline at 1.5. Echo done previous admission w/ EF 55-60%. Discussed case with Dr. Verma. Plan to admit to observation to trend troponin, check BLE US for DVT, IV lasix therapy.            (Not in a hospital admission)      Review of patient's allergies indicates:   Allergen Reactions    Bactrim [sulfamethoxazole-trimethoprim] Shortness Of Breath and Itching    Penicillins Shortness Of Breath and Itching       Past Medical History:    Diagnosis Date    Acute blood loss anemia 05/11/2023    Anemia     Arthritis     Cirrhosis     CKD (chronic kidney disease) stage 4, GFR 15-29 ml/min, slow graft function with sustaine creatinien improvement 05/26/2023    Crohn's disease     Disorder of kidney and ureter     Stage 5    Encounter for blood transfusion     Gout     Hearing loss     Hepatitis     Had Hep C Cleared    Hypertension     Immunosuppressive management encounter following kidney transplant 05/26/2023    Neurosarcoidosis 2018    Obesity     Osteoarthritis     S/P kidney transplant 05/11/2023    Sarcoid neuropathy     Sarcoidosis, lung     Steatosis      Past Surgical History:   Procedure Laterality Date    AV FISTULA PLACEMENT Left     BRAIN SURGERY      COLON SURGERY      Exploratory lap x 4 for Chron's disease. Likely right colectomy    CSF SHUNT      DIAGNOSTIC ULTRASOUND N/A 5/15/2023    Procedure: ULTRASOUND, DIAGNOSTIC;  Surgeon: Chivo Brown MD;  Location: [facility] OR [address] [place] [address];  Service: Transplant;  Laterality: N/A;    JOINT REPLACEMENT Right     Hip    KIDNEY TRANSPLANT N/A 5/10/2023    Procedure: TRANSPLANT, KIDNEY - RQ 7PM START;  Surgeon: Chivo Brown MD;  Location: [address];  Service: Transplant;  Laterality: N/A;    NEPHROSTOMY N/A 6/3/2023    Procedure: Nephrostomy;  Surgeon: Silvia Surgeon;  Location: [facility];  Service: Anesthesiology;  Laterality: N/A;    RENAL BIOPSY  5/15/2023    Procedure: BIOPSY, KIDNEY;  Surgeon: Chivo Brown MD;  Location: [address];  Service: Transplant;;    RENAL EXPLORATION  5/15/2023    Procedure: EXPLORATION, KIDNEY;  Surgeon: Chivo Brown MD;  Location: [address];  Service: Transplant;;    TOTAL HIP ARTHROPLASTY Right      Family History       Problem Relation (Age of Onset)    COPD Sister    Cancer Father, Maternal Grandfather, Paternal Grandfather    Diabetes Father    Heart disease Father, Sister    Hypertension Mother, Father    Lung  "cancer Maternal Grandfather, Paternal Grandfather    Multiple myeloma Father    Stroke Father          Tobacco Use    Smoking status: Never    Smokeless tobacco: Never   Substance and Sexual Activity    Alcohol use: Not Currently    Drug use: Never    Sexual activity: Yes     Partners: Female        Review of Systems   Constitutional:  Negative for activity change, appetite change, chills and fever.   HENT: Negative.     Respiratory:  Positive for cough and shortness of breath (intermittent).    Cardiovascular:  Positive for leg swelling. Negative for chest pain and palpitations.   Gastrointestinal:  Positive for diarrhea (chronic). Negative for abdominal distention and abdominal pain.   Genitourinary:  Negative for decreased urine volume and dysuria.   Allergic/Immunologic: Positive for immunocompromised state.   Neurological:  Negative for dizziness and headaches.   Psychiatric/Behavioral:  Negative for agitation and confusion.      Objective:     Vital Signs (Most Recent):  Temp: 97.5 °F (36.4 °C) (24)  Pulse: (!) 58 (24)  Resp: 19 (24)  BP: 137/65 (24)  SpO2: 98 % (24)  Height: 5' 9" (175.3 cm)  Weight: 82.1 kg (181 lb)  Body mass index is 26.73 kg/m².      Physical Exam  Vitals and nursing note reviewed.   Constitutional:       Appearance: Normal appearance.   Cardiovascular:      Rate and Rhythm: Bradycardia present.   Pulmonary:      Effort: Pulmonary effort is normal.      Breath sounds: Examination of the right-lower field reveals decreased breath sounds. Examination of the left-lower field reveals decreased breath sounds. Decreased breath sounds present.   Abdominal:      General: Bowel sounds are normal.      Palpations: Abdomen is soft.   Musculoskeletal:      Right lower le+ Pitting Edema present.      Left lower le+ Pitting Edema present.   Skin:     General: Skin is warm.   Neurological:      Mental Status: He is alert and oriented to " "person, place, and time. Mental status is at baseline.   Psychiatric:         Mood and Affect: Mood normal.         Behavior: Behavior normal.          Laboratory  CBC:   Recent Labs   Lab 03/09/24  0634 03/11/24  0720 03/16/24  0127   WBC 6.10 5.54 4.12   RBC 3.41* 3.72* 3.07*   HGB 11.6* 12.8* 10.6*   HCT 34.9* 40.0 33.3*    247 152   * 108* 109*   MCH 34.0* 34.4* 34.5*   MCHC 33.2 32.0 31.8*     CMP:   Recent Labs   Lab 03/09/24  0634 03/11/24  0720 03/16/24  0127   GLU 72 77  77 76   CALCIUM 8.9 9.5  9.5 8.9   ALBUMIN 2.7* 3.3*  3.3* 2.8*   PROT  --  9.3* 7.2    140  140 137   K 4.5 4.2  4.2 4.2   CO2 18* 22*  22* 21*   * 109  109 109   BUN 16 18  18 16   CREATININE 1.1 1.5*  1.5* 1.5*   ALKPHOS  --  75 67   ALT  --  12 16   AST  --  26 33       Diagnostic Results:  None      Assessment/Plan:     Pulmonary  Lung nodule  - To f/u with pulmonology outpt      Renal/  * Hypervolemia  - BLE edema X 2 days with elevated BNP (374)  - Echo from previous admission reviewed  - Lasix therapy ordered  - Check BLE US to r/o DVT      CKD (chronic kidney disease) stage 2, GFR 60-89 ml/min  - Cr 1.5 on admit  - Monitor daily renal panel  - Avoid nephrotoxic medications  - Strict I's/O's      ID  Bacteremia  - Enterococcus bacteremia during previous admission early March 2024  - Believe urine source but urine cx negative  - Seen by ID, on Daptomycin with EOT 4/3/24      At risk for opportunistic infections  - OI prophylaxis per transplant protocol      Immunology/Multi System  Prophylactic immunotherapy  - see "long term use of immunosuppression"      Oncology  Anemia of renal disease  - CBC stable, monitor daily      Palliative Care  Long-term use of immunosuppressant medication  - Continue prograf, steroid  - Check prograf level daily and adjust for therapeutic dosage. Monitor for toxic side effects   - Cellcept on hold while on Daptomycin            Discharge Planning:  Not suitable at " this time    Medical decision making for this encounter includes review of pertinent labs and diagnostic studies, assessment and planning, discussions with consulting providers, discussion with patient/family, and participation in multidisciplinary rounds. Time spent caring for patient: 60 minutes    Josué Moy NP  Kidney Transplant  [facility] - Emergency Dept

## 2024-03-16 NOTE — ED PROVIDER NOTES
Encounter Date: 3/16/2024       History     Chief Complaint   Patient presents with    Leg Swelling     Kidney transplant in may. On iv antibiotics. Noticed bilateral ankle swelling starting 2 days ago. Transplant coordinator advised to come to er     62-year-old past medical history of ESRD status post kidney transplant 05/10/2023, Crohn's disease, arthritis, anemia, HIV infectious status post Harvoni treatment 2020, neuro sarcoidosis status post  shunt, lung sarcoidosis, recent hospitalization for Enterococcus Faecalis bacteremia on IV antibiotics presenting with lower extremity swelling and shortness of breath.  Patient mentions for past 2 days having lower extremity swelling so mentions past 2-3 days having intermittent shortness of breath.  Mentions shortness of breath is usually in the evenings after taking his IV antibiotic medication.  Patient denies any chest pain, cough, fevers, URI type symptoms, nausea vomiting diarrhea or abdominal pain back pain or flank pain.  Patient is compliant with Flomax medication mentions baseline urinary output.      Review of patient's allergies indicates:   Allergen Reactions    Bactrim [sulfamethoxazole-trimethoprim] Shortness Of Breath and Itching    Penicillins Shortness Of Breath and Itching     Past Medical History:   Diagnosis Date    Acute blood loss anemia 05/11/2023    Anemia     Arthritis     Cirrhosis     CKD (chronic kidney disease) stage 4, GFR 15-29 ml/min, slow graft function with sustaine creatinien improvement 05/26/2023    Crohn's disease     Disorder of kidney and ureter     Stage 5    Encounter for blood transfusion     Gout     Hearing loss     Hepatitis     Had Hep C Cleared    Hypertension     Immunosuppressive management encounter following kidney transplant 05/26/2023    Neurosarcoidosis 2018    Obesity     Osteoarthritis     S/P kidney transplant 05/11/2023    Sarcoid neuropathy     Sarcoidosis, lung     Steatosis      Past Surgical History:    Procedure Laterality Date    AV FISTULA PLACEMENT Left     BRAIN SURGERY      COLON SURGERY      Exploratory lap x 4 for Chron's disease. Likely right colectomy    CSF SHUNT      DIAGNOSTIC ULTRASOUND N/A 5/15/2023    Procedure: ULTRASOUND, DIAGNOSTIC;  Surgeon: Chivo Brown MD;  [facility];  Service: Transplant;  Laterality: N/A;    JOINT REPLACEMENT Right     Hip    KIDNEY TRANSPLANT N/A 5/10/2023    Procedure: TRANSPLANT, KIDNEY - RQ 7PM START;  Surgeon: Chivo Brown MD;  [facility];  Service: Transplant;  Laterality: N/A;    NEPHROSTOMY N/A 6/3/2023    Procedure: Nephrostomy;  Surgeon: Silvia Surgeon;  Location: [facility];  Service: Anesthesiology;  Laterality: N/A;    RENAL BIOPSY  5/15/2023    Procedure: BIOPSY, KIDNEY;  Surgeon: Chivo Brown MD;  [facility];  Service: Transplant;;    RENAL EXPLORATION  5/15/2023    Procedure: EXPLORATION, KIDNEY;  Surgeon: Chivo Brown MD;  Location: [address];  Service: Transplant;;    TOTAL HIP ARTHROPLASTY Right      Family History   Problem Relation Age of Onset    Hypertension Mother     Cancer Father     Diabetes Father     Heart disease Father     Hypertension Father     Stroke Father     Multiple myeloma Father     COPD Sister     Heart disease Sister     Cancer Maternal Grandfather     Lung cancer Maternal Grandfather     Cancer Paternal Grandfather     Lung cancer Paternal Grandfather      Social History     Tobacco Use    Smoking status: Never    Smokeless tobacco: Never   Substance Use Topics    Alcohol use: Not Currently    Drug use: Never     Review of Systems    Physical Exam     Initial Vitals [03/16/24 0017]   BP Pulse Resp Temp SpO2   (!) 185/77 63 16 97.5 °F (36.4 °C) 97 %      MAP       --         Physical Exam    Nursing note and vitals reviewed.  Constitutional: He appears well-developed and well-nourished. He is not diaphoretic. No distress.   HENT:   Head:  Normocephalic and atraumatic.   Nose: Nose normal.   Eyes: Conjunctivae and EOM are normal. Pupils are equal, round, and reactive to light. No scleral icterus.   Neck: Neck supple.   Normal range of motion.  Cardiovascular:  Normal rate and regular rhythm.     Exam reveals no gallop and no friction rub.       No murmur heard.  Pulmonary/Chest: Breath sounds normal. No respiratory distress. He has no wheezes. He has no rhonchi. He has no rales.   Abdominal: Abdomen is soft. Bowel sounds are normal. There is no abdominal tenderness. There is no rebound and no guarding.   Musculoskeletal:         General: No tenderness or edema. Normal range of motion.      Cervical back: Normal range of motion and neck supple.      Comments: 2+ bilateral lower extremity pitting edema.    Right upper extremity PICC line in place     Neurological: He is alert and oriented to person, place, and time. He has normal strength. No sensory deficit. GCS score is 15. GCS eye subscore is 4. GCS verbal subscore is 5. GCS motor subscore is 6.   Skin: Skin is warm and dry. No rash noted. No erythema. No pallor.   Psychiatric: He has a normal mood and affect. His behavior is normal. Judgment and thought content normal.         ED Course   Procedures  Labs Reviewed   CBC W/ AUTO DIFFERENTIAL - Abnormal; Notable for the following components:       Result Value    RBC 3.07 (*)     Hemoglobin 10.6 (*)     Hematocrit 33.3 (*)      (*)     MCH 34.5 (*)     MCHC 31.8 (*)     All other components within normal limits   COMPREHENSIVE METABOLIC PANEL - Abnormal; Notable for the following components:    CO2 21 (*)     Creatinine 1.5 (*)     Albumin 2.8 (*)     eGFR 52.3 (*)     Anion Gap 7 (*)     All other components within normal limits   URINALYSIS, REFLEX TO URINE CULTURE - Abnormal; Notable for the following components:    Appearance, UA Hazy (*)     Protein, UA Trace (*)     Occult Blood UA 2+ (*)     All other components within normal limits     Narrative:     Specimen Source->Urine   TROPONIN I - Abnormal; Notable for the following components:    Troponin I 0.070 (*)     All other components within normal limits   B-TYPE NATRIURETIC PEPTIDE - Abnormal; Notable for the following components:     (*)     All other components within normal limits   URINALYSIS MICROSCOPIC - Abnormal; Notable for the following components:    RBC, UA 30 (*)     All other components within normal limits    Narrative:     Specimen Source->Urine   TACROLIMUS LEVEL   COMPREHENSIVE METABOLIC PANEL   SARS-COV-2 RNA AMPLIFICATION, QUAL   TROPONIN I   POCT GLUCOSE   POCT GLUCOSE MONITORING CONTINUOUS        ECG Results              EKG 12-lead (Final result)        Collection Time Result Time QRS Duration [facility] QTC Calculation    03/16/24 01:27:42 03/16/24 07:08:19 92 429                     Final result by Interface, Lab In [facility] (03/16/24 07:08:26)                   Narrative:    Test Reason : R06.02,    Vent. Rate : 059 BPM     Atrial Rate : 059 BPM     P-R Int : 150 ms          QRS Dur : 092 ms      QT Int : 434 ms       P-R-T Axes : 039 010 016 degrees     QTc Int : 429 ms    Sinus bradycardia  Minimal voltage criteria for LVH, may be normal variant  Borderline Abnormal ECG  When compared with ECG of 25-APR-2023 10:39,  QT has shortened  Confirmed by JULIETTE MACK MD ([id number]) on 3/16/2024 7:08:16 AM    Referred By: [email or web address]   SELF           Confirmed By:JULIETTE MACK MD                                  Imaging Results              US Lower Extremity Veins Bilateral (Final result)  Result time 03/16/24 08:48:00      Final result by El Reynoso MD (03/16/24 08:48:00)                   Impression:      No evidence of deep venous thrombosis in either lower extremity.      Electronically signed by: El Reynoso MD  Date:    03/16/2024  Time:    08:48               Narrative:    EXAMINATION:  US LOWER EXTREMITY VEINS BILATERAL    CLINICAL HISTORY:  rule out  DVT;    TECHNIQUE:  Duplex and color flow Doppler and dynamic compression was performed of the bilateral lower extremity veins was performed.    COMPARISON:  06/06/2023    FINDINGS:  Right thigh veins: The common femoral, femoral, popliteal and upper greater saphenous veins are patent and free of thrombus. The veins are normally compressible and have normal phasic flow and augmentation response.    Right calf veins: The visualized calf veins are patent.    Left thigh veins: The common femoral, femoral, popliteal and upper greater saphenous veins are patent and free of thrombus.  The veins are normally compressible and have normal phasic flow and augmentation response.    Left calf veins: The visualized calf veins are patent.    Miscellaneous: None                                       X-Ray Chest AP Portable (Final result)  Result time 03/16/24 02:01:14      Final result by Mercedes Silver MD (03/16/24 02:01:14)                   Impression:      Small right pleural effusion with associated compressive atelectasis.      Electronically signed by: Mercedes Silver  Date:    03/16/2024  Time:    02:01               Narrative:    EXAMINATION:  AP PORTABLE CHEST    CLINICAL HISTORY:  Shortness of breath    TECHNIQUE:  AP portable chest radiograph was submitted.    COMPARISON:  CT chest without contrast from 03/08/2024    FINDINGS:  AP portable chest radiograph demonstrates stable cardiac silhouette.  There is small right pleural effusion.  A spiculated lesion seen in the superior segment right lower lobe on CT chest without contrast from 03/08/2024 is not appreciated on in the plain radiograph.  There is no pneumothorax or focal consolidation.  There is a right-sided ventricular peritoneal shunt catheter.                                       Medications   sodium chloride 0.9% flush 3 mL (has no administration in time range)   melatonin tablet 6 mg (has no administration in time range)   acetaminophen tablet 650 mg  (has no administration in time range)   heparin (porcine) injection 5,000 Units ( Subcutaneous Canceled Entry 3/16/24 2200)   ondansetron injection 4 mg (has no administration in time range)   loperamide capsule 2 mg (has no administration in time range)   magnesium oxide tablet 400 mg (400 mg Oral Given 3/16/24 2048)   predniSONE tablet 5 mg (5 mg Oral Given 3/16/24 0936)   sodium bicarbonate tablet 1,300 mg (1,300 mg Oral Given 3/16/24 2048)   DAPTOmycin (CUBICIN) 835 mg in sodium chloride 0.9% SolP 50 mL IVPB (0 mg Intravenous Stopped 3/16/24 2134)   tamsulosin 24 hr capsule 0.4 mg (0.4 mg Oral Given 3/16/24 0936)   losartan tablet 100 mg (100 mg Oral Given 3/16/24 1207)   furosemide injection 40 mg (40 mg Intravenous Given 3/16/24 0620)     Medical Decision Making  63-year-old presenting with shortness breath lower extremity edema.    Ddx includes but is not limited to:   Renal failure, transplant rejection, electrolyte derangement, ACS, CHF, pneumonia, pneumothorax, asthma , COPD. Considered PE however much lower likelihood at this time considering patient's history and presentation.    Plan: Will obtain CBC, CMP, tachy or levels, troponin, BNP, chest x-ray and reassess.        Amount and/or Complexity of Data Reviewed  Labs: ordered. Decision-making details documented in ED Course.  Radiology: ordered.               ED Course as of 03/16/24 2317   Sat Mar 16, 2024   0425 BNP(!): 374 [DC]   0425 Troponin I(!): 0.070  Noted mild elevation in troponin and BNP.  X-ray with right-sided transfusion seen in prior x-rays will contact kidney transplant team continue to reassess. [DC]   0430 Spoke with transplant team will come to evaluate patient. [DC]      ED Course User Index  [DC] Jeanna Velez Jr., MD                As per transplant team will admit patient to their service.           Clinical Impression:  Final diagnoses:  [R06.02] SOB (shortness of breath)  [E87.70] Hypervolemia          ED Disposition  Condition    Observation                 Jeanna Velez Jr., MD  03/16/24 3482

## 2024-03-16 NOTE — ASSESSMENT & PLAN NOTE
- Continue prograf, steroid  - Check prograf level daily and adjust for therapeutic dosage. Monitor for toxic side effects   - Cellcept on hold while on Daptomycin

## 2024-03-16 NOTE — ED NOTES
Received report from Rylee, RN. Assumed care of pt.    The patient is resting quietly in ED stretcher, and is AAOx4 at this time. Respirations are even and unlabored, with no distress noted.  The patient is aware of POC and all questions and concerns addressed at this time. The patient was offered restroom assistance and denies need to void. The patient denies further needs and has no complaints at this time. SR raised x2, bed locked and in low position with brake engaged. Call bell within reach and the patient verbalized he would call for assistance if needed. Personal belongings are at bedside within reach. Patient has a visitor at bedside.

## 2024-03-17 VITALS
SYSTOLIC BLOOD PRESSURE: 122 MMHG | BODY MASS INDEX: 26.94 KG/M2 | OXYGEN SATURATION: 96 % | RESPIRATION RATE: 18 BRPM | WEIGHT: 181.88 LBS | TEMPERATURE: 97 F | HEART RATE: 67 BPM | DIASTOLIC BLOOD PRESSURE: 74 MMHG | HEIGHT: 69 IN

## 2024-03-17 PROBLEM — R06.02 SOB (SHORTNESS OF BREATH): Status: ACTIVE | Noted: 2024-03-17

## 2024-03-17 LAB
ALBUMIN SERPL BCP-MCNC: 2.8 G/DL (ref 3.5–5.2)
ANION GAP SERPL CALC-SCNC: 8 MMOL/L (ref 8–16)
BASOPHILS # BLD AUTO: 0.03 K/UL (ref 0–0.2)
BASOPHILS NFR BLD: 0.9 % (ref 0–1.9)
BUN SERPL-MCNC: 17 MG/DL (ref 8–23)
CALCIUM SERPL-MCNC: 8.8 MG/DL (ref 8.7–10.5)
CHLORIDE SERPL-SCNC: 105 MMOL/L (ref 95–110)
CO2 SERPL-SCNC: 23 MMOL/L (ref 23–29)
CREAT SERPL-MCNC: 1.5 MG/DL (ref 0.5–1.4)
DIFFERENTIAL METHOD BLD: ABNORMAL
EOSINOPHIL # BLD AUTO: 0.1 K/UL (ref 0–0.5)
EOSINOPHIL NFR BLD: 1.5 % (ref 0–8)
ERYTHROCYTE [DISTWIDTH] IN BLOOD BY AUTOMATED COUNT: 13.9 % (ref 11.5–14.5)
EST. GFR  (NO RACE VARIABLE): 52.3 ML/MIN/1.73 M^2
GLUCOSE SERPL-MCNC: 77 MG/DL (ref 70–110)
HCT VFR BLD AUTO: 34.3 % (ref 40–54)
HGB BLD-MCNC: 11.2 G/DL (ref 14–18)
IMM GRANULOCYTES # BLD AUTO: 0.02 K/UL (ref 0–0.04)
IMM GRANULOCYTES NFR BLD AUTO: 0.6 % (ref 0–0.5)
LYMPHOCYTES # BLD AUTO: 0.9 K/UL (ref 1–4.8)
LYMPHOCYTES NFR BLD: 26.7 % (ref 18–48)
MAGNESIUM SERPL-MCNC: 1.6 MG/DL (ref 1.6–2.6)
MCH RBC QN AUTO: 34.3 PG (ref 27–31)
MCHC RBC AUTO-ENTMCNC: 32.7 G/DL (ref 32–36)
MCV RBC AUTO: 105 FL (ref 82–98)
MONOCYTES # BLD AUTO: 0.5 K/UL (ref 0.3–1)
MONOCYTES NFR BLD: 14.3 % (ref 4–15)
NEUTROPHILS # BLD AUTO: 1.8 K/UL (ref 1.8–7.7)
NEUTROPHILS NFR BLD: 56 % (ref 38–73)
NRBC BLD-RTO: 0 /100 WBC
PHOSPHATE SERPL-MCNC: 2.7 MG/DL (ref 2.7–4.5)
PLATELET # BLD AUTO: 163 K/UL (ref 150–450)
PMV BLD AUTO: 10.7 FL (ref 9.2–12.9)
POTASSIUM SERPL-SCNC: 4.1 MMOL/L (ref 3.5–5.1)
RBC # BLD AUTO: 3.27 M/UL (ref 4.6–6.2)
SODIUM SERPL-SCNC: 136 MMOL/L (ref 136–145)
TACROLIMUS BLD-MCNC: 9.3 NG/ML (ref 5–15)
TROPONIN I SERPL DL<=0.01 NG/ML-MCNC: 0.04 NG/ML (ref 0–0.03)
WBC # BLD AUTO: 3.29 K/UL (ref 3.9–12.7)

## 2024-03-17 PROCEDURE — 85025 COMPLETE CBC W/AUTO DIFF WBC: CPT | Performed by: CLINICAL NURSE SPECIALIST

## 2024-03-17 PROCEDURE — 63600175 PHARM REV CODE 636 W HCPCS: Performed by: INTERNAL MEDICINE

## 2024-03-17 PROCEDURE — 25000003 PHARM REV CODE 250: Performed by: PHYSICIAN ASSISTANT

## 2024-03-17 PROCEDURE — 25000003 PHARM REV CODE 250: Performed by: INTERNAL MEDICINE

## 2024-03-17 PROCEDURE — 80197 ASSAY OF TACROLIMUS: CPT | Performed by: CLINICAL NURSE SPECIALIST

## 2024-03-17 PROCEDURE — 25000003 PHARM REV CODE 250: Performed by: CLINICAL NURSE SPECIALIST

## 2024-03-17 PROCEDURE — G0378 HOSPITAL OBSERVATION PER HR: HCPCS

## 2024-03-17 PROCEDURE — 80069 RENAL FUNCTION PANEL: CPT | Performed by: CLINICAL NURSE SPECIALIST

## 2024-03-17 PROCEDURE — 63600175 PHARM REV CODE 636 W HCPCS: Performed by: CLINICAL NURSE SPECIALIST

## 2024-03-17 PROCEDURE — 96372 THER/PROPH/DIAG INJ SC/IM: CPT | Performed by: CLINICAL NURSE SPECIALIST

## 2024-03-17 PROCEDURE — 36415 COLL VENOUS BLD VENIPUNCTURE: CPT | Performed by: CLINICAL NURSE SPECIALIST

## 2024-03-17 PROCEDURE — 83735 ASSAY OF MAGNESIUM: CPT | Performed by: CLINICAL NURSE SPECIALIST

## 2024-03-17 PROCEDURE — 99239 HOSP IP/OBS DSCHRG MGMT >30: CPT | Mod: ,,, | Performed by: PHYSICIAN ASSISTANT

## 2024-03-17 RX ORDER — OLMESARTAN MEDOXOMIL 40 MG/1
40 TABLET ORAL DAILY
Qty: 30 TABLET | Refills: 11 | Status: ON HOLD | OUTPATIENT
Start: 2024-03-17 | End: 2024-05-18 | Stop reason: HOSPADM

## 2024-03-17 RX ORDER — FUROSEMIDE 20 MG/1
20 TABLET ORAL DAILY
Qty: 4 TABLET | Refills: 0 | Status: SHIPPED | OUTPATIENT
Start: 2024-03-17 | End: 2024-03-22 | Stop reason: DRUGHIGH

## 2024-03-17 RX ORDER — TACROLIMUS 1 MG/1
CAPSULE ORAL
Qty: 150 CAPSULE | Refills: 11 | Status: SHIPPED | OUTPATIENT
Start: 2024-03-17 | End: 2024-03-22 | Stop reason: DRUGHIGH

## 2024-03-17 RX ORDER — FUROSEMIDE 20 MG/1
20 TABLET ORAL DAILY
Qty: 5 TABLET | Refills: 0 | Status: SHIPPED | OUTPATIENT
Start: 2024-03-17 | End: 2024-03-17

## 2024-03-17 RX ORDER — METOPROLOL TARTRATE 25 MG/1
12.5 TABLET ORAL 2 TIMES DAILY
Status: DISCONTINUED | OUTPATIENT
Start: 2024-03-17 | End: 2024-03-17 | Stop reason: HOSPADM

## 2024-03-17 RX ORDER — FUROSEMIDE 20 MG/1
20 TABLET ORAL DAILY
Status: DISCONTINUED | OUTPATIENT
Start: 2024-03-17 | End: 2024-03-17 | Stop reason: HOSPADM

## 2024-03-17 RX ORDER — TACROLIMUS 1 MG/1
1 CAPSULE ORAL 2 TIMES DAILY
Status: DISCONTINUED | OUTPATIENT
Start: 2024-03-17 | End: 2024-03-17 | Stop reason: HOSPADM

## 2024-03-17 RX ADMIN — LOSARTAN POTASSIUM 100 MG: 50 TABLET, FILM COATED ORAL at 08:03

## 2024-03-17 RX ADMIN — HEPARIN SODIUM 5000 UNITS: 5000 INJECTION INTRAVENOUS; SUBCUTANEOUS at 05:03

## 2024-03-17 RX ADMIN — FUROSEMIDE 20 MG: 20 TABLET ORAL at 12:03

## 2024-03-17 RX ADMIN — Medication 400 MG: at 08:03

## 2024-03-17 RX ADMIN — TAMSULOSIN HYDROCHLORIDE 0.4 MG: 0.4 CAPSULE ORAL at 08:03

## 2024-03-17 RX ADMIN — PREDNISONE 5 MG: 5 TABLET ORAL at 08:03

## 2024-03-17 RX ADMIN — TACROLIMUS 1 MG: 1 CAPSULE ORAL at 11:03

## 2024-03-17 RX ADMIN — SODIUM BICARBONATE 650 MG TABLET 1300 MG: at 08:03

## 2024-03-17 NOTE — PROGRESS NOTES
Discharge Medication Note:    Hospital Course: RE-ADMIT s/p kidney transplant on 5/10/23    Admitted d/t hypervolemia; BLE US negative for DVT. Diuresed with lasix 40 mg IV x1 with good response. Discharging on lasix 20 mg PO QD x5 days (1st dose in hospital).     Tacrolimus elevated above goal inpatient - discharged on reduce dose of tac 1/1.     Patient to get labs tomorrow AM, 3/18/24.     Met with Abdoulaye Parikh at discharge to review discharge medications and to update the blue medication card.         Medication List        START taking these medications      furosemide 20 MG tablet  Commonly known as: LASIX  Take 1 tablet (20 mg total) by mouth once daily. for 4 days            CHANGE how you take these medications      olmesartan 40 MG tablet  Commonly known as: BENICAR  Take 1 tablet (40 mg total) by mouth once daily. HOLD SBP <120  What changed: additional instructions     tacrolimus 1 MG Cap  Commonly known as: PROGRAF  Take 1 capsule (1 mg total) by mouth every morning AND 1 capsule (1 mg total) every evening. Txp Date:5/10/2023 (Kidney) Disch Date:5/17/2023 ICD10:Z94.0 Txp Location:[facility].  What changed: See the new instructions.            CONTINUE taking these medications      IMODIUM A-D ORAL     magnesium oxide 400 mg (241.3 mg magnesium) tablet  Commonly known as: MAG-OX  Take 1 tablet (400 mg total) by mouth 2 (two) times daily.     metoprolol tartrate 25 MG tablet  Commonly known as: LOPRESSOR  Take HALF tablet (12.5 mg total) by mouth 2 (two) times daily.     predniSONE 5 MG tablet  Commonly known as: DELTASONE  Take 1 tablet (5 mg total) by mouth once daily.     sodium bicarbonate 650 MG tablet  Take 2 tablets (1,300 mg total) by mouth 3 (three) times daily.     sodium chloride 0.9% SolP 50 mL with DAPTOmycin 500 mg SolR 835 mg  Inject 835 mg into the vein once daily. STOP 4/4/24     tamsulosin 0.4 mg Cap  Commonly known as: FLOMAX  Take 1 capsule (0.4 mg total) by mouth once daily.                Where to Get Your Medications        [facility]  [address] [facility] [address]      Hours: Mon-Fri 7a-7p, Sat-Sun 10a-4p Phone: [phone]   furosemide 20 MG tablet  olmesartan 40 MG tablet  tacrolimus 1 MG Cap        The following medications have been placed on HOLD and should be restarted in the outpatient setting (when appropriate): Cellcept once finished with Daptomycin    Abdoulaye Parikh verbalized understanding and had the opportunity to ask questions.

## 2024-03-17 NOTE — PROGRESS NOTES
Pt departed TSU on foot. VS stable and pt in no distress. All belongings gathered. AVS given to pt and pt gave full verbal consent/understanding of AVS.

## 2024-03-17 NOTE — DISCHARGE SUMMARY
[facility]plant Stepdown  Kidney Transplant  Discharge Summary    Patient Name: Abdoulaye Parikh  MRN: [id number]  Admission Date: 3/16/2024  Hospital Length of Stay: 0 days  Discharge Date and Time:  03/17/2024 11:39 AM  Attending Physician: Darrin Verma MD   Discharging Provider: Mary Sexton PA-C  Primary Care Provider: Gosia, Primary Doctor    HPI:   Mr Abdoulaye Parikh is a 64 yo male s/p living unrelated kidney transplant on 5/10/23 w/ OR takeback 5/15 to assess flow due to elevated velocities in artery and vein on US and DGF (last HD 5/11 for hyperkalemia). Post op course notable for:  Urine leak - neph tube placed 6/3; PCNU removed 12/31.  possible bladder/kidney stones. Seen by urology 2/2024. Started on flomax. Cystoscopy planned but patient declined.   Bacteremia - Enterococcus faecalis bacteremia March 2024. Believe urine source although urine cx not available. Started on Daptomycin per ID with EOT date 4/3/24.      Patient now presents to the ER with new onset BLE edema starting 2 days prior. He reports swelling to ankles which is new since discharge from hospital on 3/9. He also reports intermittent SOB for the past several days which occurs nightly shortly after his daptomycin infusion. SOB is self-limiting, patient denies SOB at this time. Of note, CT chest done last admission w/ an Irregular right lower lobe solid nodule, new when compared to CT 02/12/2024 ; also with Multiple ground-glass nodules measuring up to 6 mm. Pt was to f/u outpt with pulmonology for further work-up. CXR in ED with small R pleural effusion and atelectasis. Labs in ED notable for elevated BNP ( 374) and elevated troponin (0.07). Patient denies chest pain, EKG stable. Creatinine near baseline at 1.5. Echo done previous admission w/ EF 55-60%. Discussed case with Dr. Verma. Plan to admit to observation to trend troponin, check BLE US for DVT, IV lasix therapy.          Hospital Course:      Mr. Parikh was admitted to obs  through the ED with hypervolemia. BLE US negative for DVT. Troponin mildly elevated on admit; trended down. EKG stable. Diuresed well with 40 mg IV lasix. Cr stable at 1.5. He will discharge on 20 mg oral lasix daily x 5 days. Prograf dose decreased this admit for supratherapeutic level. Follow up with labs tomorrow, 3/18. Patient is in agreement with discharge from the hospital today and follow up plan.    Goals of Care Treatment Preferences:  Code Status: Full Code      Final Active Diagnoses:    Diagnosis Date Noted POA    PRINCIPAL PROBLEM:  Hypervolemia [E87.70] 03/16/2024 Yes    Lung nodule [R91.1] 03/07/2024 Yes    Bacteremia [R78.81] 06/05/2023 Yes    CKD (chronic kidney disease) stage 2, GFR 60-89 ml/min [N18.2] 05/26/2023 Yes    Anemia of renal disease [N18.9, D63.1] 05/11/2023 Yes    At risk for opportunistic infections [Z91.89] 05/11/2023 Yes    Long-term use of immunosuppressant medication [Z79.60] 05/11/2023 Not Applicable    Prophylactic immunotherapy [Z29.89] 05/11/2023 Not Applicable      Problems Resolved During this Admission:       Treatments: See above.        Pending Diagnostic Studies:       Procedure Component Value Units Date/Time    Troponin I [[id number]] Collected: 03/16/24 1548    Order Status: Sent Lab Status: No result     Specimen: Blood           Significant Diagnostic Studies: Labs: CMP   Recent Labs   Lab 03/16/24  0127 03/17/24  0637    136   K 4.2 4.1    105   CO2 21* 23   GLU 76 77   BUN 16 17   CREATININE 1.5* 1.5*   CALCIUM 8.9 8.8   PROT 7.2  --    ALBUMIN 2.8* 2.8*   BILITOT 0.6  --    ALKPHOS 67  --    AST 33  --    ALT 16  --    ANIONGAP 7* 8   , CBC   Recent Labs   Lab 03/16/24  0127 03/17/24  0637   WBC 4.12 3.29*   HGB 10.6* 11.2*   HCT 33.3* 34.3*    163   , and INR   Lab Results   Component Value Date    INR 1.1 09/29/2023    INR 1.0 07/31/2023    INR 1.1 06/12/2023       Discharged Condition: stable    Disposition: Home or Self Care    Follow Up:  See above.    Patient Instructions:      Diet Adult Regular     Notify your health care provider if you experience any of the following:  increased confusion or weakness     Notify your health care provider if you experience any of the following:  persistent dizziness, light-headedness, or visual disturbances     Notify your health care provider if you experience any of the following:  worsening rash     Notify your health care provider if you experience any of the following:  severe persistent headache     Notify your health care provider if you experience any of the following:  difficulty breathing or increased cough     Notify your health care provider if you experience any of the following:  redness, tenderness, or signs of infection (pain, swelling, redness, odor or green/yellow discharge around incision site)     Notify your health care provider if you experience any of the following:  severe uncontrolled pain     Notify your health care provider if you experience any of the following:  persistent nausea and vomiting or diarrhea     Notify your health care provider if you experience any of the following:  temperature >100.4     Activity as tolerated     Medications:  Reconciled Home Medications:      Medication List        START taking these medications      furosemide 20 MG tablet  Commonly known as: LASIX  Take 1 tablet (20 mg total) by mouth once daily. for 4 days            CHANGE how you take these medications      olmesartan 40 MG tablet  Commonly known as: BENICAR  Take 1 tablet (40 mg total) by mouth once daily. HOLD SBP <120  What changed: additional instructions     tacrolimus 1 MG Cap  Commonly known as: PROGRAF  Take 1 capsule (1 mg total) by mouth every morning AND 1 capsule (1 mg total) every evening. Txp Date:5/10/2023 (Kidney) Disch Date:5/17/2023 ICD10:Z94.0 Txp Location:[facility].  What changed: See the new instructions.            CONTINUE taking these medications      IMODIUM A-D ORAL  Take by  mouth.     magnesium oxide 400 mg (241.3 mg magnesium) tablet  Commonly known as: MAG-OX  Take 1 tablet (400 mg total) by mouth 2 (two) times daily.     metoprolol tartrate 25 MG tablet  Commonly known as: LOPRESSOR  Take HALF tablet (12.5 mg total) by mouth 2 (two) times daily.     predniSONE 5 MG tablet  Commonly known as: DELTASONE  Take 1 tablet (5 mg total) by mouth once daily.     sodium bicarbonate 650 MG tablet  Take 2 tablets (1,300 mg total) by mouth 3 (three) times daily.     sodium chloride 0.9% SolP 50 mL with DAPTOmycin 500 mg SolR 835 mg  Inject 835 mg into the vein once daily. STOP 4/4/24     tamsulosin 0.4 mg Cap  Commonly known as: FLOMAX  Take 1 capsule (0.4 mg total) by mouth once daily.            Time spent caring for patient (Greater than 1/2 spent in direct face-to-face contact): > 30 minutes    Mary Sexton PA-C  Kidney Transplant  [facility] - Transplant Stepdown

## 2024-03-18 ENCOUNTER — TELEPHONE (OUTPATIENT)
Dept: TRANSPLANT | Facility: CLINIC | Age: 64
End: 2024-03-18
Payer: MEDICARE

## 2024-03-18 ENCOUNTER — LAB VISIT (OUTPATIENT)
Dept: LAB | Facility: HOSPITAL | Age: 64
End: 2024-03-18
Attending: INTERNAL MEDICINE
Payer: MEDICARE

## 2024-03-18 ENCOUNTER — TELEPHONE (OUTPATIENT)
Dept: INFECTIOUS DISEASES | Facility: CLINIC | Age: 64
End: 2024-03-18
Payer: MEDICARE

## 2024-03-18 ENCOUNTER — PATIENT MESSAGE (OUTPATIENT)
Dept: INFECTIOUS DISEASES | Facility: CLINIC | Age: 64
End: 2024-03-18
Payer: MEDICARE

## 2024-03-18 DIAGNOSIS — Z94.0 KIDNEY REPLACED BY TRANSPLANT: ICD-10-CM

## 2024-03-18 DIAGNOSIS — R06.02 SOB (SHORTNESS OF BREATH): ICD-10-CM

## 2024-03-18 DIAGNOSIS — Z94.0 KIDNEY REPLACED BY TRANSPLANT: Primary | ICD-10-CM

## 2024-03-18 LAB
ALBUMIN SERPL BCP-MCNC: 3.4 G/DL (ref 3.5–5.2)
ALP SERPL-CCNC: 73 U/L (ref 55–135)
ALT SERPL W/O P-5'-P-CCNC: 37 U/L (ref 10–44)
ANION GAP SERPL CALC-SCNC: 8 MMOL/L (ref 8–16)
AST SERPL-CCNC: 75 U/L (ref 10–40)
BASOPHILS # BLD AUTO: 0.06 K/UL (ref 0–0.2)
BASOPHILS NFR BLD: 1.4 % (ref 0–1.9)
BILIRUB SERPL-MCNC: 0.9 MG/DL (ref 0.1–1)
BUN SERPL-MCNC: 15 MG/DL (ref 8–23)
CALCIUM SERPL-MCNC: 9.6 MG/DL (ref 8.7–10.5)
CHLORIDE SERPL-SCNC: 104 MMOL/L (ref 95–110)
CK SERPL-CCNC: 1567 U/L (ref 20–200)
CO2 SERPL-SCNC: 23 MMOL/L (ref 23–29)
CREAT SERPL-MCNC: 1.7 MG/DL (ref 0.5–1.4)
DIFFERENTIAL METHOD BLD: ABNORMAL
EOSINOPHIL # BLD AUTO: 0.1 K/UL (ref 0–0.5)
EOSINOPHIL NFR BLD: 1.4 % (ref 0–8)
ERYTHROCYTE [DISTWIDTH] IN BLOOD BY AUTOMATED COUNT: 14 % (ref 11.5–14.5)
EST. GFR  (NO RACE VARIABLE): 45 ML/MIN/1.73 M^2
GLUCOSE SERPL-MCNC: 79 MG/DL (ref 70–110)
HCT VFR BLD AUTO: 39.6 % (ref 40–54)
HGB BLD-MCNC: 12.8 G/DL (ref 14–18)
IMM GRANULOCYTES # BLD AUTO: 0.01 K/UL (ref 0–0.04)
IMM GRANULOCYTES NFR BLD AUTO: 0.2 % (ref 0–0.5)
LYMPHOCYTES # BLD AUTO: 1.3 K/UL (ref 1–4.8)
LYMPHOCYTES NFR BLD: 30.7 % (ref 18–48)
MAGNESIUM SERPL-MCNC: 1.6 MG/DL (ref 1.6–2.6)
MCH RBC QN AUTO: 35 PG (ref 27–31)
MCHC RBC AUTO-ENTMCNC: 32.3 G/DL (ref 32–36)
MCV RBC AUTO: 108 FL (ref 82–98)
MONOCYTES # BLD AUTO: 0.6 K/UL (ref 0.3–1)
MONOCYTES NFR BLD: 13 % (ref 4–15)
NEUTROPHILS # BLD AUTO: 2.3 K/UL (ref 1.8–7.7)
NEUTROPHILS NFR BLD: 53.3 % (ref 38–73)
NRBC BLD-RTO: 0 /100 WBC
PLATELET # BLD AUTO: 177 K/UL (ref 150–450)
PMV BLD AUTO: 10.5 FL (ref 9.2–12.9)
POTASSIUM SERPL-SCNC: 4 MMOL/L (ref 3.5–5.1)
PROT SERPL-MCNC: 8.7 G/DL (ref 6–8.4)
RBC # BLD AUTO: 3.66 M/UL (ref 4.6–6.2)
SODIUM SERPL-SCNC: 135 MMOL/L (ref 136–145)
TACROLIMUS BLD-MCNC: 10.1 NG/ML (ref 5–15)
WBC # BLD AUTO: 4.23 K/UL (ref 3.9–12.7)

## 2024-03-18 PROCEDURE — 83735 ASSAY OF MAGNESIUM: CPT | Performed by: INTERNAL MEDICINE

## 2024-03-18 PROCEDURE — 85025 COMPLETE CBC W/AUTO DIFF WBC: CPT | Performed by: INTERNAL MEDICINE

## 2024-03-18 PROCEDURE — 36415 COLL VENOUS BLD VENIPUNCTURE: CPT | Performed by: INTERNAL MEDICINE

## 2024-03-18 PROCEDURE — 80053 COMPREHEN METABOLIC PANEL: CPT | Performed by: INTERNAL MEDICINE

## 2024-03-18 PROCEDURE — 82550 ASSAY OF CK (CPK): CPT | Performed by: INTERNAL MEDICINE

## 2024-03-18 PROCEDURE — 80197 ASSAY OF TACROLIMUS: CPT | Performed by: INTERNAL MEDICINE

## 2024-03-18 RX ORDER — LINEZOLID 600 MG/1
600 TABLET, FILM COATED ORAL 2 TIMES DAILY
Qty: 28 TABLET | Refills: 0 | Status: SHIPPED | OUTPATIENT
Start: 2024-03-18 | End: 2024-04-01 | Stop reason: ALTCHOICE

## 2024-03-18 NOTE — TELEPHONE ENCOUNTER
I spoke with uche /MARIANO and let her know Mr Parikh CPK elevated and Dr Lopez would like him to stop dapto and have line removed.  Patient already notified

## 2024-03-18 NOTE — PROGRESS NOTES
Did we order a CPK?  No sure why?  I do not recall he complained of myalgias? Were we following this, I see several CPK's ordered  Let's order a 2D echo please  Call the patient and see how he feels? Let me know please

## 2024-03-18 NOTE — TELEPHONE ENCOUNTER
----- Message from Darrin Verma MD sent at 3/18/2024 10:49 AM CDT -----  Yes he was in the hospital for 36 hrs, notify ID about the CPK  Proceed with 2 D echo   ----- Message -----  From: Robles Arita, RN  Sent: 3/18/2024  10:10 AM CDT  To: Darrin Verma MD    CPK'S ordered weekly while he is on Daptomycin IV.  He is to completed Dapto on 4/3.  He was seen in the ER yesterday for Hypervolemia, SOB and BLE Edema and D/C on Lasix X 5 days.  I am awaiting on his return call to assess him.  Proceed with 2D Echo ?  ----- Message -----  From: Darrin Verma MD  Sent: 3/18/2024   8:21 AM CDT  To: [facility]ney Transplant Clinical    Did we order a CPK?  No sure why?  I do not recall he complained of myalgias? Were we following this, I see several CPK's ordered  Let's order a 2D echo please  Call the patient and see how he feels? Let me know please

## 2024-03-18 NOTE — TELEPHONE ENCOUNTER
Results reviewed with patient states feels better today.  2D echo schedule for 1pm today.   ----- Message from Darrin Verma MD sent at 3/18/2024 10:49 AM CDT -----  Yes he was in the hospital for 36 hrs, notify ID about the CPK  Proceed with 2 D echo   ----- Message -----  From: Robles Arita, RN  Sent: 3/18/2024  10:10 AM CDT  To: Darrin Verma MD    CPK'S ordered weekly while he is on Daptomycin IV.  He is to completed Dapto on 4/3.  He was seen in the ER yesterday for Hypervolemia, SOB and BLE Edema and D/C on Lasix X 5 days.  I am awaiting on his return call to assess him.  Proceed with 2D Echo ?  ----- Message -----  From: Darrin Verma MD  Sent: 3/18/2024   8:21 AM CDT  To: [facility] Post-Kidney Transplant Clinical    Did we order a CPK?  No sure why?  I do not recall he complained of myalgias? Were we following this, I see several CPK's ordered  Let's order a 2D echo please  Call the patient and see how he feels? Let me know please

## 2024-03-18 NOTE — TELEPHONE ENCOUNTER
CK elevated while on daptomycin 10 mg/kg    Will discontinue daptomycin, remove line  Start linezolid 600 mg PO BID x14 days    Left message for patient on voicemail  Informed kidney transplant team  Will also send MyChart message

## 2024-03-18 NOTE — TELEPHONE ENCOUNTER
Discussed plan with patient and agreed to repeat labs on Thursday 3/21.  ----- Message from Maral Lopez MD sent at 3/18/2024  4:28 PM CDT -----  Regarding: RE: CPK  Yes can we get some labs on 3/21? CBC with diff, CMP, CK? I have standing orders if you could make him a lab appointment.    Thank you!        ----- Message -----  From: Robles Arita RN  Sent: 3/18/2024   4:18 PM CDT  To: Maral Lopez MD  Subject: RE: CPK                                          His next are schedule for 3/25.  Would you like them sooner ?  ----- Message -----  From: Maral Lopez MD  Sent: 3/18/2024   3:48 PM CDT  To: Robles Arita RN; Mitch Colón MD; #  Subject: RE: CPK                                          July and Mitch - this is my patient so I got it!    Niels Arboleda,    Thank you for the update. I will discontinue the daptomycin IV and have the IV line removed. I will ask him to complete the course with linezolid PO.  When he be getting labs again?    ----- Message -----  From: Robles Arita RN  Sent: 3/18/2024  10:58 AM CDT  To: Mitch Colón MD; Maral Lopez MD; #  Subject: CPK                                              Please review CPK results and advise.    ----- Message -----  From: Darrin Verma MD  Sent: 3/18/2024  10:50 AM CDT  To: Robles Arita RN    Yes he was in the hospital for 36 hrs, notify ID about the CPK  Proceed with 2 D echo   ----- Message -----  From: Robles Artia RN  Sent: 3/18/2024  10:10 AM CDT  To: Darrin Verma MD    CPK'S ordered weekly while he is on Daptomycin IV.  He is to completed Dapto on 4/3.  He was seen in the ER yesterday for Hypervolemia, SOB and BLE Edema and D/C on Lasix X 5 days.  I am awaiting on his return call to assess him.  Proceed with 2D Echo ?  ----- Message -----  From: Darrin Verma MD  Sent: 3/18/2024   8:21 AM CDT  To: [facility]-Kidney Transplant Clinical    Did we order a CPK?  No sure why?  I do not recall he complained of  myalgias? Were we following this, I see several CPK's ordered  Let's order a 2D echo please  Call the patient and see how he feels? Let me know please

## 2024-03-19 ENCOUNTER — INFUSION (OUTPATIENT)
Dept: INFECTIOUS DISEASES | Facility: HOSPITAL | Age: 64
End: 2024-03-19
Payer: COMMERCIAL

## 2024-03-19 ENCOUNTER — HOSPITAL ENCOUNTER (OUTPATIENT)
Dept: CARDIOLOGY | Facility: HOSPITAL | Age: 64
Discharge: HOME | End: 2024-03-19
Attending: INTERNAL MEDICINE
Payer: MEDICARE

## 2024-03-19 ENCOUNTER — TELEPHONE (OUTPATIENT)
Dept: INFECTIOUS DISEASES | Facility: CLINIC | Age: 64
End: 2024-03-19
Payer: MEDICARE

## 2024-03-19 VITALS
BODY MASS INDEX: 27.25 KG/M2 | SYSTOLIC BLOOD PRESSURE: 154 MMHG | HEART RATE: 58 BPM | DIASTOLIC BLOOD PRESSURE: 95 MMHG | WEIGHT: 184 LBS | HEIGHT: 69 IN

## 2024-03-19 VITALS
OXYGEN SATURATION: 95 % | DIASTOLIC BLOOD PRESSURE: 91 MMHG | SYSTOLIC BLOOD PRESSURE: 156 MMHG | TEMPERATURE: 98 F | HEART RATE: 62 BPM | HEIGHT: 69 IN | WEIGHT: 183 LBS | BODY MASS INDEX: 27.11 KG/M2 | RESPIRATION RATE: 18 BRPM

## 2024-03-19 DIAGNOSIS — R74.8 ELEVATED CPK: Primary | ICD-10-CM

## 2024-03-19 DIAGNOSIS — R06.02 SOB (SHORTNESS OF BREATH): ICD-10-CM

## 2024-03-19 DIAGNOSIS — Z94.0 KIDNEY REPLACED BY TRANSPLANT: ICD-10-CM

## 2024-03-19 LAB
ASCENDING AORTA: 3.33 CM
AV INDEX (PROSTH): 1.01
AV MEAN GRADIENT: 2 MMHG
AV PEAK GRADIENT: 5 MMHG
AV VALVE AREA BY VELOCITY RATIO: 3.69 CM²
AV VALVE AREA: 4.55 CM²
AV VELOCITY RATIO: 0.82
BSA FOR ECHO PROCEDURE: 2.02 M2
CV ECHO LV RWT: 0.4 CM
DOP CALC AO PEAK VEL: 1.07 M/S
DOP CALC AO VTI: 21.11 CM
DOP CALC LVOT AREA: 4.5 CM2
DOP CALC LVOT DIAMETER: 2.39 CM
DOP CALC LVOT PEAK VEL: 0.88 M/S
DOP CALC LVOT STROKE VOLUME: 96 CM3
DOP CALCLVOT PEAK VEL VTI: 21.41 CM
E WAVE DECELERATION TIME: 161.3 MSEC
E/A RATIO: 1.14
E/E' RATIO: 5.91 M/S
ECHO LV POSTERIOR WALL: 0.85 CM (ref 0.6–1.1)
FRACTIONAL SHORTENING: 38 % (ref 28–44)
INTERVENTRICULAR SEPTUM: 1.07 CM (ref 0.6–1.1)
LA MAJOR: 5.21 CM
LA MINOR: 5.42 CM
LA WIDTH: 4.41 CM
LEFT ATRIUM SIZE: 4.05 CM
LEFT ATRIUM VOLUME INDEX MOD: 37 ML/M2
LEFT ATRIUM VOLUME INDEX: 40.5 ML/M2
LEFT ATRIUM VOLUME MOD: 73.57 CM3
LEFT ATRIUM VOLUME: 80.66 CM3
LEFT INTERNAL DIMENSION IN SYSTOLE: 2.62 CM (ref 2.1–4)
LEFT VENTRICLE DIASTOLIC VOLUME INDEX: 39.83 ML/M2
LEFT VENTRICLE DIASTOLIC VOLUME: 79.27 ML
LEFT VENTRICLE MASS INDEX: 66 G/M2
LEFT VENTRICLE SYSTOLIC VOLUME INDEX: 12.7 ML/M2
LEFT VENTRICLE SYSTOLIC VOLUME: 25.19 ML
LEFT VENTRICULAR INTERNAL DIMENSION IN DIASTOLE: 4.22 CM (ref 3.5–6)
LEFT VENTRICULAR MASS: 130.66 G
LV LATERAL E/E' RATIO: 4.64 M/S
LV SEPTAL E/E' RATIO: 8.13 M/S
MV A" WAVE DURATION": 12.27 MSEC
MV PEAK A VEL: 0.57 M/S
MV PEAK E VEL: 0.65 M/S
MV STENOSIS PRESSURE HALF TIME: 46.78 MS
MV VALVE AREA P 1/2 METHOD: 4.7 CM2
PISA MRMAX VEL: 0.06 M/S
PISA TR MAX VEL: 2.38 M/S
PULM VEIN S/D RATIO: 1.86
PV PEAK D VEL: 0.37 M/S
PV PEAK S VEL: 0.69 M/S
RA MAJOR: 5.12 CM
RA PRESSURE ESTIMATED: 3 MMHG
RA WIDTH: 4.02 CM
RIGHT VENTRICULAR END-DIASTOLIC DIMENSION: 3.72 CM
RV TB RVSP: 5 MMHG
SINUS: 3.38 CM
STJ: 2.45 CM
TDI LATERAL: 0.14 M/S
TDI SEPTAL: 0.08 M/S
TDI: 0.11 M/S
TR MAX PG: 23 MMHG
TRICUSPID ANNULAR PLANE SYSTOLIC EXCURSION: 2.05 CM
TV REST PULMONARY ARTERY PRESSURE: 26 MMHG
Z-SCORE OF LEFT VENTRICULAR DIMENSION IN END DIASTOLE: -3.13
Z-SCORE OF LEFT VENTRICULAR DIMENSION IN END SYSTOLE: -2.39

## 2024-03-19 PROCEDURE — 93306 TTE W/DOPPLER COMPLETE: CPT

## 2024-03-19 PROCEDURE — 93306 TTE W/DOPPLER COMPLETE: CPT | Mod: 26,,, | Performed by: INTERNAL MEDICINE

## 2024-03-19 NOTE — PROGRESS NOTES
Patient arrives ambulatory for midline removal as ordered - midline removed without difficulty from right upper medial aspect of arm - measurement marked at 10 cm with entire catheter intact without compromise noted - sterile vaseline gauze placed with sterile 2x2 gauze and large coban wrap with instructions to leave dry and in place x 2-4 hours. No signs of infection noted to site - no swelling or drainage

## 2024-03-19 NOTE — TELEPHONE ENCOUNTER
----- Message from Annalisa Alberts RN sent at 3/18/2024  4:16 PM CDT -----  I will put him for like, 3:15, tell him to come after whatever that appt is, just before 4:45pm. :)  ----- Message -----  From: Dionne Esquivel LPN  Sent: 3/18/2024   4:11 PM CDT  To: [facility] Clinical Support    Dr Lopez would like his midline removed.  His Cpk is elavated.  He has be discharge from . Can you schedule him to have line removed tomorrow?  He has another appt here at 2:30 pm

## 2024-03-20 ENCOUNTER — PATIENT MESSAGE (OUTPATIENT)
Dept: TRANSPLANT | Facility: CLINIC | Age: 64
End: 2024-03-20
Payer: MEDICARE

## 2024-03-22 ENCOUNTER — OFFICE VISIT (OUTPATIENT)
Dept: INFECTIOUS DISEASES | Facility: CLINIC | Age: 64
End: 2024-03-22
Payer: MEDICARE

## 2024-03-22 ENCOUNTER — LAB VISIT (OUTPATIENT)
Dept: LAB | Facility: HOSPITAL | Age: 64
End: 2024-03-22
Attending: INTERNAL MEDICINE
Payer: MEDICARE

## 2024-03-22 DIAGNOSIS — R91.1 LUNG NODULE: ICD-10-CM

## 2024-03-22 DIAGNOSIS — Z79.2 RECEIVING INTRAVENOUS ANTIBIOTIC TREATMENT AS OUTPATIENT: ICD-10-CM

## 2024-03-22 DIAGNOSIS — A49.8 ENTEROCOCCUS FAECALIS INFECTION: Primary | ICD-10-CM

## 2024-03-22 DIAGNOSIS — R78.81 BACTEREMIA: ICD-10-CM

## 2024-03-22 DIAGNOSIS — Z94.0 KIDNEY REPLACED BY TRANSPLANT: ICD-10-CM

## 2024-03-22 DIAGNOSIS — Z79.60 LONG-TERM USE OF IMMUNOSUPPRESSANT MEDICATION: ICD-10-CM

## 2024-03-22 DIAGNOSIS — Z91.89 AT RISK FOR OPPORTUNISTIC INFECTIONS: ICD-10-CM

## 2024-03-22 DIAGNOSIS — Z94.0 S/P KIDNEY TRANSPLANT: ICD-10-CM

## 2024-03-22 DIAGNOSIS — R74.8 ELEVATED CPK: ICD-10-CM

## 2024-03-22 LAB
ALBUMIN SERPL BCP-MCNC: 3.4 G/DL (ref 3.5–5.2)
ALP SERPL-CCNC: 78 U/L (ref 55–135)
ALT SERPL W/O P-5'-P-CCNC: 55 U/L (ref 10–44)
ANION GAP SERPL CALC-SCNC: 10 MMOL/L (ref 8–16)
AST SERPL-CCNC: 63 U/L (ref 10–40)
BASOPHILS # BLD AUTO: 0.04 K/UL (ref 0–0.2)
BASOPHILS NFR BLD: 1.2 % (ref 0–1.9)
BILIRUB SERPL-MCNC: 0.9 MG/DL (ref 0.1–1)
BUN SERPL-MCNC: 19 MG/DL (ref 8–23)
CALCIUM SERPL-MCNC: 9.3 MG/DL (ref 8.7–10.5)
CHLORIDE SERPL-SCNC: 104 MMOL/L (ref 95–110)
CK SERPL-CCNC: 742 U/L (ref 20–200)
CO2 SERPL-SCNC: 24 MMOL/L (ref 23–29)
CREAT SERPL-MCNC: 1.8 MG/DL (ref 0.5–1.4)
DIFFERENTIAL METHOD BLD: ABNORMAL
EOSINOPHIL # BLD AUTO: 0.1 K/UL (ref 0–0.5)
EOSINOPHIL NFR BLD: 2.2 % (ref 0–8)
ERYTHROCYTE [DISTWIDTH] IN BLOOD BY AUTOMATED COUNT: 14 % (ref 11.5–14.5)
EST. GFR  (NO RACE VARIABLE): 42 ML/MIN/1.73 M^2
GLUCOSE SERPL-MCNC: 73 MG/DL (ref 70–110)
HCT VFR BLD AUTO: 39.4 % (ref 40–54)
HGB BLD-MCNC: 12.8 G/DL (ref 14–18)
IMM GRANULOCYTES # BLD AUTO: 0.01 K/UL (ref 0–0.04)
IMM GRANULOCYTES NFR BLD AUTO: 0.3 % (ref 0–0.5)
LYMPHOCYTES # BLD AUTO: 0.4 K/UL (ref 1–4.8)
LYMPHOCYTES NFR BLD: 12.5 % (ref 18–48)
MCH RBC QN AUTO: 34.8 PG (ref 27–31)
MCHC RBC AUTO-ENTMCNC: 32.5 G/DL (ref 32–36)
MCV RBC AUTO: 107 FL (ref 82–98)
MONOCYTES # BLD AUTO: 0.4 K/UL (ref 0.3–1)
MONOCYTES NFR BLD: 11.5 % (ref 4–15)
NEUTROPHILS # BLD AUTO: 2.3 K/UL (ref 1.8–7.7)
NEUTROPHILS NFR BLD: 72.3 % (ref 38–73)
NRBC BLD-RTO: 0 /100 WBC
PLATELET # BLD AUTO: 134 K/UL (ref 150–450)
PMV BLD AUTO: 11 FL (ref 9.2–12.9)
POTASSIUM SERPL-SCNC: 4.1 MMOL/L (ref 3.5–5.1)
PROT SERPL-MCNC: 8.4 G/DL (ref 6–8.4)
RBC # BLD AUTO: 3.68 M/UL (ref 4.6–6.2)
SODIUM SERPL-SCNC: 138 MMOL/L (ref 136–145)
TACROLIMUS BLD-MCNC: 5.3 NG/ML (ref 5–15)
WBC # BLD AUTO: 3.21 K/UL (ref 3.9–12.7)

## 2024-03-22 PROCEDURE — 36415 COLL VENOUS BLD VENIPUNCTURE: CPT | Performed by: INTERNAL MEDICINE

## 2024-03-22 PROCEDURE — 85025 COMPLETE CBC W/AUTO DIFF WBC: CPT | Performed by: INTERNAL MEDICINE

## 2024-03-22 PROCEDURE — 82550 ASSAY OF CK (CPK): CPT | Performed by: INTERNAL MEDICINE

## 2024-03-22 PROCEDURE — G2211 COMPLEX E/M VISIT ADD ON: HCPCS | Mod: 95,,, | Performed by: INTERNAL MEDICINE

## 2024-03-22 PROCEDURE — 99215 OFFICE O/P EST HI 40 MIN: CPT | Mod: 95,,, | Performed by: INTERNAL MEDICINE

## 2024-03-22 PROCEDURE — 80197 ASSAY OF TACROLIMUS: CPT | Performed by: INTERNAL MEDICINE

## 2024-03-22 PROCEDURE — 80053 COMPREHEN METABOLIC PANEL: CPT | Performed by: INTERNAL MEDICINE

## 2024-03-22 RX ORDER — TACROLIMUS 1 MG/1
CAPSULE ORAL
Qty: 90 CAPSULE | Refills: 11 | Status: SHIPPED | OUTPATIENT
Start: 2024-03-22 | End: 2024-03-28 | Stop reason: DRUGHIGH

## 2024-03-22 RX ORDER — FUROSEMIDE 20 MG/1
20 TABLET ORAL DAILY PRN
Qty: 10 TABLET | Refills: 0 | Status: ON HOLD | OUTPATIENT
Start: 2024-03-22 | End: 2024-05-18

## 2024-03-22 NOTE — TELEPHONE ENCOUNTER
Patient repeated back and voice a understanding of orders and will repeat labs on 3/25/2024.  States BLE edema has totally subsided.  ----- Message from Darrin Verma MD sent at 3/22/2024  9:45 AM CDT -----  Lets increase prograf to 2/1  If the edema is resolved We can do lasix prn

## 2024-03-22 NOTE — PROGRESS NOTES
The patient location is: Home  The chief complaint leading to consultation is: Follow-up for E faecalis bacteremia, pulmonary nodule    Visit type: audiovisual    Face to Face time with patient: 15 mins  45 minutes of total time spent on the encounter, which includes face to face time and non-face to face time preparing to see the patient (eg, review of tests), Obtaining and/or reviewing separately obtained history, Documenting clinical information in the electronic or other health record, Independently interpreting results (not separately reported) and communicating results to the patient/family/caregiver, or Care coordination (not separately reported).         Each patient to whom he or she provides medical services by telemedicine is:  (1) informed of the relationship between the physician and patient and the respective role of any other health care provider with respect to management of the patient; and (2) notified that he or she may decline to receive medical services by telemedicine and may withdraw from such care at any time.    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      Patient presented after a week of malaise. He went to urgent care, influenza and COVID-19 negative. Outpatient blood cultures drawn were positive for E faecalis bacteremia. Work-up for possible source was negative - TTE with no evidence of endocarditis. CT CAP with new RLL nodule. Patient urine cultures never returned.     Patient was discharged with 4 week course of daptomycin IV given unknown source of E faecalis bacteremia. On follow-up labs, CK noted to be elevated, switched to linezolid 600 mg PO BID.    Patient was admitted recently for fluid overload, resolved diuresis. He is no longer taking lasix, he  reports edema in his legs have resolved.    He denied having any myalgias while on daptomycin. No issues currently while on linezolid.    Review of Systems   Constitutional:  Negative for chills, diaphoresis, fever and weight loss.   HENT:  Negative for congestion, sinus pain and sore throat.    Eyes:  Negative for photophobia and pain.   Respiratory:  Negative for cough, sputum production and shortness of breath.    Cardiovascular:  Negative for chest pain and leg swelling.   Gastrointestinal:  Negative for abdominal pain, diarrhea, nausea and vomiting.   Genitourinary:  Negative for dysuria and hematuria.   Musculoskeletal:  Negative for joint pain.   Skin:  Negative for rash.   Neurological:  Negative for focal weakness and headaches.   Psychiatric/Behavioral:  Negative for depression. The patient is not nervous/anxious.          Objective:   Physical Exam  Vitals reviewed.   Constitutional:       General: He is not in acute distress.     Appearance: He is well-developed. He is not diaphoretic.   HENT:      Head: Normocephalic and atraumatic.      Nose: Nose normal.   Eyes:      Conjunctiva/sclera: Conjunctivae normal.   Pulmonary:      Effort: Pulmonary effort is normal. No respiratory distress.   Abdominal:      General: Abdomen is flat. There is no distension.   Musculoskeletal:      Cervical back: Normal range of motion.      Right lower leg: No edema.      Left lower leg: No edema.   Skin:     General: Skin is warm and dry.      Findings: No erythema or rash.   Neurological:      Mental Status: He is alert.   Psychiatric:         Behavior: Behavior normal.           Significant results reviewed:    Sodium   Date Value Ref Range Status   03/22/2024 138 136 - 145 mmol/L Final   03/18/2024 135 (L) 136 - 145 mmol/L Final   03/17/2024 136 136 - 145 mmol/L Final      Potassium   Date Value Ref Range Status   03/22/2024 4.1 3.5 - 5.1 mmol/L Final   03/18/2024 4.0 3.5 - 5.1 mmol/L Final   03/17/2024 4.1 3.5 - 5.1  mmol/L Final      Chloride   Date Value Ref Range Status   03/22/2024 104 95 - 110 mmol/L Final   03/18/2024 104 95 - 110 mmol/L Final   03/17/2024 105 95 - 110 mmol/L Final      CO2   Date Value Ref Range Status   03/22/2024 24 23 - 29 mmol/L Final   03/18/2024 23 23 - 29 mmol/L Final   03/17/2024 23 23 - 29 mmol/L Final      BUN   Date Value Ref Range Status   03/22/2024 19 8 - 23 mg/dL Final   03/18/2024 15 8 - 23 mg/dL Final   03/17/2024 17 8 - 23 mg/dL Final      Creatinine   Date Value Ref Range Status   03/22/2024 1.8 (H) 0.5 - 1.4 mg/dL Final   03/18/2024 1.7 (H) 0.5 - 1.4 mg/dL Final   03/17/2024 1.5 (H) 0.5 - 1.4 mg/dL Final      Glucose   Date Value Ref Range Status   03/22/2024 73 70 - 110 mg/dL Final   03/18/2024 79 70 - 110 mg/dL Final   03/17/2024 77 70 - 110 mg/dL Final       ALT   Date Value Ref Range Status   03/22/2024 55 (H) 10 - 44 U/L Final   03/18/2024 37 10 - 44 U/L Final   03/16/2024 16 10 - 44 U/L Final      AST   Date Value Ref Range Status   03/22/2024 63 (H) 10 - 40 U/L Final   03/18/2024 75 (H) 10 - 40 U/L Final   03/16/2024 33 10 - 40 U/L Final      Total Bilirubin   Date Value Ref Range Status   03/22/2024 0.9 0.1 - 1.0 mg/dL Final     Comment:     For infants and newborns, interpretation of results should be based  on gestational age, weight and in agreement with clinical  observations.    Premature Infant recommended reference ranges:  Up to 24 hours.............<8.0 mg/dL  Up to 48 hours............<12.0 mg/dL  3-5 days..................<15.0 mg/dL  6-29 days.................<15.0 mg/dL     03/18/2024 0.9 0.1 - 1.0 mg/dL Final     Comment:     For infants and newborns, interpretation of results should be based  on gestational age, weight and in agreement with clinical  observations.    Premature Infant recommended reference ranges:  Up to 24 hours.............<8.0 mg/dL  Up to 48 hours............<12.0 mg/dL  3-5 days..................<15.0 mg/dL  6-29 days.................<15.0  mg/dL     03/16/2024 0.6 0.1 - 1.0 mg/dL Final     Comment:     For infants and newborns, interpretation of results should be based  on gestational age, weight and in agreement with clinical  observations.    Premature Infant recommended reference ranges:  Up to 24 hours.............<8.0 mg/dL  Up to 48 hours............<12.0 mg/dL  3-5 days..................<15.0 mg/dL  6-29 days.................<15.0 mg/dL        Albumin   Date Value Ref Range Status   03/22/2024 3.4 (L) 3.5 - 5.2 g/dL Final   03/18/2024 3.4 (L) 3.5 - 5.2 g/dL Final   03/17/2024 2.8 (L) 3.5 - 5.2 g/dL Final      Total Protein   Date Value Ref Range Status   03/22/2024 8.4 6.0 - 8.4 g/dL Final   03/18/2024 8.7 (H) 6.0 - 8.4 g/dL Final   03/16/2024 7.2 6.0 - 8.4 g/dL Final      Alkaline Phosphatase   Date Value Ref Range Status   03/22/2024 78 55 - 135 U/L Final   03/18/2024 73 55 - 135 U/L Final   03/16/2024 67 55 - 135 U/L Final        WBC   Date Value Ref Range Status   03/22/2024 3.21 (L) 3.90 - 12.70 K/uL Final   03/18/2024 4.23 3.90 - 12.70 K/uL Final   03/17/2024 3.29 (L) 3.90 - 12.70 K/uL Final      Hemoglobin   Date Value Ref Range Status   03/22/2024 12.8 (L) 14.0 - 18.0 g/dL Final   03/18/2024 12.8 (L) 14.0 - 18.0 g/dL Final   03/17/2024 11.2 (L) 14.0 - 18.0 g/dL Final      POC Hematocrit   Date Value Ref Range Status   05/10/2023 27 (L) 36 - 54 %PCV Final     Hematocrit   Date Value Ref Range Status   03/22/2024 39.4 (L) 40.0 - 54.0 % Final   03/18/2024 39.6 (L) 40.0 - 54.0 % Final   03/17/2024 34.3 (L) 40.0 - 54.0 % Final      Platelets   Date Value Ref Range Status   03/22/2024 134 (L) 150 - 450 K/uL Final   03/18/2024 177 150 - 450 K/uL Final   03/17/2024 163 150 - 450 K/uL Final        Urine and serum histo / blast neg  Aspergillus Ag neg  Fungal immunodiffusion neg  Fungitell neg  Crypto Ag neg    CT chest  Irregular right lower lobe solid nodule, new when compared to CT 02/12/2024.  Findings could represent infectious/inflammatory  process.  However, underlying neoplastic process cannot be ruled out.  Short-term follow-up in 3 months recommended.     Multiple ground-glass nodules measuring up to 6 mm.  Attention on follow-up advised.     Small volume right-sided pleural effusion.     Calcified mediastinal and hilar lymphadenopathy in patient with reported history of sarcoidosis.    CT abd/pelvis  Postsurgical changes of renal transplant allograft.  Similar nonobstructive nephrolithiasis of the allograft.     Similar clustered calcification within the posterior urinary bladder.     New irregular right lower lobe 1.3 cm opacity favored to reflect infectious/inflammatory process as this is new compared to February 12, 2024.  Other left lower lobe 0.5 cm nodular opacity is unchanged.     Additional findings as detailed above.    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 TTE with no evidence of endocarditis. Patient was discharged on 4 week course of daptomycin but subsequently developed rhabdo so he was switched to linezolid. Patient otherwise feeling well.      During work-up for E faecalis, CT with new RLL 1.3 cm nodule, fungal biomarkers negative. Will continue to monitor on follow-up scans.       - Prior notes by kidney transplant team reviewed  - Results reviewed as above      Plan:   - Continue linezolid 600 mg PO BID, last day 3/31/2024  - Will monitor drug therapy for toxicity    - The following labs were ordered:  CBC with diff, CMP, CK qweekly    - Repeat CT chest 5/2024 to reassess pulmonary nodule    - Follow-up with Pulmonary 6/2024    - Plan discussed with kidney transplant team    RTC 2 weeks    45 minutes of total time spent on the encounter, which includes face to face time and non-face to face time preparing to see the patient  (eg, review of tests), Obtaining and/or reviewing separately obtained history, Documenting clinical information in the electronic or other health record, Independently interpreting results (not separately reported) and communicating results to the patient/family/caregiver, or Care coordination (not separately reported).       Maral Lopez MD MPH  [facility]

## 2024-03-25 ENCOUNTER — TELEPHONE (OUTPATIENT)
Dept: TRANSPLANT | Facility: CLINIC | Age: 64
End: 2024-03-25

## 2024-03-25 ENCOUNTER — OFFICE VISIT (OUTPATIENT)
Dept: TRANSPLANT | Facility: CLINIC | Age: 64
End: 2024-03-25
Payer: MEDICARE

## 2024-03-25 VITALS
BODY MASS INDEX: 27.62 KG/M2 | WEIGHT: 186.5 LBS | HEIGHT: 69 IN | DIASTOLIC BLOOD PRESSURE: 66 MMHG | TEMPERATURE: 97 F | OXYGEN SATURATION: 99 % | HEART RATE: 56 BPM | SYSTOLIC BLOOD PRESSURE: 138 MMHG

## 2024-03-25 DIAGNOSIS — N18.2 CKD (CHRONIC KIDNEY DISEASE) STAGE 2, GFR 60-89 ML/MIN: Primary | ICD-10-CM

## 2024-03-25 DIAGNOSIS — Z79.60 LONG-TERM USE OF IMMUNOSUPPRESSANT MEDICATION: ICD-10-CM

## 2024-03-25 DIAGNOSIS — Z94.0 KIDNEY REPLACED BY TRANSPLANT: Primary | ICD-10-CM

## 2024-03-25 PROCEDURE — 99213 OFFICE O/P EST LOW 20 MIN: CPT | Mod: PBBFAC | Performed by: NURSE PRACTITIONER

## 2024-03-25 PROCEDURE — 99215 OFFICE O/P EST HI 40 MIN: CPT | Mod: S$PBB,,, | Performed by: NURSE PRACTITIONER

## 2024-03-25 PROCEDURE — 99999 PR PBB SHADOW E&M-EST. PATIENT-LVL III: CPT | Mod: PBBFAC,,, | Performed by: NURSE PRACTITIONER

## 2024-03-25 RX ORDER — BENZONATATE 200 MG/1
CAPSULE ORAL
Status: ON HOLD | COMMUNITY
Start: 2024-01-21 | End: 2024-05-18

## 2024-03-25 NOTE — LETTER
March 25, 2024        Quinn Espino  [address]  [address]  [facility] [address]  Phone: [phone]  Fax: [phone]             [facility]st Fl  [address]  [facility] [address]  Phone: [phone]   Patient: Abdoulaye Parikh   MR Number: [id number]   YOB: 1960   Date of Visit: 3/25/2024       Dear Dr. Quinn Espino    Thank you for referring Abdoulaye Parikh to me for evaluation. Attached you will find relevant portions of my assessment and plan of care.    If you have questions, please do not hesitate to call me. I look forward to following Abdoulaye Parikh along with you.    Sincerely,    Rose Mary Stroud, NP    Enclosure    If you would like to receive this communication electronically, please contact [email or web address] or [phone] to request [employer] Link access.    [employer] Link is a tool which provides read-only access to select patient information with whom you have a relationship. Its easy to use and provides real time access to review your patients record including encounter summaries, notes, results, and demographic information.    If you feel you have received this communication in error or would no longer like to receive these types of communications, please e-mail [email or web address]

## 2024-03-25 NOTE — Clinical Note
Recently hospitalized for bacteremia and fluid overload. Has increase in creatinine. Just about a week ago ID switched his Dapto to Zyvox due to elevated CK level at 1500. Most recent level 700. Should he get fluids? He was hospitalized for volume overload needing lasix.  He is drinking about 2L of water a day. Also with history of kidney and bladder stone, following with Urology.

## 2024-03-25 NOTE — TELEPHONE ENCOUNTER
Patient repeated back and voice a understanding of orders and will repeat labs on 3/28.  ----- Message from Maggie Mcgovern MD sent at 3/25/2024 10:11 AM CDT -----  Check DSA, allosure, renal panel, CBC and tac level in few days   ----- Message -----  From: Robles Arita RN  Sent: 3/25/2024  10:09 AM CDT  To: Maggie Mcgovern MD    Patient recent hospital admit for Bacteriemia, on  3/18/2024 Daptomycin D/C and started Linezolid by Dr DELFINO Lopez in ID.    ----- Message -----  From: Maggie Mcgovern MD  Sent: 3/25/2024   8:18 AM CDT  To: [facility] Post-Kidney Transplant Clinical    Cr is trending up 1.8. any work up has done? Is pt ill?

## 2024-03-25 NOTE — PROGRESS NOTES
Kidney Post-Transplant Assessment    Referring Physician: Quinn Espino  Current Nephrologist: Quinn Espino    ORGAN: LEFT KIDNEY  Donor Type: living  PHS Increased Risk: no  Cold Ischemia:  mins  Induction Medications:     Subjective:     CC:  Reassessment of renal allograft function and management of chronic immunosuppression.    HPI:  Mr. Parikh is a 63 y.o. year old Black or  male who received a living kidney transplant on 5/10/23. His most recent creatinine is 1. He takes mycophenolate mofetil, prednisone, and tacrolimus for maintenance immunosuppression. His post transplant course has been complicated by perinephric fluid collection and urine leak .  Nephrostomy tube was placed and removed 12/31/23  Plan for stone risk profile 2/26/24  3/5: bacteremia Enterococcus Dapto until 4/3/24 holding MYF, pulmonary nodule (pulmonary appointment 6/21/24), kidney stone  3/17 volume overload; diuresis     Medical Issues  Crohn's  Sarcoidosis  Right sided hearing loss  Left sided newer following with ENT. Last seen 7/17/2023 f/u in 3 [facility]  5/15/23-Kidney Bx: Good sample, No IFTA no glomerulosclerosis ,ATI no rejection C4d negative, moderate arterionephrosclerosis     Has established with Urology. Seen ID and Dapto switched to Zyvox  Good UOP and drinking 2-2.5L. His BP at home 130s/70s-80s. Appetite has improved. Walking well. Has not taken lasix beyond the 4 days prescribed.     Current Outpatient Medications on File Prior to Visit   Medication Sig Dispense Refill    benzonatate (TESSALON) 200 MG capsule TAKE 1 CAPSULE BY MOUTH THREE TIMES DAILY FOR 10 DAYS AS NEEDED FOR COUGH      furosemide (LASIX) 20 MG tablet Take 1 tablet (20 mg total) by mouth daily as needed. 10 tablet 0    linezolid (ZYVOX) 600 mg Tab Take 1 tablet (600 mg total) by mouth 2 (two) times daily. for 14 days 28 tablet 0    loperamide HCl (IMODIUM A-D ORAL) Take by mouth.      magnesium oxide (MAG-OX) 400 mg (241.3 mg  "magnesium) tablet Take 1 tablet (400 mg total) by mouth 2 (two) times daily. 60 tablet 11    metoprolol tartrate (LOPRESSOR) 25 MG tablet Take HALF tablet (12.5 mg total) by mouth 2 (two) times daily. 30 tablet 11    olmesartan (BENICAR) 40 MG tablet Take 1 tablet (40 mg total) by mouth once daily. HOLD SBP <120 30 tablet 11    predniSONE (DELTASONE) 5 MG tablet Take 1 tablet (5 mg total) by mouth once daily. 30 tablet 11    sodium bicarbonate 650 MG tablet Take 2 tablets (1,300 mg total) by mouth 3 (three) times daily. 180 tablet 11    tacrolimus (PROGRAF) 1 MG Cap Take 2 capsules (2 mg total) by mouth every morning AND 1 capsule (1 mg total) every evening. Txp Date:5/10/2023 (Kidney) Disch Date:5/17/2023 ICD10:Z94.0 Txp Location:[facility]. 90 capsule 11    tamsulosin (FLOMAX) 0.4 mg Cap Take 1 capsule (0.4 mg total) by mouth once daily. 30 capsule 11     No current facility-administered medications on file prior to visit.        Review of Systems    Skin: no skin rash  CNS; no headaches, blurred vision, seizure, or syncope  ENT: No JVD,  Adenopathies,  nasal congestion. No oral lesions  Cardiac: No chest pain, dyspnea, claudication, edema or palpitations  Respiratory: No SOB, cough, hemoptysis   Gastro-intestinal: No diarrhea, constipation, abdominal pain, nausea, vomit. No ascitis  Genitourinary: no hematuria, dysuria, frequency, frequency  Musculoskeletal: joint pain, arthritis or vasculitic changes  Psych: alert awake, oriented, No cranial nerves deficit.      Objective:   /66 (BP Location: Right arm, Patient Position: Sitting, BP Method: Medium (Manual))   Pulse (!) 56   Temp 97.2 °F (36.2 °C) (Temporal)   Ht 5' 9" (1.753 m)   Wt 84.6 kg (186 lb 8.2 oz)   SpO2 99%   BMI 27.54 kg/m²       Physical Exam  Head: normocephalic  Neck: No JVD, cervical axillary, or femoral adenopathies  Heart: no murmurs, Normal s1 and s2, No gallops, no rubs, No murmurs  Lungs; CTA, good respiratory effort, no " "crackles  Abdomen: soft, non tender, no splenomegaly or hepatomegaly, no massess, no bruits  Extremities: No edema, skin rash, joint pain  SNC: awake, alert oriented. Cranial nerves are intact, no focalized, sensitivity and strength preserved    Labs:  Lab Results   Component Value Date    WBC 3.49 (L) 03/25/2024    HGB 12.5 (L) 03/25/2024    HCT 39.1 (L) 03/25/2024     (L) 03/25/2024    K 4.2 03/25/2024     03/25/2024    CO2 24 03/25/2024    BUN 13 03/25/2024    CREATININE 1.8 (H) 03/25/2024    EGFRNORACEVR 42.0 (A) 03/25/2024    GLUCOSE 82 04/08/2023    CALCIUM 9.3 03/25/2024    PHOS 2.7 03/17/2024    MG 1.6 03/25/2024    ALBUMIN 3.1 (L) 03/25/2024    AST 23 03/25/2024    ALT 28 03/25/2024    UTPCR 0.27 (H) 03/11/2024    .7 (H) 05/10/2023    TACROLIMUS 8.2 03/25/2024       No results found for: "EXTANC", "EXTWBC", "EXTSEGS", "EXTPLATELETS", "EXTHEMOGLOBI", "EXTHEMATOCRI", "EXTCREATININ", "EXTSODIUM", "EXTPOTASSIUM", "EXTBUN", "EXTCO2", "EXTCALCIUM", "EXTPHOSPHORU", "EXTGLUCOSE", "EXTALBUMIN", "EXTAST", "EXTALT", "EXTBILITOTAL", "EXTLIPASE", "EXTAMYLASE"    No results found for: "EXTCYCLOSLVL", "EXTSIROLIMUS", "EXTTACROLVL", "EXTPROTCRE", "EXTPTHINTACT", "EXTPROTEINUA", "EXTWBCUA", "EXTRBCUA"    Labs were reviewed with the patient    Assessment:     1. CKD (chronic kidney disease) stage 2, GFR 60-89 ml/min    2. Long-term use of immunosuppressant medication          Plan:   BK viremia--labs per protocol,   MYF on hold for bacteremia, was on Dapo now on zyvox  Needs repeat Tac and BK level in 2 weeks  Cr increased    1. CKD stage 2 with stable creatinine baseline 1.1 to 1.3: will continue follow up as per our center guidelines. patient to continue close follow up with the local General nephrologist. Education provided in appropriate fluid intake, potassium intake. Continue with oral hydration.    1A. Pancreatic Function: N/A for non-pancreas allograft recipients:    2. High risk immunosuppression " "medication for organ transplantation, requiring regular intensive follow up and monitoring : prograf 1/1 will adjust when resulted Myfortic 360 bid prednisone taper  Lab Results   Component Value Date    TACROLIMUS 8.2 03/25/2024    TACROLIMUS 5.3 03/22/2024    TACROLIMUS 10.1 03/18/2024   Will closely monitor for toxicities, education provided about adherence to medicines and need to communicate any side effects to the transplant nurse or physician.      3. Allograft Function:stable at baseline for the patient. Continue follow up as per our guidelines and with the local General nephrologist. Communication will be sent today.  Lab Results   Component Value Date    CREATININE 1.8 (H) 03/25/2024    CREATININE 1.8 (H) 03/22/2024    CREATININE 1.7 (H) 03/18/2024     No results found for: "AMYLASE", "LIPASE"    4. Hypertension management:  metoprolol 12.5 mg bid, benicar due to uncontrolled hypertension I advised patient and care giver to call me with BP and HR. Continue with home blood pressure monitoring, low salt and healthy life discussed with the patient..    5. Metabolic Bone Disease/Secondary Hyperparathyroidism:calcium and phosphorus level discussed with the patient, patient will continue follow up with the general nephrologist for management of metabolic bone disease. Will monitor PTH and Vit D per our transplant center guidelines.   Lab Results   Component Value Date    .7 (H) 05/10/2023    CALCIUM 9.3 03/25/2024    PHOS 2.7 03/17/2024    PHOS 3.2 03/11/2024    PHOS 3.2 03/09/2024   KPN, MagOx    6. Electrolytes and acid base balance: reviewed with the patient, essentially within the normal range no need for acute changes today, will monitor as per our center guidelines.  Lab Results   Component Value Date     (L) 03/25/2024    K 4.2 03/25/2024     03/25/2024    CO2 24 03/25/2024    CO2 24 03/22/2024    CO2 23 03/18/2024   NaBicarb    7. Anemia: will continue monitoring as per our center " guidelines. No indication for acute intervention today.  Lab Results   Component Value Date    WBC 3.49 (L) 03/25/2024    HGB 12.5 (L) 03/25/2024    HCT 39.1 (L) 03/25/2024     (H) 03/25/2024     (L) 03/25/2024       8.Proteinuria: will continue with pr/cr ratio as per our center guidelines.  Lab Results   Component Value Date    PROTEINURINE 42 (H) 03/11/2024    CREATRANDUR 153.0 03/11/2024    UTPCR 0.27 (H) 03/11/2024    UTPCR 0.16 02/12/2024    UTPCR 0.36 (H) 12/04/2023        9. BK virus infection screening: will continue with urine or blood PCR as per our guidelines to prevent BK virus viremia and allograft dysfunction  Lab Results   Component Value Date    BKVIRUSPCRQB See Below (A) 03/11/2024         10. Weight and metabolic management: education provided provided during the clinic visit.   Body mass index is 27.54 kg/m².       11.Patient safety education regarding immunosuppression including prophylaxis posttransplant for CMV, PCP : Education provided about vaccination and prevention of infections.    12.  Cytopenias: no significant cytopenias will monitor as per our guidelines. Medicine list reviewed including potential causes of drug-induced cytopenias     Lab Results   Component Value Date    WBC 3.49 (L) 03/25/2024    HGB 12.5 (L) 03/25/2024    HCT 39.1 (L) 03/25/2024     (H) 03/25/2024     (L) 03/25/2024       13. Post-transplant Prophylaxis; CMV Infection, PJP and Candida mucosistis and other indicated for this particular patient.       Follow-up:   Clinic: return to transplant clinic weekly for the first month after transplant; every 2 weeks during months 2-3; then at 6-, 9-, 12-, 18-, 24-, and 36- months post-transplant to reassess for complications from immunosuppression toxicity and monitor for rejection.  Annually thereafter.    Labs: since patient remains at high risk for rejection and drug-related complications that warrant close monitoring, labs will be ordered as  follows: continue twice weekly CBC, renal panel, and drug level for first month; then same labs once weekly through 3rd month post-transplant.  Urine for UA and protein/creatinine ratio monthly.  Serum BK - PCR at 1-, 3-, 6-, 9-, 12-, 18-, 24-, 36-, 48-, and 60 months post-transplant.  Hepatic panel at 1-, 2-, 3-, 6-, 9-, 12-, 18-, 24-, and 36- months post-transplant.    Rose Mary Stroud NP       Education:   Material provided to the patient.  Patient reminded to call with any health changes since these can be early signs of significant complications.  Also, I advised the patient to be sure any new medications or changes of old medications are discussed with either a pharmacist or physician knowledgeable with transplant to avoid rejection/drug toxicity related to significant drug interactions.    Patient advised that it is recommended that all transplanted patients, and their close contacts and household members receive Covid vaccination.

## 2024-03-28 ENCOUNTER — TELEPHONE (OUTPATIENT)
Dept: TRANSPLANT | Facility: CLINIC | Age: 64
End: 2024-03-28
Payer: MEDICARE

## 2024-03-28 ENCOUNTER — LAB VISIT (OUTPATIENT)
Dept: LAB | Facility: HOSPITAL | Age: 64
End: 2024-03-28
Attending: INTERNAL MEDICINE
Payer: MEDICARE

## 2024-03-28 DIAGNOSIS — Z94.0 KIDNEY REPLACED BY TRANSPLANT: Primary | ICD-10-CM

## 2024-03-28 DIAGNOSIS — Z94.0 S/P KIDNEY TRANSPLANT: ICD-10-CM

## 2024-03-28 DIAGNOSIS — Z94.0 KIDNEY REPLACED BY TRANSPLANT: ICD-10-CM

## 2024-03-28 LAB
ALBUMIN SERPL BCP-MCNC: 3.2 G/DL (ref 3.5–5.2)
ANION GAP SERPL CALC-SCNC: 11 MMOL/L (ref 8–16)
BASOPHILS # BLD AUTO: 0.05 K/UL (ref 0–0.2)
BASOPHILS NFR BLD: 1.4 % (ref 0–1.9)
BUN SERPL-MCNC: 18 MG/DL (ref 8–23)
CALCIUM SERPL-MCNC: 9.2 MG/DL (ref 8.7–10.5)
CHLORIDE SERPL-SCNC: 104 MMOL/L (ref 95–110)
CO2 SERPL-SCNC: 22 MMOL/L (ref 23–29)
CREAT SERPL-MCNC: 1.8 MG/DL (ref 0.5–1.4)
DIFFERENTIAL METHOD BLD: ABNORMAL
EOSINOPHIL # BLD AUTO: 0 K/UL (ref 0–0.5)
EOSINOPHIL NFR BLD: 1.1 % (ref 0–8)
ERYTHROCYTE [DISTWIDTH] IN BLOOD BY AUTOMATED COUNT: 13.9 % (ref 11.5–14.5)
EST. GFR  (NO RACE VARIABLE): 42 ML/MIN/1.73 M^2
GLUCOSE SERPL-MCNC: 69 MG/DL (ref 70–110)
HCT VFR BLD AUTO: 37.1 % (ref 40–54)
HGB BLD-MCNC: 11.8 G/DL (ref 14–18)
IMM GRANULOCYTES # BLD AUTO: 0.01 K/UL (ref 0–0.04)
IMM GRANULOCYTES NFR BLD AUTO: 0.3 % (ref 0–0.5)
LYMPHOCYTES # BLD AUTO: 1.1 K/UL (ref 1–4.8)
LYMPHOCYTES NFR BLD: 30.2 % (ref 18–48)
MCH RBC QN AUTO: 34.1 PG (ref 27–31)
MCHC RBC AUTO-ENTMCNC: 31.8 G/DL (ref 32–36)
MCV RBC AUTO: 107 FL (ref 82–98)
MONOCYTES # BLD AUTO: 0.2 K/UL (ref 0.3–1)
MONOCYTES NFR BLD: 5.9 % (ref 4–15)
NEUTROPHILS # BLD AUTO: 2.2 K/UL (ref 1.8–7.7)
NEUTROPHILS NFR BLD: 61.1 % (ref 38–73)
NRBC BLD-RTO: 0 /100 WBC
PHOSPHATE SERPL-MCNC: 2.7 MG/DL (ref 2.7–4.5)
PLATELET # BLD AUTO: 128 K/UL (ref 150–450)
PMV BLD AUTO: 10.8 FL (ref 9.2–12.9)
POTASSIUM SERPL-SCNC: 4.4 MMOL/L (ref 3.5–5.1)
RBC # BLD AUTO: 3.46 M/UL (ref 4.6–6.2)
SODIUM SERPL-SCNC: 137 MMOL/L (ref 136–145)
TACROLIMUS BLD-MCNC: 10.1 NG/ML (ref 5–15)
WBC # BLD AUTO: 3.54 K/UL (ref 3.9–12.7)

## 2024-03-28 PROCEDURE — 87799 DETECT AGENT NOS DNA QUANT: CPT | Performed by: INTERNAL MEDICINE

## 2024-03-28 PROCEDURE — 86833 HLA CLASS II HIGH DEFIN QUAL: CPT | Mod: PO | Performed by: INTERNAL MEDICINE

## 2024-03-28 PROCEDURE — 80069 RENAL FUNCTION PANEL: CPT | Performed by: INTERNAL MEDICINE

## 2024-03-28 PROCEDURE — 80197 ASSAY OF TACROLIMUS: CPT | Performed by: INTERNAL MEDICINE

## 2024-03-28 PROCEDURE — 36415 COLL VENOUS BLD VENIPUNCTURE: CPT | Mod: XB | Performed by: INTERNAL MEDICINE

## 2024-03-28 PROCEDURE — 85025 COMPLETE CBC W/AUTO DIFF WBC: CPT | Performed by: INTERNAL MEDICINE

## 2024-03-28 PROCEDURE — 86977 RBC SERUM PRETX INCUBJ/INHIB: CPT | Mod: PO | Performed by: INTERNAL MEDICINE

## 2024-03-28 PROCEDURE — 86832 HLA CLASS I HIGH DEFIN QUAL: CPT | Mod: PO | Performed by: INTERNAL MEDICINE

## 2024-03-28 RX ORDER — TACROLIMUS 1 MG/1
CAPSULE ORAL
Qty: 60 CAPSULE | Refills: 11 | Status: SHIPPED | OUTPATIENT
Start: 2024-03-28 | End: 2024-04-04 | Stop reason: DRUGHIGH

## 2024-03-28 NOTE — TELEPHONE ENCOUNTER
Left voice message to call coordinator back.  ----- Message from Sierra Epps MA sent at 3/28/2024 10:17 AM CDT -----  Regarding: FW: return call  Contact: Abdoulaye    ----- Message -----  From: Mirella Hernandez  Sent: 3/28/2024   9:57 AM CDT  To: [facility] Post-Kidney Transplant Non-Clinical  Subject: return call                                      Caller:Abdoulaye Parikh          Returning call to: Robles        Caller can be reached @   [phone] ([employer]), Requesting a call back

## 2024-03-28 NOTE — TELEPHONE ENCOUNTER
Patient repeated back and voice a understanding of orders.  Requesting the Kidney TXP U/S on Monday 4/1 after 2 pm.  Next labs on 4/1/2024.  ----- Message from Maggie Mcgovern MD sent at 3/28/2024 10:08 AM CDT -----  Tac to 1 mg BID if it is a true level. Check lab next week.   Maggie Mcgovern MD  [facility] Post-Kidney Transplant Clinical  Obtain Kidney US. Pending DSA and allosure.

## 2024-03-28 NOTE — TELEPHONE ENCOUNTER
Left voice message to call coordinator back.  ----- Message from Maggie Mcgovern MD sent at 3/28/2024  9:14 AM CDT -----  Obtain Kidney US. Pending DSA and allosure.

## 2024-03-30 LAB
ALLOSURE COMMENT: NORMAL
ALLOSURE SCORE KIDNEY: 0.18 %
INFORMATION: NORMAL

## 2024-04-01 ENCOUNTER — HOSPITAL ENCOUNTER (OUTPATIENT)
Dept: RADIOLOGY | Facility: HOSPITAL | Age: 64
Discharge: HOME | End: 2024-04-01
Attending: INTERNAL MEDICINE
Payer: MEDICARE

## 2024-04-01 ENCOUNTER — TELEPHONE (OUTPATIENT)
Dept: TRANSPLANT | Facility: CLINIC | Age: 64
End: 2024-04-01
Payer: MEDICARE

## 2024-04-01 DIAGNOSIS — Z94.0 KIDNEY REPLACED BY TRANSPLANT: ICD-10-CM

## 2024-04-01 PROCEDURE — 76776 US EXAM K TRANSPL W/DOPPLER: CPT | Mod: 26,,, | Performed by: RADIOLOGY

## 2024-04-01 PROCEDURE — 76776 US EXAM K TRANSPL W/DOPPLER: CPT | Mod: TC

## 2024-04-01 NOTE — TELEPHONE ENCOUNTER
Low platelets reviewed with Dr Colón and OK to D/C Linezolid.  Patient repeated back and voice a understanding of orders and states he took his last dose of Linezolid this morning.  Agreed to repeat labs on Thursday 4/4.  ----- Message from Darrin Verma MD sent at 4/1/2024 10:57 AM CDT -----  Please report this PLT count to ID, I see he is on Linezolid.

## 2024-04-01 NOTE — TELEPHONE ENCOUNTER
Results reviewed with patient and happy with results.  ----- Message from Darrin Verma MD sent at 4/1/2024  3:08 PM CDT -----  Labs and diagnostic tests were reviewed. No action/changes indicated.

## 2024-04-03 ENCOUNTER — OFFICE VISIT (OUTPATIENT)
Dept: INFECTIOUS DISEASES | Facility: CLINIC | Age: 64
End: 2024-04-03
Payer: MEDICARE

## 2024-04-03 DIAGNOSIS — Z91.89 AT RISK FOR OPPORTUNISTIC INFECTIONS: ICD-10-CM

## 2024-04-03 DIAGNOSIS — Z79.60 LONG-TERM USE OF IMMUNOSUPPRESSANT MEDICATION: ICD-10-CM

## 2024-04-03 DIAGNOSIS — R91.1 LUNG NODULE: ICD-10-CM

## 2024-04-03 DIAGNOSIS — A49.8 ENTEROCOCCUS FAECALIS INFECTION: Primary | ICD-10-CM

## 2024-04-03 PROCEDURE — G2211 COMPLEX E/M VISIT ADD ON: HCPCS | Mod: 95,,, | Performed by: INTERNAL MEDICINE

## 2024-04-03 PROCEDURE — 99215 OFFICE O/P EST HI 40 MIN: CPT | Mod: 95,,, | Performed by: INTERNAL MEDICINE

## 2024-04-03 NOTE — PROGRESS NOTES
The patient location is: Home  The chief complaint leading to consultation is: Follow-up for E faecalis bacteremia, pulmonary nodule    Visit type: audiovisual    Face to Face time with patient: 15 mins  40 minutes of total time spent on the encounter, which includes face to face time and non-face to face time preparing to see the patient (eg, review of tests), Obtaining and/or reviewing separately obtained history, Documenting clinical information in the electronic or other health record, Independently interpreting results (not separately reported) and communicating results to the patient/family/caregiver, or Care coordination (not separately reported).         Each patient to whom he or she provides medical services by telemedicine is:  (1) informed of the relationship between the physician and patient and the respective role of any other health care provider with respect to management of the patient; and (2) notified that he or she may decline to receive medical services by telemedicine and may withdraw from such care at any time.    Notes:     Subjective:      Chief Complaint: Follow-up for E faecalis bacteremia    History of Present Illness  63M with history of LUKT 5/20/2023, bladder / kidney stones, crohn's disease, AVG in left arm, right prosthetic hip, ventriculopleural shunt, presents for follow-up of of E faecalis bacteremia.    Summary of Illness:  Patient presented after a week of malaise. He went to urgent care, influenza and COVID-19 negative. Outpatient blood cultures drawn were positive for E faecalis bacteremia. Work-up for possible source was negative - TTE with no evidence of endocarditis. CT CAP with new RLL nodule. Patient urine cultures never returned.     Patient was discharged with 4 week course of daptomycin IV given unknown source of E faecalis bacteremia. On follow-up labs, CK noted to be elevated, switched to linezolid 600 mg PO BID.    Subjective:  Patient completed his course of  linezolid this AM. Patient denied having any complaints. No fevers, chills, SOB, CP, nvd, abdominal pain, hematuria, dysuria, rash, arthalgias.    Review of Systems   Constitutional:  Negative for chills, diaphoresis, fever and weight loss.   HENT:  Negative for congestion, sinus pain and sore throat.    Eyes:  Negative for photophobia and pain.   Respiratory:  Negative for cough, sputum production and shortness of breath.    Cardiovascular:  Negative for chest pain and leg swelling.   Gastrointestinal:  Negative for abdominal pain, diarrhea, nausea and vomiting.   Genitourinary:  Negative for dysuria and hematuria.   Musculoskeletal:  Negative for joint pain.   Skin:  Negative for rash.   Neurological:  Negative for focal weakness and headaches.   Psychiatric/Behavioral:  Negative for depression. The patient is not nervous/anxious.          Objective:   Physical Exam  Vitals reviewed.   Constitutional:       General: He is not in acute distress.     Appearance: He is well-developed. He is not diaphoretic.   HENT:      Head: Normocephalic and atraumatic.      Nose: Nose normal.   Eyes:      Conjunctiva/sclera: Conjunctivae normal.   Pulmonary:      Effort: Pulmonary effort is normal. No respiratory distress.   Abdominal:      General: Abdomen is flat. There is no distension.   Musculoskeletal:      Cervical back: Normal range of motion.      Right lower leg: No edema.      Left lower leg: No edema.   Skin:     General: Skin is warm and dry.      Findings: No erythema or rash.   Neurological:      Mental Status: He is alert.   Psychiatric:         Behavior: Behavior normal.           Significant results reviewed:    Sodium   Date Value Ref Range Status   04/01/2024 136 136 - 145 mmol/L Final   03/28/2024 137 136 - 145 mmol/L Final   03/25/2024 135 (L) 136 - 145 mmol/L Final      Potassium   Date Value Ref Range Status   04/01/2024 4.6 3.5 - 5.1 mmol/L Final   03/28/2024 4.4 3.5 - 5.1 mmol/L Final   03/25/2024 4.2 3.5  - 5.1 mmol/L Final      Chloride   Date Value Ref Range Status   04/01/2024 102 95 - 110 mmol/L Final   03/28/2024 104 95 - 110 mmol/L Final   03/25/2024 105 95 - 110 mmol/L Final      CO2   Date Value Ref Range Status   04/01/2024 23 23 - 29 mmol/L Final   03/28/2024 22 (L) 23 - 29 mmol/L Final   03/25/2024 24 23 - 29 mmol/L Final      BUN   Date Value Ref Range Status   04/01/2024 17 8 - 23 mg/dL Final   03/28/2024 18 8 - 23 mg/dL Final   03/25/2024 13 8 - 23 mg/dL Final      Creatinine   Date Value Ref Range Status   04/01/2024 1.8 (H) 0.5 - 1.4 mg/dL Final   03/28/2024 1.8 (H) 0.5 - 1.4 mg/dL Final   03/25/2024 1.8 (H) 0.5 - 1.4 mg/dL Final      Glucose   Date Value Ref Range Status   04/01/2024 78 70 - 110 mg/dL Final   03/28/2024 69 (L) 70 - 110 mg/dL Final   03/25/2024 72 70 - 110 mg/dL Final       ALT   Date Value Ref Range Status   04/01/2024 28 10 - 44 U/L Final   03/25/2024 28 10 - 44 U/L Final   03/22/2024 55 (H) 10 - 44 U/L Final      AST   Date Value Ref Range Status   04/01/2024 22 10 - 40 U/L Final   03/25/2024 23 10 - 40 U/L Final   03/22/2024 63 (H) 10 - 40 U/L Final      Total Bilirubin   Date Value Ref Range Status   04/01/2024 1.0 0.1 - 1.0 mg/dL Final     Comment:     For infants and newborns, interpretation of results should be based  on gestational age, weight and in agreement with clinical  observations.    Premature Infant recommended reference ranges:  Up to 24 hours.............<8.0 mg/dL  Up to 48 hours............<12.0 mg/dL  3-5 days..................<15.0 mg/dL  6-29 days.................<15.0 mg/dL     03/25/2024 0.7 0.1 - 1.0 mg/dL Final     Comment:     For infants and newborns, interpretation of results should be based  on gestational age, weight and in agreement with clinical  observations.    Premature Infant recommended reference ranges:  Up to 24 hours.............<8.0 mg/dL  Up to 48 hours............<12.0 mg/dL  3-5 days..................<15.0 mg/dL  6-29  days.................<15.0 mg/dL     03/22/2024 0.9 0.1 - 1.0 mg/dL Final     Comment:     For infants and newborns, interpretation of results should be based  on gestational age, weight and in agreement with clinical  observations.    Premature Infant recommended reference ranges:  Up to 24 hours.............<8.0 mg/dL  Up to 48 hours............<12.0 mg/dL  3-5 days..................<15.0 mg/dL  6-29 days.................<15.0 mg/dL        Albumin   Date Value Ref Range Status   04/01/2024 3.3 (L) 3.5 - 5.2 g/dL Final   03/28/2024 3.2 (L) 3.5 - 5.2 g/dL Final   03/25/2024 3.1 (L) 3.5 - 5.2 g/dL Final      Total Protein   Date Value Ref Range Status   04/01/2024 8.2 6.0 - 8.4 g/dL Final   03/25/2024 8.2 6.0 - 8.4 g/dL Final   03/22/2024 8.4 6.0 - 8.4 g/dL Final      Alkaline Phosphatase   Date Value Ref Range Status   04/01/2024 82 55 - 135 U/L Final   03/25/2024 79 55 - 135 U/L Final   03/22/2024 78 55 - 135 U/L Final        WBC   Date Value Ref Range Status   04/01/2024 3.36 (L) 3.90 - 12.70 K/uL Final   03/28/2024 3.54 (L) 3.90 - 12.70 K/uL Final   03/25/2024 3.49 (L) 3.90 - 12.70 K/uL Final      Hemoglobin   Date Value Ref Range Status   04/01/2024 11.9 (L) 14.0 - 18.0 g/dL Final   03/28/2024 11.8 (L) 14.0 - 18.0 g/dL Final   03/25/2024 12.5 (L) 14.0 - 18.0 g/dL Final      POC Hematocrit   Date Value Ref Range Status   05/10/2023 27 (L) 36 - 54 %PCV Final     Hematocrit   Date Value Ref Range Status   04/01/2024 37.1 (L) 40.0 - 54.0 % Final   03/28/2024 37.1 (L) 40.0 - 54.0 % Final   03/25/2024 39.1 (L) 40.0 - 54.0 % Final      Platelets   Date Value Ref Range Status   04/01/2024 90 (L) 150 - 450 K/uL Final   03/28/2024 128 (L) 150 - 450 K/uL Final   03/25/2024 116 (L) 150 - 450 K/uL Final        Urine and serum histo / blast neg  Aspergillus Ag neg  Fungal immunodiffusion neg  Fungitell neg  Crypto Ag neg    CT chest  Irregular right lower lobe solid nodule, new when compared to CT 02/12/2024.  Findings could  represent infectious/inflammatory process.  However, underlying neoplastic process cannot be ruled out.  Short-term follow-up in 3 months recommended.     Multiple ground-glass nodules measuring up to 6 mm.  Attention on follow-up advised.     Small volume right-sided pleural effusion.     Calcified mediastinal and hilar lymphadenopathy in patient with reported history of sarcoidosis.    CT abd/pelvis  Postsurgical changes of renal transplant allograft.  Similar nonobstructive nephrolithiasis of the allograft.     Similar clustered calcification within the posterior urinary bladder.     New irregular right lower lobe 1.3 cm opacity favored to reflect infectious/inflammatory process as this is new compared to February 12, 2024.  Other left lower lobe 0.5 cm nodular opacity is unchanged.     Additional findings as detailed above.    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 TTE with no evidence of endocarditis. Patient was discharged on 4 week course of daptomycin but subsequently developed rhabdo so he was switched to linezolid.     Linezolid course completed today, now with pancytopenia - likely related to bone marrow suppression from linezolid. Will continue to monitor cell counts.    During work-up for E faecalis, CT with new RLL 1.3 cm nodule, fungal biomarkers negative. Will continue to monitor on follow-up scans.       - Prior notes by kidney transplant team reviewed  - Results reviewed as above      Plan:   - No further antibiotics needed for E faecalis    - Repeat CT chest 5/2024 to reassess pulmonary nodule    - Follow-up with Pulmonary 6/2024    - Plan discussed with kidney transplant team      40 minutes of total time spent on the encounter, which includes face to face time and non-face to face time  preparing to see the patient (eg, review of tests), Obtaining and/or reviewing separately obtained history, Documenting clinical information in the electronic or other health record, Independently interpreting results (not separately reported) and communicating results to the patient/family/caregiver, or Care coordination (not separately reported).     This patient's visit complexity is inherent to evaluation and management associated with medical care services that are part of ongoing care related to this patient's single, serious condition or complex condition.     Maral Lopez MD MPH  [facility] [place] [facility] - Infectious Disease

## 2024-04-04 ENCOUNTER — TELEPHONE (OUTPATIENT)
Dept: TRANSPLANT | Facility: CLINIC | Age: 64
End: 2024-04-04
Payer: MEDICARE

## 2024-04-04 ENCOUNTER — LAB VISIT (OUTPATIENT)
Dept: LAB | Facility: HOSPITAL | Age: 64
End: 2024-04-04
Attending: INTERNAL MEDICINE
Payer: MEDICARE

## 2024-04-04 DIAGNOSIS — Z94.0 S/P KIDNEY TRANSPLANT: ICD-10-CM

## 2024-04-04 DIAGNOSIS — Z94.0 KIDNEY REPLACED BY TRANSPLANT: ICD-10-CM

## 2024-04-04 LAB
ALBUMIN SERPL BCP-MCNC: 3.4 G/DL (ref 3.5–5.2)
ANION GAP SERPL CALC-SCNC: 9 MMOL/L (ref 8–16)
BASOPHILS # BLD AUTO: 0.04 K/UL (ref 0–0.2)
BASOPHILS NFR BLD: 1.1 % (ref 0–1.9)
BUN SERPL-MCNC: 17 MG/DL (ref 8–23)
CALCIUM SERPL-MCNC: 9.3 MG/DL (ref 8.7–10.5)
CHLORIDE SERPL-SCNC: 103 MMOL/L (ref 95–110)
CO2 SERPL-SCNC: 22 MMOL/L (ref 23–29)
CREAT SERPL-MCNC: 1.4 MG/DL (ref 0.5–1.4)
DIFFERENTIAL METHOD BLD: ABNORMAL
EOSINOPHIL # BLD AUTO: 0.1 K/UL (ref 0–0.5)
EOSINOPHIL NFR BLD: 2 % (ref 0–8)
ERYTHROCYTE [DISTWIDTH] IN BLOOD BY AUTOMATED COUNT: 14.4 % (ref 11.5–14.5)
EST. GFR  (NO RACE VARIABLE): 56.8 ML/MIN/1.73 M^2
GLUCOSE SERPL-MCNC: 63 MG/DL (ref 70–110)
HCT VFR BLD AUTO: 36.3 % (ref 40–54)
HGB BLD-MCNC: 12.1 G/DL (ref 14–18)
IMM GRANULOCYTES # BLD AUTO: 0.04 K/UL (ref 0–0.04)
IMM GRANULOCYTES NFR BLD AUTO: 1.1 % (ref 0–0.5)
LYMPHOCYTES # BLD AUTO: 1 K/UL (ref 1–4.8)
LYMPHOCYTES NFR BLD: 27.4 % (ref 18–48)
MAGNESIUM SERPL-MCNC: 1.6 MG/DL (ref 1.6–2.6)
MCH RBC QN AUTO: 35.6 PG (ref 27–31)
MCHC RBC AUTO-ENTMCNC: 33.3 G/DL (ref 32–36)
MCV RBC AUTO: 107 FL (ref 82–98)
MONOCYTES # BLD AUTO: 0.3 K/UL (ref 0.3–1)
MONOCYTES NFR BLD: 7.1 % (ref 4–15)
NEUTROPHILS # BLD AUTO: 2.2 K/UL (ref 1.8–7.7)
NEUTROPHILS NFR BLD: 61.3 % (ref 38–73)
NRBC BLD-RTO: 0 /100 WBC
PHOSPHATE SERPL-MCNC: 3 MG/DL (ref 2.7–4.5)
PLATELET # BLD AUTO: 88 K/UL (ref 150–450)
PMV BLD AUTO: 11.9 FL (ref 9.2–12.9)
POTASSIUM SERPL-SCNC: 4.1 MMOL/L (ref 3.5–5.1)
RBC # BLD AUTO: 3.4 M/UL (ref 4.6–6.2)
SODIUM SERPL-SCNC: 134 MMOL/L (ref 136–145)
TACROLIMUS BLD-MCNC: 4.5 NG/ML (ref 5–15)
WBC # BLD AUTO: 3.51 K/UL (ref 3.9–12.7)

## 2024-04-04 PROCEDURE — 85025 COMPLETE CBC W/AUTO DIFF WBC: CPT | Performed by: INTERNAL MEDICINE

## 2024-04-04 PROCEDURE — 80197 ASSAY OF TACROLIMUS: CPT | Performed by: INTERNAL MEDICINE

## 2024-04-04 PROCEDURE — 83735 ASSAY OF MAGNESIUM: CPT | Performed by: INTERNAL MEDICINE

## 2024-04-04 PROCEDURE — 80069 RENAL FUNCTION PANEL: CPT | Performed by: INTERNAL MEDICINE

## 2024-04-04 PROCEDURE — 36415 COLL VENOUS BLD VENIPUNCTURE: CPT | Performed by: INTERNAL MEDICINE

## 2024-04-04 RX ORDER — TACROLIMUS 1 MG/1
CAPSULE ORAL
Qty: 120 CAPSULE | Refills: 11 | Status: SHIPPED | OUTPATIENT
Start: 2024-04-04 | End: 2024-04-08 | Stop reason: DRUGHIGH

## 2024-04-04 NOTE — TELEPHONE ENCOUNTER
Left voice message to call coordinator back.  ----- Message from Darrin Verma MD sent at 4/4/2024  8:36 AM CDT -----  We can repeat in 1 week and then I'll decide, I suspect after that we could do monthly x 2 months

## 2024-04-04 NOTE — PROGRESS NOTES
Increase prograf to 2 mg PO bid, I suspect we may need to use 0.5 mg, but for now 2 bid and repeat labs next week

## 2024-04-04 NOTE — TELEPHONE ENCOUNTER
Patient repeated back and voice a understanding of orders.  Next labs on 4/8/2024.  ----- Message from Darrin Verma MD sent at 4/4/2024  3:14 PM CDT -----  Increase prograf to 2 mg PO bid, I suspect we may need to use 0.5 mg, but for now 2 bid and repeat labs next week

## 2024-04-04 NOTE — TELEPHONE ENCOUNTER
Patient repeated back and voice a understanding of orders.  Next labs on 4/8/2024.  ----- Message from Darrin Verma MD sent at 4/4/2024  8:36 AM CDT -----  We can repeat in 1 week and then I'll decide, I suspect after that we could do monthly x 2 months

## 2024-04-05 DIAGNOSIS — Z94.0 KIDNEY REPLACED BY TRANSPLANT: Primary | ICD-10-CM

## 2024-04-08 ENCOUNTER — LAB VISIT (OUTPATIENT)
Dept: LAB | Facility: HOSPITAL | Age: 64
End: 2024-04-08
Attending: INTERNAL MEDICINE
Payer: MEDICARE

## 2024-04-08 DIAGNOSIS — Z94.0 KIDNEY REPLACED BY TRANSPLANT: ICD-10-CM

## 2024-04-08 DIAGNOSIS — Z94.0 S/P KIDNEY TRANSPLANT: ICD-10-CM

## 2024-04-08 DIAGNOSIS — Z94.0 KIDNEY REPLACED BY TRANSPLANT: Primary | ICD-10-CM

## 2024-04-08 LAB
BACTERIA #/AREA URNS AUTO: ABNORMAL /HPF
BILIRUB UR QL STRIP: NEGATIVE
CLARITY UR REFRACT.AUTO: CLEAR
COLOR UR AUTO: YELLOW
CREAT UR-MCNC: 115 MG/DL (ref 23–375)
GLUCOSE UR QL STRIP: NEGATIVE
HGB UR QL STRIP: ABNORMAL
KETONES UR QL STRIP: NEGATIVE
LEUKOCYTE ESTERASE UR QL STRIP: NEGATIVE
MICROSCOPIC COMMENT: ABNORMAL
NITRITE UR QL STRIP: NEGATIVE
PH UR STRIP: 7 [PH] (ref 5–8)
PROT UR QL STRIP: NEGATIVE
PROT UR-MCNC: 19 MG/DL (ref 0–15)
PROT/CREAT UR: 0.17 MG/G{CREAT} (ref 0–0.2)
RBC #/AREA URNS AUTO: 27 /HPF (ref 0–4)
SP GR UR STRIP: 1.01 (ref 1–1.03)
SQUAMOUS #/AREA URNS AUTO: 0 /HPF
URN SPEC COLLECT METH UR: ABNORMAL
WBC #/AREA URNS AUTO: 3 /HPF (ref 0–5)

## 2024-04-08 PROCEDURE — 84156 ASSAY OF PROTEIN URINE: CPT | Performed by: INTERNAL MEDICINE

## 2024-04-08 PROCEDURE — 81001 URINALYSIS AUTO W/SCOPE: CPT | Performed by: INTERNAL MEDICINE

## 2024-04-08 RX ORDER — TACROLIMUS 1 MG/1
CAPSULE ORAL
Qty: 180 CAPSULE | Refills: 11 | Status: SHIPPED | OUTPATIENT
Start: 2024-04-08 | End: 2024-04-11 | Stop reason: DRUGHIGH

## 2024-04-08 NOTE — TELEPHONE ENCOUNTER
Patient repeated back and voice a understanding of orders.  Unable to add LDH and Haptoglobin to 4/8 labs.  Agreed to repeat labs on 4/11.    Darrin Verma MD  [facility] Post-Kidney Transplant Clinical  Please increase prograf to 3 mg PO bid    ----- Message from Darrin Verma MD sent at 4/8/2024 10:56 AM CDT -----  I wonder how takes for the low PLT count to resolve  See if we can add LDH and haptoglobin to the labs from this morning please   ----- Message -----  From: Robles Arita, RN  Sent: 4/8/2024  10:48 AM CDT  To: Darrin Verma MD    Linezolid was completed on 4/1.  ----- Message -----  From: Darrin Verma MD  Sent: 4/8/2024   8:22 AM CDT  To: [facility] Post-Kidney Transplant Clinical    Did he stop the linezolid?  Repeat cbc in 3 days

## 2024-04-11 ENCOUNTER — LAB VISIT (OUTPATIENT)
Dept: LAB | Facility: HOSPITAL | Age: 64
End: 2024-04-11
Attending: INTERNAL MEDICINE
Payer: MEDICARE

## 2024-04-11 DIAGNOSIS — Z94.0 KIDNEY REPLACED BY TRANSPLANT: ICD-10-CM

## 2024-04-11 DIAGNOSIS — Z94.0 S/P KIDNEY TRANSPLANT: ICD-10-CM

## 2024-04-11 LAB
ALBUMIN SERPL BCP-MCNC: 3.3 G/DL (ref 3.5–5.2)
ANION GAP SERPL CALC-SCNC: 10 MMOL/L (ref 8–16)
BASOPHILS # BLD AUTO: 0.03 K/UL (ref 0–0.2)
BASOPHILS NFR BLD: 0.7 % (ref 0–1.9)
BUN SERPL-MCNC: 13 MG/DL (ref 8–23)
CALCIUM SERPL-MCNC: 9.1 MG/DL (ref 8.7–10.5)
CHLORIDE SERPL-SCNC: 106 MMOL/L (ref 95–110)
CO2 SERPL-SCNC: 24 MMOL/L (ref 23–29)
CREAT SERPL-MCNC: 1.4 MG/DL (ref 0.5–1.4)
DIFFERENTIAL METHOD BLD: ABNORMAL
EOSINOPHIL # BLD AUTO: 0.1 K/UL (ref 0–0.5)
EOSINOPHIL NFR BLD: 1.2 % (ref 0–8)
ERYTHROCYTE [DISTWIDTH] IN BLOOD BY AUTOMATED COUNT: 14.5 % (ref 11.5–14.5)
EST. GFR  (NO RACE VARIABLE): 56.8 ML/MIN/1.73 M^2
GLUCOSE SERPL-MCNC: 70 MG/DL (ref 70–110)
HAPTOGLOB SERPL-MCNC: 16 MG/DL (ref 30–250)
HCT VFR BLD AUTO: 32.5 % (ref 40–54)
HGB BLD-MCNC: 10.5 G/DL (ref 14–18)
IMM GRANULOCYTES # BLD AUTO: 0.05 K/UL (ref 0–0.04)
IMM GRANULOCYTES NFR BLD AUTO: 1.2 % (ref 0–0.5)
LDH SERPL L TO P-CCNC: 181 U/L (ref 110–260)
LYMPHOCYTES # BLD AUTO: 1.3 K/UL (ref 1–4.8)
LYMPHOCYTES NFR BLD: 31.1 % (ref 18–48)
MAGNESIUM SERPL-MCNC: 1.7 MG/DL (ref 1.6–2.6)
MCH RBC QN AUTO: 34.5 PG (ref 27–31)
MCHC RBC AUTO-ENTMCNC: 32.3 G/DL (ref 32–36)
MCV RBC AUTO: 107 FL (ref 82–98)
MONOCYTES # BLD AUTO: 0.6 K/UL (ref 0.3–1)
MONOCYTES NFR BLD: 13.4 % (ref 4–15)
NEUTROPHILS # BLD AUTO: 2.2 K/UL (ref 1.8–7.7)
NEUTROPHILS NFR BLD: 52.4 % (ref 38–73)
NRBC BLD-RTO: 0 /100 WBC
PHOSPHATE SERPL-MCNC: 3.3 MG/DL (ref 2.7–4.5)
PLATELET # BLD AUTO: 129 K/UL (ref 150–450)
PMV BLD AUTO: 11.1 FL (ref 9.2–12.9)
POTASSIUM SERPL-SCNC: 4.1 MMOL/L (ref 3.5–5.1)
RBC # BLD AUTO: 3.04 M/UL (ref 4.6–6.2)
SODIUM SERPL-SCNC: 140 MMOL/L (ref 136–145)
TACROLIMUS BLD-MCNC: 10.3 NG/ML (ref 5–15)
WBC # BLD AUTO: 4.25 K/UL (ref 3.9–12.7)

## 2024-04-11 PROCEDURE — 36415 COLL VENOUS BLD VENIPUNCTURE: CPT | Performed by: INTERNAL MEDICINE

## 2024-04-11 PROCEDURE — 83010 ASSAY OF HAPTOGLOBIN QUANT: CPT | Performed by: INTERNAL MEDICINE

## 2024-04-11 PROCEDURE — 83615 LACTATE (LD) (LDH) ENZYME: CPT | Performed by: INTERNAL MEDICINE

## 2024-04-11 PROCEDURE — 80069 RENAL FUNCTION PANEL: CPT | Performed by: INTERNAL MEDICINE

## 2024-04-11 PROCEDURE — 80197 ASSAY OF TACROLIMUS: CPT | Performed by: INTERNAL MEDICINE

## 2024-04-11 PROCEDURE — 83735 ASSAY OF MAGNESIUM: CPT | Performed by: INTERNAL MEDICINE

## 2024-04-11 PROCEDURE — 85025 COMPLETE CBC W/AUTO DIFF WBC: CPT | Performed by: INTERNAL MEDICINE

## 2024-04-11 RX ORDER — TACROLIMUS 1 MG/1
CAPSULE ORAL
Qty: 150 CAPSULE | Refills: 11 | Status: SHIPPED | OUTPATIENT
Start: 2024-04-11 | End: 2024-04-22 | Stop reason: DRUGHIGH

## 2024-04-11 NOTE — TELEPHONE ENCOUNTER
Pt notified, labs weekly, will adjust FK to 3 weeks     ----- Message from Darrin Verma MD sent at 4/11/2024  9:53 AM CDT -----    Lets lower prograf to 3/2 and repeat a level in 3 weeks

## 2024-04-16 ENCOUNTER — LAB VISIT (OUTPATIENT)
Dept: LAB | Facility: HOSPITAL | Age: 64
End: 2024-04-16
Attending: INTERNAL MEDICINE
Payer: MEDICARE

## 2024-04-16 DIAGNOSIS — Z94.0 KIDNEY REPLACED BY TRANSPLANT: ICD-10-CM

## 2024-04-16 LAB
ALBUMIN SERPL BCP-MCNC: 3.2 G/DL (ref 3.5–5.2)
ANION GAP SERPL CALC-SCNC: 11 MMOL/L (ref 8–16)
BASOPHILS # BLD AUTO: 0.02 K/UL (ref 0–0.2)
BASOPHILS NFR BLD: 0.4 % (ref 0–1.9)
BUN SERPL-MCNC: 17 MG/DL (ref 8–23)
CALCIUM SERPL-MCNC: 9.6 MG/DL (ref 8.7–10.5)
CHLORIDE SERPL-SCNC: 101 MMOL/L (ref 95–110)
CO2 SERPL-SCNC: 24 MMOL/L (ref 23–29)
CREAT SERPL-MCNC: 1.7 MG/DL (ref 0.5–1.4)
DIFFERENTIAL METHOD BLD: ABNORMAL
EOSINOPHIL # BLD AUTO: 0.1 K/UL (ref 0–0.5)
EOSINOPHIL NFR BLD: 1.1 % (ref 0–8)
ERYTHROCYTE [DISTWIDTH] IN BLOOD BY AUTOMATED COUNT: 14.6 % (ref 11.5–14.5)
EST. GFR  (NO RACE VARIABLE): 45 ML/MIN/1.73 M^2
GLUCOSE SERPL-MCNC: 68 MG/DL (ref 70–110)
HCT VFR BLD AUTO: 34.8 % (ref 40–54)
HGB BLD-MCNC: 11 G/DL (ref 14–18)
IMM GRANULOCYTES # BLD AUTO: 0.02 K/UL (ref 0–0.04)
IMM GRANULOCYTES NFR BLD AUTO: 0.4 % (ref 0–0.5)
LYMPHOCYTES # BLD AUTO: 0.8 K/UL (ref 1–4.8)
LYMPHOCYTES NFR BLD: 17.7 % (ref 18–48)
MAGNESIUM SERPL-MCNC: 1.8 MG/DL (ref 1.6–2.6)
MCH RBC QN AUTO: 33.6 PG (ref 27–31)
MCHC RBC AUTO-ENTMCNC: 31.6 G/DL (ref 32–36)
MCV RBC AUTO: 106 FL (ref 82–98)
MONOCYTES # BLD AUTO: 0.6 K/UL (ref 0.3–1)
MONOCYTES NFR BLD: 13.5 % (ref 4–15)
NEUTROPHILS # BLD AUTO: 3.2 K/UL (ref 1.8–7.7)
NEUTROPHILS NFR BLD: 66.9 % (ref 38–73)
NRBC BLD-RTO: 0 /100 WBC
PHOSPHATE SERPL-MCNC: 2.8 MG/DL (ref 2.7–4.5)
PLATELET # BLD AUTO: 147 K/UL (ref 150–450)
PMV BLD AUTO: 11.9 FL (ref 9.2–12.9)
POTASSIUM SERPL-SCNC: 4.4 MMOL/L (ref 3.5–5.1)
RBC # BLD AUTO: 3.27 M/UL (ref 4.6–6.2)
SODIUM SERPL-SCNC: 136 MMOL/L (ref 136–145)
WBC # BLD AUTO: 4.74 K/UL (ref 3.9–12.7)

## 2024-04-16 PROCEDURE — 83735 ASSAY OF MAGNESIUM: CPT | Performed by: INTERNAL MEDICINE

## 2024-04-16 PROCEDURE — 85025 COMPLETE CBC W/AUTO DIFF WBC: CPT | Performed by: INTERNAL MEDICINE

## 2024-04-16 PROCEDURE — 36415 COLL VENOUS BLD VENIPUNCTURE: CPT | Performed by: INTERNAL MEDICINE

## 2024-04-16 PROCEDURE — 80069 RENAL FUNCTION PANEL: CPT | Performed by: INTERNAL MEDICINE

## 2024-04-17 ENCOUNTER — EXTERNAL HOME HEALTH (OUTPATIENT)
Dept: HOME HEALTH SERVICES | Facility: HOSPITAL | Age: 64
End: 2024-04-17
Payer: MEDICARE

## 2024-04-21 ENCOUNTER — PATIENT MESSAGE (OUTPATIENT)
Dept: TRANSPLANT | Facility: CLINIC | Age: 64
End: 2024-04-21
Payer: MEDICARE

## 2024-04-22 ENCOUNTER — LAB VISIT (OUTPATIENT)
Dept: LAB | Facility: HOSPITAL | Age: 64
End: 2024-04-22
Attending: INTERNAL MEDICINE
Payer: MEDICARE

## 2024-04-22 ENCOUNTER — TELEPHONE (OUTPATIENT)
Dept: TRANSPLANT | Facility: CLINIC | Age: 64
End: 2024-04-22
Payer: MEDICARE

## 2024-04-22 DIAGNOSIS — Z94.0 KIDNEY REPLACED BY TRANSPLANT: ICD-10-CM

## 2024-04-22 DIAGNOSIS — Z94.0 S/P KIDNEY TRANSPLANT: ICD-10-CM

## 2024-04-22 LAB
ALBUMIN SERPL BCP-MCNC: 3.1 G/DL (ref 3.5–5.2)
ANION GAP SERPL CALC-SCNC: 8 MMOL/L (ref 8–16)
BASOPHILS # BLD AUTO: 0.03 K/UL (ref 0–0.2)
BASOPHILS NFR BLD: 0.5 % (ref 0–1.9)
BUN SERPL-MCNC: 20 MG/DL (ref 8–23)
CALCIUM SERPL-MCNC: 9.1 MG/DL (ref 8.7–10.5)
CHLORIDE SERPL-SCNC: 106 MMOL/L (ref 95–110)
CO2 SERPL-SCNC: 22 MMOL/L (ref 23–29)
CREAT SERPL-MCNC: 1.7 MG/DL (ref 0.5–1.4)
DIFFERENTIAL METHOD BLD: ABNORMAL
EOSINOPHIL # BLD AUTO: 0 K/UL (ref 0–0.5)
EOSINOPHIL NFR BLD: 0.7 % (ref 0–8)
ERYTHROCYTE [DISTWIDTH] IN BLOOD BY AUTOMATED COUNT: 14.6 % (ref 11.5–14.5)
EST. GFR  (NO RACE VARIABLE): 45 ML/MIN/1.73 M^2
GLUCOSE SERPL-MCNC: 78 MG/DL (ref 70–110)
HCT VFR BLD AUTO: 35.8 % (ref 40–54)
HGB BLD-MCNC: 12.1 G/DL (ref 14–18)
IMM GRANULOCYTES # BLD AUTO: 0.04 K/UL (ref 0–0.04)
IMM GRANULOCYTES NFR BLD AUTO: 0.7 % (ref 0–0.5)
LYMPHOCYTES # BLD AUTO: 1.2 K/UL (ref 1–4.8)
LYMPHOCYTES NFR BLD: 18.9 % (ref 18–48)
MAGNESIUM SERPL-MCNC: 1.6 MG/DL (ref 1.6–2.6)
MCH RBC QN AUTO: 35.3 PG (ref 27–31)
MCHC RBC AUTO-ENTMCNC: 33.8 G/DL (ref 32–36)
MCV RBC AUTO: 104 FL (ref 82–98)
MONOCYTES # BLD AUTO: 0.6 K/UL (ref 0.3–1)
MONOCYTES NFR BLD: 9.4 % (ref 4–15)
NEUTROPHILS # BLD AUTO: 4.3 K/UL (ref 1.8–7.7)
NEUTROPHILS NFR BLD: 69.8 % (ref 38–73)
NRBC BLD-RTO: 0 /100 WBC
PHOSPHATE SERPL-MCNC: 3.2 MG/DL (ref 2.7–4.5)
PLATELET # BLD AUTO: 197 K/UL (ref 150–450)
PMV BLD AUTO: 11.4 FL (ref 9.2–12.9)
POTASSIUM SERPL-SCNC: 4.8 MMOL/L (ref 3.5–5.1)
RBC # BLD AUTO: 3.43 M/UL (ref 4.6–6.2)
SODIUM SERPL-SCNC: 136 MMOL/L (ref 136–145)
TACROLIMUS BLD-MCNC: 13.9 NG/ML (ref 5–15)
WBC # BLD AUTO: 6.09 K/UL (ref 3.9–12.7)

## 2024-04-22 PROCEDURE — 85025 COMPLETE CBC W/AUTO DIFF WBC: CPT | Performed by: INTERNAL MEDICINE

## 2024-04-22 PROCEDURE — 80197 ASSAY OF TACROLIMUS: CPT | Performed by: INTERNAL MEDICINE

## 2024-04-22 PROCEDURE — 36415 COLL VENOUS BLD VENIPUNCTURE: CPT | Performed by: INTERNAL MEDICINE

## 2024-04-22 PROCEDURE — 87799 DETECT AGENT NOS DNA QUANT: CPT | Performed by: INTERNAL MEDICINE

## 2024-04-22 PROCEDURE — 80069 RENAL FUNCTION PANEL: CPT | Performed by: INTERNAL MEDICINE

## 2024-04-22 PROCEDURE — 83735 ASSAY OF MAGNESIUM: CPT | Performed by: INTERNAL MEDICINE

## 2024-04-22 RX ORDER — TACROLIMUS 1 MG/1
CAPSULE ORAL
Qty: 90 CAPSULE | Refills: 11 | Status: SHIPPED | OUTPATIENT
Start: 2024-04-22 | End: 2024-05-27 | Stop reason: DRUGHIGH

## 2024-04-22 NOTE — TELEPHONE ENCOUNTER
Patient repeated back and voice a understanding of orders.  Next labs on 5/6/2024.  ----- Message from Darrin Verma MD sent at 4/22/2024 12:44 PM CDT -----  Lets lower prograf to 2/1 and repeat labs in 2 weeks

## 2024-04-22 NOTE — TELEPHONE ENCOUNTER
Left voice message to call coordinator back.  ----- Message from Darrin Verma MD sent at 4/22/2024 12:44 PM CDT -----  Lets lower prograf to 2/1 and repeat labs in 2 weeks

## 2024-04-29 LAB
CLASS I ANTIBODY COMMENTS - LUMINEX: NORMAL
CLASS II ANTIBODY COMMENTS - LUMINEX: NORMAL
DSA1 TESTING DATE: NORMAL
DSA12 TESTING DATE: NORMAL
DSA2 TESTING DATE: NORMAL
SERUM COLLECTION DT - LUMINEX CLASS I: NORMAL
SERUM COLLECTION DT - LUMINEX CLASS II: NORMAL

## 2024-04-30 ENCOUNTER — PATIENT OUTREACH (OUTPATIENT)
Dept: ADMINISTRATIVE | Facility: CLINIC | Age: 64
End: 2024-04-30
Payer: MEDICARE

## 2024-04-30 NOTE — PROGRESS NOTES
2nd Attempt made to reach patient for TCC call. Left voicemail please call [phone] leave first name, last name, and  Chiara will return your call.

## 2024-05-01 NOTE — PROGRESS NOTES
3rd Attempt made to reach patient for TCC call. Left voicemail please call [phone] leave first name, last name, and  Chiara will return your call.     Spoke with spouse

## 2024-05-06 ENCOUNTER — LAB VISIT (OUTPATIENT)
Dept: LAB | Facility: HOSPITAL | Age: 64
End: 2024-05-06
Attending: INTERNAL MEDICINE
Payer: MEDICARE

## 2024-05-06 DIAGNOSIS — Z94.0 KIDNEY REPLACED BY TRANSPLANT: ICD-10-CM

## 2024-05-06 LAB
BACTERIA #/AREA URNS AUTO: ABNORMAL /HPF
BILIRUB UR QL STRIP: NEGATIVE
CLARITY UR REFRACT.AUTO: ABNORMAL
COLOR UR AUTO: YELLOW
CREAT UR-MCNC: 156 MG/DL (ref 23–375)
GLUCOSE UR QL STRIP: NEGATIVE
HGB UR QL STRIP: ABNORMAL
HYALINE CASTS UR QL AUTO: 0 /LPF
KETONES UR QL STRIP: NEGATIVE
LEUKOCYTE ESTERASE UR QL STRIP: ABNORMAL
MICROSCOPIC COMMENT: ABNORMAL
NITRITE UR QL STRIP: NEGATIVE
PH UR STRIP: 7 [PH] (ref 5–8)
PROT UR QL STRIP: ABNORMAL
PROT UR-MCNC: 33 MG/DL (ref 0–15)
PROT/CREAT UR: 0.21 MG/G{CREAT} (ref 0–0.2)
RBC #/AREA URNS AUTO: 68 /HPF (ref 0–4)
SP GR UR STRIP: 1.01 (ref 1–1.03)
SQUAMOUS #/AREA URNS AUTO: 1 /HPF
URN SPEC COLLECT METH UR: ABNORMAL
WBC #/AREA URNS AUTO: 35 /HPF (ref 0–5)
WBC CLUMPS UR QL AUTO: ABNORMAL

## 2024-05-06 PROCEDURE — 87086 URINE CULTURE/COLONY COUNT: CPT | Performed by: INTERNAL MEDICINE

## 2024-05-06 PROCEDURE — 81001 URINALYSIS AUTO W/SCOPE: CPT | Performed by: INTERNAL MEDICINE

## 2024-05-06 PROCEDURE — 87088 URINE BACTERIA CULTURE: CPT | Performed by: INTERNAL MEDICINE

## 2024-05-06 PROCEDURE — 82570 ASSAY OF URINE CREATININE: CPT | Performed by: INTERNAL MEDICINE

## 2024-05-06 PROCEDURE — 87186 SC STD MICRODIL/AGAR DIL: CPT | Performed by: INTERNAL MEDICINE

## 2024-05-06 PROCEDURE — 87077 CULTURE AEROBIC IDENTIFY: CPT | Performed by: INTERNAL MEDICINE

## 2024-05-08 LAB — BACTERIA UR CULT: ABNORMAL

## 2024-05-13 ENCOUNTER — TELEPHONE (OUTPATIENT)
Dept: TRANSPLANT | Facility: CLINIC | Age: 64
End: 2024-05-13
Payer: MEDICARE

## 2024-05-13 ENCOUNTER — HOSPITAL ENCOUNTER (INPATIENT)
Facility: HOSPITAL | Age: 64
LOS: 5 days | Discharge: HOME | DRG: 291 | End: 2024-05-18
Attending: INTERNAL MEDICINE | Admitting: INTERNAL MEDICINE
Payer: MEDICARE

## 2024-05-13 DIAGNOSIS — N17.9 ACUTE KIDNEY INJURY SUPERIMPOSED ON CKD: ICD-10-CM

## 2024-05-13 DIAGNOSIS — I10 HYPERTENSION, UNSPECIFIED TYPE: ICD-10-CM

## 2024-05-13 DIAGNOSIS — N18.9 ACUTE KIDNEY INJURY SUPERIMPOSED ON CKD: ICD-10-CM

## 2024-05-13 DIAGNOSIS — D63.1 ANEMIA OF RENAL DISEASE: ICD-10-CM

## 2024-05-13 DIAGNOSIS — Z91.89 AT RISK FOR OPPORTUNISTIC INFECTIONS: ICD-10-CM

## 2024-05-13 DIAGNOSIS — I82.411 ACUTE DEEP VEIN THROMBOSIS (DVT) OF FEMORAL VEIN OF RIGHT LOWER EXTREMITY: ICD-10-CM

## 2024-05-13 DIAGNOSIS — E87.79 VOLUME OVERLOAD STATE OF HEART: ICD-10-CM

## 2024-05-13 DIAGNOSIS — N18.32 STAGE 3B CHRONIC KIDNEY DISEASE: ICD-10-CM

## 2024-05-13 DIAGNOSIS — N18.9 ANEMIA OF RENAL DISEASE: ICD-10-CM

## 2024-05-13 DIAGNOSIS — J90 PLEURAL EFFUSION: ICD-10-CM

## 2024-05-13 DIAGNOSIS — J96.01 ACUTE RESPIRATORY FAILURE WITH HYPOXIA: Primary | ICD-10-CM

## 2024-05-13 DIAGNOSIS — R82.71 BACTERIURIA: ICD-10-CM

## 2024-05-13 DIAGNOSIS — D84.821 IMMUNODEFICIENCY DUE TO LONG TERM DRUG THERAPY: ICD-10-CM

## 2024-05-13 DIAGNOSIS — Z94.0 S/P KIDNEY TRANSPLANT: ICD-10-CM

## 2024-05-13 DIAGNOSIS — Z79.899 IMMUNODEFICIENCY DUE TO LONG TERM DRUG THERAPY: ICD-10-CM

## 2024-05-13 DIAGNOSIS — Z94.0 KIDNEY TRANSPLANT STATUS: ICD-10-CM

## 2024-05-13 LAB
BACTERIA #/AREA URNS AUTO: ABNORMAL /HPF
BILIRUB UR QL STRIP: NEGATIVE
CLARITY UR REFRACT.AUTO: CLEAR
COLOR UR AUTO: YELLOW
GLUCOSE UR QL STRIP: NEGATIVE
HGB UR QL STRIP: ABNORMAL
KETONES UR QL STRIP: NEGATIVE
LEUKOCYTE ESTERASE UR QL STRIP: ABNORMAL
MICROSCOPIC COMMENT: ABNORMAL
NITRITE UR QL STRIP: NEGATIVE
PH UR STRIP: 7 [PH] (ref 5–8)
PROT UR QL STRIP: NEGATIVE
RBC #/AREA URNS AUTO: 33 /HPF (ref 0–4)
SP GR UR STRIP: 1.01 (ref 1–1.03)
SQUAMOUS #/AREA URNS AUTO: 0 /HPF
URN SPEC COLLECT METH UR: ABNORMAL
WBC #/AREA URNS AUTO: 64 /HPF (ref 0–5)

## 2024-05-13 PROCEDURE — 25000003 PHARM REV CODE 250: Performed by: STUDENT IN AN ORGANIZED HEALTH CARE EDUCATION/TRAINING PROGRAM

## 2024-05-13 PROCEDURE — 87077 CULTURE AEROBIC IDENTIFY: CPT | Performed by: STUDENT IN AN ORGANIZED HEALTH CARE EDUCATION/TRAINING PROGRAM

## 2024-05-13 PROCEDURE — 87086 URINE CULTURE/COLONY COUNT: CPT | Performed by: STUDENT IN AN ORGANIZED HEALTH CARE EDUCATION/TRAINING PROGRAM

## 2024-05-13 PROCEDURE — 87088 URINE BACTERIA CULTURE: CPT | Performed by: STUDENT IN AN ORGANIZED HEALTH CARE EDUCATION/TRAINING PROGRAM

## 2024-05-13 PROCEDURE — 63600175 PHARM REV CODE 636 W HCPCS: Performed by: STUDENT IN AN ORGANIZED HEALTH CARE EDUCATION/TRAINING PROGRAM

## 2024-05-13 PROCEDURE — 20600001 HC STEP DOWN PRIVATE ROOM

## 2024-05-13 PROCEDURE — 87186 SC STD MICRODIL/AGAR DIL: CPT | Performed by: STUDENT IN AN ORGANIZED HEALTH CARE EDUCATION/TRAINING PROGRAM

## 2024-05-13 PROCEDURE — 81001 URINALYSIS AUTO W/SCOPE: CPT | Performed by: STUDENT IN AN ORGANIZED HEALTH CARE EDUCATION/TRAINING PROGRAM

## 2024-05-13 RX ORDER — PREDNISONE 5 MG/1
5 TABLET ORAL DAILY
Status: DISCONTINUED | OUTPATIENT
Start: 2024-05-14 | End: 2024-05-18 | Stop reason: HOSPADM

## 2024-05-13 RX ORDER — LEVOFLOXACIN 5 MG/ML
750 INJECTION, SOLUTION INTRAVENOUS
Status: COMPLETED | OUTPATIENT
Start: 2024-05-13 | End: 2024-05-17

## 2024-05-13 RX ORDER — SODIUM BICARBONATE 650 MG/1
1300 TABLET ORAL 3 TIMES DAILY
Status: DISCONTINUED | OUTPATIENT
Start: 2024-05-13 | End: 2024-05-18 | Stop reason: HOSPADM

## 2024-05-13 RX ORDER — HYDROXYZINE HYDROCHLORIDE 25 MG/1
25 TABLET, FILM COATED ORAL 3 TIMES DAILY PRN
Status: DISPENSED | OUTPATIENT
Start: 2024-05-13 | End: 2024-05-14

## 2024-05-13 RX ORDER — OXYCODONE AND ACETAMINOPHEN 5; 325 MG/1; MG/1
1 TABLET ORAL EVERY 4 HOURS PRN
Status: DISPENSED | OUTPATIENT
Start: 2024-05-13 | End: 2024-05-15

## 2024-05-13 RX ORDER — HEPARIN SODIUM 5000 [USP'U]/ML
5000 INJECTION, SOLUTION INTRAVENOUS; SUBCUTANEOUS EVERY 8 HOURS
Status: DISCONTINUED | OUTPATIENT
Start: 2024-05-13 | End: 2024-05-15

## 2024-05-13 RX ORDER — METOPROLOL TARTRATE 25 MG/1
12.5 TABLET ORAL 2 TIMES DAILY
Status: DISCONTINUED | OUTPATIENT
Start: 2024-05-13 | End: 2024-05-18 | Stop reason: HOSPADM

## 2024-05-13 RX ORDER — SODIUM CHLORIDE 0.9 % (FLUSH) 0.9 %
10 SYRINGE (ML) INJECTION EVERY 12 HOURS PRN
Status: DISCONTINUED | OUTPATIENT
Start: 2024-05-13 | End: 2024-05-18 | Stop reason: HOSPADM

## 2024-05-13 RX ORDER — GUAIFENESIN AND DEXTROMETHORPHAN HYDROBROMIDE 10; 100 MG/5ML; MG/5ML
5 SYRUP ORAL EVERY 4 HOURS PRN
Status: DISPENSED | OUTPATIENT
Start: 2024-05-13 | End: 2024-05-15

## 2024-05-13 RX ADMIN — GUAIFENESIN AND DEXTROMETHORPHAN 5 ML: 100; 10 SYRUP ORAL at 11:05

## 2024-05-13 RX ADMIN — HYDROXYZINE HYDROCHLORIDE 25 MG: 25 TABLET, FILM COATED ORAL at 10:05

## 2024-05-13 RX ADMIN — SODIUM BICARBONATE 650 MG TABLET 1300 MG: at 10:05

## 2024-05-13 RX ADMIN — OXYCODONE HYDROCHLORIDE AND ACETAMINOPHEN 1 TABLET: 5; 325 TABLET ORAL at 10:05

## 2024-05-13 RX ADMIN — HEPARIN SODIUM 5000 UNITS: 5000 INJECTION INTRAVENOUS; SUBCUTANEOUS at 10:05

## 2024-05-13 RX ADMIN — LEVOFLOXACIN 750 MG: 750 INJECTION, SOLUTION INTRAVENOUS at 10:05

## 2024-05-13 RX ADMIN — METOPROLOL TARTRATE 12.5 MG: 25 TABLET, FILM COATED ORAL at 10:05

## 2024-05-13 NOTE — PROVIDER TRANSFER
(Physician in Lead of Transfers)  Outside Transfer Acceptance Note / Regional Referral Center    Upon patient arrival to floor, please send SecureChat to [facility] or call extension [phone] (if no answer, do NOT leave a callback number after the beep, rather please send a SecureChat to [facility]), for Hospital Medicine admit team assignment and for additional admit orders for the patient.  Do not page the attending physician associated with the patient on arrival (this physician may not be on duty at the time of arrival).  Rather, always send a SecureChat to [facility] or call [phone] to reach the triage physician for orders and team assignment.    Referring facility: [facility]   Referring provider: ISAAC CARRILLO  Accepting facility: [facility]  Accepting provider: BENEDICT MONTEZ  Reason for transfer: Established Provider   Transfer diagnosis: pleural effusion, TRINO  Transfer specialty requested: Hospital Medicine  Transfer specialty notified: Yes  Transfer level: NUMBER 1-5: 2  Bed type requested: stepdown  Isolation status: No active isolations   Admission class or status: IP- Inpatient      Narrative     63-year-old male with a history of kidney transplant in May 2023, anemia, Crohn's disease, gout, hypertension, sarcoidosis,  shunt placement, Enterococcus faecalis bacteremia in March 2024, and recent admission to [facility] (April 23-26) with pneumonia and pleural effusion requiring thoracentesis.  Thoracentesis on April 25 had 465 nucleated cells (16% neutrophils, 57% lymphocytes, 27% monocytes), 401 RBCs, LDH 88, glucose 86, total protein 3.1, triglyceride 19, moderate white blood cells with no organisms seen.  Fluid culture had no growth.  Blood cultures had no growth at 5 days. He was treated with antibiotics and discharged on cefpodoxime to complete a 10 day course of antibiotics for pneumonia.    He subsequently presented to [facility]  on May 13 with dyspnea.  Shortness of breath progressed and he noted leg swelling, but dyspnea became worse early on the morning of May 13.  Chest x-ray showed a moderate-size right pleural effusion.  Etiology of the effusion is uncertain.  BNP was elevated, but recent echocardiogram was fairly unremarkable.  Patient has no significant cough and no fever.  He did not appear to be in respiratory distress in the emergency department.  ED will obtain blood cultures and give empiric antibiotics.  Will plan transfer to Hospital Medicine at [facility] in step-down status for further evaluation of recurrent pleural effusion in a patient with kidney transplant and recent pneumonia. KTM is aware on the transfer.    White blood cells 4.5, hemoglobin 12.2, hematocrit 36.6, platelets 194, BNP 1037, INR 1.1, sodium 140, potassium 3.8, chloride 107, CO2 22, BUN 17, creatinine 1.8 (1.4 on 5/6), glucose 98, calcium 9.3, albumin 3.3, AST 40, ALT 37, COVID negative, influenza negative    Chest x-ray said there appears to be mild pulmonary congestion.  Moderate size right pleural effusion.    March 19, 2024: Echocardiogram with EF 60-65%.  Normal diastolic function.    VS:  Temperature 98.1°, pulse 66, respirations 23, blood pressure 108/59, O2 sats 97%.      Instructions    Admit to Hospital Medicine      MICHAEL Sauer MD  Hospital Medicine Staff  Cell: [phone]

## 2024-05-14 PROBLEM — R82.90 ABNORMAL URINALYSIS: Status: ACTIVE | Noted: 2024-05-14

## 2024-05-14 PROBLEM — J96.01 ACUTE RESPIRATORY FAILURE WITH HYPOXIA: Status: ACTIVE | Noted: 2024-03-17

## 2024-05-14 LAB
ANION GAP SERPL CALC-SCNC: 12 MMOL/L (ref 8–16)
BASOPHILS # BLD AUTO: 0.05 K/UL (ref 0–0.2)
BASOPHILS NFR BLD: 0.7 % (ref 0–1.9)
BNP SERPL-MCNC: 1325 PG/ML (ref 0–99)
BUN SERPL-MCNC: 18 MG/DL (ref 8–23)
CALCIUM SERPL-MCNC: 8.9 MG/DL (ref 8.7–10.5)
CHLORIDE SERPL-SCNC: 106 MMOL/L (ref 95–110)
CO2 SERPL-SCNC: 21 MMOL/L (ref 23–29)
CREAT SERPL-MCNC: 1.7 MG/DL (ref 0.5–1.4)
DIFFERENTIAL METHOD BLD: ABNORMAL
EOSINOPHIL # BLD AUTO: 0 K/UL (ref 0–0.5)
EOSINOPHIL NFR BLD: 0.6 % (ref 0–8)
ERYTHROCYTE [DISTWIDTH] IN BLOOD BY AUTOMATED COUNT: 14.6 % (ref 11.5–14.5)
EST. GFR  (NO RACE VARIABLE): 45 ML/MIN/1.73 M^2
GLUCOSE SERPL-MCNC: 78 MG/DL (ref 70–110)
HCT VFR BLD AUTO: 40.7 % (ref 40–54)
HGB BLD-MCNC: 12.7 G/DL (ref 14–18)
IMM GRANULOCYTES # BLD AUTO: 0.04 K/UL (ref 0–0.04)
IMM GRANULOCYTES NFR BLD AUTO: 0.6 % (ref 0–0.5)
LYMPHOCYTES # BLD AUTO: 0.6 K/UL (ref 1–4.8)
LYMPHOCYTES NFR BLD: 9 % (ref 18–48)
MAGNESIUM SERPL-MCNC: 1.5 MG/DL (ref 1.6–2.6)
MCH RBC QN AUTO: 33.7 PG (ref 27–31)
MCHC RBC AUTO-ENTMCNC: 31.2 G/DL (ref 32–36)
MCV RBC AUTO: 108 FL (ref 82–98)
MONOCYTES # BLD AUTO: 0.9 K/UL (ref 0.3–1)
MONOCYTES NFR BLD: 12.2 % (ref 4–15)
NEUTROPHILS # BLD AUTO: 5.4 K/UL (ref 1.8–7.7)
NEUTROPHILS NFR BLD: 76.9 % (ref 38–73)
NRBC BLD-RTO: 0 /100 WBC
PHOSPHATE SERPL-MCNC: 2.7 MG/DL (ref 2.7–4.5)
PLATELET # BLD AUTO: 164 K/UL (ref 150–450)
PMV BLD AUTO: 11 FL (ref 9.2–12.9)
POTASSIUM SERPL-SCNC: 4.3 MMOL/L (ref 3.5–5.1)
PROCALCITONIN SERPL IA-MCNC: 0.12 NG/ML
RBC # BLD AUTO: 3.77 M/UL (ref 4.6–6.2)
SODIUM SERPL-SCNC: 139 MMOL/L (ref 136–145)
WBC # BLD AUTO: 6.98 K/UL (ref 3.9–12.7)

## 2024-05-14 PROCEDURE — 25000003 PHARM REV CODE 250: Performed by: STUDENT IN AN ORGANIZED HEALTH CARE EDUCATION/TRAINING PROGRAM

## 2024-05-14 PROCEDURE — 63600175 PHARM REV CODE 636 W HCPCS: Performed by: STUDENT IN AN ORGANIZED HEALTH CARE EDUCATION/TRAINING PROGRAM

## 2024-05-14 PROCEDURE — 99223 1ST HOSP IP/OBS HIGH 75: CPT | Mod: GC,,, | Performed by: INTERNAL MEDICINE

## 2024-05-14 PROCEDURE — 87040 BLOOD CULTURE FOR BACTERIA: CPT

## 2024-05-14 PROCEDURE — 85025 COMPLETE CBC W/AUTO DIFF WBC: CPT | Performed by: STUDENT IN AN ORGANIZED HEALTH CARE EDUCATION/TRAINING PROGRAM

## 2024-05-14 PROCEDURE — 36415 COLL VENOUS BLD VENIPUNCTURE: CPT | Performed by: STUDENT IN AN ORGANIZED HEALTH CARE EDUCATION/TRAINING PROGRAM

## 2024-05-14 PROCEDURE — 25000003 PHARM REV CODE 250: Performed by: PHYSICIAN ASSISTANT

## 2024-05-14 PROCEDURE — 83735 ASSAY OF MAGNESIUM: CPT | Performed by: STUDENT IN AN ORGANIZED HEALTH CARE EDUCATION/TRAINING PROGRAM

## 2024-05-14 PROCEDURE — 84100 ASSAY OF PHOSPHORUS: CPT | Performed by: STUDENT IN AN ORGANIZED HEALTH CARE EDUCATION/TRAINING PROGRAM

## 2024-05-14 PROCEDURE — 63600175 PHARM REV CODE 636 W HCPCS: Performed by: INTERNAL MEDICINE

## 2024-05-14 PROCEDURE — 80048 BASIC METABOLIC PNL TOTAL CA: CPT | Performed by: STUDENT IN AN ORGANIZED HEALTH CARE EDUCATION/TRAINING PROGRAM

## 2024-05-14 PROCEDURE — 20600001 HC STEP DOWN PRIVATE ROOM

## 2024-05-14 PROCEDURE — 83880 ASSAY OF NATRIURETIC PEPTIDE: CPT | Performed by: STUDENT IN AN ORGANIZED HEALTH CARE EDUCATION/TRAINING PROGRAM

## 2024-05-14 PROCEDURE — 84145 PROCALCITONIN (PCT): CPT | Performed by: STUDENT IN AN ORGANIZED HEALTH CARE EDUCATION/TRAINING PROGRAM

## 2024-05-14 PROCEDURE — 99223 1ST HOSP IP/OBS HIGH 75: CPT | Mod: ,,, | Performed by: INTERNAL MEDICINE

## 2024-05-14 RX ORDER — FUROSEMIDE 10 MG/ML
40 INJECTION INTRAMUSCULAR; INTRAVENOUS 2 TIMES DAILY
Status: DISCONTINUED | OUTPATIENT
Start: 2024-05-15 | End: 2024-05-16

## 2024-05-14 RX ORDER — TACROLIMUS 1 MG/1
2 CAPSULE ORAL 2 TIMES DAILY
Status: DISCONTINUED | OUTPATIENT
Start: 2024-05-14 | End: 2024-05-18 | Stop reason: HOSPADM

## 2024-05-14 RX ORDER — MAGNESIUM SULFATE HEPTAHYDRATE 40 MG/ML
2 INJECTION, SOLUTION INTRAVENOUS ONCE
Status: COMPLETED | OUTPATIENT
Start: 2024-05-14 | End: 2024-05-14

## 2024-05-14 RX ORDER — FUROSEMIDE 10 MG/ML
40 INJECTION INTRAMUSCULAR; INTRAVENOUS EVERY 12 HOURS
Status: DISCONTINUED | OUTPATIENT
Start: 2024-05-14 | End: 2024-05-14

## 2024-05-14 RX ORDER — LOPERAMIDE HYDROCHLORIDE 2 MG/1
2 CAPSULE ORAL 4 TIMES DAILY PRN
Status: DISCONTINUED | OUTPATIENT
Start: 2024-05-14 | End: 2024-05-18 | Stop reason: HOSPADM

## 2024-05-14 RX ADMIN — GUAIFENESIN AND DEXTROMETHORPHAN 5 ML: 100; 10 SYRUP ORAL at 11:05

## 2024-05-14 RX ADMIN — FUROSEMIDE 40 MG: 10 INJECTION, SOLUTION INTRAMUSCULAR; INTRAVENOUS at 04:05

## 2024-05-14 RX ADMIN — OXYCODONE HYDROCHLORIDE AND ACETAMINOPHEN 1 TABLET: 5; 325 TABLET ORAL at 08:05

## 2024-05-14 RX ADMIN — HYDROXYZINE HYDROCHLORIDE 25 MG: 25 TABLET, FILM COATED ORAL at 11:05

## 2024-05-14 RX ADMIN — PREDNISONE 5 MG: 5 TABLET ORAL at 08:05

## 2024-05-14 RX ADMIN — HEPARIN SODIUM 5000 UNITS: 5000 INJECTION INTRAVENOUS; SUBCUTANEOUS at 04:05

## 2024-05-14 RX ADMIN — LOPERAMIDE HYDROCHLORIDE 2 MG: 2 CAPSULE ORAL at 08:05

## 2024-05-14 RX ADMIN — HEPARIN SODIUM 5000 UNITS: 5000 INJECTION INTRAVENOUS; SUBCUTANEOUS at 02:05

## 2024-05-14 RX ADMIN — METOPROLOL TARTRATE 12.5 MG: 25 TABLET, FILM COATED ORAL at 08:05

## 2024-05-14 RX ADMIN — OXYCODONE HYDROCHLORIDE AND ACETAMINOPHEN 1 TABLET: 5; 325 TABLET ORAL at 11:05

## 2024-05-14 RX ADMIN — LOSARTAN POTASSIUM 25 MG: 25 TABLET, FILM COATED ORAL at 08:05

## 2024-05-14 RX ADMIN — GUAIFENESIN AND DEXTROMETHORPHAN 5 ML: 100; 10 SYRUP ORAL at 04:05

## 2024-05-14 RX ADMIN — GUAIFENESIN AND DEXTROMETHORPHAN 5 ML: 100; 10 SYRUP ORAL at 08:05

## 2024-05-14 RX ADMIN — MAGNESIUM SULFATE HEPTAHYDRATE 2 G: 40 INJECTION, SOLUTION INTRAVENOUS at 09:05

## 2024-05-14 RX ADMIN — SODIUM BICARBONATE 650 MG TABLET 1300 MG: at 02:05

## 2024-05-14 RX ADMIN — TACROLIMUS 2 MG: 1 CAPSULE ORAL at 05:05

## 2024-05-14 RX ADMIN — SODIUM BICARBONATE 650 MG TABLET 1300 MG: at 08:05

## 2024-05-14 RX ADMIN — FUROSEMIDE 40 MG: 10 INJECTION, SOLUTION INTRAMUSCULAR; INTRAVENOUS at 08:05

## 2024-05-14 RX ADMIN — HEPARIN SODIUM 5000 UNITS: 5000 INJECTION INTRAVENOUS; SUBCUTANEOUS at 10:05

## 2024-05-14 RX ADMIN — HYDROXYZINE HYDROCHLORIDE 25 MG: 25 TABLET, FILM COATED ORAL at 08:05

## 2024-05-14 NOTE — CONSULTS
[facility]   Infectious Diseases  Consult Note     Patient Name: Abdoulaye Parikh   MRN:  [id number]   Admission Date:  5/13/2024  Length of Stay:  1  Attending Physician:  Olya Moreno MD   Primary Care Provider:  No, Primary Doctor       Isolation Status: No active isolations    Assessment/Plan:      Pulmonary  Pleural effusion  Abdoulaye Parikh is a 63-year-old male with h/o kidney transplant (May 2023), Crohn's disease, gout, hypertension, sarcoidosis,  shunt placement, Enterococcus faecalis bacteremia (3/2024, suspected source urine) admitted on 5/13/2024 with acute respiratory failure 2/2 chronic right pleural effusion (seen on 2/2024 CT). Recently admitted to [facility] (4/23-26) for same, at time pleural fluid studies transudative and cultures NG. Afebrile and HDS since admission. Has been satting 81-95% on 1.5-2L NC. WBC 6.98 and BNP 1325.     RECOMMENDATIONS:   - Overall low concern for infectious etiology as previous pleural studies transudative, with evidence of volume overload this admission and lack of concerning infectious sx. OK to continue levaquin for now pending pulmonary evaluation, suspect can discontinue soon.  - Of note, discussed with patient history of reported penicillin allergy. He says he is not actually sure where it came from or if it is a true allergy, no history of anaphylaxis or need for Epi pen. Has taken Amoxicillin for dental procedure since allergy listing and also tolerated CTX and Cefpodoxime recently. Will note in chart.  - Consider US DVT of LEs  - Follow up pulmonary evaluation and studies       Anticipated Disposition: TBD     Thank you for your consult. I will follow-up with patient. Please contact us if you have any additional questions.     Carol Robles MD  Infectious Diseases    Subjective:      Principal Problem: Pleural effusion     HPI: Abdoulaye Parikh is a 63-year-old male with h/o kidney transplant (May 2023), Crohn's  disease, gout, hypertension, sarcoidosis,  shunt placement, Enterococcus faecalis bacteremia (3/2024, suspected source urine) transferred from [facility] on 5/13/2024 with shortness of breath x1 day. Associated with dry cough. Recently admitted to [facility] (4/23-26) for respiratory  failure, diagnosed ultimately with pneumonia and large right pleural effusion. Thoracentesis on 04/25 with 1050 cc of serous fluid revmoed, 465 WBC (16% neutrophils, 57% lymphocytes, 27% monocytes), 401 RBCs, LDH 88, glucose 86, total protein 3.1, triglyceride 19 and cultures without growth. He was given CTX + azithromycin during admission and discharged on cefpodoxime to complete a 10 day course of antibiotics for pneumonia.     On review of imaging in our system, pleural effusion has been noted since at least 2/2024 CT.     Afebrile and HDS since admission. Has been satting 81-95% on 1.5-2L NC. WBC 6.98 and BNP 1325.     Discussed with patient history of reported penicillin allergy. He says he is not actually sure where it came from or if it is a true allergy. He denies history of anaphylaxis or need for Epi pen. He has taken Amoxicillin for dental procedure since allergy listing and also tolerated CTX and Cefpodoxime recently.     Review of Systems   Review of Systems   Constitutional:  Negative for chills, diaphoresis, fever, malaise/fatigue and weight loss.   Respiratory:  Negative for cough, hemoptysis, sputum production, shortness of breath and wheezing.    Cardiovascular:  Negative for chest pain, palpitations, orthopnea and leg swelling.   Gastrointestinal:  Negative for abdominal pain, blood in stool, constipation, diarrhea, heartburn, melena, nausea and vomiting.   Genitourinary:  Negative for dysuria, frequency, hematuria and urgency.   Musculoskeletal:  Negative for joint pain, myalgias and neck pain.   All other systems reviewed and are negative.      Objective:      Last 24 Hour Vital Signs:  BP  Min: 108/59  Max: 151/79  Temp   "Av.8 °F (36.6 °C)  Min: 97.6 °F (36.4 °C)  Max: 98.2 °F (36.8 °C)  Pulse  Av.8  Min: 60  Max: 83  Resp  Av  Min: 16  Max: 26  SpO2  Av.6 %  Min: 81 %  Max: 97 %  Height  Av' 9" (175.3 cm)  Min: 5' 9" (175.3 cm)  Max: 5' 9" (175.3 cm)  Weight  Av.4 kg (181 lb 8.8 oz)  Min: 82.1 kg (181 lb)  Max: 82.6 kg (182 lb 1.6 oz)  I/O last 3 completed shifts:  In: -   Out: 100 [Urine:100]    Body mass index is 26.89 kg/m².  Estimated Creatinine Clearance: 45.1 mL/min (A) (based on SCr of 1.7 mg/dL (H)).        Physical Exam  Physical Exam  Vitals reviewed.   Constitutional:       General: He is not in acute distress.     Appearance: Normal appearance. He is not ill-appearing.   HENT:      Head: Normocephalic and atraumatic.      Nose: Nose normal. No congestion.      Mouth/Throat:      Mouth: Mucous membranes are moist.      Pharynx: Oropharynx is clear. No oropharyngeal exudate or posterior oropharyngeal erythema.   Eyes:      General: No scleral icterus.     Extraocular Movements: Extraocular movements intact.      Conjunctiva/sclera: Conjunctivae normal.      Pupils: Pupils are equal, round, and reactive to light.   Cardiovascular:      Rate and Rhythm: Normal rate and regular rhythm.      Pulses: Normal pulses.      Heart sounds: No murmur heard.  Pulmonary:      Effort: Pulmonary effort is normal. No respiratory distress.      Breath sounds: No wheezing or rales.      Comments: Decreased breath sounds  Abdominal:      General: Abdomen is flat. Bowel sounds are normal.      Palpations: Abdomen is soft.   Musculoskeletal:         General: No swelling, tenderness, deformity or signs of injury. Normal range of motion.      Cervical back: Normal range of motion and neck supple. No rigidity.   Skin:     General: Skin is warm and dry.      Capillary Refill: Capillary refill takes less than 2 seconds.      Coloration: Skin is not jaundiced.      Findings: No bruising or lesion.   Neurological:      " General: No focal deficit present.      Mental Status: He is alert.              Labs  Recent Labs   Lab 05/14/24  0650   WBC 6.98   HGB 12.7*   HCT 40.7      *   RDW 14.6*      K 4.3      CO2 21*   BUN 18   CREATININE 1.7*   GLU 78         Microbiology:  Microbiology Results (last 7 days)       Procedure Component Value Units Date/Time    Blood culture [[id number]]     Order Status: Sent Specimen: Blood     Blood culture [[id number]]     Order Status: Sent Specimen: Blood     Urine culture [[id number]] Collected: 05/13/24 2251    Order Status: No result Specimen: Urine Updated: 05/13/24 2639            Significant Imaging: I have reviewed all pertinent imaging results/findings within the past 24 hours.

## 2024-05-14 NOTE — PLAN OF CARE
[facility] Transplant Stepdown  Initial Discharge Assessment       Primary Care Provider: Gosia, Primary Doctor    Admission Diagnosis: Pleural effusion [J90]    Admission Date: 5/13/2024  Expected Discharge Date:     Transition of Care Barriers: None    Payor: MEDICARE / Plan: MEDICARE PART A & B / Product Type: Government /     Extended Emergency Contact Information  Primary Emergency Contact: Kalani Parikh  Address: [address] [place] [address] [facility]  Home Phone: [phone]  Mobile Phone: [phone]  Relation: Spouse  Secondary Emergency Contact: Dianna Harrell  Address: [address] [place] [address] [place]  Mobile Phone: [phone]  Relation: Relative    Discharge Plan A: Home, Home with family  Discharge Plan B: Home Health      [facility]  [address]  [facility] [address]  Phone: [phone] Fax: [phone]      Initial Assessment (most recent)       Adult Discharge Assessment - 05/14/24 1110          Discharge Assessment    Assessment Type Discharge Planning Assessment     Confirmed/corrected address, phone number and insurance Yes     Confirmed Demographics Correct on Facesheet     Source of Information patient     Communicated SAVITA with patient/caregiver Date not available/Unable to determine     People in Home spouse     Do you expect to return to your current living situation? Yes     Do you have help at home or someone to help you manage your care at home? Yes     Who are your caregiver(s) and their phone number(s)? Kalani Parikh     Prior to hospitilization cognitive status: Alert/Oriented     Current cognitive status: Alert/Oriented     Walking or Climbing Stairs Difficulty no     Dressing/Bathing Difficulty no     Equipment Currently Used at Home none     Patient currently being followed by outpatient case management? No     Do you currently have service(s) that help you manage your care  at home? No     Do you take prescription medications? Yes     Do you have prescription coverage? Yes     Coverage MEDICARE - MEDICARE PART A & B -     Is the patient taking medications as prescribed? no     Who is going to help you get home at discharge? Eliu Kevin     Are you on dialysis? No     Do you take coumadin? No     Discharge Plan A Home;Home with family     Discharge Plan B Home Health     DME Needed Upon Discharge  none     Discharge Plan discussed with: Patient     Transition of Care Barriers None        Physical Activity    On average, how many days per week do you engage in moderate to strenuous exercise (like a brisk walk)? Patient declined     On average, how many minutes do you engage in exercise at this level? Patient declined        Financial Resource Strain    How hard is it for you to pay for the very basics like food, housing, medical care, and heating? Patient declined        Housing Stability    In the last 12 months, was there a time when you were not able to pay the mortgage or rent on time? Patient declined     At any time in the past 12 months, were you homeless or living in a shelter (including now)? Patient declined        Transportation Needs    Has the lack of transportation kept you from medical appointments, meetings, work or from getting things needed for daily living? Patient declined        Food Insecurity    Within the past 12 months, you worried that your food would run out before you got the money to buy more. Patient declined     Within the past 12 months, the food you bought just didn't last and you didn't have money to get more. Patient declined        Stress    Do you feel stress - tense, restless, nervous, or anxious, or unable to sleep at night because your mind is troubled all the time - these days? Patient declined        Social Isolation    How often do you feel lonely or isolated from those around you?  Patient declined        Alcohol Use    Q1: How often do you  have a drink containing alcohol? Patient declined     Q2: How many drinks containing alcohol do you have on a typical day when you are drinking? Patient declined        Utilities    In the past 12 months has the electric, gas, oil, or water company threatened to shut off services in your home? Patient declined        Health Literacy    How often do you need to have someone help you when you read instructions, pamphlets, or other written material from your doctor or pharmacy? Patient declines to respond                   The SW met with the patient at bedside and completed his DPA. The patient reported that he does not use any DME at this time. The patient is not on Dialysis or Coumadin. The patient will have assistance/ help from his wife upon discharge.  The patient's Christian member will be transporting the patient home upon discharge.    Discharge Plan A and Plan B have been determined by review of patient's clinical status, future medical and therapeutic needs, and coverage/benefits for post-acute care in coordination with multidisciplinary team members.    Concepción Rosas LMSW  Case Management [facility]

## 2024-05-14 NOTE — ASSESSMENT & PLAN NOTE
Chronic, controlled. Latest blood pressure and vitals reviewed-     Temp:  [97.4 °F (36.3 °C)-98 °F (36.7 °C)]   Pulse:  [60-83]   Resp:  [16-26]   BP: (115-157)/(70-91)   SpO2:  [81 %-95 %] .   Home meds for hypertension were reviewed and noted below.   Hypertension Medications               furosemide (LASIX) 20 MG tablet Take 1 tablet (20 mg total) by mouth daily as needed.    metoprolol tartrate (LOPRESSOR) 25 MG tablet Take HALF tablet (12.5 mg total) by mouth 2 (two) times daily.    olmesartan (BENICAR) 40 MG tablet Take 1 tablet (40 mg total) by mouth once daily. HOLD SBP <120            While in the hospital, will manage blood pressure as follows; Adjust home antihypertensive regimen as follows- hold ARB     Will utilize p.r.n. blood pressure medication only if patient's blood pressure greater than 180/110 and he develops symptoms such as worsening chest pain or shortness of breath.

## 2024-05-14 NOTE — ASSESSMENT & PLAN NOTE
- s/p kidney transplant (May 2023)  -tacrolimus at home, hold during this hospitalization when he is on antibiotics  - transplant team has been consulted

## 2024-05-14 NOTE — NURSING
@Nurses Note -- 4 Eyes      5/14/2024   3:16 AM      Skin assessed during: Admit      [x] No Altered Skin Integrity Present    []Prevention Measures Documented      [] Yes- Altered Skin Integrity Present or Discovered   [] LDA Added if Not in Epic (Describe Wound)   [] New Altered Skin Integrity was Present on Admit and Documented in LDA   [] Wound Image Taken    Wound Care Consulted? No    Attending Nurse:  Bebe Phillips RN/Staff Member:   Sachi

## 2024-05-14 NOTE — SUBJECTIVE & OBJECTIVE
Interval History: Patient reports shortness of breath, stable on 2L. Reports dry cough, resp culture ordered but no sputum. Afebrile    Review of Systems   Constitutional:  Negative for fever.   Respiratory:  Positive for cough (dry) and shortness of breath.    Cardiovascular:  Positive for leg swelling.   Genitourinary:  Negative for dysuria.     Objective:     Vital Signs (Most Recent):  Temp: 97.4 °F (36.3 °C) (05/14/24 1538)  Pulse: 71 (05/14/24 1538)  Resp: 20 (05/14/24 1538)  BP: (!) 157/80 (05/14/24 1538)  SpO2: (!) 90 % (05/14/24 1538) Vital Signs (24h Range):  Temp:  [97.4 °F (36.3 °C)-98 °F (36.7 °C)] 97.4 °F (36.3 °C)  Pulse:  [60-83] 71  Resp:  [16-26] 20  SpO2:  [81 %-95 %] 90 %  BP: (115-157)/(70-91) 157/80     Weight: 82.6 kg (182 lb 1.6 oz)  Body mass index is 26.89 kg/m².    Intake/Output Summary (Last 24 hours) at 5/14/2024 1549  Last data filed at 5/14/2024 1200  Gross per 24 hour   Intake 950 ml   Output 100 ml   Net 850 ml      Physical Exam  Vitals and nursing note reviewed.   Constitutional:       General: He is not in acute distress.     Appearance: Normal appearance.   HENT:      Head: Normocephalic and atraumatic.      Nose: Nose normal.      Mouth/Throat:      Mouth: Mucous membranes are moist.      Pharynx: Oropharynx is clear.   Eyes:      Extraocular Movements: Extraocular movements intact.      Conjunctiva/sclera: Conjunctivae normal.      Pupils: Pupils are equal, round, and reactive to light.   Cardiovascular:      Rate and Rhythm: Normal rate and regular rhythm.      Pulses: Normal pulses.      Heart sounds: Murmur heard.   Pulmonary:      Effort: Pulmonary effort is normal.      Comments: Decreased breath sounds R>L and crackles at bases  Abdominal:      General: Abdomen is flat. Bowel sounds are normal.      Palpations: Abdomen is soft.   Musculoskeletal:         General: Normal range of motion.      Cervical back: Normal range of motion and neck supple.      Right lower leg:  Edema present.      Left lower leg: Edema present.      Comments: Rt leg edema >L   Skin:     General: Skin is warm and dry.   Neurological:      General: No focal deficit present.      Mental Status: He is alert and oriented to person, place, and time.   Psychiatric:         Mood and Affect: Mood normal.         Behavior: Behavior normal.         Significant Labs:  CBC:  Recent Labs   Lab 05/14/24  0650   WBC 6.98   HGB 12.7*   HCT 40.7        CMP:  Recent Labs   Lab 05/14/24  0650      K 4.3      CO2 21*   GLU 78   BUN 18   CREATININE 1.7*   CALCIUM 8.9   ANIONGAP 12     PTINR:  Recent Labs   Lab 05/13/24  1156   INR 1.1

## 2024-05-14 NOTE — ASSESSMENT & PLAN NOTE
- continue home medicine sodium bicarbonate  - Monitor UOP and serial BMP and adjust therapy as needed. Renally dose meds. Avoid nephrotoxic medications and procedures.  - transplant team has been consulted

## 2024-05-14 NOTE — SUBJECTIVE & OBJECTIVE
Past Medical History:   Diagnosis Date    Acute blood loss anemia 05/11/2023    Anemia     Arthritis     Cirrhosis     CKD (chronic kidney disease) stage 4, GFR 15-29 ml/min, slow graft function with sustaine creatinien improvement 05/26/2023    Crohn's disease     Disorder of kidney and ureter     Stage 5    Encounter for blood transfusion     Gout     Hearing loss     Hepatitis     Had Hep C Cleared    Hypertension     Immunosuppressive management encounter following kidney transplant 05/26/2023    Neurosarcoidosis 2018    Obesity     Osteoarthritis     S/P kidney transplant 05/11/2023    Sarcoid neuropathy     Sarcoidosis, lung     Steatosis        Past Surgical History:   Procedure Laterality Date    AV FISTULA PLACEMENT Left     BRAIN SURGERY      COLON SURGERY      Exploratory lap x 4 for Chron's disease. Likely right colectomy    CSF SHUNT      DIAGNOSTIC ULTRASOUND N/A 5/15/2023    Procedure: ULTRASOUND, DIAGNOSTIC;  Surgeon: Chivo Brown MD;  Location: [facility] OR [address] [place] [address];  Service: Transplant;  Laterality: N/A;    JOINT REPLACEMENT Right     Hip    KIDNEY TRANSPLANT N/A 5/10/2023    Procedure: TRANSPLANT, KIDNEY - RQ 7PM START;  Surgeon: Chivo Brown MD;  Location: [address];  Service: Transplant;  Laterality: N/A;    NEPHROSTOMY N/A 6/3/2023    Procedure: Nephrostomy;  Surgeon: Silvia Surgeon;  Location: [facility];  Service: Anesthesiology;  Laterality: N/A;    RENAL BIOPSY  5/15/2023    Procedure: BIOPSY, KIDNEY;  Surgeon: Chivo Brown MD;  Location: [address];  Service: Transplant;;    RENAL EXPLORATION  5/15/2023    Procedure: EXPLORATION, KIDNEY;  Surgeon: Chivo Brown MD;  Location: [address];  Service: Transplant;;    TOTAL HIP ARTHROPLASTY Right        Review of patient's allergies indicates:   Allergen Reactions    Bactrim [sulfamethoxazole-trimethoprim] Shortness Of Breath and Itching    Penicillins Shortness Of Breath and Itching       Current  Facility-Administered Medications on File Prior to Encounter   Medication    [DISCONTINUED] GENERIC EXTERNAL MEDICATION    [DISCONTINUED] GENERIC EXTERNAL MEDICATION     Current Outpatient Medications on File Prior to Encounter   Medication Sig    benzonatate (TESSALON) 200 MG capsule TAKE 1 CAPSULE BY MOUTH THREE TIMES DAILY FOR 10 DAYS AS NEEDED FOR COUGH    furosemide (LASIX) 20 MG tablet Take 1 tablet (20 mg total) by mouth daily as needed.    loperamide HCl (IMODIUM A-D ORAL) Take by mouth.    magnesium oxide (MAG-OX) 400 mg (241.3 mg magnesium) tablet Take 1 tablet (400 mg total) by mouth 2 (two) times daily.    metoprolol tartrate (LOPRESSOR) 25 MG tablet Take HALF tablet (12.5 mg total) by mouth 2 (two) times daily.    olmesartan (BENICAR) 40 MG tablet Take 1 tablet (40 mg total) by mouth once daily. HOLD SBP <120    predniSONE (DELTASONE) 5 MG tablet Take 1 tablet (5 mg total) by mouth once daily.    sodium bicarbonate 650 MG tablet Take 2 tablets (1,300 mg total) by mouth 3 (three) times daily.    tacrolimus (PROGRAF) 1 MG Cap Take 2 capsules (2 mg total) by mouth every morning AND 1 capsule (1 mg total) every evening. Txp Date:5/10/2023 (Kidney) Disch Date:5/17/2023 ICD10:Z94.0 Txp Location:[facility].    tamsulosin (FLOMAX) 0.4 mg Cap Take 1 capsule (0.4 mg total) by mouth once daily.     Family History       Problem Relation (Age of Onset)    COPD Sister    Cancer Father, Maternal Grandfather, Paternal Grandfather    Diabetes Father    Heart disease Father, Sister    Hypertension Mother, Father    Lung cancer Maternal Grandfather, Paternal Grandfather    Multiple myeloma Father    Stroke Father          Tobacco Use    Smoking status: Never    Smokeless tobacco: Never   Substance and Sexual Activity    Alcohol use: Not Currently    Drug use: Never    Sexual activity: Yes     Partners: Female     Review of Systems   Constitutional:  Negative for chills, diaphoresis, fatigue and fever.   HENT:  Negative for  congestion, ear pain, sore throat, tinnitus and trouble swallowing.    Eyes:  Negative for photophobia, discharge and visual disturbance.   Respiratory:  Positive for cough and shortness of breath. Negative for apnea, choking and chest tightness.    Cardiovascular:  Negative for chest pain, palpitations and leg swelling.   Gastrointestinal:  Negative for abdominal distention, abdominal pain, constipation, diarrhea, nausea and vomiting.   Endocrine: Negative for polydipsia, polyphagia and polyuria.   Genitourinary:  Negative for difficulty urinating and dysuria.   Musculoskeletal:  Negative for arthralgias, back pain and gait problem.   Skin:  Negative for color change and rash.   Neurological:  Positive for headaches. Negative for dizziness, syncope, speech difficulty, weakness and light-headedness.   Psychiatric/Behavioral:  Negative for agitation, confusion, dysphoric mood, hallucinations and sleep disturbance. The patient is not nervous/anxious.      Objective:     Vital Signs (Most Recent):  Temp: 97.6 °F (36.4 °C) (05/13/24 1934)  Pulse: 69 (05/13/24 1934)  Resp: 16 (05/13/24 1934)  BP: 115/70 (05/13/24 1934)  SpO2: 95 % (05/13/24 1934) Vital Signs (24h Range):  Temp:  [97.6 °F (36.4 °C)-98.2 °F (36.8 °C)] 97.6 °F (36.4 °C)  Pulse:  [66-69] 69  Resp:  [16-23] 16  SpO2:  [95 %-97 %] 95 %  BP: (108-115)/(59-70) 115/70     Weight: 82.6 kg (182 lb 1.6 oz)  Body mass index is 26.89 kg/m².     Physical Exam  Constitutional:       General: He is not in acute distress.     Appearance: Normal appearance. He is normal weight.   HENT:      Head: Normocephalic and atraumatic.      Nose: No congestion.      Mouth/Throat:      Mouth: Mucous membranes are moist.   Eyes:      Extraocular Movements: Extraocular movements intact.      Conjunctiva/sclera: Conjunctivae normal.      Pupils: Pupils are equal, round, and reactive to light.   Neck:      Vascular: No carotid bruit.   Cardiovascular:      Rate and Rhythm: Normal rate  and regular rhythm.      Pulses: Normal pulses.      Heart sounds: Normal heart sounds. No murmur heard.     No gallop.   Pulmonary:      Breath sounds: Decreased air movement present. Examination of the right-lower field reveals decreased breath sounds. Decreased breath sounds present.   Chest:      Chest wall: No tenderness.   Abdominal:      General: Abdomen is flat. Bowel sounds are normal. There is no distension.      Tenderness: There is no abdominal tenderness.   Musculoskeletal:         General: No swelling.      Cervical back: No rigidity.   Skin:     Capillary Refill: Capillary refill takes less than 2 seconds.   Neurological:      General: No focal deficit present.      Mental Status: He is alert and oriented to person, place, and time. Mental status is at baseline.      Cranial Nerves: No cranial nerve deficit.      Motor: No weakness.   Psychiatric:         Mood and Affect: Mood normal.         Behavior: Behavior normal.              CRANIAL NERVES     CN III, IV, VI   Pupils are equal, round, and reactive to light.       Significant Labs: All pertinent labs within the past 24 hours have been reviewed.    Significant Imaging: I have reviewed all pertinent imaging results/findings within the past 24 hours.

## 2024-05-14 NOTE — SUBJECTIVE & OBJECTIVE
Past Medical History:   Diagnosis Date    Acute blood loss anemia 05/11/2023    Anemia     Arthritis     Cirrhosis     CKD (chronic kidney disease) stage 4, GFR 15-29 ml/min, slow graft function with sustaine creatinien improvement 05/26/2023    Crohn's disease     Disorder of kidney and ureter     Stage 5    Encounter for blood transfusion     Gout     Hearing loss     Hepatitis     Had Hep C Cleared    Hypertension     Immunosuppressive management encounter following kidney transplant 05/26/2023    Neurosarcoidosis 2018    Obesity     Osteoarthritis     S/P kidney transplant 05/11/2023    Sarcoid neuropathy     Sarcoidosis, lung     Steatosis        Past Surgical History:   Procedure Laterality Date    AV FISTULA PLACEMENT Left     BRAIN SURGERY      COLON SURGERY      Exploratory lap x 4 for Chron's disease. Likely right colectomy    CSF SHUNT      DIAGNOSTIC ULTRASOUND N/A 5/15/2023    Procedure: ULTRASOUND, DIAGNOSTIC;  Surgeon: Chivo Brown MD;  Location: [facility] OR [address] [place] [address];  Service: Transplant;  Laterality: N/A;    JOINT REPLACEMENT Right     Hip    KIDNEY TRANSPLANT N/A 5/10/2023    Procedure: TRANSPLANT, KIDNEY - RQ 7PM START;  Surgeon: Chivo Brown MD;  Location: [address];  Service: Transplant;  Laterality: N/A;    NEPHROSTOMY N/A 6/3/2023    Procedure: Nephrostomy;  Surgeon: Silvia Surgeon;  Location: [facility];  Service: Anesthesiology;  Laterality: N/A;    RENAL BIOPSY  5/15/2023    Procedure: BIOPSY, KIDNEY;  Surgeon: Chivo Brown MD;  Location: [address];  Service: Transplant;;    RENAL EXPLORATION  5/15/2023    Procedure: EXPLORATION, KIDNEY;  Surgeon: Chivo Brown MD;  Location: [address];  Service: Transplant;;    TOTAL HIP ARTHROPLASTY Right        Review of patient's allergies indicates:   Allergen Reactions    Bactrim [sulfamethoxazole-trimethoprim] Shortness Of Breath and Itching    Penicillins Shortness Of Breath and Itching       Family  History       Problem Relation (Age of Onset)    COPD Sister    Cancer Father, Maternal Grandfather, Paternal Grandfather    Diabetes Father    Heart disease Father, Sister    Hypertension Mother, Father    Lung cancer Maternal Grandfather, Paternal Grandfather    Multiple myeloma Father    Stroke Father          Tobacco Use    Smoking status: Never    Smokeless tobacco: Never   Substance and Sexual Activity    Alcohol use: Not Currently    Drug use: Never    Sexual activity: Yes     Partners: Female         Review of Systems   Constitutional:  Negative for chills, diaphoresis, fatigue and fever.   HENT:  Negative for congestion, ear pain, sore throat, tinnitus and trouble swallowing.    Eyes:  Negative for photophobia, discharge and visual disturbance.   Respiratory:  Positive for cough and shortness of breath. Negative for apnea, choking and chest tightness.    Cardiovascular:  Negative for chest pain, palpitations and leg swelling.   Gastrointestinal:  Negative for abdominal distention, abdominal pain, constipation, diarrhea, nausea and vomiting.   Endocrine: Negative for polydipsia, polyphagia and polyuria.   Genitourinary:  Negative for difficulty urinating and dysuria.   Musculoskeletal:  Negative for arthralgias, back pain and gait problem.   Skin:  Negative for color change and rash.   Neurological:  Negative for dizziness, syncope, speech difficulty, weakness, light-headedness and headaches.   Psychiatric/Behavioral:  Negative for agitation, confusion, dysphoric mood, hallucinations and sleep disturbance. The patient is not nervous/anxious.      Objective:     Vital Signs (Most Recent):  Temp: 98 °F (36.7 °C) (05/14/24 0730)  Pulse: 77 (05/14/24 0800)  Resp: (!) 22 (05/14/24 0730)  BP: (!) 146/81 (05/14/24 0730)  SpO2: (!) 89 % (05/14/24 0800) Vital Signs (24h Range):  Temp:  [97.6 °F (36.4 °C)-98.2 °F (36.8 °C)] 98 °F (36.7 °C)  Pulse:  [60-83] 77  Resp:  [16-26] 22  SpO2:  [81 %-97 %] 89 %  BP:  (108-151)/(59-81) 146/81     Weight: 82.6 kg (182 lb 1.6 oz)  Body mass index is 26.89 kg/m².      Intake/Output Summary (Last 24 hours) at 5/14/2024 1041  Last data filed at 5/14/2024 0943  Gross per 24 hour   Intake 410 ml   Output 100 ml   Net 310 ml        Physical Exam  Constitutional:       General: He is not in acute distress.     Appearance: Normal appearance. He is normal weight.      Interventions: Nasal cannula in place.   HENT:      Head: Normocephalic and atraumatic.      Nose: No congestion.      Mouth/Throat:      Mouth: Mucous membranes are moist.   Eyes:      Extraocular Movements: Extraocular movements intact.      Conjunctiva/sclera: Conjunctivae normal.      Pupils: Pupils are equal, round, and reactive to light.   Neck:      Vascular: No carotid bruit.   Cardiovascular:      Rate and Rhythm: Normal rate and regular rhythm.      Pulses: Normal pulses.      Heart sounds: Normal heart sounds. No murmur heard.     No gallop.   Pulmonary:      Effort: No respiratory distress.      Breath sounds: Decreased air movement present. Examination of the right-lower field reveals decreased breath sounds. Decreased breath sounds present. No rales.   Chest:      Chest wall: No tenderness.   Abdominal:      General: Abdomen is flat. Bowel sounds are normal. There is no distension.      Tenderness: There is no abdominal tenderness.   Musculoskeletal:         General: No swelling.      Cervical back: No rigidity.   Skin:     Capillary Refill: Capillary refill takes less than 2 seconds.   Neurological:      General: No focal deficit present.      Mental Status: He is alert and oriented to person, place, and time. Mental status is at baseline.      Cranial Nerves: No cranial nerve deficit.      Motor: No weakness.   Psychiatric:         Mood and Affect: Mood normal.         Behavior: Behavior normal.          Vents:       Lines/Drains/Airways       Central Venous Catheter Line  Duration                  Hemodialysis  AV Graft Left upper arm -- days              Peripheral Intravenous Line  Duration                  Peripheral IV - Single Lumen 05/13/24 20 G Right Antecubital 1 day                    Significant Labs:    CBC/Anemia Profile:  Recent Labs   Lab 05/14/24  0650   WBC 6.98   HGB 12.7*   HCT 40.7      *   RDW 14.6*        Chemistries:  Recent Labs   Lab 05/14/24  0650      K 4.3      CO2 21*   BUN 18   CREATININE 1.7*   CALCIUM 8.9   MG 1.5*   PHOS 2.7       All pertinent labs within the past 24 hours have been reviewed.    Significant Imaging:   I have reviewed all pertinent imaging results/findings within the past 24 hours.

## 2024-05-14 NOTE — ASSESSMENT & PLAN NOTE
- continue home medicine prednisolone but hold tacrolimus as patient is receiving antibiotics  - kidney transplant team has been consulted

## 2024-05-14 NOTE — HPI
Mr. Parikh is a 63 y.o. year old Black male who received a living kidney transplant on 5/10/23. His most recent creatinine is 1.2-1.7. He takes prednisone, and tacrolimus for maintenance immunosuppression. His post transplant course has been complicated by urine leak. Had nephrostomy tube was placed and removed 12/31/23. 5/15/23-Kidney Bx: Good sample, No IFTA no glomerulosclerosis ,ATI no rejection C4d negative     Pertinent history   Crohn's  Sarcoidosis  Hearing loss   shunt placement x3  Enterococcus faecalis bacteremia ( March 2024)  Pneumonia April 2024     was transferred from [facility] on 5/13/2024 with recurrent pleural effusion. He was hospitalized in April with pneumonia and a thoracentesis was performed yielding 1050 cc transudate effusion. He discharged home on oral antibiotics. Mycophenolate was stopped. At [facility], a CXR showed recurrence of moderate R sided pleural effusion and BNP 1037, so patient was transferred to [facility] for further workup.    Previous Echos have not shown HFrEF/HFpEF. CXR on arrival to [facility] showed pulmonary congestion with R pleural effusion. Of note, the patient has had multiple  shunt replacements (secondary to infection) and his  shunt terminates into the pleural space. His Cr 1.7 which is high baseline. Last kidney US in March showed nonobstructing renal stones.

## 2024-05-14 NOTE — HPI
63-year-old male with a h/o kidney transplant (May 2023), Anemia of Chronic Disease, Crohn's disease, gout, hypertension, sarcoidosis,  shunt placement, Enterococcus faecalis bacteremia in March 2024 is transferred from [facility].  He reports shortness of breath started today early morning 1 with rest associated with cough without any expectoration.  He lives at home and he completed the cefpodoxime course of antibiotics was given during the recent discharge.  He denies any fever, chest pain, palpitations, lightheadedness, abdominal pain, disturbance with bowel or bladder habits.    He was admitted at [facility] (April 23-26) treated for pneumonia and pleural effusion. Thoracentesis on 04/25 yielding 1050 cc of serous fluid and had 465 nucleated cells (16% neutrophils, 57% lymphocytes, 27% monocytes), 401 RBCs, LDH 88, glucose 86, total protein 3.1, triglyceride 19, moderate white blood cells with no organisms seen. Fluid culture had no growth. Blood cultures had no growth at 5 days. He was treated with antibiotics azithromycin & ceftriaxone during the hospitalization and discharged on cefpodoxime to complete a 10 day course of antibiotics for pneumonia.     Today at [facility] Chest x-ray showed a moderate-size right pleural effusion. White blood cells 4.5, hemoglobin 12.2, hematocrit 36.6, platelets 194, BNP 1037, INR 1.1, sodium 140, potassium 3.8, chloride 107, CO2 22, BUN 17, creatinine 1.8 (1.4 on 5/6), glucose 98, calcium 9.3, albumin 3.3, AST 40, ALT 37, COVID negative, influenza negative      March 19, 2024: Echocardiogram with EF 60-65%.  Normal diastolic function.    [place] [facility] T 97.6, HR 69, RR 16, /70, SaO2 95% on RA

## 2024-05-14 NOTE — HPI
Mr. Parikh is a 63-year-old male with a Mhx of kidney transplant (May 2023 on Prednisone and Tacrolimus), Crohn's disease, sarcoidosis,  shunt placement x3, and Enterococcus faecalis bacteremia (3/2024) who was transferred from [facility] on 5/13/2024 with recurrent pleural effusion. He was recently hospitalized in April where he was noted to have concern for pneumonia and a thoracentesis was performed yielding 1050 cc serous fluid (transudative effusion). His O2 sats improved after thoracentesis and he was discharged home on oral antibiotics. He reports starting to feel dyspneic over the past 2 days with cough. Mycophenolate was recently stopped, but patient has not missed any doses of Tacrolimus or Prednisone. At [facility], a CXR showed recurrence of moderate R sided pleural effusion and BNP 1037, so patient was transferred to [facility] for further workup.     Previous Echos have not shown HFrEF/HFpEF. CXR on arrival to [facility] showed pulmonary congestion with R pleural effusion. Of note, the patient has had multiple  shunt replacements (secondary to infection) and his  shunt terminates into the pleural space. Denies any urinary incontinence, headaches, or gait instability. Notable labs: BNP 1325, HGB 12.7, WBC 6.98, Cr 1.7. Pulm consulted for management of R pleural effusion.

## 2024-05-14 NOTE — CONSULTS
[facility] - Transplant Stepdown  Pulmonology  Consult Note    Patient Name: Abdoulaye Parikh  MRN: [id number]  Admission Date: 5/13/2024  Hospital Length of Stay: 1 days  Code Status: Full Code  Attending Physician: Olya Moreno MD  Primary Care Provider: Gosia, Primary Doctor   Principal Problem: Pleural effusion    Inpatient consult to Pulmonology  Consult performed by: Zaina Masters MD  Consult ordered by: Sujit Joshua MD        Subjective:     HPI:  Mr. Parikh is a 63-year-old male with a Mhx of kidney transplant (May 2023 on Prednisone and Tacrolimus), Crohn's disease, sarcoidosis,  shunt placement x3, and Enterococcus faecalis bacteremia (3/2024) who was transferred from [facility] on 5/13/2024 with recurrent pleural effusion. He was recently hospitalized in April where he was noted to have concern for pneumonia and a thoracentesis was performed yielding 1050 cc serous fluid (transudative effusion). His O2 sats improved after thoracentesis and he was discharged home on oral antibiotics. He reports starting to feel dyspneic over the past 2 days with cough. Mycophenolate was recently stopped, but patient has not missed any doses of Tacrolimus or Prednisone. At [facility], a CXR showed recurrence of moderate R sided pleural effusion and BNP 1037, so patient was transferred to [facility] for further workup.     Previous Echos have not shown HFrEF/HFpEF. CXR on arrival to [facility] showed pulmonary congestion with R pleural effusion. Of note, the patient has had multiple  shunt replacements (secondary to infection) and his  shunt terminates into the pleural space. Denies any urinary incontinence, headaches, or gait instability. Notable labs: BNP 1325, HGB 12.7, WBC 6.98, Cr 1.7. Pulm consulted for management of R pleural effusion.     Past Medical History:   Diagnosis Date    Acute blood loss anemia 05/11/2023    Anemia     Arthritis     Cirrhosis     CKD (chronic kidney disease) stage 4, GFR  15-29 ml/min, slow graft function with sustaine creatinien improvement 05/26/2023    Crohn's disease     Disorder of kidney and ureter     Stage 5    Encounter for blood transfusion     Gout     Hearing loss     Hepatitis     Had Hep C Cleared    Hypertension     Immunosuppressive management encounter following kidney transplant 05/26/2023    Neurosarcoidosis 2018    Obesity     Osteoarthritis     S/P kidney transplant 05/11/2023    Sarcoid neuropathy     Sarcoidosis, lung     Steatosis        Past Surgical History:   Procedure Laterality Date    AV FISTULA PLACEMENT Left     BRAIN SURGERY      COLON SURGERY      Exploratory lap x 4 for Chron's disease. Likely right colectomy    CSF SHUNT      DIAGNOSTIC ULTRASOUND N/A 5/15/2023    Procedure: ULTRASOUND, DIAGNOSTIC;  Surgeon: Chivo Brown MD;  Location: [facility] OR [address] [place] [address];  Service: Transplant;  Laterality: N/A;    JOINT REPLACEMENT Right     Hip    KIDNEY TRANSPLANT N/A 5/10/2023    Procedure: TRANSPLANT, KIDNEY - RQ 7PM START;  Surgeon: Chivo Brown MD;  Location: [facility] OR [address] [place] [address];  Service: Transplant;  Laterality: N/A;    NEPHROSTOMY N/A 6/3/2023    Procedure: Nephrostomy;  Surgeon: Silvia Surgeon;  Location: [facility];  Service: Anesthesiology;  Laterality: N/A;    RENAL BIOPSY  5/15/2023    Procedure: BIOPSY, KIDNEY;  Surgeon: Chivo Brown MD;  Location: [facility] OR [address] [place] [address];  Service: Transplant;;    RENAL EXPLORATION  5/15/2023    Procedure: EXPLORATION, KIDNEY;  Surgeon: Chivo Brown MD;  Location: [address];  Service: Transplant;;    TOTAL HIP ARTHROPLASTY Right        Review of patient's allergies indicates:   Allergen Reactions    Bactrim [sulfamethoxazole-trimethoprim] Shortness Of Breath and Itching    Penicillins Shortness Of Breath and Itching       Family History       Problem Relation (Age of Onset)    COPD Sister    Cancer Father, Maternal Grandfather, Paternal Grandfather    Diabetes Father    Heart disease  Father, Sister    Hypertension Mother, Father    Lung cancer Maternal Grandfather, Paternal Grandfather    Multiple myeloma Father    Stroke Father          Tobacco Use    Smoking status: Never    Smokeless tobacco: Never   Substance and Sexual Activity    Alcohol use: Not Currently    Drug use: Never    Sexual activity: Yes     Partners: Female         Review of Systems   Constitutional:  Negative for chills, diaphoresis, fatigue and fever.   HENT:  Negative for congestion, ear pain, sore throat, tinnitus and trouble swallowing.    Eyes:  Negative for photophobia, discharge and visual disturbance.   Respiratory:  Positive for cough and shortness of breath. Negative for apnea, choking and chest tightness.    Cardiovascular:  Negative for chest pain, palpitations and leg swelling.   Gastrointestinal:  Negative for abdominal distention, abdominal pain, constipation, diarrhea, nausea and vomiting.   Endocrine: Negative for polydipsia, polyphagia and polyuria.   Genitourinary:  Negative for difficulty urinating and dysuria.   Musculoskeletal:  Negative for arthralgias, back pain and gait problem.   Skin:  Negative for color change and rash.   Neurological:  Negative for dizziness, syncope, speech difficulty, weakness, light-headedness and headaches.   Psychiatric/Behavioral:  Negative for agitation, confusion, dysphoric mood, hallucinations and sleep disturbance. The patient is not nervous/anxious.      Objective:     Vital Signs (Most Recent):  Temp: 98 °F (36.7 °C) (05/14/24 0730)  Pulse: 77 (05/14/24 0800)  Resp: (!) 22 (05/14/24 0730)  BP: (!) 146/81 (05/14/24 0730)  SpO2: (!) 89 % (05/14/24 0800) Vital Signs (24h Range):  Temp:  [97.6 °F (36.4 °C)-98.2 °F (36.8 °C)] 98 °F (36.7 °C)  Pulse:  [60-83] 77  Resp:  [16-26] 22  SpO2:  [81 %-97 %] 89 %  BP: (108-151)/(59-81) 146/81     Weight: 82.6 kg (182 lb 1.6 oz)  Body mass index is 26.89 kg/m².      Intake/Output Summary (Last 24 hours) at 5/14/2024 1041  Last data  filed at 5/14/2024 0943  Gross per 24 hour   Intake 410 ml   Output 100 ml   Net 310 ml        Physical Exam  Constitutional:       General: He is not in acute distress.     Appearance: Normal appearance. He is normal weight.      Interventions: Nasal cannula in place.   HENT:      Head: Normocephalic and atraumatic.      Nose: No congestion.      Mouth/Throat:      Mouth: Mucous membranes are moist.   Eyes:      Extraocular Movements: Extraocular movements intact.      Conjunctiva/sclera: Conjunctivae normal.      Pupils: Pupils are equal, round, and reactive to light.   Neck:      Vascular: No carotid bruit.   Cardiovascular:      Rate and Rhythm: Normal rate and regular rhythm.      Pulses: Normal pulses.      Heart sounds: Normal heart sounds. No murmur heard.     No gallop.   Pulmonary:      Effort: No respiratory distress.      Breath sounds: Decreased air movement present. Examination of the right-lower field reveals decreased breath sounds. Decreased breath sounds present. No rales.   Chest:      Chest wall: No tenderness.   Abdominal:      General: Abdomen is flat. Bowel sounds are normal. There is no distension.      Tenderness: There is no abdominal tenderness.   Musculoskeletal:         General: No swelling.      Cervical back: No rigidity.   Skin:     Capillary Refill: Capillary refill takes less than 2 seconds.   Neurological:      General: No focal deficit present.      Mental Status: He is alert and oriented to person, place, and time. Mental status is at baseline.      Cranial Nerves: No cranial nerve deficit.      Motor: No weakness.   Psychiatric:         Mood and Affect: Mood normal.         Behavior: Behavior normal.          Vents:       Lines/Drains/Airways       Central Venous Catheter Line  Duration                  Hemodialysis AV Graft Left upper arm -- days              Peripheral Intravenous Line  Duration                  Peripheral IV - Single Lumen 05/13/24 20 G Right Antecubital 1  day                    Significant Labs:    CBC/Anemia Profile:  Recent Labs   Lab 05/14/24  0650   WBC 6.98   HGB 12.7*   HCT 40.7      *   RDW 14.6*        Chemistries:  Recent Labs   Lab 05/14/24  0650      K 4.3      CO2 21*   BUN 18   CREATININE 1.7*   CALCIUM 8.9   MG 1.5*   PHOS 2.7       All pertinent labs within the past 24 hours have been reviewed.    Significant Imaging:   I have reviewed all pertinent imaging results/findings within the past 24 hours.  Assessment/Plan:     Pulmonary  * Pleural effusion  Mr. Parikh is a 63-year-old male with a Mhx of kidney transplant (May 2023 on Prednisone and Tacrolimus), Crohn's disease, sarcoidosis,  shunt placement x3, and Enterococcus faecalis bacteremia (3/2024) who was transferred from [facility] on 5/13/2024 with recurrent pleural effusion. He was recently hospitalized in April where he was noted to have concern for pneumonia and a thoracentesis was performed yielding 1050 cc serous fluid (transudative effusion). His O2 sats improved after thoracentesis and he was discharged home on oral antibiotics.     CXR on arrival to [facility] showed pulmonary congestion with R pleural effusion. Of note, the patient has had multiple  shunt replacements (secondary to infection) and his  shunt terminates into the pleural space. Denies any urinary incontinence, headaches, or gait instability. Notable labs: BNP 1325, HGB 12.7, WBC 6.98, Cr 1.7.    Recommendations:  - In the setting of pulmonary congestion and elevated BNP would recommend diuresis with IV lasix  - Previous effusion was transudative in nature. Will ultrasound later this afternoon to see if there is a simple effusion to drain the pocket of fluid  - If pleural fluid re accumulates after treatment, will order a  shunt series as this terminates in the pleural space. However, this is unlikely to be a shunt issue as it has been in place since 2019 without problems.   - Wean  FiO2 as tolerated  - Jerson PRN  - F/U Pleural fluids if thoracentesis performed           Thank you for your consult. I will follow-up with patient. Please contact us if you have any additional questions.     Zaina Masters MD  Pulmonology  [facility] - Transplant Stepdown

## 2024-05-14 NOTE — ASSESSMENT & PLAN NOTE
- Patient found to have moderate pleural effusion on imaging. I have personally reviewed and interpreted the following imaging: Xray   - pulmonology team has been consulted   - CT scan of the chest has been not performed because of recent worsening of kidney status (s/p kidney transplant) creatinine 1.8 (1.4 on 5/6)  - pulm recommend diuresis and monitoring clinical response and need for repeat thora  - Incidental note of V-P catheter in R pleural space which has been present for several years, and not likely related to the current pleural process    None

## 2024-05-14 NOTE — PROGRESS NOTES
"Pharmacist Renal Dose Adjustment Note    Abdoulaye Parikh is a 63 y.o. male being treated with the medication Levaquin.    Patient Data:    Vital Signs (Most Recent):  Temp: 97.6 °F (36.4 °C) (05/13/24 1934)  Pulse: 69 (05/13/24 1934)  Resp: 16 (05/13/24 1934)  BP: 115/70 (05/13/24 1934)  SpO2: 95 % (05/13/24 1934) Vital Signs (72h Range):  Temp:  [97.6 °F (36.4 °C)-98.2 °F (36.8 °C)]   Pulse:  [66-69]   Resp:  [16-23]   BP: (108-115)/(59-70)   SpO2:  [95 %-97 %]      No results for input(s): "CREATININE" in the last 168 hours.  - Labs today done at OSH. Scr 1.8. Manually calculated Cr ~45 mL/min using adjusted body weight.     Levaquin 750 mg Q24H will be changed to Levaquin 750 mg Q48H.    Pharmacist's Name: Caroline Wren  Pharmacist's Extension: [phone]    "

## 2024-05-14 NOTE — ASSESSMENT & PLAN NOTE
- cont prednisone and prograf with level monitoring  - KTM following  - renal u/s ordered  - monitor renal function

## 2024-05-14 NOTE — ASSESSMENT & PLAN NOTE
- secondary to pleural effusion and pulm edema  - not on home o2, supplemental O2- wean as tolerated  - Chest x-ray showed a moderate-size right pleural effusion  - pulmonology consult requested for recurrent pleural effusion in case of status post kidney transplant. Previous effusion transudate  - empiric treatment with levofloxacin has been started in view of recent history of receiving azithromycin, ceftriaxone during the hospitalization followed by cefpodoxime as outpatient.  - ID consulted, cont levaquin for now but low suspicion for infection, procal neg   - resp culture ordered but no sputum  - elevated BNP and CXR with pulm edema- cont IV lasix. ECHO ordered   - Pulm following recommend cont IV lasix and monitor need for repeat thora

## 2024-05-14 NOTE — NURSING
Pt arrived to [facility] [id number]. VSS. MD notified. Placed on 1L nasal cannula for comfort d/t sob. Pt oriented to room. Call light within reach. Skin assessed. No skin breakdown noted.

## 2024-05-14 NOTE — ASSESSMENT & PLAN NOTE
Patient's anemia is currently controlled. Has not received any PRBCs to date. Etiology likely d/t chronic disease.  Current CBC reviewed-   Lab Results   Component Value Date    HGB 12.7 (L) 05/14/2024    HCT 40.7 05/14/2024     Monitor serial CBC and transfuse if patient becomes hemodynamically unstable, symptomatic or H/H drops below 7/21.

## 2024-05-14 NOTE — PLAN OF CARE
Patient is AAOx3. Patient was 88% on RA this morning, placed on 2L per nasal cannula. Patient had a CXR and BC x2. Patient is scheduled for an ECHO. Patient to start on Lasix 40mg IVP once a new PIV is placed by PICC team. Patient received IV Mag replacement. Patient c/o pain, cough, and anxiety. PRN meds given per orders. KTS, Pulm, and ID consulted. Reminded the patient to call for assistance.

## 2024-05-14 NOTE — SUBJECTIVE & OBJECTIVE
Subjective:   History of Present Illness:  Mr. Parikh is a 63 y.o. year old Black male who received a living kidney transplant on 5/10/23. His most recent creatinine is 1.2-1.7. He takes prednisone, and tacrolimus for maintenance immunosuppression. His post transplant course has been complicated by urine leak. Had nephrostomy tube was placed and removed 12/31/23. 5/15/23-Kidney Bx: Good sample, No IFTA no glomerulosclerosis ,ATI no rejection C4d negative     Pertinent history   Crohn's  Sarcoidosis  Hearing loss   shunt placement x3  Enterococcus faecalis bacteremia ( March 2024)  Pneumonia April 2024     was transferred from [facility] on 5/13/2024 with recurrent pleural effusion. He was hospitalized in April with pneumonia and a thoracentesis was performed yielding 1050 cc transudate effusion. He discharged home on oral antibiotics. Mycophenolate was stopped. At [facility], a CXR showed recurrence of moderate R sided pleural effusion and BNP 1037, so patient was [place] [facility] [place].    Previous Echos have not shown HFrEF/HFpEF. CXR on arrival [place] [facility] showed pulmonary congestion with R pleural effusion. Of note, the patient has had multiple  shunt replacements (secondary to infection) and his  shunt terminates into the pleural space. His Cr 1.7 which is high baseline. Last kidney US in March showed nonobstructing renal stones.       Past Medical, Surgical, Family, and Social History:   Unchanged from H&P.    Scheduled Meds:   furosemide (LASIX) injection  40 mg Intravenous Q12H    heparin (porcine)  5,000 Units Subcutaneous Q8H    levoFLOXacin  750 mg Intravenous Q48H    losartan  25 mg Oral Daily    metoprolol tartrate  12.5 mg Oral BID    predniSONE  5 mg Oral Daily    sodium bicarbonate  1,300 mg Oral TID     Continuous Infusions:  PRN Meds:  Current Facility-Administered Medications:     dextromethorphan-guaiFENesin  mg/5 ml, 5 mL, Oral, Q4H PRN     "hydrOXYzine HCL, 25 mg, Oral, TID PRN    oxyCODONE-acetaminophen, 1 tablet, Oral, Q4H PRN    sodium chloride 0.9%, 10 mL, Intravenous, Q12H PRN    Intake/Output - Last 3 Shifts         05/12 0700  05/13 0659 05/13 0700  05/14 0659 05/14 0700  05/15 0659    P.O.   360    I.V. (mL/kg)   50 (0.6)    Total Intake(mL/kg)   410 (5)    Urine (mL/kg/hr)  100     Total Output  100     Net  -100 +410           Urine Occurrence  2 x 2 x    Stool Occurrence  2 x     Emesis Occurrence  0 x              Review of Systems   Respiratory:  Positive for cough and shortness of breath.   Cardiovascular:  Negative for chest pain, palpitations and leg swelling.   Gastrointestinal:  Negative for abdominal distention, abdominal pain, constipation, diarrhea, nausea and vomiting.   Endocrine: Negative for polydipsia, polyphagia and polyuria.   Genitourinary:  Negative for difficulty urinating and dysuria.   Musculoskeletal:  Negative for arthralgias, back pain and gait problem. No edema      Objective:     Vital Signs (Most Recent):  Temp: 97.5 °F (36.4 °C) (05/14/24 1202)  Pulse: 75 (05/14/24 1202)  Resp: (!) 22 (05/14/24 1202)  BP: (!) 157/91 (05/14/24 1202)  SpO2: (!) 91 % (05/14/24 1202) Vital Signs (24h Range):  Temp:  [97.5 °F (36.4 °C)-98.2 °F (36.8 °C)] 97.5 °F (36.4 °C)  Pulse:  [60-83] 75  Resp:  [16-26] 22  SpO2:  [81 %-97 %] 91 %  BP: (108-157)/(59-91) 157/91     Weight: 82.6 kg (182 lb 1.6 oz)  Height: 5' 9" (175.3 cm)  Body mass index is 26.89 kg/m².     Physical Exam   Cardiovascular:      Rate and Rhythm: Normal rate and regular rhythm.      Heart sounds: Normal heart sounds. No murmur heard.  Pulmonary:      Effort: No respiratory distress.      Breath sounds: Bilateral decreased breath sounds. No rales.   Chest:      Chest wall: No tenderness.   Abdominal:      General: Abdomen is flat. Bowel sounds are normal. There is no distension.      Tenderness: There is no abdominal tenderness.   Musculoskeletal:          No edema "   Neurological:      General: No focal deficit present.      Mental Status: He is alert and oriented to person, place, and time. Mental status is at baseline.     Laboratory:  Labs within the past 24 hours have been reviewed.

## 2024-05-14 NOTE — ASSESSMENT & PLAN NOTE
- Chest x-ray showed a moderate-size right pleural effusion  - pulmonology consult requested for recurrent pleural effusion in case of status post kidney transplant  - empiric treatment with levofloxacin has been started in view of recent history of receiving azithromycin, ceftriaxone during the hospitalization followed by cefpodoxime as outpatient.  -  patient is allergic to penicillin reports developing shortness of breath, palpitations therefore bipolar saline tazobactam has not been given and vancomycin has been ordered due to kidney transplant status.  - infectious disease team has been consulted to help with choosing the appropriate antibiotics.  - blood culture has been obtained at we will keep following the results..

## 2024-05-14 NOTE — PLAN OF CARE
Orientedx4. VSS, ex tachpnea. Afebrile. Up ad lukas, steady gait. PRN percocet administered once. PRN Hydroxyzine given for anxiety. PRN cough medicine given twice.     Pt desatted to 80s on room air. SaO2>92% on 1.5L O2 via nasal cannula.    Fall precautions maintained. Call light within reach. Pt verbalized understanding to call nurse as needed. No concerns voiced at this time.

## 2024-05-14 NOTE — CONSULTS
[facility] - Transplant Stepdown  Kidney Transplant  Progress Note      Reason for Follow-up: Reassessment of Kidney Transplant - 5/10/2023  (#1) recipient and management of immunosuppression.           Subjective:   History of Present Illness:  Mr. Parikh is a 63 y.o. year old Black male who received a living kidney transplant on 5/10/23. His most recent creatinine is 1.2-1.7. He takes prednisone, and tacrolimus for maintenance immunosuppression. His post transplant course has been complicated by urine leak. Had nephrostomy tube was placed and removed 12/31/23. 5/15/23-Kidney Bx: Good sample, No IFTA no glomerulosclerosis ,ATI no rejection C4d negative     Pertinent history   Crohn's  Sarcoidosis  Hearing loss   shunt placement x3  Enterococcus faecalis bacteremia ( March 2024)  Pneumonia April 2024     was transferred from [facility] on 5/13/2024 with recurrent pleural effusion. He was hospitalized in April with pneumonia and a thoracentesis was performed yielding 1050 cc transudate effusion. He discharged home on oral antibiotics. Mycophenolate was stopped. At [facility], a CXR showed recurrence of moderate R sided pleural effusion and BNP 1037, so patient was transferred to [facility] for further workup.    Previous Echos have not shown HFrEF/HFpEF. CXR on arrival to [facility] showed pulmonary congestion with R pleural effusion. Of note, the patient has had multiple  shunt replacements (secondary to infection) and his  shunt terminates into the pleural space. His Cr 1.7 which is high baseline. Last kidney US in March showed nonobstructing renal stones.       Past Medical, Surgical, Family, and Social History:   Unchanged from H&P.    Scheduled Meds:   furosemide (LASIX) injection  40 mg Intravenous Q12H    heparin (porcine)  5,000 Units Subcutaneous Q8H    levoFLOXacin  750 mg Intravenous Q48H    losartan  25 mg Oral Daily    metoprolol tartrate  12.5 mg Oral BID    predniSONE  5 mg Oral  "Daily    sodium bicarbonate  1,300 mg Oral TID     Continuous Infusions:  PRN Meds:  Current Facility-Administered Medications:     dextromethorphan-guaiFENesin  mg/5 ml, 5 mL, Oral, Q4H PRN    hydrOXYzine HCL, 25 mg, Oral, TID PRN    oxyCODONE-acetaminophen, 1 tablet, Oral, Q4H PRN    sodium chloride 0.9%, 10 mL, Intravenous, Q12H PRN    Intake/Output - Last 3 Shifts         05/12 0700 05/13 0659 05/13 0700 05/14 0659 05/14 0700  05/15 0659    P.O.   360    I.V. (mL/kg)   50 (0.6)    Total Intake(mL/kg)   410 (5)    Urine (mL/kg/hr)  100     Total Output  100     Net  -100 +410           Urine Occurrence  2 x 2 x    Stool Occurrence  2 x     Emesis Occurrence  0 x              Review of Systems   Respiratory:  Positive for cough and shortness of breath.   Cardiovascular:  Negative for chest pain, palpitations and leg swelling.   Gastrointestinal:  Negative for abdominal distention, abdominal pain, constipation, diarrhea, nausea and vomiting.   Endocrine: Negative for polydipsia, polyphagia and polyuria.   Genitourinary:  Negative for difficulty urinating and dysuria.   Musculoskeletal:  Negative for arthralgias, back pain and gait problem. No edema      Objective:     Vital Signs (Most Recent):  Temp: 97.5 °F (36.4 °C) (05/14/24 1202)  Pulse: 75 (05/14/24 1202)  Resp: (!) 22 (05/14/24 1202)  BP: (!) 157/91 (05/14/24 1202)  SpO2: (!) 91 % (05/14/24 1202) Vital Signs (24h Range):  Temp:  [97.5 °F (36.4 °C)-98.2 °F (36.8 °C)] 97.5 °F (36.4 °C)  Pulse:  [60-83] 75  Resp:  [16-26] 22  SpO2:  [81 %-97 %] 91 %  BP: (108-157)/(59-91) 157/91     Weight: 82.6 kg (182 lb 1.6 oz)  Height: 5' 9" (175.3 cm)  Body mass index is 26.89 kg/m².     Physical Exam   Cardiovascular:      Rate and Rhythm: Normal rate and regular rhythm.      Heart sounds: Normal heart sounds. No murmur heard.  Pulmonary:      Effort: No respiratory distress.      Breath sounds: Bilateral decreased breath sounds. No rales.   Chest:      Chest " wall: No tenderness.   Abdominal:      General: Abdomen is flat. Bowel sounds are normal. There is no distension.      Tenderness: There is no abdominal tenderness.   Musculoskeletal:          No edema   Neurological:      General: No focal deficit present.      Mental Status: He is alert and oriented to person, place, and time. Mental status is at baseline.     Laboratory:  Labs within the past 24 hours have been reviewed.      Assessment/Plan:     Kidney allograft function       living kidney transplant on 5/10/23.        Baseline Cr 1.2- 1.5. Recent Cr remains between 1.4-1.7 since March 2023       Continue prograf and prednisone        Check tacrolimus level daily. Target level ~ 4-6       Hold MMF       If there is no cardiac indication, hold losartan for now       Obtain Kidney US as pt has history of hydronephrosis and kidney stone       Strict I/O        Lasix as needed - BNP >1000       Check CMV PCR        Pleural effusion  Agree with lasix   Consider echocardiogram        Consider to evaluate  shunt  function s       F/U Pleural fluids if thoracentesis performed           Maggie Mcgovern MD  Kidney Transplant  [address] - Transplant Stepdown

## 2024-05-14 NOTE — CONSULTS
KEVIN consulted for PIV insertion in real time using ultrasound guidance.    Indication: PVA  Gauge and length: 20 g x 1 3/4 inch   Location: [facility]

## 2024-05-14 NOTE — HOSPITAL COURSE
Admitted for respiratory failure 2/2 to pleural effusion and volume overload. Started on supplemental O2 and diuretics. Of note this is reoccurrence of pleural effusion that was drained at previous admission, transudate based on previous analysis. Pulm consulted and recommend diuresis, will consider thora if not improving. Cont IV lasix. ECHO ordered, previous ECHO without reduced function. Elevated BNP and pulm edema on CXR.   - ECHO with dilated LA and pulm HTN normal EF.  Lower extremity u/s done for swelling and revealed DVT. Started on eliquis, deferred CTA given his renal function per KTM.    Of note he has a  shunt that terminates in the pleural space, this has been present for a long time however could possibly contributing to effusion.   KTM consulted for patient with history of renal transplant, recommend cont prograf with level monitoring and prednisone. Hold MMF. Renal u/s Mildly elevated intraparenchymal resistive indices minimally improved compared to prior exam which may be seen with medical renal disease, rejection, or drug toxicity. Nonobstructing renal stones. Patient treated with IV lasix.    Levaquin started on admit concern for possible parapneumonic effusion/PNA, resp culture ordered but reports no sputum- ID consulted recommend complete 5 days levaquin. Ucx colonization with Enterococcus denies urinary sxs, so no need to treat.  Noted Worsening creatinine 5/16. Lasix held.  Pleural effusion looks about the same on chest x-ray repeat. Discussed case with Pulmonology. Bedside Echo shows only a modest volume of fluid with the appearance of a simple effusion. Options discussed wih patient, and decision made to defer thoracentesis unless respiratory symptoms develop. Pt deemed appropriate for discharge. Recommend continued daily Lasix. Prescribed Eliquis for acute DVT. Plan discussed with pt, who was agreeable; medications were discussed and reviewed. Outpatient follow-up discussed and scheduled  when able. ER precautions were given. Patient discharged.

## 2024-05-14 NOTE — H&P
[facility]plant [facility] Medicine  History & Physical    Patient Name: Abdoulaye Parikh  MRN: [id number]  Patient Class: IP- Inpatient  Admission Date: 5/13/2024  Attending Physician: Olya Moreno MD   Primary Care Provider: [facility] Primary Doctor         Patient information was obtained from patient, past medical records, and ER records.     Subjective:     Principal Problem:Pleural effusion    Chief Complaint:  Shortness of breath       HPI: 63-year-old male with a h/o kidney transplant (May 2023), Anemia of Chronic Disease, Crohn's disease, gout, hypertension, sarcoidosis,  shunt placement, Enterococcus faecalis bacteremia in March 2024 is transferred from [facility].  He reports shortness of breath started today early morning 1 with rest associated with cough without any expectoration.  He lives at home and he completed the cefpodoxime course of antibiotics was given during the recent discharge.  He denies any fever, chest pain, palpitations, lightheadedness, abdominal pain, disturbance with bowel or bladder habits.    He was admitted at [facility] (April 23-26) treated for pneumonia and pleural effusion. Thoracentesis on 04/25 yielding 1050 cc of serous fluid and had 465 nucleated cells (16% neutrophils, 57% lymphocytes, 27% monocytes), 401 RBCs, LDH 88, glucose 86, total protein 3.1, triglyceride 19, moderate white blood cells with no organisms seen. Fluid culture had no growth. Blood cultures had no growth at 5 days. He was treated with antibiotics azithromycin & ceftriaxone during the hospitalization and discharged on cefpodoxime to complete a 10 day course of antibiotics for pneumonia.     Today at [facility] Chest x-ray showed a moderate-size right pleural effusion. White blood cells 4.5, hemoglobin 12.2, hematocrit 36.6, platelets 194, BNP 1037, INR 1.1, sodium 140, potassium 3.8, chloride 107, CO2 22, BUN 17, creatinine 1.8 (1.4 on 5/6), glucose 98,  calcium 9.3, albumin 3.3, AST 40, ALT 37, COVID negative, influenza negative      March 19, 2024: Echocardiogram with EF 60-65%.  Normal diastolic function.    The current vitals at [facility]: T 97.6, HR 69, RR 16, /70, SaO2 95% on RA    Past Medical History:   Diagnosis Date    Acute blood loss anemia 05/11/2023    Anemia     Arthritis     Cirrhosis     CKD (chronic kidney disease) stage 4, GFR 15-29 ml/min, slow graft function with sustaine creatinien improvement 05/26/2023    Crohn's disease     Disorder of kidney and ureter     Stage 5    Encounter for blood transfusion     Gout     Hearing loss     Hepatitis     Had Hep C Cleared    Hypertension     Immunosuppressive management encounter following kidney transplant 05/26/2023    Neurosarcoidosis 2018    Obesity     Osteoarthritis     S/P kidney transplant 05/11/2023    Sarcoid neuropathy     Sarcoidosis, lung     Steatosis        Past Surgical History:   Procedure Laterality Date    AV FISTULA PLACEMENT Left     BRAIN SURGERY      COLON SURGERY      Exploratory lap x 4 for Chron's disease. Likely right colectomy    CSF SHUNT      DIAGNOSTIC ULTRASOUND N/A 5/15/2023    Procedure: ULTRASOUND, DIAGNOSTIC;  Surgeon: Chivo Brown MD;  Location: [address];  Service: Transplant;  Laterality: N/A;    JOINT REPLACEMENT Right     Hip    KIDNEY TRANSPLANT N/A 5/10/2023    Procedure: TRANSPLANT, KIDNEY - RQ 7PM START;  Surgeon: Chivo Brown MD;  Location: [address];  Service: Transplant;  Laterality: N/A;    NEPHROSTOMY N/A 6/3/2023    Procedure: Nephrostomy;  Surgeon: Silvia Surgeon;  Location: [facility];  Service: Anesthesiology;  Laterality: N/A;    RENAL BIOPSY  5/15/2023    Procedure: BIOPSY, KIDNEY;  Surgeon: Chivo Brown MD;  Location: [facility] OR [address] [place] [address];  Service: Transplant;;    RENAL EXPLORATION  5/15/2023    Procedure: EXPLORATION, KIDNEY;  Surgeon: Chivo Brown MD;  Location: [address];  Service: Transplant;;     TOTAL HIP ARTHROPLASTY Right        Review of patient's allergies indicates:   Allergen Reactions    Bactrim [sulfamethoxazole-trimethoprim] Shortness Of Breath and Itching    Penicillins Shortness Of Breath and Itching       Current Facility-Administered Medications on File Prior to Encounter   Medication    [DISCONTINUED] GENERIC EXTERNAL MEDICATION    [DISCONTINUED] GENERIC EXTERNAL MEDICATION     Current Outpatient Medications on File Prior to Encounter   Medication Sig    benzonatate (TESSALON) 200 MG capsule TAKE 1 CAPSULE BY MOUTH THREE TIMES DAILY FOR 10 DAYS AS NEEDED FOR COUGH    furosemide (LASIX) 20 MG tablet Take 1 tablet (20 mg total) by mouth daily as needed.    loperamide HCl (IMODIUM A-D ORAL) Take by mouth.    magnesium oxide (MAG-OX) 400 mg (241.3 mg magnesium) tablet Take 1 tablet (400 mg total) by mouth 2 (two) times daily.    metoprolol tartrate (LOPRESSOR) 25 MG tablet Take HALF tablet (12.5 mg total) by mouth 2 (two) times daily.    olmesartan (BENICAR) 40 MG tablet Take 1 tablet (40 mg total) by mouth once daily. HOLD SBP <120    predniSONE (DELTASONE) 5 MG tablet Take 1 tablet (5 mg total) by mouth once daily.    sodium bicarbonate 650 MG tablet Take 2 tablets (1,300 mg total) by mouth 3 (three) times daily.    tacrolimus (PROGRAF) 1 MG Cap Take 2 capsules (2 mg total) by mouth every morning AND 1 capsule (1 mg total) every evening. Txp Date:5/10/2023 (Kidney) Disch Date:5/17/2023 ICD10:Z94.0 Txp Location:[facility].    tamsulosin (FLOMAX) 0.4 mg Cap Take 1 capsule (0.4 mg total) by mouth once daily.     Family History       Problem Relation (Age of Onset)    COPD Sister    Cancer Father, Maternal Grandfather, Paternal Grandfather    Diabetes Father    Heart disease Father, Sister    Hypertension Mother, Father    Lung cancer Maternal Grandfather, Paternal Grandfather    Multiple myeloma Father    Stroke Father          Tobacco Use    Smoking status: Never    Smokeless tobacco: Never    Substance and Sexual Activity    Alcohol use: Not Currently    Drug use: Never    Sexual activity: Yes     Partners: Female     Review of Systems   Constitutional:  Negative for chills, diaphoresis, fatigue and fever.   HENT:  Negative for congestion, ear pain, sore throat, tinnitus and trouble swallowing.    Eyes:  Negative for photophobia, discharge and visual disturbance.   Respiratory:  Positive for cough and shortness of breath. Negative for apnea, choking and chest tightness.    Cardiovascular:  Negative for chest pain, palpitations and leg swelling.   Gastrointestinal:  Negative for abdominal distention, abdominal pain, constipation, diarrhea, nausea and vomiting.   Endocrine: Negative for polydipsia, polyphagia and polyuria.   Genitourinary:  Negative for difficulty urinating and dysuria.   Musculoskeletal:  Negative for arthralgias, back pain and gait problem.   Skin:  Negative for color change and rash.   Neurological:  Positive for headaches. Negative for dizziness, syncope, speech difficulty, weakness and light-headedness.   Psychiatric/Behavioral:  Negative for agitation, confusion, dysphoric mood, hallucinations and sleep disturbance. The patient is not nervous/anxious.      Objective:     Vital Signs (Most Recent):  Temp: 97.6 °F (36.4 °C) (05/13/24 1934)  Pulse: 69 (05/13/24 1934)  Resp: 16 (05/13/24 1934)  BP: 115/70 (05/13/24 1934)  SpO2: 95 % (05/13/24 1934) Vital Signs (24h Range):  Temp:  [97.6 °F (36.4 °C)-98.2 °F (36.8 °C)] 97.6 °F (36.4 °C)  Pulse:  [66-69] 69  Resp:  [16-23] 16  SpO2:  [95 %-97 %] 95 %  BP: (108-115)/(59-70) 115/70     Weight: 82.6 kg (182 lb 1.6 oz)  Body mass index is 26.89 kg/m².     Physical Exam  Constitutional:       General: He is not in acute distress.     Appearance: Normal appearance. He is normal weight.   HENT:      Head: Normocephalic and atraumatic.      Nose: No congestion.      Mouth/Throat:      Mouth: Mucous membranes are moist.   Eyes:      Extraocular  Movements: Extraocular movements intact.      Conjunctiva/sclera: Conjunctivae normal.      Pupils: Pupils are equal, round, and reactive to light.   Neck:      Vascular: No carotid bruit.   Cardiovascular:      Rate and Rhythm: Normal rate and regular rhythm.      Pulses: Normal pulses.      Heart sounds: Normal heart sounds. No murmur heard.     No gallop.   Pulmonary:      Breath sounds: Decreased air movement present. Examination of the right-lower field reveals decreased breath sounds. Decreased breath sounds present.   Chest:      Chest wall: No tenderness.   Abdominal:      General: Abdomen is flat. Bowel sounds are normal. There is no distension.      Tenderness: There is no abdominal tenderness.   Musculoskeletal:         General: No swelling.      Cervical back: No rigidity.   Skin:     Capillary Refill: Capillary refill takes less than 2 seconds.   Neurological:      General: No focal deficit present.      Mental Status: He is alert and oriented to person, place, and time. Mental status is at baseline.      Cranial Nerves: No cranial nerve deficit.      Motor: No weakness.   Psychiatric:         Mood and Affect: Mood normal.         Behavior: Behavior normal.              CRANIAL NERVES     CN III, IV, VI   Pupils are equal, round, and reactive to light.       Significant Labs: All pertinent labs within the past 24 hours have been reviewed.    Significant Imaging: I have reviewed all pertinent imaging results/findings within the past 24 hours.  Assessment/Plan:     * Pleural effusion  - Patient found to have moderate pleural effusion on imaging. I have not personally reviewed and interpreted the following imaging: Xray as it was performed at another facility and we have only the imaging report available.    - pulmonology team has been consulted   - CT scan of the chest has been not performed because of recent worsening of kidney status (s/p kidney transplant) creatinine 1.8 (1.4 on 5/6)    CKD (chronic  kidney disease)  - continue home medicine sodium bicarbonate  - Monitor UOP and serial BMP and adjust therapy as needed. Renally dose meds. Avoid nephrotoxic medications and procedures.  - transplant team has been consulted    Hypertension  Chronic, controlled. Latest blood pressure and vitals reviewed-     Temp:  [97.6 °F (36.4 °C)-98.2 °F (36.8 °C)]   Pulse:  [66-69]   Resp:  [16-23]   BP: (108-115)/(59-70)   SpO2:  [95 %-97 %] .   Home meds for hypertension were reviewed and noted below.   Hypertension Medications               furosemide (LASIX) 20 MG tablet Take 1 tablet (20 mg total) by mouth daily as needed.    metoprolol tartrate (LOPRESSOR) 25 MG tablet Take HALF tablet (12.5 mg total) by mouth 2 (two) times daily.    olmesartan (BENICAR) 40 MG tablet Take 1 tablet (40 mg total) by mouth once daily. HOLD SBP <120            While in the hospital, will manage blood pressure as follows; Adjust home antihypertensive regimen as follows- home olmesartan has been ordered as a losartan here as an alternative    Will utilize p.r.n. blood pressure medication only if patient's blood pressure greater than 180/110 and he develops symptoms such as worsening chest pain or shortness of breath.    Kidney transplant status  - continue home medicine prednisolone but hold tacrolimus as patient is receiving antibiotics  - kidney transplant team has been consulted      SOB (shortness of breath)  - Chest x-ray showed a moderate-size right pleural effusion  - pulmonology consult requested for recurrent pleural effusion in case of status post kidney transplant  - empiric treatment with levofloxacin has been started in view of recent history of receiving azithromycin, ceftriaxone during the hospitalization followed by cefpodoxime as outpatient.  -  patient is allergic to penicillin reports developing shortness of breath, palpitations therefore bipolar saline tazobactam has not been given and vancomycin has been ordered due to  "kidney transplant status.  - infectious disease team has been consulted to help with choosing the appropriate antibiotics.  - blood culture has been obtained at we will keep following the results..      Anemia of renal disease  Patient's anemia is currently controlled. Has not received any PRBCs to date. Etiology likely d/t chronic disease.  Current CBC reviewed-   Lab Results   Component Value Date    HGB 11.6 (L) 05/06/2024    HCT 37.0 (L) 05/06/2024     Monitor serial CBC and transfuse if patient becomes hemodynamically unstable, symptomatic or H/H drops below 7/21.    Long-term use of immunosuppressant medication  - s/p kidney transplant (May 2023)  -tacrolimus at home, hold during this hospitalization when he is on antibiotics  - transplant team has been consulted        VTE Risk Mitigation (From admission, onward)           Ordered     heparin (porcine) injection 5,000 Units  Every 8 hours         05/13/24 2045     IP VTE HIGH RISK PATIENT  Once         05/13/24 2045                               Pharmacist Renal Dose Adjustment Note    Abdoulaye Parikh is a 63 y.o. male being treated with the medication Levaquin.    Patient Data:    Vital Signs (Most Recent):  Temp: 97.6 °F (36.4 °C) (05/13/24 1934)  Pulse: 69 (05/13/24 1934)  Resp: 16 (05/13/24 1934)  BP: 115/70 (05/13/24 1934)  SpO2: 95 % (05/13/24 1934) Vital Signs (72h Range):  Temp:  [97.6 °F (36.4 °C)-98.2 °F (36.8 °C)]   Pulse:  [66-69]   Resp:  [16-23]   BP: (108-115)/(59-70)   SpO2:  [95 %-97 %]      No results for input(s): "CREATININE" in the last 168 hours.  - Labs today done at OSH. Scr 1.8. Manually calculated Cr ~45 mL/min using adjusted body weight.     Levaquin 750 mg Q24H will be changed to Levaquin 750 mg Q48H.    Pharmacist's Name: Caroline Wren  Pharmacist's Extension: [phone]      Sujit Joshua MD  Department of Hospital Medicine  [facility] - Transplant Stepdown          "

## 2024-05-14 NOTE — ASSESSMENT & PLAN NOTE
- continue home medicine sodium bicarbonate  - Monitor UOP and serial BMP and adjust therapy as needed. Renally dose meds. Avoid nephrotoxic medications and procedures.  - KTM following

## 2024-05-14 NOTE — ASSESSMENT & PLAN NOTE
Patient's anemia is currently controlled. Has not received any PRBCs to date. Etiology likely d/t chronic disease.  Current CBC reviewed-   Lab Results   Component Value Date    HGB 11.6 (L) 05/06/2024    HCT 37.0 (L) 05/06/2024     Monitor serial CBC and transfuse if patient becomes hemodynamically unstable, symptomatic or H/H drops below 7/21.

## 2024-05-14 NOTE — ASSESSMENT & PLAN NOTE
Chronic, controlled. Latest blood pressure and vitals reviewed-     Temp:  [97.6 °F (36.4 °C)-98.2 °F (36.8 °C)]   Pulse:  [66-69]   Resp:  [16-23]   BP: (108-115)/(59-70)   SpO2:  [95 %-97 %] .   Home meds for hypertension were reviewed and noted below.   Hypertension Medications               furosemide (LASIX) 20 MG tablet Take 1 tablet (20 mg total) by mouth daily as needed.    metoprolol tartrate (LOPRESSOR) 25 MG tablet Take HALF tablet (12.5 mg total) by mouth 2 (two) times daily.    olmesartan (BENICAR) 40 MG tablet Take 1 tablet (40 mg total) by mouth once daily. HOLD SBP <120            While in the hospital, will manage blood pressure as follows; Adjust home antihypertensive regimen as follows- home olmesartan has been ordered as a losartan here as an alternative    Will utilize p.r.n. blood pressure medication only if patient's blood pressure greater than 180/110 and he develops symptoms such as worsening chest pain or shortness of breath.

## 2024-05-14 NOTE — ASSESSMENT & PLAN NOTE
- Patient found to have moderate pleural effusion on imaging. I have not personally reviewed and interpreted the following imaging: Xray as it was performed at another facility and we have only the imaging report available.    - pulmonology team has been consulted   - CT scan of the chest has been not performed because of recent worsening of kidney status (s/p kidney transplant) creatinine 1.8 (1.4 on 5/6)

## 2024-05-14 NOTE — ASSESSMENT & PLAN NOTE
Mr. Parikh is a 63-year-old male with a Mhx of kidney transplant (May 2023 on Prednisone and Tacrolimus), Crohn's disease, sarcoidosis,  shunt placement x3, and Enterococcus faecalis bacteremia (3/2024) who was transferred from [facility] on 5/13/2024 with recurrent pleural effusion. He was recently hospitalized in April where he was noted to have concern for pneumonia and a thoracentesis was performed yielding 1050 cc serous fluid (transudative effusion). His O2 sats improved after thoracentesis and he was discharged home on oral antibiotics.     [place] [facility] [place] with R pleural effusion. Of note, the patient has had multiple  shunt replacements (secondary to infection) and his  shunt terminates into the pleural space. Denies any urinary incontinence, headaches, or gait instability. Notable labs: BNP 1325, HGB 12.7, WBC 6.98, Cr 1.7.    Recommendations:  - In the setting of pulmonary congestion and elevated BNP would recommend diuresis with IV lasix  - Previous effusion was transudative in nature. Will ultrasound later this afternoon to see if there is a simple effusion to drain the pocket of fluid  - If pleural fluid re accumulates after treatment, will order a  shunt series as this terminates in the pleural space. However, this is unlikely to be a shunt issue as it has been in place since 2019 without problems.   - Wean FiO2 as tolerated  - DuoNebs PRN  - F/U Pleural fluids if thoracentesis performed

## 2024-05-14 NOTE — PROGRESS NOTES
[facility] [place] [facility]  Progress Note    Patient Name: Abdoulaye Parikh  MRN: [id number]  Patient Class: IP- Inpatient   Admission Date: 5/13/2024  Length of Stay: 1 days  Attending Physician: Olya Moreno MD  Primary Care Provider: Gosia, Primary Doctor        Subjective:     Principal Problem:Pleural effusion        HPI:  63-year-old male with a h/o kidney transplant (May 2023), Anemia of Chronic Disease, Crohn's disease, gout, hypertension, sarcoidosis,  shunt placement, Enterococcus faecalis bacteremia in March 2024 is transferred from [facility].  He reports shortness of breath started today early morning 1 with rest associated with cough without any expectoration.  He lives at home and he completed the cefpodoxime course of antibiotics was given during the recent discharge.  He denies any fever, chest pain, palpitations, lightheadedness, abdominal pain, disturbance with bowel or bladder habits.    He was admitted at [facility] (April 23-26) treated for pneumonia and pleural effusion. Thoracentesis on 04/25 yielding 1050 cc of serous fluid and had 465 nucleated cells (16% neutrophils, 57% lymphocytes, 27% monocytes), 401 RBCs, LDH 88, glucose 86, total protein 3.1, triglyceride 19, moderate white blood cells with no organisms seen. Fluid culture had no growth. Blood cultures had no growth at 5 days. He was treated with antibiotics azithromycin & ceftriaxone during the hospitalization and discharged on cefpodoxime to complete a 10 day course of antibiotics for pneumonia.     Today at [facility] Chest x-ray showed a moderate-size right pleural effusion. White blood cells 4.5, hemoglobin 12.2, hematocrit 36.6, platelets 194, BNP 1037, INR 1.1, sodium 140, potassium 3.8, chloride 107, CO2 22, BUN 17, creatinine 1.8 (1.4 on 5/6), glucose 98, calcium 9.3, albumin 3.3, AST 40, ALT 37, COVID negative, influenza negative      March 19, 2024: Echocardiogram with  EF 60-65%.  Normal diastolic function.    [place] [facility] [place] 97.6, HR 69, RR 16, /70, SaO2 95% on RA    Overview/Hospital Course:  Admitted for respiratory failure 2/2 to pleural effusion and volume overload. Started on supplemental O2 and diuretics. Of note this is reoccurrence of pleural effusion that was drained at previous admission, transudate based on previous analysis. Pulm consulted and recommend diuresis, will consider thora if not improving. Cont IV lasix. ECHO ordered, previous ECHO without reduced function. Elevated BNP and pulm edema on CXR.   KTM consulted for patient with history of renal transplant, recommend cont prograf with level monitoring and prednisone. Hold MMF. Renal u/s ordered.  Levaquin started on admit concern for possible parapneumonic effusion/PNA, resp culture ordered but reports no sputum- ID consulted recommend cont for now but infectious etiology of sxs low. Ucx in process, denies urinary sxs.     Interval History: Patient reports shortness of breath, stable on 2L. Reports dry cough, resp culture ordered but no sputum. Afebrile    Review of Systems   Constitutional:  Negative for fever.   Respiratory:  Positive for cough (dry) and shortness of breath.    Cardiovascular:  Positive for leg swelling.   Genitourinary:  Negative for dysuria.     Objective:     Vital Signs (Most Recent):  Temp: 97.4 °F (36.3 °C) (05/14/24 1538)  Pulse: 71 (05/14/24 1538)  Resp: 20 (05/14/24 1538)  BP: (!) 157/80 (05/14/24 1538)  SpO2: (!) 90 % (05/14/24 1538) Vital Signs (24h Range):  Temp:  [97.4 °F (36.3 °C)-98 °F (36.7 °C)] 97.4 °F (36.3 °C)  Pulse:  [60-83] 71  Resp:  [16-26] 20  SpO2:  [81 %-95 %] 90 %  BP: (115-157)/(70-91) 157/80     Weight: 82.6 kg (182 lb 1.6 oz)  Body mass index is 26.89 kg/m².    Intake/Output Summary (Last 24 hours) at 5/14/2024 1522  Last data filed at 5/14/2024 1200  Gross per 24 hour   Intake 950 ml   Output 100 ml   Net 850 ml      Physical Exam  Vitals and  nursing note reviewed.   Constitutional:       General: He is not in acute distress.     Appearance: Normal appearance.   HENT:      Head: Normocephalic and atraumatic.      Nose: Nose normal.      Mouth/Throat:      Mouth: Mucous membranes are moist.      Pharynx: Oropharynx is clear.   Eyes:      Extraocular Movements: Extraocular movements intact.      Conjunctiva/sclera: Conjunctivae normal.      Pupils: Pupils are equal, round, and reactive to light.   Cardiovascular:      Rate and Rhythm: Normal rate and regular rhythm.      Pulses: Normal pulses.      Heart sounds: Murmur heard.   Pulmonary:      Effort: Pulmonary effort is normal.      Comments: Decreased breath sounds R>L and crackles at bases  Abdominal:      General: Abdomen is flat. Bowel sounds are normal.      Palpations: Abdomen is soft.   Musculoskeletal:         General: Normal range of motion.      Cervical back: Normal range of motion and neck supple.      Right lower leg: Edema present.      Left lower leg: Edema present.      Comments: Rt leg edema >L   Skin:     General: Skin is warm and dry.   Neurological:      General: No focal deficit present.      Mental Status: He is alert and oriented to person, place, and time.   Psychiatric:         Mood and Affect: Mood normal.         Behavior: Behavior normal.         Significant Labs:  CBC:  Recent Labs   Lab 05/14/24  0650   WBC 6.98   HGB 12.7*   HCT 40.7        CMP:  Recent Labs   Lab 05/14/24  0650      K 4.3      CO2 21*   GLU 78   BUN 18   CREATININE 1.7*   CALCIUM 8.9   ANIONGAP 12     PTINR:  Recent Labs   Lab 05/13/24  1156   INR 1.1       Assessment/Plan:      * Pleural effusion  - Patient found to have moderate pleural effusion on imaging. I have personally reviewed and interpreted the following imaging: Xray   - pulmonology team has been consulted   - CT scan of the chest has been not performed because of recent worsening of kidney status (s/p kidney transplant)  creatinine 1.8 (1.4 on 5/6)  - pulm recommend diuresis and monitoring clinical response and need for repeat thora  - Incidental note of V-P catheter in R pleural space which has been present for several years, and not likely related to the current pleural process     Abnormal urinalysis  - UA appears infectious, no dysuria reported  - cont levaquin  - ID following  - f/u Ucx      CKD (chronic kidney disease)  - continue home medicine sodium bicarbonate  - Monitor UOP and serial BMP and adjust therapy as needed. Renally dose meds. Avoid nephrotoxic medications and procedures.  - KTM following    Hypertension  Chronic, controlled. Latest blood pressure and vitals reviewed-     Temp:  [97.4 °F (36.3 °C)-98 °F (36.7 °C)]   Pulse:  [60-83]   Resp:  [16-26]   BP: (115-157)/(70-91)   SpO2:  [81 %-95 %] .   Home meds for hypertension were reviewed and noted below.   Hypertension Medications               furosemide (LASIX) 20 MG tablet Take 1 tablet (20 mg total) by mouth daily as needed.    metoprolol tartrate (LOPRESSOR) 25 MG tablet Take HALF tablet (12.5 mg total) by mouth 2 (two) times daily.    olmesartan (BENICAR) 40 MG tablet Take 1 tablet (40 mg total) by mouth once daily. HOLD SBP <120            While in the hospital, will manage blood pressure as follows; Adjust home antihypertensive regimen as follows- hold ARB     Will utilize p.r.n. blood pressure medication only if patient's blood pressure greater than 180/110 and he develops symptoms such as worsening chest pain or shortness of breath.    Kidney transplant status  - cont prednisone and prograf with level monitoring  - KTM following  - renal u/s ordered  - monitor renal function     Acute respiratory failure with hypoxia  - secondary to pleural effusion and pulm edema  - not on home o2, supplemental O2- wean as tolerated  - Chest x-ray showed a moderate-size right pleural effusion  - pulmonology consult requested for recurrent pleural effusion in case of  status post kidney transplant. Previous effusion transudate  - empiric treatment with levofloxacin has been started in view of recent history of receiving azithromycin, ceftriaxone during the hospitalization followed by cefpodoxime as outpatient.  - ID consulted, cont levaquin for now but low suspicion for infection, procal neg   - resp culture ordered but no sputum  - elevated BNP and CXR with pulm edema- cont IV lasix. ECHO ordered   - Pulm following recommend cont IV lasix and monitor need for repeat thora        Anemia of renal disease  Patient's anemia is currently controlled. Has not received any PRBCs to date. Etiology likely d/t chronic disease.  Current CBC reviewed-   Lab Results   Component Value Date    HGB 12.7 (L) 05/14/2024    HCT 40.7 05/14/2024     Monitor serial CBC and transfuse if patient becomes hemodynamically unstable, symptomatic or H/H drops below 7/21.    Long-term use of immunosuppressant medication  - s/p kidney transplant (May 2023)  - cont prograf, monitoring daily levels.   - cont prednisone  - Hold MMF  - KTM following        VTE Risk Mitigation (From admission, onward)           Ordered     heparin (porcine) injection 5,000 Units  Every 8 hours         05/13/24 2045     IP VTE HIGH RISK PATIENT  Once         05/13/24 2045                    Discharge Planning   SAVITA: 5/16/2024     Code Status: Full Code   Is the patient medically ready for discharge?:     Reason for patient still in hospital (select all that apply): Patient trending condition and Consult recommendations  Discharge Plan A: Home, Home with family                  Olya Moreno MD  Department of Hospital Medicine   [facility] - [facility]

## 2024-05-15 PROBLEM — R82.71 BACTERIURIA: Status: RESOLVED | Noted: 2024-05-15 | Resolved: 2024-05-15

## 2024-05-15 PROBLEM — I82.409 ACUTE DVT (DEEP VENOUS THROMBOSIS): Status: ACTIVE | Noted: 2024-05-15

## 2024-05-15 PROBLEM — D84.821 IMMUNODEFICIENCY DUE TO LONG TERM DRUG THERAPY: Status: ACTIVE | Noted: 2023-05-11

## 2024-05-15 PROBLEM — Z79.899 IMMUNODEFICIENCY DUE TO LONG TERM DRUG THERAPY: Status: ACTIVE | Noted: 2023-05-11

## 2024-05-15 PROBLEM — R82.71 BACTERIURIA: Status: ACTIVE | Noted: 2024-05-15

## 2024-05-15 PROBLEM — R82.71 BACTERIURIA: Status: ACTIVE | Noted: 2024-05-14

## 2024-05-15 LAB
ANION GAP SERPL CALC-SCNC: 12 MMOL/L (ref 8–16)
ASCENDING AORTA: 3.21 CM
AV INDEX (PROSTH): 0.65
AV MEAN GRADIENT: 3 MMHG
AV PEAK GRADIENT: 6 MMHG
AV VALVE AREA BY VELOCITY RATIO: 2.69 CM²
AV VALVE AREA: 2.55 CM²
AV VELOCITY RATIO: 0.69
BASOPHILS # BLD AUTO: 0.03 K/UL (ref 0–0.2)
BASOPHILS NFR BLD: 0.7 % (ref 0–1.9)
BSA FOR ECHO PROCEDURE: 2.01 M2
BUN SERPL-MCNC: 20 MG/DL (ref 8–23)
CALCIUM SERPL-MCNC: 9.3 MG/DL (ref 8.7–10.5)
CHLORIDE SERPL-SCNC: 101 MMOL/L (ref 95–110)
CO2 SERPL-SCNC: 24 MMOL/L (ref 23–29)
CREAT SERPL-MCNC: 1.8 MG/DL (ref 0.5–1.4)
CV ECHO LV RWT: 0.33 CM
DIFFERENTIAL METHOD BLD: ABNORMAL
DOP CALC AO PEAK VEL: 1.19 M/S
DOP CALC AO VTI: 23.71 CM
DOP CALC LVOT AREA: 3.9 CM2
DOP CALC LVOT DIAMETER: 2.23 CM
DOP CALC LVOT PEAK VEL: 0.82 M/S
DOP CALC LVOT STROKE VOLUME: 60.47 CM3
DOP CALCLVOT PEAK VEL VTI: 15.49 CM
E WAVE DECELERATION TIME: 132.18 MSEC
E/A RATIO: 0.92
E/E' RATIO: 5.6 M/S
ECHO LV POSTERIOR WALL: 0.93 CM (ref 0.6–1.1)
EJECTION FRACTION: 63 %
EOSINOPHIL # BLD AUTO: 0.1 K/UL (ref 0–0.5)
EOSINOPHIL NFR BLD: 1.8 % (ref 0–8)
ERYTHROCYTE [DISTWIDTH] IN BLOOD BY AUTOMATED COUNT: 14.7 % (ref 11.5–14.5)
EST. GFR  (NO RACE VARIABLE): 42 ML/MIN/1.73 M^2
FRACTIONAL SHORTENING: 35 % (ref 28–44)
GLUCOSE SERPL-MCNC: 69 MG/DL (ref 70–110)
HCT VFR BLD AUTO: 38.4 % (ref 40–54)
HGB BLD-MCNC: 12.6 G/DL (ref 14–18)
IMM GRANULOCYTES # BLD AUTO: 0.01 K/UL (ref 0–0.04)
IMM GRANULOCYTES NFR BLD AUTO: 0.2 % (ref 0–0.5)
INTERVENTRICULAR SEPTUM: 0.8 CM (ref 0.6–1.1)
LA MAJOR: 6.29 CM
LA MINOR: 6.11 CM
LA WIDTH: 4.84 CM
LEFT ATRIUM SIZE: 4.45 CM
LEFT ATRIUM VOLUME INDEX MOD: 46.1 ML/M2
LEFT ATRIUM VOLUME INDEX: 58.2 ML/M2
LEFT ATRIUM VOLUME MOD: 89.91 CM3
LEFT ATRIUM VOLUME: 113.48 CM3
LEFT INTERNAL DIMENSION IN SYSTOLE: 3.64 CM (ref 2.1–4)
LEFT VENTRICLE DIASTOLIC VOLUME INDEX: 77.63 ML/M2
LEFT VENTRICLE DIASTOLIC VOLUME: 151.38 ML
LEFT VENTRICLE MASS INDEX: 92 G/M2
LEFT VENTRICLE SYSTOLIC VOLUME INDEX: 28.6 ML/M2
LEFT VENTRICLE SYSTOLIC VOLUME: 55.8 ML
LEFT VENTRICULAR INTERNAL DIMENSION IN DIASTOLE: 5.56 CM (ref 3.5–6)
LEFT VENTRICULAR MASS: 179.93 G
LV LATERAL E/E' RATIO: 5.09 M/S
LV SEPTAL E/E' RATIO: 6.22 M/S
LYMPHOCYTES # BLD AUTO: 0.8 K/UL (ref 1–4.8)
LYMPHOCYTES NFR BLD: 17.2 % (ref 18–48)
MAGNESIUM SERPL-MCNC: 1.7 MG/DL (ref 1.6–2.6)
MCH RBC QN AUTO: 35.1 PG (ref 27–31)
MCHC RBC AUTO-ENTMCNC: 32.8 G/DL (ref 32–36)
MCV RBC AUTO: 107 FL (ref 82–98)
MONOCYTES # BLD AUTO: 0.6 K/UL (ref 0.3–1)
MONOCYTES NFR BLD: 13.7 % (ref 4–15)
MV A" WAVE DURATION": 11.8 MSEC
MV PEAK A VEL: 0.61 M/S
MV PEAK E VEL: 0.56 M/S
MV STENOSIS PRESSURE HALF TIME: 38.33 MS
MV VALVE AREA P 1/2 METHOD: 5.74 CM2
NEUTROPHILS # BLD AUTO: 2.9 K/UL (ref 1.8–7.7)
NEUTROPHILS NFR BLD: 66.4 % (ref 38–73)
NRBC BLD-RTO: 0 /100 WBC
OHS CV RV/LV RATIO: 0.61 CM
PHOSPHATE SERPL-MCNC: 2.9 MG/DL (ref 2.7–4.5)
PISA MRMAX VEL: 0.06 M/S
PISA TR MAX VEL: 3.53 M/S
PLATELET # BLD AUTO: 157 K/UL (ref 150–450)
PMV BLD AUTO: 11.4 FL (ref 9.2–12.9)
POTASSIUM SERPL-SCNC: 3.9 MMOL/L (ref 3.5–5.1)
PULM VEIN S/D RATIO: 1.21
PV PEAK D VEL: 0.34 M/S
PV PEAK S VEL: 0.41 M/S
RA MAJOR: 5.16 CM
RA PRESSURE ESTIMATED: 3 MMHG
RA WIDTH: 3.56 CM
RBC # BLD AUTO: 3.59 M/UL (ref 4.6–6.2)
RIGHT VENTRICULAR END-DIASTOLIC DIMENSION: 3.37 CM
RV TB RVSP: 7 MMHG
SINUS: 3.36 CM
SODIUM SERPL-SCNC: 137 MMOL/L (ref 136–145)
STJ: 2.24 CM
TACROLIMUS BLD-MCNC: 6.8 NG/ML (ref 5–15)
TDI LATERAL: 0.11 M/S
TDI SEPTAL: 0.09 M/S
TDI: 0.1 M/S
TR MAX PG: 50 MMHG
TRICUSPID ANNULAR PLANE SYSTOLIC EXCURSION: 1.27 CM
TV REST PULMONARY ARTERY PRESSURE: 53 MMHG
WBC # BLD AUTO: 4.37 K/UL (ref 3.9–12.7)
Z-SCORE OF LEFT VENTRICULAR DIMENSION IN END DIASTOLE: 0
Z-SCORE OF LEFT VENTRICULAR DIMENSION IN END SYSTOLE: 0.49

## 2024-05-15 PROCEDURE — 83735 ASSAY OF MAGNESIUM: CPT | Performed by: STUDENT IN AN ORGANIZED HEALTH CARE EDUCATION/TRAINING PROGRAM

## 2024-05-15 PROCEDURE — 63600175 PHARM REV CODE 636 W HCPCS: Performed by: STUDENT IN AN ORGANIZED HEALTH CARE EDUCATION/TRAINING PROGRAM

## 2024-05-15 PROCEDURE — 25000003 PHARM REV CODE 250: Performed by: STUDENT IN AN ORGANIZED HEALTH CARE EDUCATION/TRAINING PROGRAM

## 2024-05-15 PROCEDURE — 99233 SBSQ HOSP IP/OBS HIGH 50: CPT | Mod: ,,, | Performed by: INTERNAL MEDICINE

## 2024-05-15 PROCEDURE — 99233 SBSQ HOSP IP/OBS HIGH 50: CPT | Mod: ,,, | Performed by: STUDENT IN AN ORGANIZED HEALTH CARE EDUCATION/TRAINING PROGRAM

## 2024-05-15 PROCEDURE — 80048 BASIC METABOLIC PNL TOTAL CA: CPT | Performed by: STUDENT IN AN ORGANIZED HEALTH CARE EDUCATION/TRAINING PROGRAM

## 2024-05-15 PROCEDURE — 80197 ASSAY OF TACROLIMUS: CPT | Performed by: INTERNAL MEDICINE

## 2024-05-15 PROCEDURE — 85025 COMPLETE CBC W/AUTO DIFF WBC: CPT | Performed by: STUDENT IN AN ORGANIZED HEALTH CARE EDUCATION/TRAINING PROGRAM

## 2024-05-15 PROCEDURE — 84100 ASSAY OF PHOSPHORUS: CPT | Performed by: STUDENT IN AN ORGANIZED HEALTH CARE EDUCATION/TRAINING PROGRAM

## 2024-05-15 PROCEDURE — 36415 COLL VENOUS BLD VENIPUNCTURE: CPT | Performed by: STUDENT IN AN ORGANIZED HEALTH CARE EDUCATION/TRAINING PROGRAM

## 2024-05-15 PROCEDURE — 63600175 PHARM REV CODE 636 W HCPCS: Performed by: INTERNAL MEDICINE

## 2024-05-15 PROCEDURE — 25000003 PHARM REV CODE 250

## 2024-05-15 PROCEDURE — 20600001 HC STEP DOWN PRIVATE ROOM

## 2024-05-15 RX ADMIN — HEPARIN SODIUM 5000 UNITS: 5000 INJECTION INTRAVENOUS; SUBCUTANEOUS at 05:05

## 2024-05-15 RX ADMIN — METOPROLOL TARTRATE 12.5 MG: 25 TABLET, FILM COATED ORAL at 08:05

## 2024-05-15 RX ADMIN — APIXABAN 10 MG: 5 TABLET, FILM COATED ORAL at 08:05

## 2024-05-15 RX ADMIN — TACROLIMUS 2 MG: 1 CAPSULE ORAL at 08:05

## 2024-05-15 RX ADMIN — PREDNISONE 5 MG: 5 TABLET ORAL at 08:05

## 2024-05-15 RX ADMIN — FUROSEMIDE 40 MG: 10 INJECTION, SOLUTION INTRAMUSCULAR; INTRAVENOUS at 06:05

## 2024-05-15 RX ADMIN — OXYCODONE HYDROCHLORIDE AND ACETAMINOPHEN 1 TABLET: 5; 325 TABLET ORAL at 03:05

## 2024-05-15 RX ADMIN — LEVOFLOXACIN 750 MG: 750 INJECTION, SOLUTION INTRAVENOUS at 09:05

## 2024-05-15 RX ADMIN — OXYCODONE HYDROCHLORIDE AND ACETAMINOPHEN 1 TABLET: 5; 325 TABLET ORAL at 08:05

## 2024-05-15 RX ADMIN — SODIUM BICARBONATE 650 MG TABLET 1300 MG: at 03:05

## 2024-05-15 RX ADMIN — SODIUM BICARBONATE 650 MG TABLET 1300 MG: at 08:05

## 2024-05-15 RX ADMIN — TACROLIMUS 2 MG: 1 CAPSULE ORAL at 06:05

## 2024-05-15 RX ADMIN — FUROSEMIDE 40 MG: 10 INJECTION, SOLUTION INTRAMUSCULAR; INTRAVENOUS at 08:05

## 2024-05-15 NOTE — PLAN OF CARE
Patient is AAOx3. VSS -2L per nasal cannulat to stay > 90%.  Chest xray and blood cx;'s today. Echo in am.  Started Lasix 40mg IVP BID- 1L UO overnight- clear yellow urine.  Home med immodium started for chronic diarrhea.  Patient c/o pain to back - percoset given, cough syrup given for dry cough and atarax for anxiety  KTS, Pulm, and ID following. .Prograf restarted - cellcept on hold  Pt ambulates to restroom and in room. Left fistula (+)/ (+). Ultrasound completed overnight. Hearing aids in place. Pt has nonskid socks on and turns in bed. Skin Intact. Plan of care reviewed with pt

## 2024-05-15 NOTE — PROGRESS NOTES
MD on call notified of pt's request for home med of immodium.  Pt takes for Chrohn's- has diarrhea off and on and prior to starting antibiotics. Stool does not have foul odor.  Immodium ordered and given

## 2024-05-15 NOTE — ASSESSMENT & PLAN NOTE
On tac, mmf, pred at home  MMF on hold for possible infection, will restart if stable of abx  Continue current tac and pred  Monitor for toxicity

## 2024-05-15 NOTE — PROGRESS NOTES
[facility]plant [facility]  Kidney Transplant  Progress Note      Reason for Follow-up: Reassessment of Kidney Transplant - 5/10/2023  (#1) recipient and management of immunosuppression.     ORGAN: LEFT KIDNEY      Donor Type: Living            Subjective:   History of Present Illness:  Mr. Parikh is a 63 y.o. year old Black male who received a living kidney transplant on 5/10/23. His most recent creatinine is 1.2-1.7. He takes prednisone, and tacrolimus for maintenance immunosuppression. His post transplant course has been complicated by urine leak. Had nephrostomy tube was placed and removed 12/31/23. 5/15/23-Kidney Bx: Good sample, No IFTA no glomerulosclerosis ,ATI no rejection C4d negative     Pertinent history   Crohn's  Sarcoidosis  Hearing loss   shunt placement x3  Enterococcus faecalis bacteremia ( March 2024)  Pneumonia April 2024     was transferred from [facility] on 5/13/2024 with recurrent pleural effusion. He was hospitalized in April with pneumonia and a thoracentesis was performed yielding 1050 cc transudate effusion. He discharged home on oral antibiotics. Mycophenolate was stopped. At [facility], a CXR showed recurrence of moderate R sided pleural effusion and BNP 1037, so patient was [place] [facility] [place].    Previous Echos have not shown HFrEF/HFpEF. CXR on arrival to [facility] showed pulmonary congestion with R pleural effusion. Of note, the patient has had multiple  shunt replacements (secondary to infection) and his  shunt terminates into the pleural space. His Cr 1.7 which is high baseline. Last kidney US in March showed nonobstructing renal stones.     Mr. Parikh is a 63 y.o. year old male who is status post Kidney Transplant - 5/10/2023  (#1).    His maintenance immunosuppression consists of:   Immunosuppressants (From admission, onward)      Start     Stop Route Frequency Ordered    05/14/24 1800  tacrolimus capsule 2 mg         -- Oral 2  "times daily 05/14/24 1220            Hospital Course:  No notes on file    Interval History:  Increased urine output and improvement in shortness of breath. Says he is close to his baseline.     Past Medical, Surgical, Family, and Social History:   Unchanged from H&P.    Scheduled Meds:   apixaban  10 mg Oral BID    Followed by    [START ON 5/22/2024] apixaban  5 mg Oral BID    furosemide (LASIX) injection  40 mg Intravenous BID    levoFLOXacin  750 mg Intravenous Q48H    metoprolol tartrate  12.5 mg Oral BID    predniSONE  5 mg Oral Daily    sodium bicarbonate  1,300 mg Oral TID    tacrolimus  2 mg Oral BID     Continuous Infusions:  PRN Meds:  Current Facility-Administered Medications:     dextromethorphan-guaiFENesin  mg/5 ml, 5 mL, Oral, Q4H PRN    loperamide, 2 mg, Oral, QID PRN    oxyCODONE-acetaminophen, 1 tablet, Oral, Q4H PRN    sodium chloride 0.9%, 10 mL, Intravenous, Q12H PRN    Intake/Output - Last 3 Shifts         05/13 0700  05/14 0659 05/14 0700  05/15 0659 05/15 0700  05/16 0659    P.O.  1020     I.V. (mL/kg)  50 (0.6)     Total Intake(mL/kg)  1070 (13.6)     Urine (mL/kg/hr) 100 950 (0.5)     Stool  0     Total Output 100 950     Net -100 +120            Urine Occurrence 2 x 5 x     Stool Occurrence 2 x 4 x     Emesis Occurrence 0 x               Review of Systems   Respiratory:  Positive for shortness of breath (improved).    Cardiovascular:  Positive for leg swelling (improved).      Objective:     Vital Signs (Most Recent):  Temp: 98.1 °F (36.7 °C) (05/15/24 1104)  Pulse: 63 (05/15/24 1104)  Resp: 18 (05/15/24 1546)  BP: 133/67 (05/15/24 1104)  SpO2: 96 % (05/15/24 1104) Vital Signs (24h Range):  Temp:  [97.7 °F (36.5 °C)-98.1 °F (36.7 °C)] 98.1 °F (36.7 °C)  Pulse:  [58-68] 63  Resp:  [14-21] 18  SpO2:  [86 %-98 %] 96 %  BP: (133-165)/(65-78) 133/67     Weight: 78.9 kg (173 lb 15.1 oz)  Height: 5' 9" (175.3 cm)  Body mass index is 25.69 kg/m².     Physical Exam  Vitals and nursing note " reviewed.   Constitutional:       General: He is not in acute distress.     Appearance: He is obese.   Eyes:      General: No scleral icterus.  Cardiovascular:      Rate and Rhythm: Normal rate.      Comments: No jvd  Pulmonary:      Effort: Pulmonary effort is normal. No respiratory distress.   Musculoskeletal:      Right lower leg: Edema (1+) present.      Left lower leg: No edema.   Skin:     Coloration: Skin is not jaundiced.   Neurological:      Mental Status: He is alert.          Laboratory:  Labs within the past 24 hours have been reviewed.    Diagnostic Results:  US - Kidney: Results for orders placed during the hospital encounter of 05/13/24    US Transplant Kidney With Doppler    Narrative  EXAMINATION:  US TRANSPLANT KIDNEY WITH DOPPLER    CLINICAL HISTORY:  hx transplant;    TECHNIQUE:  Real time gray scale and doppler ultrasound was performed over the patient's renal allograft.    COMPARISON:  04/01/2024    FINDINGS:  Renal allograft in the right lower quadrant.  The allograft measures 11.5 cm. Normal perfusion. No hydronephrosis.  A few nonobstructing stones measuring up to 8 mm.    No fluid collections.    Vasculature:    Resistive indices ranged from 0.77 to 0.79.    Main renal artery peak systolic velocity: 203cm/sec with normal waveform.    Renal artery/iliac ratio: 1.3.    The main renal vein is patent..    Urinary bladder demonstrates a few bladder stones.    Impression  Mildly elevated intraparenchymal resistive indices minimally improved compared to prior exam which may be seen with medical renal disease, rejection, or drug toxicity.    Nonobstructing renal stones.      Electronically signed by: Gary Sheehan MD  Date:    05/15/2024  Time:    07:39  Assessment/Plan:     Bacteriuria  Asymptomatic       Acute DVT (deep venous thrombosis)  RLE now on apixaban  Did not due CTA chest due to risk for CA-TRINO      CKD (chronic kidney disease)  On sodium bicarb      Kidney transplant status  ESKD  "from [facility] on HD 2020; neuro and pulm sarcoid; HCV; crohns  LUKT 5/10/23 CIT 11 cPRA 0 DSA 0 induced with thymo. OR notable for subcapsular hematoma. DGF for hyperkalemia. Takeback for elevated velocity. 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 Verma    Acute respiratory failure with hypoxia  Has improved since hospitalization   shunts drains into right thoracic cavity likely explains effusion and fluid overload explains the edema with recent pneumonia explaining the consolidation  Echo shows elevated pulmonary pressures with normal cvp and no jvd on exam this could be due to pulmonary edema, recent pneumonia or VTE. 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 Time spent caring for patient: 60 minutes    Sathya Hicks Jr., MD  Kidney Transplant  [facility]    "

## 2024-05-15 NOTE — SUBJECTIVE & OBJECTIVE
Subjective:   History of Present Illness:  Mr. Parikh is a 63 y.o. year old Black male who received a living kidney transplant on 5/10/23. His most recent creatinine is 1.2-1.7. He takes prednisone, and tacrolimus for maintenance immunosuppression. His post transplant course has been complicated by urine leak. Had nephrostomy tube was placed and removed 12/31/23. 5/15/23-Kidney Bx: Good sample, No IFTA no glomerulosclerosis ,ATI no rejection C4d negative     Pertinent history   Crohn's  Sarcoidosis  Hearing loss   shunt placement x3  Enterococcus faecalis bacteremia ( March 2024)  Pneumonia April 2024     was transferred from [facility] on 5/13/2024 with recurrent pleural effusion. He was hospitalized in April with pneumonia and a thoracentesis was performed yielding 1050 cc transudate effusion. He discharged home on oral antibiotics. Mycophenolate was stopped. At [facility], a CXR showed recurrence of moderate R sided pleural effusion and BNP 1037, so patient was transferred to [facility] for further workup.    Previous Echos have not shown HFrEF/HFpEF. CXR on arrival to [facility] showed pulmonary congestion with R pleural effusion. Of note, the patient has had multiple  shunt replacements (secondary to infection) and his  shunt terminates into the pleural space. His Cr 1.7 which is high baseline. Last kidney US in March showed nonobstructing renal stones.     Mr. Parikh is a 63 y.o. year old male who is status post Kidney Transplant - 5/10/2023  (#1).    His maintenance immunosuppression consists of:   Immunosuppressants (From admission, onward)      Start     Stop Route Frequency Ordered    05/14/24 1800  tacrolimus capsule 2 mg         -- Oral 2 times daily 05/14/24 1220            Hospital Course:  No notes on file    Interval History:  Increased urine output and improvement in shortness of breath. Says he is close to his baseline.     Past Medical, Surgical, Family, and Social History:  "  Unchanged from H&P.    Scheduled Meds:   apixaban  10 mg Oral BID    Followed by    [START ON 5/22/2024] apixaban  5 mg Oral BID    furosemide (LASIX) injection  40 mg Intravenous BID    levoFLOXacin  750 mg Intravenous Q48H    metoprolol tartrate  12.5 mg Oral BID    predniSONE  5 mg Oral Daily    sodium bicarbonate  1,300 mg Oral TID    tacrolimus  2 mg Oral BID     Continuous Infusions:  PRN Meds:  Current Facility-Administered Medications:     dextromethorphan-guaiFENesin  mg/5 ml, 5 mL, Oral, Q4H PRN    loperamide, 2 mg, Oral, QID PRN    oxyCODONE-acetaminophen, 1 tablet, Oral, Q4H PRN    sodium chloride 0.9%, 10 mL, Intravenous, Q12H PRN    Intake/Output - Last 3 Shifts         05/13 0700  05/14 0659 05/14 0700  05/15 0659 05/15 0700  05/16 0659    P.O.  1020     I.V. (mL/kg)  50 (0.6)     Total Intake(mL/kg)  1070 (13.6)     Urine (mL/kg/hr) 100 950 (0.5)     Stool  0     Total Output 100 950     Net -100 +120            Urine Occurrence 2 x 5 x     Stool Occurrence 2 x 4 x     Emesis Occurrence 0 x               Review of Systems   Respiratory:  Positive for shortness of breath (improved).    Cardiovascular:  Positive for leg swelling (improved).      Objective:     Vital Signs (Most Recent):  Temp: 98.1 °F (36.7 °C) (05/15/24 1104)  Pulse: 63 (05/15/24 1104)  Resp: 18 (05/15/24 1546)  BP: 133/67 (05/15/24 1104)  SpO2: 96 % (05/15/24 1104) Vital Signs (24h Range):  Temp:  [97.7 °F (36.5 °C)-98.1 °F (36.7 °C)] 98.1 °F (36.7 °C)  Pulse:  [58-68] 63  Resp:  [14-21] 18  SpO2:  [86 %-98 %] 96 %  BP: (133-165)/(65-78) 133/67     Weight: 78.9 kg (173 lb 15.1 oz)  Height: 5' 9" (175.3 cm)  Body mass index is 25.69 kg/m².     Physical Exam  Vitals and nursing note reviewed.   Constitutional:       General: He is not in acute distress.     Appearance: He is obese.   Eyes:      General: No scleral icterus.  Cardiovascular:      Rate and Rhythm: Normal rate.      Comments: No jvd  Pulmonary:      Effort: " Pulmonary effort is normal. No respiratory distress.   Musculoskeletal:      Right lower leg: Edema (1+) present.      Left lower leg: No edema.   Skin:     Coloration: Skin is not jaundiced.   Neurological:      Mental Status: He is alert.          Laboratory:  Labs within the past 24 hours have been reviewed.    Diagnostic Results:  US - Kidney: Results for orders placed during the hospital encounter of 05/13/24    US Transplant Kidney With Doppler    Narrative  EXAMINATION:  US TRANSPLANT KIDNEY WITH DOPPLER    CLINICAL HISTORY:  hx transplant;    TECHNIQUE:  Real time gray scale and doppler ultrasound was performed over the patient's renal allograft.    COMPARISON:  04/01/2024    FINDINGS:  Renal allograft in the right lower quadrant.  The allograft measures 11.5 cm. Normal perfusion. No hydronephrosis.  A few nonobstructing stones measuring up to 8 mm.    No fluid collections.    Vasculature:    Resistive indices ranged from 0.77 to 0.79.    Main renal artery peak systolic velocity: 203cm/sec with normal waveform.    Renal artery/iliac ratio: 1.3.    The main renal vein is patent..    Urinary bladder demonstrates a few bladder stones.    Impression  Mildly elevated intraparenchymal resistive indices minimally improved compared to prior exam which may be seen with medical renal disease, rejection, or drug toxicity.    Nonobstructing renal stones.      Electronically signed by: Gary Sheehan MD  Date:    05/15/2024  Time:    07:39

## 2024-05-15 NOTE — ASSESSMENT & PLAN NOTE
ESKD from [facility] on HD 2020; neuro and pulm sarcoid; HCV; crohns  LUKT 5/10/23 CIT 11 cPRA 0 DSA 0 induced with thymo. OR notable for subcapsular hematoma. DGF for hyperkalemia. Takeback for elevated velocity. 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 Verma

## 2024-05-15 NOTE — PROGRESS NOTES
[facility] - Transplant Stepdown  Infectious Disease  Progress Note    Patient Name: Abdoulaye Parikh  MRN: [id number]  Admission Date: 5/13/2024  Length of Stay: 2 days  Attending Physician: Olya Moreno MD  Primary Care Provider: No, Primary Doctor    Isolation Status: No active isolations  Assessment/Plan:      Pulmonary  * Pleural effusion  63M s/p Ktx, hx sarcoid, admitted w/ recurrent pleural effusion. Prior evaluation revealed transudative fluid. Clinically responding to diuresis. No identified infectious process    Recommendations:   - complete 5d course of levaquin  - enterococcus in urine does not need treated as patient is asymptomatic  - if does not improve w/ ongoing diuresis, recommend thora for repeat fluid studies. Low suspicion this is related to infectious process at this time    Will sign off    Anticipated Disposition: TBD    Thank you for your consult. I will sign off. Please contact us if you have any additional questions.    Juyl Xavier DO  Transplant Infectious Disease    Time: 50 minutes   50% of time spent on face-to-face counseling and coordination of care. Counseling included review of test results, diagnosis, and treatment plan with patient and/or family.      Subjective:     Principal Problem:Pleural effusion    HPI: No notes on file  Interval History: no events, feeling better, RLE DVT on US, no identified infectious process    Review of Systems   All other systems reviewed and are negative.    Objective:     Vital Signs (Most Recent):  Temp: 98.1 °F (36.7 °C) (05/15/24 1104)  Pulse: 63 (05/15/24 1104)  Resp: 14 (05/15/24 1104)  BP: 133/67 (05/15/24 1104)  SpO2: 96 % (05/15/24 1104) Vital Signs (24h Range):  Temp:  [97.4 °F (36.3 °C)-98.1 °F (36.7 °C)] 98.1 °F (36.7 °C)  Pulse:  [58-71] 63  Resp:  [14-21] 14  SpO2:  [86 %-98 %] 96 %  BP: (133-165)/(65-80) 133/67     Weight: 78.9 kg (173 lb 15.1 oz)  Body mass index is 25.69 kg/m².    Estimated Creatinine Clearance: 42.6 mL/min (A) (based  on SCr of 1.8 mg/dL (H)).     Physical Exam  Constitutional:       General: He is not in acute distress.     Appearance: Normal appearance. He is well-developed. He is not ill-appearing or diaphoretic.   HENT:      Head: Normocephalic and atraumatic.      Right Ear: External ear normal.      Left Ear: External ear normal.      Nose: Nose normal.   Eyes:      General: No scleral icterus.        Right eye: No discharge.         Left eye: No discharge.      Extraocular Movements: Extraocular movements intact.      Conjunctiva/sclera: Conjunctivae normal.   Pulmonary:      Effort: Pulmonary effort is normal. No respiratory distress.      Breath sounds: No stridor.   Skin:     General: Skin is dry.      Coloration: Skin is not jaundiced or pale.      Findings: No erythema.   Neurological:      General: No focal deficit present.      Mental Status: He is alert and oriented to person, place, and time. Mental status is at baseline.   Psychiatric:         Mood and Affect: Mood normal.         Behavior: Behavior normal.         Thought Content: Thought content normal.         Judgment: Judgment normal.          Significant Labs: CBC:   Recent Labs   Lab 05/14/24  0650 05/15/24  0705   WBC 6.98 4.37   HGB 12.7* 12.6*   HCT 40.7 38.4*    157       Significant Imaging: I have reviewed all pertinent imaging results/findings within the past 24 hours.

## 2024-05-15 NOTE — SUBJECTIVE & OBJECTIVE
Interval History: no events, feeling better, RLE DVT on US, no identified infectious process    Review of Systems   All other systems reviewed and are negative.    Objective:     Vital Signs (Most Recent):  Temp: 98.1 °F (36.7 °C) (05/15/24 1104)  Pulse: 63 (05/15/24 1104)  Resp: 14 (05/15/24 1104)  BP: 133/67 (05/15/24 1104)  SpO2: 96 % (05/15/24 1104) Vital Signs (24h Range):  Temp:  [97.4 °F (36.3 °C)-98.1 °F (36.7 °C)] 98.1 °F (36.7 °C)  Pulse:  [58-71] 63  Resp:  [14-21] 14  SpO2:  [86 %-98 %] 96 %  BP: (133-165)/(65-80) 133/67     Weight: 78.9 kg (173 lb 15.1 oz)  Body mass index is 25.69 kg/m².    Estimated Creatinine Clearance: 42.6 mL/min (A) (based on SCr of 1.8 mg/dL (H)).     Physical Exam  Constitutional:       General: He is not in acute distress.     Appearance: Normal appearance. He is well-developed. He is not ill-appearing or diaphoretic.   HENT:      Head: Normocephalic and atraumatic.      Right Ear: External ear normal.      Left Ear: External ear normal.      Nose: Nose normal.   Eyes:      General: No scleral icterus.        Right eye: No discharge.         Left eye: No discharge.      Extraocular Movements: Extraocular movements intact.      Conjunctiva/sclera: Conjunctivae normal.   Pulmonary:      Effort: Pulmonary effort is normal. No respiratory distress.      Breath sounds: No stridor.   Skin:     General: Skin is dry.      Coloration: Skin is not jaundiced or pale.      Findings: No erythema.   Neurological:      General: No focal deficit present.      Mental Status: He is alert and oriented to person, place, and time. Mental status is at baseline.   Psychiatric:         Mood and Affect: Mood normal.         Behavior: Behavior normal.         Thought Content: Thought content normal.         Judgment: Judgment normal.          Significant Labs: CBC:   Recent Labs   Lab 05/14/24  0650 05/15/24  0705   WBC 6.98 4.37   HGB 12.7* 12.6*   HCT 40.7 38.4*    157       Significant  Imaging: I have reviewed all pertinent imaging results/findings within the past 24 hours.

## 2024-05-15 NOTE — PLAN OF CARE
Pt AAOx4. Independent. VS stable. Echo done - 60-65% EF. Eloquis PO started for non occlusive clot in R common femoral and iliac vein. PRN perc given for pain. Enterococci found in urine. No diarrhea.

## 2024-05-15 NOTE — ASSESSMENT & PLAN NOTE
- u/s: Nonocclusive deep venous thrombus in the RIGHT common femoral, femoral, and iliac veins   - started eliquis  - CTA deferred given renal function

## 2024-05-15 NOTE — MEDICAL/APP STUDENT
[facility]   Infectious Diseases  Progress Note     Patient Name: Abdoulaye Parikh   MRN:  [id number]   Admission Date:  5/13/2024  Length of Stay:  2  Attending Physician:  Olya Moreno MD   Primary Care Provider:  No, Primary Doctor       Isolation Status: No active isolations    Assessment/Plan:      Pulmonary - Pleural Effusion  Abdoulaye Parikh is a 63-year-old male with h/o kidney transplant (May 2023), Crohn's disease, gout, hypertension, sarcoidosis,  shunt placement terminating in the pleural space, Enterococcus faecalis bacteremia (3/2024, suspected source urine) admitted on 5/13/2024 with acute respiratory failure 2/2 chronic right pleural effusion (seen on 2/2024 CT). Recently admitted to [facility] (4/23-26) for same, at time pleural fluid studies transudative and cultures NG. Afebrile and HDS since admission. Has been satting 81-95% on 1.5-2L NC.     As of 5/14, echo EF 63% with severe left atrial dilation. Non-occlusive thrombus of right iliac and femoral veins on US. Urine cultures from 5/13 growing enterococci. Likely urinary tract colonization and non-contributory to current clinical picture.    Right-sided pleural effusion likely due to decreased clearance of  shunt contribution in the setting of heart failure.     RECOMMENDATIONS:   - Continue current levofloxacin until 5/17       Anticipated Disposition: TBD     Thank you for your consult. Please contact us if you have any additional questions.     Carol Robles MD  Infectious Diseases  [email or web address]     Subjective:      Principal Problem: Pleural effusion       HPI: Abdoulaye Parikh is a 63-year-old male with h/o kidney transplant (May 2023), Crohn's disease, gout, hypertension, sarcoidosis,  shunt placement, Enterococcus faecalis bacteremia (3/2024, suspected source urine) transferred from [facility] on 5/13/2024 with shortness of breath x1 day. Associated with dry cough. Recently admitted to  [facility]) for respiratory  failure, diagnosed ultimately with pneumonia and large right pleural effusion. Thoracentesis on  with 1050 cc of serous fluid revmoed, 465 WBC (16% neutrophils, 57% lymphocytes, 27% monocytes), 401 RBCs, LDH 88, glucose 86, total protein 3.1, triglyceride 19 and cultures without growth. He was given CTX + azithromycin during admission and discharged on cefpodoxime to complete a 10 day course of antibiotics for pneumonia.      On review of imaging in our system, pleural effusion has been noted since at least 2024 CT.      Afebrile and HDS since admission. Has been satting 81-95% on 1.5-2L NC. WBC 6.98 and BNP 1325.      Discussed with patient history of reported penicillin allergy. He says he is not actually sure where it came from or if it is a true allergy. He denies history of anaphylaxis or need for Epi pen. He has taken Amoxicillin for dental procedure since allergy listing and also tolerated CTX and Cefpodoxime recently.      Interval History:  Echo EF 63% with severe left atrial dilation. BNP unchanged. Non-occlusive thrombus of right iliac and femoral veins on US. Urinalysis pyuria and hematuria, urine cultures from  growing enterococci.     Review of Systems   Constitutional:  Negative for chills, diaphoresis, fever, malaise/fatigue and weight loss.   Respiratory:  Negative for cough, hemoptysis, sputum production, shortness of breath and wheezing.    Cardiovascular:  Negative for chest pain, palpitations, orthopnea and leg swelling.   Gastrointestinal:  Negative for abdominal pain, blood in stool, constipation, diarrhea, heartburn, melena, nausea and vomiting.   Genitourinary:  Negative for dysuria, frequency, hematuria and urgency.   Musculoskeletal:  Negative for joint pain, myalgias and neck pain.   All other systems reviewed and are negative.    Objective:      Last 24 Hour Vital Signs:  BP  Min: 143/73  Max: 165/78  Temp  Av.8 °F (36.6 °C)  Min: 97.4 °F  (36.3 °C)  Max: 98 °F (36.7 °C)  Pulse  Av.2  Min: 58  Max: 75  Resp  Av.8  Min: 16  Max: 22  SpO2  Av.6 %  Min: 86 %  Max: 98 %  Weight  Av.9 kg (173 lb 15.1 oz)  Min: 78.9 kg (173 lb 15.1 oz)  Max: 78.9 kg (173 lb 15.1 oz)  I/O last 3 completed shifts:  In: 1070 [P.O.:1020; I.V.:50]  Out: 1050 [Urine:1050]    Body mass index is 25.69 kg/m².  Estimated Creatinine Clearance: 42.6 mL/min (A) (based on SCr of 1.8 mg/dL (H)).        Physical Exam  Vitals reviewed.   Constitutional:       General: He is not in acute distress.     Appearance: Normal appearance. He is not ill-appearing.   HENT:      Head: Normocephalic and atraumatic.      Nose: Nose normal. No congestion.      Mouth/Throat:      Mouth: Mucous membranes are moist.      Pharynx: Oropharynx is clear. No oropharyngeal exudate or posterior oropharyngeal erythema.   Eyes:      General: No scleral icterus.     Extraocular Movements: Extraocular movements intact.      Conjunctiva/sclera: Conjunctivae normal.      Pupils: Pupils are equal, round, and reactive to light.   Cardiovascular:      Rate and Rhythm: Normal rate and regular rhythm.      Pulses: Normal pulses.      Heart sounds: No murmur heard.  Pulmonary:      Effort: Pulmonary effort is normal. No respiratory distress.      Breath sounds: No wheezing or rales.      Comments: Decreased breath sounds  Abdominal:      General: Abdomen is flat. Bowel sounds are normal.      Palpations: Abdomen is soft.   Musculoskeletal:         General: No swelling, tenderness, deformity or signs of injury. Normal range of motion.      Cervical back: Normal range of motion and neck supple. No rigidity.   Skin:     General: Skin is warm and dry.      Capillary Refill: Capillary refill takes less than 2 seconds.      Coloration: Skin is not jaundiced.      Findings: No bruising or lesion.   Neurological:      General: No focal deficit present.      Mental Status: He is alert.                Labs  Recent  Labs   Lab 05/14/24  0650 05/15/24  0705   WBC 6.98  --    HGB 12.7*  --    HCT 40.7  --      --    *  --    RDW 14.6*  --     137   K 4.3 3.9    101   CO2 21* 24   BUN 18 20   CREATININE 1.7* 1.8*   GLU 78 69*         Microbiology:  Microbiology Results (last 7 days)       Procedure Component Value Units Date/Time    Urine culture [[id number]]  (Abnormal) Collected: 05/13/24 2251    Order Status: Completed Specimen: Urine Updated: 05/15/24 0626     Urine Culture, Routine ENTEROCOCCUS SPECIES  50,000 - 99,999 cfu/ml  Identification and susceptibility pending      Narrative:      Specimen Source->Urine    Blood culture [[id number]] Collected: 05/14/24 0950    Order Status: Completed Specimen: Blood Updated: 05/14/24 1715     Blood Culture, Routine No Growth to date    Blood culture [[id number]] Collected: 05/14/24 0950    Order Status: Completed Specimen: Blood Updated: 05/14/24 1715     Blood Culture, Routine No Growth to date    Culture, Respiratory with Gram Stain [[id number]]     Order Status: No result Specimen: Respiratory             Significant Imaging: I have reviewed all pertinent imaging results/findings within the past 24 hours.

## 2024-05-15 NOTE — ASSESSMENT & PLAN NOTE
Has improved since hospitalization   shunts drains into right thoracic cavity likely explains effusion and fluid overload explains the edema with recent pneumonia explaining the consolidation  Echo shows elevated pulmonary pressures with normal cvp and no jvd on exam this could be due to pulmonary edema, recent pneumonia or VTE. Last echo 2022 with PASP 28  Cardiology may be better able to clarify the elevated pulmonary pressures with the new MR not seen in 2022  Agree with IV diuresis

## 2024-05-15 NOTE — ASSESSMENT & PLAN NOTE
63M s/p Ktx, hx sarcoid, admitted w/ recurrent pleural effusion. Prior evaluation revealed transudative fluid. Clinically responding to diuresis. No identified infectious process    Recommendations:   - complete 5d course of levaquin  - enterococcus in urine does not need treated as patient is asymptomatic  - if does not improve w/ ongoing diuresis, recommend thora for repeat fluid studies. Low suspicion this is related to infectious process at this time    Will sign off

## 2024-05-16 PROBLEM — D63.1 ANEMIA IN CHRONIC KIDNEY DISEASE: Status: ACTIVE | Noted: 2024-03-10

## 2024-05-16 PROBLEM — B18.2 CHRONIC HEPATITIS C WITHOUT HEPATIC COMA: Status: ACTIVE | Noted: 2020-11-11

## 2024-05-16 PROBLEM — D86.9 SARCOIDOSIS: Status: ACTIVE | Noted: 2019-01-13

## 2024-05-16 PROBLEM — K50.818 CROHN'S DISEASE OF BOTH SMALL AND LARGE INTESTINE WITH OTHER COMPLICATION: Status: ACTIVE | Noted: 2020-11-11

## 2024-05-16 PROBLEM — I34.0 NONRHEUMATIC MITRAL VALVE REGURGITATION: Status: ACTIVE | Noted: 2024-05-16

## 2024-05-16 PROBLEM — N18.9 ACUTE KIDNEY INJURY SUPERIMPOSED ON CKD: Status: ACTIVE | Noted: 2024-05-16

## 2024-05-16 PROBLEM — I50.31 ACUTE DIASTOLIC HEART FAILURE: Status: ACTIVE | Noted: 2024-05-16

## 2024-05-16 PROBLEM — N18.9 ANEMIA IN CHRONIC KIDNEY DISEASE: Status: ACTIVE | Noted: 2024-03-10

## 2024-05-16 PROBLEM — N17.9 ACUTE KIDNEY INJURY SUPERIMPOSED ON CKD: Status: ACTIVE | Noted: 2024-05-16

## 2024-05-16 LAB
ANION GAP SERPL CALC-SCNC: 12 MMOL/L (ref 8–16)
BACTERIA UR CULT: ABNORMAL
BASOPHILS # BLD AUTO: 0.02 K/UL (ref 0–0.2)
BASOPHILS NFR BLD: 0.4 % (ref 0–1.9)
BUN SERPL-MCNC: 24 MG/DL (ref 8–23)
CALCIUM SERPL-MCNC: 9.5 MG/DL (ref 8.7–10.5)
CHLORIDE SERPL-SCNC: 99 MMOL/L (ref 95–110)
CO2 SERPL-SCNC: 26 MMOL/L (ref 23–29)
CREAT SERPL-MCNC: 2.2 MG/DL (ref 0.5–1.4)
DIFFERENTIAL METHOD BLD: ABNORMAL
EOSINOPHIL # BLD AUTO: 0.1 K/UL (ref 0–0.5)
EOSINOPHIL NFR BLD: 1 % (ref 0–8)
ERYTHROCYTE [DISTWIDTH] IN BLOOD BY AUTOMATED COUNT: 14.8 % (ref 11.5–14.5)
EST. GFR  (NO RACE VARIABLE): 33 ML/MIN/1.73 M^2
GLUCOSE SERPL-MCNC: 81 MG/DL (ref 70–110)
HCT VFR BLD AUTO: 35.4 % (ref 40–54)
HGB BLD-MCNC: 11.5 G/DL (ref 14–18)
IMM GRANULOCYTES # BLD AUTO: 0.02 K/UL (ref 0–0.04)
IMM GRANULOCYTES NFR BLD AUTO: 0.4 % (ref 0–0.5)
LYMPHOCYTES # BLD AUTO: 0.8 K/UL (ref 1–4.8)
LYMPHOCYTES NFR BLD: 15.9 % (ref 18–48)
MCH RBC QN AUTO: 34.1 PG (ref 27–31)
MCHC RBC AUTO-ENTMCNC: 32.5 G/DL (ref 32–36)
MCV RBC AUTO: 105 FL (ref 82–98)
MONOCYTES # BLD AUTO: 0.7 K/UL (ref 0.3–1)
MONOCYTES NFR BLD: 14.6 % (ref 4–15)
NEUTROPHILS # BLD AUTO: 3.2 K/UL (ref 1.8–7.7)
NEUTROPHILS NFR BLD: 67.7 % (ref 38–73)
NRBC BLD-RTO: 0 /100 WBC
PLATELET # BLD AUTO: 180 K/UL (ref 150–450)
PMV BLD AUTO: 10.8 FL (ref 9.2–12.9)
POTASSIUM SERPL-SCNC: 3.7 MMOL/L (ref 3.5–5.1)
RBC # BLD AUTO: 3.37 M/UL (ref 4.6–6.2)
SODIUM SERPL-SCNC: 137 MMOL/L (ref 136–145)
TACROLIMUS BLD-MCNC: 8.8 NG/ML (ref 5–15)
WBC # BLD AUTO: 4.79 K/UL (ref 3.9–12.7)

## 2024-05-16 PROCEDURE — 25000003 PHARM REV CODE 250: Performed by: STUDENT IN AN ORGANIZED HEALTH CARE EDUCATION/TRAINING PROGRAM

## 2024-05-16 PROCEDURE — 36415 COLL VENOUS BLD VENIPUNCTURE: CPT | Performed by: STUDENT IN AN ORGANIZED HEALTH CARE EDUCATION/TRAINING PROGRAM

## 2024-05-16 PROCEDURE — 99233 SBSQ HOSP IP/OBS HIGH 50: CPT | Mod: ,,, | Performed by: STUDENT IN AN ORGANIZED HEALTH CARE EDUCATION/TRAINING PROGRAM

## 2024-05-16 PROCEDURE — 25000003 PHARM REV CODE 250

## 2024-05-16 PROCEDURE — 80197 ASSAY OF TACROLIMUS: CPT | Performed by: INTERNAL MEDICINE

## 2024-05-16 PROCEDURE — 20600001 HC STEP DOWN PRIVATE ROOM

## 2024-05-16 PROCEDURE — 80048 BASIC METABOLIC PNL TOTAL CA: CPT | Performed by: STUDENT IN AN ORGANIZED HEALTH CARE EDUCATION/TRAINING PROGRAM

## 2024-05-16 PROCEDURE — 85025 COMPLETE CBC W/AUTO DIFF WBC: CPT | Performed by: STUDENT IN AN ORGANIZED HEALTH CARE EDUCATION/TRAINING PROGRAM

## 2024-05-16 PROCEDURE — 63600175 PHARM REV CODE 636 W HCPCS: Performed by: STUDENT IN AN ORGANIZED HEALTH CARE EDUCATION/TRAINING PROGRAM

## 2024-05-16 PROCEDURE — 63600175 PHARM REV CODE 636 W HCPCS: Performed by: INTERNAL MEDICINE

## 2024-05-16 RX ORDER — HYDRALAZINE HYDROCHLORIDE 20 MG/ML
10 INJECTION INTRAMUSCULAR; INTRAVENOUS EVERY 6 HOURS PRN
Status: DISCONTINUED | OUTPATIENT
Start: 2024-05-16 | End: 2024-05-18 | Stop reason: HOSPADM

## 2024-05-16 RX ORDER — OXYCODONE AND ACETAMINOPHEN 5; 325 MG/1; MG/1
1 TABLET ORAL EVERY 6 HOURS PRN
Status: DISCONTINUED | OUTPATIENT
Start: 2024-05-16 | End: 2024-05-18 | Stop reason: HOSPADM

## 2024-05-16 RX ORDER — HYDROXYZINE HYDROCHLORIDE 25 MG/1
25 TABLET, FILM COATED ORAL 3 TIMES DAILY PRN
Status: DISCONTINUED | OUTPATIENT
Start: 2024-05-16 | End: 2024-05-18 | Stop reason: HOSPADM

## 2024-05-16 RX ADMIN — METOPROLOL TARTRATE 12.5 MG: 25 TABLET, FILM COATED ORAL at 08:05

## 2024-05-16 RX ADMIN — APIXABAN 10 MG: 5 TABLET, FILM COATED ORAL at 08:05

## 2024-05-16 RX ADMIN — SODIUM BICARBONATE 650 MG TABLET 1300 MG: at 08:05

## 2024-05-16 RX ADMIN — OXYCODONE HYDROCHLORIDE AND ACETAMINOPHEN 1 TABLET: 5; 325 TABLET ORAL at 11:05

## 2024-05-16 RX ADMIN — PREDNISONE 5 MG: 5 TABLET ORAL at 08:05

## 2024-05-16 RX ADMIN — SODIUM BICARBONATE 650 MG TABLET 1300 MG: at 03:05

## 2024-05-16 RX ADMIN — TACROLIMUS 2 MG: 1 CAPSULE ORAL at 05:05

## 2024-05-16 RX ADMIN — TACROLIMUS 2 MG: 1 CAPSULE ORAL at 08:05

## 2024-05-16 NOTE — ASSESSMENT & PLAN NOTE
-Inpatient TTE shows EF 60-65%. Left Atrium: Left atrium is severely dilated.  -Mitral Valve: There is at least mild-moderate to probably  moderate (2+) regurgitation with an eccentric jet. moderate pulmonary hypertension. The estimated pulmonary artery systolic pressure is 53 mmHg.  -lasix as above  -Cards consulted

## 2024-05-16 NOTE — HPI
63-year-old male with a h/o kidney transplant (May 2023), Anemia of Chronic Disease, Crohn's disease, gout, hypertension, sarcoidosis,  shunt placement, Enterococcus faecalis bacteremia in March 2024 is transferred from [facility].  He reports shortness of breath started today early morning 1 with rest associated with cough without any expectoration.  He lives at home and he completed the cefpodoxime course of antibiotics was given during the recent discharge.  He denies any fever, chest pain, palpitations, lightheadedness, abdominal pain, disturbance with bowel or bladder habits.     He was admitted at [facility] (April 23-26) treated for pneumonia and pleural effusion. Thoracentesis on 04/25 yielding 1050 cc of serous fluid and had 465 nucleated cells (16% neutrophils, 57% lymphocytes, 27% monocytes), 401 RBCs, LDH 88, glucose 86, total protein 3.1, triglyceride 19, moderate white blood cells with no organisms seen. Fluid culture had no growth. Blood cultures had no growth at 5 days. He was treated with antibiotics azithromycin & ceftriaxone during the hospitalization and discharged on cefpodoxime to complete a 10 day course of antibiotics for pneumonia.      Today at [facility] Chest x-ray showed a moderate-size right pleural effusion. White blood cells 4.5, hemoglobin 12.2, hematocrit 36.6, platelets 194, BNP 1037, INR 1.1, sodium 140, potassium 3.8, chloride 107, CO2 22, BUN 17, creatinine 1.8 (1.4 on 5/6), glucose 98, calcium 9.3, albumin 3.3, AST 40, ALT 37, COVID negative, influenza negative      March 19, 2024: Echocardiogram with EF 60-65%.  Normal diastolic function.     The current vitals at [facility]: T 97.6, HR 69, RR 16, /70, SaO2 95% on RA     Admitted for respiratory failure 2/2 to pleural effusion and volume overload. Started on supplemental O2 and diuretics. Of note this is reoccurrence of pleural effusion that was drained at previous admission, transudate based  on previous analysis. Pulm consulted and recommend diuresis, will consider thora if not improving. Cont IV lasix. ECHO ordered, previous ECHO without reduced function. Elevated BNP and pulm edema on CXR.   - ECHO with dilated LA and pulm HTN normal EF.  Lower extremity u/s done for swelling and revealed DVT. Started on eliquis, deferred CTA given his renal function per KTM.     Of note he has a  shunt that terminates in the pleural space, this has been present for a long time however could possibly contributing to effusion.      KTM consulted for patient with history of renal transplant, recommend cont prograf with level monitoring and prednisone. Hold MMF. Renal u/s Mildly elevated intraparenchymal resistive indices minimally improved compared to prior exam which may be seen with medical renal disease, rejection, or drug toxicity. Nonobstructing renal stones. Patient treated with IV lasix.     Levaquin started on admit concern for possible parapneumonic effusion/PNA, resp culture ordered but reports no sputum- ID consulted recommend complete 5 days levaquin. Ucx colonization with Enterococcus denies urinary sxs, so no need to treat.  Noted Worsening creatinine 5/16. Lasix held.  Pleural effusion looks about the same on chest x-ray repeat.     Cardiology consulted for TTE today with mild-moderate MR and PASP 53 mmHg.

## 2024-05-16 NOTE — ASSESSMENT & PLAN NOTE
- Patient found to have moderate pleural effusion on imaging. I have personally reviewed and interpreted the following imaging: Xray   - pulmonology team has been consulted   - CT scan of the chest has been not performed because of recent worsening of kidney status (s/p kidney transplant) creatinine 1.8 (1.4 on 5/6)  - pulm recommend diuresis and monitoring clinical response and need for repeat thora  - Incidental note of V-P catheter in R pleural space which has been present for several years, and not likely related to the current pleural process per pulm

## 2024-05-16 NOTE — ASSESSMENT & PLAN NOTE
Has improved since hospitalization   shunts drains into right thoracic cavity likely explains effusion and fluid overload explains the edema with recent pneumonia explaining the consolidation  Echo shows elevated pulmonary pressures with normal cvp and no jvd on exam this could be due to pulmonary edema, recent pneumonia or VTE. Last echo 2022 with PASP 28  Cardiology may be better able to clarify the elevated pulmonary pressures with the new MR not seen in 2022  I favor no IV fluids and no diuretics at this time, drink to thirst

## 2024-05-16 NOTE — SUBJECTIVE & OBJECTIVE
Interval History: Patient ambulating well in room to bathroom.  He was on 1 L nasal cannula during my exam.  Place patient on room air, O2 sats 91% by bedside monitor.  He states he feels about the same as yesterday, still short of breath with exertion.  Discussed worsening creatinine.  Discussed with patient's nurse    Review of Systems  A comprehensive review of systems was negative except as noted above.     Objective:     Vital Signs (Most Recent):  Temp: 97.7 °F (36.5 °C) (05/16/24 1544)  Pulse: (!) 59 (05/16/24 1544)  Resp: 14 (05/16/24 1544)  BP: 129/63 (05/16/24 1544)  SpO2: 96 % (05/16/24 1544) Vital Signs (24h Range):  Temp:  [97.5 °F (36.4 °C)-97.7 °F (36.5 °C)] 97.7 °F (36.5 °C)  Pulse:  [58-70] 59  Resp:  [14-20] 14  SpO2:  [94 %-99 %] 96 %  BP: (123-151)/(62-81) 129/63     Weight: 79.5 kg (175 lb 4.3 oz)  Body mass index is 25.88 kg/m².    Intake/Output Summary (Last 24 hours) at 5/16/2024 1602  Last data filed at 5/16/2024 1200  Gross per 24 hour   Intake 1614.07 ml   Output 1875 ml   Net -260.93 ml         Physical Exam  Vitals and nursing note reviewed.   Constitutional:       General: He is not in acute distress.     Appearance: Normal appearance.   HENT:      Head: Normocephalic and atraumatic.      Nose: Nose normal.      Mouth/Throat:      Mouth: Mucous membranes are moist.   Eyes:      Extraocular Movements: Extraocular movements intact.      Conjunctiva/sclera: Conjunctivae normal.      Pupils: Pupils are equal, round, and reactive to light.   Cardiovascular:      Rate and Rhythm: Normal rate and regular rhythm.      Pulses: Normal pulses.      Heart sounds: Murmur heard.   Pulmonary:      Effort: Pulmonary effort is normal.      Comments: Decreased breath sounds R>L and crackles at right base  Abdominal:      General: Abdomen is flat. Bowel sounds are normal.      Palpations: Abdomen is soft.   Musculoskeletal:         General: Normal range of motion.      Cervical back: Normal range of  motion and neck supple.      Right lower leg: Edema present.      Left lower leg: Edema present.      Comments: Rt leg edema >L- edema improving   Skin:     General: Skin is warm and dry.   Neurological:      General: No focal deficit present.      Mental Status: He is alert and oriented to person, place, and time.   Psychiatric:         Mood and Affect: Mood normal.         Behavior: Behavior normal.         Significant Labs: All pertinent labs within the past 24 hours have been reviewed.  BMP:   Recent Labs   Lab 05/15/24  0705 05/16/24  0638   GLU 69* 81    137   K 3.9 3.7    99   CO2 24 26   BUN 20 24*   CREATININE 1.8* 2.2*   CALCIUM 9.3 9.5   MG 1.7  --      CBC:   Recent Labs   Lab 05/15/24  0705 05/16/24  0531   WBC 4.37 4.79   HGB 12.6* 11.5*   HCT 38.4* 35.4*    180       Significant Imaging: I have reviewed all pertinent imaging results/findings within the past 24 hours.

## 2024-05-16 NOTE — ASSESSMENT & PLAN NOTE
- Suspected secondary to pleural effusion and pulm edema  - not on home o2, supplemental O2- wean as tolerated  - Chest x-ray showed a moderate-size right pleural effusion  - pulmonology consult requested for recurrent pleural effusion in case of status post kidney transplant. Previous effusion transudate  - empiric treatment with levofloxacin has been started in view of recent history of receiving azithromycin, ceftriaxone during the hospitalization followed by cefpodoxime as outpatient.  - ID consulted, cont levaquin 5 day course, procal neg   - resp culture ordered but no sputum  - elevated BNP and CXR with pulm edema- s/p IV lasix. ECHO with dilated LA and pulm HTN  - Pulm following recommend cont IV lasix and monitor need for repeat thora  - Patient had Good urine output yesterday.  Worsening kidney function.  Hold further Lasix.  Chest x-ray shows persistent pleural effusion.  Think thoracentesis may be beneficial, defer to pulmonology

## 2024-05-16 NOTE — PLAN OF CARE
Patient is AAOx3. Patient's creat level 2.2 this morning, Lasix stopped. Patient had a repeat CXR with little changes. Plan is for Dr Herrera to speak with Pulmonology about a thoracentesis. Percocet given for pain. Reminded the patient to call for assistance.

## 2024-05-16 NOTE — PROGRESS NOTES
Attempted to draw am labs from [facility] per pt request- BMP hemolyzed. Lab reordered for phlebotomist to collect/ - lab collected

## 2024-05-16 NOTE — ASSESSMENT & PLAN NOTE
- s/p kidney transplant (May 2023)  - cont prograf, monitoring daily levels.   - cont prednisone  - Hold MMF  - KTM following

## 2024-05-16 NOTE — PROGRESS NOTES
Pt ambulated to and from restroom without oxygen. O2 saturation 94-95% upon return. Pt wished to keep oxygen on 2L while sleeping.

## 2024-05-16 NOTE — PROGRESS NOTES
[facility] Medicine  Progress Note    Patient Name: Abdoulaye Parikh  MRN: [id number]  Patient Class: IP- Inpatient   Admission Date: 5/13/2024  Length of Stay: 3 days  Attending Physician: Josephine Herrera MD  Primary Care Provider: Gosia, Primary Doctor        Subjective:     Principal Problem:Pleural effusion        HPI:  63-year-old male with a h/o kidney transplant (May 2023), Anemia of Chronic Disease, Crohn's disease, gout, hypertension, sarcoidosis,  shunt placement, Enterococcus faecalis bacteremia in March 2024 is transferred from [facility].  He reports shortness of breath started today early morning 1 with rest associated with cough without any expectoration.  He lives at home and he completed the cefpodoxime course of antibiotics was given during the recent discharge.  He denies any fever, chest pain, palpitations, lightheadedness, abdominal pain, disturbance with bowel or bladder habits.    He was admitted at [facility] (April 23-26) treated for pneumonia and pleural effusion. Thoracentesis on 04/25 yielding 1050 cc of serous fluid and had 465 nucleated cells (16% neutrophils, 57% lymphocytes, 27% monocytes), 401 RBCs, LDH 88, glucose 86, total protein 3.1, triglyceride 19, moderate white blood cells with no organisms seen. Fluid culture had no growth. Blood cultures had no growth at 5 days. He was treated with antibiotics azithromycin & ceftriaxone during the hospitalization and discharged on cefpodoxime to complete a 10 day course of antibiotics for pneumonia.     Today at [facility] Chest x-ray showed a moderate-size right pleural effusion. White blood cells 4.5, hemoglobin 12.2, hematocrit 36.6, platelets 194, BNP 1037, INR 1.1, sodium 140, potassium 3.8, chloride 107, CO2 22, BUN 17, creatinine 1.8 (1.4 on 5/6), glucose 98, calcium 9.3, albumin 3.3, AST 40, ALT 37, COVID negative, influenza negative      March 19, 2024: Echocardiogram  with EF 60-65%.  Normal diastolic function.    The current vitals at [facility] [place]: T 97.6, HR 69, RR 16, /70, SaO2 95% on RA    Overview/Hospital Course:  Admitted for respiratory failure 2/2 to pleural effusion and volume overload. Started on supplemental O2 and diuretics. Of note this is reoccurrence of pleural effusion that was drained at previous admission, transudate based on previous analysis. Pulm consulted and recommend diuresis, will consider thora if not improving. Cont IV lasix. ECHO ordered, previous ECHO without reduced function. Elevated BNP and pulm edema on CXR.   - ECHO with dilated LA and pulm HTN normal EF.  Lower extremity u/s done for swelling and revealed DVT. Started on eliquis, deferred CTA given his renal function per KTM.    Of note he has a  shunt that terminates in the pleural space, this has been present for a long time however could possibly contributing to effusion.     KTM consulted for patient with history of renal transplant, recommend cont prograf with level monitoring and prednisone. Hold MMF. Renal u/s Mildly elevated intraparenchymal resistive indices minimally improved compared to prior exam which may be seen with medical renal disease, rejection, or drug toxicity. Nonobstructing renal stones. Patient treated with IV lasix.    Levaquin started on admit concern for possible parapneumonic effusion/PNA, resp culture ordered but reports no sputum- ID consulted recommend complete 5 days levaquin. Ucx colonization with Enterococcus denies urinary sxs, so no need to treat.  Noted Worsening creatinine 5/16. Lasix held.  Pleural effusion looks about the same on chest x-ray repeat.    Interval History: Patient ambulating well in room to bathroom.  He was on 1 L nasal cannula during my exam.  Place patient on room air, O2 sats 91% by bedside monitor.  He states he feels about the same as yesterday, still short of breath with exertion.  Discussed worsening creatinine.  Discussed with  patient's nurse    Review of Systems  A comprehensive review of systems was negative except as noted above.     Objective:     Vital Signs (Most Recent):  Temp: 97.7 °F (36.5 °C) (05/16/24 1544)  Pulse: (!) 59 (05/16/24 1544)  Resp: 14 (05/16/24 1544)  BP: 129/63 (05/16/24 1544)  SpO2: 96 % (05/16/24 1544) Vital Signs (24h Range):  Temp:  [97.5 °F (36.4 °C)-97.7 °F (36.5 °C)] 97.7 °F (36.5 °C)  Pulse:  [58-70] 59  Resp:  [14-20] 14  SpO2:  [94 %-99 %] 96 %  BP: (123-151)/(62-81) 129/63     Weight: 79.5 kg (175 lb 4.3 oz)  Body mass index is 25.88 kg/m².    Intake/Output Summary (Last 24 hours) at 5/16/2024 1602  Last data filed at 5/16/2024 1200  Gross per 24 hour   Intake 1614.07 ml   Output 1875 ml   Net -260.93 ml         Physical Exam  Vitals and nursing note reviewed.   Constitutional:       General: He is not in acute distress.     Appearance: Normal appearance.   HENT:      Head: Normocephalic and atraumatic.      Nose: Nose normal.      Mouth/Throat:      Mouth: Mucous membranes are moist.   Eyes:      Extraocular Movements: Extraocular movements intact.      Conjunctiva/sclera: Conjunctivae normal.      Pupils: Pupils are equal, round, and reactive to light.   Cardiovascular:      Rate and Rhythm: Normal rate and regular rhythm.      Pulses: Normal pulses.      Heart sounds: Murmur heard.   Pulmonary:      Effort: Pulmonary effort is normal.      Comments: Decreased breath sounds R>L and crackles at right base  Abdominal:      General: Abdomen is flat. Bowel sounds are normal.      Palpations: Abdomen is soft.   Musculoskeletal:         General: Normal range of motion.      Cervical back: Normal range of motion and neck supple.      Right lower leg: Edema present.      Left lower leg: Edema present.      Comments: Rt leg edema >L- edema improving   Skin:     General: Skin is warm and dry.   Neurological:      General: No focal deficit present.      Mental Status: He is alert and oriented to person, place,  and time.   Psychiatric:         Mood and Affect: Mood normal.         Behavior: Behavior normal.         Significant Labs: All pertinent labs within the past 24 hours have been reviewed.  BMP:   Recent Labs   Lab 05/15/24  0705 05/16/24  0638   GLU 69* 81    137   K 3.9 3.7    99   CO2 24 26   BUN 20 24*   CREATININE 1.8* 2.2*   CALCIUM 9.3 9.5   MG 1.7  --      CBC:   Recent Labs   Lab 05/15/24  0705 05/16/24  0531   WBC 4.37 4.79   HGB 12.6* 11.5*   HCT 38.4* 35.4*    180       Significant Imaging: I have reviewed all pertinent imaging results/findings within the past 24 hours.    Assessment/Plan:      * Pleural effusion  - Patient found to have moderate pleural effusion on imaging. I have personally reviewed and interpreted the following imaging: Xray   - pulmonology team consulted/following.   - CT scan of the chest has been not performed because of recent worsening of kidney status (s/p kidney transplant) creatinine 1.8 (1.4 on 5/6)  - pulm recommend diuresis and monitoring clinical response and need for repeat thora  - Incidental note of V-P catheter in R pleural space which has been present for several years, and not likely related to the current pleural process per pulm    Nonrheumatic mitral valve regurgitation  -Inpatient TTE shows EF 60-65%. Left Atrium: Left atrium is severely dilated.  -Mitral Valve: There is at least mild-moderate to probably  moderate (2+) regurgitation with an eccentric jet. moderate pulmonary hypertension. The estimated pulmonary artery systolic pressure is 53 mmHg.  -lasix as above  -Cards consult    Acute kidney injury superimposed on CKD  -Noted  worsening creatinine this a.m. to 2.2.  suspect due to Lasix use  - hold further Lasix. KTM following.     Acute DVT (deep venous thrombosis)  - u/s: Nonocclusive deep venous thrombus in the RIGHT common femoral, femoral, and iliac veins   - started eliquis  - CTA deferred given renal function     Bacteriuria  - UA  appears infectious, no dysuria reported  - ID following  - f/u Ucx Enterococcus likely colonization- no sxs and no need to treat      CKD (chronic kidney disease)  - continue home medicine sodium bicarbonate  - Monitor UOP and serial BMP and adjust therapy as needed. Renally dose meds. Avoid nephrotoxic medications and procedures.  - KTM following  - Cr worsening. Hold Further Lasix    Hypertension  Chronic, controlled. Latest blood pressure and vitals reviewed-     Temp:  [97.5 °F (36.4 °C)-97.7 °F (36.5 °C)]   Pulse:  [58-70]   Resp:  [14-20]   BP: (123-151)/(62-81)   SpO2:  [94 %-99 %] .   Home meds for hypertension were reviewed and noted below.   Hypertension Medications               furosemide (LASIX) 20 MG tablet Take 1 tablet (20 mg total) by mouth daily as needed.    metoprolol tartrate (LOPRESSOR) 25 MG tablet Take HALF tablet (12.5 mg total) by mouth 2 (two) times daily.    olmesartan (BENICAR) 40 MG tablet Take 1 tablet (40 mg total) by mouth once daily. HOLD SBP <120       -While in the hospital, will manage blood pressure as follows; Adjust home antihypertensive regimen as follows- hold ARB  . Continue Lopressor    -Will utilize p.r.n. blood pressure medication only if patient's blood pressure greater than 180/110    Kidney transplant status  - cont prednisone and prograf with level monitoring  - KTM following  - renal u/s - Mildly elevated intraparenchymal resistive indices minimally improved compared to prior exam which may be seen with medical renal disease, rejection, or drug toxicity. Nonobstructing renal stones.  - monitor renal function     Acute respiratory failure with hypoxia  - Suspected secondary to pleural effusion and pulm edema  - not on home o2, supplemental O2- wean as tolerated  - Chest x-ray showed a moderate-size right pleural effusion  - pulmonology consult requested for recurrent pleural effusion in case of status post kidney transplant. Previous effusion transudate  - empiric  "treatment with levofloxacin has been started in view of recent history of receiving azithromycin, ceftriaxone during the hospitalization followed by cefpodoxime as outpatient.  - ID consulted, cont levaquin 5 day course, procal neg   - resp culture ordered but no sputum  - elevated BNP and CXR with pulm edema- s/p IV lasix. ECHO with dilated LA and pulm HTN  - Pulm following recommend cont IV lasix and monitor need for repeat thora  - Patient had Good urine output yesterday.  Worsening kidney function.  Hold further Lasix.  Chest x-ray shows persistent pleural effusion.  Think thoracentesis may be beneficial, defer to pulmonology        Anemia of renal disease  Patient's anemia is currently controlled. Has not received any PRBCs to date. Etiology likely d/t chronic disease.  Current CBC reviewed-   Lab Results   Component Value Date    HGB 11.5 (L) 05/16/2024    HCT 35.4 (L) 05/16/2024     Monitor serial CBC and transfuse if patient becomes hemodynamically unstable, symptomatic or H/H drops below 7/21.    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   - cont prednisone  - Hold MMF  - KTM following      Prophylactic immunotherapy  - noted given hx of transplant and immunosuppressive medications       At risk for opportunistic infections  - noted given hx of transplant and immunosuppressive medications         VTE Risk Mitigation (From admission, onward)           Ordered     apixaban tablet 5 mg  2 times daily        Placed in "Followed by" Linked Group    05/15/24 0554     apixaban tablet 10 mg  2 times daily        Placed in "Followed by" Linked Group    05/15/24 0554     IP VTE HIGH RISK PATIENT  Once         05/13/24 2045                    Discharge Planning   SAVITA: 5/19/2024     Code Status: Full Code   Is the patient medically ready for discharge?:     Reason for patient still in hospital (select all that apply): Patient trending " "condition. Consult recommendations  Discharge Plan A: Home, Home with family                Josephine Herrera MD  Department of Hospital Medicine  [facility] [address] [facility]  05/16/2024      *Portions of this note may have been created with voice recognition software. Occasional "wrong word "or "sound alike" substitutions may have occurred due to the inherent limitations of voice recognition software.  Please excuse errors. Please, read the note carefully and recognize, using context, where substitutions have occurred.      "

## 2024-05-16 NOTE — ASSESSMENT & PLAN NOTE
Patient's anemia is currently controlled. Has not received any PRBCs to date. Etiology likely d/t chronic disease.  Current CBC reviewed-   Lab Results   Component Value Date    HGB 11.5 (L) 05/16/2024    HCT 35.4 (L) 05/16/2024     Monitor serial CBC and transfuse if patient becomes hemodynamically unstable, symptomatic or H/H drops below 7/21.

## 2024-05-16 NOTE — PLAN OF CARE
Patient is AAOx3. VSS . 95% RA ambulating. Nasal canula @2L per pt request.  Echo performed this am-.  ECHO with dilated LA and pulm HTN normal EF. Cont  Lasix 40mg IVP BID- 2L UO / 24 hrs- clear yellow urine. HAs not needed immodium.  Patient c/o pain to back - percoset given with relief.  KTS, Pulm, and ID following- cont diuresis and iV abx, for  .possible parapneumonic effusion/PNA,  .Continue to hold cellcept on hold  Pt ambulates to restroom and in room. Left fistula (+)/ (+). Eloquis started for RLE DVT.   Hearing aids in place. Pt has nonskid socks on and turns in bed. Skin Intact. Plan of care reviewed with pt

## 2024-05-16 NOTE — SUBJECTIVE & OBJECTIVE
Past Medical History:   Diagnosis Date    Acute blood loss anemia 05/11/2023    Anemia     Arthritis     Cirrhosis     CKD (chronic kidney disease) stage 4, GFR 15-29 ml/min, slow graft function with sustaine creatinien improvement 05/26/2023    Crohn's disease     Disorder of kidney and ureter     Stage 5    Encounter for blood transfusion     Gout     Hearing loss     Hepatitis     Had Hep C Cleared    Hypertension     Immunosuppressive management encounter following kidney transplant 05/26/2023    Neurosarcoidosis 2018    Obesity     Osteoarthritis     S/P kidney transplant 05/11/2023    Sarcoid neuropathy     Sarcoidosis, lung     Steatosis        Past Surgical History:   Procedure Laterality Date    AV FISTULA PLACEMENT Left     BRAIN SURGERY      COLON SURGERY      Exploratory lap x 4 for Chron's disease. Likely right colectomy    CSF SHUNT      DIAGNOSTIC ULTRASOUND N/A 5/15/2023    Procedure: ULTRASOUND, DIAGNOSTIC;  Surgeon: Chivo Brown MD;  Location: [facility] OR [address] [place] [address];  Service: Transplant;  Laterality: N/A;    JOINT REPLACEMENT Right     Hip    KIDNEY TRANSPLANT N/A 5/10/2023    Procedure: TRANSPLANT, KIDNEY - RQ 7PM START;  Surgeon: Chivo Brown MD;  Location: [facility] OR [address] [place] [address];  Service: Transplant;  Laterality: N/A;    NEPHROSTOMY N/A 6/3/2023    Procedure: Nephrostomy;  Surgeon: Silvia Surgeon;  Location: [facility];  Service: Anesthesiology;  Laterality: N/A;    RENAL BIOPSY  5/15/2023    Procedure: BIOPSY, KIDNEY;  Surgeon: Chivo Brown MD;  Location: [address];  Service: Transplant;;    RENAL EXPLORATION  5/15/2023    Procedure: EXPLORATION, KIDNEY;  Surgeon: Chivo Brown MD;  Location: [address];  Service: Transplant;;    TOTAL HIP ARTHROPLASTY Right        Review of patient's allergies indicates:   Allergen Reactions    Bactrim [sulfamethoxazole-trimethoprim] Shortness Of Breath and Itching    Penicillins Shortness Of Breath and Itching     5/14/2024:  has tolerated amoxicillin since for dental infection, as well as CTX and Cefpodoxime during hospital admission.        No current facility-administered medications on file prior to encounter.     Current Outpatient Medications on File Prior to Encounter   Medication Sig    benzonatate (TESSALON) 200 MG capsule TAKE 1 CAPSULE BY MOUTH THREE TIMES DAILY FOR 10 DAYS AS NEEDED FOR COUGH    furosemide (LASIX) 20 MG tablet Take 1 tablet (20 mg total) by mouth daily as needed.    loperamide HCl (IMODIUM A-D ORAL) Take by mouth.    magnesium oxide (MAG-OX) 400 mg (241.3 mg magnesium) tablet Take 1 tablet (400 mg total) by mouth 2 (two) times daily.    metoprolol tartrate (LOPRESSOR) 25 MG tablet Take HALF tablet (12.5 mg total) by mouth 2 (two) times daily.    olmesartan (BENICAR) 40 MG tablet Take 1 tablet (40 mg total) by mouth once daily. HOLD SBP <120    predniSONE (DELTASONE) 5 MG tablet Take 1 tablet (5 mg total) by mouth once daily.    sodium bicarbonate 650 MG tablet Take 2 tablets (1,300 mg total) by mouth 3 (three) times daily.    tacrolimus (PROGRAF) 1 MG Cap Take 2 capsules (2 mg total) by mouth every morning AND 1 capsule (1 mg total) every evening. Txp Date:5/10/2023 (Kidney) Disch Date:5/17/2023 ICD10:Z94.0 Txp Location:[facility].    tamsulosin (FLOMAX) 0.4 mg Cap Take 1 capsule (0.4 mg total) by mouth once daily.     Family History       Problem Relation (Age of Onset)    COPD Sister    Cancer Father, Maternal Grandfather, Paternal Grandfather    Diabetes Father    Heart disease Father, Sister    Hypertension Mother, Father    Lung cancer Maternal Grandfather, Paternal Grandfather    Multiple myeloma Father    Stroke Father          Tobacco Use    Smoking status: Never    Smokeless tobacco: Never   Substance and Sexual Activity    Alcohol use: Not Currently    Drug use: Never    Sexual activity: Yes     Partners: Female     Review of Systems   Cardiovascular:  Positive for dyspnea on exertion. Negative for chest  pain.   Respiratory:  Positive for shortness of breath.    All other systems reviewed and are negative.    Objective:     Vital Signs (Most Recent):  Temp: 97.7 °F (36.5 °C) (05/16/24 1544)  Pulse: (!) 59 (05/16/24 1544)  Resp: 14 (05/16/24 1544)  BP: 129/63 (05/16/24 1544)  SpO2: 96 % (05/16/24 1544) Vital Signs (24h Range):  Temp:  [97.5 °F (36.4 °C)-97.7 °F (36.5 °C)] 97.7 °F (36.5 °C)  Pulse:  [58-70] 59  Resp:  [14-20] 14  SpO2:  [94 %-99 %] 96 %  BP: (123-145)/(62-75) 129/63     Weight: 79.5 kg (175 lb 4.3 oz)  Body mass index is 25.88 kg/m².    SpO2: 96 %         Intake/Output Summary (Last 24 hours) at 5/16/2024 1722  Last data filed at 5/16/2024 1200  Gross per 24 hour   Intake 1254.07 ml   Output 1075 ml   Net 179.07 ml       Lines/Drains/Airways       Central Venous Catheter Line  Duration                  Hemodialysis AV Graft Left upper arm -- days              Peripheral Intravenous Line  Duration                  Peripheral IV - Single Lumen 05/14/24 1607 20 G;1 3/4 in Anterior;Proximal;Right Upper Arm 2 days                     Physical Exam  Constitutional:       General: He is not in acute distress.     Appearance: Normal appearance.   HENT:      Head: Normocephalic.      Mouth/Throat:      Mouth: Mucous membranes are moist.   Eyes:      Extraocular Movements: Extraocular movements intact.   Neck:      Comments: No JVD  Cardiovascular:      Rate and Rhythm: Normal rate and regular rhythm.   Pulmonary:      Breath sounds: Normal breath sounds.   Abdominal:      Palpations: Abdomen is soft.   Musculoskeletal:      Cervical back: Neck supple.      Right lower leg: Edema present.      Left lower leg: No edema.   Skin:     General: Skin is warm.   Neurological:      Mental Status: He is alert and oriented to person, place, and time.          Significant Labs: BMP:   Recent Labs   Lab 05/15/24  0705 05/16/24  0638   GLU 69* 81    137   K 3.9 3.7    99   CO2 24 26   BUN 20 24*   CREATININE  "1.8* 2.2*   CALCIUM 9.3 9.5   MG 1.7  --    , CMP   Recent Labs   Lab 05/15/24  0705 05/16/24  0638    137   K 3.9 3.7    99   CO2 24 26   GLU 69* 81   BUN 20 24*   CREATININE 1.8* 2.2*   CALCIUM 9.3 9.5   ANIONGAP 12 12   , CBC   Recent Labs   Lab 05/15/24  0705 05/16/24  0531   WBC 4.37 4.79   HGB 12.6* 11.5*   HCT 38.4* 35.4*    180   , INR No results for input(s): "INR", "PROTIME" in the last 48 hours., Lipid Panel No results for input(s): "CHOL", "HDL", "LDLCALC", "TRIG", "CHOLHDL" in the last 48 hours., and Troponin No results for input(s): "TROPONINI" in the last 48 hours.    Significant Imaging:   TTE 5/15/24    Left Ventricle: Regional wall motion abnormalities present. There is normal systolic function with a visually estimated ejection fraction of 60 - 65%. Ejection fraction by visual approximation is 63%.    Left Atrium: Left atrium is severely dilated.    Right Atrium: Right atrium is mildly dilated.    Aortic Valve: The aortic valve is a trileaflet valve. There is trace aortic regurgitation.    Mitral Valve: There is at least mild-moderate to probably  moderate (2+) regurgitation with an eccentric jet.    Pulmonary Artery: There is moderate pulmonary hypertension. The estimated pulmonary artery systolic pressure is 53 mmHg.    IVC/SVC: Normal venous pressure at 3 mmHg.  "

## 2024-05-16 NOTE — PROGRESS NOTES
[facility] - Transplant Stepdown  Kidney Transplant  Progress Note      Reason for Follow-up: Reassessment of Kidney Transplant - 5/10/2023  (#1) recipient and management of immunosuppression.     ORGAN: LEFT KIDNEY      Donor Type: Living            Subjective:   History of Present Illness:  Mr. Parikh is a 63 y.o. year old Black male who received a living kidney transplant on 5/10/23. His most recent creatinine is 1.2-1.7. He takes prednisone, and tacrolimus for maintenance immunosuppression. His post transplant course has been complicated by urine leak. Had nephrostomy tube was placed and removed 12/31/23. 5/15/23-Kidney Bx: Good sample, No IFTA no glomerulosclerosis ,ATI no rejection C4d negative     Pertinent history   Crohn's  Sarcoidosis  Hearing loss   shunt placement x3  Enterococcus faecalis bacteremia ( March 2024)  Pneumonia April 2024     was transferred from [facility] on 5/13/2024 with recurrent pleural effusion. He was hospitalized in April with pneumonia and a thoracentesis was performed yielding 1050 cc transudate effusion. He discharged home on oral antibiotics. Mycophenolate was stopped. At [facility], a CXR showed recurrence of moderate R sided pleural effusion and BNP 1037, so patient was transferred to [facility] for further workup.    Previous Echos have not shown HFrEF/HFpEF. CXR on arrival to [facility] showed pulmonary congestion with R pleural effusion. Of note, the patient has had multiple  shunt replacements (secondary to infection) and his  shunt terminates into the pleural space. His Cr 1.7 which is high baseline. Last kidney US in March showed nonobstructing renal stones.       Hospital Course:  No notes on file    Interval History:  Slight increase in creatinine from yesterday    Past Medical, Surgical, Family, and Social History:   Unchanged from H&P.    Scheduled Meds:   apixaban  10 mg Oral BID    Followed by    [START ON 5/22/2024] apixaban  5 mg Oral BID     "levoFLOXacin  750 mg Intravenous Q48H    metoprolol tartrate  12.5 mg Oral BID    predniSONE  5 mg Oral Daily    sodium bicarbonate  1,300 mg Oral TID    tacrolimus  2 mg Oral BID     Continuous Infusions:  PRN Meds:  Current Facility-Administered Medications:     hydrALAZINE, 10 mg, Intravenous, Q6H PRN    hydrOXYzine HCL, 25 mg, Oral, TID PRN    loperamide, 2 mg, Oral, QID PRN    oxyCODONE-acetaminophen, 1 tablet, Oral, Q6H PRN    sodium chloride 0.9%, 10 mL, Intravenous, Q12H PRN    Intake/Output - Last 3 Shifts         05/14 0700  05/15 0659 05/15 0700 05/16 0659 05/16 0700  05/17 0659    P.O. 1020 560 780    I.V. (mL/kg) 50 (0.6)      IV Piggyback  274.1     Total Intake(mL/kg) 1070 (13.6) 834.1 (10.5) 780 (9.8)    Urine (mL/kg/hr) 950 (0.5) 1875 (1)     Stool 0 0     Total Output 950 1875     Net +120 -1040.9 +780           Urine Occurrence 5 x  3 x    Stool Occurrence 4 x 1 x 2 x             Review of Systems   Respiratory:  Negative for shortness of breath.    Cardiovascular:  Negative for leg swelling.      Objective:     Vital Signs (Most Recent):  Temp: 97.7 °F (36.5 °C) (05/16/24 1544)  Pulse: (!) 59 (05/16/24 1544)  Resp: 14 (05/16/24 1544)  BP: 129/63 (05/16/24 1544)  SpO2: 96 % (05/16/24 1544) Vital Signs (24h Range):  Temp:  [97.5 °F (36.4 °C)-97.7 °F (36.5 °C)] 97.7 °F (36.5 °C)  Pulse:  [58-70] 59  Resp:  [14-20] 14  SpO2:  [94 %-99 %] 96 %  BP: (123-151)/(62-81) 129/63     Weight: 79.5 kg (175 lb 4.3 oz)  Height: 5' 9" (175.3 cm)  Body mass index is 25.88 kg/m².     Physical Exam  Vitals and nursing note reviewed.   Constitutional:       General: He is not in acute distress.     Appearance: He is obese.   Eyes:      General: No scleral icterus.  Cardiovascular:      Rate and Rhythm: Normal rate.   Pulmonary:      Effort: Pulmonary effort is normal. No respiratory distress.   Musculoskeletal:      Right lower leg: No edema.      Left lower leg: No edema.   Skin:     Coloration: Skin is not " jaundiced.   Neurological:      Mental Status: He is alert.          Laboratory:  Labs within the past 24 hours have been reviewed.    Diagnostic Results:  US - Kidney: Results for orders placed during the hospital encounter of 05/13/24    US Transplant Kidney With Doppler    Narrative  EXAMINATION:  US TRANSPLANT KIDNEY WITH DOPPLER    CLINICAL HISTORY:  hx transplant;    TECHNIQUE:  Real time gray scale and doppler ultrasound was performed over the patient's renal allograft.    COMPARISON:  04/01/2024    FINDINGS:  Renal allograft in the right lower quadrant.  The allograft measures 11.5 cm. Normal perfusion. No hydronephrosis.  A few nonobstructing stones measuring up to 8 mm.    No fluid collections.    Vasculature:    Resistive indices ranged from 0.77 to 0.79.    Main renal artery peak systolic velocity: 203cm/sec with normal waveform.    Renal artery/iliac ratio: 1.3.    The main renal vein is patent..    Urinary bladder demonstrates a few bladder stones.    Impression  Mildly elevated intraparenchymal resistive indices minimally improved compared to prior exam which may be seen with medical renal disease, rejection, or drug toxicity.    Nonobstructing renal stones.      Electronically signed by: Gary Sheehan MD  Date:    05/15/2024  Time:    07:39  Assessment/Plan:     Acute DVT (deep venous thrombosis)  RLE now on apixaban  Did not due CTA chest due to risk for CI-TRINO      CKD (chronic kidney disease)  On sodium bicarb      Kidney transplant status  ESKD from [facility] on HD 2020; neuro and pulm sarcoid; HCV; crohns  LUKT 5/10/23 CIT 11 cPRA 0 DSA 0 induced with thymo. OR notable for subcapsular hematoma. DGF for hyperkalemia. Takeback for elevated velocity. 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 Verma    Acute respiratory failure with hypoxia  Has improved since hospitalization   shunts drains into right thoracic cavity likely explains effusion and fluid overload explains the  "edema with recent pneumonia explaining the consolidation  Echo shows elevated pulmonary pressures with normal cvp and no jvd on exam this could be due to pulmonary edema, recent pneumonia or VTE. 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

## 2024-05-16 NOTE — ASSESSMENT & PLAN NOTE
Patient's anemia is currently controlled. Has not received any PRBCs to date. Etiology likely d/t chronic disease.  Current CBC reviewed-   Lab Results   Component Value Date    HGB 12.6 (L) 05/15/2024    HCT 38.4 (L) 05/15/2024     Monitor serial CBC and transfuse if patient becomes hemodynamically unstable, symptomatic or H/H drops below 7/21.

## 2024-05-16 NOTE — PROGRESS NOTES
[facility] - Transplant [facility] Medicine  Progress Note    Patient Name: Abdoulaye Parikh  MRN: [id number]  Patient Class: IP- Inpatient   Admission Date: 5/13/2024  Length of Stay: 2 days  Attending Physician: Olya Moreno MD  Primary Care Provider: Gosia, Primary Doctor        Subjective:     Principal Problem:Pleural effusion        HPI:  63-year-old male with a h/o kidney transplant (May 2023), Anemia of Chronic Disease, Crohn's disease, gout, hypertension, sarcoidosis,  shunt placement, Enterococcus faecalis bacteremia in March 2024 is transferred from [facility].  He reports shortness of breath started today early morning 1 with rest associated with cough without any expectoration.  He lives at home and he completed the cefpodoxime course of antibiotics was given during the recent discharge.  He denies any fever, chest pain, palpitations, lightheadedness, abdominal pain, disturbance with bowel or bladder habits.    He was admitted at [facility] (April 23-26) treated for pneumonia and pleural effusion. Thoracentesis on 04/25 yielding 1050 cc of serous fluid and had 465 nucleated cells (16% neutrophils, 57% lymphocytes, 27% monocytes), 401 RBCs, LDH 88, glucose 86, total protein 3.1, triglyceride 19, moderate white blood cells with no organisms seen. Fluid culture had no growth. Blood cultures had no growth at 5 days. He was treated with antibiotics azithromycin & ceftriaxone during the hospitalization and discharged on cefpodoxime to complete a 10 day course of antibiotics for pneumonia.     Today at [facility] Chest x-ray showed a moderate-size right pleural effusion. White blood cells 4.5, hemoglobin 12.2, hematocrit 36.6, platelets 194, BNP 1037, INR 1.1, sodium 140, potassium 3.8, chloride 107, CO2 22, BUN 17, creatinine 1.8 (1.4 on 5/6), glucose 98, calcium 9.3, albumin 3.3, AST 40, ALT 37, COVID negative, influenza negative      March 19, 2024: Echocardiogram with  EF 60-65%.  Normal diastolic function.    [place] [facility] [place] 97.6, HR 69, RR 16, /70, SaO2 95% on RA    Overview/Hospital Course:  Admitted for respiratory failure 2/2 to pleural effusion and volume overload. Started on supplemental O2 and diuretics. Of note this is reoccurrence of pleural effusion that was drained at previous admission, transudate based on previous analysis. Pulm consulted and recommend diuresis, will consider thora if not improving. Cont IV lasix. ECHO ordered, previous ECHO without reduced function. Elevated BNP and pulm edema on CXR.   - ECHO with dilated LA and pulm HTN normal EF.  Lower extremity u/s done for swelling and revealed DVT. Started on eliquis, deferred CTA given his renal function per KTM.    Of note he has a  shunt that terminates in the pleural space, this has been present for a long time however could possibly contributing to effusion.     KTM consulted for patient with history of renal transplant, recommend cont prograf with level monitoring and prednisone. Hold MMF. Renal u/s Mildly elevated intraparenchymal resistive indices minimally improved compared to prior exam which may be seen with medical renal disease, rejection, or drug toxicity.Nonobstructing renal stones.    Levaquin started on admit concern for possible parapneumonic effusion/PNA, resp culture ordered but reports no sputum- ID consulted recommend complete 5 days levaquin. Ucx colonization with Enterococcus denies urinary sxs, so no need to treat.     Interval History: Reports shortness of breath and leg swelling improving. Stable on 2L.     Overnight LE u/s showed DVT- started on eliquis.     Review of Systems   Constitutional:  Negative for fever.   Respiratory:  Positive for cough (dry) and shortness of breath.    Cardiovascular:  Positive for leg swelling.   Genitourinary:  Negative for dysuria.     Objective:     Vital Signs (Most Recent):  Temp: 97.5 °F (36.4 °C) (05/15/24 1914)  Pulse: 70  (05/15/24 1914)  Resp: 18 (05/15/24 1914)  BP: 136/62 (05/15/24 1914)  SpO2: 97 % (05/15/24 1914) Vital Signs (24h Range):  Temp:  [97.5 °F (36.4 °C)-98.1 °F (36.7 °C)] 97.5 °F (36.4 °C)  Pulse:  [58-70] 70  Resp:  [14-21] 18  SpO2:  [86 %-98 %] 97 %  BP: (133-165)/(62-81) 136/62     Weight: 78.9 kg (173 lb 15.1 oz)  Body mass index is 25.69 kg/m².    Intake/Output Summary (Last 24 hours) at 5/15/2024 1930  Last data filed at 5/15/2024 1730  Gross per 24 hour   Intake 480 ml   Output 1550 ml   Net -1070 ml      Physical Exam  Vitals and nursing note reviewed.   Constitutional:       General: He is not in acute distress.     Appearance: Normal appearance.   HENT:      Head: Normocephalic and atraumatic.      Nose: Nose normal.      Mouth/Throat:      Mouth: Mucous membranes are moist.      Pharynx: Oropharynx is clear.   Eyes:      Extraocular Movements: Extraocular movements intact.      Conjunctiva/sclera: Conjunctivae normal.      Pupils: Pupils are equal, round, and reactive to light.   Cardiovascular:      Rate and Rhythm: Normal rate and regular rhythm.      Pulses: Normal pulses.      Heart sounds: Murmur heard.   Pulmonary:      Effort: Pulmonary effort is normal.      Comments: Decreased breath sounds R>L and crackles at bases  Abdominal:      General: Abdomen is flat. Bowel sounds are normal.      Palpations: Abdomen is soft.   Musculoskeletal:         General: Normal range of motion.      Cervical back: Normal range of motion and neck supple.      Right lower leg: Edema present.      Left lower leg: Edema present.      Comments: Rt leg edema >L- edema improving   Skin:     General: Skin is warm and dry.   Neurological:      General: No focal deficit present.      Mental Status: He is alert and oriented to person, place, and time.   Psychiatric:         Mood and Affect: Mood normal.         Behavior: Behavior normal.         Significant Labs:  CBC:  Recent Labs   Lab 05/14/24  0650 05/15/24  0705   WBC 6.98  4.37   HGB 12.7* 12.6*   HCT 40.7 38.4*    157     CMP:  Recent Labs   Lab 05/14/24  0650 05/15/24  0705    137   K 4.3 3.9    101   CO2 21* 24   GLU 78 69*   BUN 18 20   CREATININE 1.7* 1.8*   CALCIUM 8.9 9.3   ANIONGAP 12 12         Assessment/Plan:      * Pleural effusion  - Patient found to have moderate pleural effusion on imaging. I have personally reviewed and interpreted the following imaging: Xray   - pulmonology team has been consulted   - CT scan of the chest has been not performed because of recent worsening of kidney status (s/p kidney transplant) creatinine 1.8 (1.4 on 5/6)  - pulm recommend diuresis and monitoring clinical response and need for repeat thora  - Incidental note of V-P catheter in R pleural space which has been present for several years, and not likely related to the current pleural process per pulm    Acute DVT (deep venous thrombosis)  - u/s: Nonocclusive deep venous thrombus in the RIGHT common femoral, femoral, and iliac veins   - started eliquis  - CTA deferred given renal function     Bacteriuria  - UA appears infectious, no dysuria reported  - ID following  - f/u Ucx Enterococcus likely colonization- no sxs and no need to treat      CKD (chronic kidney disease)  - continue home medicine sodium bicarbonate  - Monitor UOP and serial BMP and adjust therapy as needed. Renally dose meds. Avoid nephrotoxic medications and procedures.  - KTM following    Hypertension  Chronic, controlled. Latest blood pressure and vitals reviewed-     Temp:  [97.5 °F (36.4 °C)-98.1 °F (36.7 °C)]   Pulse:  [58-70]   Resp:  [14-21]   BP: (133-151)/(62-81)   SpO2:  [86 %-98 %] .   Home meds for hypertension were reviewed and noted below.   Hypertension Medications               furosemide (LASIX) 20 MG tablet Take 1 tablet (20 mg total) by mouth daily as needed.    metoprolol tartrate (LOPRESSOR) 25 MG tablet Take HALF tablet (12.5 mg total) by mouth 2 (two) times daily.    olmesartan  (BENICAR) 40 MG tablet Take 1 tablet (40 mg total) by mouth once daily. HOLD SBP <120            While in the hospital, will manage blood pressure as follows; Adjust home antihypertensive regimen as follows- hold ARB     Will utilize p.r.n. blood pressure medication only if patient's blood pressure greater than 180/110 and he develops symptoms such as worsening chest pain or shortness of breath.    Kidney transplant status  - cont prednisone and prograf with level monitoring  - KTM following  - renal u/s - Mildly elevated intraparenchymal resistive indices minimally improved compared to prior exam which may be seen with medical renal disease, rejection, or drug toxicity. Nonobstructing renal stones.  - monitor renal function     Acute respiratory failure with hypoxia  - secondary to pleural effusion and pulm edema  - not on home o2, supplemental O2- wean as tolerated  - Chest x-ray showed a moderate-size right pleural effusion  - pulmonology consult requested for recurrent pleural effusion in case of status post kidney transplant. Previous effusion transudate  - empiric treatment with levofloxacin has been started in view of recent history of receiving azithromycin, ceftriaxone during the hospitalization followed by cefpodoxime as outpatient.  - ID consulted, cont levaquin 5 day course, procal neg   - resp culture ordered but no sputum  - elevated BNP and CXR with pulm edema- cont IV lasix. ECHO with dilated LA and pulm HTN  - Pulm following recommend cont IV lasix and monitor need for repeat thora        Anemia of renal disease  Patient's anemia is currently controlled. Has not received any PRBCs to date. Etiology likely d/t chronic disease.  Current CBC reviewed-   Lab Results   Component Value Date    HGB 12.6 (L) 05/15/2024    HCT 38.4 (L) 05/15/2024     Monitor serial CBC and transfuse if patient becomes hemodynamically unstable, symptomatic or H/H drops below 7/21.    Immunodeficiency due to long term drug  "therapy  - s/p kidney transplant (May 2023)  - cont prograf, monitoring daily levels.   - cont prednisone  - Hold MMF  - KTM following      Prophylactic immunotherapy  - noted given hx of transplant and immunosuppressive medications       At risk for opportunistic infections  - noted given hx of transplant and immunosuppressive medications         VTE Risk Mitigation (From admission, onward)           Ordered     apixaban tablet 5 mg  2 times daily        Placed in "Followed by" Linked Group    05/15/24 0554     apixaban tablet 10 mg  2 times daily        Placed in "Followed by" Linked Group    05/15/24 0554     IP VTE HIGH RISK PATIENT  Once         05/13/24 2045                    Discharge Planning   SAVITA: 5/16/2024     Code Status: Full Code   Is the patient medically ready for discharge?:     Reason for patient still in hospital (select all that apply): Patient trending condition and Consult recommendations  Discharge Plan A: Home, Home with family                  Olya Moreno MD  Department of Hospital Medicine   [facility] - Transplant Stepdown    "

## 2024-05-16 NOTE — ASSESSMENT & PLAN NOTE
- UA appears infectious, no dysuria reported  - ID following  - f/u Ucx Enterococcus likely colonization- no sxs and no need to treat

## 2024-05-16 NOTE — ASSESSMENT & PLAN NOTE
Pulmonary Hypertension  Symptoms consistent with NHYA class II-III. Confounded by large pleural effusion on right side from  shunt.  Echo with normal Biv function, normal filling pressures, but PHTN with PASP 53 mmHg. PASP 28 mmHg prior to transplant.     Agree patient appears euvolemic.  Would start Jardiance 10 mg daily if able from renal standpoint.  Can place referral to [facility] on discharge for PH evaluation.   Otherwise can follow-up with his general cardiologist at [facility].

## 2024-05-16 NOTE — ASSESSMENT & PLAN NOTE
Chronic, controlled. Latest blood pressure and vitals reviewed-     Temp:  [97.5 °F (36.4 °C)-97.7 °F (36.5 °C)]   Pulse:  [58-70]   Resp:  [14-20]   BP: (123-151)/(62-81)   SpO2:  [94 %-99 %] .   Home meds for hypertension were reviewed and noted below.   Hypertension Medications               furosemide (LASIX) 20 MG tablet Take 1 tablet (20 mg total) by mouth daily as needed.    metoprolol tartrate (LOPRESSOR) 25 MG tablet Take HALF tablet (12.5 mg total) by mouth 2 (two) times daily.    olmesartan (BENICAR) 40 MG tablet Take 1 tablet (40 mg total) by mouth once daily. HOLD SBP <120       -While in the hospital, will manage blood pressure as follows; Adjust home antihypertensive regimen as follows- hold ARB  . Continue Lopressor    -Will utilize p.r.n. blood pressure medication only if patient's blood pressure greater than 180/110

## 2024-05-16 NOTE — ASSESSMENT & PLAN NOTE
- Patient found to have moderate pleural effusion on imaging. I have personally reviewed and interpreted the following imaging: Xray   - pulmonology team consulted/following.   - CT scan of the chest has been not performed because of recent worsening of kidney status (s/p kidney transplant) creatinine 1.8 (1.4 on 5/6)  - pulm recommend diuresis and monitoring clinical response and need for repeat thora  - Incidental note of V-P catheter in R pleural space which has been present for several years, and not likely related to the current pleural process per pulm

## 2024-05-16 NOTE — ASSESSMENT & PLAN NOTE
-Noted  worsening creatinine this a.m. to 2.2.  suspect due to Lasix use  - hold further Lasix. KTM following.

## 2024-05-16 NOTE — ASSESSMENT & PLAN NOTE
On tac, mmf, pred at home  Restart MMF once completed levofloxacin  Continue current tac and pred  Monitor for toxicity

## 2024-05-16 NOTE — ASSESSMENT & PLAN NOTE
- cont prednisone and prograf with level monitoring  - KTM following  - renal u/s - Mildly elevated intraparenchymal resistive indices minimally improved compared to prior exam which may be seen with medical renal disease, rejection, or drug toxicity. Nonobstructing renal stones.  - monitor renal function

## 2024-05-16 NOTE — ASSESSMENT & PLAN NOTE
- continue home medicine sodium bicarbonate  - Monitor UOP and serial BMP and adjust therapy as needed. Renally dose meds. Avoid nephrotoxic medications and procedures.  - KTM following  - Cr worsening. Hold Further Lasix

## 2024-05-16 NOTE — SUBJECTIVE & OBJECTIVE
Subjective:   History of Present Illness:  Mr. Parikh is a 63 y.o. year old Black male who received a living kidney transplant on 5/10/23. His most recent creatinine is 1.2-1.7. He takes prednisone, and tacrolimus for maintenance immunosuppression. His post transplant course has been complicated by urine leak. Had nephrostomy tube was placed and removed 12/31/23. 5/15/23-Kidney Bx: Good sample, No IFTA no glomerulosclerosis ,ATI no rejection C4d negative     Pertinent history   Crohn's  Sarcoidosis  Hearing loss   shunt placement x3  Enterococcus faecalis bacteremia ( March 2024)  Pneumonia April 2024     was transferred from [facility] on 5/13/2024 with recurrent pleural effusion. He was hospitalized in April with pneumonia and a thoracentesis was performed yielding 1050 cc transudate effusion. He discharged home on oral antibiotics. Mycophenolate was stopped. At [facility], a CXR showed recurrence of moderate R sided pleural effusion and BNP 1037, so patient was transferred to [facility] for further workup.    Previous Echos have not shown HFrEF/HFpEF. CXR on arrival to [facility] showed pulmonary congestion with R pleural effusion. Of note, the patient has had multiple  shunt replacements (secondary to infection) and his  shunt terminates into the pleural space. His Cr 1.7 which is high baseline. Last kidney US in March showed nonobstructing renal stones.       Hospital Course:  No notes on file    Interval History:  Slight increase in creatinine from yesterday    Past Medical, Surgical, Family, and Social History:   Unchanged from H&P.    Scheduled Meds:   apixaban  10 mg Oral BID    Followed by    [START ON 5/22/2024] apixaban  5 mg Oral BID    levoFLOXacin  750 mg Intravenous Q48H    metoprolol tartrate  12.5 mg Oral BID    predniSONE  5 mg Oral Daily    sodium bicarbonate  1,300 mg Oral TID    tacrolimus  2 mg Oral BID     Continuous Infusions:  PRN Meds:  Current Facility-Administered  "Medications:     hydrALAZINE, 10 mg, Intravenous, Q6H PRN    hydrOXYzine HCL, 25 mg, Oral, TID PRN    loperamide, 2 mg, Oral, QID PRN    oxyCODONE-acetaminophen, 1 tablet, Oral, Q6H PRN    sodium chloride 0.9%, 10 mL, Intravenous, Q12H PRN    Intake/Output - Last 3 Shifts         05/14 0700  05/15 0659 05/15 0700 05/16 0659 05/16 0700 05/17 0659    P.O. 1020 560 780    I.V. (mL/kg) 50 (0.6)      IV Piggyback  274.1     Total Intake(mL/kg) 1070 (13.6) 834.1 (10.5) 780 (9.8)    Urine (mL/kg/hr) 950 (0.5) 1875 (1)     Stool 0 0     Total Output 950 1875     Net +120 -1040.9 +780           Urine Occurrence 5 x  3 x    Stool Occurrence 4 x 1 x 2 x             Review of Systems   Respiratory:  Negative for shortness of breath.    Cardiovascular:  Negative for leg swelling.      Objective:     Vital Signs (Most Recent):  Temp: 97.7 °F (36.5 °C) (05/16/24 1544)  Pulse: (!) 59 (05/16/24 1544)  Resp: 14 (05/16/24 1544)  BP: 129/63 (05/16/24 1544)  SpO2: 96 % (05/16/24 1544) Vital Signs (24h Range):  Temp:  [97.5 °F (36.4 °C)-97.7 °F (36.5 °C)] 97.7 °F (36.5 °C)  Pulse:  [58-70] 59  Resp:  [14-20] 14  SpO2:  [94 %-99 %] 96 %  BP: (123-151)/(62-81) 129/63     Weight: 79.5 kg (175 lb 4.3 oz)  Height: 5' 9" (175.3 cm)  Body mass index is 25.88 kg/m².     Physical Exam  Vitals and nursing note reviewed.   Constitutional:       General: He is not in acute distress.     Appearance: He is obese.   Eyes:      General: No scleral icterus.  Cardiovascular:      Rate and Rhythm: Normal rate.   Pulmonary:      Effort: Pulmonary effort is normal. No respiratory distress.   Musculoskeletal:      Right lower leg: No edema.      Left lower leg: No edema.   Skin:     Coloration: Skin is not jaundiced.   Neurological:      Mental Status: He is alert.          Laboratory:  Labs within the past 24 hours have been reviewed.    Diagnostic Results:  US - Kidney: Results for orders placed during the hospital encounter of 05/13/24    US Transplant " Kidney With Doppler    Narrative  EXAMINATION:  US TRANSPLANT KIDNEY WITH DOPPLER    CLINICAL HISTORY:  hx transplant;    TECHNIQUE:  Real time gray scale and doppler ultrasound was performed over the patient's renal allograft.    COMPARISON:  04/01/2024    FINDINGS:  Renal allograft in the right lower quadrant.  The allograft measures 11.5 cm. Normal perfusion. No hydronephrosis.  A few nonobstructing stones measuring up to 8 mm.    No fluid collections.    Vasculature:    Resistive indices ranged from 0.77 to 0.79.    Main renal artery peak systolic velocity: 203cm/sec with normal waveform.    Renal artery/iliac ratio: 1.3.    The main renal vein is patent..    Urinary bladder demonstrates a few bladder stones.    Impression  Mildly elevated intraparenchymal resistive indices minimally improved compared to prior exam which may be seen with medical renal disease, rejection, or drug toxicity.    Nonobstructing renal stones.      Electronically signed by: Gary Sheehan MD  Date:    05/15/2024  Time:    07:39

## 2024-05-16 NOTE — ASSESSMENT & PLAN NOTE
- secondary to pleural effusion and pulm edema  - not on home o2, supplemental O2- wean as tolerated  - Chest x-ray showed a moderate-size right pleural effusion  - pulmonology consult requested for recurrent pleural effusion in case of status post kidney transplant. Previous effusion transudate  - empiric treatment with levofloxacin has been started in view of recent history of receiving azithromycin, ceftriaxone during the hospitalization followed by cefpodoxime as outpatient.  - ID consulted, cont levaquin 5 day course, procal neg   - resp culture ordered but no sputum  - elevated BNP and CXR with pulm edema- cont IV lasix. ECHO with dilated LA and pulm HTN  - Pulm following recommend cont IV lasix and monitor need for repeat thora

## 2024-05-16 NOTE — ASSESSMENT & PLAN NOTE
Mild-moderate in severity and unchanged compared to prior echo 3/24. Mild MR noted prior to transplant in 2023.  GDMT for HFpEF and volume optimization.  Can follow-up with his general cardiologist at [facility].

## 2024-05-16 NOTE — CONSULTS
[facility]  Cardiology  Consult Note    Patient Name: Abdoulaye Parikh  MRN: [id number]  Admission Date: 5/13/2024  Hospital Length of Stay: 3 days  Code Status: Full Code   Attending Provider: Josephine Herrera MD   Consulting Provider: Husam Rivas MD  Primary Care Physician: Gosia, Primary Doctor  Principal Problem:Pleural effusion    Patient information was obtained from patient and ER records.     Inpatient consult to Cardiology  Consult performed by: Husam Rivas MD  Consult ordered by: Josephine Herrera MD  Reason for consult: PHTN and MR        Subjective:     Chief Complaint:  SOB, HYMAN     HPI:   63-year-old male with a h/o kidney transplant (May 2023), Anemia of Chronic Disease, Crohn's disease, gout, hypertension, sarcoidosis,  shunt placement, Enterococcus faecalis bacteremia in March 2024 is transferred from [facility].  He reports shortness of breath started today early morning 1 with rest associated with cough without any expectoration.  He lives at home and he completed the cefpodoxime course of antibiotics was given during the recent discharge.  He denies any fever, chest pain, palpitations, lightheadedness, abdominal pain, disturbance with bowel or bladder habits.     He was admitted at [facility] (April 23-26) treated for pneumonia and pleural effusion. Thoracentesis on 04/25 yielding 1050 cc of serous fluid and had 465 nucleated cells (16% neutrophils, 57% lymphocytes, 27% monocytes), 401 RBCs, LDH 88, glucose 86, total protein 3.1, triglyceride 19, moderate white blood cells with no organisms seen. Fluid culture had no growth. Blood cultures had no growth at 5 days. He was treated with antibiotics azithromycin & ceftriaxone during the hospitalization and discharged on cefpodoxime to complete a 10 day course of antibiotics for pneumonia.      Today at [facility] Chest x-ray showed a moderate-size right pleural effusion. White blood  cells 4.5, hemoglobin 12.2, hematocrit 36.6, platelets 194, BNP 1037, INR 1.1, sodium 140, potassium 3.8, chloride 107, CO2 22, BUN 17, creatinine 1.8 (1.4 on 5/6), glucose 98, calcium 9.3, albumin 3.3, AST 40, ALT 37, COVID negative, influenza negative      March 19, 2024: Echocardiogram with EF 60-65%.  Normal diastolic function.     The current vitals at [facility]: T 97.6, HR 69, RR 16, /70, SaO2 95% on RA     Admitted for respiratory failure 2/2 to pleural effusion and volume overload. Started on supplemental O2 and diuretics. Of note this is reoccurrence of pleural effusion that was drained at previous admission, transudate based on previous analysis. Pulm consulted and recommend diuresis, will consider thora if not improving. Cont IV lasix. ECHO ordered, previous ECHO without reduced function. Elevated BNP and pulm edema on CXR.   - ECHO with dilated LA and pulm HTN normal EF.  Lower extremity u/s done for swelling and revealed DVT. Started on eliquis, deferred CTA given his renal function per KTM.     Of note he has a  shunt that terminates in the pleural space, this has been present for a long time however could possibly contributing to effusion.      KTM consulted for patient with history of renal transplant, recommend cont prograf with level monitoring and prednisone. Hold MMF. Renal u/s Mildly elevated intraparenchymal resistive indices minimally improved compared to prior exam which may be seen with medical renal disease, rejection, or drug toxicity. Nonobstructing renal stones. Patient treated with IV lasix.     Levaquin started on admit concern for possible parapneumonic effusion/PNA, resp culture ordered but reports no sputum- ID consulted recommend complete 5 days levaquin. Ucx colonization with Enterococcus denies urinary sxs, so no need to treat.  Noted Worsening creatinine 5/16. Lasix held.  Pleural effusion looks about the same on chest x-ray repeat.     Cardiology consulted for TTE today with  mild-moderate MR and PASP 53 mmHg.    Past Medical History:   Diagnosis Date    Acute blood loss anemia 05/11/2023    Anemia     Arthritis     Cirrhosis     CKD (chronic kidney disease) stage 4, GFR 15-29 ml/min, slow graft function with sustaine creatinien improvement 05/26/2023    Crohn's disease     Disorder of kidney and ureter     Stage 5    Encounter for blood transfusion     Gout     Hearing loss     Hepatitis     Had Hep C Cleared    Hypertension     Immunosuppressive management encounter following kidney transplant 05/26/2023    Neurosarcoidosis 2018    Obesity     Osteoarthritis     S/P kidney transplant 05/11/2023    Sarcoid neuropathy     Sarcoidosis, lung     Steatosis        Past Surgical History:   Procedure Laterality Date    AV FISTULA PLACEMENT Left     BRAIN SURGERY      COLON SURGERY      Exploratory lap x 4 for Chron's disease. Likely right colectomy    CSF SHUNT      DIAGNOSTIC ULTRASOUND N/A 5/15/2023    Procedure: ULTRASOUND, DIAGNOSTIC;  Surgeon: Chivo Brown MD;  Location: [facility] OR [address] [place] [address];  Service: Transplant;  Laterality: N/A;    JOINT REPLACEMENT Right     Hip    KIDNEY TRANSPLANT N/A 5/10/2023    Procedure: TRANSPLANT, KIDNEY - RQ 7PM START;  Surgeon: Chivo Brown MD;  Location: [facility] OR [address] [place] [address];  Service: Transplant;  Laterality: N/A;    NEPHROSTOMY N/A 6/3/2023    Procedure: Nephrostomy;  Surgeon: Silvia Surgeon;  Location: [facility];  Service: Anesthesiology;  Laterality: N/A;    RENAL BIOPSY  5/15/2023    Procedure: BIOPSY, KIDNEY;  Surgeon: Chivo Brown MD;  Location: [facility] OR [address] [place] [address];  Service: Transplant;;    RENAL EXPLORATION  5/15/2023    Procedure: EXPLORATION, KIDNEY;  Surgeon: Chivo Brown MD;  Location: [address];  Service: Transplant;;    TOTAL HIP ARTHROPLASTY Right        Review of patient's allergies indicates:   Allergen Reactions    Bactrim [sulfamethoxazole-trimethoprim] Shortness Of Breath and Itching    Penicillins  Shortness Of Breath and Itching     5/14/2024: has tolerated amoxicillin since for dental infection, as well as CTX and Cefpodoxime during hospital admission.        No current facility-administered medications on file prior to encounter.     Current Outpatient Medications on File Prior to Encounter   Medication Sig    benzonatate (TESSALON) 200 MG capsule TAKE 1 CAPSULE BY MOUTH THREE TIMES DAILY FOR 10 DAYS AS NEEDED FOR COUGH    furosemide (LASIX) 20 MG tablet Take 1 tablet (20 mg total) by mouth daily as needed.    loperamide HCl (IMODIUM A-D ORAL) Take by mouth.    magnesium oxide (MAG-OX) 400 mg (241.3 mg magnesium) tablet Take 1 tablet (400 mg total) by mouth 2 (two) times daily.    metoprolol tartrate (LOPRESSOR) 25 MG tablet Take HALF tablet (12.5 mg total) by mouth 2 (two) times daily.    olmesartan (BENICAR) 40 MG tablet Take 1 tablet (40 mg total) by mouth once daily. HOLD SBP <120    predniSONE (DELTASONE) 5 MG tablet Take 1 tablet (5 mg total) by mouth once daily.    sodium bicarbonate 650 MG tablet Take 2 tablets (1,300 mg total) by mouth 3 (three) times daily.    tacrolimus (PROGRAF) 1 MG Cap Take 2 capsules (2 mg total) by mouth every morning AND 1 capsule (1 mg total) every evening. Txp Date:5/10/2023 (Kidney) Disch Date:5/17/2023 ICD10:Z94.0 Txp Location:[facility].    tamsulosin (FLOMAX) 0.4 mg Cap Take 1 capsule (0.4 mg total) by mouth once daily.     Family History       Problem Relation (Age of Onset)    COPD Sister    Cancer Father, Maternal Grandfather, Paternal Grandfather    Diabetes Father    Heart disease Father, Sister    Hypertension Mother, Father    Lung cancer Maternal Grandfather, Paternal Grandfather    Multiple myeloma Father    Stroke Father          Tobacco Use    Smoking status: Never    Smokeless tobacco: Never   Substance and Sexual Activity    Alcohol use: Not Currently    Drug use: Never    Sexual activity: Yes     Partners: Female     Review of Systems   Cardiovascular:   Positive for dyspnea on exertion. Negative for chest pain.   Respiratory:  Positive for shortness of breath.    All other systems reviewed and are negative.    Objective:     Vital Signs (Most Recent):  Temp: 97.7 °F (36.5 °C) (05/16/24 1544)  Pulse: (!) 59 (05/16/24 1544)  Resp: 14 (05/16/24 1544)  BP: 129/63 (05/16/24 1544)  SpO2: 96 % (05/16/24 1544) Vital Signs (24h Range):  Temp:  [97.5 °F (36.4 °C)-97.7 °F (36.5 °C)] 97.7 °F (36.5 °C)  Pulse:  [58-70] 59  Resp:  [14-20] 14  SpO2:  [94 %-99 %] 96 %  BP: (123-145)/(62-75) 129/63     Weight: 79.5 kg (175 lb 4.3 oz)  Body mass index is 25.88 kg/m².    SpO2: 96 %         Intake/Output Summary (Last 24 hours) at 5/16/2024 1722  Last data filed at 5/16/2024 1200  Gross per 24 hour   Intake 1254.07 ml   Output 1075 ml   Net 179.07 ml       Lines/Drains/Airways       Central Venous Catheter Line  Duration                  Hemodialysis AV Graft Left upper arm -- days              Peripheral Intravenous Line  Duration                  Peripheral IV - Single Lumen 05/14/24 1607 20 G;1 3/4 in Anterior;Proximal;Right Upper Arm 2 days                     Physical Exam  Constitutional:       General: He is not in acute distress.     Appearance: Normal appearance.   HENT:      Head: Normocephalic.      Mouth/Throat:      Mouth: Mucous membranes are moist.   Eyes:      Extraocular Movements: Extraocular movements intact.   Neck:      Comments: No JVD  Cardiovascular:      Rate and Rhythm: Normal rate and regular rhythm.   Pulmonary:      Breath sounds: Normal breath sounds.   Abdominal:      Palpations: Abdomen is soft.   Musculoskeletal:      Cervical back: Neck supple.      Right lower leg: Edema present.      Left lower leg: No edema.   Skin:     General: Skin is warm.   Neurological:      Mental Status: He is alert and oriented to person, place, and time.          Significant Labs: BMP:   Recent Labs   Lab 05/15/24  0705 05/16/24  0638   GLU 69* 81    137   K 3.9 3.7  "   99   CO2 24 26   BUN 20 24*   CREATININE 1.8* 2.2*   CALCIUM 9.3 9.5   MG 1.7  --    , CMP   Recent Labs   Lab 05/15/24  0705 05/16/24  0638    137   K 3.9 3.7    99   CO2 24 26   GLU 69* 81   BUN 20 24*   CREATININE 1.8* 2.2*   CALCIUM 9.3 9.5   ANIONGAP 12 12   , CBC   Recent Labs   Lab 05/15/24  0705 05/16/24  0531   WBC 4.37 4.79   HGB 12.6* 11.5*   HCT 38.4* 35.4*    180   , INR No results for input(s): "INR", "PROTIME" in the last 48 hours., Lipid Panel No results for input(s): "CHOL", "HDL", "LDLCALC", "TRIG", "CHOLHDL" in the last 48 hours., and Troponin No results for input(s): "TROPONINI" in the last 48 hours.    Significant Imaging:   TTE 5/15/24    Left Ventricle: Regional wall motion abnormalities present. There is normal systolic function with a visually estimated ejection fraction of 60 - 65%. Ejection fraction by visual approximation is 63%.    Left Atrium: Left atrium is severely dilated.    Right Atrium: Right atrium is mildly dilated.    Aortic Valve: The aortic valve is a trileaflet valve. There is trace aortic regurgitation.    Mitral Valve: There is at least mild-moderate to probably  moderate (2+) regurgitation with an eccentric jet.    Pulmonary Artery: There is moderate pulmonary hypertension. The estimated pulmonary artery systolic pressure is 53 mmHg.    IVC/SVC: Normal venous pressure at 3 mmHg.  Assessment and Plan:     Acute diastolic heart failure  Pulmonary Hypertension  Symptoms consistent with NHYA class II-III. Confounded by large pleural effusion on right side from  shunt.  Echo with normal Biv function, normal filling pressures, but PHTN with PASP 53 mmHg. PASP 28 mmHg prior to transplant.     Agree patient appears euvolemic.  Would start Jardiance 10 mg daily if able from renal standpoint.  Can place referral to [facility] on discharge for PH evaluation.   Otherwise can follow-up with his general cardiologist at [facility].       Nonrheumatic mitral valve " "regurgitation  Mild-moderate in severity and unchanged compared to prior echo 3/24. Mild MR noted prior to transplant in 2023.  GDMT for HFpEF and volume optimization.  Can follow-up with his general cardiologist at [facility].        VTE Risk Mitigation (From admission, onward)           Ordered     apixaban tablet 5 mg  2 times daily        Placed in "Followed by" Linked Group    05/15/24 0554     apixaban tablet 10 mg  2 times daily        Placed in "Followed by" Linked Group    05/15/24 0554     IP VTE HIGH RISK PATIENT  Once         05/13/24 2045                  Case discussed with Dr Venita Regalado MD.    Thank you for your consult. I will sign off. Please contact us if you have any additional questions.    Huasm Rivas MD  Cardiology   [facility] Trans[facility]      "

## 2024-05-16 NOTE — ASSESSMENT & PLAN NOTE
Chronic, controlled. Latest blood pressure and vitals reviewed-     Temp:  [97.5 °F (36.4 °C)-98.1 °F (36.7 °C)]   Pulse:  [58-70]   Resp:  [14-21]   BP: (133-151)/(62-81)   SpO2:  [86 %-98 %] .   Home meds for hypertension were reviewed and noted below.   Hypertension Medications               furosemide (LASIX) 20 MG tablet Take 1 tablet (20 mg total) by mouth daily as needed.    metoprolol tartrate (LOPRESSOR) 25 MG tablet Take HALF tablet (12.5 mg total) by mouth 2 (two) times daily.    olmesartan (BENICAR) 40 MG tablet Take 1 tablet (40 mg total) by mouth once daily. HOLD SBP <120            While in the hospital, will manage blood pressure as follows; Adjust home antihypertensive regimen as follows- hold ARB     Will utilize p.r.n. blood pressure medication only if patient's blood pressure greater than 180/110 and he develops symptoms such as worsening chest pain or shortness of breath.

## 2024-05-16 NOTE — SUBJECTIVE & OBJECTIVE
Interval History: Reports shortness of breath and leg swelling improving. Stable on 2L.     Overnight LE u/s showed DVT- started on eliquis.     Review of Systems   Constitutional:  Negative for fever.   Respiratory:  Positive for cough (dry) and shortness of breath.    Cardiovascular:  Positive for leg swelling.   Genitourinary:  Negative for dysuria.     Objective:     Vital Signs (Most Recent):  Temp: 97.5 °F (36.4 °C) (05/15/24 1914)  Pulse: 70 (05/15/24 1914)  Resp: 18 (05/15/24 1914)  BP: 136/62 (05/15/24 1914)  SpO2: 97 % (05/15/24 1914) Vital Signs (24h Range):  Temp:  [97.5 °F (36.4 °C)-98.1 °F (36.7 °C)] 97.5 °F (36.4 °C)  Pulse:  [58-70] 70  Resp:  [14-21] 18  SpO2:  [86 %-98 %] 97 %  BP: (133-165)/(62-81) 136/62     Weight: 78.9 kg (173 lb 15.1 oz)  Body mass index is 25.69 kg/m².    Intake/Output Summary (Last 24 hours) at 5/15/2024 1930  Last data filed at 5/15/2024 1730  Gross per 24 hour   Intake 480 ml   Output 1550 ml   Net -1070 ml      Physical Exam  Vitals and nursing note reviewed.   Constitutional:       General: He is not in acute distress.     Appearance: Normal appearance.   HENT:      Head: Normocephalic and atraumatic.      Nose: Nose normal.      Mouth/Throat:      Mouth: Mucous membranes are moist.      Pharynx: Oropharynx is clear.   Eyes:      Extraocular Movements: Extraocular movements intact.      Conjunctiva/sclera: Conjunctivae normal.      Pupils: Pupils are equal, round, and reactive to light.   Cardiovascular:      Rate and Rhythm: Normal rate and regular rhythm.      Pulses: Normal pulses.      Heart sounds: Murmur heard.   Pulmonary:      Effort: Pulmonary effort is normal.      Comments: Decreased breath sounds R>L and crackles at bases  Abdominal:      General: Abdomen is flat. Bowel sounds are normal.      Palpations: Abdomen is soft.   Musculoskeletal:         General: Normal range of motion.      Cervical back: Normal range of motion and neck supple.      Right lower  leg: Edema present.      Left lower leg: Edema present.      Comments: Rt leg edema >L- edema improving   Skin:     General: Skin is warm and dry.   Neurological:      General: No focal deficit present.      Mental Status: He is alert and oriented to person, place, and time.   Psychiatric:         Mood and Affect: Mood normal.         Behavior: Behavior normal.         Significant Labs:  CBC:  Recent Labs   Lab 05/14/24  0650 05/15/24  0705   WBC 6.98 4.37   HGB 12.7* 12.6*   HCT 40.7 38.4*    157     CMP:  Recent Labs   Lab 05/14/24  0650 05/15/24  0705    137   K 4.3 3.9    101   CO2 21* 24   GLU 78 69*   BUN 18 20   CREATININE 1.7* 1.8*   CALCIUM 8.9 9.3   ANIONGAP 12 12

## 2024-05-17 LAB
ANION GAP SERPL CALC-SCNC: 12 MMOL/L (ref 8–16)
BUN SERPL-MCNC: 23 MG/DL (ref 8–23)
CALCIUM SERPL-MCNC: 9.3 MG/DL (ref 8.7–10.5)
CHLORIDE SERPL-SCNC: 101 MMOL/L (ref 95–110)
CMV DNA SPEC QL NAA+PROBE: NORMAL
CO2 SERPL-SCNC: 23 MMOL/L (ref 23–29)
CREAT SERPL-MCNC: 1.9 MG/DL (ref 0.5–1.4)
CYTOMEGALOVIRUS PCR, QUANT: NOT DETECTED IU/ML
ERYTHROCYTE [DISTWIDTH] IN BLOOD BY AUTOMATED COUNT: 14.6 % (ref 11.5–14.5)
EST. GFR  (NO RACE VARIABLE): 39.4 ML/MIN/1.73 M^2
GLUCOSE SERPL-MCNC: 74 MG/DL (ref 70–110)
HCT VFR BLD AUTO: 34.9 % (ref 40–54)
HGB BLD-MCNC: 11.4 G/DL (ref 14–18)
MAGNESIUM SERPL-MCNC: 1.6 MG/DL (ref 1.6–2.6)
MCH RBC QN AUTO: 34.2 PG (ref 27–31)
MCHC RBC AUTO-ENTMCNC: 32.7 G/DL (ref 32–36)
MCV RBC AUTO: 105 FL (ref 82–98)
PHOSPHATE SERPL-MCNC: 2.9 MG/DL (ref 2.7–4.5)
PLATELET # BLD AUTO: 170 K/UL (ref 150–450)
PMV BLD AUTO: 10.4 FL (ref 9.2–12.9)
POTASSIUM SERPL-SCNC: 3.4 MMOL/L (ref 3.5–5.1)
RBC # BLD AUTO: 3.33 M/UL (ref 4.6–6.2)
SODIUM SERPL-SCNC: 136 MMOL/L (ref 136–145)
TACROLIMUS BLD-MCNC: 8.2 NG/ML (ref 5–15)
WBC # BLD AUTO: 4.79 K/UL (ref 3.9–12.7)

## 2024-05-17 PROCEDURE — 36415 COLL VENOUS BLD VENIPUNCTURE: CPT | Performed by: STUDENT IN AN ORGANIZED HEALTH CARE EDUCATION/TRAINING PROGRAM

## 2024-05-17 PROCEDURE — 63600175 PHARM REV CODE 636 W HCPCS: Performed by: INTERNAL MEDICINE

## 2024-05-17 PROCEDURE — 83735 ASSAY OF MAGNESIUM: CPT | Performed by: STUDENT IN AN ORGANIZED HEALTH CARE EDUCATION/TRAINING PROGRAM

## 2024-05-17 PROCEDURE — 63600175 PHARM REV CODE 636 W HCPCS: Performed by: STUDENT IN AN ORGANIZED HEALTH CARE EDUCATION/TRAINING PROGRAM

## 2024-05-17 PROCEDURE — 80197 ASSAY OF TACROLIMUS: CPT | Performed by: INTERNAL MEDICINE

## 2024-05-17 PROCEDURE — 84100 ASSAY OF PHOSPHORUS: CPT | Performed by: STUDENT IN AN ORGANIZED HEALTH CARE EDUCATION/TRAINING PROGRAM

## 2024-05-17 PROCEDURE — 25000003 PHARM REV CODE 250: Performed by: STUDENT IN AN ORGANIZED HEALTH CARE EDUCATION/TRAINING PROGRAM

## 2024-05-17 PROCEDURE — 85027 COMPLETE CBC AUTOMATED: CPT | Performed by: STUDENT IN AN ORGANIZED HEALTH CARE EDUCATION/TRAINING PROGRAM

## 2024-05-17 PROCEDURE — 20600001 HC STEP DOWN PRIVATE ROOM

## 2024-05-17 PROCEDURE — 99232 SBSQ HOSP IP/OBS MODERATE 35: CPT | Mod: GC,,, | Performed by: INTERNAL MEDICINE

## 2024-05-17 PROCEDURE — 99233 SBSQ HOSP IP/OBS HIGH 50: CPT | Mod: ,,, | Performed by: PHYSICIAN ASSISTANT

## 2024-05-17 PROCEDURE — 80048 BASIC METABOLIC PNL TOTAL CA: CPT | Performed by: STUDENT IN AN ORGANIZED HEALTH CARE EDUCATION/TRAINING PROGRAM

## 2024-05-17 RX ORDER — LANOLIN ALCOHOL/MO/W.PET/CERES
400 CREAM (GRAM) TOPICAL DAILY
Status: DISCONTINUED | OUTPATIENT
Start: 2024-05-17 | End: 2024-05-18 | Stop reason: HOSPADM

## 2024-05-17 RX ORDER — POTASSIUM CHLORIDE 20 MEQ/1
20 TABLET, EXTENDED RELEASE ORAL ONCE
Status: COMPLETED | OUTPATIENT
Start: 2024-05-17 | End: 2024-05-17

## 2024-05-17 RX ORDER — FUROSEMIDE 20 MG/1
20 TABLET ORAL DAILY
Status: DISCONTINUED | OUTPATIENT
Start: 2024-05-17 | End: 2024-05-18

## 2024-05-17 RX ORDER — ENOXAPARIN SODIUM 100 MG/ML
1 INJECTION SUBCUTANEOUS EVERY 12 HOURS
Status: DISCONTINUED | OUTPATIENT
Start: 2024-05-17 | End: 2024-05-17

## 2024-05-17 RX ADMIN — SODIUM BICARBONATE 650 MG TABLET 1300 MG: at 08:05

## 2024-05-17 RX ADMIN — METOPROLOL TARTRATE 12.5 MG: 25 TABLET, FILM COATED ORAL at 08:05

## 2024-05-17 RX ADMIN — SODIUM BICARBONATE 650 MG TABLET 1300 MG: at 02:05

## 2024-05-17 RX ADMIN — Medication 400 MG: at 02:05

## 2024-05-17 RX ADMIN — TACROLIMUS 2 MG: 1 CAPSULE ORAL at 08:05

## 2024-05-17 RX ADMIN — POTASSIUM CHLORIDE 20 MEQ: 1500 TABLET, EXTENDED RELEASE ORAL at 02:05

## 2024-05-17 RX ADMIN — APIXABAN 10 MG: 5 TABLET, FILM COATED ORAL at 08:05

## 2024-05-17 RX ADMIN — FUROSEMIDE 20 MG: 20 TABLET ORAL at 02:05

## 2024-05-17 RX ADMIN — LEVOFLOXACIN 750 MG: 750 INJECTION, SOLUTION INTRAVENOUS at 09:05

## 2024-05-17 RX ADMIN — OXYCODONE HYDROCHLORIDE AND ACETAMINOPHEN 1 TABLET: 5; 325 TABLET ORAL at 08:05

## 2024-05-17 RX ADMIN — PREDNISONE 5 MG: 5 TABLET ORAL at 08:05

## 2024-05-17 RX ADMIN — DIBASIC SODIUM PHOSPHATE, MONOBASIC POTASSIUM PHOSPHATE AND MONOBASIC SODIUM PHOSPHATE 1 TABLET: 852; 155; 130 TABLET ORAL at 05:05

## 2024-05-17 RX ADMIN — ENOXAPARIN SODIUM 80 MG: 80 INJECTION SUBCUTANEOUS at 08:05

## 2024-05-17 RX ADMIN — TACROLIMUS 2 MG: 1 CAPSULE ORAL at 05:05

## 2024-05-17 NOTE — SUBJECTIVE & OBJECTIVE
Interval History: Patient states he continues to feel better.  On room air during my exam.  States he has been on room air since yesterday.  Still some shortness for breath only with exertion.  Discussed with patient's wife at bedside.  Discussed risk versus benefit of thoracentesis now that patient on anticoagulation.    Review of Systems  A comprehensive review of systems was negative except as noted above.     Objective:     Vital Signs (Most Recent):  Temp: 97.9 °F (36.6 °C) (05/17/24 1534)  Pulse: 62 (05/17/24 1534)  Resp: 18 (05/17/24 1534)  BP: (!) 157/72 (05/17/24 1534)  SpO2: 95 % (05/17/24 1534) Vital Signs (24h Range):  Temp:  [97.9 °F (36.6 °C)-98.1 °F (36.7 °C)] 97.9 °F (36.6 °C)  Pulse:  [57-63] 62  Resp:  [18] 18  SpO2:  [94 %-95 %] 95 %  BP: (132-157)/(63-72) 157/72     Weight: 78.6 kg (173 lb 4.5 oz)  Body mass index is 25.59 kg/m².    Intake/Output Summary (Last 24 hours) at 5/17/2024 1625  Last data filed at 5/17/2024 1457  Gross per 24 hour   Intake 420 ml   Output 1367 ml   Net -947 ml         Physical Exam  Vitals and nursing note reviewed.   Constitutional:       General: He is not in acute distress.     Appearance: Normal appearance.   HENT:      Head: Normocephalic and atraumatic.      Nose: Nose normal.      Mouth/Throat:      Mouth: Mucous membranes are moist.   Eyes:      Extraocular Movements: Extraocular movements intact.      Conjunctiva/sclera: Conjunctivae normal.      Pupils: Pupils are equal, round, and reactive to light.   Cardiovascular:      Rate and Rhythm: Normal rate and regular rhythm.      Pulses: Normal pulses.      Heart sounds: Murmur heard.   Pulmonary:      Effort: Pulmonary effort is normal.      Comments: Decreased breath sounds R>L  Abdominal:      General: Abdomen is flat. Bowel sounds are normal.      Palpations: Abdomen is soft.   Musculoskeletal:         General: Normal range of motion.      Cervical back: Normal range of motion and neck supple.      Right lower  leg: Edema present.      Left lower leg: Edema present.      Comments: Rt leg edema >L- edema improving   Skin:     General: Skin is warm and dry.   Neurological:      General: No focal deficit present.      Mental Status: He is alert and oriented to person, place, and time.   Psychiatric:         Mood and Affect: Mood normal.         Behavior: Behavior normal.         Significant Labs: All pertinent labs within the past 24 hours have been reviewed.  BMP:   Recent Labs   Lab 05/17/24  0801   GLU 74      K 3.4*      CO2 23   BUN 23   CREATININE 1.9*   CALCIUM 9.3   MG 1.6     CBC:   Recent Labs   Lab 05/16/24  0531 05/17/24  0801   WBC 4.79 4.79   HGB 11.5* 11.4*   HCT 35.4* 34.9*    170       Significant Imaging: I have reviewed all pertinent imaging results/findings within the past 24 hours.

## 2024-05-17 NOTE — ASSESSMENT & PLAN NOTE
Chronic, controlled. Latest blood pressure and vitals reviewed-     Home meds for hypertension were reviewed and noted below.   Hypertension Medications               furosemide (LASIX) 20 MG tablet Take 1 tablet (20 mg total) by mouth daily as needed.    metoprolol tartrate (LOPRESSOR) 25 MG tablet Take HALF tablet (12.5 mg total) by mouth 2 (two) times daily.    olmesartan (BENICAR) 40 MG tablet Take 1 tablet (40 mg total) by mouth once daily. HOLD SBP <120     -ok to continue prior regimen

## 2024-05-17 NOTE — PROGRESS NOTES
[facility]  Kidney Transplant  Progress Note      Reason for Follow-up: Reassessment of Kidney Transplant - 5/10/2023  (#1) recipient and management of immunosuppression.    ORGAN: LEFT KIDNEY      Donor Type: Living      Donor CMV Status:    Donor HBcAB:   Donor HCV Status:   Donor HBV AURORA:   Donor HCV AURORA:       Subjective:   History of Present Illness:  Mr. Parikh is a 63 y.o. year old Black male who received a living kidney transplant on 5/10/23. His most recent creatinine is 1.2-1.7. He takes prednisone, and tacrolimus for maintenance immunosuppression. His post transplant course has been complicated by urine leak. Had nephrostomy tube was placed and removed 12/31/23. 5/15/23-Kidney Bx: Good sample, No IFTA no glomerulosclerosis ,ATI no rejection C4d negative     Pertinent history   Crohn's  Sarcoidosis  Hearing loss   shunt placement x3  Enterococcus faecalis bacteremia ( March 2024)  Pneumonia April 2024     was transferred from [facility] on 5/13/2024 with recurrent pleural effusion. He was hospitalized in April with pneumonia and a thoracentesis was performed yielding 1050 cc transudate effusion. He discharged home on oral antibiotics. Mycophenolate was stopped. At [facility], a CXR showed recurrence of moderate R sided pleural effusion and BNP 1037, so patient was transferred to [facility] for further workup.    Previous Echos have not shown HFrEF/HFpEF. CXR on arrival to [facility] showed pulmonary congestion with R pleural effusion. Of note, the patient has had multiple  shunt replacements (secondary to infection) and his  shunt terminates into the pleural space. His Cr 1.7 which is high baseline. Last kidney US in March showed nonobstructing renal stones.     Interval history:  NAEON. Patient feeling better today. Cr slightly better today at 1.9. Continue prograf at same dose. Continue pred 5 mg daily. Resume cellcept when completes antibiotic course. Recommend  waiting to start jardiance until without UTI for at least 6 months.      Past Medical, Surgical, Family, and Social History:   Unchanged from H&P.    Scheduled Meds:   enoxparin  1 mg/kg Subcutaneous Q12H (treatment, non-standard time)    levoFLOXacin  750 mg Intravenous Q48H    metoprolol tartrate  12.5 mg Oral BID    predniSONE  5 mg Oral Daily    sodium bicarbonate  1,300 mg Oral TID    tacrolimus  2 mg Oral BID     Continuous Infusions:  PRN Meds:  Current Facility-Administered Medications:     hydrALAZINE, 10 mg, Intravenous, Q6H PRN    hydrOXYzine HCL, 25 mg, Oral, TID PRN    loperamide, 2 mg, Oral, QID PRN    oxyCODONE-acetaminophen, 1 tablet, Oral, Q6H PRN    sodium chloride 0.9%, 10 mL, Intravenous, Q12H PRN    Intake/Output - Last 3 Shifts         05/15 0700  05/16 0659 05/16 0700  05/17 0659 05/17 0700  05/18 0659    P.O. 560 1200     I.V. (mL/kg)  0 (0)     Other  0     IV Piggyback 274.1      Total Intake(mL/kg) 834.1 (10.5) 1200 (15.3)     Urine (mL/kg/hr) 1875 (1) 650 (0.3)     Emesis/NG output  0     Other  0     Stool 0 0     Blood  0     Total Output 1875 650     Net -1040.9 +550            Urine Occurrence  6 x     Stool Occurrence 1 x 5 x     Emesis Occurrence  0 x              Review of Systems   Constitutional:  Negative for chills and fever.   Respiratory:  Negative for cough and shortness of breath.    Cardiovascular:  Negative for chest pain and leg swelling.   Gastrointestinal:  Negative for abdominal pain, diarrhea, nausea and vomiting.   Allergic/Immunologic: Positive for immunocompromised state.   Neurological:  Negative for dizziness and weakness.   Psychiatric/Behavioral:  Negative for agitation and confusion.       Objective:     Vital Signs (Most Recent):  Temp: 98.1 °F (36.7 °C) (05/17/24 1112)  Pulse: (!) 57 (05/17/24 1112)  Resp: 18 (05/17/24 1112)  BP: 132/65 (05/17/24 1112)  SpO2: 95 % (05/17/24 1112) Vital Signs (24h Range):  Temp:  [97.7 °F (36.5 °C)-98.1 °F (36.7 °C)] 98.1 °F  "(36.7 °C)  Pulse:  [57-63] 57  Resp:  [14-18] 18  SpO2:  [94 %-96 %] 95 %  BP: (129-154)/(63-72) 132/65     Weight: 78.6 kg (173 lb 4.5 oz)  Height: 5' 9" (175.3 cm)  Body mass index is 25.59 kg/m².     Physical Exam  Vitals and nursing note reviewed.   Constitutional:       General: He is not in acute distress.     Appearance: He is obese.   Eyes:      General: No scleral icterus.  Cardiovascular:      Rate and Rhythm: Normal rate.   Pulmonary:      Effort: Pulmonary effort is normal. No respiratory distress.   Musculoskeletal:      Right lower leg: No edema.      Left lower leg: No edema.   Skin:     Coloration: Skin is not jaundiced.   Neurological:      Mental Status: He is alert.          Laboratory:  CBC:   Recent Labs   Lab 05/15/24  0705 05/16/24  0531 05/17/24  0801   WBC 4.37 4.79 4.79   RBC 3.59* 3.37* 3.33*   HGB 12.6* 11.5* 11.4*   HCT 38.4* 35.4* 34.9*    180 170   * 105* 105*   MCH 35.1* 34.1* 34.2*   MCHC 32.8 32.5 32.7     CMP:   Recent Labs   Lab 05/15/24  0705 05/16/24  0638 05/17/24  0801   GLU 69* 81 74   CALCIUM 9.3 9.5 9.3    137 136   K 3.9 3.7 3.4*   CO2 24 26 23    99 101   BUN 20 24* 23   CREATININE 1.8* 2.2* 1.9*     Labs within the past 24 hours have been reviewed.    Diagnostic Results: Reviewed.  Assessment/Plan:     Acute DVT (deep venous thrombosis)  RLE now on apixaban  Did not due CTA chest due to risk for CI-TRINO      CKD (chronic kidney disease)  On sodium bicarb      Kidney transplant status  ESKD from [facility] on HD 2020; neuro and pulm sarcoid; HCV; crohns  LUKT 5/10/23 CIT 11 cPRA 0 DSA 0 induced with thymo. OR notable for subcapsular hematoma. DGF for hyperkalemia. Takeback for elevated velocity. 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 Verma  Cr improved today to 1.9    Acute respiratory failure with hypoxia  Has improved since hospitalization   shunts drains into right thoracic cavity likely explains effusion and fluid " "overload explains the edema with recent pneumonia explaining the consolidation  Echo shows elevated pulmonary pressures with normal cvp and no jvd on exam this could be due to pulmonary edema, recent pneumonia or VTE. 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 Time spent caring for patient: 30 minutes    Kera Hernandez PA-C  Kidney Transplant  [facility]  "

## 2024-05-17 NOTE — ASSESSMENT & PLAN NOTE
- Suspected secondary to pleural effusion and pulm edema  - not on home o2, supplemental O2- wean as tolerated  - Chest x-ray showed a moderate-size right pleural effusion  - pulmonology consult requested for recurrent pleural effusion in case of status post kidney transplant. Previous effusion transudate  - empiric treatment with levofloxacin has been started in view of recent history of receiving azithromycin, ceftriaxone during the hospitalization followed by cefpodoxime as outpatient.  - ID consulted, cont levaquin 5 day course, procal neg   - resp culture ordered but no sputum  - elevated BNP and CXR with pulm edema- s/p IV lasix. ECHO with dilated LA and pulm HTN  - Pulm following.   - Patient had Good urine output with lasix but Worsening kidney function. Chest x-ray shows persistent pleural effusion.  Discussed with pulmonology.  As patient not requiring oxygen they do not recommend thoracentesis at this time.  Start p.o. daily Lasix.

## 2024-05-17 NOTE — ASSESSMENT & PLAN NOTE
- u/s: Nonocclusive deep venous thrombus in the RIGHT common femoral, femoral, and iliac veins   - started eliquis. Precribed Eliquis for discharge  - CTA deferred given renal function

## 2024-05-17 NOTE — ASSESSMENT & PLAN NOTE
- continue home medicine sodium bicarbonate  - Monitor UOP and serial BMP and adjust therapy as needed. Renally dose meds. Avoid nephrotoxic medications and procedures.  - KTM following  - Cr trending down.  Restart low-dose p.o. Lasix

## 2024-05-17 NOTE — NURSING
Pt AAOx4, VSS, afebrile. Electrolytes replaced PO. No plan for repeat thora at this time. Cr 1.9, home lasix resumed. 800 cc U/O. 6 min walk test performed, pt does not qualify for home O2. Pt remains on r/a with sats of 95%. Bed in low/locked position, call light/personal belongings w/in reach, non-slip socks in place, pt remains free from falls, family at bedside, TM.

## 2024-05-17 NOTE — ASSESSMENT & PLAN NOTE
Patient's anemia is currently controlled. Has not received any PRBCs to date. Etiology likely d/t chronic disease.  Current CBC reviewed-   Lab Results   Component Value Date    HGB 11.4 (L) 05/17/2024    HCT 34.9 (L) 05/17/2024     Monitor serial CBC and transfuse if patient becomes hemodynamically unstable, symptomatic or H/H drops below 7/21.

## 2024-05-17 NOTE — PROGRESS NOTES
[facility] Transplant [facility] Medicine  Progress Note    Patient Name: Abdoulaye Parikh  MRN: [id number]  Patient Class: IP- Inpatient   Admission Date: 5/13/2024  Length of Stay: 4 days  Attending Physician: Josephine Herrera MD  Primary Care Provider: Gosia, Primary Doctor        Subjective:     Principal Problem:Pleural effusion        HPI:  63-year-old male with a h/o kidney transplant (May 2023), Anemia of Chronic Disease, Crohn's disease, gout, hypertension, sarcoidosis,  shunt placement, Enterococcus faecalis bacteremia in March 2024 is transferred from [facility].  He reports shortness of breath started today early morning 1 with rest associated with cough without any expectoration.  He lives at home and he completed the cefpodoxime course of antibiotics was given during the recent discharge.  He denies any fever, chest pain, palpitations, lightheadedness, abdominal pain, disturbance with bowel or bladder habits.    He was admitted at [facility] (April 23-26) treated for pneumonia and pleural effusion. Thoracentesis on 04/25 yielding 1050 cc of serous fluid and had 465 nucleated cells (16% neutrophils, 57% lymphocytes, 27% monocytes), 401 RBCs, LDH 88, glucose 86, total protein 3.1, triglyceride 19, moderate white blood cells with no organisms seen. Fluid culture had no growth. Blood cultures had no growth at 5 days. He was treated with antibiotics azithromycin & ceftriaxone during the hospitalization and discharged on cefpodoxime to complete a 10 day course of antibiotics for pneumonia.     Today at [facility] Chest x-ray showed a moderate-size right pleural effusion. White blood cells 4.5, hemoglobin 12.2, hematocrit 36.6, platelets 194, BNP 1037, INR 1.1, sodium 140, potassium 3.8, chloride 107, CO2 22, BUN 17, creatinine 1.8 (1.4 on 5/6), glucose 98, calcium 9.3, albumin 3.3, AST 40, ALT 37, COVID negative, influenza negative      March 19, 2024: Echocardiogram  with EF 60-65%.  Normal diastolic function.    The current vitals at [facility]: T 97.6, HR 69, RR 16, /70, SaO2 95% on RA    Overview/Hospital Course:  Admitted for respiratory failure 2/2 to pleural effusion and volume overload. Started on supplemental O2 and diuretics. Of note this is reoccurrence of pleural effusion that was drained at previous admission, transudate based on previous analysis. Pulm consulted and recommend diuresis, will consider thora if not improving. Cont IV lasix. ECHO ordered, previous ECHO without reduced function. Elevated BNP and pulm edema on CXR.   - ECHO with dilated LA and pulm HTN normal EF.  Lower extremity u/s done for swelling and revealed DVT. Started on eliquis, deferred CTA given his renal function per KTM.    Of note he has a  shunt that terminates in the pleural space, this has been present for a long time however could possibly contributing to effusion.     KTM consulted for patient with history of renal transplant, recommend cont prograf with level monitoring and prednisone. Hold MMF. Renal u/s Mildly elevated intraparenchymal resistive indices minimally improved compared to prior exam which may be seen with medical renal disease, rejection, or drug toxicity. Nonobstructing renal stones. Patient treated with IV lasix.    Levaquin started on admit concern for possible parapneumonic effusion/PNA, resp culture ordered but reports no sputum- ID consulted recommend complete 5 days levaquin. Ucx colonization with Enterococcus denies urinary sxs, so no need to treat.  Noted Worsening creatinine 5/16. Lasix held.  Pleural effusion looks about the same on chest x-ray repeat.    Interval History: Patient states he continues to feel better.  On room air during my exam.  States he has been on room air since yesterday.  Still some shortness for breath only with exertion.  Discussed with patient's wife at bedside.  Discussed risk versus benefit of thoracentesis now that patient on  anticoagulation.    Review of Systems  A comprehensive review of systems was negative except as noted above.     Objective:     Vital Signs (Most Recent):  Temp: 97.9 °F (36.6 °C) (05/17/24 1534)  Pulse: 62 (05/17/24 1534)  Resp: 18 (05/17/24 1534)  BP: (!) 157/72 (05/17/24 1534)  SpO2: 95 % (05/17/24 1534) Vital Signs (24h Range):  Temp:  [97.9 °F (36.6 °C)-98.1 °F (36.7 °C)] 97.9 °F (36.6 °C)  Pulse:  [57-63] 62  Resp:  [18] 18  SpO2:  [94 %-95 %] 95 %  BP: (132-157)/(63-72) 157/72     Weight: 78.6 kg (173 lb 4.5 oz)  Body mass index is 25.59 kg/m².    Intake/Output Summary (Last 24 hours) at 5/17/2024 1625  Last data filed at 5/17/2024 1457  Gross per 24 hour   Intake 420 ml   Output 1367 ml   Net -947 ml         Physical Exam  Vitals and nursing note reviewed.   Constitutional:       General: He is not in acute distress.     Appearance: Normal appearance.   HENT:      Head: Normocephalic and atraumatic.      Nose: Nose normal.      Mouth/Throat:      Mouth: Mucous membranes are moist.   Eyes:      Extraocular Movements: Extraocular movements intact.      Conjunctiva/sclera: Conjunctivae normal.      Pupils: Pupils are equal, round, and reactive to light.   Cardiovascular:      Rate and Rhythm: Normal rate and regular rhythm.      Pulses: Normal pulses.      Heart sounds: Murmur heard.   Pulmonary:      Effort: Pulmonary effort is normal.      Comments: Decreased breath sounds R>L  Abdominal:      General: Abdomen is flat. Bowel sounds are normal.      Palpations: Abdomen is soft.   Musculoskeletal:         General: Normal range of motion.      Cervical back: Normal range of motion and neck supple.      Right lower leg: Edema present.      Left lower leg: Edema present.      Comments: Rt leg edema >L- edema improving   Skin:     General: Skin is warm and dry.   Neurological:      General: No focal deficit present.      Mental Status: He is alert and oriented to person, place, and time.   Psychiatric:         Mood  and Affect: Mood normal.         Behavior: Behavior normal.         Significant Labs: All pertinent labs within the past 24 hours have been reviewed.  BMP:   Recent Labs   Lab 05/17/24  0801   GLU 74      K 3.4*      CO2 23   BUN 23   CREATININE 1.9*   CALCIUM 9.3   MG 1.6     CBC:   Recent Labs   Lab 05/16/24  0531 05/17/24  0801   WBC 4.79 4.79   HGB 11.5* 11.4*   HCT 35.4* 34.9*    170       Significant Imaging: I have reviewed all pertinent imaging results/findings within the past 24 hours.    Assessment/Plan:      * Pleural effusion  - Patient found to have moderate pleural effusion on imaging. I have personally reviewed and interpreted the following imaging: Xray   - pulmonology team consulted/following.   - CT scan of the chest has been not performed because of recent worsening of kidney status (s/p kidney transplant) creatinine 1.8 (1.4 on 5/6)  - pulm recommend diuresis and monitoring clinical response and need for repeat thora  - Incidental note of V-P catheter in R pleural space which has been present for several years, and not likely related to the current pleural process per pulm    Nonrheumatic mitral valve regurgitation  -Inpatient TTE shows EF 60-65%. Left Atrium: Left atrium is severely dilated.  -Mitral Valve: There is at least mild-moderate to probably  moderate (2+) regurgitation with an eccentric jet. moderate pulmonary hypertension. The estimated pulmonary artery systolic pressure is 53 mmHg.  -lasix as above  -Cards consulted    Acute kidney injury superimposed on CKD  -Noted worsening creatinine suspect due to Lasix use  -KTM following. Lasix as above    Acute DVT (deep venous thrombosis)  - u/s: Nonocclusive deep venous thrombus in the RIGHT common femoral, femoral, and iliac veins   - started eliquis. (transitioned to full-dose Lovenox today in case of procedure, no procedure planned- transition back to Eliquis)  - CTA deferred given renal function     Bacteriuria  - UA  appears infectious, no dysuria reported  - ID following  - f/u Ucx Enterococcus likely colonization- no sxs and no need to treat      CKD (chronic kidney disease)  - continue home medicine sodium bicarbonate  - Monitor UOP and serial BMP and adjust therapy as needed. Renally dose meds. Avoid nephrotoxic medications and procedures.  - KTM following  - Cr trending down.  Restart low-dose p.o. Lasix    Hypertension  Chronic, controlled. Latest blood pressure and vitals reviewed-     Temp:  [97.9 °F (36.6 °C)-98.1 °F (36.7 °C)]   Pulse:  [57-63]   Resp:  [18]   BP: (132-157)/(63-72)   SpO2:  [94 %-95 %] .   Home meds for hypertension were reviewed and noted below.   Hypertension Medications               furosemide (LASIX) 20 MG tablet Take 1 tablet (20 mg total) by mouth daily as needed.    metoprolol tartrate (LOPRESSOR) 25 MG tablet Take HALF tablet (12.5 mg total) by mouth 2 (two) times daily.    olmesartan (BENICAR) 40 MG tablet Take 1 tablet (40 mg total) by mouth once daily. HOLD SBP <120       -While in the hospital, will manage blood pressure as follows; Adjust home antihypertensive regimen as follows- hold ARB  . Continue Lopressor    -Will utilize p.r.n. blood pressure medication only if patient's blood pressure greater than 180/110    Kidney transplant status  - cont prednisone and prograf with level monitoring  - KTM following  - renal u/s - Mildly elevated intraparenchymal resistive indices minimally improved compared to prior exam which may be seen with medical renal disease, rejection, or drug toxicity. Nonobstructing renal stones.  - monitor renal function     Acute respiratory failure with hypoxia  - Suspected secondary to pleural effusion and pulm edema  - not on home o2, supplemental O2- wean as tolerated  - Chest x-ray showed a moderate-size right pleural effusion  - pulmonology consult requested for recurrent pleural effusion in case of status post kidney transplant. Previous effusion transudate  -  empiric treatment with levofloxacin has been started in view of recent history of receiving azithromycin, ceftriaxone during the hospitalization followed by cefpodoxime as outpatient.  - ID consulted, cont levaquin 5 day course, procal neg   - resp culture ordered but no sputum  - elevated BNP and CXR with pulm edema- s/p IV lasix. ECHO with dilated LA and pulm HTN  - Pulm following.   - Patient had Good urine output with lasix but Worsening kidney function. Chest x-ray shows persistent pleural effusion.  Discussed with pulmonology.  As patient not requiring oxygen they do not recommend thoracentesis at this time.  Start p.o. daily Lasix.      Anemia of renal disease  Patient's anemia is currently controlled. Has not received any PRBCs to date. Etiology likely d/t chronic disease.  Current CBC reviewed-   Lab Results   Component Value Date    HGB 11.4 (L) 05/17/2024    HCT 34.9 (L) 05/17/2024     Monitor serial CBC and transfuse if patient becomes hemodynamically unstable, symptomatic or H/H drops below 7/21.    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   - cont prednisone  - Hold MMF  - KTM following      Prophylactic immunotherapy  - noted given hx of transplant and immunosuppressive medications       At risk for opportunistic infections  - noted given hx of transplant and immunosuppressive medications         VTE Risk Mitigation (From admission, onward)           Ordered     apixaban tablet 10 mg  2 times daily         05/17/24 1438     IP VTE HIGH RISK PATIENT  Once         05/13/24 2045                    Discharge Planning   SAVITA: 5/18/2024     Code Status: Full Code   Is the patient medically ready for discharge?:     Reason for patient still in hospital (select all that apply): Patient trending condition. Consult recommendations  Discharge Plan A: Home, Home with family                Josephine Herrera MD  Department of Hospital  "Medicine [facility]  05/17/2024      *Portions of this note may have been created with voice recognition software. Occasional "wrong word "or "sound alike" substitutions may have occurred due to the inherent limitations of voice recognition software.  Please excuse errors. Please, read the note carefully and recognize, using context, where substitutions have occurred.      "

## 2024-05-17 NOTE — PLAN OF CARE
AAOx3, afebrile, w/o c/o pain. Pt is on RA currently. Cardiology, KTM, Pulmonology and ID is following for pleural effusions. Lasix is on hold for increased Cr. Encouraging pt to measure urine in urinal. Pt having soft bm with his hx of Crohn's disease. IV levofloxacin q48h. Waiting sputum sample. Left arm fistula +/+. Pt able to position self independently. Pt in lowest position, side rails up x2, pt does not want non-skid foot wear, call light within reach, pt verbalized understanding to call RN when needed. Hand hygiene practiced per protocol. Will continue to monitor.

## 2024-05-17 NOTE — HOSPITAL COURSE
AUBREY. Patient feeling better today. Cr slightly elevated. Continue prograf at same dose. Continue pred 5 mg daily. Resume cellcept when completes antibiotic course. Recommend waiting to start jardiance until without UTI for at least 6 months.

## 2024-05-17 NOTE — PROGRESS NOTES
Home Oxygen Evaluation    Date Performed: 2024    1) Patient's Home O2 Sat on room air, while at rest: 95        If O2 sats on room air at rest are 88% or below, patient qualifies. No additional testing needed. Document N/A in steps 2 and 3. If 89% or above, complete steps 2.      2) Patient's O2 Sat on room air while exercisin        If O2 sats on room air while exercising remain 89% or above patient does not qualify, no further testing needed Document N/A in step 3. If O2 sats on room air while exercising are 88% or below, continue to step 3.      3) Patient's O2 Sat while exercising on O2: N/A at N/A         (Must show improvement from #2 for patients to qualify)    If O2 sats improve on oxygen, patient qualifies for portable oxygen. If not, the patient does not qualify.

## 2024-05-17 NOTE — PLAN OF CARE
"Pulmonary was asked to re-evaluate Mr. Parikh for the right sided pleural effusion due to worsening creatinine and unable to diuresis the patient.  Patient is status post thoracentesis in April with removal of 1050 cc of transudate effusion.  Patient was seen and examined. Patient discloses no shortness of breath, chest pain or fever. Patient able to ambulate without any symptoms  ./65 (Patient Position: Lying)   Pulse (!) 57   Temp 98.1 °F (36.7 °C) (Oral)   Resp 18   Ht 5' 9" (1.753 m)   Wt 78.6 kg (173 lb 4.5 oz)   SpO2 95%   BMI 25.59 kg/m²      Patient is maintaining oxygen sat on room air.  There was no JVD or peripheral edema on examination.  Bedside POCUS showed bilateral anechoic fluid (right > left).  The option of monitoring vs thoracentesis discussed with the patient along with the risks. The patient opted for monitoring, which is appropriate. Patient appears euvolemic, will elect to resume patient's home dose of lasix, but will defer to the primary team. The creatinine appears to be stable.  There is no plan for repeat thoracentesis. Please resume oral anticoagulation.    Case discussed with the attending physician Dr. Arian Long.      Rob Mendoza MD  PCCM Fellow, [facility]"

## 2024-05-17 NOTE — SUBJECTIVE & OBJECTIVE
Subjective:   History of Present Illness:  Mr. Parikh is a 63 y.o. year old Black male who received a living kidney transplant on 5/10/23. His most recent creatinine is 1.2-1.7. He takes prednisone, and tacrolimus for maintenance immunosuppression. His post transplant course has been complicated by urine leak. Had nephrostomy tube was placed and removed 12/31/23. 5/15/23-Kidney Bx: Good sample, No IFTA no glomerulosclerosis ,ATI no rejection C4d negative     Pertinent history   Crohn's  Sarcoidosis  Hearing loss   shunt placement x3  Enterococcus faecalis bacteremia ( March 2024)  Pneumonia April 2024     was transferred from [facility] on 5/13/2024 with recurrent pleural effusion. He was hospitalized in April with pneumonia and a thoracentesis was performed yielding 1050 cc transudate effusion. He discharged home on oral antibiotics. Mycophenolate was stopped. At [facility], a CXR showed recurrence of moderate R sided pleural effusion and BNP 1037, so patient was transferred to [facility] for further workup.    Previous Echos have not shown HFrEF/HFpEF. CXR on arrival to [facility] showed pulmonary congestion with R pleural effusion. Of note, the patient has had multiple  shunt replacements (secondary to infection) and his  shunt terminates into the pleural space. His Cr 1.7 which is high baseline. Last kidney US in March showed nonobstructing renal stones.     Interval history:  NAEON. Patient feeling better today. Cr slightly better today at 1.9. Continue prograf at same dose. Continue pred 5 mg daily. Resume cellcept when completes antibiotic course. Recommend waiting to start jardiance until without UTI for at least 6 months.      Past Medical, Surgical, Family, and Social History:   Unchanged from H&P.    Scheduled Meds:   enoxparin  1 mg/kg Subcutaneous Q12H (treatment, non-standard time)    levoFLOXacin  750 mg Intravenous Q48H    metoprolol tartrate  12.5 mg Oral BID    predniSONE  5  "mg Oral Daily    sodium bicarbonate  1,300 mg Oral TID    tacrolimus  2 mg Oral BID     Continuous Infusions:  PRN Meds:  Current Facility-Administered Medications:     hydrALAZINE, 10 mg, Intravenous, Q6H PRN    hydrOXYzine HCL, 25 mg, Oral, TID PRN    loperamide, 2 mg, Oral, QID PRN    oxyCODONE-acetaminophen, 1 tablet, Oral, Q6H PRN    sodium chloride 0.9%, 10 mL, Intravenous, Q12H PRN    Intake/Output - Last 3 Shifts         05/15 0700  05/16 0659 05/16 0700 05/17 0659 05/17 0700  05/18 0659    P.O. 560 1200     I.V. (mL/kg)  0 (0)     Other  0     IV Piggyback 274.1      Total Intake(mL/kg) 834.1 (10.5) 1200 (15.3)     Urine (mL/kg/hr) 1875 (1) 650 (0.3)     Emesis/NG output  0     Other  0     Stool 0 0     Blood  0     Total Output 1875 650     Net -1040.9 +550            Urine Occurrence  6 x     Stool Occurrence 1 x 5 x     Emesis Occurrence  0 x              Review of Systems   Constitutional:  Negative for chills and fever.   Respiratory:  Negative for cough and shortness of breath.    Cardiovascular:  Negative for chest pain and leg swelling.   Gastrointestinal:  Negative for abdominal pain, diarrhea, nausea and vomiting.   Allergic/Immunologic: Positive for immunocompromised state.   Neurological:  Negative for dizziness and weakness.   Psychiatric/Behavioral:  Negative for agitation and confusion.       Objective:     Vital Signs (Most Recent):  Temp: 98.1 °F (36.7 °C) (05/17/24 1112)  Pulse: (!) 57 (05/17/24 1112)  Resp: 18 (05/17/24 1112)  BP: 132/65 (05/17/24 1112)  SpO2: 95 % (05/17/24 1112) Vital Signs (24h Range):  Temp:  [97.7 °F (36.5 °C)-98.1 °F (36.7 °C)] 98.1 °F (36.7 °C)  Pulse:  [57-63] 57  Resp:  [14-18] 18  SpO2:  [94 %-96 %] 95 %  BP: (129-154)/(63-72) 132/65     Weight: 78.6 kg (173 lb 4.5 oz)  Height: 5' 9" (175.3 cm)  Body mass index is 25.59 kg/m².     Physical Exam  Vitals and nursing note reviewed.   Constitutional:       General: He is not in acute distress.     Appearance: He " is obese.   Eyes:      General: No scleral icterus.  Cardiovascular:      Rate and Rhythm: Normal rate.   Pulmonary:      Effort: Pulmonary effort is normal. No respiratory distress.   Musculoskeletal:      Right lower leg: No edema.      Left lower leg: No edema.   Skin:     Coloration: Skin is not jaundiced.   Neurological:      Mental Status: He is alert.          Laboratory:  CBC:   Recent Labs   Lab 05/15/24  0705 05/16/24  0531 05/17/24  0801   WBC 4.37 4.79 4.79   RBC 3.59* 3.37* 3.33*   HGB 12.6* 11.5* 11.4*   HCT 38.4* 35.4* 34.9*    180 170   * 105* 105*   MCH 35.1* 34.1* 34.2*   MCHC 32.8 32.5 32.7     CMP:   Recent Labs   Lab 05/15/24  0705 05/16/24  0638 05/17/24  0801   GLU 69* 81 74   CALCIUM 9.3 9.5 9.3    137 136   K 3.9 3.7 3.4*   CO2 24 26 23    99 101   BUN 20 24* 23   CREATININE 1.8* 2.2* 1.9*     Labs within the past 24 hours have been reviewed.    Diagnostic Results: Reviewed.

## 2024-05-18 VITALS
TEMPERATURE: 98 F | DIASTOLIC BLOOD PRESSURE: 81 MMHG | HEIGHT: 69 IN | WEIGHT: 173.06 LBS | BODY MASS INDEX: 25.63 KG/M2 | HEART RATE: 59 BPM | SYSTOLIC BLOOD PRESSURE: 142 MMHG | RESPIRATION RATE: 18 BRPM | OXYGEN SATURATION: 97 %

## 2024-05-18 LAB
ANION GAP SERPL CALC-SCNC: 12 MMOL/L (ref 8–16)
BASOPHILS # BLD AUTO: 0.05 K/UL (ref 0–0.2)
BASOPHILS NFR BLD: 0.9 % (ref 0–1.9)
BUN SERPL-MCNC: 20 MG/DL (ref 8–23)
CALCIUM SERPL-MCNC: 9.7 MG/DL (ref 8.7–10.5)
CHLORIDE SERPL-SCNC: 101 MMOL/L (ref 95–110)
CO2 SERPL-SCNC: 22 MMOL/L (ref 23–29)
CREAT SERPL-MCNC: 2 MG/DL (ref 0.5–1.4)
DIFFERENTIAL METHOD BLD: ABNORMAL
EOSINOPHIL # BLD AUTO: 0.1 K/UL (ref 0–0.5)
EOSINOPHIL NFR BLD: 1.1 % (ref 0–8)
ERYTHROCYTE [DISTWIDTH] IN BLOOD BY AUTOMATED COUNT: 14.7 % (ref 11.5–14.5)
EST. GFR  (NO RACE VARIABLE): 37 ML/MIN/1.73 M^2
GLUCOSE SERPL-MCNC: 77 MG/DL (ref 70–110)
HCT VFR BLD AUTO: 41.1 % (ref 40–54)
HGB BLD-MCNC: 13.2 G/DL (ref 14–18)
IMM GRANULOCYTES # BLD AUTO: 0.02 K/UL (ref 0–0.04)
IMM GRANULOCYTES NFR BLD AUTO: 0.4 % (ref 0–0.5)
LYMPHOCYTES # BLD AUTO: 1.3 K/UL (ref 1–4.8)
LYMPHOCYTES NFR BLD: 23.3 % (ref 18–48)
MAGNESIUM SERPL-MCNC: 1.5 MG/DL (ref 1.6–2.6)
MCH RBC QN AUTO: 34.6 PG (ref 27–31)
MCHC RBC AUTO-ENTMCNC: 32.1 G/DL (ref 32–36)
MCV RBC AUTO: 108 FL (ref 82–98)
MONOCYTES # BLD AUTO: 0.8 K/UL (ref 0.3–1)
MONOCYTES NFR BLD: 13.8 % (ref 4–15)
NEUTROPHILS # BLD AUTO: 3.4 K/UL (ref 1.8–7.7)
NEUTROPHILS NFR BLD: 60.5 % (ref 38–73)
NRBC BLD-RTO: 0 /100 WBC
PHOSPHATE SERPL-MCNC: 2.9 MG/DL (ref 2.7–4.5)
PLATELET # BLD AUTO: 197 K/UL (ref 150–450)
PMV BLD AUTO: 10.9 FL (ref 9.2–12.9)
POTASSIUM SERPL-SCNC: 3.8 MMOL/L (ref 3.5–5.1)
RBC # BLD AUTO: 3.82 M/UL (ref 4.6–6.2)
SODIUM SERPL-SCNC: 135 MMOL/L (ref 136–145)
TACROLIMUS BLD-MCNC: 9.9 NG/ML (ref 5–15)
WBC # BLD AUTO: 5.58 K/UL (ref 3.9–12.7)

## 2024-05-18 PROCEDURE — 63600175 PHARM REV CODE 636 W HCPCS: Performed by: STUDENT IN AN ORGANIZED HEALTH CARE EDUCATION/TRAINING PROGRAM

## 2024-05-18 PROCEDURE — 25000003 PHARM REV CODE 250: Performed by: STUDENT IN AN ORGANIZED HEALTH CARE EDUCATION/TRAINING PROGRAM

## 2024-05-18 PROCEDURE — 83735 ASSAY OF MAGNESIUM: CPT | Performed by: STUDENT IN AN ORGANIZED HEALTH CARE EDUCATION/TRAINING PROGRAM

## 2024-05-18 PROCEDURE — 80048 BASIC METABOLIC PNL TOTAL CA: CPT | Performed by: STUDENT IN AN ORGANIZED HEALTH CARE EDUCATION/TRAINING PROGRAM

## 2024-05-18 PROCEDURE — 36415 COLL VENOUS BLD VENIPUNCTURE: CPT | Performed by: STUDENT IN AN ORGANIZED HEALTH CARE EDUCATION/TRAINING PROGRAM

## 2024-05-18 PROCEDURE — 84100 ASSAY OF PHOSPHORUS: CPT | Performed by: STUDENT IN AN ORGANIZED HEALTH CARE EDUCATION/TRAINING PROGRAM

## 2024-05-18 PROCEDURE — 63600175 PHARM REV CODE 636 W HCPCS: Performed by: INTERNAL MEDICINE

## 2024-05-18 PROCEDURE — 80197 ASSAY OF TACROLIMUS: CPT | Performed by: INTERNAL MEDICINE

## 2024-05-18 PROCEDURE — 85025 COMPLETE CBC W/AUTO DIFF WBC: CPT | Performed by: STUDENT IN AN ORGANIZED HEALTH CARE EDUCATION/TRAINING PROGRAM

## 2024-05-18 PROCEDURE — 99232 SBSQ HOSP IP/OBS MODERATE 35: CPT | Mod: ,,, | Performed by: INTERNAL MEDICINE

## 2024-05-18 RX ORDER — FUROSEMIDE 20 MG/1
20 TABLET ORAL DAILY
Qty: 30 TABLET | Refills: 0 | Status: SHIPPED | OUTPATIENT
Start: 2024-05-18 | End: 2024-06-17

## 2024-05-18 RX ORDER — MAGNESIUM SULFATE HEPTAHYDRATE 40 MG/ML
2 INJECTION, SOLUTION INTRAVENOUS ONCE
Status: COMPLETED | OUTPATIENT
Start: 2024-05-18 | End: 2024-05-18

## 2024-05-18 RX ORDER — BENZONATATE 200 MG/1
200 CAPSULE ORAL 2 TIMES DAILY PRN
Qty: 15 CAPSULE | Refills: 0 | Status: SHIPPED | OUTPATIENT
Start: 2024-05-18 | End: 2024-06-01

## 2024-05-18 RX ORDER — LIDOCAINE 50 MG/G
1 PATCH TOPICAL DAILY
Qty: 14 PATCH | Refills: 0 | Status: SHIPPED | OUTPATIENT
Start: 2024-05-18

## 2024-05-18 RX ORDER — OLMESARTAN MEDOXOMIL 40 MG/1
20 TABLET ORAL DAILY
Qty: 15 TABLET | Refills: 1 | Status: SHIPPED | OUTPATIENT
Start: 2024-05-18 | End: 2025-05-18

## 2024-05-18 RX ADMIN — SODIUM BICARBONATE 650 MG TABLET 1300 MG: at 08:05

## 2024-05-18 RX ADMIN — DIBASIC SODIUM PHOSPHATE, MONOBASIC POTASSIUM PHOSPHATE AND MONOBASIC SODIUM PHOSPHATE 1 TABLET: 852; 155; 130 TABLET ORAL at 08:05

## 2024-05-18 RX ADMIN — FUROSEMIDE 20 MG: 20 TABLET ORAL at 08:05

## 2024-05-18 RX ADMIN — MAGNESIUM SULFATE HEPTAHYDRATE 2 G: 40 INJECTION, SOLUTION INTRAVENOUS at 09:05

## 2024-05-18 RX ADMIN — TACROLIMUS 2 MG: 1 CAPSULE ORAL at 08:05

## 2024-05-18 RX ADMIN — PREDNISONE 5 MG: 5 TABLET ORAL at 08:05

## 2024-05-18 RX ADMIN — APIXABAN 10 MG: 5 TABLET, FILM COATED ORAL at 08:05

## 2024-05-18 RX ADMIN — Medication 400 MG: at 08:05

## 2024-05-18 RX ADMIN — METOPROLOL TARTRATE 12.5 MG: 25 TABLET, FILM COATED ORAL at 08:05

## 2024-05-18 NOTE — PROGRESS NOTES
"KIDNEY TRANSPLANT MEDICINE PROGRESS NOTE    Please refer to detailed progress note from May 17 2024 for complete details of this admission.    Interval history: Feels fine No chest pain or SOB. HM would like to d/c home, patient feels he is ready to go home     Past medical, surgical, family, social history reviewed & unchanged since admission.     I/O last 3 completed shifts:  In: 1947 [P.O.:1797; IV Piggyback:150]  Out: 2100 [Urine:2100]    VITALS:  height is 5' 9" (1.753 m) and weight is 78.5 kg (173 lb 1 oz). His oral temperature is 97.7 °F (36.5 °C). His blood pressure is 143/80 (abnormal) and his pulse is 57 (abnormal). His respiration is 18 and oxygen saturation is 95%.       A&O x3, NAD.  Lungs CTA, unlabored.  Heart regular, no rub.  Abdomen soft, nontender, no masses.  Allograft nontender.  Edema: none.    Recent Labs   Lab 05/16/24  0531 05/17/24  0801 05/18/24  0627   WBC 4.79 4.79 5.58   HGB 11.5* 11.4* 13.2*   HCT 35.4* 34.9* 41.1    170 197       Recent Labs   Lab 05/15/24  0705 05/16/24  0638 05/17/24  0801 05/18/24  0627    137 136 135*   K 3.9 3.7 3.4* 3.8    99 101 101   CO2 24 26 23 22*   BUN 20 24* 23 20   CREATININE 1.8* 2.2* 1.9* 2.0*   CALCIUM 9.3 9.5 9.3 9.7   PHOS 2.9  --  2.9 2.9     Recent Labs   Lab 05/15/24  0705 05/16/24  0531 05/17/24  0801   Tacrolimus Lvl 6.8 8.8 8.2       ASSESSMENT/PLAN:    Problems/plans as noted in the referenced progress note. Additional pertinent findings:  Acute DVT (deep venous thrombosis)  RLE now on apixaban  Did not due CTA chest due to risk for CI-TRINO        CKD (chronic kidney disease)  On sodium bicarb        Kidney transplant status  ESKD from [facility] on HD 2020; neuro and pulm sarcoid; HCV; crohns  LUKT 5/10/23 CIT 11 cPRA 0 DSA 0 induced with thymo. OR notable for subcapsular hematoma. DGF for hyperkalemia. Takeback for elevated velocity. 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 Luci Torres stable at 2   " "  Acute respiratory failure with hypoxia  Has improved since hospitalization   shunts drains into right thoracic cavity likely explains effusion and fluid overload explains the edema with recent pneumonia explaining the consolidation  Echo shows elevated pulmonary pressures with normal cvp and no jvd on exam this could be due to pulmonary edema, recent pneumonia or VTE. 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    -Avoid nephrotoxic agents when feasible (NSAIDs, IV contrast dye, Aminoglycoside-containing antibiotics)  -Renally dose all appropriate medications, including antibiotics      Encounter for Monitoring Immunosuppression post Transplant  -Continue to trend immunosuppression levels.   -Monitor for med-related side effects or drug toxicity given immunosuppressant med with narrow therapeutic window.     "

## 2024-05-18 NOTE — PLAN OF CARE
AAOx3, afebrile, c/o pain in back. PRN pain medication given. Pt is on RA currently. Cardiology, KTM, Pulmonology and ID is following for pleural effusions. PO lasix started 5/17. Encouraging pt to measure urine in urinal. Pt is having soft bm with his hx of Crohn's disease. Last dose of IV levofloxacin given. Waiting sputum sample. Left arm fistula +/+. Pt able to position self independently. Pt in lowest position, side rails up x2, pt does not want non-skid foot wear, call light within reach, pt verbalized understanding to call RN when needed. Hand hygiene practiced per protocol. Will continue to monitor.

## 2024-05-18 NOTE — PROGRESS NOTES
AVS printout provided to pt and reviewed at bedside. Questions encouraged and answered accordingly. Pt verbalized understanding of AVS. VSS. PIV removed. Pt transport pending. No distress noted.

## 2024-05-19 LAB
BACTERIA BLD CULT: NORMAL
BACTERIA BLD CULT: NORMAL

## 2024-05-19 NOTE — PLAN OF CARE
[facility] Trans[facility] Stepdown  Discharge Final Note    Primary Care Provider: No, Primary Doctor    Expected Discharge Date: 5/18/2024    Patient cleared for discharge from case management standpoint.    Final Discharge Note (most recent)       Final Note - 05/18/24 1500          Final Note    Assessment Type Final Discharge Note     Anticipated Discharge Disposition Home or Self Care     What phone number can be called within the next 1-3 days to see how you are doing after discharge? [phone]     Hospital Resources/Appts/Education Provided Provided patient/caregiver with written discharge plan information;Appointments scheduled and added to AVS        Post-Acute Status    Discharge Delays None known at this time                   Important Message from Medicare           Laura Lopez RN  Weekend  - [facility]  Spectralink: [phone]

## 2024-05-20 ENCOUNTER — PATIENT MESSAGE (OUTPATIENT)
Dept: TRANSPLANT | Facility: CLINIC | Age: 64
End: 2024-05-20
Payer: MEDICARE

## 2024-05-21 ENCOUNTER — DOCUMENTATION ONLY (OUTPATIENT)
Dept: CARDIOLOGY | Facility: CLINIC | Age: 64
End: 2024-05-21
Payer: MEDICARE

## 2024-05-22 ENCOUNTER — DOCUMENTATION ONLY (OUTPATIENT)
Dept: CARDIOLOGY | Facility: CLINIC | Age: 64
End: 2024-05-22
Payer: MEDICARE

## 2024-05-23 ENCOUNTER — TELEPHONE (OUTPATIENT)
Dept: TRANSPLANT | Facility: CLINIC | Age: 64
End: 2024-05-23
Payer: MEDICARE

## 2024-05-23 ENCOUNTER — DOCUMENTATION ONLY (OUTPATIENT)
Dept: CARDIOLOGY | Facility: CLINIC | Age: 64
End: 2024-05-23
Payer: MEDICARE

## 2024-05-23 NOTE — TELEPHONE ENCOUNTER
Left voice message to call coordinator back.  ----- Message from Mi Alexander RN sent at 5/20/2024  9:22 AM CDT -----  Regarding: FW: Consult/Advisory  Contact: Abdoulaye Parikh    ----- Message -----  From: Deb Perry  Sent: 5/20/2024   8:56 AM CDT  To: [facility] Post-Kidney Transplant Non-Clinical  Subject: Consult/Advisory                                    Consult/Advisory     Name Of Caller:Abdoulaye Parikh         Contact Preference:[phone] (home) [phone] (work)      Nature of call:Patient is calling to speak to Robles to get a letter to return back to work. Requesting a call back

## 2024-05-23 NOTE — TELEPHONE ENCOUNTER
Return call to patient, notified that he is schedule for fasting labs on Monday 5/27.  ----- Message from Gunjan England sent at 5/23/2024  3:10 PM CDT -----  Regarding: return call to Robles  Contact: PT  [phone]  The patient called returning call to speak to Nurse Robles. I was unable to make contact w/coordinator - message sent for return call     No further information provided    Patient can be contacted @# [phone]

## 2024-05-27 ENCOUNTER — DOCUMENTATION ONLY (OUTPATIENT)
Dept: CARDIOLOGY | Facility: CLINIC | Age: 64
End: 2024-05-27
Payer: MEDICARE

## 2024-05-27 ENCOUNTER — PATIENT MESSAGE (OUTPATIENT)
Dept: TRANSPLANT | Facility: CLINIC | Age: 64
End: 2024-05-27
Payer: MEDICARE

## 2024-05-27 ENCOUNTER — LAB VISIT (OUTPATIENT)
Dept: LAB | Facility: HOSPITAL | Age: 64
End: 2024-05-27
Attending: INTERNAL MEDICINE
Payer: MEDICARE

## 2024-05-27 DIAGNOSIS — Z94.0 S/P KIDNEY TRANSPLANT: ICD-10-CM

## 2024-05-27 DIAGNOSIS — Z94.0 KIDNEY REPLACED BY TRANSPLANT: ICD-10-CM

## 2024-05-27 LAB
ALBUMIN SERPL BCP-MCNC: 3.2 G/DL (ref 3.5–5.2)
ALBUMIN SERPL BCP-MCNC: 3.2 G/DL (ref 3.5–5.2)
ALP SERPL-CCNC: 84 U/L (ref 55–135)
ALT SERPL W/O P-5'-P-CCNC: 34 U/L (ref 10–44)
ANION GAP SERPL CALC-SCNC: 9 MMOL/L (ref 8–16)
AST SERPL-CCNC: 25 U/L (ref 10–40)
BASOPHILS # BLD AUTO: 0.05 K/UL (ref 0–0.2)
BASOPHILS NFR BLD: 1 % (ref 0–1.9)
BILIRUB DIRECT SERPL-MCNC: 0.4 MG/DL (ref 0.1–0.3)
BILIRUB SERPL-MCNC: 0.9 MG/DL (ref 0.1–1)
BUN SERPL-MCNC: 13 MG/DL (ref 8–23)
CALCIUM SERPL-MCNC: 9.2 MG/DL (ref 8.7–10.5)
CHLORIDE SERPL-SCNC: 108 MMOL/L (ref 95–110)
CO2 SERPL-SCNC: 27 MMOL/L (ref 23–29)
CREAT SERPL-MCNC: 1.5 MG/DL (ref 0.5–1.4)
DIFFERENTIAL METHOD BLD: ABNORMAL
EOSINOPHIL # BLD AUTO: 0.1 K/UL (ref 0–0.5)
EOSINOPHIL NFR BLD: 1.3 % (ref 0–8)
ERYTHROCYTE [DISTWIDTH] IN BLOOD BY AUTOMATED COUNT: 14.5 % (ref 11.5–14.5)
EST. GFR  (NO RACE VARIABLE): 52.3 ML/MIN/1.73 M^2
GLUCOSE SERPL-MCNC: 67 MG/DL (ref 70–110)
HCT VFR BLD AUTO: 37.5 % (ref 40–54)
HGB BLD-MCNC: 11.9 G/DL (ref 14–18)
IMM GRANULOCYTES # BLD AUTO: 0.02 K/UL (ref 0–0.04)
IMM GRANULOCYTES NFR BLD AUTO: 0.4 % (ref 0–0.5)
LYMPHOCYTES # BLD AUTO: 1.1 K/UL (ref 1–4.8)
LYMPHOCYTES NFR BLD: 21.9 % (ref 18–48)
MAGNESIUM SERPL-MCNC: 1.9 MG/DL (ref 1.6–2.6)
MCH RBC QN AUTO: 34.7 PG (ref 27–31)
MCHC RBC AUTO-ENTMCNC: 31.7 G/DL (ref 32–36)
MCV RBC AUTO: 109 FL (ref 82–98)
MONOCYTES # BLD AUTO: 0.6 K/UL (ref 0.3–1)
MONOCYTES NFR BLD: 11.2 % (ref 4–15)
NEUTROPHILS # BLD AUTO: 3.3 K/UL (ref 1.8–7.7)
NEUTROPHILS NFR BLD: 64.2 % (ref 38–73)
NRBC BLD-RTO: 0 /100 WBC
PHOSPHATE SERPL-MCNC: 3.6 MG/DL (ref 2.7–4.5)
PLATELET # BLD AUTO: 168 K/UL (ref 150–450)
PMV BLD AUTO: 10.5 FL (ref 9.2–12.9)
POTASSIUM SERPL-SCNC: 3.8 MMOL/L (ref 3.5–5.1)
PROT SERPL-MCNC: 7.9 G/DL (ref 6–8.4)
RBC # BLD AUTO: 3.43 M/UL (ref 4.6–6.2)
SODIUM SERPL-SCNC: 144 MMOL/L (ref 136–145)
TACROLIMUS BLD-MCNC: 4.6 NG/ML (ref 5–15)
WBC # BLD AUTO: 5.2 K/UL (ref 3.9–12.7)

## 2024-05-27 PROCEDURE — 80069 RENAL FUNCTION PANEL: CPT | Performed by: INTERNAL MEDICINE

## 2024-05-27 PROCEDURE — 80197 ASSAY OF TACROLIMUS: CPT | Performed by: INTERNAL MEDICINE

## 2024-05-27 PROCEDURE — 87799 DETECT AGENT NOS DNA QUANT: CPT | Performed by: INTERNAL MEDICINE

## 2024-05-27 PROCEDURE — 83735 ASSAY OF MAGNESIUM: CPT | Performed by: INTERNAL MEDICINE

## 2024-05-27 PROCEDURE — 84075 ASSAY ALKALINE PHOSPHATASE: CPT | Performed by: INTERNAL MEDICINE

## 2024-05-27 PROCEDURE — 36415 COLL VENOUS BLD VENIPUNCTURE: CPT | Performed by: INTERNAL MEDICINE

## 2024-05-27 PROCEDURE — 85025 COMPLETE CBC W/AUTO DIFF WBC: CPT | Performed by: INTERNAL MEDICINE

## 2024-05-27 PROCEDURE — 84155 ASSAY OF PROTEIN SERUM: CPT | Performed by: INTERNAL MEDICINE

## 2024-05-27 RX ORDER — TACROLIMUS 1 MG/1
CAPSULE ORAL
Qty: 120 CAPSULE | Refills: 11 | Status: SHIPPED | OUTPATIENT
Start: 2024-05-27 | End: 2025-05-27

## 2024-05-27 NOTE — PROGRESS NOTES
[facility] ([facility]) Team notified of pt referral via Ambulatory Referral to Heart Failure Transitional Care ([id number]).    Patient screened today 05/27/2024 by provider and RN for enrollment to program.      Pt was deemed not a candidate for enrollment at this time related to multiple attempts made to phone enroll patient . Unable to reach patient,  no return call after messages were left for patient to call.  Pt will require additional follow up planning per primary team.     If pt status, diagnosis, or treatment plan changes , please place AMB referral to [facility] ([id number]) for [facility] enrollment re-evalution.

## 2024-05-27 NOTE — TELEPHONE ENCOUNTER
Patient repeated back and voice a understanding of orders.  Next labs on 6/10/2024.  ----- Message from Darrin Verma MD sent at 5/27/2024  9:37 AM CDT -----  Please increase prograf to 2 mg PO bid

## 2024-05-28 NOTE — DISCHARGE SUMMARY
[facility] - Transplant [facility] Medicine  Discharge Summary      Patient Name: Abdoulaye Parikh  MRN: [id number]  DELMY: [id number]  Patient Class: IP- Inpatient  Admission Date: 5/13/2024  Hospital Length of Stay: 5 days  Discharge Date and Time: 5/18/2024  3:03 PM  Attending Physician: Josephine Herrera MD   Discharging Provider: Josephine Herrera MD  Primary Care Provider: Gosia, Primary Doctor  [facility] Medicine Team: [facility] MED D Josephine Herrera MD  Primary Care Team: [facility] MED D    HPI:   63-year-old male with a h/o kidney transplant (May 2023), Anemia of Chronic Disease, Crohn's disease, gout, hypertension, sarcoidosis,  shunt placement, Enterococcus faecalis bacteremia in March 2024 is transferred from [facility].  He reports shortness of breath started today early morning 1 with rest associated with cough without any expectoration.  He lives at home and he completed the cefpodoxime course of antibiotics was given during the recent discharge.  He denies any fever, chest pain, palpitations, lightheadedness, abdominal pain, disturbance with bowel or bladder habits.    He was admitted at [facility] (April 23-26) treated for pneumonia and pleural effusion. Thoracentesis on 04/25 yielding 1050 cc of serous fluid and had 465 nucleated cells (16% neutrophils, 57% lymphocytes, 27% monocytes), 401 RBCs, LDH 88, glucose 86, total protein 3.1, triglyceride 19, moderate white blood cells with no organisms seen. Fluid culture had no growth. Blood cultures had no growth at 5 days. He was treated with antibiotics azithromycin & ceftriaxone during the hospitalization and discharged on cefpodoxime to complete a 10 day course of antibiotics for pneumonia.     Today at [facility] Chest x-ray showed a moderate-size right pleural effusion. White blood cells 4.5, hemoglobin 12.2, hematocrit 36.6, platelets 194, BNP 1037, INR 1.1, sodium 140, potassium 3.8, chloride 107, CO2 22, BUN  17, creatinine 1.8 (1.4 on 5/6), glucose 98, calcium 9.3, albumin 3.3, AST 40, ALT 37, COVID negative, influenza negative      March 19, 2024: Echocardiogram with EF 60-65%.  Normal diastolic function.    [place] [facility] T 97.6, HR 69, RR 16, /70, SaO2 95% on RA    * No surgery found *      Hospital Course:   Admitted for respiratory failure 2/2 to pleural effusion and volume overload. Started on supplemental O2 and diuretics. Of note this is reoccurrence of pleural effusion that was drained at previous admission, transudate based on previous analysis. Pulm consulted and recommend diuresis, will consider thora if not improving. Cont IV lasix. ECHO ordered, previous ECHO without reduced function. Elevated BNP and pulm edema on CXR.   - ECHO with dilated LA and pulm HTN normal EF.  Lower extremity u/s done for swelling and revealed DVT. Started on eliquis, deferred CTA given his renal function per KTM.    Of note he has a  shunt that terminates in the pleural space, this has been present for a long time however could possibly contributing to effusion.   KTM consulted for patient with history of renal transplant, recommend cont prograf with level monitoring and prednisone. Hold MMF. Renal u/s Mildly elevated intraparenchymal resistive indices minimally improved compared to prior exam which may be seen with medical renal disease, rejection, or drug toxicity. Nonobstructing renal stones. Patient treated with IV lasix.    Levaquin started on admit concern for possible parapneumonic effusion/PNA, resp culture ordered but reports no sputum- ID consulted recommend complete 5 days levaquin. Ucx colonization with Enterococcus denies urinary sxs, so no need to treat.  Noted Worsening creatinine 5/16. Lasix held.  Pleural effusion looks about the same on chest x-ray repeat. Discussed case with Pulmonology. Bedside Echo shows only a modest volume of fluid with the appearance of a simple effusion. Options  discussed wih patient, and decision made to defer thoracentesis unless respiratory symptoms develop. Pt deemed appropriate for discharge. Recommend continued daily Lasix. Prescribed Eliquis for acute DVT. Plan discussed with pt, who was agreeable; medications were discussed and reviewed. Outpatient follow-up discussed and scheduled when able. ER precautions were given. Patient discharged.        Physical Exam  Vitals and nursing note reviewed.   Constitutional:       General: He is not in acute distress.     Appearance: Normal appearance.   HENT:      Head: Normocephalic and atraumatic.      Nose: Nose normal.      Mouth/Throat:      Mouth: Mucous membranes are moist.   Eyes:      Extraocular Movements: Extraocular movements intact.      Conjunctiva/sclera: Conjunctivae normal.      Pupils: Pupils are equal, round, and reactive to light.   Cardiovascular:      Rate and Rhythm: Normal rate and regular rhythm.      Pulses: Normal pulses.      Heart sounds: Murmur heard.   Pulmonary:      Effort: Pulmonary effort is normal.      Comments: Decreased breath sounds R>L  Abdominal:      General: Abdomen is flat. Bowel sounds are normal.      Palpations: Abdomen is soft.   Musculoskeletal:         General: Normal range of motion.      Cervical back: Normal range of motion and neck supple.      Right lower leg: Edema present.      Left lower leg: Edema present.      Comments: Rt leg edema >L- edema improving   Skin:     General: Skin is warm and dry.   Neurological:      General: No focal deficit present.      Mental Status: He is alert and oriented to person, place, and time.   Psychiatric:         Mood and Affect: Mood normal.         Behavior: Behavior normal.     Goals of Care Treatment Preferences:  Code Status: Full Code      Consults:   Consults (From admission, onward)          Status Ordering Provider     Inpatient consult to Cardiology  Once        Provider:  (Not yet assigned)    Completed BRITNEY NAVARRO      Inpatient consult to Midline team  Once        Provider:  (Not yet assigned)    Completed ENZO CORONEL     Inpatient consult to Kidney/Pancreas Transplant Medicine  Once        Provider:  (Not yet assigned)    Completed ENZO CORONEL     Inpatient consult to Infectious Diseases  Once        Provider:  Irina Freire MD    Completed KRYSTINA ELIZABETH     Inpatient consult to Pulmonology  Once        Provider:  LORRI Long MD    Completed KRYSTINA ELIZABETH            Pulmonary  * Pleural effusion  - Patient found to have moderate pleural effusion on imaging. I have personally reviewed and interpreted the following imaging: Xray   - pulmonology team consulted/following.   - CT scan of the chest has been not performed because of recent worsening of kidney status (s/p kidney transplant) creatinine 1.8 (1.4 on 5/6)  - pulm recommend diuresis and monitoring clinical response and need for repeat thora  - Incidental note of V-P catheter in R pleural space which has been present for several years, and not likely related to the current pleural process per pulm    Acute respiratory failure with hypoxia  - Suspected secondary to pleural effusion and pulm edema  - not on home o2, supplemental O2- wean as tolerated  - Chest x-ray showed a moderate-size right pleural effusion  - pulmonology consult requested for recurrent pleural effusion in case of status post kidney transplant. Previous effusion transudate  - empiric treatment with levofloxacin has been started in view of recent history of receiving azithromycin, ceftriaxone during the hospitalization followed by cefpodoxime as outpatient.  - ID consulted, cont levaquin 5 day course, procal neg   - resp culture ordered but no sputum  - elevated BNP and CXR with pulm edema- s/p IV lasix. ECHO with dilated LA and pulm HTN  - Pulm following.   - Patient had Good urine output with lasix but Worsening kidney function. Chest x-ray shows persistent pleural effusion.  Discussed with  pulmonology.  As patient not requiring oxygen they do not recommend thoracentesis at this time.  Start p.o. daily Lasix.      Cardiac/Vascular  Nonrheumatic mitral valve regurgitation  -Inpatient TTE shows EF 60-65%. Left Atrium: Left atrium is severely dilated.  -Mitral Valve: There is at least mild-moderate to probably  moderate (2+) regurgitation with an eccentric jet. moderate pulmonary hypertension. The estimated pulmonary artery systolic pressure is 53 mmHg.  -lasix as above  -Cards consulted    Hypertension  Chronic, controlled. Latest blood pressure and vitals reviewed-     Home meds for hypertension were reviewed and noted below.   Hypertension Medications               furosemide (LASIX) 20 MG tablet Take 1 tablet (20 mg total) by mouth daily as needed.    metoprolol tartrate (LOPRESSOR) 25 MG tablet Take HALF tablet (12.5 mg total) by mouth 2 (two) times daily.    olmesartan (BENICAR) 40 MG tablet Take 1 tablet (40 mg total) by mouth once daily. HOLD SBP <120     -ok to continue prior regimen    Renal/  Acute kidney injury superimposed on CKD  -Noted worsening creatinine suspect due to Lasix use  -KTM following. Lasix as above    Bacteriuria  - UA appears infectious, no dysuria reported  - ID following  - f/u Ucx Enterococcus likely colonization- no sxs and no need to treat      CKD (chronic kidney disease)  - continue home medicine sodium bicarbonate  - Monitor UOP and serial BMP and adjust therapy as needed. Renally dose meds. Avoid nephrotoxic medications and procedures.  - KTM following  - Cr trending down.  Restart low-dose p.o. Lasix    Hematology  Acute DVT (deep venous thrombosis)  - u/s: Nonocclusive deep venous thrombus in the RIGHT common femoral, femoral, and iliac veins   - started eliquis. Precribed Eliquis for discharge  - CTA deferred given renal function     Oncology  Anemia of renal disease  Patient's anemia is currently controlled. Has not received any PRBCs to date. Etiology likely  d/t chronic disease.  Current CBC reviewed-   Lab Results   Component Value Date    HGB 11.4 (L) 05/17/2024    HCT 34.9 (L) 05/17/2024     Monitor serial CBC and transfuse if patient becomes hemodynamically unstable, symptomatic or H/H drops below 7/21.      Final Active Diagnoses:    Diagnosis Date Noted POA    PRINCIPAL PROBLEM:  Pleural effusion [J90] 05/13/2024 Yes    Acute kidney injury superimposed on CKD [N17.9, N18.9] 05/16/2024 No    Nonrheumatic mitral valve regurgitation [I34.0] 05/16/2024 Yes    Acute diastolic heart failure [I50.31] 05/16/2024 Yes    Acute DVT (deep venous thrombosis) [I82.409] 05/15/2024 Yes    Bacteriuria [R82.71] 05/14/2024 Yes    Kidney transplant status [Z94.0] 05/13/2024 Not Applicable    Hypertension [I10] 05/13/2024 Yes    CKD (chronic kidney disease) [N18.9] 05/13/2024 Yes    Acute respiratory failure with hypoxia [J96.01] 03/17/2024 Yes    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, Z79.899] 05/11/2023 Not Applicable    Anemia of renal disease [N18.9, D63.1] 05/11/2023 Yes    At risk for opportunistic infections [Z91.89] 05/11/2023 Yes    S/P kidney transplant [Z94.0] 05/11/2023 Not Applicable      Problems Resolved During this Admission:    Diagnosis Date Noted Date Resolved POA    Bacteriuria [R82.71] 05/15/2024 05/15/2024 Yes       Discharged Condition: stable    Disposition: Home or Self Care    Follow Up:  PCP  Transplant Nephrology       Patient Instructions:      Ambulatory referral/consult to Outpatient Case Management   Referral Priority: Routine Referral Type: Consultation   Referral Reason: Specialty Services Required   Number of Visits Requested: 1     ACCEPT - Ambulatory referral/consult to [facility] Failure [facility]   Standing Status: Future   Referral Priority: Routine Referral Type: Consultation   Referral Reason: Specialty Services Required   Requested Specialty: Cardiology   Number of Visits Requested: 1     Diet renal     Notify your  health care provider if you experience any of the following:  difficulty breathing or increased cough     Activity as tolerated       Significant Diagnostic Studies:   Significant Labs: All pertinent labs within the past 24 hours have been reviewed.  BMP:       Recent Labs   Lab 05/17/24  0801   GLU 74      K 3.4*      CO2 23   BUN 23   CREATININE 1.9*   CALCIUM 9.3   MG 1.6      CBC:        Recent Labs   Lab 05/16/24  0531 05/17/24  0801   WBC 4.79 4.79   HGB 11.5* 11.4*   HCT 35.4* 34.9*    170         Pending Diagnostic Studies:       None           Medications:  Reconciled Home Medications:      Medication List        START taking these medications      ELIQUIS 5 mg Tab  Generic drug: apixaban  Take 2 tablets (10 mg) by mouth twice daily for 3 days, THEN 1 tablet (5 mg) twice daily thereafter  Start taking on: May 18, 2024     LIDOcaine 5 %  Commonly known as: LIDODERM  Place 1 patch onto the skin once daily. Remove & Discard patch within 12 hours or as directed by MD.            CHANGE how you take these medications      benzonatate 200 MG capsule  Commonly known as: TESSALON  Take 1 capsule (200 mg total) by mouth 2 (two) times daily as needed for Cough.  What changed: See the new instructions.     furosemide 20 MG tablet  Commonly known as: LASIX  Take 1 tablet (20 mg total) by mouth once daily.  What changed:   when to take this  reasons to take this     olmesartan 40 MG tablet  Commonly known as: BENICAR  Take ½ tablet (20 mg total) by mouth once daily.  What changed:   how much to take  additional instructions            CONTINUE taking these medications      IMODIUM A-D ORAL  Take by mouth.     magnesium oxide 400 mg (241.3 mg magnesium) tablet  Commonly known as: MAG-OX  Take 1 tablet (400 mg total) by mouth 2 (two) times daily.     metoprolol tartrate 25 MG tablet  Commonly known as: LOPRESSOR  Take HALF tablet (12.5 mg total) by mouth 2 (two) times daily.     predniSONE 5 MG  tablet  Commonly known as: DELTASONE  Take 1 tablet (5 mg total) by mouth once daily.     sodium bicarbonate 650 MG tablet  Take 2 tablets (1,300 mg total) by mouth 3 (three) times daily.     tamsulosin 0.4 mg Cap  Commonly known as: FLOMAX  Take 1 capsule (0.4 mg total) by mouth once daily.              Indwelling Lines/Drains at time of discharge:   Lines/Drains/Airways       Central Venous Catheter Line  Duration                  Hemodialysis AV Graft Left upper arm -- days               Time spent on the discharge of patient:  Approximately 45 minutes of time spent on discharge, including examining the patient, providing discharge instructions, arranging follow up, and documentation.    Josephine Herrera MD  Department of Hospital Medicine  [facility]  05/28/2024

## 2024-05-29 ENCOUNTER — OUTPATIENT CASE MANAGEMENT (OUTPATIENT)
Dept: ADMINISTRATIVE | Facility: OTHER | Age: 64
End: 2024-05-29
Payer: MEDICARE

## 2024-05-29 NOTE — PROGRESS NOTES
Outpatient Care Management  Patient Not Qualified    Patient: Abdoulaye Parikh  MRN:  [id number]  Date of Service:  5/29/2024  Completed by:  Yamile Jewell RN    Chief Complaint   Patient presents with    OPCM Chart Review     5/29/24    Case Closure     5/29/24       Patient Summary           Reason Not Qualified:  Followed by transplant    5/29/24 Patient is currently being followed by Transplant. Closing case.

## 2024-05-31 DIAGNOSIS — Z94.0 KIDNEY REPLACED BY TRANSPLANT: Primary | ICD-10-CM

## 2024-05-31 PROBLEM — I12.9 BENIGN HYPERTENSION WITH CKD (CHRONIC KIDNEY DISEASE) STAGE III: Status: ACTIVE | Noted: 2024-05-13

## 2024-05-31 PROBLEM — N18.30 BENIGN HYPERTENSION WITH CKD (CHRONIC KIDNEY DISEASE) STAGE III: Status: ACTIVE | Noted: 2024-05-13

## 2024-05-31 PROBLEM — N18.9 CKD (CHRONIC KIDNEY DISEASE): Status: RESOLVED | Noted: 2024-05-13 | Resolved: 2024-05-31

## 2024-05-31 PROBLEM — N18.31 STAGE 3A CHRONIC KIDNEY DISEASE: Status: ACTIVE | Noted: 2023-05-26

## 2024-05-31 NOTE — PROGRESS NOTES
Kidney Post-Transplant Assessment    Referring Physician: Quinn Espino  Current Nephrologist: Quinn Espino    ORGAN: LEFT KIDNEY  Donor Type: living  PHS Increased Risk: no  Cold Ischemia:     Induction Medications:      Subjective:      CC:  Reassessment of renal allograft function and management of chronic immunosuppression.    HPI:  Mr. Parikh is a 63 y.o. year old Black or  male who received a living kidney transplant on 5/10/23. His most recent creatinine is 1.5. He takes prednisone and tacrolimus for maintenance immunosuppression.     Recent hosp;  5/13/24 -5/18/24 Admitted for respiratory failure 2/2 to pleural effusion and volume overload. Started on supplemental O2 and diuretics.   - ECHO with dilated LA and pulm HTN normal EF.  Lower extremity u/s done for swelling and revealed DVT. Started on eliquis   Of note he has a  shunt that terminates in the pleural space, this has been present for a long time however could possibly contributing to effusion.  Levaquin started on admit concern for possible parapneumonic effusion/PNA, resp culture ordered but reports no sputum  - ID consulted recommend complete 5 days levaquin.   Recommend continued daily Lasix. Prescribed Eliquis for acute RLE DVT.   Scheduled to f/u with cardiology 6/4/24    LOV 3/25/24  Interval HX:  Has completed abx  and is feeling well.  -denies cough, HYMAN  Is on lasix 20 mg QD     Intake-is now drinking 2 L water daily   UOP-no problems reported   Peripheral edema-none   Weight-lost weight since recent hospitalization   Appetite-denies N/V/D; tolerating IS meds well      BP  BP Readings from Last 3 Encounters:   06/03/24 (!) 145/69   05/18/24 (!) 142/81   05/13/24 (!) 108/59       Lab /diagnostic results reviewed with patient today.   All questions answered    Review of Systems   Constitutional:  Negative for activity change, appetite change, chills, fatigue, fever and unexpected weight change.   HENT:   "Positive for hearing loss (hearing aids). Negative for congestion, facial swelling, postnasal drip, rhinorrhea, sinus pressure, sore throat and trouble swallowing.    Eyes:  Positive for visual disturbance (wears glasses). Negative for pain and redness.   Respiratory:  Negative for cough, chest tightness, shortness of breath and wheezing.    Cardiovascular: Negative.  Negative for chest pain, palpitations and leg swelling.   Gastrointestinal:  Negative for abdominal pain, diarrhea, nausea and vomiting.        Hx Crohn's dz--soft stools   Genitourinary:  Negative for dysuria, flank pain and urgency.   Musculoskeletal:  Negative for gait problem, neck pain and neck stiffness.   Skin:  Negative for rash.   Allergic/Immunologic: Positive for immunocompromised state. Negative for environmental allergies and food allergies.   Neurological:  Negative for dizziness, weakness, light-headedness and headaches.   Hematological:  Bruises/bleeds easily (Eliquis--RLE DVT).   Psychiatric/Behavioral:  Negative for agitation and confusion. The patient is not nervous/anxious.        Objective:   Blood pressure (!) 145/69, pulse (!) 55, temperature 97.3 °F (36.3 °C), temperature source Temporal, resp. rate (!) 22, height 5' 9" (1.753 m), weight 82.5 kg (181 lb 14.1 oz), SpO2 98%.body mass index is 26.86 kg/m².    Physical Exam  Constitutional:       Appearance: Normal appearance. He is well-developed.   HENT:      Head: Normocephalic.      Nose: Nose normal.   Eyes:      Conjunctiva/sclera: Conjunctivae normal.      Pupils: Pupils are equal, round, and reactive to light.   Cardiovascular:      Rate and Rhythm: Normal rate and regular rhythm.      Heart sounds: Normal heart sounds.   Pulmonary:      Effort: Pulmonary effort is normal.      Breath sounds: Normal breath sounds.   Abdominal:      General: Bowel sounds are normal.      Palpations: Abdomen is soft. There is no hepatomegaly or splenomegaly.      Comments: No bruit noted over " "the allograft      Musculoskeletal:      Cervical back: Normal range of motion and neck supple.   Skin:     General: Skin is warm and dry.   Neurological:      Mental Status: He is alert and oriented to person, place, and time.   Psychiatric:         Behavior: Behavior normal.         Labs:  Lab Results   Component Value Date    WBC 5.20 05/27/2024    HGB 11.9 (L) 05/27/2024    HCT 37.5 (L) 05/27/2024     05/27/2024    K 3.8 05/27/2024     05/27/2024    CO2 27 05/27/2024    BUN 13 05/27/2024    CREATININE 1.5 (H) 05/27/2024    EGFRNORACEVR 52.3 (A) 05/27/2024    GLUCOSE 82 04/08/2023    CALCIUM 9.2 05/27/2024    PHOS 3.6 05/27/2024    MG 1.9 05/27/2024    ALBUMIN 3.2 (L) 05/27/2024    ALBUMIN 3.2 (L) 05/27/2024    AST 25 05/27/2024    ALT 34 05/27/2024    UTPCR 0.16 05/27/2024    .0 (H) 05/06/2024    TACROLIMUS 4.6 (L) 05/27/2024       No results found for: "EXTANC", "EXTWBC", "EXTSEGS", "EXTPLATELETS", "EXTHEMOGLOBI", "EXTHEMATOCRI", "EXTCREATININ", "EXTSODIUM", "EXTPOTASSIUM", "EXTBUN", "EXTCO2", "EXTCALCIUM", "EXTPHOSPHORU", "EXTGLUCOSE", "EXTALBUMIN", "EXTAST", "EXTALT", "EXTBILITOTAL", "EXTLIPASE", "EXTAMYLASE"    No results found for: "EXTCYCLOSLVL", "EXTSIROLIMUS", "EXTTACROLVL", "EXTPROTCRE", "EXTPTHINTACT", "EXTPROTEINUA", "EXTWBCUA", "EXTRBCUA"    Labs were reviewed with the patient    Assessment:     1. S/P kidney transplant    2. 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 Stage 3a chronic kidney disease    4. Anemia in stage 3a chronic kidney disease    5. Pleural effusion    6. At risk for opportunistic infections    7. Acute deep vein thrombosis (DVT) of lower extremity, unspecified laterality, unspecified vein        Plan:    -F/u cardiology scheduled 6/4/24  -  f/u scheduled with local Nephrologist. Dr WINIFRED Espino  7/10/24    Restart MyF 360 mg BID   Lasix  20 mg qd     1) s/p living related kidney transplant              - Graft function    -FK Trough 4.6   - cont on FK  " 2/2   -   restarted MyF  360 mg BID   -prednisone  5 mg QD   -Will continue to monitor for drug toxicities     Lab Results   Component Value Date    CREATININE 1.5 (H) 05/27/2024       Latest Reference Range & Units 1yr 0mo,  Kidney-Post 2 Year  05/27/24   eGFR >60 mL/min/1.73 m^2 52.3 !   !: Data is abnormal      2)  HTN: advise low salt diet and home BP monitoring  - Cont  olmesartan, lopressor, flomax, Lasix  as prescribed   RLE DVT: cont Eliquis as prescribed --mgmt deferred to cardiology   BP Readings from Last 3 Encounters:   06/03/24 (!) 145/69   05/18/24 (!) 142/81   05/13/24 (!) 108/59           3) Hypophosphatemia, Hypomagnesemia, Secondary hyperparathyroidism:  - no intervention required ,will continue to monitor/ guidelines                -continue mg ox  as prescribed      Lab Results   Component Value Date    .0 (H) 05/06/2024    CALCIUM 9.2 05/27/2024    PHOS 3.6 05/27/2024         4) Metabolic acidosis/Electrolyte balance:  - no intervention required ,will continue to monitor/ guidelines    - cont on  sodium bicarb as prescribed   Lab Results   Component Value Date     05/27/2024    K 3.8 05/27/2024     05/27/2024    CO2 27 05/27/2024         5) Anemia/Cytopenias:   will monitor as per our guidelines. Medicine list reviewed including potential causes of drug-induced cytopenias                - no intervention required ,will continue to monitor/ guidelines      Lab Results   Component Value Date    WBC 5.20 05/27/2024    HGB 11.9 (L) 05/27/2024    HCT 37.5 (L) 05/27/2024     (H) 05/27/2024     05/27/2024           6) Proteinuria: continue p/c ratio as per guidelines   Protein Creatinine Ratios:    Creatinine, Urine   Date Value Ref Range Status   05/27/2024 157.0 23.0 - 375.0 mg/dL Final   05/06/2024 156.0 23.0 - 375.0 mg/dL Final   04/08/2024 115.0 23.0 - 375.0 mg/dL Final     Protein, Urine Random   Date Value Ref Range Status   05/27/2024 25 (H) 0 - 15 mg/dL Final    05/06/2024 33 (H) 0 - 15 mg/dL Final   04/08/2024 19 (H) 0 - 15 mg/dL Final     Prot/Creat Ratio, Urine   Date Value Ref Range Status   05/27/2024 0.16 0.00 - 0.20 Final   05/06/2024 0.21 (H) 0.00 - 0.20 Final   04/08/2024 0.17 0.00 - 0.20 Final        .     7) BK virus infection screening:  will continue to monitor/ guidelines      Latest Reference Range & Units 1yr 0mo,  Kidney-Post 2 Year  05/15/24   Cytomegalovirus PCR, Quant <50 IU/mL Not Detected      Latest Reference Range & Units 1yr 0mo,  Kidney-Post 2 Year  05/27/24   BK Virus DNA PCR, Quant, Blood <125 Copies/mL <125      Latest Reference Range & Units 1yr 0mo,  Kidney-Post 2 Year  05/27/24   BK Virus DNA, Blood Not detected  Not detected       8) Weight education: provided during the clinic visit   Body mass index is 26.86 kg/m².        Follow-up:   Clinic: return to transplant clinic weekly for the first month after transplant; every 2 weeks during months 2-3; then at 6-, 9-, 12-, 18-, 24-, and 36- months post-transplant to reassess for complications from immunosuppression toxicity and monitor for rejection.  Annually thereafter.    Labs: since patient remains at high risk for rejection and drug-related complications that warrant close monitoring, labs will be ordered as follows: continue twice weekly CBC, renal panel, and drug level for first month; then same labs once weekly through 3rd month post-transplant.  Urine for UA and protein/creatinine ratio monthly.  Serum BK - PCR at 1-, 3-, 6-, 9-, 12-, 18-, 24-, 36-, 48-, and 60 months post-transplant.  Hepatic panel at 1-, 2-, 3-, 6-, 9-, 12-, 18-, 24-, and 36- months post-transplant.    Teodora Guevara NP       Education:   Material provided to the patient.  Patient reminded to call with any health changes since these can be early signs of significant complications.  Also, I advised the patient to be sure any new medications or changes of old medications are discussed with either a pharmacist or  physician knowledgeable with transplant to avoid rejection/drug toxicity related to significant drug interactions.    Patient advised that it is recommended that all transplanted patients, and their close contacts and household members receive Covid vaccination.

## 2024-06-03 ENCOUNTER — OFFICE VISIT (OUTPATIENT)
Dept: TRANSPLANT | Facility: CLINIC | Age: 64
End: 2024-06-03
Payer: MEDICARE

## 2024-06-03 VITALS
DIASTOLIC BLOOD PRESSURE: 69 MMHG | OXYGEN SATURATION: 98 % | TEMPERATURE: 97 F | BODY MASS INDEX: 26.94 KG/M2 | SYSTOLIC BLOOD PRESSURE: 145 MMHG | HEIGHT: 69 IN | WEIGHT: 181.88 LBS | HEART RATE: 55 BPM | RESPIRATION RATE: 22 BRPM

## 2024-06-03 DIAGNOSIS — Z91.89 AT RISK FOR OPPORTUNISTIC INFECTIONS: ICD-10-CM

## 2024-06-03 DIAGNOSIS — D63.1 ANEMIA IN STAGE 3A CHRONIC KIDNEY DISEASE: ICD-10-CM

## 2024-06-03 DIAGNOSIS — Z94.0 S/P KIDNEY TRANSPLANT: Primary | ICD-10-CM

## 2024-06-03 DIAGNOSIS — Z79.899 IMMUNODEFICIENCY DUE TO LONG TERM DRUG THERAPY: ICD-10-CM

## 2024-06-03 DIAGNOSIS — I82.409 ACUTE DEEP VEIN THROMBOSIS (DVT) OF LOWER EXTREMITY, UNSPECIFIED LATERALITY, UNSPECIFIED VEIN: ICD-10-CM

## 2024-06-03 DIAGNOSIS — J90 PLEURAL EFFUSION: ICD-10-CM

## 2024-06-03 DIAGNOSIS — N18.31 STAGE 3A CHRONIC KIDNEY DISEASE: ICD-10-CM

## 2024-06-03 DIAGNOSIS — N18.31 ANEMIA IN STAGE 3A CHRONIC KIDNEY DISEASE: ICD-10-CM

## 2024-06-03 DIAGNOSIS — D84.821 IMMUNODEFICIENCY DUE TO LONG TERM DRUG THERAPY: ICD-10-CM

## 2024-06-03 PROCEDURE — 99215 OFFICE O/P EST HI 40 MIN: CPT | Mod: PBBFAC | Performed by: NURSE PRACTITIONER

## 2024-06-03 PROCEDURE — 99999 PR PBB SHADOW E&M-EST. PATIENT-LVL V: CPT | Mod: PBBFAC,,, | Performed by: NURSE PRACTITIONER

## 2024-06-03 PROCEDURE — 99215 OFFICE O/P EST HI 40 MIN: CPT | Mod: S$PBB,,, | Performed by: NURSE PRACTITIONER

## 2024-06-03 RX ORDER — MYCOPHENOLIC ACID 180 MG/1
360 TABLET, DELAYED RELEASE ORAL 2 TIMES DAILY
Qty: 120 TABLET | Refills: 11 | Status: SHIPPED | OUTPATIENT
Start: 2024-06-03 | End: 2025-06-03

## 2024-06-03 NOTE — LETTER
Sho 3, 2024        Quinn Espino  [address]  [address]  [facility] [address]  Phone: [phone]  Fax: [phone]             [facility] 1st Fl  [address]  [facility] [address]  Phone: [phone]   Patient: Abdoulaye Parikh   MR Number: [id number]   YOB: 1960   Date of Visit: 6/3/2024       Dear Dr. Quinn Espino    Thank you for referring Abdoulaye Parikh to me for evaluation. Attached you will find relevant portions of my assessment and plan of care.    If you have questions, please do not hesitate to call me. I look forward to following Abdoulaye Parikh along with you.    Sincerely,    Teodora Guevara, NP    Enclosure    If you would like to receive this communication electronically, please contact [email or web address] or [phone] to request [employer] Link access.    NPR Link is a tool which provides read-only access to select patient information with whom you have a relationship. Its easy to use and provides real time access to review your patients record including encounter summaries, notes, results, and demographic information.    If you feel you have received this communication in error or would no longer like to receive these types of communications, please e-mail [email or web address]

## 2024-06-09 RX ORDER — APIXABAN 5 MG/1
TABLET, FILM COATED ORAL
Qty: 72 TABLET | Refills: 0 | Status: CANCELLED | OUTPATIENT
Start: 2024-06-09 | End: 2024-07-11

## 2024-06-10 ENCOUNTER — LAB VISIT (OUTPATIENT)
Dept: LAB | Facility: HOSPITAL | Age: 64
End: 2024-06-10
Attending: INTERNAL MEDICINE
Payer: MEDICARE

## 2024-06-10 ENCOUNTER — TELEPHONE (OUTPATIENT)
Dept: TRANSPLANT | Facility: CLINIC | Age: 64
End: 2024-06-10
Payer: MEDICARE

## 2024-06-10 DIAGNOSIS — Z94.0 KIDNEY REPLACED BY TRANSPLANT: Primary | ICD-10-CM

## 2024-06-10 DIAGNOSIS — Z94.0 KIDNEY REPLACED BY TRANSPLANT: ICD-10-CM

## 2024-06-10 LAB
ALBUMIN SERPL BCP-MCNC: 3.2 G/DL (ref 3.5–5.2)
ANION GAP SERPL CALC-SCNC: 8 MMOL/L (ref 8–16)
BASOPHILS # BLD AUTO: 0.03 K/UL (ref 0–0.2)
BASOPHILS NFR BLD: 0.7 % (ref 0–1.9)
BUN SERPL-MCNC: 15 MG/DL (ref 8–23)
CALCIUM SERPL-MCNC: 9.2 MG/DL (ref 8.7–10.5)
CHLORIDE SERPL-SCNC: 105 MMOL/L (ref 95–110)
CO2 SERPL-SCNC: 26 MMOL/L (ref 23–29)
CREAT SERPL-MCNC: 1.6 MG/DL (ref 0.5–1.4)
DIFFERENTIAL METHOD BLD: ABNORMAL
EOSINOPHIL # BLD AUTO: 0 K/UL (ref 0–0.5)
EOSINOPHIL NFR BLD: 0.7 % (ref 0–8)
ERYTHROCYTE [DISTWIDTH] IN BLOOD BY AUTOMATED COUNT: 14.5 % (ref 11.5–14.5)
EST. GFR  (NO RACE VARIABLE): 48.4 ML/MIN/1.73 M^2
GLUCOSE SERPL-MCNC: 74 MG/DL (ref 70–110)
HCT VFR BLD AUTO: 38.7 % (ref 40–54)
HGB BLD-MCNC: 12.3 G/DL (ref 14–18)
IMM GRANULOCYTES # BLD AUTO: 0.01 K/UL (ref 0–0.04)
IMM GRANULOCYTES NFR BLD AUTO: 0.2 % (ref 0–0.5)
LYMPHOCYTES # BLD AUTO: 0.9 K/UL (ref 1–4.8)
LYMPHOCYTES NFR BLD: 22.1 % (ref 18–48)
MAGNESIUM SERPL-MCNC: 1.6 MG/DL (ref 1.6–2.6)
MCH RBC QN AUTO: 34.8 PG (ref 27–31)
MCHC RBC AUTO-ENTMCNC: 31.8 G/DL (ref 32–36)
MCV RBC AUTO: 110 FL (ref 82–98)
MONOCYTES # BLD AUTO: 0.5 K/UL (ref 0.3–1)
MONOCYTES NFR BLD: 12 % (ref 4–15)
NEUTROPHILS # BLD AUTO: 2.6 K/UL (ref 1.8–7.7)
NEUTROPHILS NFR BLD: 64.3 % (ref 38–73)
NRBC BLD-RTO: 0 /100 WBC
PHOSPHATE SERPL-MCNC: 3.2 MG/DL (ref 2.7–4.5)
PLATELET # BLD AUTO: 158 K/UL (ref 150–450)
PMV BLD AUTO: 10.6 FL (ref 9.2–12.9)
POTASSIUM SERPL-SCNC: 3.9 MMOL/L (ref 3.5–5.1)
RBC # BLD AUTO: 3.53 M/UL (ref 4.6–6.2)
SODIUM SERPL-SCNC: 139 MMOL/L (ref 136–145)
TACROLIMUS BLD-MCNC: 7.8 NG/ML (ref 5–15)
WBC # BLD AUTO: 4.08 K/UL (ref 3.9–12.7)

## 2024-06-10 PROCEDURE — 80069 RENAL FUNCTION PANEL: CPT | Performed by: INTERNAL MEDICINE

## 2024-06-10 PROCEDURE — 36415 COLL VENOUS BLD VENIPUNCTURE: CPT | Performed by: INTERNAL MEDICINE

## 2024-06-10 PROCEDURE — 83735 ASSAY OF MAGNESIUM: CPT | Performed by: INTERNAL MEDICINE

## 2024-06-10 PROCEDURE — 80197 ASSAY OF TACROLIMUS: CPT | Performed by: INTERNAL MEDICINE

## 2024-06-10 PROCEDURE — 85025 COMPLETE CBC W/AUTO DIFF WBC: CPT | Performed by: INTERNAL MEDICINE

## 2024-06-10 RX ORDER — APIXABAN 5 MG/1
TABLET, FILM COATED ORAL
Qty: 72 TABLET | Refills: 0 | Status: CANCELLED | OUTPATIENT
Start: 2024-06-10 | End: 2024-07-12

## 2024-06-10 NOTE — TELEPHONE ENCOUNTER
Left voice message to call coordinator back.  ----- Message from Darrin Verma MD sent at 6/10/2024  1:24 PM CDT -----  Lets repeat in 2 weeks and then monthly x 3 months then every three months x2 and then per protocol  ----- Message -----  From: Robles Arita RN  Sent: 6/10/2024   1:04 PM CDT  To: Darrin Verma MD    How often would you like labs ?  Patient greater than one year post transplant.  Recent Hospital admit DX with a Pleural Effusion and DVT.  ----- Message -----  From: Darrin Verma MD  Sent: 6/10/2024  11:43 AM CDT  To: [facility] Post-Kidney Transplant Clinical    Labs and diagnostic tests were reviewed. No action/changes indicated.

## 2024-06-10 NOTE — TELEPHONE ENCOUNTER
Results reviewed with patient and will repeat labs on 6/24.  ----- Message from Darrin Verma MD sent at 6/10/2024  1:24 PM CDT -----  Lets repeat in 2 weeks and then monthly x 3 months then every three months x2 and then per protocol  ----- Message -----  From: Robles Arita RN  Sent: 6/10/2024   1:04 PM CDT  To: Darrin Verma MD    How often would you like labs ?  Patient greater than one year post transplant.  Recent Hospital admit DX with a Pleural Effusion and DVT.  ----- Message -----  From: Darrin Verma MD  Sent: 6/10/2024  11:43 AM CDT  To: [facility] Post-Kidney Transplant Clinical    Labs and diagnostic tests were reviewed. No action/changes indicated.

## 2024-06-13 DIAGNOSIS — Z94.0 S/P KIDNEY TRANSPLANT: ICD-10-CM

## 2024-06-13 RX ORDER — FUROSEMIDE 20 MG/1
20 TABLET ORAL DAILY
Qty: 30 TABLET | Refills: 0 | Status: CANCELLED | OUTPATIENT
Start: 2024-06-13 | End: 2024-07-13

## 2024-06-13 RX ORDER — APIXABAN 5 MG/1
TABLET, FILM COATED ORAL
Qty: 72 TABLET | Refills: 0 | Status: CANCELLED | OUTPATIENT
Start: 2024-06-13 | End: 2024-07-15

## 2024-06-13 RX ORDER — SODIUM BICARBONATE 650 MG/1
1300 TABLET ORAL 3 TIMES DAILY
Qty: 180 TABLET | Refills: 11 | Status: CANCELLED | OUTPATIENT
Start: 2024-06-13 | End: 2025-06-13

## 2024-06-14 DIAGNOSIS — Z94.0 S/P KIDNEY TRANSPLANT: ICD-10-CM

## 2024-06-14 RX ORDER — FUROSEMIDE 20 MG/1
20 TABLET ORAL DAILY
Qty: 30 TABLET | Refills: 0 | Status: CANCELLED | OUTPATIENT
Start: 2024-06-14 | End: 2024-07-14

## 2024-06-14 RX ORDER — APIXABAN 5 MG/1
TABLET, FILM COATED ORAL
Qty: 72 TABLET | Refills: 0 | Status: CANCELLED | OUTPATIENT
Start: 2024-06-14 | End: 2024-07-16

## 2024-06-14 RX ORDER — SODIUM BICARBONATE 650 MG/1
1300 TABLET ORAL 3 TIMES DAILY
Qty: 180 TABLET | Refills: 11 | Status: CANCELLED | OUTPATIENT
Start: 2024-06-14 | End: 2025-06-14

## 2024-06-17 DIAGNOSIS — Z94.0 S/P KIDNEY TRANSPLANT: ICD-10-CM

## 2024-06-17 RX ORDER — FUROSEMIDE 20 MG/1
20 TABLET ORAL DAILY
Qty: 30 TABLET | Refills: 0 | Status: CANCELLED | OUTPATIENT
Start: 2024-06-14 | End: 2024-07-14

## 2024-06-17 RX ORDER — APIXABAN 5 MG/1
TABLET, FILM COATED ORAL
Qty: 72 TABLET | Refills: 0 | Status: CANCELLED | OUTPATIENT
Start: 2024-06-14 | End: 2024-07-16

## 2024-06-17 RX ORDER — SODIUM BICARBONATE 650 MG/1
1300 TABLET ORAL 3 TIMES DAILY
Qty: 180 TABLET | Refills: 11 | Status: CANCELLED | OUTPATIENT
Start: 2024-06-14 | End: 2025-06-14

## 2024-06-24 ENCOUNTER — LAB VISIT (OUTPATIENT)
Dept: LAB | Facility: HOSPITAL | Age: 64
End: 2024-06-24
Attending: INTERNAL MEDICINE
Payer: MEDICARE

## 2024-06-24 DIAGNOSIS — Z94.0 KIDNEY REPLACED BY TRANSPLANT: ICD-10-CM

## 2024-06-24 LAB
ALBUMIN SERPL BCP-MCNC: 3.1 G/DL (ref 3.5–5.2)
ANION GAP SERPL CALC-SCNC: 15 MMOL/L (ref 8–16)
BASOPHILS # BLD AUTO: 0.03 K/UL (ref 0–0.2)
BASOPHILS NFR BLD: 0.7 % (ref 0–1.9)
BUN SERPL-MCNC: 15 MG/DL (ref 8–23)
CALCIUM SERPL-MCNC: 9.4 MG/DL (ref 8.7–10.5)
CHLORIDE SERPL-SCNC: 102 MMOL/L (ref 95–110)
CO2 SERPL-SCNC: 23 MMOL/L (ref 23–29)
CREAT SERPL-MCNC: 1.5 MG/DL (ref 0.5–1.4)
DIFFERENTIAL METHOD BLD: ABNORMAL
EOSINOPHIL # BLD AUTO: 0 K/UL (ref 0–0.5)
EOSINOPHIL NFR BLD: 0.7 % (ref 0–8)
ERYTHROCYTE [DISTWIDTH] IN BLOOD BY AUTOMATED COUNT: 13.6 % (ref 11.5–14.5)
EST. GFR  (NO RACE VARIABLE): 52.3 ML/MIN/1.73 M^2
GLUCOSE SERPL-MCNC: 77 MG/DL (ref 70–110)
HCT VFR BLD AUTO: 36.3 % (ref 40–54)
HGB BLD-MCNC: 11.9 G/DL (ref 14–18)
IMM GRANULOCYTES # BLD AUTO: 0.03 K/UL (ref 0–0.04)
IMM GRANULOCYTES NFR BLD AUTO: 0.7 % (ref 0–0.5)
LYMPHOCYTES # BLD AUTO: 1 K/UL (ref 1–4.8)
LYMPHOCYTES NFR BLD: 21.8 % (ref 18–48)
MCH RBC QN AUTO: 35.2 PG (ref 27–31)
MCHC RBC AUTO-ENTMCNC: 32.8 G/DL (ref 32–36)
MCV RBC AUTO: 107 FL (ref 82–98)
MONOCYTES # BLD AUTO: 0.5 K/UL (ref 0.3–1)
MONOCYTES NFR BLD: 12 % (ref 4–15)
NEUTROPHILS # BLD AUTO: 2.8 K/UL (ref 1.8–7.7)
NEUTROPHILS NFR BLD: 64.1 % (ref 38–73)
NRBC BLD-RTO: 0 /100 WBC
PHOSPHATE SERPL-MCNC: 3.5 MG/DL (ref 2.7–4.5)
PLATELET # BLD AUTO: 164 K/UL (ref 150–450)
PMV BLD AUTO: 10.1 FL (ref 9.2–12.9)
POTASSIUM SERPL-SCNC: 4 MMOL/L (ref 3.5–5.1)
RBC # BLD AUTO: 3.38 M/UL (ref 4.6–6.2)
SODIUM SERPL-SCNC: 140 MMOL/L (ref 136–145)
TACROLIMUS BLD-MCNC: 6.8 NG/ML (ref 5–15)
WBC # BLD AUTO: 4.4 K/UL (ref 3.9–12.7)

## 2024-06-24 PROCEDURE — 80069 RENAL FUNCTION PANEL: CPT | Performed by: INTERNAL MEDICINE

## 2024-06-24 PROCEDURE — 36415 COLL VENOUS BLD VENIPUNCTURE: CPT | Performed by: INTERNAL MEDICINE

## 2024-06-24 PROCEDURE — 85025 COMPLETE CBC W/AUTO DIFF WBC: CPT | Performed by: INTERNAL MEDICINE

## 2024-06-24 PROCEDURE — 80197 ASSAY OF TACROLIMUS: CPT | Performed by: INTERNAL MEDICINE

## 2024-06-28 RX ORDER — LANOLIN ALCOHOL/MO/W.PET/CERES
400 CREAM (GRAM) TOPICAL 2 TIMES DAILY
Qty: 60 TABLET | Refills: 11 | Status: CANCELLED | OUTPATIENT
Start: 2024-06-28 | End: 2025-06-28

## 2024-07-01 RX ORDER — LANOLIN ALCOHOL/MO/W.PET/CERES
400 CREAM (GRAM) TOPICAL 2 TIMES DAILY
Qty: 60 TABLET | Refills: 11 | Status: CANCELLED | OUTPATIENT
Start: 2024-07-01 | End: 2025-07-01

## 2024-07-06 RX ORDER — LANOLIN ALCOHOL/MO/W.PET/CERES
400 CREAM (GRAM) TOPICAL 2 TIMES DAILY
Qty: 60 TABLET | Refills: 11 | Status: CANCELLED | OUTPATIENT
Start: 2024-07-06 | End: 2025-07-06

## 2024-07-15 ENCOUNTER — LAB VISIT (OUTPATIENT)
Dept: LAB | Facility: HOSPITAL | Age: 64
End: 2024-07-15
Attending: INTERNAL MEDICINE
Payer: MEDICARE

## 2024-07-15 DIAGNOSIS — Z94.0 KIDNEY REPLACED BY TRANSPLANT: ICD-10-CM

## 2024-07-15 LAB
ALBUMIN SERPL BCP-MCNC: 3.1 G/DL (ref 3.5–5.2)
ANION GAP SERPL CALC-SCNC: 8 MMOL/L (ref 8–16)
BASOPHILS # BLD AUTO: 0.03 K/UL (ref 0–0.2)
BASOPHILS NFR BLD: 0.7 % (ref 0–1.9)
BUN SERPL-MCNC: 13 MG/DL (ref 8–23)
CALCIUM SERPL-MCNC: 9 MG/DL (ref 8.7–10.5)
CHLORIDE SERPL-SCNC: 106 MMOL/L (ref 95–110)
CO2 SERPL-SCNC: 22 MMOL/L (ref 23–29)
CREAT SERPL-MCNC: 1.4 MG/DL (ref 0.5–1.4)
DIFFERENTIAL METHOD BLD: ABNORMAL
EOSINOPHIL # BLD AUTO: 0 K/UL (ref 0–0.5)
EOSINOPHIL NFR BLD: 0.7 % (ref 0–8)
ERYTHROCYTE [DISTWIDTH] IN BLOOD BY AUTOMATED COUNT: 13.7 % (ref 11.5–14.5)
EST. GFR  (NO RACE VARIABLE): 56.8 ML/MIN/1.73 M^2
GLUCOSE SERPL-MCNC: 77 MG/DL (ref 70–110)
HCT VFR BLD AUTO: 39 % (ref 40–54)
HGB BLD-MCNC: 12.2 G/DL (ref 14–18)
IMM GRANULOCYTES # BLD AUTO: 0.02 K/UL (ref 0–0.04)
IMM GRANULOCYTES NFR BLD AUTO: 0.4 % (ref 0–0.5)
LYMPHOCYTES # BLD AUTO: 1 K/UL (ref 1–4.8)
LYMPHOCYTES NFR BLD: 21.9 % (ref 18–48)
MCH RBC QN AUTO: 34 PG (ref 27–31)
MCHC RBC AUTO-ENTMCNC: 31.3 G/DL (ref 32–36)
MCV RBC AUTO: 109 FL (ref 82–98)
MONOCYTES # BLD AUTO: 0.5 K/UL (ref 0.3–1)
MONOCYTES NFR BLD: 10.8 % (ref 4–15)
NEUTROPHILS # BLD AUTO: 3 K/UL (ref 1.8–7.7)
NEUTROPHILS NFR BLD: 65.5 % (ref 38–73)
NRBC BLD-RTO: 0 /100 WBC
PHOSPHATE SERPL-MCNC: 2.7 MG/DL (ref 2.7–4.5)
PLATELET # BLD AUTO: 161 K/UL (ref 150–450)
PMV BLD AUTO: 10.1 FL (ref 9.2–12.9)
POTASSIUM SERPL-SCNC: 3.8 MMOL/L (ref 3.5–5.1)
RBC # BLD AUTO: 3.59 M/UL (ref 4.6–6.2)
SODIUM SERPL-SCNC: 136 MMOL/L (ref 136–145)
TACROLIMUS BLD-MCNC: 6.3 NG/ML (ref 5–15)
WBC # BLD AUTO: 4.53 K/UL (ref 3.9–12.7)

## 2024-07-15 PROCEDURE — 85025 COMPLETE CBC W/AUTO DIFF WBC: CPT | Performed by: INTERNAL MEDICINE

## 2024-07-15 PROCEDURE — 80197 ASSAY OF TACROLIMUS: CPT | Performed by: INTERNAL MEDICINE

## 2024-07-15 PROCEDURE — 36415 COLL VENOUS BLD VENIPUNCTURE: CPT | Performed by: INTERNAL MEDICINE

## 2024-07-15 PROCEDURE — 80069 RENAL FUNCTION PANEL: CPT | Performed by: INTERNAL MEDICINE

## 2024-07-24 RX ORDER — LANOLIN ALCOHOL/MO/W.PET/CERES
400 CREAM (GRAM) TOPICAL 2 TIMES DAILY
Qty: 60 TABLET | Refills: 11 | Status: SHIPPED | OUTPATIENT
Start: 2024-07-24 | End: 2025-07-24

## 2024-08-12 ENCOUNTER — LAB VISIT (OUTPATIENT)
Dept: LAB | Facility: HOSPITAL | Age: 64
End: 2024-08-12
Attending: INTERNAL MEDICINE
Payer: MEDICARE

## 2024-08-12 DIAGNOSIS — Z94.0 KIDNEY REPLACED BY TRANSPLANT: ICD-10-CM

## 2024-08-12 LAB
ALBUMIN SERPL BCP-MCNC: 3.2 G/DL (ref 3.5–5.2)
ANION GAP SERPL CALC-SCNC: 9 MMOL/L (ref 8–16)
BASOPHILS # BLD AUTO: 0.03 K/UL (ref 0–0.2)
BASOPHILS NFR BLD: 0.7 % (ref 0–1.9)
BUN SERPL-MCNC: 14 MG/DL (ref 8–23)
CALCIUM SERPL-MCNC: 9.6 MG/DL (ref 8.7–10.5)
CHLORIDE SERPL-SCNC: 108 MMOL/L (ref 95–110)
CO2 SERPL-SCNC: 23 MMOL/L (ref 23–29)
CREAT SERPL-MCNC: 1.6 MG/DL (ref 0.5–1.4)
DIFFERENTIAL METHOD BLD: ABNORMAL
EOSINOPHIL # BLD AUTO: 0 K/UL (ref 0–0.5)
EOSINOPHIL NFR BLD: 0.9 % (ref 0–8)
ERYTHROCYTE [DISTWIDTH] IN BLOOD BY AUTOMATED COUNT: 14 % (ref 11.5–14.5)
EST. GFR  (NO RACE VARIABLE): 48.4 ML/MIN/1.73 M^2
GLUCOSE SERPL-MCNC: 69 MG/DL (ref 70–110)
HCT VFR BLD AUTO: 39.7 % (ref 40–54)
HGB BLD-MCNC: 12.4 G/DL (ref 14–18)
IMM GRANULOCYTES # BLD AUTO: 0.04 K/UL (ref 0–0.04)
IMM GRANULOCYTES NFR BLD AUTO: 0.9 % (ref 0–0.5)
LYMPHOCYTES # BLD AUTO: 1 K/UL (ref 1–4.8)
LYMPHOCYTES NFR BLD: 23.2 % (ref 18–48)
MCH RBC QN AUTO: 34 PG (ref 27–31)
MCHC RBC AUTO-ENTMCNC: 31.2 G/DL (ref 32–36)
MCV RBC AUTO: 109 FL (ref 82–98)
MONOCYTES # BLD AUTO: 0.5 K/UL (ref 0.3–1)
MONOCYTES NFR BLD: 11.5 % (ref 4–15)
NEUTROPHILS # BLD AUTO: 2.8 K/UL (ref 1.8–7.7)
NEUTROPHILS NFR BLD: 62.8 % (ref 38–73)
NRBC BLD-RTO: 0 /100 WBC
PHOSPHATE SERPL-MCNC: 3.1 MG/DL (ref 2.7–4.5)
PLATELET # BLD AUTO: 174 K/UL (ref 150–450)
PMV BLD AUTO: 9.8 FL (ref 9.2–12.9)
POTASSIUM SERPL-SCNC: 4.4 MMOL/L (ref 3.5–5.1)
RBC # BLD AUTO: 3.65 M/UL (ref 4.6–6.2)
SODIUM SERPL-SCNC: 140 MMOL/L (ref 136–145)
TACROLIMUS BLD-MCNC: 7.8 NG/ML (ref 5–15)
WBC # BLD AUTO: 4.44 K/UL (ref 3.9–12.7)

## 2024-08-12 PROCEDURE — 80197 ASSAY OF TACROLIMUS: CPT | Performed by: INTERNAL MEDICINE

## 2024-08-12 PROCEDURE — 36415 COLL VENOUS BLD VENIPUNCTURE: CPT | Performed by: INTERNAL MEDICINE

## 2024-08-12 PROCEDURE — 85025 COMPLETE CBC W/AUTO DIFF WBC: CPT | Performed by: INTERNAL MEDICINE

## 2024-08-12 PROCEDURE — 80069 RENAL FUNCTION PANEL: CPT | Performed by: INTERNAL MEDICINE

## 2024-08-19 PROBLEM — N17.9 ACUTE KIDNEY INJURY SUPERIMPOSED ON CKD: Status: RESOLVED | Noted: 2024-05-16 | Resolved: 2024-08-19

## 2024-08-19 PROBLEM — J96.01 ACUTE RESPIRATORY FAILURE WITH HYPOXIA: Status: RESOLVED | Noted: 2024-03-17 | Resolved: 2024-08-19

## 2024-08-19 PROBLEM — N18.9 ACUTE KIDNEY INJURY SUPERIMPOSED ON CKD: Status: RESOLVED | Noted: 2024-05-16 | Resolved: 2024-08-19

## 2024-09-06 RX ORDER — METOPROLOL TARTRATE 25 MG/1
12.5 TABLET, FILM COATED ORAL 2 TIMES DAILY
Qty: 30 TABLET | Refills: 11 | Status: SHIPPED | OUTPATIENT
Start: 2024-09-06 | End: 2025-09-06

## 2024-09-09 ENCOUNTER — LAB VISIT (OUTPATIENT)
Dept: LAB | Facility: HOSPITAL | Age: 64
End: 2024-09-09
Attending: INTERNAL MEDICINE
Payer: MEDICARE

## 2024-09-09 ENCOUNTER — PATIENT MESSAGE (OUTPATIENT)
Dept: TRANSPLANT | Facility: CLINIC | Age: 64
End: 2024-09-09
Payer: MEDICARE

## 2024-09-09 DIAGNOSIS — Z94.0 S/P KIDNEY TRANSPLANT: ICD-10-CM

## 2024-09-09 DIAGNOSIS — Z94.0 KIDNEY REPLACED BY TRANSPLANT: ICD-10-CM

## 2024-09-09 LAB
ALBUMIN SERPL BCP-MCNC: 3.3 G/DL (ref 3.5–5.2)
ANION GAP SERPL CALC-SCNC: 8 MMOL/L (ref 8–16)
BASOPHILS # BLD AUTO: 0.03 K/UL (ref 0–0.2)
BASOPHILS NFR BLD: 0.6 % (ref 0–1.9)
BUN SERPL-MCNC: 14 MG/DL (ref 8–23)
CALCIUM SERPL-MCNC: 9.2 MG/DL (ref 8.7–10.5)
CHLORIDE SERPL-SCNC: 104 MMOL/L (ref 95–110)
CO2 SERPL-SCNC: 25 MMOL/L (ref 23–29)
CREAT SERPL-MCNC: 1.6 MG/DL (ref 0.5–1.4)
DIFFERENTIAL METHOD BLD: ABNORMAL
EOSINOPHIL # BLD AUTO: 0 K/UL (ref 0–0.5)
EOSINOPHIL NFR BLD: 0.6 % (ref 0–8)
ERYTHROCYTE [DISTWIDTH] IN BLOOD BY AUTOMATED COUNT: 14.1 % (ref 11.5–14.5)
EST. GFR  (NO RACE VARIABLE): 48.4 ML/MIN/1.73 M^2
GLUCOSE SERPL-MCNC: 81 MG/DL (ref 70–110)
HCT VFR BLD AUTO: 39.3 % (ref 40–54)
HGB BLD-MCNC: 12.5 G/DL (ref 14–18)
IMM GRANULOCYTES # BLD AUTO: 0.03 K/UL (ref 0–0.04)
IMM GRANULOCYTES NFR BLD AUTO: 0.6 % (ref 0–0.5)
LYMPHOCYTES # BLD AUTO: 0.9 K/UL (ref 1–4.8)
LYMPHOCYTES NFR BLD: 17.7 % (ref 18–48)
MCH RBC QN AUTO: 33.8 PG (ref 27–31)
MCHC RBC AUTO-ENTMCNC: 31.8 G/DL (ref 32–36)
MCV RBC AUTO: 106 FL (ref 82–98)
MONOCYTES # BLD AUTO: 0.6 K/UL (ref 0.3–1)
MONOCYTES NFR BLD: 11 % (ref 4–15)
NEUTROPHILS # BLD AUTO: 3.5 K/UL (ref 1.8–7.7)
NEUTROPHILS NFR BLD: 69.5 % (ref 38–73)
NRBC BLD-RTO: 0 /100 WBC
PHOSPHATE SERPL-MCNC: 3 MG/DL (ref 2.7–4.5)
PLATELET # BLD AUTO: 174 K/UL (ref 150–450)
PMV BLD AUTO: 10.4 FL (ref 9.2–12.9)
POTASSIUM SERPL-SCNC: 4 MMOL/L (ref 3.5–5.1)
RBC # BLD AUTO: 3.7 M/UL (ref 4.6–6.2)
SODIUM SERPL-SCNC: 137 MMOL/L (ref 136–145)
TACROLIMUS BLD-MCNC: 9.4 NG/ML (ref 5–15)
WBC # BLD AUTO: 5.09 K/UL (ref 3.9–12.7)

## 2024-09-09 PROCEDURE — 36415 COLL VENOUS BLD VENIPUNCTURE: CPT | Performed by: INTERNAL MEDICINE

## 2024-09-09 PROCEDURE — 85025 COMPLETE CBC W/AUTO DIFF WBC: CPT | Performed by: INTERNAL MEDICINE

## 2024-09-09 PROCEDURE — 80069 RENAL FUNCTION PANEL: CPT | Performed by: INTERNAL MEDICINE

## 2024-09-09 PROCEDURE — 80197 ASSAY OF TACROLIMUS: CPT | Performed by: INTERNAL MEDICINE

## 2024-09-09 RX ORDER — TACROLIMUS 1 MG/1
CAPSULE ORAL
Qty: 90 CAPSULE | Refills: 11 | Status: SHIPPED | OUTPATIENT
Start: 2024-09-09

## 2024-09-09 NOTE — TELEPHONE ENCOUNTER
Received written order from Dr. Verma.  Messaged pt to lower evening dose of Prograf to 1 mg but to keep the morning dose at 2 mg and repeat a level on 9/30.  Asked pt to contact us with any questions.       ----- Message from Darrin Verma MD sent at 9/9/2024 10:36 AM CDT -----  Please lower prograf to 2/1  Repeat a level in 3 weeks

## 2024-09-30 ENCOUNTER — LAB VISIT (OUTPATIENT)
Dept: LAB | Facility: HOSPITAL | Age: 64
End: 2024-09-30
Payer: MEDICARE

## 2024-09-30 ENCOUNTER — TELEPHONE (OUTPATIENT)
Dept: TRANSPLANT | Facility: CLINIC | Age: 64
End: 2024-09-30
Payer: MEDICARE

## 2024-09-30 ENCOUNTER — PATIENT MESSAGE (OUTPATIENT)
Dept: TRANSPLANT | Facility: CLINIC | Age: 64
End: 2024-09-30
Payer: MEDICARE

## 2024-09-30 DIAGNOSIS — Z94.0 KIDNEY REPLACED BY TRANSPLANT: ICD-10-CM

## 2024-09-30 LAB — TACROLIMUS BLD-MCNC: 4.9 NG/ML (ref 5–15)

## 2024-09-30 PROCEDURE — 80197 ASSAY OF TACROLIMUS: CPT | Performed by: INTERNAL MEDICINE

## 2024-09-30 PROCEDURE — 36415 COLL VENOUS BLD VENIPUNCTURE: CPT | Performed by: INTERNAL MEDICINE

## 2024-09-30 NOTE — TELEPHONE ENCOUNTER
Patient repeated back and voice a understanding of orders and will repeat prograf level on 10/28/2024.  ----- Message from Darrin Verma MD sent at 9/30/2024  9:58 AM CDT -----  Lets repeat the prograf level in 4 weeks

## 2024-10-28 ENCOUNTER — LAB VISIT (OUTPATIENT)
Dept: LAB | Facility: HOSPITAL | Age: 64
End: 2024-10-28
Attending: INTERNAL MEDICINE
Payer: MEDICARE

## 2024-10-28 DIAGNOSIS — Z94.0 KIDNEY REPLACED BY TRANSPLANT: ICD-10-CM

## 2024-10-28 LAB — TACROLIMUS BLD-MCNC: 5.6 NG/ML (ref 5–15)

## 2024-10-28 PROCEDURE — 80197 ASSAY OF TACROLIMUS: CPT | Performed by: INTERNAL MEDICINE

## 2024-10-28 PROCEDURE — 36415 COLL VENOUS BLD VENIPUNCTURE: CPT | Performed by: INTERNAL MEDICINE

## 2024-10-30 ENCOUNTER — PATIENT MESSAGE (OUTPATIENT)
Dept: TRANSPLANT | Facility: CLINIC | Age: 64
End: 2024-10-30
Payer: MEDICARE

## 2024-11-08 ENCOUNTER — TELEPHONE (OUTPATIENT)
Dept: TRANSPLANT | Facility: CLINIC | Age: 64
End: 2024-11-08
Payer: MEDICARE

## 2024-11-08 ENCOUNTER — PATIENT MESSAGE (OUTPATIENT)
Dept: TRANSPLANT | Facility: CLINIC | Age: 64
End: 2024-11-08
Payer: MEDICARE

## 2024-11-08 NOTE — TELEPHONE ENCOUNTER
Return call to patient , states wanted to notify the team that he had a Cystoscopy yesterday 11/7/2024 at [facility] to remove a Bladder stone.  Patient reminded of 11/11 labs and Clinic on 11/18.   ----- Message from Gunjan sent at 11/8/2024  1:12 PM CST -----  Regarding: Speak to Nurse rAboleda  Contact: [phone]  PT fuentes req to speak to Nurse Arboleda. Please return call at your convenience  .  Patient can be contacted @# [phone]

## 2024-11-11 ENCOUNTER — LAB VISIT (OUTPATIENT)
Dept: LAB | Facility: HOSPITAL | Age: 64
End: 2024-11-11
Attending: INTERNAL MEDICINE
Payer: MEDICARE

## 2024-11-11 DIAGNOSIS — Z94.0 KIDNEY REPLACED BY TRANSPLANT: ICD-10-CM

## 2024-11-11 LAB
ALBUMIN SERPL BCP-MCNC: 3.2 G/DL (ref 3.5–5.2)
ALBUMIN SERPL BCP-MCNC: 3.4 G/DL (ref 3.5–5.2)
ALP SERPL-CCNC: 78 U/L (ref 40–150)
ALT SERPL W/O P-5'-P-CCNC: 44 U/L (ref 10–44)
ANION GAP SERPL CALC-SCNC: 10 MMOL/L (ref 8–16)
AST SERPL-CCNC: 41 U/L (ref 10–40)
BASOPHILS # BLD AUTO: 0.02 K/UL (ref 0–0.2)
BASOPHILS NFR BLD: 0.4 % (ref 0–1.9)
BILIRUB DIRECT SERPL-MCNC: 0.4 MG/DL (ref 0.1–0.3)
BILIRUB SERPL-MCNC: 0.9 MG/DL (ref 0.1–1)
BUN SERPL-MCNC: 17 MG/DL (ref 8–23)
CALCIUM SERPL-MCNC: 8.9 MG/DL (ref 8.7–10.5)
CHLORIDE SERPL-SCNC: 105 MMOL/L (ref 95–110)
CO2 SERPL-SCNC: 21 MMOL/L (ref 23–29)
CREAT SERPL-MCNC: 1.5 MG/DL (ref 0.5–1.4)
DIFFERENTIAL METHOD BLD: ABNORMAL
EOSINOPHIL # BLD AUTO: 0.1 K/UL (ref 0–0.5)
EOSINOPHIL NFR BLD: 1.1 % (ref 0–8)
ERYTHROCYTE [DISTWIDTH] IN BLOOD BY AUTOMATED COUNT: 14.6 % (ref 11.5–14.5)
EST. GFR  (NO RACE VARIABLE): 52 ML/MIN/1.73 M^2
GLUCOSE SERPL-MCNC: 73 MG/DL (ref 70–110)
HCT VFR BLD AUTO: 38.2 % (ref 40–54)
HGB BLD-MCNC: 12.1 G/DL (ref 14–18)
IMM GRANULOCYTES # BLD AUTO: 0.05 K/UL (ref 0–0.04)
IMM GRANULOCYTES NFR BLD AUTO: 1.1 % (ref 0–0.5)
LYMPHOCYTES # BLD AUTO: 0.3 K/UL (ref 1–4.8)
LYMPHOCYTES NFR BLD: 6.1 % (ref 18–48)
MCH RBC QN AUTO: 34.1 PG (ref 27–31)
MCHC RBC AUTO-ENTMCNC: 31.7 G/DL (ref 32–36)
MCV RBC AUTO: 108 FL (ref 82–98)
MONOCYTES # BLD AUTO: 0.3 K/UL (ref 0.3–1)
MONOCYTES NFR BLD: 6.3 % (ref 4–15)
NEUTROPHILS # BLD AUTO: 4 K/UL (ref 1.8–7.7)
NEUTROPHILS NFR BLD: 85 % (ref 38–73)
NRBC BLD-RTO: 0 /100 WBC
PHOSPHATE SERPL-MCNC: 2.6 MG/DL (ref 2.7–4.5)
PLATELET # BLD AUTO: 134 K/UL (ref 150–450)
PMV BLD AUTO: 10.6 FL (ref 9.2–12.9)
POTASSIUM SERPL-SCNC: 4 MMOL/L (ref 3.5–5.1)
PROT SERPL-MCNC: 8.5 G/DL (ref 6–8.4)
RBC # BLD AUTO: 3.55 M/UL (ref 4.6–6.2)
SODIUM SERPL-SCNC: 136 MMOL/L (ref 136–145)
TACROLIMUS BLD-MCNC: 4.2 NG/ML (ref 5–15)
WBC # BLD AUTO: 4.75 K/UL (ref 3.9–12.7)

## 2024-11-11 PROCEDURE — 87799 DETECT AGENT NOS DNA QUANT: CPT | Performed by: INTERNAL MEDICINE

## 2024-11-11 PROCEDURE — 80076 HEPATIC FUNCTION PANEL: CPT | Performed by: INTERNAL MEDICINE

## 2024-11-11 PROCEDURE — 80069 RENAL FUNCTION PANEL: CPT | Performed by: INTERNAL MEDICINE

## 2024-11-11 PROCEDURE — 85025 COMPLETE CBC W/AUTO DIFF WBC: CPT | Performed by: INTERNAL MEDICINE

## 2024-11-11 PROCEDURE — 80197 ASSAY OF TACROLIMUS: CPT | Performed by: INTERNAL MEDICINE

## 2024-11-11 PROCEDURE — 36415 COLL VENOUS BLD VENIPUNCTURE: CPT | Performed by: INTERNAL MEDICINE

## 2024-11-13 NOTE — PROGRESS NOTES
Kidney Post-Transplant Assessment    Referring Physician: Quinn Espino  Current Nephrologist: Quinn Espnio    ORGAN: LEFT KIDNEY  Donor Type: living  PHS Increased Risk: no  Cold Ischemia:     Induction Medications:      Subjective:      CC:  Reassessment of renal allograft function and management of chronic immunosuppression.    HPI:  Mr. Parikh is a 64 y.o. year old Black or  male who received a living kidney transplant on 5/10/23. His most recent creatinine is 1.5. He takes prednisone and tacrolimus for maintenance immunosuppression.     Recent hosp;    11/7/24 : bladder stone, s/p Cystolitholapaxy  at [facility] [place]: Gary Contreras MD ; LOV 9/16/24 11/7/24           LOV  6/3/24  Interval HX:   restablish care with his local Nephrologist --Dr WINIFRED Espino  , LOV 10/2024     Intake-is now drinking 2 L+ water daily   UOP-no problems reported   Peripheral edema-none   Appetite-denies N/V/D; tolerating IS meds well      BP  BP Readings from Last 3 Encounters:   11/18/24 137/67   06/03/24 (!) 145/69   05/18/24 (!) 142/81       Lab /diagnostic results reviewed with patient today.   All questions answered    Review of Systems   Constitutional:  Negative for activity change, appetite change, chills, fatigue, fever and unexpected weight change.   HENT:  Positive for hearing loss (hearing aids). Negative for congestion, facial swelling, postnasal drip, rhinorrhea, sinus pressure, sore throat and trouble swallowing.    Eyes:  Positive for visual disturbance (wears glasses). Negative for pain and redness.   Respiratory:  Negative for cough, chest tightness, shortness of breath and wheezing.    Cardiovascular: Negative.  Negative for chest pain, palpitations and leg swelling.   Gastrointestinal:  Negative for abdominal pain, diarrhea, nausea and vomiting.        Hx Crohn's dz--soft stools   Genitourinary:  Negative for dysuria, flank pain and urgency.   Musculoskeletal:  Negative for gait  "problem, neck pain and neck stiffness.   Skin:  Negative for rash.   Allergic/Immunologic: Positive for immunocompromised state. Negative for environmental allergies and food allergies.   Neurological:  Negative for dizziness, weakness, light-headedness and headaches.   Hematological:  Bruises/bleeds easily (Eliquis--RLE DVT).   Psychiatric/Behavioral:  Negative for agitation and confusion. The patient is not nervous/anxious.        Objective:   Blood pressure 137/67, pulse 73, temperature 97.3 °F (36.3 °C), temperature source Temporal, resp. rate 20, height 5' 9.5" (1.765 m), weight 83 kg (182 lb 15.7 oz), SpO2 (!) 92%.body mass index is 26.63 kg/m².    Physical Exam  Constitutional:       Appearance: Normal appearance. He is well-developed.   HENT:      Head: Normocephalic.      Nose: Nose normal.   Eyes:      Conjunctiva/sclera: Conjunctivae normal.      Pupils: Pupils are equal, round, and reactive to light.   Cardiovascular:      Rate and Rhythm: Normal rate and regular rhythm.      Heart sounds: Normal heart sounds.   Pulmonary:      Effort: Pulmonary effort is normal.      Breath sounds: Normal breath sounds.   Abdominal:      General: Bowel sounds are normal.      Palpations: Abdomen is soft. There is no hepatomegaly or splenomegaly.      Comments: No bruit noted over the allograft      Musculoskeletal:      Cervical back: Normal range of motion and neck supple.   Skin:     General: Skin is warm and dry.   Neurological:      Mental Status: He is alert and oriented to person, place, and time.   Psychiatric:         Behavior: Behavior normal.         Labs:  Lab Results   Component Value Date    WBC 4.75 11/11/2024    HGB 12.1 (L) 11/11/2024    HCT 38.2 (L) 11/11/2024     11/11/2024    K 4.0 11/11/2024     11/11/2024    CO2 21 (L) 11/11/2024    BUN 17 11/11/2024    CREATININE 1.5 (H) 11/11/2024    EGFRNORACEVR 52.0 (A) 11/11/2024    GLUCOSE 82 04/08/2023    CALCIUM 8.9 11/11/2024    PHOS 2.6 (L) " "11/11/2024    MG 1.6 06/10/2024    ALBUMIN 3.2 (L) 11/11/2024    ALBUMIN 3.4 (L) 11/11/2024    AST 41 (H) 11/11/2024    ALT 44 11/11/2024    UTPCR 0.24 (H) 11/11/2024    .0 (H) 05/06/2024    TACROLIMUS 4.2 (L) 11/11/2024       No results found for: "EXTANC", "EXTWBC", "EXTSEGS", "EXTPLATELETS", "EXTHEMOGLOBI", "EXTHEMATOCRI", "EXTCREATININ", "EXTSODIUM", "EXTPOTASSIUM", "EXTBUN", "EXTCO2", "EXTCALCIUM", "EXTPHOSPHORU", "EXTGLUCOSE", "EXTALBUMIN", "EXTAST", "EXTALT", "EXTBILITOTAL", "EXTLIPASE", "EXTAMYLASE"    No results found for: "EXTCYCLOSLVL", "EXTSIROLIMUS", "EXTTACROLVL", "EXTPROTCRE", "EXTPTHINTACT", "EXTPROTEINUA", "EXTWBCUA", "EXTRBCUA"    Labs were reviewed with the patient    Assessment:     1. S/P kidney transplant    2. 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 Stage 3a chronic kidney disease    4. Benign hypertension with CKD (chronic kidney disease) stage III    5. At risk for opportunistic infections    6. Anemia of renal disease      Plan:    HX bladder stone, s/p Cystolitholapaxy  at [facility] [place]: Gary Contreras MD --Cont f/u as scheduled--mgmt deferred  Pt is Currently on Calcitriol for hyperparathyroid mgmt    --will discuss with Dr Verma to see if anything else is warranted to help minimize/prevent bladder stone formation     1) s/p living related kidney transplant              - Graft function -stable    -FK Trough 4.2   - cont on FK  2/1   -   MyF  360 mg BID   -prednisone  5 mg QD   -Will continue to monitor for drug toxicities     Lab Results   Component Value Date    CREATININE 1.5 (H) 11/11/2024        Latest Reference Range & Units 1yr 6mo,  Kidney-Post 2 Year  11/11/24   eGFR >60 mL/min/1.73 m^2 52.0 !          2)  HTN: advise low salt diet and home BP monitoring  - Cont  olmesartan, lopressor, flomax, Lasix  as prescribed   RLE DVT: cont Eliquis as prescribed --mgmt deferred to cardiology   BP Readings from Last 3 Encounters:   11/18/24 137/67   06/03/24 (!) 145/69 "   05/18/24 (!) 142/81           3) Hypophosphatemia, Hypomagnesemia, Secondary hyperparathyroidism:  - no intervention required ,will continue to monitor/ guidelines                -continue mg ox  as prescribed      Lab Results   Component Value Date    .0 (H) 05/06/2024    CALCIUM 8.9 11/11/2024    PHOS 2.6 (L) 11/11/2024         4) Metabolic acidosis/Electrolyte balance:  - no intervention required ,will continue to monitor/ guidelines    - cont on  sodium bicarb as prescribed   Lab Results   Component Value Date     11/11/2024    K 4.0 11/11/2024     11/11/2024    CO2 21 (L) 11/11/2024         5) Anemia/Cytopenias:   will monitor as per our guidelines. Medicine list reviewed including potential causes of drug-induced cytopenias                - no intervention required ,will continue to monitor/ guidelines      Lab Results   Component Value Date    WBC 4.75 11/11/2024    HGB 12.1 (L) 11/11/2024    HCT 38.2 (L) 11/11/2024     (H) 11/11/2024     (L) 11/11/2024           6) Proteinuria: continue p/c ratio as per guidelines   Protein Creatinine Ratios:    Creatinine, Urine   Date Value Ref Range Status   11/11/2024 135.0 23.0 - 375.0 mg/dL Final   05/27/2024 157.0 23.0 - 375.0 mg/dL Final   05/06/2024 156.0 23.0 - 375.0 mg/dL Final     Protein, Urine Random   Date Value Ref Range Status   11/11/2024 33 (H) 0 - 15 mg/dL Final   05/27/2024 25 (H) 0 - 15 mg/dL Final   05/06/2024 33 (H) 0 - 15 mg/dL Final     Prot/Creat Ratio, Urine   Date Value Ref Range Status   11/11/2024 0.24 (H) 0.00 - 0.20 Final   05/27/2024 0.16 0.00 - 0.20 Final   05/06/2024 0.21 (H) 0.00 - 0.20 Final            7) BK virus infection screening:  will continue to monitor/ guidelines      Latest Reference Range & Units 1yr 0mo,  Kidney-Post 2 Year  05/15/24   Cytomegalovirus PCR, Quant <50 IU/mL Not Detected      Latest Reference Range & Units 1yr 0mo,  Kidney-Post 2 Year  05/27/24   BK Virus DNA PCR, Quant, Blood  <125 Copies/mL <125      Latest Reference Range & Units 1yr 0mo,  Kidney-Post 2 Year  05/27/24   BK Virus DNA, Blood Not detected  Not detected       8) Weight education: provided during the clinic visit   Body mass index is 26.63 kg/m².        Follow-up:   Clinic: return to transplant clinic weekly for the first month after transplant; every 2 weeks during months 2-3; then at 6-, 9-, 12-, 18-, 24-, and 36- months post-transplant to reassess for complications from immunosuppression toxicity and monitor for rejection.  Annually thereafter.    Labs: since patient remains at high risk for rejection and drug-related complications that warrant close monitoring, labs will be ordered as follows: continue twice weekly CBC, renal panel, and drug level for first month; then same labs once weekly through 3rd month post-transplant.  Urine for UA and protein/creatinine ratio monthly.  Serum BK - PCR at 1-, 3-, 6-, 9-, 12-, 18-, 24-, 36-, 48-, and 60 months post-transplant.  Hepatic panel at 1-, 2-, 3-, 6-, 9-, 12-, 18-, 24-, and 36- months post-transplant.    Teodora Guevara NP       Education:   Material provided to the patient.  Patient reminded to call with any health changes since these can be early signs of significant complications.  Also, I advised the patient to be sure any new medications or changes of old medications are discussed with either a pharmacist or physician knowledgeable with transplant to avoid rejection/drug toxicity related to significant drug interactions.    Patient advised that it is recommended that all transplanted patients, and their close contacts and household members receive Covid vaccination.

## 2024-11-15 ENCOUNTER — TELEPHONE (OUTPATIENT)
Dept: TRANSPLANT | Facility: CLINIC | Age: 64
End: 2024-11-15
Payer: MEDICARE

## 2024-11-15 NOTE — TELEPHONE ENCOUNTER
Patient agreed to have BK drawn here on Monday 11/18 prior to clinic appointment.  ----- Message from Yvonne sent at 11/15/2024  1:01 PM CST -----  Regarding: Lab test cancellation  There was an issue with the BK virus DNA PCR test ordered on this patient from 11/24/24.     Unfortunately, the reference lab received a sample that was insufficient in volume (Quantity Not Sufficient; QNS) for testing so the order has been cancelled and will need to be reordered and recollected if clinically indicated.     If there are any questions, please call the [facility] lab at [phone]. [phone].  Anyone in the [facility] lab will be able to assist you.

## 2024-11-18 ENCOUNTER — LAB VISIT (OUTPATIENT)
Dept: LAB | Facility: HOSPITAL | Age: 64
End: 2024-11-18
Attending: INTERNAL MEDICINE
Payer: MEDICARE

## 2024-11-18 ENCOUNTER — OFFICE VISIT (OUTPATIENT)
Dept: TRANSPLANT | Facility: CLINIC | Age: 64
End: 2024-11-18
Payer: MEDICARE

## 2024-11-18 VITALS
BODY MASS INDEX: 26.2 KG/M2 | HEIGHT: 70 IN | SYSTOLIC BLOOD PRESSURE: 137 MMHG | RESPIRATION RATE: 20 BRPM | WEIGHT: 183 LBS | OXYGEN SATURATION: 92 % | HEART RATE: 73 BPM | TEMPERATURE: 97 F | DIASTOLIC BLOOD PRESSURE: 67 MMHG

## 2024-11-18 DIAGNOSIS — N18.31 STAGE 3A CHRONIC KIDNEY DISEASE: ICD-10-CM

## 2024-11-18 DIAGNOSIS — I12.9 BENIGN HYPERTENSION WITH CKD (CHRONIC KIDNEY DISEASE) STAGE III: ICD-10-CM

## 2024-11-18 DIAGNOSIS — Z79.899 IMMUNODEFICIENCY DUE TO LONG TERM DRUG THERAPY: ICD-10-CM

## 2024-11-18 DIAGNOSIS — Z91.89 AT RISK FOR OPPORTUNISTIC INFECTIONS: ICD-10-CM

## 2024-11-18 DIAGNOSIS — N18.30 BENIGN HYPERTENSION WITH CKD (CHRONIC KIDNEY DISEASE) STAGE III: ICD-10-CM

## 2024-11-18 DIAGNOSIS — N18.9 ANEMIA OF RENAL DISEASE: ICD-10-CM

## 2024-11-18 DIAGNOSIS — Z94.0 S/P KIDNEY TRANSPLANT: Primary | ICD-10-CM

## 2024-11-18 DIAGNOSIS — D84.821 IMMUNODEFICIENCY DUE TO LONG TERM DRUG THERAPY: ICD-10-CM

## 2024-11-18 DIAGNOSIS — D63.1 ANEMIA OF RENAL DISEASE: ICD-10-CM

## 2024-11-18 DIAGNOSIS — Z94.0 KIDNEY REPLACED BY TRANSPLANT: ICD-10-CM

## 2024-11-18 PROCEDURE — 36415 COLL VENOUS BLD VENIPUNCTURE: CPT | Performed by: INTERNAL MEDICINE

## 2024-11-18 PROCEDURE — 99214 OFFICE O/P EST MOD 30 MIN: CPT | Mod: PBBFAC | Performed by: NURSE PRACTITIONER

## 2024-11-18 PROCEDURE — 99999 PR PBB SHADOW E&M-EST. PATIENT-LVL IV: CPT | Mod: PBBFAC,,, | Performed by: NURSE PRACTITIONER

## 2024-11-18 PROCEDURE — 87799 DETECT AGENT NOS DNA QUANT: CPT | Performed by: INTERNAL MEDICINE

## 2024-11-18 RX ORDER — CALCITRIOL 0.5 UG/1
1 CAPSULE ORAL
COMMUNITY
Start: 2024-07-10 | End: 2025-06-11

## 2024-11-18 RX ORDER — CHOLECALCIFEROL (VITAMIN D3) 25 MCG
1000 TABLET ORAL
COMMUNITY

## 2024-11-18 RX ORDER — SODIUM BICARBONATE 325 MG/1
1 TABLET ORAL 4 TIMES DAILY
COMMUNITY
Start: 2024-11-04 | End: 2025-10-30

## 2024-11-18 RX ORDER — FUROSEMIDE 20 MG/1
1 TABLET ORAL DAILY
COMMUNITY
Start: 2024-06-14 | End: 2025-06-09

## 2024-11-18 NOTE — Clinical Note
Mr Parikh [place] a  [place], [place] [facility] [place]--see path report in media  I wanted to clarify if changing calcitriol to sensipar would be beneficial in  lowering calcium and help reduce risk of stone formation.   Also would you want to repeat PTH and Vit D level  or anything else?  He f/u with  gen nephrology in 10/2024 and does not f/u again until next month.  I'm not sure who is managing stone  prevention and he has not been given any recs.  He also had txp  US imaging in 5/2024 which showed some small non obstructing  stones.   Thanks Teodora

## 2024-11-18 NOTE — LETTER
November 18, 2024        Quinn Espino  [address]  [address]  [facility] [address]  Phone: [phone]  Fax: [phone]             [facility] Transplant 1st Fl  [address]  [facility] [address]  Phone: [phone]   Patient: Abdoulaye Parikh   MR Number: [id number]   YOB: 1960   Date of Visit: 11/18/2024       Dear Dr. Quinn Espino    Thank you for referring Abdoulaye Parikh to me for evaluation. Attached you will find relevant portions of my assessment and plan of care.    If you have questions, please do not hesitate to call me. I look forward to following Abdoulaye Parikh along with you.    Sincerely,    Teodora Guevara, NP    Enclosure    If you would like to receive this communication electronically, please contact [email or web address] or [phone] to request [employer] access.    [employer] is a tool which provides read-only access to select patient information with whom you have a relationship. Its easy to use and provides real time access to review your patients record including encounter summaries, notes, results, and demographic information.    If you feel you have received this communication in error or would no longer like to receive these types of communications, please e-mail [email or web address]

## 2024-11-22 ENCOUNTER — TELEPHONE (OUTPATIENT)
Dept: TRANSPLANT | Facility: CLINIC | Age: 64
End: 2024-11-22
Payer: MEDICARE

## 2024-11-22 ENCOUNTER — PATIENT MESSAGE (OUTPATIENT)
Dept: TRANSPLANT | Facility: CLINIC | Age: 64
End: 2024-11-22
Payer: MEDICARE

## 2024-11-22 NOTE — TELEPHONE ENCOUNTER
He needs to follow up closely with his nephrologist and Urologist. Unfortunately this type of stone (struvite) is associated with infections (urease producing bacteria) and are very difficult to completely eradicate. Surgical approach was the correct approach. He likely needs long term prophylactic antibiotics, surveillance with imaging studies every 3 to 4 months. some suggest to use Lithostat (which is Acetohydroxamic acid that inhibits bacterial urease enzymes, decreasing the formation of ammonia in the urine by urea-splitting organisms UpToDate ). Please make sure the patient knows this and also fax the calculus analysis to Dr Espino, his nephrologist. The patient had the procedure at [facility] and Dr Espino practices at [facility], so he may be aware but I am not sure.    Darrin Verma MD  Transplant Nephrology

## 2025-02-11 ENCOUNTER — PATIENT MESSAGE (OUTPATIENT)
Dept: TRANSPLANT | Facility: CLINIC | Age: 65
End: 2025-02-11
Payer: MEDICARE

## 2025-02-21 RX ORDER — TAMSULOSIN HYDROCHLORIDE 0.4 MG/1
0.4 CAPSULE ORAL DAILY
Qty: 30 CAPSULE | Refills: 11 | OUTPATIENT
Start: 2025-02-21 | End: 2026-02-21

## 2025-02-24 RX ORDER — TAMSULOSIN HYDROCHLORIDE 0.4 MG/1
0.4 CAPSULE ORAL DAILY
Qty: 30 CAPSULE | Refills: 11 | OUTPATIENT
Start: 2025-02-24 | End: 2026-02-24

## 2025-03-05 RX ORDER — TAMSULOSIN HYDROCHLORIDE 0.4 MG/1
0.4 CAPSULE ORAL DAILY
Qty: 30 CAPSULE | Refills: 11 | OUTPATIENT
Start: 2025-03-05 | End: 2026-03-05

## 2025-03-07 DIAGNOSIS — Z94.0 S/P KIDNEY TRANSPLANT: ICD-10-CM

## 2025-03-07 RX ORDER — PREDNISONE 5 MG/1
5 TABLET ORAL DAILY
Qty: 30 TABLET | Refills: 11 | Status: CANCELLED | OUTPATIENT
Start: 2025-03-07

## 2025-03-10 DIAGNOSIS — Z94.0 S/P KIDNEY TRANSPLANT: ICD-10-CM

## 2025-03-10 RX ORDER — PREDNISONE 5 MG/1
5 TABLET ORAL DAILY
Qty: 30 TABLET | Refills: 11 | Status: SHIPPED | OUTPATIENT
Start: 2025-03-10

## 2025-05-05 ENCOUNTER — LAB VISIT (OUTPATIENT)
Dept: LAB | Facility: HOSPITAL | Age: 65
End: 2025-05-05
Attending: INTERNAL MEDICINE
Payer: MEDICARE

## 2025-05-05 ENCOUNTER — RESULTS FOLLOW-UP (OUTPATIENT)
Dept: TRANSPLANT | Facility: CLINIC | Age: 65
End: 2025-05-05
Payer: MEDICARE

## 2025-05-05 DIAGNOSIS — Z94.0 KIDNEY REPLACED BY TRANSPLANT: ICD-10-CM

## 2025-05-05 LAB
ABSOLUTE EOSINOPHIL (OHS): 0.04 K/UL
ABSOLUTE MONOCYTE (OHS): 0.69 K/UL (ref 0.3–1)
ABSOLUTE NEUTROPHIL COUNT (OHS): 3.92 K/UL (ref 1.8–7.7)
ALBUMIN SERPL BCP-MCNC: 3.2 G/DL (ref 3.5–5.2)
ALP SERPL-CCNC: 69 UNIT/L (ref 40–150)
ALT SERPL W/O P-5'-P-CCNC: 9 UNIT/L (ref 10–44)
ANION GAP (OHS): 9 MMOL/L (ref 8–16)
AST SERPL-CCNC: 11 UNIT/L (ref 11–45)
BACTERIA #/AREA URNS AUTO: ABNORMAL /HPF
BASOPHILS # BLD AUTO: 0.03 K/UL
BASOPHILS NFR BLD AUTO: 0.5 %
BILIRUB DIRECT SERPL-MCNC: 0.2 MG/DL (ref 0.1–0.3)
BILIRUB SERPL-MCNC: 0.6 MG/DL (ref 0.1–1)
BILIRUB UR QL STRIP.AUTO: NEGATIVE
BUN SERPL-MCNC: 14 MG/DL (ref 8–23)
CALCIUM SERPL-MCNC: 8.9 MG/DL (ref 8.7–10.5)
CHLORIDE SERPL-SCNC: 107 MMOL/L (ref 95–110)
CLARITY UR: CLEAR
CO2 SERPL-SCNC: 24 MMOL/L (ref 23–29)
COLOR UR AUTO: YELLOW
CREAT SERPL-MCNC: 1.5 MG/DL (ref 0.5–1.4)
CREAT UR-MCNC: 94 MG/DL (ref 23–375)
ERYTHROCYTE [DISTWIDTH] IN BLOOD BY AUTOMATED COUNT: 14.5 % (ref 11.5–14.5)
GFR SERPLBLD CREATININE-BSD FMLA CKD-EPI: 52 ML/MIN/1.73/M2
GLUCOSE SERPL-MCNC: 79 MG/DL (ref 70–110)
GLUCOSE UR QL STRIP: NEGATIVE
HCT VFR BLD AUTO: 38.2 % (ref 40–54)
HGB BLD-MCNC: 12 GM/DL (ref 14–18)
HGB UR QL STRIP: ABNORMAL
IMM GRANULOCYTES # BLD AUTO: 0.05 K/UL (ref 0–0.04)
IMM GRANULOCYTES NFR BLD AUTO: 0.9 % (ref 0–0.5)
KETONES UR QL STRIP: NEGATIVE
LEUKOCYTE ESTERASE UR QL STRIP: ABNORMAL
LYMPHOCYTES # BLD AUTO: 0.87 K/UL (ref 1–4.8)
MCH RBC QN AUTO: 33.8 PG (ref 27–31)
MCHC RBC AUTO-ENTMCNC: 31.4 G/DL (ref 32–36)
MCV RBC AUTO: 108 FL (ref 82–98)
MICROSCOPIC COMMENT: ABNORMAL
NITRITE UR QL STRIP: NEGATIVE
NUCLEATED RBC (/100WBC) (OHS): 0 /100 WBC
PH UR STRIP: 7 [PH]
PHOSPHATE SERPL-MCNC: 3.4 MG/DL (ref 2.7–4.5)
PLATELET # BLD AUTO: 136 K/UL (ref 150–450)
PMV BLD AUTO: 10.6 FL (ref 9.2–12.9)
POTASSIUM SERPL-SCNC: 4 MMOL/L (ref 3.5–5.1)
PROT SERPL-MCNC: 7.7 GM/DL (ref 6–8.4)
PROT UR QL STRIP: NEGATIVE
PROT UR-MCNC: 9 MG/DL
PROT/CREAT UR: 0.1 MG/G{CREAT}
RBC # BLD AUTO: 3.55 M/UL (ref 4.6–6.2)
RBC #/AREA URNS AUTO: 10 /HPF (ref 0–4)
RELATIVE EOSINOPHIL (OHS): 0.7 %
RELATIVE LYMPHOCYTE (OHS): 15.5 % (ref 18–48)
RELATIVE MONOCYTE (OHS): 12.3 % (ref 4–15)
RELATIVE NEUTROPHIL (OHS): 70.1 % (ref 38–73)
SODIUM SERPL-SCNC: 140 MMOL/L (ref 136–145)
SP GR UR STRIP: 1.01
TACROLIMUS BLD-MCNC: 4.7 NG/ML (ref 5–15)
UROBILINOGEN UR STRIP-ACNC: NEGATIVE EU/DL
WBC # BLD AUTO: 5.6 K/UL (ref 3.9–12.7)
WBC #/AREA URNS AUTO: 20 /HPF (ref 0–5)

## 2025-05-05 PROCEDURE — 36415 COLL VENOUS BLD VENIPUNCTURE: CPT

## 2025-05-05 PROCEDURE — 82374 ASSAY BLOOD CARBON DIOXIDE: CPT

## 2025-05-05 PROCEDURE — 87799 DETECT AGENT NOS DNA QUANT: CPT

## 2025-05-05 PROCEDURE — 84100 ASSAY OF PHOSPHORUS: CPT

## 2025-05-05 PROCEDURE — 82570 ASSAY OF URINE CREATININE: CPT

## 2025-05-05 PROCEDURE — 80197 ASSAY OF TACROLIMUS: CPT

## 2025-05-05 PROCEDURE — 85025 COMPLETE CBC W/AUTO DIFF WBC: CPT

## 2025-05-05 PROCEDURE — 82040 ASSAY OF SERUM ALBUMIN: CPT

## 2025-05-05 PROCEDURE — 81003 URINALYSIS AUTO W/O SCOPE: CPT

## 2025-05-05 NOTE — TELEPHONE ENCOUNTER
Left voice message to call coordinator back.   Message also sent via [employer].  ----- Message from Darrin Verma MD sent at 5/5/2025 12:40 PM CDT -----  Does he have any UTI symptoms?  ----- Message -----  From: Lab, Background User  Sent: 5/5/2025   8:19 AM CDT  To: Darrin Verma MD

## 2025-05-07 LAB
W BK VIRUS DNA, QUALITATIVE, PLASMA: NOT DETECTED
W BK VIRUS DNA, QUANTITATIVE, PLASMA: <125 COPIES/ML
W LOG BK VIRUS DNA, PLASMA: <2.1 LOG (10) COPIES/ML

## 2025-05-14 ENCOUNTER — TELEPHONE (OUTPATIENT)
Dept: TRANSPLANT | Facility: CLINIC | Age: 65
End: 2025-05-14
Payer: MEDICARE

## 2025-05-14 NOTE — TELEPHONE ENCOUNTER
Left voice message to call coordinator back.  
Clear bilaterally, pupils equal, round and reactive to light.

## 2025-05-14 NOTE — TELEPHONE ENCOUNTER
Left voice message to call coordinator back.  ----- Message from [employer] sent at 5/14/2025  4:52 PM CDT -----  Regarding: Consult/Advisory  Contact: Abdoulaye Parikh   Consult/Advisory Name Of Caller:Abdoulaye Parikh   Contact Preference:[phone] (home) [phone] (work)  Nature of call:Patient is returning call to [facility]. Requesting a call back

## 2025-05-15 ENCOUNTER — TELEPHONE (OUTPATIENT)
Dept: TRANSPLANT | Facility: CLINIC | Age: 65
End: 2025-05-15
Payer: MEDICARE

## 2025-05-15 NOTE — TELEPHONE ENCOUNTER
Return call to patient, C/O BLE edema off and on x 3 weeks, states the last time he took lasix was on 5/9.  Takes Lasix 20mg QD PRN for edema.  Denies any acute changes.  Agreed to log B/P reading BID and bring to clinic for review on Monday 5/19.   ----- Message from Calree Arita sent at 5/14/2025  5:13 PM CDT -----  Regarding: FW: Consult/Advisory  Contact: Abdoulaye Parikh    ----- Message -----  From: Deb Perry  Sent: 5/14/2025   4:54 PM CDT  To: [facility] Post-Kidney Transplant Clinical  Subject: Consult/Advisory                                  Consult/Advisory Name Of Caller:Abdoulaye Parikh   Contact Preference:[phone] (home) [phone] (work)  Nature of call:Patient is returning call to [facility]. Requesting a call back

## 2025-05-15 NOTE — PROGRESS NOTES
Kidney Post-Transplant Assessment    Referring Physician: Quinn Espino  Current Nephrologist: Quinn Espino    ORGAN: LEFT KIDNEY  Donor Type: living  PHS Increased Risk: no  Cold Ischemia:     Induction Medications:      Subjective:      CC:  Reassessment of renal allograft function and management of chronic immunosuppression.    HPI:  Mr. Parikh is a 64 y.o. year old Black or  male who received a living kidney transplant on 5/10/23. His most recent creatinine is 1.5. He takes prednisone and tacrolimus for maintenance immunosuppression.     Recent hosp;    None    Interval HX:   restablish care with his local Nephrologist --Dr WINIFRED Espino  , LOV 4/2025 next appointment 7/2025    Reports swelling to bilateral lower extremities. 1+. Not taking lasix regularly only taking as needed. Taken lasix twice a week for 2 weeks. Seen his Cardiologist 3/31/25. Was encouraged to take his lasix 2-3x a week. Only recently started. Hd elevated PA pressure on last echo. Reports mild SOB with walking intermittently     Intake-is now drinking 2 L+ water daily   UOP-no problems reported   Appetite-denies N/V/D; tolerating IS meds well      BP  BP Readings from Last 3 Encounters:   05/19/25 (!) 145/68   11/18/24 137/67   06/03/24 (!) 145/69       Lab /diagnostic results reviewed with patient today.   All questions answered    Review of Systems   Constitutional:  Negative for activity change, appetite change, chills, fatigue, fever and unexpected weight change.   HENT:  Positive for hearing loss (hearing aids). Negative for congestion, facial swelling, postnasal drip, rhinorrhea, sinus pressure, sore throat and trouble swallowing.    Eyes:  Positive for visual disturbance (wears glasses). Negative for pain and redness.   Respiratory:  Negative for cough, chest tightness, shortness of breath and wheezing.    Cardiovascular: Negative.  Negative for chest pain, palpitations and leg swelling.  "  Gastrointestinal:  Negative for abdominal pain, diarrhea, nausea and vomiting.        Hx Crohn's dz--soft stools   Genitourinary:  Negative for dysuria, flank pain and urgency.   Musculoskeletal:  Negative for gait problem, neck pain and neck stiffness.   Skin:  Negative for rash.   Allergic/Immunologic: Positive for immunocompromised state. Negative for environmental allergies and food allergies.   Neurological:  Negative for dizziness, weakness, light-headedness and headaches.   Hematological:  Bruises/bleeds easily (Eliquis--RLE DVT).   Psychiatric/Behavioral:  Negative for agitation and confusion. The patient is not nervous/anxious.        Objective:   Blood pressure (!) 145/68, pulse 65, temperature 97.3 °F (36.3 °C), temperature source Temporal, resp. rate 18, height 5' 9.5" (1.765 m), weight 91.9 kg (202 lb 9.6 oz), SpO2 95%.body mass index is 29.49 kg/m².    Physical Exam  Constitutional:       Appearance: Normal appearance. He is well-developed.   HENT:      Head: Normocephalic.      Nose: Nose normal.   Eyes:      Conjunctiva/sclera: Conjunctivae normal.      Pupils: Pupils are equal, round, and reactive to light.   Cardiovascular:      Rate and Rhythm: Normal rate and regular rhythm.      Heart sounds: Normal heart sounds.   Pulmonary:      Effort: Pulmonary effort is normal.      Breath sounds: Normal breath sounds.   Abdominal:      General: Bowel sounds are normal.      Palpations: Abdomen is soft. There is no hepatomegaly or splenomegaly.      Comments: No bruit noted over the allograft      Musculoskeletal:      Cervical back: Normal range of motion and neck supple.   Skin:     General: Skin is warm and dry.   Neurological:      Mental Status: He is alert and oriented to person, place, and time.   Psychiatric:         Behavior: Behavior normal.         Labs:  Lab Results   Component Value Date    WBC 26-50 (A) 05/09/2025    HGB 12.0 (L) 05/05/2025    HCT 38.2 (L) 05/05/2025     05/09/2025    " "K 4.3 05/09/2025     05/05/2025    CO2 26 05/09/2025    BUN 14.8 05/09/2025    CREATININE 1.49 (H) 05/09/2025    EGFRNORACEVR 52 (L) 05/09/2025    GLUCOSE 85 05/09/2025    CALCIUM 8.7 05/09/2025    PHOS 3.4 05/05/2025    MG 1.6 06/10/2024    ALBUMIN 3.2 (L) 05/05/2025    AST 11 05/05/2025    ALT 9 (L) 05/05/2025    UTPCR 0.10 05/05/2025    .0 (H) 05/06/2024    TACROLIMUS 4.7 (L) 05/05/2025       No results found for: "EXTANC", "EXTWBC", "EXTSEGS", "EXTPLATELETS", "EXTHEMOGLOBI", "EXTHEMATOCRI", "EXTCREATININ", "EXTSODIUM", "EXTPOTASSIUM", "EXTBUN", "EXTCO2", "EXTCALCIUM", "EXTPHOSPHORU", "EXTGLUCOSE", "EXTALBUMIN", "EXTAST", "EXTALT", "EXTBILITOTAL", "EXTLIPASE", "EXTAMYLASE"    No results found for: "EXTCYCLOSLVL", "EXTSIROLIMUS", "EXTTACROLVL", "EXTPROTCRE", "EXTPTHINTACT", "EXTPROTEINUA", "EXTWBCUA", "EXTRBCUA"    Labs were reviewed with the patient    Assessment:     1. S/P kidney transplant    2. Stage 3a chronic kidney disease    3. Sarcoidosis    4. 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  Encourage lifestyle modifications: diet, exercise, weight loss   Continue current IS therapy regimen  Refills on IS therapy today.       Swelling: instructed patient to take Lasix 20mg dailys 3 days. Then next week start taking lasix 3x as his Cardiologist wanted him to do.     1) s/p living related kidney transplant              -Graft function -stable    -FK Trough 4.7   -cont on FK  2/1   -MyF  360 mg BID   -prednisone  5 mg QD   -Will continue to monitor for drug toxicities     Lab Results   Component Value Date    CREATININE 1.49 (H) 05/09/2025      Latest Reference Range & Units 1yr 11mo,  Kidney-Post 2 Year  05/05/25 1yr 11mo,  Kidney-Post 2 Year  05/09/25 07:13   Creatinine 0.70 - 1.40 mg/dL 1.5 (H) 1.49 " (H) (E)   eGFR >60 mL/min/1.73/m2 52 (L) [1]          2)  HTN: advise low salt diet and home BP monitoring  - Cont  olmesartan, lopressor, flomax, Lasix  as prescribed   RLE DVT: cont Eliquis as prescribed --mgmt deferred to cardiology   BP Readings from Last 3 Encounters:   05/19/25 (!) 145/68   11/18/24 137/67   06/03/24 (!) 145/69           3) Hypophosphatemia, Hypomagnesemia, Secondary hyperparathyroidism:  - no intervention required ,will continue to monitor/ guidelines                -continue mg ox  as prescribed      Lab Results   Component Value Date    .0 (H) 05/06/2024    CALCIUM 8.7 05/09/2025    PHOS 3.4 05/05/2025         4) Metabolic acidosis/Electrolyte balance:  - no intervention required ,will continue to monitor/ guidelines    - cont on  sodium bicarb as prescribed   Lab Results   Component Value Date     05/09/2025    K 4.3 05/09/2025     05/05/2025    CO2 26 05/09/2025         5) Anemia/Cytopenias:   will monitor as per our guidelines. Medicine list reviewed including potential causes of drug-induced cytopenias                - no intervention required ,will continue to monitor/ guidelines   Lab Results   Component Value Date    WBC 26-50 (A) 05/09/2025    HGB 12.0 (L) 05/05/2025    HCT 38.2 (L) 05/05/2025     (H) 05/05/2025     (L) 05/05/2025       6) Proteinuria: continue p/c ratio as per guidelines   Protein Creatinine Ratios:    Urine Creatinine   Date Value Ref Range Status   05/05/2025 94.0 23.0 - 375.0 mg/dL Final     Creatinine, Urine   Date Value Ref Range Status   11/11/2024 135.0 23.0 - 375.0 mg/dL Final   05/27/2024 157.0 23.0 - 375.0 mg/dL Final   05/06/2024 156.0 23.0 - 375.0 mg/dL Final     Urine Protein   Date Value Ref Range Status   05/05/2025 9 <=15 mg/dL Final     Protein, Urine Random   Date Value Ref Range Status   11/11/2024 33 (H) 0 - 15 mg/dL Final   05/27/2024 25 (H) 0 - 15 mg/dL Final   05/06/2024 33 (H) 0 - 15 mg/dL Final     Urine  Protein/Creatinine Ratio   Date Value Ref Range Status   05/05/2025 0.10 <=0.20 Final     Prot/Creat Ratio, Urine   Date Value Ref Range Status   11/11/2024 0.24 (H) 0.00 - 0.20 Final   05/27/2024 0.16 0.00 - 0.20 Final   05/06/2024 0.21 (H) 0.00 - 0.20 Final            7) BK virus infection screening:  will continue to monitor/ guidelines   Latest Reference Range & Units 1yr 11mo,  Kidney-Post 2 Year  05/05/25   BK Virus DNA, Qualitative, Plasma Not detected  Not detected   BK Virus DNA, Quantitative, Plasma <125 Copies/mL <125       8) Weight education: provided during the clinic visit   Body mass index is 29.49 kg/m².        Follow-up:   Clinic: return to transplant clinic weekly for the first month after transplant; every 2 weeks during months 2-3; then at 6-, 9-, 12-, 18-, 24-, and 36- months post-transplant to reassess for complications from immunosuppression toxicity and monitor for rejection.  Annually thereafter.    Labs: since patient remains at high risk for rejection and drug-related complications that warrant close monitoring, labs will be ordered as follows: continue twice weekly CBC, renal panel, and drug level for first month; then same labs once weekly through 3rd month post-transplant.  Urine for UA and protein/creatinine ratio monthly.  Serum BK - PCR at 1-, 3-, 6-, 9-, 12-, 18-, 24-, 36-, 48-, and 60 months post-transplant.  Hepatic panel at 1-, 2-, 3-, 6-, 9-, 12-, 18-, 24-, and 36- months post-transplant.    Rose Mary Stroud NP       Education:   Material provided to the patient.  Patient reminded to call with any health changes since these can be early signs of significant complications.  Also, I advised the patient to be sure any new medications or changes of old medications are discussed with either a pharmacist or physician knowledgeable with transplant to avoid rejection/drug toxicity related to significant drug interactions.    Patient advised that it is recommended that all transplanted  patients, and their close contacts and household members receive Covid vaccination.

## 2025-05-19 ENCOUNTER — OFFICE VISIT (OUTPATIENT)
Dept: TRANSPLANT | Facility: CLINIC | Age: 65
End: 2025-05-19
Payer: MEDICARE

## 2025-05-19 VITALS
OXYGEN SATURATION: 95 % | DIASTOLIC BLOOD PRESSURE: 68 MMHG | HEART RATE: 65 BPM | RESPIRATION RATE: 18 BRPM | BODY MASS INDEX: 29.01 KG/M2 | HEIGHT: 70 IN | SYSTOLIC BLOOD PRESSURE: 145 MMHG | TEMPERATURE: 97 F | WEIGHT: 202.63 LBS

## 2025-05-19 DIAGNOSIS — Z79.899 IMMUNODEFICIENCY DUE TO LONG TERM DRUG THERAPY: ICD-10-CM

## 2025-05-19 DIAGNOSIS — D84.821 IMMUNODEFICIENCY DUE TO LONG TERM DRUG THERAPY: ICD-10-CM

## 2025-05-19 DIAGNOSIS — N18.31 STAGE 3A CHRONIC KIDNEY DISEASE: ICD-10-CM

## 2025-05-19 DIAGNOSIS — D86.9 SARCOIDOSIS: ICD-10-CM

## 2025-05-19 DIAGNOSIS — Z94.0 S/P KIDNEY TRANSPLANT: Primary | ICD-10-CM

## 2025-05-19 PROCEDURE — 99999 PR PBB SHADOW E&M-EST. PATIENT-LVL III: CPT | Mod: PBBFAC,,, | Performed by: NURSE PRACTITIONER

## 2025-05-19 PROCEDURE — 99213 OFFICE O/P EST LOW 20 MIN: CPT | Mod: PBBFAC | Performed by: NURSE PRACTITIONER

## 2025-05-19 PROCEDURE — 99215 OFFICE O/P EST HI 40 MIN: CPT | Mod: S$PBB,,, | Performed by: NURSE PRACTITIONER

## 2025-05-19 RX ORDER — MYCOPHENOLIC ACID 180 MG/1
360 TABLET, DELAYED RELEASE ORAL 2 TIMES DAILY
Qty: 120 TABLET | Refills: 11 | Status: SHIPPED | OUTPATIENT
Start: 2025-05-19 | End: 2026-05-19

## 2025-05-19 RX ORDER — TACROLIMUS 1 MG/1
CAPSULE ORAL
Qty: 90 CAPSULE | Refills: 11 | Status: SHIPPED | OUTPATIENT
Start: 2025-05-19

## 2025-05-19 NOTE — LETTER
May 19, 2025        Quinn Espino  [address]  [address]  [facility] [address]  Phone: [phone]  Fax: [phone]             [facility] Transplant 1st Fl  [address]  [facility] [address]  Phone: [phone]   Patient: Abdoulaye Parikh   MR Number: [id number]   YOB: 1960   Date of Visit: 5/19/2025       Dear Dr. Quinn Espino    Thank you for referring Abdoulaye Parikh to me for evaluation. Attached you will find relevant portions of my assessment and plan of care.    If you have questions, please do not hesitate to call me. I look forward to following Abdoulaye Parikh along with you.    Sincerely,    Rose Mary Stroud, NP    Enclosure    If you would like to receive this communication electronically, please contact [email or web address] or [phone] to request [employer] Link access.    [employer] Link is a tool which provides read-only access to select patient information with whom you have a relationship. Its easy to use and provides real time access to review your patients record including encounter summaries, notes, results, and demographic information.    If you feel you have received this communication in error or would no longer like to receive these types of communications, please e-mail [email or web address]

## 2025-06-21 ENCOUNTER — PATIENT MESSAGE (OUTPATIENT)
Dept: TRANSPLANT | Facility: CLINIC | Age: 65
End: 2025-06-21
Payer: MEDICARE

## 2025-06-24 ENCOUNTER — TELEPHONE (OUTPATIENT)
Dept: TRANSPLANT | Facility: CLINIC | Age: 65
End: 2025-06-24
Payer: MEDICARE

## 2025-06-24 NOTE — TELEPHONE ENCOUNTER
Called back pt, pt did not answer, left voicemail              Copied from CRM #[id number]. Topic: General Inquiry - Patient Advice  >> Jun 24, 2025  1:08 PM Gunjan wrote:  PT called to speak to Nurse Arboleda     Contact [phone]

## 2025-06-24 NOTE — TELEPHONE ENCOUNTER
"Called pt back twice, pt did not answer.          Copied from CRM #[id number]. Topic: General Inquiry - Patient Advice  >> Jun 24, 2025  2:09 PM Abbe wrote:  Consult/Advisory:        Name Of Caller: Self        Contact Preference?:[phone]        What is the nature of the call?: Returning call to  Smitha        Additional Notes:  "Thank you for all that you do for our patients"  "

## 2025-08-31 RX ORDER — METOPROLOL TARTRATE 25 MG/1
12.5 TABLET, FILM COATED ORAL 2 TIMES DAILY
Qty: 30 TABLET | Refills: 11 | Status: CANCELLED | OUTPATIENT
Start: 2025-08-31 | End: 2026-08-31

## (undated) DEVICE — DRESSING ABSRBNT ISLAND 3.6X8

## (undated) DEVICE — SUT ETHILON 3-0 FS-1 30

## (undated) DEVICE — SUT PROLENE 5-0 36IN C-1

## (undated) DEVICE — PUNCH AORTIC 4.8MM

## (undated) DEVICE — KIT SURGIFLO HEMOSTATIC MATRIX

## (undated) DEVICE — APPLICATOR CHLORAPREP ORN 26ML

## (undated) DEVICE — REMOVER STAPLE SKIN DISPOSABLE

## (undated) DEVICE — SUT 4/0 27IN PDS II VIO MON

## (undated) DEVICE — HEMOSTAT SURGICEL NU-KNIT 6X9

## (undated) DEVICE — ELECTRODE REM PLYHSV RETURN 9

## (undated) DEVICE — EVACUATOR WOUND BULB 100CC

## (undated) DEVICE — DRAPE SLUSH WARMER WITH DISC

## (undated) DEVICE — SUT 2-0 12-18IN SILK

## (undated) DEVICE — SUT PROLENE 6-0 BV-1

## (undated) DEVICE — ADHESIVE DERMABOND ADVANCED

## (undated) DEVICE — NDL MONOPTY BIOPSY 18GX20CM

## (undated) DEVICE — SET DECANTER MEDICHOICE

## (undated) DEVICE — SUT 4-0 12-18IN SILK BLACK

## (undated) DEVICE — SUT CTD VICRYL VIL BR SH 27

## (undated) DEVICE — TRAY CATH FOL SIL URIMTR 16FR

## (undated) DEVICE — SYR ONLY LUER LOCK 20CC

## (undated) DEVICE — SUT PROLENE 3-0 SH DA 36 BL

## (undated) DEVICE — TOWEL OR DISP STRL BLUE 4/PK

## (undated) DEVICE — PUNCH AORTIC 4.0MM 6/CASE

## (undated) DEVICE — STOCKINETTE 2INX36

## (undated) DEVICE — SUT PROLENE 6-0 BV-1 30IN

## (undated) DEVICE — TOWEL OR XRAY WHITE 17X26IN

## (undated) DEVICE — SYR SLIP TIP 1CC

## (undated) DEVICE — SUT SILK 2-0 STRANDS 30IN

## (undated) DEVICE — SEE MEDLINE ITEM 156902

## (undated) DEVICE — BOOT SUTURE AID

## (undated) DEVICE — PACK UNIVERSAL SPLIT II

## (undated) DEVICE — SUT SILK 3-0 SH 18IN BLACK

## (undated) DEVICE — SUT SILK 3-0 STRANDS 30IN

## (undated) DEVICE — SPONGE LAP 18X18 PREWASHED

## (undated) DEVICE — FOLEY BLLN 20FR 3WAY 5CC

## (undated) DEVICE — SUT PDS BV 6-0

## (undated) DEVICE — PACK KIDNEY TRANSPLANT CUSTOM

## (undated) DEVICE — CONTAINER SPECIMEN STRL 4OZ

## (undated) DEVICE — SUT SILK 3-0 SH DETACH 30IN

## (undated) DEVICE — PLUG CATHETER STERILE FOLEY

## (undated) DEVICE — SUT 1 36IN PDS II VIO MONO

## (undated) DEVICE — SET IRR URLGY 2LINE UNIV SPIKE

## (undated) DEVICE — KIT COLLECTION E SWAB REGULAR

## (undated) DEVICE — SUT PROLENE 4-0 SH BLU 36IN

## (undated) DEVICE — SUTURE PROLENE 5-0

## (undated) DEVICE — GOWN SURGICAL X-LARGE

## (undated) DEVICE — STAPLER SKIN PROXIMATE WIDE

## (undated) DEVICE — SOL NS 1000CC

## (undated) DEVICE — CLIPPER BLADE MOD 4406 (CAREF)

## (undated) DEVICE — TIP YANKAUERS BULB NO VENT

## (undated) DEVICE — Device